# Patient Record
Sex: FEMALE | Race: WHITE | Employment: OTHER | URBAN - METROPOLITAN AREA
[De-identification: names, ages, dates, MRNs, and addresses within clinical notes are randomized per-mention and may not be internally consistent; named-entity substitution may affect disease eponyms.]

---

## 2018-05-16 ENCOUNTER — HOSPITAL ENCOUNTER (OUTPATIENT)
Age: 73
Discharge: HOME | End: 2018-05-18
Payer: COMMERCIAL

## 2018-05-16 PROCEDURE — 88305 TISSUE EXAM BY PATHOLOGIST: CPT

## 2018-11-03 ENCOUNTER — HOSPITAL ENCOUNTER (EMERGENCY)
Age: 73
Discharge: HOME | End: 2018-11-03
Attending: EMERGENCY MEDICINE
Payer: COMMERCIAL

## 2018-11-03 ENCOUNTER — APPOINTMENT (OUTPATIENT)
Dept: GENERAL RADIOLOGY | Age: 73
End: 2018-11-03
Payer: COMMERCIAL

## 2018-11-03 VITALS
DIASTOLIC BLOOD PRESSURE: 66 MMHG | TEMPERATURE: 98.2 F | RESPIRATION RATE: 18 BRPM | HEART RATE: 77 BPM | OXYGEN SATURATION: 93 % | BODY MASS INDEX: 34.72 KG/M2 | WEIGHT: 216 LBS | HEIGHT: 66 IN | SYSTOLIC BLOOD PRESSURE: 144 MMHG

## 2018-11-03 DIAGNOSIS — J40 BRONCHITIS: Primary | ICD-10-CM

## 2018-11-03 LAB
ANION GAP SERPL CALCULATED.3IONS-SCNC: 11 MMOL/L (ref 7–16)
BASOPHILS ABSOLUTE: 0.04 E9/L (ref 0–0.2)
BASOPHILS RELATIVE PERCENT: 1 % (ref 0–2)
BUN BLDV-MCNC: 16 MG/DL (ref 8–23)
CALCIUM SERPL-MCNC: 9 MG/DL (ref 8.6–10.2)
CHLORIDE BLD-SCNC: 107 MMOL/L (ref 98–107)
CO2: 23 MMOL/L (ref 22–29)
CREAT SERPL-MCNC: 1.3 MG/DL (ref 0.5–1)
EOSINOPHILS ABSOLUTE: 0.11 E9/L (ref 0.05–0.5)
EOSINOPHILS RELATIVE PERCENT: 2.6 % (ref 0–6)
GFR AFRICAN AMERICAN: 48
GFR NON-AFRICAN AMERICAN: 40 ML/MIN/1.73
GLUCOSE BLD-MCNC: 93 MG/DL (ref 74–109)
HCT VFR BLD CALC: 42.6 % (ref 34–48)
HEMOGLOBIN: 13.9 G/DL (ref 11.5–15.5)
IMMATURE GRANULOCYTES #: 0.01 E9/L
IMMATURE GRANULOCYTES %: 0.2 % (ref 0–5)
LYMPHOCYTES ABSOLUTE: 0.87 E9/L (ref 1.5–4)
LYMPHOCYTES RELATIVE PERCENT: 20.8 % (ref 20–42)
MCH RBC QN AUTO: 31.2 PG (ref 26–35)
MCHC RBC AUTO-ENTMCNC: 32.6 % (ref 32–34.5)
MCV RBC AUTO: 95.5 FL (ref 80–99.9)
MONOCYTES ABSOLUTE: 0.49 E9/L (ref 0.1–0.95)
MONOCYTES RELATIVE PERCENT: 11.7 % (ref 2–12)
NEUTROPHILS ABSOLUTE: 2.66 E9/L (ref 1.8–7.3)
NEUTROPHILS RELATIVE PERCENT: 63.7 % (ref 43–80)
PDW BLD-RTO: 15.8 FL (ref 11.5–15)
PLATELET # BLD: 156 E9/L (ref 130–450)
PMV BLD AUTO: 11.1 FL (ref 7–12)
POTASSIUM REFLEX MAGNESIUM: 4.4 MMOL/L (ref 3.5–5)
RBC # BLD: 4.46 E12/L (ref 3.5–5.5)
SODIUM BLD-SCNC: 141 MMOL/L (ref 132–146)
WBC # BLD: 4.2 E9/L (ref 4.5–11.5)

## 2018-11-03 PROCEDURE — 80048 BASIC METABOLIC PNL TOTAL CA: CPT

## 2018-11-03 PROCEDURE — 94640 AIRWAY INHALATION TREATMENT: CPT

## 2018-11-03 PROCEDURE — 85025 COMPLETE CBC W/AUTO DIFF WBC: CPT

## 2018-11-03 PROCEDURE — 99283 EMERGENCY DEPT VISIT LOW MDM: CPT

## 2018-11-03 PROCEDURE — 94664 DEMO&/EVAL PT USE INHALER: CPT

## 2018-11-03 PROCEDURE — 6360000002 HC RX W HCPCS: Performed by: EMERGENCY MEDICINE

## 2018-11-03 PROCEDURE — 6370000000 HC RX 637 (ALT 250 FOR IP): Performed by: EMERGENCY MEDICINE

## 2018-11-03 PROCEDURE — 2580000003 HC RX 258: Performed by: EMERGENCY MEDICINE

## 2018-11-03 PROCEDURE — 96374 THER/PROPH/DIAG INJ IV PUSH: CPT

## 2018-11-03 PROCEDURE — 71045 X-RAY EXAM CHEST 1 VIEW: CPT

## 2018-11-03 RX ORDER — PREDNISONE 20 MG/1
40 TABLET ORAL DAILY
Qty: 8 TABLET | Refills: 0 | Status: ON HOLD | OUTPATIENT
Start: 2018-11-03 | End: 2019-05-06 | Stop reason: HOSPADM

## 2018-11-03 RX ORDER — SODIUM CHLORIDE 0.9 % (FLUSH) 0.9 %
10 SYRINGE (ML) INJECTION PRN
Status: DISCONTINUED | OUTPATIENT
Start: 2018-11-03 | End: 2018-11-03 | Stop reason: HOSPADM

## 2018-11-03 RX ORDER — METHYLPREDNISOLONE SODIUM SUCCINATE 125 MG/2ML
125 INJECTION, POWDER, LYOPHILIZED, FOR SOLUTION INTRAMUSCULAR; INTRAVENOUS ONCE
Status: COMPLETED | OUTPATIENT
Start: 2018-11-03 | End: 2018-11-03

## 2018-11-03 RX ORDER — IPRATROPIUM BROMIDE AND ALBUTEROL SULFATE 2.5; .5 MG/3ML; MG/3ML
3 SOLUTION RESPIRATORY (INHALATION) ONCE
Status: COMPLETED | OUTPATIENT
Start: 2018-11-03 | End: 2018-11-03

## 2018-11-03 RX ORDER — LOSARTAN POTASSIUM 100 MG/1
100 TABLET ORAL DAILY
Status: ON HOLD | COMMUNITY
End: 2019-05-07 | Stop reason: HOSPADM

## 2018-11-03 RX ORDER — CEFDINIR 300 MG/1
300 CAPSULE ORAL 2 TIMES DAILY
Status: ON HOLD | COMMUNITY
End: 2019-05-07 | Stop reason: HOSPADM

## 2018-11-03 RX ORDER — ALBUTEROL SULFATE 90 UG/1
2 AEROSOL, METERED RESPIRATORY (INHALATION) EVERY 6 HOURS PRN
Qty: 1 INHALER | Refills: 1 | Status: ON HOLD | OUTPATIENT
Start: 2018-11-03 | End: 2019-05-14 | Stop reason: ALTCHOICE

## 2018-11-03 RX ADMIN — Medication 10 ML: at 11:57

## 2018-11-03 RX ADMIN — IPRATROPIUM BROMIDE AND ALBUTEROL SULFATE 3 AMPULE: .5; 3 SOLUTION RESPIRATORY (INHALATION) at 11:47

## 2018-11-03 RX ADMIN — METHYLPREDNISOLONE SODIUM SUCCINATE 125 MG: 125 INJECTION, POWDER, FOR SOLUTION INTRAMUSCULAR; INTRAVENOUS at 11:56

## 2018-11-03 ASSESSMENT — ENCOUNTER SYMPTOMS
EYE REDNESS: 0
VOMITING: 0
EYE PAIN: 0
BACK PAIN: 0
DIARRHEA: 0
ABDOMINAL DISTENTION: 0
COUGH: 0
EYE DISCHARGE: 0
SINUS PRESSURE: 0
SORE THROAT: 0
WHEEZING: 0
SHORTNESS OF BREATH: 0
NAUSEA: 0

## 2019-04-30 ENCOUNTER — APPOINTMENT (OUTPATIENT)
Dept: NUCLEAR MEDICINE | Age: 74
DRG: 872 | End: 2019-04-30
Payer: COMMERCIAL

## 2019-04-30 ENCOUNTER — HOSPITAL ENCOUNTER (INPATIENT)
Age: 74
LOS: 7 days | Discharge: NURSING, REHAB OR LONG-TERM CARE | DRG: 872 | End: 2019-05-07
Attending: EMERGENCY MEDICINE | Admitting: INTERNAL MEDICINE
Payer: COMMERCIAL

## 2019-04-30 ENCOUNTER — APPOINTMENT (OUTPATIENT)
Dept: GENERAL RADIOLOGY | Age: 74
DRG: 872 | End: 2019-04-30
Payer: COMMERCIAL

## 2019-04-30 ENCOUNTER — APPOINTMENT (OUTPATIENT)
Dept: CT IMAGING | Age: 74
DRG: 872 | End: 2019-04-30
Payer: COMMERCIAL

## 2019-04-30 DIAGNOSIS — R10.31 RIGHT LOWER QUADRANT PAIN: ICD-10-CM

## 2019-04-30 DIAGNOSIS — K63.89 PNEUMATOSIS COLI: Primary | ICD-10-CM

## 2019-04-30 DIAGNOSIS — M48.061 SPINAL STENOSIS OF LUMBAR REGION AT MULTIPLE LEVELS: ICD-10-CM

## 2019-04-30 LAB
ALBUMIN SERPL-MCNC: 3.1 G/DL (ref 3.5–5.2)
ALP BLD-CCNC: 99 U/L (ref 35–104)
ALT SERPL-CCNC: 38 U/L (ref 0–32)
ANION GAP SERPL CALCULATED.3IONS-SCNC: 18 MMOL/L (ref 7–16)
AST SERPL-CCNC: 45 U/L (ref 0–31)
BACTERIA: ABNORMAL /HPF
BASOPHILS ABSOLUTE: 0.04 E9/L (ref 0–0.2)
BASOPHILS RELATIVE PERCENT: 0.3 % (ref 0–2)
BILIRUB SERPL-MCNC: 0.5 MG/DL (ref 0–1.2)
BILIRUBIN URINE: ABNORMAL
BLOOD, URINE: ABNORMAL
BUN BLDV-MCNC: 53 MG/DL (ref 8–23)
CALCIUM SERPL-MCNC: 8.6 MG/DL (ref 8.6–10.2)
CHLORIDE BLD-SCNC: 91 MMOL/L (ref 98–107)
CLARITY: CLEAR
CO2: 18 MMOL/L (ref 22–29)
COLOR: YELLOW
CREAT SERPL-MCNC: 2.3 MG/DL (ref 0.5–1)
EOSINOPHILS ABSOLUTE: 0.08 E9/L (ref 0.05–0.5)
EOSINOPHILS RELATIVE PERCENT: 0.6 % (ref 0–6)
EPITHELIAL CELLS, UA: ABNORMAL /HPF
GFR AFRICAN AMERICAN: 25
GFR NON-AFRICAN AMERICAN: 21 ML/MIN/1.73
GLUCOSE BLD-MCNC: 92 MG/DL (ref 74–99)
GLUCOSE URINE: NEGATIVE MG/DL
HCT VFR BLD CALC: 40.3 % (ref 34–48)
HEMOGLOBIN: 13.3 G/DL (ref 11.5–15.5)
IMMATURE GRANULOCYTES #: 0.15 E9/L
IMMATURE GRANULOCYTES %: 1.2 % (ref 0–5)
INFLUENZA A BY PCR: NOT DETECTED
INFLUENZA B BY PCR: NOT DETECTED
KETONES, URINE: ABNORMAL MG/DL
LACTIC ACID: 0.8 MMOL/L (ref 0.5–2.2)
LEUKOCYTE ESTERASE, URINE: ABNORMAL
LYMPHOCYTES ABSOLUTE: 0.75 E9/L (ref 1.5–4)
LYMPHOCYTES RELATIVE PERCENT: 5.8 % (ref 20–42)
MCH RBC QN AUTO: 29.6 PG (ref 26–35)
MCHC RBC AUTO-ENTMCNC: 33 % (ref 32–34.5)
MCV RBC AUTO: 89.8 FL (ref 80–99.9)
MONOCYTES ABSOLUTE: 0.96 E9/L (ref 0.1–0.95)
MONOCYTES RELATIVE PERCENT: 7.5 % (ref 2–12)
NEUTROPHILS ABSOLUTE: 10.88 E9/L (ref 1.8–7.3)
NEUTROPHILS RELATIVE PERCENT: 84.6 % (ref 43–80)
NITRITE, URINE: NEGATIVE
PDW BLD-RTO: 17.2 FL (ref 11.5–15)
PH UA: 5 (ref 5–9)
PLATELET # BLD: 176 E9/L (ref 130–450)
PMV BLD AUTO: 12 FL (ref 7–12)
POTASSIUM SERPL-SCNC: 4.9 MMOL/L (ref 3.5–5)
PRO-BNP: 765 PG/ML (ref 0–450)
PROTEIN UA: ABNORMAL MG/DL
RBC # BLD: 4.49 E12/L (ref 3.5–5.5)
RBC UA: ABNORMAL /HPF (ref 0–2)
RENAL EPITHELIAL, UA: ABNORMAL /HPF
SODIUM BLD-SCNC: 127 MMOL/L (ref 132–146)
SPECIFIC GRAVITY UA: 1.01 (ref 1–1.03)
TOTAL PROTEIN: 6.6 G/DL (ref 6.4–8.3)
TROPONIN: 0.05 NG/ML (ref 0–0.03)
UROBILINOGEN, URINE: 0.2 E.U./DL
WBC # BLD: 12.9 E9/L (ref 4.5–11.5)
WBC UA: >20 /HPF (ref 0–5)

## 2019-04-30 PROCEDURE — 2060000000 HC ICU INTERMEDIATE R&B

## 2019-04-30 PROCEDURE — 94760 N-INVAS EAR/PLS OXIMETRY 1: CPT

## 2019-04-30 PROCEDURE — 87798 DETECT AGENT NOS DNA AMP: CPT

## 2019-04-30 PROCEDURE — 71045 X-RAY EXAM CHEST 1 VIEW: CPT

## 2019-04-30 PROCEDURE — 2580000003 HC RX 258: Performed by: EMERGENCY MEDICINE

## 2019-04-30 PROCEDURE — 87077 CULTURE AEROBIC IDENTIFY: CPT

## 2019-04-30 PROCEDURE — 3430000000 HC RX DIAGNOSTIC RADIOPHARMACEUTICAL: Performed by: RADIOLOGY

## 2019-04-30 PROCEDURE — 74176 CT ABD & PELVIS W/O CONTRAST: CPT

## 2019-04-30 PROCEDURE — 87581 M.PNEUMON DNA AMP PROBE: CPT

## 2019-04-30 PROCEDURE — 85025 COMPLETE CBC W/AUTO DIFF WBC: CPT

## 2019-04-30 PROCEDURE — A9540 TC99M MAA: HCPCS | Performed by: RADIOLOGY

## 2019-04-30 PROCEDURE — 87633 RESP VIRUS 12-25 TARGETS: CPT

## 2019-04-30 PROCEDURE — 87088 URINE BACTERIA CULTURE: CPT

## 2019-04-30 PROCEDURE — 87186 SC STD MICRODIL/AGAR DIL: CPT

## 2019-04-30 PROCEDURE — 78582 LUNG VENTILAT&PERFUS IMAGING: CPT

## 2019-04-30 PROCEDURE — 87040 BLOOD CULTURE FOR BACTERIA: CPT

## 2019-04-30 PROCEDURE — 81001 URINALYSIS AUTO W/SCOPE: CPT

## 2019-04-30 PROCEDURE — 83880 ASSAY OF NATRIURETIC PEPTIDE: CPT

## 2019-04-30 PROCEDURE — 87502 INFLUENZA DNA AMP PROBE: CPT

## 2019-04-30 PROCEDURE — 99285 EMERGENCY DEPT VISIT HI MDM: CPT

## 2019-04-30 PROCEDURE — 93005 ELECTROCARDIOGRAM TRACING: CPT | Performed by: EMERGENCY MEDICINE

## 2019-04-30 PROCEDURE — A9558 XE133 XENON 10MCI: HCPCS | Performed by: RADIOLOGY

## 2019-04-30 PROCEDURE — 83605 ASSAY OF LACTIC ACID: CPT

## 2019-04-30 PROCEDURE — 84484 ASSAY OF TROPONIN QUANT: CPT

## 2019-04-30 PROCEDURE — 87486 CHLMYD PNEUM DNA AMP PROBE: CPT

## 2019-04-30 PROCEDURE — 6360000002 HC RX W HCPCS: Performed by: EMERGENCY MEDICINE

## 2019-04-30 PROCEDURE — 80053 COMPREHEN METABOLIC PANEL: CPT

## 2019-04-30 RX ORDER — XENON XE-133 10 MCI/1
10 GAS RESPIRATORY (INHALATION)
Status: COMPLETED | OUTPATIENT
Start: 2019-04-30 | End: 2019-04-30

## 2019-04-30 RX ORDER — 0.9 % SODIUM CHLORIDE 0.9 %
1000 INTRAVENOUS SOLUTION INTRAVENOUS ONCE
Status: COMPLETED | OUTPATIENT
Start: 2019-04-30 | End: 2019-04-30

## 2019-04-30 RX ORDER — SODIUM CHLORIDE 0.9 % (FLUSH) 0.9 %
10 SYRINGE (ML) INJECTION PRN
Status: DISCONTINUED | OUTPATIENT
Start: 2019-04-30 | End: 2019-05-07 | Stop reason: HOSPADM

## 2019-04-30 RX ORDER — FENTANYL CITRATE 50 UG/ML
100 INJECTION, SOLUTION INTRAMUSCULAR; INTRAVENOUS ONCE
Status: COMPLETED | OUTPATIENT
Start: 2019-04-30 | End: 2019-04-30

## 2019-04-30 RX ADMIN — FENTANYL CITRATE 100 MCG: 50 INJECTION, SOLUTION INTRAMUSCULAR; INTRAVENOUS at 20:02

## 2019-04-30 RX ADMIN — XENON XE-133 10 MILLICURIE: 10 GAS RESPIRATORY (INHALATION) at 20:23

## 2019-04-30 RX ADMIN — Medication 6 MILLICURIE: at 20:23

## 2019-04-30 RX ADMIN — SODIUM CHLORIDE 1000 ML: 9 INJECTION, SOLUTION INTRAVENOUS at 20:02

## 2019-04-30 RX ADMIN — MEROPENEM 500 MG: 500 INJECTION, POWDER, FOR SOLUTION INTRAVENOUS at 21:36

## 2019-04-30 ASSESSMENT — ENCOUNTER SYMPTOMS
VOMITING: 0
DIARRHEA: 1
BELCHING: 0
CONSTIPATION: 0
NAUSEA: 0
BLOOD IN STOOL: 0
BACK PAIN: 0
COUGH: 1
ABDOMINAL PAIN: 1
HEMATEMESIS: 0
SHORTNESS OF BREATH: 1

## 2019-04-30 ASSESSMENT — PAIN DESCRIPTION - PAIN TYPE: TYPE: ACUTE PAIN

## 2019-04-30 ASSESSMENT — PAIN DESCRIPTION - LOCATION: LOCATION: ABDOMEN

## 2019-04-30 ASSESSMENT — PAIN SCALES - GENERAL
PAINLEVEL_OUTOF10: 6
PAINLEVEL_OUTOF10: 7

## 2019-04-30 NOTE — ED PROVIDER NOTES
smokeless tobacco. She reports that she drinks alcohol. She reports that she does not use drugs. Family History: family history is not on file. The patients home medications have been reviewed.     Allergies: Adrenalin [epinephrine]    -------------------------------------------------- RESULTS -------------------------------------------------    LABS:  Results for orders placed or performed during the hospital encounter of 04/30/19   Culture Blood #1   Result Value Ref Range    Blood Culture, Routine (A)      Gram stain performed from blood culture bottle media  Gram negative rods     Culture Blood #2   Result Value Ref Range    Culture, Blood 2 (A)      Gram stain performed from blood culture bottle media  Gram negative rods     Urine Culture   Result Value Ref Range    Urine Culture, Routine      Organism Escherichia coli (A)     Urine Culture, Routine >100,000 CFU/ml  Sensitivity to follow      Rapid influenza A/B antigens   Result Value Ref Range    Influenza A by PCR Not Detected Not Detected    Influenza B by PCR Not Detected Not Detected   Respiratory Panel, Film Array   Result Value Ref Range    Film Array Adenovirus       Result: Not Detected  *  *  Normal Range: Not Detected      Film Array Coronavirus HKU1       Result: Not Detected  *  *  Normal Range: Not Detected      Film Array Conoravirus NL63       Result: Not Detected  *  *  Normal Range: Not Detected      Film Array Coronavirus 229E       Result: Not Detected  *  *  Normal Range: Not Detected      Film Array Coronavirus OC43       Result: Not Detected  *  *  Normal Range: Not Detected      Film Array Influenza A Virus       Result: Not Detected  *  *  Normal Range: Not Detected      Film Array Influenza A Virus H1       Result: Not Detected  *  *  Normal Range: Not Detected      Film Array Influenza A Virus 09H1       Result: Not Detected  *  *  Normal Range: Not Detected      Film Array Influenza A Virus H3       Result: Not Detected  *  *  Normal Range: Not Detected      Film Array Influenza B       Result: Not Detected  *  *  Normal Range: Not Detected      Film Array Metapneumovirus       Result: Not Detected  *  *  Normal Range: Not Detected      Film Array Parainfluenza Virus 1       Result: Not Detected  *  *  Normal Range: Not Detected      Film Array Parainfluenza Virus 2       Result: Not Detected  *  *  Normal Range: Not Detected      Film Array Parainfluenza Virus 3       Result: Not Detected  *  *  Normal Range: Not Detected      Film Array Parainfluenza Virus 4       Result: Not Detected  *  *  Normal Range: Not Detected      Film Array Respiratory Syncitial Virus       Result: Not Detected  *  *  Normal Range: Not Detected      Film Array Rhinovirus/Enterovirus       Result: Not Detected  *  *  Normal Range: Not Detected      Film Array Bordetella Pertusis       Result: Not Detected  *  *  Normal Range: Not Detected      Film Array Chlamydophilia Pneumoniae       Result: Not Detected  *  *  Normal Range: Not Detected      Film Array Mycoplasma Pneumoniae       Result: Not Detected  *  *  Normal Range: Not Detected     CBC Auto Differential   Result Value Ref Range    WBC 12.9 (H) 4.5 - 11.5 E9/L    RBC 4.49 3.50 - 5.50 E12/L    Hemoglobin 13.3 11.5 - 15.5 g/dL    Hematocrit 40.3 34.0 - 48.0 %    MCV 89.8 80.0 - 99.9 fL    MCH 29.6 26.0 - 35.0 pg    MCHC 33.0 32.0 - 34.5 %    RDW 17.2 (H) 11.5 - 15.0 fL    Platelets 427 193 - 453 E9/L    MPV 12.0 7.0 - 12.0 fL    Neutrophils % 84.6 (H) 43.0 - 80.0 %    Immature Granulocytes % 1.2 0.0 - 5.0 %    Lymphocytes % 5.8 (L) 20.0 - 42.0 %    Monocytes % 7.5 2.0 - 12.0 %    Eosinophils % 0.6 0.0 - 6.0 %    Basophils % 0.3 0.0 - 2.0 %    Neutrophils # 10.88 (H) 1.80 - 7.30 E9/L    Immature Granulocytes # 0.15 E9/L    Lymphocytes # 0.75 (L) 1.50 - 4.00 E9/L    Monocytes # 0.96 (H) 0.10 - 0.95 E9/L    Eosinophils # 0.08 0.05 - 0.50 E9/L    Basophils # 0.04 0.00 - 0.20 E9/L Hemoglobin 12.1 11.5 - 15.5 g/dL    Hematocrit 36.4 34.0 - 48.0 %    MCV 90.5 80.0 - 99.9 fL    MCH 30.1 26.0 - 35.0 pg    MCHC 33.2 32.0 - 34.5 %    RDW 17.1 (H) 11.5 - 15.0 fL    Platelets 390 665 - 974 E9/L    MPV 11.5 7.0 - 12.0 fL   Comprehensive metabolic panel   Result Value Ref Range    Sodium 133 132 - 146 mmol/L    Potassium 4.4 3.5 - 5.0 mmol/L    Chloride 100 98 - 107 mmol/L    CO2 17 (L) 22 - 29 mmol/L    Anion Gap 16 7 - 16 mmol/L    Glucose 96 74 - 99 mg/dL    BUN 45 (H) 8 - 23 mg/dL    CREATININE 1.8 (H) 0.5 - 1.0 mg/dL    GFR Non-African American 27 >=60 mL/min/1.73    GFR African American 33     Calcium 7.9 (L) 8.6 - 10.2 mg/dL    Total Protein 5.8 (L) 6.4 - 8.3 g/dL    Alb 2.6 (L) 3.5 - 5.2 g/dL    Total Bilirubin 0.6 0.0 - 1.2 mg/dL    Alkaline Phosphatase 104 35 - 104 U/L    ALT 33 (H) 0 - 32 U/L    AST 42 (H) 0 - 31 U/L   CK   Result Value Ref Range    Total  (H) 20 - 180 U/L   CK MB   Result Value Ref Range    CK-MB 9.2 (H) 0.0 - 4.3 ng/mL   C-Reactive Protein   Result Value Ref Range    CRP 19.7 (H) 0.0 - 0.4 mg/dL   Sedimentation Rate   Result Value Ref Range    Sed Rate 76 (H) 0 - 20 mm/Hr   Urine electrolytes   Result Value Ref Range    Sodium, Ur <20 Not Established mmol/L    Potassium, Ur 32.9 Not Established mmol/L    Chloride <20 Not Established mmol/L   Urea nitrogen, urine   Result Value Ref Range    Urea Nitrogen, Ur 572 (L) 800 - 1666 mg/dL   EKG 12 Lead   Result Value Ref Range    Ventricular Rate 104 BPM    Atrial Rate 104 BPM    P-R Interval 134 ms    QRS Duration 74 ms    Q-T Interval 332 ms    QTc Calculation (Bazett) 436 ms    P Axis 85 degrees    R Axis 58 degrees    T Axis 35 degrees       RADIOLOGY:  Ct Abdomen Pelvis Wo Contrast Additional Contrast? None    Result Date: 2019  Patient MRN:  [id number] : 1945 Age: 74 years Gender: Female Order Date:  2019 4:00 PM EXAM: CT ABDOMEN PELVIS WO CONTRAST NUMBER OF IMAGES \ views:  460 INDICATION:  RLQ abdominal pain, abdominal distention, Durand he stools/diarrhea COMPARISON: None TECHNIQUE: Axial computerized tomography sections of the abdomen with sagittal and coronal MPR reconstructions were obtained from the lower chest to the pelvic floor without contrast administration. Low-dose CT acquisition technique included one of following options; 1 . Automated exposure control, 2. Adjustment of MA and or KV according to patient's size or 3. Use of iterative reconstruction. Noncontrast imaging limits visceral detail. FINDINGS: CHEST: The lung bases are remarkable for cardiomegaly without evidence of decompensation, and thickened airways with some patchy lower lobe density along the diaphragmatic surface which could represent early pneumonia or atelectasis in the left lower lung. LIVER: The liver is uniform in parenchymal density without focal findings GALLBLADDER AND BILIARY TREE: The gallbladder has been surgically removed, and there is no biliary ductal ectasia. SPLEEN:The spleen is not enlarged, and shows no focal pathology. ADRENALS:  The adrenals are normal in appearance bilaterally. PANCREAS:The pancreas shows no evidence of acute inflammation or mass lesions. There is a mild degree of fatty atrophy. KIDNEYS/BLADDER: The kidneys show cortical atrophy without evidence of outflow obstruction. There is no perinephric inflammatory change. Ureters are similar in course and caliber, and the bladder is normal in appearance. APPENDIX:  Retrocecal, and normal BOWEL:  Small bowel is unremarkable. The right hemicolon is prominently distended with fluid and gas, but shows no evidence of mural thickening. There is a relatively ahaustral appearance, which may indicate a chronic process, such as ulcerative colitis. Small bubbles in the periphery of the fluid adjacent the bowel wall could indicate very mild pneumatosis in the cecal region.  There is no associated mural thickening or extraluminal induration or hyperemia in the region of the cecum. There is no focal constricting lesion 2 suggest obstructive dilation. The colon tapers to more normal caliber in the left hemicolon, which is distended with gas, and shows scattered diverticuli without acute inflammatory findings. There is no evidence of a perforated viscus at this time. PELVIS: There is no dependent free pelvic fluid or pelvic adenopathy. The patient is post hysterectomy. LYMPHATICS:There is no mesenteric or retroperitoneal adenopathy. VASCULAR: There is no evidence of acute vascular pathology involving mesenteric or retroperitoneal great vessels. Atherosclerotic aortoiliac calcification is present. SKELETAL: Moderately advanced degenerative changes of the spine and hips are documented. The L3 vertebrae shows a compressed upper articular endplate without displacement, and there is disc space narrowing at the L4-5 level. There is fluid and gaseous distention of the right hemicolon, most prominent in the cecal region, where there may be subtle pneumatosis. Distention may mask mural inflammatory changes, such as in patient's with TYPHLITIS- related to immunosuppression, neutropenia, or white cell disorders. There is no evidence of perforation at this time, but the distended right hemicolon and slightly thin cecal wall may be sensitive to further distention or intervention. There is a small area of consolidation or atelectasis in the left lower lobe, retrocardiac region, but only a portion of the lower lungs was included. The patient is of large habitus, and advanced degenerative changes of the spine with some vertebral compressions are noted, but no other acute findings are specifically identified.      Nm Lung Vent/perfusion (vq)    Result Date: 2019  Patient MRN: [id number] : 1945 Age:  74 years Gender: Female Order Date: 2019 6:00 PM Exam: NM LUNG VENT/PERFUSION (VQ) Number of Images: 7 groups of images Indication: Chest discomfort, shortness of breath Comparison: Anterior upright chest radiograph today 1618 hours showing hypoventilated lungs in a patient of large habitus Technique/findings: The patient received 9.3 millicuries of 885 Xe. Ventilation images reveal uniform initial and equilibrium tracer distribution throughout the lungs, with rapid bilateral uniform washout. The patient received 7.7 millicuries of 34RFM MAA. Perfusion images reveal no segmental or multifocal subsegmental defects to suggest acute embolism. Normal study with no evidence of pulmonary embolism or significant air trapping. Xr Chest Portable    Result Date: 2019  Patient MRN: [id number] : 1945 Age:  74 years Gender: Female Order Date: 2019 4:00 PM Exam: XR CHEST PORTABLE Number of Views: 1 Indication:   Cough and shortness of breath and epigastric/right groin pain. Comparison: 11/3/2018 Findings: There is a enlarged cardiomediastinal silhouette when compared with the previous study with underlying pulmonary edema, right hemidiaphragmatic elevation, bibasilar airspace disease and thoracic aortic vascular calcifications. No pneumothorax. 1. Enlarged cardiomediastinal silhouette, the possibility of underlying pericardial effusion or other cardiac abnormalities are not excluded on the basis of this exam, clinical correlation recommended. . 2. Underlying pulmonary edema. 3. Nonspecific bibasilar airspace disease, findings can be seen infiltrate/pneumonia and/or atelectasis. 4. Right hemidiaphragmatic elevation. 5. Vascular calcifications thoracic aorta.    ------------------------- NURSING NOTES AND VITALS REVIEWED ---------------------------  Date / Time Roomed:  2019  3:47 PM  ED Bed Assignment:  [id number]    The nursing notes within the ED encounter and vital signs as below have been reviewed.      Patient Vitals for the past 24 hrs:   BP Temp Temp src Pulse Resp SpO2 Height Weight   19 1421 (!) 113/59 98.1 °F (36.7 °C) Oral 108 18 92 % -- -- 05/01/19 0836 -- -- -- -- -- 94 % -- --   05/01/19 0828 134/60 97.2 °F (36.2 °C) Axillary 106 18 (!) 88 % -- --   05/01/19 0534 -- -- -- -- -- -- -- 240 lb 3.2 oz (109 kg)   04/30/19 2230 136/73 98.6 °F (37 °C) Oral 109 18 96 % 5' 6\" (1.676 m) 240 lb 3.2 oz (109 kg)   04/30/19 2136 135/75 98.5 °F (36.9 °C) Oral 113 18 95 % -- --       Oxygen Saturation Interpretation: Normal    ------------------------------------------ PROGRESS NOTES ------------------------------------------  I did speak with Dr. Lauri Zee will admit the patient patient did have a VQ scan that showed no signs of pulmonary embolism she was felt safe for admission to a telemetry floor and is having improving with pain medication. Counseling:  I have spoken with the patient and discussed todays results, in addition to providing specific details for the plan of care and counseling regarding the diagnosis and prognosis. Their questions are answered at this time and they are agreeable with the plan of admission.    --------------------------------- ADDITIONAL PROVIDER NOTES ---------------------------------  This patient's ED course included: a personal history and physicial examination, re-evaluation prior to disposition, multiple bedside re-evaluations, IV medications, cardiac monitoring and continuous pulse oximetry    This patient has remained hemodynamically stable during their ED course. Diagnosis:  1. Pneumatosis coli        Disposition:  Patient's disposition: Admit to telemetry  Patient's condition is stable.              Yonis Hoover DO  Resident  05/01/19 9712

## 2019-04-30 NOTE — ED NOTES
Patient found to have SpO2 of 86% on room air in triage, placed on 2 L nasal cannula and up to 95%     Vitaliy Mckeon RN  04/30/19 5668

## 2019-05-01 LAB
ALBUMIN SERPL-MCNC: 2.6 G/DL (ref 3.5–5.2)
ALP BLD-CCNC: 104 U/L (ref 35–104)
ALT SERPL-CCNC: 33 U/L (ref 0–32)
ANION GAP SERPL CALCULATED.3IONS-SCNC: 16 MMOL/L (ref 7–16)
AST SERPL-CCNC: 42 U/L (ref 0–31)
BILIRUB SERPL-MCNC: 0.6 MG/DL (ref 0–1.2)
BUN BLDV-MCNC: 45 MG/DL (ref 8–23)
C-REACTIVE PROTEIN: 19.7 MG/DL (ref 0–0.4)
CALCIUM SERPL-MCNC: 7.9 MG/DL (ref 8.6–10.2)
CHLORIDE BLD-SCNC: 100 MMOL/L (ref 98–107)
CHLORIDE URINE RANDOM: <20 MMOL/L
CK MB: 9.2 NG/ML (ref 0–4.3)
CO2: 17 MMOL/L (ref 22–29)
CREAT SERPL-MCNC: 1.8 MG/DL (ref 0.5–1)
CREATININE URINE: 137 MG/DL (ref 29–226)
EKG ATRIAL RATE: 104 BPM
EKG P AXIS: 85 DEGREES
EKG P-R INTERVAL: 134 MS
EKG Q-T INTERVAL: 332 MS
EKG QRS DURATION: 74 MS
EKG QTC CALCULATION (BAZETT): 436 MS
EKG R AXIS: 58 DEGREES
EKG T AXIS: 35 DEGREES
EKG VENTRICULAR RATE: 104 BPM
FILM ARRAY ADENOVIRUS: NORMAL
FILM ARRAY BORDETELLA PERTUSSIS: NORMAL
FILM ARRAY CHLAMYDOPHILIA PNEUMONIAE: NORMAL
FILM ARRAY CORONAVIRUS 229E: NORMAL
FILM ARRAY CORONAVIRUS HKU1: NORMAL
FILM ARRAY CORONAVIRUS NL63: NORMAL
FILM ARRAY CORONAVIRUS OC43: NORMAL
FILM ARRAY INFLUENZA A VIRUS 09H1: NORMAL
FILM ARRAY INFLUENZA A VIRUS H1: NORMAL
FILM ARRAY INFLUENZA A VIRUS H3: NORMAL
FILM ARRAY INFLUENZA A VIRUS: NORMAL
FILM ARRAY INFLUENZA B: NORMAL
FILM ARRAY METAPNEUMOVIRUS: NORMAL
FILM ARRAY MYCOPLASMA PNEUMONIAE: NORMAL
FILM ARRAY PARAINFLUENZA VIRUS 1: NORMAL
FILM ARRAY PARAINFLUENZA VIRUS 2: NORMAL
FILM ARRAY PARAINFLUENZA VIRUS 3: NORMAL
FILM ARRAY PARAINFLUENZA VIRUS 4: NORMAL
FILM ARRAY RESPIRATORY SYNCITIAL VIRUS: NORMAL
FILM ARRAY RHINOVIRUS/ENTEROVIRUS: NORMAL
GFR AFRICAN AMERICAN: 33
GFR NON-AFRICAN AMERICAN: 27 ML/MIN/1.73
GLUCOSE BLD-MCNC: 96 MG/DL (ref 74–99)
HCT VFR BLD CALC: 36.4 % (ref 34–48)
HEMOGLOBIN: 12.1 G/DL (ref 11.5–15.5)
LACTIC ACID: 0.7 MMOL/L (ref 0.5–2.2)
LACTIC ACID: 1 MMOL/L (ref 0.5–2.2)
LV EF: 67 %
LVEF MODALITY: NORMAL
MCH RBC QN AUTO: 30.1 PG (ref 26–35)
MCHC RBC AUTO-ENTMCNC: 33.2 % (ref 32–34.5)
MCV RBC AUTO: 90.5 FL (ref 80–99.9)
MICROALBUMIN UR-MCNC: 26.1 MG/L
MICROALBUMIN/CREAT UR-RTO: 19.1 (ref 0–30)
PDW BLD-RTO: 17.1 FL (ref 11.5–15)
PLATELET # BLD: 145 E9/L (ref 130–450)
PMV BLD AUTO: 11.5 FL (ref 7–12)
POTASSIUM SERPL-SCNC: 4.4 MMOL/L (ref 3.5–5)
POTASSIUM, UR: 32.9 MMOL/L
RBC # BLD: 4.02 E12/L (ref 3.5–5.5)
SEDIMENTATION RATE, ERYTHROCYTE: 76 MM/HR (ref 0–20)
SODIUM BLD-SCNC: 133 MMOL/L (ref 132–146)
SODIUM URINE: <20 MMOL/L
TOTAL CK: 288 U/L (ref 20–180)
TOTAL PROTEIN: 5.8 G/DL (ref 6.4–8.3)
TROPONIN: 0.01 NG/ML (ref 0–0.03)
TROPONIN: 0.03 NG/ML (ref 0–0.03)
UREA NITROGEN, UR: 572 MG/DL (ref 800–1666)
WBC # BLD: 14.1 E9/L (ref 4.5–11.5)

## 2019-05-01 PROCEDURE — 2580000003 HC RX 258: Performed by: EMERGENCY MEDICINE

## 2019-05-01 PROCEDURE — 82550 ASSAY OF CK (CPK): CPT

## 2019-05-01 PROCEDURE — 51702 INSERT TEMP BLADDER CATH: CPT

## 2019-05-01 PROCEDURE — 93306 TTE W/DOPPLER COMPLETE: CPT

## 2019-05-01 PROCEDURE — 85651 RBC SED RATE NONAUTOMATED: CPT

## 2019-05-01 PROCEDURE — 80053 COMPREHEN METABOLIC PANEL: CPT

## 2019-05-01 PROCEDURE — 87040 BLOOD CULTURE FOR BACTERIA: CPT

## 2019-05-01 PROCEDURE — 84133 ASSAY OF URINE POTASSIUM: CPT

## 2019-05-01 PROCEDURE — 84484 ASSAY OF TROPONIN QUANT: CPT

## 2019-05-01 PROCEDURE — 99223 1ST HOSP IP/OBS HIGH 75: CPT | Performed by: INTERNAL MEDICINE

## 2019-05-01 PROCEDURE — 6360000002 HC RX W HCPCS: Performed by: INTERNAL MEDICINE

## 2019-05-01 PROCEDURE — 82044 UR ALBUMIN SEMIQUANTITATIVE: CPT

## 2019-05-01 PROCEDURE — 83605 ASSAY OF LACTIC ACID: CPT

## 2019-05-01 PROCEDURE — 2060000000 HC ICU INTERMEDIATE R&B

## 2019-05-01 PROCEDURE — 82436 ASSAY OF URINE CHLORIDE: CPT

## 2019-05-01 PROCEDURE — 85027 COMPLETE CBC AUTOMATED: CPT

## 2019-05-01 PROCEDURE — APPSS60 APP SPLIT SHARED TIME 46-60 MINUTES: Performed by: NURSE PRACTITIONER

## 2019-05-01 PROCEDURE — 82553 CREATINE MB FRACTION: CPT

## 2019-05-01 PROCEDURE — 84540 ASSAY OF URINE/UREA-N: CPT

## 2019-05-01 PROCEDURE — 6360000002 HC RX W HCPCS: Performed by: SPECIALIST

## 2019-05-01 PROCEDURE — 2500000003 HC RX 250 WO HCPCS: Performed by: STUDENT IN AN ORGANIZED HEALTH CARE EDUCATION/TRAINING PROGRAM

## 2019-05-01 PROCEDURE — 84300 ASSAY OF URINE SODIUM: CPT

## 2019-05-01 PROCEDURE — 2580000003 HC RX 258: Performed by: SPECIALIST

## 2019-05-01 PROCEDURE — 93010 ELECTROCARDIOGRAM REPORT: CPT | Performed by: INTERNAL MEDICINE

## 2019-05-01 PROCEDURE — 36415 COLL VENOUS BLD VENIPUNCTURE: CPT

## 2019-05-01 PROCEDURE — 82570 ASSAY OF URINE CREATININE: CPT

## 2019-05-01 PROCEDURE — 2700000000 HC OXYGEN THERAPY PER DAY

## 2019-05-01 PROCEDURE — 2500000003 HC RX 250 WO HCPCS: Performed by: INTERNAL MEDICINE

## 2019-05-01 PROCEDURE — 2580000003 HC RX 258: Performed by: INTERNAL MEDICINE

## 2019-05-01 PROCEDURE — 86140 C-REACTIVE PROTEIN: CPT

## 2019-05-01 RX ORDER — SODIUM CHLORIDE 9 MG/ML
INJECTION, SOLUTION INTRAVENOUS CONTINUOUS
Status: DISCONTINUED | OUTPATIENT
Start: 2019-05-01 | End: 2019-05-01

## 2019-05-01 RX ORDER — MORPHINE SULFATE 2 MG/ML
4 INJECTION, SOLUTION INTRAMUSCULAR; INTRAVENOUS
Status: DISCONTINUED | OUTPATIENT
Start: 2019-05-01 | End: 2019-05-04

## 2019-05-01 RX ORDER — MORPHINE SULFATE 2 MG/ML
2 INJECTION, SOLUTION INTRAMUSCULAR; INTRAVENOUS
Status: DISCONTINUED | OUTPATIENT
Start: 2019-05-01 | End: 2019-05-04

## 2019-05-01 RX ADMIN — Medication 10 ML: at 22:59

## 2019-05-01 RX ADMIN — MORPHINE SULFATE 2 MG: 2 INJECTION, SOLUTION INTRAMUSCULAR; INTRAVENOUS at 17:19

## 2019-05-01 RX ADMIN — Medication 10 ML: at 20:50

## 2019-05-01 RX ADMIN — MORPHINE SULFATE 4 MG: 2 INJECTION, SOLUTION INTRAMUSCULAR; INTRAVENOUS at 08:55

## 2019-05-01 RX ADMIN — Medication 10 ML: at 08:58

## 2019-05-01 RX ADMIN — CEFTRIAXONE 1 G: 1 INJECTION, POWDER, FOR SOLUTION INTRAMUSCULAR; INTRAVENOUS at 10:18

## 2019-05-01 RX ADMIN — SODIUM BICARBONATE: 84 INJECTION, SOLUTION INTRAVENOUS at 16:36

## 2019-05-01 RX ADMIN — AMPICILLIN AND SULBACTAM 3 G: 1; .5 INJECTION, POWDER, FOR SOLUTION INTRAMUSCULAR; INTRAVENOUS at 22:59

## 2019-05-01 RX ADMIN — AMPICILLIN AND SULBACTAM 3 G: 1; .5 INJECTION, POWDER, FOR SOLUTION INTRAMUSCULAR; INTRAVENOUS at 14:25

## 2019-05-01 RX ADMIN — METRONIDAZOLE 500 MG: 500 INJECTION, SOLUTION INTRAVENOUS at 08:24

## 2019-05-01 RX ADMIN — MORPHINE SULFATE 4 MG: 2 INJECTION, SOLUTION INTRAMUSCULAR; INTRAVENOUS at 11:21

## 2019-05-01 RX ADMIN — MORPHINE SULFATE 4 MG: 2 INJECTION, SOLUTION INTRAMUSCULAR; INTRAVENOUS at 14:21

## 2019-05-01 RX ADMIN — SODIUM CHLORIDE: 9 INJECTION, SOLUTION INTRAVENOUS at 01:00

## 2019-05-01 RX ADMIN — Medication 10 ML: at 14:19

## 2019-05-01 RX ADMIN — MORPHINE SULFATE 4 MG: 2 INJECTION, SOLUTION INTRAMUSCULAR; INTRAVENOUS at 20:50

## 2019-05-01 RX ADMIN — Medication 10 ML: at 11:21

## 2019-05-01 RX ADMIN — MORPHINE SULFATE 4 MG: 2 INJECTION, SOLUTION INTRAMUSCULAR; INTRAVENOUS at 06:14

## 2019-05-01 RX ADMIN — Medication 10 ML: at 17:19

## 2019-05-01 ASSESSMENT — PAIN - FUNCTIONAL ASSESSMENT
PAIN_FUNCTIONAL_ASSESSMENT: PREVENTS OR INTERFERES SOME ACTIVE ACTIVITIES AND ADLS
PAIN_FUNCTIONAL_ASSESSMENT: PREVENTS OR INTERFERES WITH MANY ACTIVE NOT PASSIVE ACTIVITIES

## 2019-05-01 ASSESSMENT — PAIN DESCRIPTION - ORIENTATION
ORIENTATION: MID;LOWER
ORIENTATION: MID;RIGHT
ORIENTATION: MID;LEFT;RIGHT
ORIENTATION: LOWER;MID;RIGHT

## 2019-05-01 ASSESSMENT — PAIN DESCRIPTION - PAIN TYPE
TYPE: ACUTE PAIN

## 2019-05-01 ASSESSMENT — PAIN SCALES - GENERAL
PAINLEVEL_OUTOF10: 9
PAINLEVEL_OUTOF10: 8
PAINLEVEL_OUTOF10: 3
PAINLEVEL_OUTOF10: 8
PAINLEVEL_OUTOF10: 10
PAINLEVEL_OUTOF10: 0
PAINLEVEL_OUTOF10: 8
PAINLEVEL_OUTOF10: 5
PAINLEVEL_OUTOF10: 4

## 2019-05-01 ASSESSMENT — PAIN DESCRIPTION - LOCATION
LOCATION: BACK;HIP
LOCATION: BACK
LOCATION: BACK
LOCATION: BACK;GROIN

## 2019-05-01 ASSESSMENT — PAIN DESCRIPTION - DESCRIPTORS
DESCRIPTORS: ACHING;DISCOMFORT;THROBBING
DESCRIPTORS: DISCOMFORT

## 2019-05-01 ASSESSMENT — PAIN DESCRIPTION - PROGRESSION: CLINICAL_PROGRESSION: GRADUALLY WORSENING

## 2019-05-01 ASSESSMENT — PAIN DESCRIPTION - FREQUENCY
FREQUENCY: CONTINUOUS

## 2019-05-01 ASSESSMENT — PAIN DESCRIPTION - ONSET: ONSET: ON-GOING

## 2019-05-01 NOTE — CONSULTS
Inpatient Cardiology Consultation      Reason for Consult:  Elevated Troponin     Consulting Physician: Dr. Rikki Dia     Requesting Physician:  Dr. Imelda Garcia     Date of Consultation: 5/1/2019    HISTORY OF PRESENT ILLNESS:   Ms. Pat Nova is a 74year old  female who is previously not known to [facility]. Her medical history includes HTN, HLD, morbid obesity, hypothyroidism, and Gout. Ms. Pat Nova presented to [facility] ED on 04/30/2019 with complaints of right groin and diffuse abdominal pain. She states that prior to presentation she was having diffuse lower back pain for which she was treating with ice and a heating pad and muscle relaxers. She states that this was not helping and denies fall or injury prior to the pain. She states that on 04/27/2019 she developed right groin pain with diffuse lower abdominal pain as well as diffuse epigastric and right and left upper quadrant pain. She states that her right groin and diffuse lower abdominal pain was continuous and that her upper abdominal pain was intermittent lasting hours and unchanged with exertion. She denies accompanying dyspnea, orthopnea, PND, chest pain, or black/bloody/tarry stools. She states that she had diarrhea and constipation and chills with edema to her BLE for the past week. Upon arrival to the ED her VE were 98.5-132-/61-86% on RA. EKG SR. CXR with questionable pneumonia/pulmonary edema. CT of the abdomen and pelvis with pneumotosis colon to cecum. VQ scan was negative for pulmonary emboli. She received NS, Merrem, and Fentanyl. She was admitted to a telemetry monitored unit. Troponin was 0.05 and 0.03 with BUN/SCr of 53/2.3. Nephrology and General Surgery were consulted. Cardiology was consulted by Dr. Imelda Garcia for evaluation of elevated Troponin. Currently, Ms. Pat Nova is sitting up in bed. Denies chest pain and dyspnea. VS are stable. Telemetry monitoring SR. She is in no acute distress. times daily    Historical Provider, MD   predniSONE (DELTASONE) 20 MG tablet Take 2 tablets by mouth daily 11/3/18   Chrissy Herndon DO   liothyronine (CYTOMEL) 5 MCG tablet Take 5 mcg by mouth daily. Instructed to take morning of surgery with a sip of water    Historical Provider, MD   Coenzyme Q10 (CO Q 10 PO) Take  by mouth. LAST DOSE 1/17/13    Historical Provider, MD       Current Medications:    Current Facility-Administered Medications: 0.9 % sodium chloride infusion, , Intravenous, Continuous  morphine (PF) injection 2 mg, 2 mg, Intravenous, Q2H PRN  morphine (PF) injection 4 mg, 4 mg, Intravenous, Q2H PRN  metronidazole (FLAGYL) 500 mg in NaCl 100 mL IVPB premix, 500 mg, Intravenous, Q8H  cefTRIAXone (ROCEPHIN) 1 g in dextrose 5 % 50 mL IVPB (vial-mate), 1 g, Intravenous, Q24H  sodium chloride flush 0.9 % injection 10 mL, 10 mL, Intravenous, PRN    Allergies:  Adrenalin [epinephrine]    Social History:    Denies tobacco and illicit drug use. Caffeine intake includes decaf coffee daily. Alcohol intake includes 1-2 glasses of wine a day. Family History:   Please note this information was not obtained at this time as it is limited in nature due to the patient's advanced age. REVIEW OF SYSTEMS:     · Constitutional: Denies fevers, chills, night sweats, and fatigue  · HEENT: Denies headaches, nose bleeds, and blurred vision,oral pain, abscess or lesion. · Musculoskeletal: Denies falls, pain to BLE with ambulation and complains of edema to BLE. Complains of right groin and diffuse lumbar back pain-see HPI. · Neurological: Denies dizziness and lightheadedness, numbness and tingling  · Cardiovascular: Denies chest pain, palpitations, and feelings of heart racing. · Respiratory: Denies orthopnea and PND  · Gastrointestinal: Denies heartburn, nausea/vomiting. Complains of diffuse abdominal pain, diarrhea and constipation-see HPI. Denies black/bloody, and tarry stools.    · Genitourinary: Denies dysuria and hematuria  · Hematologic: Denies excessive bruising or bleeding  · Lymphatic: Denies lumps and bumps to neck, axilla, breast, and groin  · Endocrine: Denies excessive thirst. Denies intolerance to hot and cold  · GYN: Postmenopausal state; Denies vaginal bleeding. · Psychiatric: Complains of anxiety and depression. PHYSICAL EXAM:   /73   Pulse 109   Temp 98.6 °F (37 °C) (Oral)   Resp 18   Ht 5' 6\" (1.676 m)   Wt 240 lb 3.2 oz (109 kg)   SpO2 96%   Breastfeeding? No   BMI 38.77 kg/m²   CONST:  Well developed, morbidly obese elderly female who appears of her stated age. Awake, alert, cooperative, no apparent distress  HEENT:   Head- Normocephalic, atraumatic   Eyes- Conjunctivae pink, anicteric  Throat- Oral mucosa pink and moist  Neck-  No stridor, trachea midline, no jugular venous distention. No adenopathy   CHEST: Chest symmetrical and non-tender to palpation. No accessory muscle use or intercostal retractions  RESPIRATORY: Lung sounds - clear throughout fields   CARDIOVASCULAR:     No carotid bruit  Heart Inspection- shows no noted pulsations  Heart Palpation- no heaves or thrills; PMI is non-displaced   Heart Ausculation- Regular rate and rhythm, no murmur. No s3, s4 or rub   PV: No lower extremity edema. No varicosities. Pedal pulses palpable, no clubbing or cyanosis   ABDOMEN: Soft, obese, non-tender to light palpation. Bowel sounds present. No palpable masses no organomegaly; no abdominal bruit  MS: Good muscle strength and tone. No atrophy or abnormal movements. : Deferred  SKIN: Warm and dry no statis dermatitis or ulcers   NEURO / PSYCH: Oriented to person, place and time. Speech clear and appropriate. Follows all commands.  Pleasant affect     DATA:    ECG: SR   Tele strips: SR  Diagnostic:      Intake/Output Summary (Last 24 hours) at 5/1/2019 0757  Last data filed at 5/1/2019 0420  Gross per 24 hour   Intake 384 ml   Output --   Net 384 ml       Labs:   CBC:   Recent Labs     04/30/19  1700 05/01/19  0700   WBC 12.9* 14.1*   HGB 13.3 12.1   HCT 40.3 36.4    145     BMP:   Recent Labs     04/30/19 1700 05/01/19  0700   * 133   K 4.9 4.4   CO2 18* 17*   BUN 53* 45*   CREATININE 2.3* 1.8*   LABGLOM 21 27   CALCIUM 8.6 7.9*   CARDIAC ENZYMES:  Recent Labs     04/30/19  1700 05/01/19  0115 05/01/19  0700   TROPONINI 0.05* 0.03 0.01   LIVER PROFILE:  Recent Labs     04/30/19 1700 05/01/19  0700   AST 45* 42*   ALT 38* 33*   LABALBU 3.1* 2.6*     04/30/2019 CXR:   1. Enlarged cardiomediastinal silhouette, the possibility of  underlying pericardial effusion or other cardiac abnormalities are not  excluded on the basis of this exam, clinical correlation recommended. .  2. Underlying pulmonary edema. 3. Nonspecific bibasilar airspace disease, findings can be seen  infiltrate/pneumonia and/or atelectasis. 4. Right hemidiaphragmatic elevation. 5. Vascular calcifications thoracic aorta.     04/30/2019 VQ Scan:  Normal study with no evidence of pulmonary embolism or significant air trapping. 04/30/2019 CT of Abdomen and Pelvis: There is fluid and gaseous distention of the right hemicolon, most  prominent in the cecal region, where there may be subtle pneumatosis. Distention may mask mural inflammatory changes, such as in patient's  with TYPHLITIS- related to immunosuppression, neutropenia, or white  cell disorders. There is no evidence of perforation at this time, but  the distended right hemicolon and slightly thin cecal wall may be  sensitive to further distention or intervention. There is a small area of consolidation or atelectasis in the left  lower lobe, retrocardiac region, but only a portion of the lower lungs  was included. The patient is of large habitus, and advanced degenerative changes of  the spine with some vertebral compressions are noted, but no other  acute findings are specifically identified. IMPRESSION:  1.  Troponin elevation consistent with myocyte necrosis not yet meeting criteria for an acute MI type I or type II. 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 No chest pain or acute EKG changes  2. Diffuse abdominal pain with right groin pain. Pneumatosis of the colon per CT of the abdomen this admission; General Surgery consulted  3. Lumbar back pain  4. 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 Improving with IVF  5. HTN: Stable  6. HLD  7. Morbid Obesity  8. Hypothyroidism, on replacement therapy  9. Gout      PLAN:  1. Echocardiogram to evaluate WMA. 2. No Troponin driven strategy at present  3. Consider Lexiscan. May be done as an outpatient. Electronically signed by JACOB Bain CNP on 5/1/2019 at 7:53 AM     [id number] [facility] cardiology  Patient is interviewed and examined by me today. She is somnolent and not a good historian. This is taken primarily from the EMR. Ms. Grazyna Lyon presented to [facility] ED on 04/30/2019 with complaints of right groin and diffuse abdominal pain. She states that prior to presentation she was having diffuse lower back pain for which she was treating with ice and a heating pad and muscle relaxers. She states that this was not helping and denies fall or injury prior to the pain. She states that on 04/27/2019 she developed right groin pain with diffuse lower abdominal pain as well as diffuse epigastric and right and left upper quadrant pain.  She states that her right groin and diffuse lower abdominal pain was continuous and that her upper abdominal pain remained the HPI stands as summarized on the chart    Review of Systems:  Constitutional: negative for fever and chills  Respiratory: negative for cough and hemoptysis  Cardiovascular:   Gastrointestinal: negative for abdominal pain, diarrhea, nausea and vomiting  Genitourinary:negative for dysuria and hematuria  Derm: negative for rash and skin lesion(s)  Neurological: negative for seizures and tremors  Endocrine: negative for diabetic symptoms including polydipsia and polyuria  Musculoskeletal: negative for CTD  Psychiatric: negative for psychosis and major depression    On examination by me she is an obese elderly  female who is somnolent and in no acute distress. /73   Pulse 109   Temp 98.6 °F (37 °C) (Oral)   Resp 18   Ht 5' 6\" (1.676 m)   Wt 240 lb 3.2 oz (109 kg)   SpO2 96%   Breastfeeding? No   BMI 38.77 kg/m²   No JVD. No cervical bruit or goiter. Breath sounds diminished with poor and poor effort. Chronic [place] not displaced. Grade 1 SHARON left lower sternal border. Bowel sounds intact. No edema clubbing or cyanosis    EKG interpretation by me sinus tachycardia.  Nonspecific ST segment abnormality    Cardiac assessment and plan formulated by me    I have personally interviewed the patient, independently performed a focused cardiac exam, reviewed the pertinent laboratory and diagnostic testing results with the patient, and directly participated in the medical decision-making as noted above with additions and corrections as appropriate>> HARDEEP Cordero MD

## 2019-05-01 NOTE — PROGRESS NOTES
Consult called to [facility] @ answering service for Dr. Claudio Jerez regarding this patient. Rajat Bianchi, UHA/UC2  5/1/2019   8:01 AM    Dr. Marquez Aquino called back stating consult came to him and Dr. Claudio Jerez is available. Recalled answering service and spoke with Clementine regarding error. Dr. Claudio Jerez to be paged.   8:11 AM

## 2019-05-01 NOTE — CONSULTS
GENERAL SURGERY  CONSULT NOTE  5/1/2019    Physician Consulted: Dr. Amanda Terrell  Reason for Consult: Abdominal pain  Referring Physician: Dr. Sulaiman Vicente    HPI  Anurag Novak is a 74 y.o. female who presents for evaluation of back pain for one week that migrated to mid-abdomen for the past couple of days. She has not had a solid bowel movement for one week. Reports loose stools. Denied melena or blood in stool. Passing flatus. Recent colonoscopy by Dr. Lydia Clay. Hx of cholecystectomy. She denied fever, chills, cp, sob, n/v.      Past Medical History:   Diagnosis Date    Gout     CONTROLLED    Hyperlipidemia     Hypertension     STABLE    Macular hole, right eye     Thyroid disease        Past Surgical History:   Procedure Laterality Date    BREAST SURGERY Right 1982    BENIGN CYST    CHOLECYSTECTOMY  1997    HYSTERECTOMY  1985       Medications Prior to Admission:    Prior to Admission medications    Medication Sig Start Date End Date Taking? Authorizing Provider   losartan (COZAAR) 100 MG tablet Take 100 mg by mouth daily   Yes Historical Provider, MD   albuterol sulfate  (90 Base) MCG/ACT inhaler Inhale 2 puffs into the lungs every 6 hours as needed for Wheezing 11/3/18  Yes Parrish Moraes, DO   Spacer/Aero-Holding Chambers (E-Z SPACER) YAIR Use with all metered dose inhalers 11/3/18  Yes Josep Gibson, DO   atenolol (TENORMIN) 50 MG tablet Take 50 mg by mouth daily. Instructed to take morning of surgery with a sip of water   Yes Historical Provider, MD   atenolol (TENORMIN) 25 MG tablet Take 25 mg by mouth nightly. Yes Historical Provider, MD   lisinopril (PRINIVIL;ZESTRIL) 20 MG tablet Take 20 mg by mouth daily. Yes Historical Provider, MD   levothyroxine (SYNTHROID) 112 MCG tablet Take 112 mcg by mouth Daily. Instructed to take morning of surgery with a sip of water   Yes Historical Provider, MD   pravastatin (PRAVACHOL) 40 MG tablet Take 40 mg by mouth nightly.    Yes Historical Provider, MD   potassium chloride (MICRO-K) 10 MEQ CR capsule Take 10 mEq by mouth 2 times daily. Yes Historical Provider, MD   probenecid (BENEMID) 500 MG tablet Take 500 mg by mouth 2 times daily. Yes Historical Provider, MD   aspirin 81 MG tablet Take 81 mg by mouth daily. LAST DOSE 1/11/13   Yes Historical Provider, MD   multivitamin [facility]) per tablet Take 1 tablet by mouth daily. Yes Historical Provider, MD   cefdinir (OMNICEF) 300 MG capsule Take 300 mg by mouth 2 times daily    Historical Provider, MD   predniSONE (DELTASONE) 20 MG tablet Take 2 tablets by mouth daily 11/3/18   Сергей Flores DO   liothyronine (CYTOMEL) 5 MCG tablet Take 5 mcg by mouth daily. Instructed to take morning of surgery with a sip of water    Historical Provider, MD   Coenzyme Q10 (CO Q 10 PO) Take  by mouth. LAST DOSE 1/17/13    Historical Provider, MD       Allergies   Allergen Reactions    Adrenalin [Epinephrine]      CAUSES TACHYCARDIA       No family history on file. Social History     Tobacco Use    Smoking status: Never Smoker    Smokeless tobacco: Never Used   Substance Use Topics    Alcohol use: Yes     Comment: socially    Drug use: No         Review of Systems   General ROS: positive for  - chills  negative for - fatigue, fever or night sweats  Hematological and Lymphatic ROS: negative for - fatigue, jaundice, night sweats or pallor  Respiratory ROS: no cough, shortness of breath, or wheezing  Cardiovascular ROS: no chest pain or dyspnea on exertion  Gastrointestinal ROS: positive for - abdominal pain, constipation and diarrhea  Genito-Urinary ROS: no dysuria, trouble voiding, or hematuria  Musculoskeletal ROS: negative for - joint swelling, muscle pain or muscular weakness      PHYSICAL EXAM:    Vitals:    04/30/19 2230   BP: 136/73   Pulse: 109   Resp: 18   Temp: 98.6 °F (37 °C)   SpO2: 96%       General: NAD, awake and alert. A&Ox3  Head: Normocephalic, atraumatic  Eyes: PERRLA, EOMI.  No sclera icterus. Lungs: No increased work of breathing. CTAB. No W/R/R. Cardiovascular: RRR. Abdomen: Soft, distended, TTP RLQ. No rebound, guarding or rigidity. Rectal: No masses. Brown stool. Extremities: Atraumatic, full range of motion  Skin: Warm, dry and intact    LABS:  CBC  Recent Labs     19  1700   WBC 12.9*   HGB 13.3   HCT 40.3        BMP  Recent Labs     19  1700   *   K 4.9   CL 91*   CO2 18*   BUN 53*   CREATININE 2.3*   CALCIUM 8.6     Liver Function  Recent Labs     19  1700   BILITOT 0.5   AST 45*   ALT 38*   ALKPHOS 99   PROT 6.6   LABALBU 3.1*     No results for input(s): LACTATE in the last 72 hours. No results for input(s): INR, PTT in the last 72 hours. Invalid input(s): PT    RADIOLOGY  Ct Abdomen Pelvis Wo Contrast Additional Contrast? None    Result Date: 2019  Patient MRN:  [id number] : 1945 Age: 74 years Gender: Female Order Date:  2019 4:00 PM EXAM: CT ABDOMEN PELVIS WO CONTRAST NUMBER OF IMAGES \ views:  26 INDICATION:  RLQ abdominal pain, abdominal distention, Shelby he stools/diarrhea COMPARISON: None TECHNIQUE: Axial computerized tomography sections of the abdomen with sagittal and coronal MPR reconstructions were obtained from the lower chest to the pelvic floor without contrast administration. Low-dose CT acquisition technique included one of following options; 1 . Automated exposure control, 2. Adjustment of MA and or KV according to patient's size or 3. Use of iterative reconstruction. Noncontrast imaging limits visceral detail. FINDINGS: CHEST: The lung bases are remarkable for cardiomegaly without evidence of decompensation, and thickened airways with some patchy lower lobe density along the diaphragmatic surface which could represent early pneumonia or atelectasis in the left lower lung.  LIVER: The liver is uniform in parenchymal density without focal findings GALLBLADDER AND BILIARY TREE: The gallbladder has been surgically removed, and there is no biliary ductal ectasia. SPLEEN:The spleen is not enlarged, and shows no focal pathology. ADRENALS:  The adrenals are normal in appearance bilaterally. PANCREAS:The pancreas shows no evidence of acute inflammation or mass lesions. There is a mild degree of fatty atrophy. KIDNEYS/BLADDER: The kidneys show cortical atrophy without evidence of outflow obstruction. There is no perinephric inflammatory change. Ureters are similar in course and caliber, and the bladder is normal in appearance. APPENDIX:  Retrocecal, and normal BOWEL:  Small bowel is unremarkable. The right hemicolon is prominently distended with fluid and gas, but shows no evidence of mural thickening. There is a relatively ahaustral appearance, which may indicate a chronic process, such as ulcerative colitis. Small bubbles in the periphery of the fluid adjacent the bowel wall could indicate very mild pneumatosis in the cecal region. There is no associated mural thickening or extraluminal induration or hyperemia in the region of the cecum. There is no focal constricting lesion 2 suggest obstructive dilation. The colon tapers to more normal caliber in the left hemicolon, which is distended with gas, and shows scattered diverticuli without acute inflammatory findings. There is no evidence of a perforated viscus at this time. PELVIS: There is no dependent free pelvic fluid or pelvic adenopathy. The patient is post hysterectomy. LYMPHATICS:There is no mesenteric or retroperitoneal adenopathy. VASCULAR: There is no evidence of acute vascular pathology involving mesenteric or retroperitoneal great vessels. Atherosclerotic aortoiliac calcification is present. SKELETAL: Moderately advanced degenerative changes of the spine and hips are documented. The L3 vertebrae shows a compressed upper articular endplate without displacement, and there is disc space narrowing at the L4-5 level.      There is fluid and gaseous distention of the right hemicolon, most prominent in the cecal region, where there may be subtle pneumatosis. Distention may mask mural inflammatory changes, such as in patient's with TYPHLITIS- related to immunosuppression, neutropenia, or white cell disorders. There is no evidence of perforation at this time, but the distended right hemicolon and slightly thin cecal wall may be sensitive to further distention or intervention. There is a small area of consolidation or atelectasis in the left lower lobe, retrocardiac region, but only a portion of the lower lungs was included. The patient is of large habitus, and advanced degenerative changes of the spine with some vertebral compressions are noted, but no other acute findings are specifically identified. Nm Lung Vent/perfusion (vq)    Result Date: 2019  Patient MRN: [id number] : 1945 Age:  74 years Gender: Female Order Date: 2019 6:00 PM Exam: NM LUNG VENT/PERFUSION (VQ) Number of Images: 7 groups of images Indication: Chest discomfort, shortness of breath Comparison: Anterior upright chest radiograph today 1618 hours showing hypoventilated lungs in a patient of large habitus Technique/findings: The patient received 9.3 millicuries of 756 Xe. Ventilation images reveal uniform initial and equilibrium tracer distribution throughout the lungs, with rapid bilateral uniform washout. The patient received 7.7 millicuries of 25WDW MAA. Perfusion images reveal no segmental or multifocal subsegmental defects to suggest acute embolism. Normal study with no evidence of pulmonary embolism or significant air trapping. Xr Chest Portable    Result Date: 2019  Patient MRN: [id number] : 1945 Age:  74 years Gender: Female Order Date: 2019 4:00 PM Exam: XR CHEST PORTABLE Number of Views: 1 Indication:   Cough and shortness of breath and epigastric/right groin pain. Comparison: 11/3/2018 Findings:  There is a enlarged cardiomediastinal silhouette when compared with the previous study with underlying pulmonary edema, right hemidiaphragmatic elevation, bibasilar airspace disease and thoracic aortic vascular calcifications. No pneumothorax. 1. Enlarged cardiomediastinal silhouette, the possibility of underlying pericardial effusion or other cardiac abnormalities are not excluded on the basis of this exam, clinical correlation recommended. . 2. Underlying pulmonary edema. 3. Nonspecific bibasilar airspace disease, findings can be seen infiltrate/pneumonia and/or atelectasis. 4. Right hemidiaphragmatic elevation. 5. Vascular calcifications thoracic aorta.     ASSESSMENT:  74 y.o. female with cecal distension, pneumatosis unknown etiology at this time    PLAN:  Pain and nausea control PRN  NPO  IVF's  Abx's - Rocephin/Flagyl  Repeat Lactic acid pending  Monitor abdominal exam  Blood Cx's w/ GNR, speciation pending    Electronically signed by Teodoro Boles MD on 5/1/19 at 6:44 AM    Seen on 5/1  Much improved pain per patient  Minimal to no tenderness in rlq  CT reviewed, not impressive pneumatosis if that's what it is at all  Cecal distention  Close follow up  May need laparoscopy if clinically not improved    Letty Montoya MD

## 2019-05-01 NOTE — H&P
daily  liothyronine (CYTOMEL) 5 MCG tablet, Take 5 mcg by mouth daily. Instructed to take morning of surgery with a sip of water  Coenzyme Q10 (CO Q 10 PO), Take  by mouth. LAST DOSE 1/17/13    Allergies:    Adrenalin [epinephrine]    Social History:    reports that she has never smoked. She has never used smokeless tobacco. She reports that she drinks alcohol. She reports that she does not use drugs. Family History:   family history is not on file. REVIEW OF SYSTEMS:  As above in the HPI, otherwise negative    PHYSICAL EXAM:    Vitals:  /73   Pulse 109   Temp 98.6 °F (37 °C) (Oral)   Resp 18   Ht 5' 6\" (1.676 m)   Wt 240 lb 3.2 oz (109 kg)   SpO2 96%   Breastfeeding? No   BMI 38.77 kg/m²     General:  Awake, alert, oriented X 3. Well developed, well nourished, well groomed. No apparent distress. HEENT:  Normocephalic, atraumatic. Pupils equal, round, reactive to light. No scleral icterus. No conjunctival injection. Normal lips, teeth, and gums. No nasal discharge.   Neck:  Supple  Heart:  RRR, no murmurs, gallops, rubs  Lungs:  CTA bilaterally, bilat symmetrical expansion, no wheeze, rales, or rhonchi  Abdomen:  Tenderness right greater than left with rebound  Extremities:  No clubbing, cyanosis, or edema  Skin:  Warm and dry, no open lesions or rash  Neuro:  Cranial nerves 2-12 intact, no focal deficits  Breast: deferred  Rectal: deferred  Genitalia:  deferred    LABS:  CBC with Differential:  Lab Results   Component Value Date    WBC 12.9 04/30/2019    RBC 4.49 04/30/2019    HGB 13.3 04/30/2019    HCT 40.3 04/30/2019     04/30/2019    MCV 89.8 04/30/2019    MCH 29.6 04/30/2019    MCHC 33.0 04/30/2019    RDW 17.2 04/30/2019    LYMPHOPCT 5.8 04/30/2019    MONOPCT 7.5 04/30/2019    BASOPCT 0.3 04/30/2019    MONOSABS 0.96 04/30/2019    LYMPHSABS 0.75 04/30/2019    EOSABS 0.08 04/30/2019    BASOSABS 0.04 04/30/2019     CMP:  Lab Results   Component Value Date     04/30/2019    K 4.9 04/30/2019    K 4.4 11/03/2018    CL 91 04/30/2019    CO2 18 04/30/2019    BUN 53 04/30/2019    CREATININE 2.3 04/30/2019    GFRAA 25 04/30/2019    LABGLOM 21 04/30/2019    GLUCOSE 92 04/30/2019    PROT 6.6 04/30/2019    LABALBU 3.1 04/30/2019    CALCIUM 8.6 04/30/2019    BILITOT 0.5 04/30/2019    ALKPHOS 99 04/30/2019    AST 45 04/30/2019    ALT 38 04/30/2019     PT/INR:  No results found for: PROTIME, INR  @cktotal:3,ckmb:3,ckmbindex:3,troponini:3@  U/A:  Lab Results   Component Value Date    NITRU Negative 04/30/2019    COLORU Yellow 04/30/2019    PHUR 5.0 04/30/2019    WBCUA >20 04/30/2019    RBCUA 5-10 04/30/2019    BACTERIA MANY 04/30/2019    CLARITYU Clear 04/30/2019    SPECGRAV 1.015 04/30/2019    UROBILINOGEN 0.2 04/30/2019    BILIRUBINUR SMALL 04/30/2019    BLOODU SMALL 04/30/2019    GLUCOSEU Negative 04/30/2019    KETUA TRACE 04/30/2019          ASSESSMENT:      Active Problems:    Right lower quadrant pain  Resolved Problems:    * No resolved hospital problems.  *          PLAN:    Abdominal pain-  Acute  With changes on CT scan  Lactic ok  Will give ABT  Await surgical opinion      ARF-  No NSAIDS  IVF  Renal consult    Abnormal troponin-  ?if related to ARF  Better this am  Ask cardio opinion  Mary Tavares DO  6:04 AM  5/1/2019

## 2019-05-01 NOTE — CONSULTS
Systems:   Pertinent items are noted in HPI. Remainder of a complete review of systems is (-) otherr than in the HPI    Physical exam:   Constitutional:  Elderly obese female in NAD  Vitals:   VITALS:  BP (!) 113/59   Pulse 108   Temp 98.1 °F (36.7 °C) (Oral)   Resp 18   Ht 5' 6\" (1.676 m)   Wt 240 lb 3.2 oz (109 kg)   SpO2 92%   Breastfeeding?  No   BMI 38.77 kg/m²   24HR INTAKE/OUTPUT:    Intake/Output Summary (Last 24 hours) at 5/1/2019 1519  Last data filed at 5/1/2019 1120  Gross per 24 hour   Intake 384 ml   Output 750 ml   Net -366 ml     URINARY CATHETER OUTPUT (Harris):  Urethral Catheter Non-latex-Output (mL): 750 mL  DRAIN/TUBE OUTPUT:     VENT SETTINGS:     Additional Respiratory  Assessments  Pulse: 108  Resp: 18  SpO2: 92 %    Skin: no rash, turgor wnl  Heent:  eomi, mmm, nc. at  Neck: no bruits or jvd noted  Cardiovascular: PMI not lat displaced  S1, S2 without S3 or a rub  Respiratory: CTA B without w/r/r  Abdomen:  Decreased BS abd distended tympanitic, tender, no HSM  Ext: trace bilat lower extremity edema  Psychiatric: mood and affect appropriate, cr nr 2-12 grossly intact      Data:   Labs:  CBC:   Lab Results   Component Value Date    WBC 14.1 05/01/2019    RBC 4.02 05/01/2019    HGB 12.1 05/01/2019    HCT 36.4 05/01/2019    MCV 90.5 05/01/2019    MCH 30.1 05/01/2019    MCHC 33.2 05/01/2019    RDW 17.1 05/01/2019     05/01/2019    MPV 11.5 05/01/2019     CBC with Differential:    Lab Results   Component Value Date    WBC 14.1 05/01/2019    RBC 4.02 05/01/2019    HGB 12.1 05/01/2019    HCT 36.4 05/01/2019     05/01/2019    MCV 90.5 05/01/2019    MCH 30.1 05/01/2019    MCHC 33.2 05/01/2019    RDW 17.1 05/01/2019    LYMPHOPCT 5.8 04/30/2019    MONOPCT 7.5 04/30/2019    BASOPCT 0.3 04/30/2019    MONOSABS 0.96 04/30/2019    LYMPHSABS 0.75 04/30/2019    EOSABS 0.08 04/30/2019    BASOSABS 0.04 04/30/2019     CMP:    Lab Results   Component Value Date     05/01/2019    K 4.4 05/01/2019    K 4.4 11/03/2018     05/01/2019    CO2 17 05/01/2019    BUN 45 05/01/2019    CREATININE 1.8 05/01/2019    GFRAA 33 05/01/2019    LABGLOM 27 05/01/2019    GLUCOSE 96 05/01/2019    PROT 5.8 05/01/2019    LABALBU 2.6 05/01/2019    CALCIUM 7.9 05/01/2019    BILITOT 0.6 05/01/2019    ALKPHOS 104 05/01/2019    AST 42 05/01/2019    ALT 33 05/01/2019     BMP:    Lab Results   Component Value Date     05/01/2019    K 4.4 05/01/2019    K 4.4 11/03/2018     05/01/2019    CO2 17 05/01/2019    BUN 45 05/01/2019    LABALBU 2.6 05/01/2019    CREATININE 1.8 05/01/2019    CALCIUM 7.9 05/01/2019    GFRAA 33 05/01/2019    LABGLOM 27 05/01/2019    GLUCOSE 96 05/01/2019     BUN/Creatinine:    Lab Results   Component Value Date    BUN 45 05/01/2019    CREATININE 1.8 05/01/2019     Hepatic Function Panel:    Lab Results   Component Value Date    ALKPHOS 104 05/01/2019    ALT 33 05/01/2019    AST 42 05/01/2019    PROT 5.8 05/01/2019    BILITOT 0.6 05/01/2019    LABALBU 2.6 05/01/2019     Albumin:    Lab Results   Component Value Date    LABALBU 2.6 05/01/2019     Calcium:    Lab Results   Component Value Date    CALCIUM 7.9 05/01/2019     Ionized Calcium:  No results found for: IONCA  Magnesium:  No results found for: MG  Phosphorus:  No results found for: PHOS  Uric Acid:  No results found for: LABURIC, URICACID  PT/INR:  No results found for: PROTIME, INR  PTT:  No results found for: APTT, PTT[APTT}  Troponin:    Lab Results   Component Value Date    TROPONINI 0.01 05/01/2019     U/A:    Lab Results   Component Value Date    COLORU Yellow 04/30/2019    PROTEINU TRACE 04/30/2019    PHUR 5.0 04/30/2019    WBCUA >20 04/30/2019    RBCUA 5-10 04/30/2019    BACTERIA MANY 04/30/2019    CLARITYU Clear 04/30/2019    SPECGRAV 1.015 04/30/2019    LEUKOCYTESUR MODERATE 04/30/2019    UROBILINOGEN 0.2 04/30/2019    BILIRUBINUR SMALL 04/30/2019    BLOODU SMALL 04/30/2019    GLUCOSEU Negative 04/30/2019     ABG:  No tomography sections of the abdomen with sagittal   and coronal MPR reconstructions were obtained from the lower chest to   the pelvic floor without contrast administration. Low-dose CT    acquisition technique included one of following options; 1 . Automated   exposure control, 2. Adjustment of MA and or KV according to patient's   size or 3. Use of iterative reconstruction. Noncontrast imaging limits   visceral detail.       FINDINGS:   CHEST:   The lung bases are remarkable for cardiomegaly without evidence of   decompensation, and thickened airways with some patchy lower lobe   density along the diaphragmatic surface which could represent early   pneumonia or atelectasis in the left lower lung.       LIVER:   The liver is uniform in parenchymal density without focal findings       GALLBLADDER AND BILIARY TREE:   The gallbladder has been surgically removed, and there is no biliary   ductal ectasia.       SPLEEN:The spleen is not enlarged, and shows no focal pathology.       ADRENALS:  The adrenals are normal in appearance bilaterally.       PANCREAS:The pancreas shows no evidence of acute inflammation or mass   lesions. There is a mild degree of fatty atrophy.       KIDNEYS/BLADDER: The kidneys show cortical atrophy without evidence of   outflow obstruction. There is no perinephric inflammatory change. Ureters are similar in course and caliber, and the bladder is normal   in appearance.       APPENDIX:  Retrocecal, and normal       BOWEL:  Small bowel is unremarkable. The right hemicolon is   prominently distended with fluid and gas, but shows no evidence of   mural thickening. There is a relatively ahaustral appearance, which   may indicate a chronic process, such as ulcerative colitis. Small   bubbles in the periphery of the fluid adjacent the bowel wall could   indicate very mild pneumatosis in the cecal region.  There is no   associated mural thickening or extraluminal induration or hyperemia in   the region IVF-check urine electrolytes and cr as well as urine urea-continue off the ACEI+ARB     2-CKD G3B with a baseline serum cr 1.3-1.6mg/dl and an e-GFR=30-40ml/min in the setting of HTN and stage II Obesity    3-Hyponatremia most probably  hypovolemic-improved with IV hydration    4- Anion Gap met Acidosis in the setting of the NASRA and the sepsis-will change the IVF to add HCO3 and follow labs    5- Hypocalcemia in the setting of Hypoalbuminemia and Sec HPTH of Renal Origin-check an ionized Ca++, PO4, Mg++, PTH, Vit D    6-HTN with CKD I-IV-BP at goal <130/80-continue off the ACEI/ARRB          Thank you Dr. Hector Reilly DO for allowing us to participate in care of Alvaro Tripp Andreabrittney  3:18 PM  5/1/2019

## 2019-05-01 NOTE — CONSULTS
Inpatient consult to Infectious Diseases  Consult performed by: Lyudmila Helms MD  Consult ordered by: Michelle Cat DO        [facility]  [address] [place] [address]  Consultation Note     Admit Date: 4/30/2019  3:47 PM    Reason for Consult:   2 positive blood cultures    Attending Physician:  Michelle Cat DO    HISTORY OF PRESENT ILLNESS:             The history is obtained from extensive review of available past medical records. The patient is a 74 y.o. female who had been complaining of back pain and was given a course of steroids. The patient was not feeling well but cannot pinpoint the exact time this started. She says it might have been for a week. 2 days prior to the admission she began having abdominal distention and pain. She stopped eating. She had not had a bowel movement at least a week. When she came to the ED on 4/30/19 she was hypoxemic and had to be placed on oxygen. The abdomen was distended and she had a white count of 12.9. Blood cultures were taken. A CT of the abdomen and pelvis revealed gas in the wall of the cecum. Surgery was consulted. She was given  Meropenem, followed by Metronidazole and Ceftriaxone. Blood cultures have turned positive in 3 of 4 bottles so ID was asked to see her in consultation. The patient denies any unusual activities. Does admit to having abdominal distention and pain.     Past Medical History:        Diagnosis Date    Gout     CONTROLLED    Hyperlipidemia     Hypertension     STABLE    Macular hole, right eye     Thyroid disease      Past Surgical History:        Procedure Laterality Date    BREAST SURGERY Right 1982    BENIGN CYST    CHOLECYSTECTOMY  1997    HYSTERECTOMY  1985     Current Medications:   Scheduled Meds:   metroNIDAZOLE  500 mg Intravenous Q8H    cefTRIAXone (ROCEPHIN) IV  1 g Intravenous Q24H     Continuous Infusions:   sodium chloride 125 mL/hr at 05/01/19 0100     PRN Meds:morphine, morphine, perflutren lipid microspheres, sodium chloride flush    Allergies:  Adrenalin [epinephrine]    Social History:   Social History     Socioeconomic History    Marital status:      Spouse name: Not on file    Number of children: Not on file    Years of education: Not on file    Highest education level: Not on file   Occupational History    Not on file   Social Needs    Financial resource strain: Not on file    Food insecurity:     Worry: Not on file     Inability: Not on file    Transportation needs:     Medical: Not on file     Non-medical: Not on file   Tobacco Use    Smoking status: Never Smoker    Smokeless tobacco: Never Used   Substance and Sexual Activity    Alcohol use: Yes     Comment: socially    Drug use: No    Sexual activity: Not on file   Lifestyle    Physical activity:     Days per week: Not on file     Minutes per session: Not on file    Stress: Not on file   Relationships    Social connections:     Talks on phone: Not on file     Gets together: Not on file     Attends Shinto service: Not on file     Active member of club or organization: Not on file     Attends meetings of clubs or organizations: Not on file     Relationship status: Not on file    Intimate partner violence:     Fear of current or ex partner: Not on file     Emotionally abused: Not on file     Physically abused: Not on file     Forced sexual activity: Not on file   Other Topics Concern    Not on file   Social History Narrative    Not on file      Pets: None  Travel: [place] and [facility] in October 2018  Patient lives alone    Family History:   No family history on file. . Otherwise non-pertinent to the chief complaint.     REVIEW OF SYSTEMS:    Constitutional: Negative for fevers, chills, diaphoresis  Neurologic: Negative   Psychiatric: Negative  Rheumatologic: Negative   Endocrine: Negative  Hematologic: Negative  Immunologic: Negative  ENT: Negative  Respiratory: Negative   Cardiovascular: Negative  GI: As in the HPI  : Negative  Musculoskeletal: Negative  Skin: No rashes. PHYSICAL EXAM:    Vitals:   /60   Pulse 106   Temp 97.2 °F (36.2 °C) (Axillary)   Resp 18   Ht 5' 6\" (1.676 m)   Wt 240 lb 3.2 oz (109 kg)   SpO2 94%   Breastfeeding? No   BMI 38.77 kg/m²   Constitutional: The patient is awake, alert, and oriented. Lying in bed. Obese. Skin: Warm and dry. No rashes were noted. HEENT: Eyes show round, and reactive pupils. No jaundice. Dry mucous membranes, no ulcerations, no thrush. Neck: Supple to movements. No lymphadenopathy. Chest: No use of accessory muscles to breathe. Symmetrical expansion. Auscultation reveals no wheezing, crackles, or rhonchi. Cardiovascular: S1 and S2 are rhythmic and regular. No murmurs appreciated. Abdomen: Diminished bowel sounds to auscultation. Distended and tender to palpation, especially right lower quadrant. Tympanitic. Extremities: No clubbing, no cyanosis, no edema. Bilateral knee arthroplasty surgical wounds noted.  No effusion  Lines: peripheral  Harris catheter with clear urine    CBC+dif:  Recent Labs     04/30/19  1700 05/01/19  0700   WBC 12.9* 14.1*   HGB 13.3 12.1   HCT 40.3 36.4   MCV 89.8 90.5    145   NEUTROABS 10.88*  --      No results found for: CRP   No results found for: CRPHS  No results found for: SEDRATE  Lab Results   Component Value Date    ALT 33 (H) 05/01/2019    AST 42 (H) 05/01/2019    ALKPHOS 104 05/01/2019    BILITOT 0.6 05/01/2019     Lab Results   Component Value Date     05/01/2019    K 4.4 05/01/2019    K 4.4 11/03/2018     05/01/2019    CO2 17 05/01/2019    BUN 45 05/01/2019    CREATININE 1.8 05/01/2019    GFRAA 33 05/01/2019    LABGLOM 27 05/01/2019    GLUCOSE 96 05/01/2019    PROT 5.8 05/01/2019    LABALBU 2.6 05/01/2019    CALCIUM 7.9 05/01/2019    BILITOT 0.6 05/01/2019    ALKPHOS 104 05/01/2019    AST 42 05/01/2019    ALT 33 05/01/2019       No results found for: PROTIME, INR    No results found for: TSH    Lab Results   Component Value Date    COLORU Yellow 04/30/2019    PHUR 5.0 04/30/2019    WBCUA >20 04/30/2019    RBCUA 5-10 04/30/2019    BACTERIA MANY 04/30/2019    CLARITYU Clear 04/30/2019    SPECGRAV 1.015 04/30/2019    LEUKOCYTESUR MODERATE 04/30/2019    UROBILINOGEN 0.2 04/30/2019    BILIRUBINUR SMALL 04/30/2019    BLOODU SMALL 04/30/2019    GLUCOSEU Negative 04/30/2019       No results found for: HCO3, BE, O2SAT, PH, THGB, PCO2, PO2, TCO2  Radiology:  CT of the abdomen and pelvis shows distention of the cecum and transverse colon with air-fluid levels in the bowel. Intramural gas noted in the cecum. Microbiology:  Pending  Recent Labs     04/30/19  1700   BC Gram stain performed from blood culture bottle media  Gram negative rods  *     No results for input(s): ORG in the last 72 hours. Recent Labs     04/30/19  1700   BLOODCULT2 Gram stain performed from blood culture bottle media  Gram negative rods  *     No results for input(s): STREPNEUMAGU in the last 72 hours. No results for input(s): LP1UAG in the last 72 hours. No results for input(s): ASO in the last 72 hours. No results for input(s): CULTRESP in the last 72 hours. No results for input(s): PROCAL in the last 72 hours. Assessment:  · Pneumatosis intestinalis  · Probable intra-abdominal infection  · Bacteremia associated to the above  · Leukocytosis secondary to the above    Plan:    · Consolidate antibiotics to Unasyn  · Repeat blood cultures  · Check final cultures, baseline ESR, CRP  · Monitor labs  · Will follow with you    Thank you for having us see this patient in consultation. I will be discussing this case with the treating physicians.     Leta Cagle  11:08 AM  5/1/2019

## 2019-05-01 NOTE — PROGRESS NOTES
SPOKE WITH DR Shayy Posadas FOR ADMISSION ORDERS GENERAL SURGERY NOTIFIED OF CT RESULTS PER ED DOCTOR, NEPHROLOGY AND CARDIOLOGY NOTIFIED OF CONSULTS.

## 2019-05-02 ENCOUNTER — APPOINTMENT (OUTPATIENT)
Dept: GENERAL RADIOLOGY | Age: 74
DRG: 872 | End: 2019-05-02
Payer: COMMERCIAL

## 2019-05-02 LAB
ALBUMIN SERPL-MCNC: 2.5 G/DL (ref 3.5–5.2)
ALP BLD-CCNC: 90 U/L (ref 35–104)
ALT SERPL-CCNC: 30 U/L (ref 0–32)
ANION GAP SERPL CALCULATED.3IONS-SCNC: 16 MMOL/L (ref 7–16)
AST SERPL-CCNC: 39 U/L (ref 0–31)
BILIRUB SERPL-MCNC: 0.5 MG/DL (ref 0–1.2)
BUN BLDV-MCNC: 47 MG/DL (ref 8–23)
CALCIUM IONIZED: 1.13 MMOL/L (ref 1.15–1.33)
CALCIUM SERPL-MCNC: 7.5 MG/DL (ref 8.6–10.2)
CHLORIDE BLD-SCNC: 102 MMOL/L (ref 98–107)
CO2: 20 MMOL/L (ref 22–29)
CREAT SERPL-MCNC: 1.4 MG/DL (ref 0.5–1)
GFR AFRICAN AMERICAN: 44
GFR NON-AFRICAN AMERICAN: 37 ML/MIN/1.73
GLUCOSE BLD-MCNC: 90 MG/DL (ref 74–99)
HCT VFR BLD CALC: 34.3 % (ref 34–48)
HEMOGLOBIN: 11.1 G/DL (ref 11.5–15.5)
MAGNESIUM: 2.6 MG/DL (ref 1.6–2.6)
MCH RBC QN AUTO: 30 PG (ref 26–35)
MCHC RBC AUTO-ENTMCNC: 32.4 % (ref 32–34.5)
MCV RBC AUTO: 92.7 FL (ref 80–99.9)
PARATHYROID HORMONE INTACT: 195 PG/ML (ref 15–65)
PDW BLD-RTO: 17.7 FL (ref 11.5–15)
PHOSPHORUS: 3.2 MG/DL (ref 2.5–4.5)
PLATELET # BLD: 127 E9/L (ref 130–450)
PMV BLD AUTO: 12 FL (ref 7–12)
POTASSIUM SERPL-SCNC: 4.1 MMOL/L (ref 3.5–5)
RBC # BLD: 3.7 E12/L (ref 3.5–5.5)
SODIUM BLD-SCNC: 138 MMOL/L (ref 132–146)
TOTAL PROTEIN: 5.5 G/DL (ref 6.4–8.3)
VITAMIN D 25-HYDROXY: 25 NG/ML (ref 30–100)
WBC # BLD: 10.9 E9/L (ref 4.5–11.5)

## 2019-05-02 PROCEDURE — 74018 RADEX ABDOMEN 1 VIEW: CPT

## 2019-05-02 PROCEDURE — 2580000003 HC RX 258: Performed by: EMERGENCY MEDICINE

## 2019-05-02 PROCEDURE — 83735 ASSAY OF MAGNESIUM: CPT

## 2019-05-02 PROCEDURE — 97161 PT EVAL LOW COMPLEX 20 MIN: CPT

## 2019-05-02 PROCEDURE — 84100 ASSAY OF PHOSPHORUS: CPT

## 2019-05-02 PROCEDURE — 2500000003 HC RX 250 WO HCPCS: Performed by: INTERNAL MEDICINE

## 2019-05-02 PROCEDURE — 2060000000 HC ICU INTERMEDIATE R&B

## 2019-05-02 PROCEDURE — 80053 COMPREHEN METABOLIC PANEL: CPT

## 2019-05-02 PROCEDURE — 82306 VITAMIN D 25 HYDROXY: CPT

## 2019-05-02 PROCEDURE — 83970 ASSAY OF PARATHORMONE: CPT

## 2019-05-02 PROCEDURE — 6360000002 HC RX W HCPCS: Performed by: SPECIALIST

## 2019-05-02 PROCEDURE — 97165 OT EVAL LOW COMPLEX 30 MIN: CPT

## 2019-05-02 PROCEDURE — 36415 COLL VENOUS BLD VENIPUNCTURE: CPT

## 2019-05-02 PROCEDURE — 6360000002 HC RX W HCPCS: Performed by: INTERNAL MEDICINE

## 2019-05-02 PROCEDURE — 99232 SBSQ HOSP IP/OBS MODERATE 35: CPT | Performed by: INTERNAL MEDICINE

## 2019-05-02 PROCEDURE — 82330 ASSAY OF CALCIUM: CPT

## 2019-05-02 PROCEDURE — 2580000003 HC RX 258: Performed by: SPECIALIST

## 2019-05-02 PROCEDURE — 2580000003 HC RX 258: Performed by: INTERNAL MEDICINE

## 2019-05-02 PROCEDURE — 85027 COMPLETE CBC AUTOMATED: CPT

## 2019-05-02 RX ADMIN — Medication 10 ML: at 05:44

## 2019-05-02 RX ADMIN — MORPHINE SULFATE 4 MG: 2 INJECTION, SOLUTION INTRAMUSCULAR; INTRAVENOUS at 09:02

## 2019-05-02 RX ADMIN — SODIUM BICARBONATE: 84 INJECTION, SOLUTION INTRAVENOUS at 21:00

## 2019-05-02 RX ADMIN — MORPHINE SULFATE 4 MG: 2 INJECTION, SOLUTION INTRAMUSCULAR; INTRAVENOUS at 19:58

## 2019-05-02 RX ADMIN — Medication 10 ML: at 16:03

## 2019-05-02 RX ADMIN — SODIUM BICARBONATE: 84 INJECTION, SOLUTION INTRAVENOUS at 01:39

## 2019-05-02 RX ADMIN — Medication 10 ML: at 09:02

## 2019-05-02 RX ADMIN — AMPICILLIN AND SULBACTAM 3 G: 1; .5 INJECTION, POWDER, FOR SOLUTION INTRAMUSCULAR; INTRAVENOUS at 05:44

## 2019-05-02 RX ADMIN — MORPHINE SULFATE 4 MG: 2 INJECTION, SOLUTION INTRAMUSCULAR; INTRAVENOUS at 05:45

## 2019-05-02 RX ADMIN — ERTAPENEM SODIUM 1000 MG: 1 INJECTION, POWDER, LYOPHILIZED, FOR SOLUTION INTRAMUSCULAR; INTRAVENOUS at 13:55

## 2019-05-02 RX ADMIN — SODIUM BICARBONATE: 84 INJECTION, SOLUTION INTRAVENOUS at 11:36

## 2019-05-02 RX ADMIN — MORPHINE SULFATE 4 MG: 2 INJECTION, SOLUTION INTRAMUSCULAR; INTRAVENOUS at 16:02

## 2019-05-02 ASSESSMENT — PAIN - FUNCTIONAL ASSESSMENT
PAIN_FUNCTIONAL_ASSESSMENT: PREVENTS OR INTERFERES SOME ACTIVE ACTIVITIES AND ADLS

## 2019-05-02 ASSESSMENT — PAIN DESCRIPTION - DESCRIPTORS
DESCRIPTORS: ACHING;DISCOMFORT
DESCRIPTORS: ACHING;DISCOMFORT
DESCRIPTORS: ACHING;DISCOMFORT;THROBBING
DESCRIPTORS: ACHING;DISCOMFORT

## 2019-05-02 ASSESSMENT — PAIN DESCRIPTION - PAIN TYPE
TYPE: ACUTE PAIN

## 2019-05-02 ASSESSMENT — PAIN SCALES - GENERAL
PAINLEVEL_OUTOF10: 8
PAINLEVEL_OUTOF10: 7
PAINLEVEL_OUTOF10: 3
PAINLEVEL_OUTOF10: 10
PAINLEVEL_OUTOF10: 5
PAINLEVEL_OUTOF10: 8
PAINLEVEL_OUTOF10: 9
PAINLEVEL_OUTOF10: 4

## 2019-05-02 ASSESSMENT — PAIN DESCRIPTION - LOCATION
LOCATION: HIP
LOCATION: GROIN
LOCATION: ABDOMEN
LOCATION: GROIN

## 2019-05-02 ASSESSMENT — PAIN DESCRIPTION - FREQUENCY
FREQUENCY: CONTINUOUS

## 2019-05-02 ASSESSMENT — PAIN DESCRIPTION - ONSET
ONSET: ON-GOING
ONSET: ON-GOING

## 2019-05-02 ASSESSMENT — PAIN DESCRIPTION - ORIENTATION
ORIENTATION: RIGHT

## 2019-05-02 ASSESSMENT — PAIN DESCRIPTION - PROGRESSION
CLINICAL_PROGRESSION: GRADUALLY WORSENING
CLINICAL_PROGRESSION: GRADUALLY WORSENING

## 2019-05-02 NOTE — PROGRESS NOTES
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 female with prior Hx of CKD G3B with a baseline serum cr 1.3-1.6mg/dl and an e-GFR=30-40ml/min in the setting of HTN and stage II Obesity. Pt states apprx 1 week before Easter she developed pain in the back. Despite outpt therapy  The pain continued and then began in the RLQ as well. She had nausea and diarrhea. No blood in the stool. She came to the ED and a CT Scan suggested Pneumotosis Intestinalis. Blood Cult (+) Gram (-) rods and the UC grew E coli. Her initial serum cr in the ED 2.3 and this AM post IVF down to 1.8mg/dl. She denies using any NSAID. She denies dysuria or hematuria, she has intermittent nocturia. She is on losartan 100mg QD along with atenolol and lisinopril for HTN control    5/2/19: pt awake alert and states she is hungry despite the RLQ pain    Past Medical History:   Diagnosis Date    Gout     CONTROLLED    Hyperlipidemia     Hypertension     STABLE    Macular hole, right eye     Thyroid disease        Past Surgical History:   Procedure Laterality Date    BREAST SURGERY Right 1982    BENIGN CYST    [address]       No family history on file. reports that she has never smoked. She has never used smokeless tobacco. She reports that she drinks alcohol. She reports that she does not use drugs.     Allergies:  Adrenalin [epinephrine]    Current Medications:      ertapenem (INVANZ) 1 g IVPB minibag Q24H   morphine (PF) injection 2 mg Q2H PRN   morphine (PF) injection 4 mg Q2H PRN   perflutren lipid microspheres (DEFINITY) injection 1.65 mg ONCE PRN   sodium bicarbonate 75 mEq in sodium chloride 0.45 % 1,000 mL infusion Continuous   sodium chloride flush 0.9 % injection 10 mL PRN       Review of Systems:   Pertinent items are noted in HPI. Remainder of a complete review of systems is (-) otherr than in the HPI    Physical exam:   Constitutional:  Elderly obese female in NAD  Vitals:   VITALS:  /77   Pulse 91   Temp 97.7 °F (36.5 °C) (Oral)   Resp 18   Ht 5' 6\" (1.676 m)   Wt 233 lb 12.8 oz (106.1 kg)   SpO2 92%   Breastfeeding?  No   BMI 37.74 kg/m²   24HR INTAKE/OUTPUT:      Intake/Output Summary (Last 24 hours) at 5/2/2019 1301  Last data filed at 5/2/2019 0640  Gross per 24 hour   Intake 2667 ml   Output 650 ml   Net 2017 ml     URINARY CATHETER OUTPUT ([facility]):  Urethral Catheter Non-latex-Output (mL): 375 mL  DRAIN/TUBE OUTPUT:     VENT SETTINGS:     Additional Respiratory  Assessments  Pulse: 91  Resp: 18  SpO2: 92 %    Skin: no rash, turgor wnl  Heent:  eomi, mmm, nc. at  Neck: no bruits or jvd noted  Cardiovascular: PMI not lat displaced  S1, S2 without S3 or a rub  Respiratory: CTA B without w/r/r  Abdomen:  Decreased BS abd distended tympanitic, tender in the RLQ, no HSM  Ext: trace bilat lower extremity edema  Psychiatric: mood and affect appropriate, cr nr 2-12 grossly intact      Data:   Labs:  CBC:   Lab Results   Component Value Date    WBC 10.9 05/02/2019    RBC 3.70 05/02/2019    HGB 11.1 05/02/2019    HCT 34.3 05/02/2019    MCV 92.7 05/02/2019    MCH 30.0 05/02/2019    MCHC 32.4 05/02/2019    RDW 17.7 05/02/2019     05/02/2019    MPV 12.0 05/02/2019     CBC with Differential:    Lab Results   Component Value Date    WBC 10.9 05/02/2019    RBC 3.70 05/02/2019    HGB 11.1 05/02/2019    HCT 34.3 05/02/2019     05/02/2019    MCV 92.7 05/02/2019    MCH 30.0 05/02/2019    MCHC 32.4 05/02/2019    RDW 17.7 05/02/2019    LYMPHOPCT 5.8 04/30/2019    MONOPCT 7.5 04/30/2019    BASOPCT 0.3 04/30/2019    MONOSABS 0.96 04/30/2019    LYMPHSABS 0.75 04/30/2019    EOSABS 0.08 04/30/2019 BASOSABS 0.04 04/30/2019     CMP:    Lab Results   Component Value Date     05/02/2019    K 4.1 05/02/2019    K 4.4 11/03/2018     05/02/2019    CO2 20 05/02/2019    BUN 47 05/02/2019    CREATININE 1.4 05/02/2019    GFRAA 44 05/02/2019    LABGLOM 37 05/02/2019    GLUCOSE 90 05/02/2019    PROT 5.5 05/02/2019    LABALBU 2.5 05/02/2019    CALCIUM 7.5 05/02/2019    BILITOT 0.5 05/02/2019    ALKPHOS 90 05/02/2019    AST 39 05/02/2019    ALT 30 05/02/2019     BMP:    Lab Results   Component Value Date     05/02/2019    K 4.1 05/02/2019    K 4.4 11/03/2018     05/02/2019    CO2 20 05/02/2019    BUN 47 05/02/2019    LABALBU 2.5 05/02/2019    CREATININE 1.4 05/02/2019    CALCIUM 7.5 05/02/2019    GFRAA 44 05/02/2019    LABGLOM 37 05/02/2019    GLUCOSE 90 05/02/2019     BUN/Creatinine:    Lab Results   Component Value Date    BUN 47 05/02/2019    CREATININE 1.4 05/02/2019     Hepatic Function Panel:    Lab Results   Component Value Date    ALKPHOS 90 05/02/2019    ALT 30 05/02/2019    AST 39 05/02/2019    PROT 5.5 05/02/2019    BILITOT 0.5 05/02/2019    LABALBU 2.5 05/02/2019     Albumin:    Lab Results   Component Value Date    LABALBU 2.5 05/02/2019     Calcium:    Lab Results   Component Value Date    CALCIUM 7.5 05/02/2019     Ionized Calcium:  No results found for: IONCA  Magnesium:    Lab Results   Component Value Date    MG 2.6 05/02/2019     Phosphorus:    Lab Results   Component Value Date    PHOS 3.2 05/02/2019     Uric Acid:  No results found for: [place]  PT/INR:  No results found for: PROTIME, INR  PTT:  No results found for: APTT, PTT[APTT}  Troponin:    Lab Results   Component Value Date    TROPONINI 0.01 05/01/2019     U/A:    Lab Results   Component Value Date    COLORU Yellow 04/30/2019    PROTEINU TRACE 04/30/2019    PHUR 5.0 04/30/2019    WBCUA >20 04/30/2019    RBCUA 5-10 04/30/2019    BACTERIA MANY 04/30/2019    CLARITYU Clear 04/30/2019    SPECGRAV 1.015 04/30/2019 LEUKOCYTESUR MODERATE 04/30/2019    UROBILINOGEN 0.2 04/30/2019    BILIRUBINUR SMALL 04/30/2019    BLOODU SMALL 04/30/2019    GLUCOSEU Negative 04/30/2019     ABG:  No results found for: PH, PCO2, PO2, HCO3, BE, THGB, TCO2, O2SAT  HgBA1c:  No results found for: LABA1C  Microalbumen/Creatinine ratio:  No components found for: RUCREAT  FLP:  No results found for: TRIG, HDL, LDLCALC, LDLDIRECT, LABVLDL  TSH:  No results found for: TSH  VITAMIN B12: No components found for: B12  FOLATE:  No results found for: FOLATE  Iron Saturation:  No components found for: PERCENTFE  FERRITIN:  No results found for: FERRITIN  AMYLASE:  No results found for: AMYLASE  LIPASE:  No results found for: LIPASE  Fibrinogen Level:  No components found for: FIB  24 Hour Urine for Protein:  No components found for: RAWUPRO, UHRS3, TTHZ60NS, UTV3  24 Hour Urine for Creatinine Clearance:  No components found for: CREAT4, UHRS10, UTV10     Imaging:  CXR results:  4/30/2019 4:00 PM       Exam: XR CHEST PORTABLE       Number of Views: 1       Indication:   Cough and shortness of breath and epigastric/right groin   pain.       Comparison: 11/3/2018       Findings: There is a enlarged cardiomediastinal silhouette when   compared with the previous study with underlying pulmonary edema,   right hemidiaphragmatic elevation, bibasilar airspace disease and   thoracic aortic vascular calcifications. No pneumothorax.           Impression   1. Enlarged cardiomediastinal silhouette, the possibility of   underlying pericardial effusion or other cardiac abnormalities are not   excluded on the basis of this exam, clinical correlation recommended. .   2. Underlying pulmonary edema. 3. Nonspecific bibasilar airspace disease, findings can be seen   infiltrate/pneumonia and/or atelectasis. 4. Right hemidiaphragmatic elevation. 5. Vascular calcifications thoracic aorta.      4/30/2019 4:00 PM       EXAM: CT ABDOMEN PELVIS WO CONTRAST       NUMBER OF IMAGES \ views:  460       INDICATION:  RLQ abdominal pain, abdominal distention, [place] he   stools/diarrhea        COMPARISON: None       TECHNIQUE:   Axial computerized tomography sections of the abdomen with sagittal   and coronal MPR reconstructions were obtained from the lower chest to   the pelvic floor without contrast administration. Low-dose CT    acquisition technique included one of following options; 1 . Automated   exposure control, 2. Adjustment of MA and or KV according to patient's   size or 3. Use of iterative reconstruction. Noncontrast imaging limits   visceral detail.       FINDINGS:   CHEST:   The lung bases are remarkable for cardiomegaly without evidence of   decompensation, and thickened airways with some patchy lower lobe   density along the diaphragmatic surface which could represent early   pneumonia or atelectasis in the left lower lung.       LIVER:   The liver is uniform in parenchymal density without focal findings       GALLBLADDER AND BILIARY TREE:   The gallbladder has been surgically removed, and there is no biliary   ductal ectasia.       SPLEEN:The spleen is not enlarged, and shows no focal pathology.       ADRENALS:  The adrenals are normal in appearance bilaterally.       PANCREAS:The pancreas shows no evidence of acute inflammation or mass   lesions. There is a mild degree of fatty atrophy.       KIDNEYS/BLADDER: The kidneys show cortical atrophy without evidence of   outflow obstruction. There is no perinephric inflammatory change. Ureters are similar in course and caliber, and the bladder is normal   in appearance.       APPENDIX:  Retrocecal, and normal       BOWEL:  Small bowel is unremarkable. The right hemicolon is   prominently distended with fluid and gas, but shows no evidence of   mural thickening. There is a relatively ahaustral appearance, which   may indicate a chronic process, such as ulcerative colitis.  Small   bubbles in the periphery of the fluid adjacent the bowel wall could   indicate very mild pneumatosis in the cecal region. There is no   associated mural thickening or extraluminal induration or hyperemia in   the region of the cecum. There is no focal constricting lesion 2   suggest obstructive dilation. The colon tapers to more normal caliber   in the left hemicolon, which is distended with gas, and shows   scattered diverticuli without acute inflammatory findings. There is no   evidence of a perforated viscus at this time.       PELVIS: There is no dependent free pelvic fluid or pelvic adenopathy. The patient is post hysterectomy.       LYMPHATICS:There is no mesenteric or retroperitoneal adenopathy.       VASCULAR: There is no evidence of acute vascular pathology involving   mesenteric or retroperitoneal great vessels. Atherosclerotic   aortoiliac calcification is present.       SKELETAL: Moderately advanced degenerative changes of the spine and   hips are documented. The L3 vertebrae shows a compressed upper   articular endplate without displacement, and there is disc space   narrowing at the L4-5 level.           Impression   There is fluid and gaseous distention of the right hemicolon, most   prominent in the cecal region, where there may be subtle pneumatosis. Distention may mask mural inflammatory changes, such as in patient's   with TYPHLITIS- related to immunosuppression, neutropenia, or white   cell disorders. There is no evidence of perforation at this time, but   the distended right hemicolon and slightly thin cecal wall may be   sensitive to further distention or intervention.       There is a small area of consolidation or atelectasis in the left   lower lobe, retrocardiac region, but only a portion of the lower lungs   was included.       The patient is of large habitus, and advanced degenerative changes of   the spine with some vertebral compressions are noted, but no other   acute findings are specifically identified. Assessment  1-Stage II NASRA in the setting of the Gram (-) sepsis and complicated UTI with E coli on Unasyn as per ID  as well as probable intra abd sepsis and ACEI+ARB-FeNa<1% consistent with decreased effective renal perfusion- Creatinine trending down off the ACEI/ARB     2-CKD G3B with a baseline serum cr 1.3-1.6mg/dl and an e-GFR=30-40ml/min in the setting of HTN and stage II Obesity    3-Hyponatremia most probably  hypovolemic-resolved with IV hydration    4- Anion Gap met Acidosis in the setting of the NASRA and the sepsis-as NPO continue the IVF-HCO3 trending up    5- Hypocalcemia in the setting of Hypoalbuminemia and Sec HPTH of Renal Origin- ionized Ca++ 1.13 follow, PO4 WNL , , PTH elevated 195 in the setting of the hypocalcemia, Vit D 25-start oral supplement when taking po    6-HTN with CKD I-IV-BP at goal <130/80 this afternoon-early this AM hypotensive at SBP 82--continue off the ACEI/ARRB          Thank you Dr. Mary Tavares DO for allowing us to participate in care of Roger Calderon  1:01 PM  5/2/2019

## 2019-05-02 NOTE — PROGRESS NOTES
[address] [place] [address] [facility]  [facility]  Progress Note    SUBJECTIVE:  Chief Complaint   Patient presents with    Abdominal Pain     patient states she has had abdominal distention since saturday with watery black stools    Diarrhea     Patient is feeling significantly better today. No nausea, vomiting or diarrhea. She still has right-sided abdominal pain described as having \"gas\". Tolerating antibiotics well. No fever. Review of systems:  As stated above in the chief complaint, otherwise negative. Medications:  Scheduled Meds:   ampicillin-sulbactam  3 g Intravenous Q8H     Continuous Infusions:   IV infusion builder 125 mL/hr at 19 0139     PRN Meds:morphine, morphine, perflutren lipid microspheres, sodium chloride flush    OBJECTIVE:  /77   Pulse 91   Temp 97.7 °F (36.5 °C) (Oral)   Resp 18   Ht 5' 6\" (1.676 m)   Wt 233 lb 12.8 oz (106.1 kg)   SpO2 92%   Breastfeeding? No   BMI 37.74 kg/m²   Temp  Av °F (36.7 °C)  Min: 97.7 °F (36.5 °C)  Max: 98.1 °F (36.7 °C)  Constitutional: The patient is awake, alert, and oriented. Visit are present. Skin: Warm and dry. No rashes were noted. HEENT: Eyes show round, and reactive pupils. No jaundice. Moist mucous membranes, no ulcerations, no thrush. Neck: Supple to movements. No lymphadenopathy. Chest: No use of accessory muscles to breathe. Symmetrical expansion. Auscultation reveals no wheezing, crackles, or rhonchi. Cardiovascular: S1 and S2 are rhythmic and regular. No murmurs appreciated. Abdomen: Positive bowel sounds to auscultation. Distended and somewhat tympanitic. Slightly tender to palpation right lower quadrant. Extremities: No edema. Bilateral TKR surgical wounds well-healed. No effusion.   Lines: peripheral    Laboratory and Tests Review:  Lab Results   Component Value Date    WBC 10.9 2019    WBC 14.1 (H) 2019    WBC 12.9 (H) 2019    HGB 11.1 (L) 2019    HCT 34.3 2019 MCV 92.7 05/02/2019     (L) 05/02/2019     Lab Results   Component Value Date    NEUTROABS 10.88 (H) 04/30/2019    NEUTROABS 2.66 11/03/2018    NEUTROABS 4.76 06/19/2017     Lab Results   Component Value Date    CRP 19.7 (H) 05/01/2019     No results found for: CRPHS  Lab Results   Component Value Date    SEDRATE 76 (H) 05/01/2019     Lab Results   Component Value Date    ALT 30 05/02/2019    AST 39 (H) 05/02/2019    ALKPHOS 90 05/02/2019    BILITOT 0.5 05/02/2019     Lab Results   Component Value Date     05/02/2019    K 4.1 05/02/2019    K 4.4 11/03/2018     05/02/2019    CO2 20 05/02/2019    BUN 47 05/02/2019    CREATININE 1.4 05/02/2019    CREATININE 1.8 05/01/2019    CREATININE 2.3 04/30/2019    GFRAA 44 05/02/2019    LABGLOM 37 05/02/2019    GLUCOSE 90 05/02/2019    PROT 5.5 05/02/2019    LABALBU 2.5 05/02/2019    CALCIUM 7.5 05/02/2019    BILITOT 0.5 05/02/2019    ALKPHOS 90 05/02/2019    AST 39 05/02/2019    ALT 30 05/02/2019     Radiology:      Microbiology:   Urine culture 4/30/19 >100K ESBL+ (sensitive to Imipenem and Bactrim) E. coli   Blood cultures 4/30/19 GNR (presumptive E. coli)  Blood cultures 5/1S 19 negative so far  Respiratory panel: negative     ASSESSMENT:  · Probable complicated UTI with sepsis with E. coli  · Pneumatosis intestinalis  · GNR bacteremia associated to the above  · Leukocytosis associated to the above    PLAN:  · Change Unasyn to Ertapenem  · Contact isolation    [place]av[place]sburgkranthi Higginbothameugene  11:18 AM  5/2/2019

## 2019-05-02 NOTE — PROGRESS NOTES
Physical Therapy    Facility/Department: [address] INTERMEDIATE 1  Initial Assessment    NAME: Winston Diop  : 1945  MRN: [id number]    Date of Service: 2019       Patient Diagnosis(es): The encounter diagnosis was Pneumatosis coli.     has a past medical history of Gout, Hyperlipidemia, Hypertension, Macular hole, right eye, and Thyroid disease. has a past surgical history that includes Breast surgery (Right, ); Hysterectomy (); and Cholecystectomy (). Evaluating Therapist: Jonny Dunn PT      Room #: 0643/4011-D  DIAGNOSIS: right lower quadrant pain  PRECAUTIONS: fall risk, O2, contact islolation    Social:  Pt lives with her  in a 1 floor plan 2 steps and 1 rails to enter. Bed and bath on first floor.  is w/c bound. Prior to admission pt walked with no device. Pt reported owing a walker. Pt reported she takes care of the household chores. Initial Evaluation  Date:  Treatment      Short Term/ Long Term   Goals   Was pt agreeable to Eval/treatment? yes     Does pt have pain? Back pain from stenosis and lower right groin pain     Bed Mobility  Rolling: Min A  Supine to sit: Mod A  Sit to supine: Mod A  Scooting: Min A to sitting EOB  SBA   Transfers Sit to stand: Min A  Stand to sit: Min A  Stand pivot: Min A  SBA   Ambulation    35 feet with w/w with Min A  100+ feet with w/w with SBA   Stair negotiation: ascended and descended NT   2 steps with 1 rail with SBA   ROM WFL     Strength Grossly 3+ to 4-/5  Increase LE strength by 1/2 mm grade   AM-PAC raw score 16/24       Pt is alert and Oriented x3  Balance: sitting EOB Supervision, standing with w/w Min A. Sensation: intact  Endurance: fair    Chair alarm: yes     ASSESSMENT  Pt displays functional ability as noted in the objective portion of this evaluation. Comments/Treatment:  Pt found in bed. No report of dizziness during functional mobility.   Cueing required for hand placement during transfers. Pt ambulates with slow gait speed. No LOB. At end of eval, pt left sitting up in chair with call light in reach and chair alarm on. Examination of body systems Decreased   Functional mobility x   ROM    Strength x   Safety Awareness    Cognition    Endurance x   Sensation    Balance x   Vision/Visual Deficets    Coordination      Patient education  Pt educated on PT objectives during eval, hand placement during transfers, w/w safety. Patient response to education:   Pt verbalized understanding Pt demonstrated skill Pt requires further education in this area   Yes    yes     Rehab potential is Good for reaching above PT goals. Pts/ family goals   1. None stated    Patient and or family understand(s) diagnosis, prognosis, and plan of care. PLAN  PT care will be provided in accordance with the objectives noted above. Whenever appropriate, clear delegation orders will be provided for nursing staff. Exercises and functional mobility practice will be used as well as appropriate assistive devices or modalities to obtain goals. Patient and family education will also be administered as needed. Frequency of treatments will be 2-5x/week x 7-10 days.     Time in: 1337  Time out: 700 [address]  License number:  [id number]

## 2019-05-02 NOTE — PROGRESS NOTES
GENERAL SURGERY  DAILY PROGRESS NOTE  5/2/2019    Subjective:  No acute events  Pain controlled  NPO  Denied n/v  -F/-BM    Objective:  /77   Pulse 91   Temp 97.7 °F (36.5 °C) (Oral)   Resp 18   Ht 5' 6\" (1.676 m)   Wt 233 lb 12.8 oz (106.1 kg)   SpO2 92%   Breastfeeding? No   BMI 37.74 kg/m²     General: NAD, awake and alert. Head: Normocephalic, atraumatic  Eyes: PERRLA, EOMI. Lungs: No increased work of breathing. Cardiovascular: RRR. Abdomen: Soft, distended, NT. No rebound, guarding or rigidity. Extremities: Atraumatic, full range of motion  Skin: Warm, dry and intact    Assessment/Plan:  74 y.o. female with abdominal distension, possibly secondary to pseudo-obstruction from urosepsis    Pain and nausea control PRN  NPO  IVF's  Replace electrolytes PRN  Monitor abdominal distension    Electronically signed by Clayton Hassan MD on 5/2/2019 at 1:04 PM      Attending Note:    Patient seen/examined. Agree with above assessment and plan. Abdominal pain and distention continues to improve today, no N/V but no flatus yet. Continue NPO, await bowel function.

## 2019-05-02 NOTE — PROGRESS NOTES
INPATIENT CARDIOLOGY FOLLOW-UP    Name: Noemy Foy    Age: 74 y.o. Date of Admission: 4/30/2019  3:47 PM    Date of Service: 5/2/2019    Chief Complaint: Follow-up for troponin elevation    Interim History:  No new overnight cardiac complaints. Currently with no complaints of CP, SOB, palpitations, dizziness, or lightheadedness. SR on telemetry. Review of Systems:   Cardiac: As per HPI  General: No fever, chills  Pulmonary: As per HPI  HEENT: No visual disturbances, difficult swallowing  GI: No nausea, vomiting  Endocrine: No thyroid disease or DM  Musculoskeletal: MEZA x 4, no focal motor deficits  Skin: Intact, no rashes  Neuro/Psych: No headache or seizures    Problem List:  Active Problems:    Right lower quadrant pain    Pneumatosis coli  Resolved Problems:    * No resolved hospital problems. *      Allergies:   Allergies   Allergen Reactions    Adrenalin [Epinephrine]      CAUSES TACHYCARDIA       Current Medications:  Current Facility-Administered Medications   Medication Dose Route Frequency Provider Last Rate Last Dose    morphine (PF) injection 2 mg  2 mg Intravenous Q2H PRN Ethan Wylien, DO   2 mg at 05/01/19 1719    morphine (PF) injection 4 mg  4 mg Intravenous Q2H PRN Ethan Ryan, DO   4 mg at 05/02/19 2067    perflutren lipid microspheres (DEFINITY) injection 1.65 mg  1.5 mL Intravenous ONCE PRN JACOB Dewitt - CNP        ampicillin-sulbactam (UNASYN) 3 g ivpb minibag  3 g Intravenous Q8H Jerry Duke MD   Stopped at 05/02/19 3750    sodium bicarbonate 75 mEq in sodium chloride 0.45 % 1,000 mL infusion   Intravenous Continuous Clent Phalen,  mL/hr at 05/02/19 0139      sodium chloride flush 0.9 % injection 10 mL  10 mL Intravenous PRN Adrienne Sago, DO   10 mL at 05/02/19 0902      IV infusion builder 125 mL/hr at 05/02/19 0139       Physical Exam:  /77   Pulse 91   Temp 97.7 °F (36.5 °C) (Oral)   Resp 18   Ht 5' 6\" (1.676 m)   Wt 233 lb 12.8 oz (106.1 kg)   SpO2 92%   Breastfeeding? No   BMI 37.74 kg/m²   Wt Readings from Last 3 Encounters:   05/02/19 233 lb 12.8 oz (106.1 kg)   11/03/18 216 lb (98 kg)   01/23/13 236 lb (107 kg)     Appearance: Awake, alert, no acute respiratory distress  Skin: Intact, no rash  Head: Normocephalic, atraumatic  Eyes: EOMI, no conjunctival erythema  ENMT: No pharyngeal erythema, MMM, no rhinorrhea  Neck: Supple, no elevated JVP, no carotid bruits  Lungs: Clear to auscultation bilaterally. No wheezes, rales, or rhonchi. Cardiac: Regular rate and rhythm, +S1S2, no murmurs apparent  Abdomen: Soft, nontender, +bowel sounds  Extremities: Moves all extremities x 4, no lower extremity edema  Neurologic: No focal motor deficits apparent, normal mood and affect  Peripheral Pulses: Intact posterior tibial pulses bilaterally    Intake/Output:    Intake/Output Summary (Last 24 hours) at 5/2/2019 1057  Last data filed at 5/2/2019 0640  Gross per 24 hour   Intake 2667 ml   Output 1400 ml   Net 1267 ml     No intake/output data recorded.     Laboratory Tests:  Recent Labs     04/30/19 1700 05/01/19  0700 05/02/19  0550   * 133 138   K 4.9 4.4 4.1   CL 91* 100 102   CO2 18* 17* 20*   BUN 53* 45* 47*   CREATININE 2.3* 1.8* 1.4*   GLUCOSE 92 96 90   CALCIUM 8.6 7.9* 7.5*     Lab Results   Component Value Date    MG 2.6 05/02/2019     Recent Labs     04/30/19 1700 05/01/19  0700 05/02/19  0550   ALKPHOS 99 104 90   ALT 38* 33* 30   AST 45* 42* 39*   PROT 6.6 5.8* 5.5*   BILITOT 0.5 0.6 0.5   LABALBU 3.1* 2.6* 2.5*     Recent Labs     04/30/19 1700 05/01/19  0700 05/02/19  0550   WBC 12.9* 14.1* 10.9   RBC 4.49 4.02 3.70   HGB 13.3 12.1 11.1*   HCT 40.3 36.4 34.3   MCV 89.8 90.5 92.7   MCH 29.6 30.1 30.0   MCHC 33.0 33.2 32.4   RDW 17.2* 17.1* 17.7*    145 127*   MPV 12.0 11.5 12.0     Lab Results   Component Value Date    CKTOTAL 288 (H) 05/01/2019    CKMB 9.2 (H) 05/01/2019    TROPONINI 0.01 05/01/2019    TROPONINI Obesity  8. Hypothyroidism, on replacement therapy  9. Gout        PLAN:  1.  Nothing to add from a cardiac persoective          Vilma Kilpatrick, [address] [facility]        Lab Results   Component Value Date     05/02/2019     05/01/2019     (L) 04/30/2019    K 4.1 05/02/2019    K 4.4 05/01/2019    K 4.9 04/30/2019     05/02/2019     05/01/2019    CL 91 (L) 04/30/2019    CO2 20 (L) 05/02/2019    CO2 17 (L) 05/01/2019    CO2 18 (L) 04/30/2019    BUN 47 (H) 05/02/2019    BUN 45 (H) 05/01/2019    BUN 53 (H) 04/30/2019    CREATININE 1.4 (H) 05/02/2019    CREATININE 1.8 (H) 05/01/2019    CREATININE 2.3 (H) 04/30/2019    GLUCOSE 90 05/02/2019    GLUCOSE 96 05/01/2019    GLUCOSE 92 04/30/2019    CALCIUM 7.5 (L) 05/02/2019    CALCIUM 7.9 (L) 05/01/2019    CALCIUM 8.6 04/30/2019

## 2019-05-02 NOTE — PROGRESS NOTES
Subjective: The patient is awake and alert. Npo  Little less pain  Objective:    BP (!) 114/50   Pulse 96   Temp 98.1 °F (36.7 °C) (Oral)   Resp 18   Ht 5' 6\" (1.676 m)   Wt 233 lb 12.8 oz (106.1 kg)   SpO2 92%   Breastfeeding? No   BMI 37.74 kg/m²     Heart:  RRR, no murmurs, gallops, or rubs.   Lungs:  CTA bilaterally, no wheeze, rales or rhonchi  Abd: bowel sounds present, nontender, nondistended, no masses  Extrem:  No clubbing, cyanosis, or edema    CBC with Differential:    Lab Results   Component Value Date    WBC 10.9 05/02/2019    RBC 3.70 05/02/2019    HGB 11.1 05/02/2019    HCT 34.3 05/02/2019     05/02/2019    MCV 92.7 05/02/2019    MCH 30.0 05/02/2019    MCHC 32.4 05/02/2019    RDW 17.7 05/02/2019    LYMPHOPCT 5.8 04/30/2019    MONOPCT 7.5 04/30/2019    BASOPCT 0.3 04/30/2019    MONOSABS 0.96 04/30/2019    LYMPHSABS 0.75 04/30/2019    EOSABS 0.08 04/30/2019    BASOSABS 0.04 04/30/2019     CMP:    Lab Results   Component Value Date     05/01/2019    K 4.4 05/01/2019    K 4.4 11/03/2018     05/01/2019    CO2 17 05/01/2019    BUN 45 05/01/2019    CREATININE 1.8 05/01/2019    GFRAA 33 05/01/2019    LABGLOM 27 05/01/2019    GLUCOSE 96 05/01/2019    PROT 5.8 05/01/2019    LABALBU 2.6 05/01/2019    CALCIUM 7.9 05/01/2019    BILITOT 0.6 05/01/2019    ALKPHOS 104 05/01/2019    AST 42 05/01/2019    ALT 33 05/01/2019     PT/INR:  No results found for: PROTIME, INR  @cktotal:3,ckmb:3,ckmbindex:3,troponini:3@  U/A:    Lab Results   Component Value Date    NITRU Negative 04/30/2019    COLORU Yellow 04/30/2019    PHUR 5.0 04/30/2019    WBCUA >20 04/30/2019    RBCUA 5-10 04/30/2019    BACTERIA MANY 04/30/2019    CLARITYU Clear 04/30/2019    SPECGRAV 1.015 04/30/2019    UROBILINOGEN 0.2 04/30/2019    BILIRUBINUR SMALL 04/30/2019    BLOODU SMALL 04/30/2019    GLUCOSEU Negative 04/30/2019    KETUA TRACE 04/30/2019        Assessment:    Patient Active Problem List   Diagnosis    Right lower quadrant pain    Pneumatosis coli       Plan:    Abdominal pain-  Little less today  Abdominal pain less  Labs pending    Gram negative sepsis-  ABT per ID  ?  If from diverticular dz or etiology  Slowly improved    ARF-  Labs pending  ivf    Abnormal CPK-  Follow with cardio      Js Cure, DO  6:41 AM  5/2/2019

## 2019-05-02 NOTE — PROGRESS NOTES
patient's room  SBA - with use of device, as needed/appropriate   Balance Sitting: Good  (at EOB)  Standing: Fair  (with walker)  Fair+ dynamic standing balance during completion of ADLs and other functional tasks. Activity Tolerance Fair  Patient will demonstrate Good understanding and consistent implementation of energy conservation techniques and work simplification techniques into ADL/IADL routines. Visual/  Perceptual WFL grossly     N/A        Strength: ROM: Additional Information:    R UE  4-/5  WFL    L UE 4-/5  WFL      Hearing: WFL  Sensation: No complaints of numbness/tingling in B UEs. Tone: WFL  Edema: No    Comments/Treatment: Upon arrival, patient supine in bed. At end of session, patient seated in bedside chair with call light and phone within reach, chair alarm activated, waffle cushion in place, and all lines and tubes intact. Patient would benefit from continued skilled OT to increase safety and independence with completion of ADL/IADL tasks for functional independence and quality of life. Patient education provided regardin) safe transfer techniques, 2) importance of having assistance with functional transfers/mobility to prevent falls during hospitalization. Patient verbalized understanding. Eval Complexity: Low    Assessment of Current Deficits:   Functional Mobility ? ADLs ? Strength ? Cognition ? Functional Transfers  ? IADLs ? Safety Awareness ? Endurance ? Fine Motor Coordination ? Balance ? Vision/Perception ? Sensation ? Gross Motor Coordination ? ROM ? Delirium ? Motor Control ? Plan of Care:   ADL Retraining ? Equipment Needs ? Neuromuscular Re-Education ? Energy Conservation Techniques ? Functional Transfer Training ? Patient and/or Family Education ? Functional Mobility Training ? Environmental Modifications ? Cognitive Re-Training ? Compensatory Techniques for ADLs ? Splinting Needs ? Positioning to Improve Overall Function ? Therapeutic Activity ? Therapeutic Exercise  ? Visual/Perceptual: ?    Delirium Prevention/Treatment  ? Other:  ?    Rehab Potential: Good for established goals. Patient / Family Goal: Patient did not state a goal.  Patient and/or family were instructed on functional diagnosis, prognosis/goals, and OT plan of care. Demonstrated Good understanding. Low complexity evaluation + 0 timed treatment minutes  Time Out: 1354    Evaluation time includes thorough review of current medical information, gathering information on past medical history/social history and prior level of function, completion of standardized testing/informal observation of tasks, assessment of data, and development of POC/Goals. Anita Vidal, OTR/L  License Number: [id number]

## 2019-05-03 ENCOUNTER — APPOINTMENT (OUTPATIENT)
Dept: GENERAL RADIOLOGY | Age: 74
DRG: 872 | End: 2019-05-03
Payer: COMMERCIAL

## 2019-05-03 LAB
ALBUMIN SERPL-MCNC: 2.7 G/DL (ref 3.5–5.2)
ALP BLD-CCNC: 96 U/L (ref 35–104)
ALT SERPL-CCNC: 30 U/L (ref 0–32)
ANION GAP SERPL CALCULATED.3IONS-SCNC: 16 MMOL/L (ref 7–16)
AST SERPL-CCNC: 33 U/L (ref 0–31)
BILIRUB SERPL-MCNC: 0.4 MG/DL (ref 0–1.2)
BUN BLDV-MCNC: 35 MG/DL (ref 8–23)
CALCIUM IONIZED: 1.14 MMOL/L (ref 1.15–1.33)
CALCIUM SERPL-MCNC: 7.9 MG/DL (ref 8.6–10.2)
CHLORIDE BLD-SCNC: 103 MMOL/L (ref 98–107)
CO2: 24 MMOL/L (ref 22–29)
CREAT SERPL-MCNC: 1 MG/DL (ref 0.5–1)
CULTURE, BLOOD 2: ABNORMAL
CULTURE, BLOOD 2: ABNORMAL
GFR AFRICAN AMERICAN: >60
GFR NON-AFRICAN AMERICAN: 54 ML/MIN/1.73
GLUCOSE BLD-MCNC: 80 MG/DL (ref 74–99)
HCT VFR BLD CALC: 38.1 % (ref 34–48)
HEMOGLOBIN: 12.1 G/DL (ref 11.5–15.5)
MCH RBC QN AUTO: 29.4 PG (ref 26–35)
MCHC RBC AUTO-ENTMCNC: 31.8 % (ref 32–34.5)
MCV RBC AUTO: 92.7 FL (ref 80–99.9)
ORGANISM: ABNORMAL
ORGANISM: ABNORMAL
PDW BLD-RTO: 17.7 FL (ref 11.5–15)
PLATELET # BLD: 178 E9/L (ref 130–450)
PMV BLD AUTO: 11.7 FL (ref 7–12)
POTASSIUM SERPL-SCNC: 4.1 MMOL/L (ref 3.5–5)
RBC # BLD: 4.11 E12/L (ref 3.5–5.5)
SODIUM BLD-SCNC: 143 MMOL/L (ref 132–146)
TOTAL PROTEIN: 6 G/DL (ref 6.4–8.3)
URINE CULTURE, ROUTINE: ABNORMAL
URINE CULTURE, ROUTINE: ABNORMAL
WBC # BLD: 10.2 E9/L (ref 4.5–11.5)

## 2019-05-03 PROCEDURE — 2580000003 HC RX 258: Performed by: INTERNAL MEDICINE

## 2019-05-03 PROCEDURE — 74018 RADEX ABDOMEN 1 VIEW: CPT

## 2019-05-03 PROCEDURE — 2700000000 HC OXYGEN THERAPY PER DAY

## 2019-05-03 PROCEDURE — 82330 ASSAY OF CALCIUM: CPT

## 2019-05-03 PROCEDURE — 6360000002 HC RX W HCPCS: Performed by: SPECIALIST

## 2019-05-03 PROCEDURE — 02HV33Z INSERTION OF INFUSION DEVICE INTO SUPERIOR VENA CAVA, PERCUTANEOUS APPROACH: ICD-10-PCS | Performed by: INTERNAL MEDICINE

## 2019-05-03 PROCEDURE — 2500000003 HC RX 250 WO HCPCS: Performed by: INTERNAL MEDICINE

## 2019-05-03 PROCEDURE — 36569 INSJ PICC 5 YR+ W/O IMAGING: CPT

## 2019-05-03 PROCEDURE — 6360000002 HC RX W HCPCS: Performed by: INTERNAL MEDICINE

## 2019-05-03 PROCEDURE — 99231 SBSQ HOSP IP/OBS SF/LOW 25: CPT | Performed by: NURSE PRACTITIONER

## 2019-05-03 PROCEDURE — 36415 COLL VENOUS BLD VENIPUNCTURE: CPT

## 2019-05-03 PROCEDURE — 2060000000 HC ICU INTERMEDIATE R&B

## 2019-05-03 PROCEDURE — 2580000003 HC RX 258: Performed by: SPECIALIST

## 2019-05-03 PROCEDURE — C1751 CATH, INF, PER/CENT/MIDLINE: HCPCS

## 2019-05-03 PROCEDURE — 6370000000 HC RX 637 (ALT 250 FOR IP): Performed by: INTERNAL MEDICINE

## 2019-05-03 PROCEDURE — 85027 COMPLETE CBC AUTOMATED: CPT

## 2019-05-03 PROCEDURE — 2500000003 HC RX 250 WO HCPCS: Performed by: SPECIALIST

## 2019-05-03 PROCEDURE — 80053 COMPREHEN METABOLIC PANEL: CPT

## 2019-05-03 PROCEDURE — 76937 US GUIDE VASCULAR ACCESS: CPT

## 2019-05-03 PROCEDURE — APPSS30 APP SPLIT SHARED TIME 16-30 MINUTES: Performed by: NURSE PRACTITIONER

## 2019-05-03 RX ORDER — LIDOCAINE HYDROCHLORIDE 10 MG/ML
5 INJECTION, SOLUTION EPIDURAL; INFILTRATION; INTRACAUDAL; PERINEURAL ONCE
Status: COMPLETED | OUTPATIENT
Start: 2019-05-03 | End: 2019-05-03

## 2019-05-03 RX ORDER — SODIUM CHLORIDE 0.9 % (FLUSH) 0.9 %
10 SYRINGE (ML) INJECTION PRN
Status: DISCONTINUED | OUTPATIENT
Start: 2019-05-03 | End: 2019-05-07 | Stop reason: HOSPADM

## 2019-05-03 RX ORDER — HYDROCODONE BITARTRATE AND ACETAMINOPHEN 7.5; 325 MG/1; MG/1
1 TABLET ORAL EVERY 6 HOURS PRN
Status: DISCONTINUED | OUTPATIENT
Start: 2019-05-03 | End: 2019-05-04

## 2019-05-03 RX ORDER — HEPARIN SODIUM (PORCINE) LOCK FLUSH IV SOLN 100 UNIT/ML 100 UNIT/ML
3 SOLUTION INTRAVENOUS PRN
Status: DISCONTINUED | OUTPATIENT
Start: 2019-05-03 | End: 2019-05-07 | Stop reason: HOSPADM

## 2019-05-03 RX ORDER — HEPARIN SODIUM (PORCINE) LOCK FLUSH IV SOLN 100 UNIT/ML 100 UNIT/ML
3 SOLUTION INTRAVENOUS EVERY 12 HOURS SCHEDULED
Status: DISCONTINUED | OUTPATIENT
Start: 2019-05-03 | End: 2019-05-07 | Stop reason: HOSPADM

## 2019-05-03 RX ORDER — BISACODYL 10 MG
10 SUPPOSITORY, RECTAL RECTAL DAILY
Status: DISCONTINUED | OUTPATIENT
Start: 2019-05-03 | End: 2019-05-07 | Stop reason: HOSPADM

## 2019-05-03 RX ADMIN — HYDROCODONE BITARTRATE AND ACETAMINOPHEN 1 TABLET: 7.5; 325 TABLET ORAL at 09:50

## 2019-05-03 RX ADMIN — LIDOCAINE HYDROCHLORIDE 5 ML: 10 INJECTION, SOLUTION EPIDURAL; INFILTRATION; INTRACAUDAL; PERINEURAL at 17:00

## 2019-05-03 RX ADMIN — SODIUM CHLORIDE, POTASSIUM CHLORIDE, SODIUM LACTATE AND CALCIUM CHLORIDE: 600; 310; 30; 20 INJECTION, SOLUTION INTRAVENOUS at 15:58

## 2019-05-03 RX ADMIN — HYDROCODONE BITARTRATE AND ACETAMINOPHEN 1 TABLET: 7.5; 325 TABLET ORAL at 22:38

## 2019-05-03 RX ADMIN — SODIUM CHLORIDE, PRESERVATIVE FREE 300 UNITS: 5 INJECTION INTRAVENOUS at 22:39

## 2019-05-03 RX ADMIN — ENOXAPARIN SODIUM 40 MG: 40 INJECTION SUBCUTANEOUS at 09:27

## 2019-05-03 RX ADMIN — HYDROCODONE BITARTRATE AND ACETAMINOPHEN 1 TABLET: 7.5; 325 TABLET ORAL at 15:59

## 2019-05-03 RX ADMIN — ERTAPENEM SODIUM 1000 MG: 1 INJECTION, POWDER, LYOPHILIZED, FOR SOLUTION INTRAMUSCULAR; INTRAVENOUS at 15:57

## 2019-05-03 RX ADMIN — SODIUM BICARBONATE: 84 INJECTION, SOLUTION INTRAVENOUS at 06:24

## 2019-05-03 ASSESSMENT — PAIN SCALES - GENERAL
PAINLEVEL_OUTOF10: 8
PAINLEVEL_OUTOF10: 7
PAINLEVEL_OUTOF10: 0
PAINLEVEL_OUTOF10: 9
PAINLEVEL_OUTOF10: 0

## 2019-05-03 NOTE — PATIENT CARE CONFERENCE
P Quality Flow/Interdisciplinary Rounds Progress Note        Quality Flow Rounds held on May 3, 2019    Disciplines Attending:  Bedside Nurse, ,  and Nursing Unit Leadership    Dotty Patten was admitted on 4/30/2019  3:47 PM    Anticipated Discharge Date:  Expected Discharge Date: 05/06/19    Disposition:    Kaveh Score:  Kaveh Scale Score: 18    Readmission Score:         Discussed patient goal for the day, patient clinical progression, and barriers to discharge. The following Goal(s) of the Day/Commitment(s) have been identified:  Cont IV antibioitics, Echo today and cont to monitor.        Jenni Stratton  May 3, 2019

## 2019-05-03 NOTE — PROCEDURES
Picc    Catheter insertion date 5/3/2019     Product Number:  [id number]   Lot No: [id number]   Gauge: 18g   Lumen: single   R Basilic    Vein Diameter : 0.5cm   Upper arm circumference (10CM ABOVE AC): 31cm   Catheter Length : 39cm   Internal Length: 38.5cm   Exposed Catheter Length: 0.5cm   Ultrasound Used:yes  VPS [facility] [place] confirms PICC tip is placed in the lower 1/3 of the SVC or at the Cavoatrial junction. Floor nurse notified PICC is okay to use.    : Alida Armenta RN

## 2019-05-03 NOTE — PROGRESS NOTES
Subjective: The patient is awake and alert. Less pain. No gas but feels like it  Objective:    /64   Pulse 97   Temp 98 °F (36.7 °C) (Oral)   Resp 18   Ht 5' 6\" (1.676 m)   Wt 240 lb 3.2 oz (109 kg)   SpO2 95%   Breastfeeding? No   BMI 38.77 kg/m²     Heart:  RRR, no murmurs, gallops, or rubs.   Lungs:  CTA bilaterally, no wheeze, rales or rhonchi  Abd: bowel sounds present, nontender, nondistended, no masses  Extrem:  No clubbing, cyanosis, or edema    CBC with Differential:    Lab Results   Component Value Date    WBC 10.2 05/03/2019    RBC 4.11 05/03/2019    HGB 12.1 05/03/2019    HCT 38.1 05/03/2019     05/03/2019    MCV 92.7 05/03/2019    MCH 29.4 05/03/2019    MCHC 31.8 05/03/2019    RDW 17.7 05/03/2019    LYMPHOPCT 5.8 04/30/2019    MONOPCT 7.5 04/30/2019    BASOPCT 0.3 04/30/2019    MONOSABS 0.96 04/30/2019    LYMPHSABS 0.75 04/30/2019    EOSABS 0.08 04/30/2019    BASOSABS 0.04 04/30/2019     CMP:    Lab Results   Component Value Date     05/03/2019    K 4.1 05/03/2019    K 4.4 11/03/2018     05/03/2019    CO2 24 05/03/2019    BUN 35 05/03/2019    CREATININE 1.0 05/03/2019    GFRAA >60 05/03/2019    LABGLOM 54 05/03/2019    GLUCOSE 80 05/03/2019    PROT 6.0 05/03/2019    LABALBU 2.7 05/03/2019    CALCIUM 7.9 05/03/2019    BILITOT 0.4 05/03/2019    ALKPHOS 96 05/03/2019    AST 33 05/03/2019    ALT 30 05/03/2019     PT/INR:  No results found for: PROTIME, INR  @cktotal:3,ckmb:3,ckmbindex:3,troponini:3@  U/A:    Lab Results   Component Value Date    NITRU Negative 04/30/2019    COLORU Yellow 04/30/2019    PHUR 5.0 04/30/2019    WBCUA >20 04/30/2019    RBCUA 5-10 04/30/2019    BACTERIA MANY 04/30/2019    CLARITYU Clear 04/30/2019    SPECGRAV 1.015 04/30/2019    UROBILINOGEN 0.2 04/30/2019    BILIRUBINUR SMALL 04/30/2019    BLOODU SMALL 04/30/2019    GLUCOSEU Negative 04/30/2019    KETUA TRACE 04/30/2019        Assessment:    Patient Active Problem List   Diagnosis    Right lower quadrant pain    Pneumatosis coli       Plan:  Abdominal pain-  Improving  Increase activity  Add dvt prophylaxis    ARF-  Improving approaching baseline  Add sara hose edema likely from venous insuff    Sepsis from UTI-E.coli  ABT per ID    Acute back pain-  Recent was d/t see pain management for likely facet injection on Tuesday   believe needs to hold off PT for now        Minnie Funes, DO  6:45 AM  5/3/2019

## 2019-05-03 NOTE — PROGRESS NOTES
5/3/2019  5:15 PM      Nutrition Assessment    Type and Reason for Visit: Initial, Positive Nutrition Screen    Nutrition Recommendations: Advance diet as tolerated. If pt cannot tolerate diet progression from current Clear liquid diet, consider alternate means of nutrition. Nutrition Assessment: Pt at risk of malnutrition secondary to altered GI function, which necessitates NPO/clear liquid diet. Currently, pt has been NPO/clear liquids x 3 days and decreased PO PTA reported. May recommend nutrition support, if pt is not able to tolerate diet progression    Malnutrition Assessment:  · Malnutrition Status: At risk for malnutrition  · Context: Acute illness or injury  · Findings of the 6 clinical characteristics of malnutrition (Minimum of 2 out of 6 clinical characteristics is required to make the diagnosis of moderate or severe Protein Calorie Malnutrition based on AND/ASPEN Guidelines):  1. Energy Intake-Less than or equal to 50% of estimated energy requirement, Unable to assess    2. Weight Loss-No significant weight loss,    3. Fat Loss-No significant subcutaneous fat loss,    4. Muscle Loss-No significant muscle mass loss,    5. Fluid Accumulation-Unable to assess(edema may be relative to CKD ), Extremities  6.  Strength- Not measured    Nutrition Risk Level:  Moderate    Nutrient Needs:  · Estimated Daily Total Kcal:  (15-18 bertha/kg CBW)  · Estimated Daily Protein (g): 70-80 (1.2-1.3 g/kg CBW - hx CKD 3)  · Estimated Daily Total Fluid (ml/day):  (1 ml/bertha)    Nutrition Diagnosis:   · Problem: Inadequate oral intake  · Etiology: related to Alteration in GI function     Signs and symptoms:  as evidenced by Intake 0-25%, Diet history of poor intake, Diarrhea, Nausea, Lab values, GI abnormality    Objective Information:  · Nutrition-Focused Physical Findings: SOB, cough, abdominal pain, diarrhea  and poor appetite PTA, current abdomen is soft with hypoactive BS, +3 edema  · Wound Type: (Boggy heels)  · Current Nutrition Therapies:  · Oral Diet Orders: Clear Liquid   · Oral Diet intake: 1-25%(PTA and has been NPO until next meal (dinner) of clear liquids)  · Anthropometric Measures:  · Ht: 5' 6\" (167.6 cm)   · Current Body Wt: 240 lb (108.9 kg)(5/3 bedwt)  · Admission Body Wt: 240 lb (108.9 kg)  · Usual Body Wt: 223 lb (101.2 kg)(per EMR at PCP x 1 yr ago)  · % Weight Change:  ,  none significant  · Ideal Body Wt: 130 lb (59 kg), % Ideal Body 185%  · BMI Classification: BMI 35.0 - 39.9 Obese Class II    Nutrition Interventions:   Modify current diet(advance as tolerated or consider nutrition support)  Continued Inpatient Monitoring, Education Not Indicated, Coordination of Care    Nutrition Evaluation:   · Evaluation: Goals set   · Goals: Pt tolerate diet progression with at least 50% intake, stable dry wt,     · Monitoring: Nutrition Progression, Meal Intake, Diet Tolerance, Skin Integrity, I&O, Weight, Pertinent Labs, Monitor Hemodynamic Status, Nausea or Vomiting, Monitor Bowel Function      Electronically signed by Niesha Bo RD, CNSC, LD on 5/3/19 at 5:15 PM    Contact Number: [phone]

## 2019-05-03 NOTE — PROGRESS NOTES
GENERAL SURGERY  DAILY PROGRESS NOTE  5/3/2019    Subjective:  No acute events  Pain controlled  NPO  Denied n/v  -F/-BM    Objective:  BP (!) 124/46   Pulse 94   Temp 97.5 °F (36.4 °C) (Oral)   Resp 20   Ht 5' 6\" (1.676 m)   Wt 240 lb 3.2 oz (109 kg)   SpO2 94%   Breastfeeding? No   BMI 38.77 kg/m²     General: NAD, awake and alert. Head: Normocephalic, atraumatic  Eyes: PERRLA, EOMI. Lungs: No increased work of breathing. Cardiovascular: RRR. Abdomen: Soft, distended, NT. No rebound, guarding or rigidity. Extremities: Atraumatic, full range of motion  Skin: Warm, dry and intact    Assessment/Plan:  74 y.o. female with abdominal distension, possibly secondary to pseudo-obstruction from urosepsis    Pain and nausea control PRN  NPO  IVF's  Replace electrolytes PRN  Monitor abdominal distension  Will place rectal tube. Possible KUB in afternoon.     Electronically signed by Madison Dupont MD on 5/3/2019 at 10:09 AM     Patient had flatus this am  No tenderness found  No leukocytosis  Clear liquid  Kojo Fonseca MD

## 2019-05-03 NOTE — PROGRESS NOTES
[facility]  [facility]  Progress Note    SUBJECTIVE:  Chief Complaint   Patient presents with    Abdominal Pain     patient states she has had abdominal distention since saturday with watery black stools    Diarrhea     Patient feels better today. Denies any pain. Tolerating antibiotics. No nausea or vomiting. Abdomen is less distended and less tender. Review of systems:  As stated above in the chief complaint, otherwise negative. Medications:  Scheduled Meds:   enoxaparin  40 mg Subcutaneous Daily    bisacodyl  10 mg Rectal Daily    [Held by provider] vitamin D  1,000 Units Oral Daily    ertapenem (INVANZ) IVPB  1 g Intravenous Q24H     Continuous Infusions:   IV infusion builder       PRN Meds:HYDROcodone-acetaminophen, morphine, morphine, perflutren lipid microspheres, sodium chloride flush    OBJECTIVE:  BP (!) 124/46   Pulse 94   Temp 97.5 °F (36.4 °C) (Oral)   Resp 20   Ht 5' 6\" (1.676 m)   Wt 240 lb 3.2 oz (109 kg)   SpO2 94%   Breastfeeding? No   BMI 38.77 kg/m²   Temp  Av.8 °F (36.6 °C)  Min: 97.5 °F (36.4 °C)  Max: 98 °F (36.7 °C)  Constitutional: patient is lying in bed. She is in no distress. Visitor present. Skin: Warm. No rash. HEENT: round pupils. No jaundice. No thrush. Neck: Supple  Chest: Good breath sounds bilaterally. No rhonchi. No crackles. Cardiovascular: Heart sounds rhythmic and regular. Abdomen: Less distended and tympanitic. Slightly tender right lower quadrant. Positive bowel sounds. Rectal tube.   Extremities: No edema  Lines: peripheral    Laboratory and Tests Review:  Lab Results   Component Value Date    WBC 10.2 2019    WBC 10.9 2019    WBC 14.1 (H) 2019    HGB 12.1 2019    HCT 38.1 2019    MCV 92.7 2019     2019     Lab Results   Component Value Date    NEUTROABS 10.88 (H) 2019    NEUTROABS 2.66 2018    NEUTROABS 4.76 2017     Lab Results   Component

## 2019-05-03 NOTE — CARE COORDINATION
Social Work/Discharge Planning:  Frankey Beets from [facility] states facility obtained pre-cert denial for snf.  notified patient and she states she will be on IV antibiotics at discharge. Called Katiuska from [facility] in regards to early [employer] and she states facility can re-submit for pre-cert on Monday since patient is not medically ready for discharge. Notified patient she is agreeable to snf at [facility] if insurance approves or home health care with [facility]. Will continue to follow.   Electronically signed by LENCHO Downs on 5/3/2019 at 3:05 PM

## 2019-05-03 NOTE — PROGRESS NOTES
INPATIENT CARDIOLOGY FOLLOW-UP    Name: Alexandria Pringle    Age: 74 y.o. Date of Admission: 4/30/2019  3:47 PM    Date of Service: 5/3/2019    Chief Complaint: Follow-up for troponin elevation    Interim History:  No new overnight cardiac complaints. Currently with no complaints of CP, SOB, palpitations, dizziness, or lightheadedness. ST on telemetry. Review of Systems:   Cardiac: As per HPI  General: No fever, chills  Pulmonary: As per HPI  HEENT: No visual disturbances, difficult swallowing  GI: No nausea, vomiting  Endocrine: No thyroid disease or DM  Musculoskeletal: MEZA x 4, no focal motor deficits  Skin: Intact, no rashes  Neuro/Psych: No headache or seizures      Allergies:   Allergies   Allergen Reactions    Adrenalin [Epinephrine]      CAUSES TACHYCARDIA       Current Medications:  Current Facility-Administered Medications   Medication Dose Route Frequency Provider Last Rate Last Dose    HYDROcodone-acetaminophen (NORCO) 7.5-325 MG per tablet 1 tablet  1 tablet Oral Q6H PRN [facility], DO   1 tablet at 05/03/19 0950    enoxaparin (LOVENOX) injection 40 mg  40 mg Subcutaneous Daily [facility], DO   40 mg at 05/03/19 0446    bisacodyl (DULCOLAX) suppository 10 mg  10 mg Rectal Daily Marcial Scott DO        ertapenem [facility]) 1 g IVPB minibag  1 g Intravenous Q24H Wyatt Malagon MD   Stopped at 05/02/19 1434    morphine (PF) injection 2 mg  2 mg Intravenous Q2H PRN [facility], DO   2 mg at 05/01/19 1719    morphine (PF) injection 4 mg  4 mg Intravenous Q2H PRN [facility], DO   4 mg at 05/02/19 1958    perflutren lipid microspheres (DEFINITY) injection 1.65 mg  1.5 mL Intravenous ONCE PRN JACOB Andre - CNP        sodium bicarbonate 75 mEq in sodium chloride 0.45 % 1,000 mL infusion   Intravenous Continuous Tyree Combs  mL/hr at 05/03/19 0624      sodium chloride flush 0.9 % injection 10 mL  10 mL Intravenous PRN Claudia Dill DO   10 mL at 05/02/19 1603      IV infusion builder 125 mL/hr at 05/03/19 0624       Physical Exam:  BP (!) 124/46   Pulse 94   Temp 97.5 °F (36.4 °C) (Oral)   Resp 20   Ht 5' 6\" (1.676 m)   Wt 240 lb 3.2 oz (109 kg)   SpO2 94%   Breastfeeding? No   BMI 38.77 kg/m²   Wt Readings from Last 3 Encounters:   05/03/19 240 lb 3.2 oz (109 kg)   11/03/18 216 lb (98 kg)   01/23/13 236 lb (107 kg)     Appearance: Awake, alert, no acute respiratory distress  Skin: Intact, no rash  Head: Normocephalic, atraumatic  Eyes: EOMI, no conjunctival erythema  ENMT: No pharyngeal erythema, MMM, no rhinorrhea  Neck: Supple, no elevated JVP, no carotid bruits  Lungs: Clear to auscultation bilaterally. No wheezes, rales, or rhonchi. Cardiac: Regular rate and rhythm, +S1S2, no murmurs apparent  Abdomen: Soft, nontender, +bowel sounds  Extremities: Moves all extremities x 4, no lower extremity edema  Neurologic: No focal motor deficits apparent, normal mood and affect  Peripheral Pulses: Intact posterior tibial pulses bilaterally    Intake/Output:    Intake/Output Summary (Last 24 hours) at 5/3/2019 1143  Last data filed at 5/3/2019 0211  Gross per 24 hour   Intake --   Output 1450 ml   Net -1450 ml     No intake/output data recorded.     Laboratory Tests:  Recent Labs     05/01/19  0700 05/02/19  0550 05/03/19  0327    138 143   K 4.4 4.1 4.1    102 103   CO2 17* 20* 24   BUN 45* 47* 35*   CREATININE 1.8* 1.4* 1.0   GLUCOSE 96 90 80   CALCIUM 7.9* 7.5* 7.9*     Lab Results   Component Value Date    MG 2.6 05/02/2019     Recent Labs     05/01/19  0700 05/02/19  0550 05/03/19  0327   ALKPHOS 104 90 96   ALT 33* 30 30   AST 42* 39* 33*   PROT 5.8* 5.5* 6.0*   BILITOT 0.6 0.5 0.4   LABALBU 2.6* 2.5* 2.7*     Recent Labs     05/01/19  0700 05/02/19  0550 05/03/19  0327   WBC 14.1* 10.9 10.2   RBC 4.02 3.70 4.11   HGB 12.1 11.1* 12.1   HCT 36.4 34.3 38.1   MCV 90.5 92.7 92.7   MCH 30.1 30.0 29.4   MCHC 33.2 32.4 31.8*   RDW 17.1* 17.7*

## 2019-05-03 NOTE — PLAN OF CARE
Problem: Inadequate oral food/beverage intake (NI-2.1)  Goal: Food and/or Nutrient Delivery  Advance diet as tolerated and will initiate ONS at that time  Description  Individualized approach for food/nutrient provision.   Outcome: Not Met This Shift

## 2019-05-03 NOTE — PROGRESS NOTES
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 female with prior Hx of CKD G3B with a baseline serum cr 1.3-1.6mg/dl and an e-GFR=30-40ml/min in the setting of HTN and stage II Obesity. Pt states apprx 1 week before Easter she developed pain in the back. Despite outpt therapy  The pain continued and then began in the RLQ as well. She had nausea and diarrhea. No blood in the stool. She came to the ED and a CT Scan suggested Pneumotosis Intestinalis. Blood Cult (+) Gram (-) rods and the UC grew E coli. Her initial serum cr in the ED 2.3 and this AM post IVF down to 1.8mg/dl. She denies using any NSAID. She denies dysuria or hematuria, she has intermittent nocturia. She is on losartan 100mg QD along with atenolol and lisinopril for HTN control    5/3/19: pt awake alert and states she is thirsty still feels quite bloated with the distended abd    Past Medical History:   Diagnosis Date    Gout     CONTROLLED    Hyperlipidemia     Hypertension     STABLE    Macular hole, right eye     Thyroid disease        Past Surgical History:   Procedure Laterality Date    BREAST SURGERY Right 1982    BENIGN CYST    [address]       No family history on file. reports that she has never smoked. She has never used smokeless tobacco. She reports that she drinks alcohol. She reports that she does not use drugs.     Allergies:  Adrenalin [epinephrine]    Current Medications:      HYDROcodone-acetaminophen (NORCO) 7.5-325 MG per tablet 1 tablet Q6H PRN   enoxaparin (LOVENOX) injection 40 mg Daily   bisacodyl (DULCOLAX) suppository 10 mg Daily   ertapenem (INVANZ) 1 g IVPB minibag Q24H   morphine (PF) injection 2 mg Q2H PRN   morphine (PF) injection 4 mg Q2H PRN   perflutren lipid microspheres (DEFINITY) injection 1.65 mg ONCE PRN   sodium bicarbonate 75 mEq in sodium chloride 0.45 % 1,000 mL infusion Continuous   sodium chloride flush 0.9 % injection 10 mL PRN       Review of Systems:   Pertinent items are noted in HPI. Remainder of a complete review of systems is (-) otherr than in the HPI    Physical exam:   Constitutional:  Elderly obese female in NAD  Vitals:   VITALS:  BP (!) 124/46   Pulse 94   Temp 97.5 °F (36.4 °C) (Oral)   Resp 20   Ht 5' 6\" (1.676 m)   Wt 240 lb 3.2 oz (109 kg)   SpO2 94%   Breastfeeding?  No   BMI 38.77 kg/m²   24HR INTAKE/OUTPUT:      Intake/Output Summary (Last 24 hours) at 5/3/2019 1212  Last data filed at 5/3/2019 0211  Gross per 24 hour   Intake --   Output 1450 ml   Net -1450 ml     URINARY CATHETER OUTPUT (Harris):  Urethral Catheter Non-latex-Output (mL): 350 mL  DRAIN/TUBE OUTPUT:     VENT SETTINGS:     Additional Respiratory  Assessments  Pulse: 94  Resp: 20  SpO2: 94 %    Skin: no rash, turgor wnl  Heent:  eomi, mmm, nc. at  Neck: no bruits or jvd noted  Cardiovascular: PMI not lat displaced  S1, S2 without S3 or a rub  Respiratory: CTA B without w/r/r  Abdomen:  Decreased BS abd distended tympanitic, tender in the RLQ, no HSM  Ext: 1(+) bilat lower extremity edema  Psychiatric: mood and affect appropriate, cr nr 2-12 grossly intact      Data:   Labs:  CBC:   Lab Results   Component Value Date    WBC 10.2 05/03/2019    RBC 4.11 05/03/2019    HGB 12.1 05/03/2019    HCT 38.1 05/03/2019    MCV 92.7 05/03/2019    MCH 29.4 05/03/2019    MCHC 31.8 05/03/2019    RDW 17.7 05/03/2019     05/03/2019    MPV 11.7 05/03/2019     CBC with Differential:    Lab Results   Component Value Date    WBC 10.2 05/03/2019    RBC 4.11 05/03/2019    HGB 12.1 05/03/2019    HCT 38.1 05/03/2019     05/03/2019    MCV 92.7 05/03/2019    MCH 29.4 05/03/2019    MCHC 31.8 05/03/2019 RDW 17.7 05/03/2019    LYMPHOPCT 5.8 04/30/2019    MONOPCT 7.5 04/30/2019    BASOPCT 0.3 04/30/2019    MONOSABS 0.96 04/30/2019    LYMPHSABS 0.75 04/30/2019    EOSABS 0.08 04/30/2019    BASOSABS 0.04 04/30/2019     CMP:    Lab Results   Component Value Date     05/03/2019    K 4.1 05/03/2019    K 4.4 11/03/2018     05/03/2019    CO2 24 05/03/2019    BUN 35 05/03/2019    CREATININE 1.0 05/03/2019    GFRAA >60 05/03/2019    LABGLOM 54 05/03/2019    GLUCOSE 80 05/03/2019    PROT 6.0 05/03/2019    LABALBU 2.7 05/03/2019    CALCIUM 7.9 05/03/2019    BILITOT 0.4 05/03/2019    ALKPHOS 96 05/03/2019    AST 33 05/03/2019    ALT 30 05/03/2019     BMP:    Lab Results   Component Value Date     05/03/2019    K 4.1 05/03/2019    K 4.4 11/03/2018     05/03/2019    CO2 24 05/03/2019    BUN 35 05/03/2019    LABALBU 2.7 05/03/2019    CREATININE 1.0 05/03/2019    CALCIUM 7.9 05/03/2019    GFRAA >60 05/03/2019    LABGLOM 54 05/03/2019    GLUCOSE 80 05/03/2019     BUN/Creatinine:    Lab Results   Component Value Date    BUN 35 05/03/2019    CREATININE 1.0 05/03/2019     Hepatic Function Panel:    Lab Results   Component Value Date    ALKPHOS 96 05/03/2019    ALT 30 05/03/2019    AST 33 05/03/2019    PROT 6.0 05/03/2019    BILITOT 0.4 05/03/2019    LABALBU 2.7 05/03/2019     Albumin:    Lab Results   Component Value Date    LABALBU 2.7 05/03/2019     Calcium:    Lab Results   Component Value Date    CALCIUM 7.9 05/03/2019     Ionized Calcium:  No results found for: IONCA  Magnesium:    Lab Results   Component Value Date    MG 2.6 05/02/2019     Phosphorus:    Lab Results   Component Value Date    PHOS 3.2 05/02/2019     Uric Acid:  No results found for: Susan Lout  PT/INR:  No results found for: PROTIME, INR  PTT:  No results found for: APTT, PTT[APTT}  Troponin:    Lab Results   Component Value Date    TROPONINI 0.01 05/01/2019     U/A:    Lab Results   Component Value Date    COLORU Yellow 04/30/2019 PROTEINU TRACE 04/30/2019    PHUR 5.0 04/30/2019    WBCUA >20 04/30/2019    RBCUA 5-10 04/30/2019    BACTERIA MANY 04/30/2019    CLARITYU Clear 04/30/2019    SPECGRAV 1.015 04/30/2019    LEUKOCYTESUR MODERATE 04/30/2019    UROBILINOGEN 0.2 04/30/2019    BILIRUBINUR SMALL 04/30/2019    BLOODU SMALL 04/30/2019    GLUCOSEU Negative 04/30/2019     ABG:  No results found for: PH, PCO2, PO2, HCO3, BE, THGB, TCO2, O2SAT  HgBA1c:  No results found for: LABA1C  Microalbumen/Creatinine ratio:  No components found for: RUCREAT  FLP:  No results found for: TRIG, HDL, LDLCALC, LDLDIRECT, LABVLDL  TSH:  No results found for: TSH  VITAMIN B12: No components found for: B12  FOLATE:  No results found for: FOLATE  Iron Saturation:  No components found for: PERCENTFE  FERRITIN:  No results found for: FERRITIN  AMYLASE:  No results found for: AMYLASE  LIPASE:  No results found for: LIPASE  Fibrinogen Level:  No components found for: FIB  24 Hour Urine for Protein:  No components found for: RAWUPRO, UHRS3, BYBH47QH, UTV3  24 Hour Urine for Creatinine Clearance:  No components found for: CREAT4, UHRS10, UTV10     Imaging:  CXR results:  4/30/2019 4:00 PM       Exam: XR CHEST PORTABLE       Number of Views: 1       Indication:   Cough and shortness of breath and epigastric/right groin   pain.       Comparison: 11/3/2018       Findings: There is a enlarged cardiomediastinal silhouette when   compared with the previous study with underlying pulmonary edema,   right hemidiaphragmatic elevation, bibasilar airspace disease and   thoracic aortic vascular calcifications. No pneumothorax.           Impression   1. Enlarged cardiomediastinal silhouette, the possibility of   underlying pericardial effusion or other cardiac abnormalities are not   excluded on the basis of this exam, clinical correlation recommended. .   2. Underlying pulmonary edema.    3. Nonspecific bibasilar airspace disease, findings can be seen   infiltrate/pneumonia and/or mural thickening. There is a relatively ahaustral appearance, which   may indicate a chronic process, such as ulcerative colitis. Small   bubbles in the periphery of the fluid adjacent the bowel wall could   indicate very mild pneumatosis in the cecal region. There is no   associated mural thickening or extraluminal induration or hyperemia in   the region of the cecum. There is no focal constricting lesion 2   suggest obstructive dilation. The colon tapers to more normal caliber   in the left hemicolon, which is distended with gas, and shows   scattered diverticuli without acute inflammatory findings. There is no   evidence of a perforated viscus at this time.       PELVIS: There is no dependent free pelvic fluid or pelvic adenopathy. The patient is post hysterectomy.       LYMPHATICS:There is no mesenteric or retroperitoneal adenopathy.       VASCULAR: There is no evidence of acute vascular pathology involving   mesenteric or retroperitoneal great vessels. Atherosclerotic   aortoiliac calcification is present.       SKELETAL: Moderately advanced degenerative changes of the spine and   hips are documented. The L3 vertebrae shows a compressed upper   articular endplate without displacement, and there is disc space   narrowing at the L4-5 level.           Impression   There is fluid and gaseous distention of the right hemicolon, most   prominent in the cecal region, where there may be subtle pneumatosis. Distention may mask mural inflammatory changes, such as in patient's   with TYPHLITIS- related to immunosuppression, neutropenia, or white   cell disorders.  There is no evidence of perforation at this time, but   the distended right hemicolon and slightly thin cecal wall may be   sensitive to further distention or intervention.       There is a small area of consolidation or atelectasis in the left   lower lobe, retrocardiac region, but only a portion of the lower lungs   was included.       The patient is of large habitus, and advanced degenerative changes of   the spine with some vertebral compressions are noted, but no other   acute findings are specifically identified.          Assessment  1-Stage II NASRA in the setting of the Gram (-) sepsis and complicated UTI with E coli on Unasyn as per ID  as well as probable intra abd sepsis and ACEI+ARB-FeNa<1% consistent with decreased effective renal perfusion-Creatinine back to baseline     2-CKD G3B with a baseline serum cr 1.3-1.6mg/dl and an e-GFR=30-40ml/min in the setting of HTN and stage II Obesity    3-Hyponatremia most probably  hypovolemic-resolved with IV hydration    4- Anion Gap met Acidosis in the setting of the NASRA and the sepsis-as NPO continue the IVF-HCO3 up to 24 will stop the IV HCO3    5- Hypocalcemia in the setting of Hypoalbuminemia and Sec HPTH of Renal Origin- ionized Ca++ 1.14 follow, last PO4 WNL , , PTH elevated 195 in the setting of the hypocalcemia, Vit D 25-start oral supplement when taking po    6-HTN with CKD I-IV-BP at goal <130/80 this afternoon-d--continue off the ACEI/ARRB          Thank you Dr. Daniela Blount DO for allowing us to participate in care of Mahogany Calderon  12:12 PM  5/3/2019

## 2019-05-03 NOTE — CARE COORDINATION
Social Work/Discharge Planning:  Met with patient and completed initial assessment. Explained Social Work role and discussed transition of care/discharge planning. Patient lives with her  in a one story house with two steps to enter. PTA patient independent with no adaptive device but states she will be using her front wheeled walker. Patient has a home health care history with [facility]. Patient denies any history with snf. Discussed AMPAC score indicating need for snf. Patient states she prefers [facility] if she goes to snf and [address] if plan is home. Patient agreeable to this worker making a referral to [facility]. Referral made to serafin Sanchez at [facility] and facility will review patient information. Will continue to follow and assist with discharge planning.   Electronically signed by Janita Buerger, LSW on 5/3/2019 at 11:02 AM

## 2019-05-04 LAB
ALBUMIN SERPL-MCNC: 2.7 G/DL (ref 3.5–5.2)
ALP BLD-CCNC: 82 U/L (ref 35–104)
ALT SERPL-CCNC: 24 U/L (ref 0–32)
ANION GAP SERPL CALCULATED.3IONS-SCNC: 10 MMOL/L (ref 7–16)
AST SERPL-CCNC: 25 U/L (ref 0–31)
BILIRUB SERPL-MCNC: 0.4 MG/DL (ref 0–1.2)
BLOOD CULTURE, ROUTINE: ABNORMAL
BLOOD CULTURE, ROUTINE: ABNORMAL
BUN BLDV-MCNC: 26 MG/DL (ref 8–23)
CALCIUM IONIZED: 1.22 MMOL/L (ref 1.15–1.33)
CALCIUM SERPL-MCNC: 8.2 MG/DL (ref 8.6–10.2)
CHLORIDE BLD-SCNC: 104 MMOL/L (ref 98–107)
CO2: 29 MMOL/L (ref 22–29)
CREAT SERPL-MCNC: 0.9 MG/DL (ref 0.5–1)
GFR AFRICAN AMERICAN: >60
GFR NON-AFRICAN AMERICAN: >60 ML/MIN/1.73
GLUCOSE BLD-MCNC: 104 MG/DL (ref 74–99)
HCT VFR BLD CALC: 34.6 % (ref 34–48)
HEMOGLOBIN: 11.3 G/DL (ref 11.5–15.5)
MCH RBC QN AUTO: 30.6 PG (ref 26–35)
MCHC RBC AUTO-ENTMCNC: 32.7 % (ref 32–34.5)
MCV RBC AUTO: 93.8 FL (ref 80–99.9)
ORGANISM: ABNORMAL
PDW BLD-RTO: 17.5 FL (ref 11.5–15)
PLATELET # BLD: 196 E9/L (ref 130–450)
PMV BLD AUTO: 11.2 FL (ref 7–12)
POTASSIUM SERPL-SCNC: 4.1 MMOL/L (ref 3.5–5)
RBC # BLD: 3.69 E12/L (ref 3.5–5.5)
SODIUM BLD-SCNC: 143 MMOL/L (ref 132–146)
TOTAL PROTEIN: 5.7 G/DL (ref 6.4–8.3)
WBC # BLD: 7.5 E9/L (ref 4.5–11.5)

## 2019-05-04 PROCEDURE — 6360000002 HC RX W HCPCS: Performed by: INTERNAL MEDICINE

## 2019-05-04 PROCEDURE — 2580000003 HC RX 258: Performed by: SPECIALIST

## 2019-05-04 PROCEDURE — 82330 ASSAY OF CALCIUM: CPT

## 2019-05-04 PROCEDURE — 6370000000 HC RX 637 (ALT 250 FOR IP): Performed by: INTERNAL MEDICINE

## 2019-05-04 PROCEDURE — 80053 COMPREHEN METABOLIC PANEL: CPT

## 2019-05-04 PROCEDURE — 36415 COLL VENOUS BLD VENIPUNCTURE: CPT

## 2019-05-04 PROCEDURE — 2060000000 HC ICU INTERMEDIATE R&B

## 2019-05-04 PROCEDURE — 85027 COMPLETE CBC AUTOMATED: CPT

## 2019-05-04 PROCEDURE — 6360000002 HC RX W HCPCS: Performed by: SPECIALIST

## 2019-05-04 RX ORDER — HYDROCODONE BITARTRATE AND ACETAMINOPHEN 5; 325 MG/1; MG/1
1 TABLET ORAL EVERY 6 HOURS PRN
Status: DISCONTINUED | OUTPATIENT
Start: 2019-05-04 | End: 2019-05-05

## 2019-05-04 RX ORDER — LOSARTAN POTASSIUM 50 MG/1
50 TABLET ORAL DAILY
Status: DISCONTINUED | OUTPATIENT
Start: 2019-05-04 | End: 2019-05-07 | Stop reason: HOSPADM

## 2019-05-04 RX ORDER — KETOROLAC TROMETHAMINE 30 MG/ML
30 INJECTION, SOLUTION INTRAMUSCULAR; INTRAVENOUS ONCE
Status: COMPLETED | OUTPATIENT
Start: 2019-05-04 | End: 2019-05-04

## 2019-05-04 RX ADMIN — HYDROCODONE BITARTRATE AND ACETAMINOPHEN 1 TABLET: 5; 325 TABLET ORAL at 08:58

## 2019-05-04 RX ADMIN — SODIUM CHLORIDE, PRESERVATIVE FREE 300 UNITS: 5 INJECTION INTRAVENOUS at 08:47

## 2019-05-04 RX ADMIN — ERTAPENEM SODIUM 1000 MG: 1 INJECTION, POWDER, LYOPHILIZED, FOR SOLUTION INTRAMUSCULAR; INTRAVENOUS at 12:59

## 2019-05-04 RX ADMIN — Medication 10 ML: at 08:47

## 2019-05-04 RX ADMIN — LOSARTAN POTASSIUM 50 MG: 50 TABLET, FILM COATED ORAL at 13:03

## 2019-05-04 RX ADMIN — ENOXAPARIN SODIUM 40 MG: 40 INJECTION SUBCUTANEOUS at 08:47

## 2019-05-04 RX ADMIN — KETOROLAC TROMETHAMINE 30 MG: 30 INJECTION, SOLUTION INTRAMUSCULAR at 12:59

## 2019-05-04 ASSESSMENT — PAIN - FUNCTIONAL ASSESSMENT
PAIN_FUNCTIONAL_ASSESSMENT: PREVENTS OR INTERFERES SOME ACTIVE ACTIVITIES AND ADLS
PAIN_FUNCTIONAL_ASSESSMENT: PREVENTS OR INTERFERES SOME ACTIVE ACTIVITIES AND ADLS

## 2019-05-04 ASSESSMENT — PAIN DESCRIPTION - PAIN TYPE
TYPE: ACUTE PAIN
TYPE: ACUTE PAIN

## 2019-05-04 ASSESSMENT — PAIN SCALES - GENERAL
PAINLEVEL_OUTOF10: 8
PAINLEVEL_OUTOF10: 3
PAINLEVEL_OUTOF10: 4
PAINLEVEL_OUTOF10: 5
PAINLEVEL_OUTOF10: 10

## 2019-05-04 ASSESSMENT — PAIN DESCRIPTION - LOCATION
LOCATION: BACK
LOCATION: BACK

## 2019-05-04 ASSESSMENT — PAIN DESCRIPTION - ORIENTATION
ORIENTATION: MID;LOWER
ORIENTATION: LOWER

## 2019-05-04 ASSESSMENT — PAIN DESCRIPTION - FREQUENCY
FREQUENCY: INTERMITTENT
FREQUENCY: INTERMITTENT

## 2019-05-04 ASSESSMENT — PAIN DESCRIPTION - ONSET
ONSET: ON-GOING
ONSET: GRADUAL

## 2019-05-04 ASSESSMENT — PAIN DESCRIPTION - DESCRIPTORS
DESCRIPTORS: DISCOMFORT;SHARP
DESCRIPTORS: ACHING;DISCOMFORT;SHARP

## 2019-05-04 ASSESSMENT — PAIN DESCRIPTION - PROGRESSION
CLINICAL_PROGRESSION: GRADUALLY WORSENING
CLINICAL_PROGRESSION: GRADUALLY WORSENING

## 2019-05-04 NOTE — PROGRESS NOTES
Subjective: The patient is awake and alert. Tolerated liquids passed gas    Objective:    BP (!) 147/72   Pulse 100   Temp 98 °F (36.7 °C) (Oral)   Resp 16   Ht 5' 6\" (1.676 m)   Wt 243 lb (110.2 kg)   SpO2 96%   Breastfeeding? No   BMI 39.22 kg/m²     Heart:  RRR, no murmurs, gallops, or rubs.   Lungs:  CTA bilaterally, no wheeze, rales or rhonchi  Abd: bowel sounds present, nontender, nondistended, no masses  Extrem:  No clubbing, cyanosis, or edema    CBC with Differential:    Lab Results   Component Value Date    WBC 7.5 05/04/2019    RBC 3.69 05/04/2019    HGB 11.3 05/04/2019    HCT 34.6 05/04/2019     05/04/2019    MCV 93.8 05/04/2019    MCH 30.6 05/04/2019    MCHC 32.7 05/04/2019    RDW 17.5 05/04/2019    LYMPHOPCT 5.8 04/30/2019    MONOPCT 7.5 04/30/2019    BASOPCT 0.3 04/30/2019    MONOSABS 0.96 04/30/2019    LYMPHSABS 0.75 04/30/2019    EOSABS 0.08 04/30/2019    BASOSABS 0.04 04/30/2019     CMP:    Lab Results   Component Value Date     05/03/2019    K 4.1 05/03/2019    K 4.4 11/03/2018     05/03/2019    CO2 24 05/03/2019    BUN 35 05/03/2019    CREATININE 1.0 05/03/2019    GFRAA >60 05/03/2019    LABGLOM 54 05/03/2019    GLUCOSE 80 05/03/2019    PROT 6.0 05/03/2019    LABALBU 2.7 05/03/2019    CALCIUM 7.9 05/03/2019    BILITOT 0.4 05/03/2019    ALKPHOS 96 05/03/2019    AST 33 05/03/2019    ALT 30 05/03/2019     PT/INR:  No results found for: PROTIME, INR  @cktotal:3,ckmb:3,ckmbindex:3,troponini:3@  U/A:    Lab Results   Component Value Date    NITRU Negative 04/30/2019    COLORU Yellow 04/30/2019    PHUR 5.0 04/30/2019    WBCUA >20 04/30/2019    RBCUA 5-10 04/30/2019    BACTERIA MANY 04/30/2019    CLARITYU Clear 04/30/2019    SPECGRAV 1.015 04/30/2019    UROBILINOGEN 0.2 04/30/2019    BILIRUBINUR SMALL 04/30/2019    BLOODU SMALL 04/30/2019    GLUCOSEU Negative 04/30/2019    KETUA TRACE 04/30/2019        Assessment:    Patient Active Problem List   Diagnosis    Right lower quadrant pain    Pneumatosis coli    Troponin level elevated       Plan:    Abdominal pain-  On norco only-try to limit as possible  Some night time confusion  Some gas  Pain still there but less  KUB noted    E.coli sepsis from uti-  Blood culture positive of 5/1 and 4/30x2  ABT per ID      Louis Dee DO  5:45 AM  5/4/2019

## 2019-05-04 NOTE — PROGRESS NOTES
Nephrology Note  Marcie Baltazar MD          Patient seen and examined. Chart reviewed. No family member is present at bedside. Patient states that soon after ambulating earlier today she has had recurrence of her symptoms of sciatica Involving her right leg. Denies shortness of breath. Appetite good. No nausea or dysguesia. Awake and alert . In no acute distress. Vital SignsBP (!) 154/64   Pulse 95   Temp 97.9 °F (36.6 °C) (Oral)   Resp 16   Ht 5' 6\" (1.676 m)   Wt 243 lb (110.2 kg)   SpO2 94%   Breastfeeding? No   BMI 39.22 kg/m²   24HR INTAKE/OUTPUT:    Intake/Output Summary (Last 24 hours) at 5/4/2019 1252  Last data filed at 5/4/2019 1003  Gross per 24 hour   Intake 1621 ml   Output 725 ml   Net 896 ml         Physical Exam    Neck: No JVD  Lungs: Breath sounds decreased at the bases. No rales or ronchi. Heart: Regular rate and rhythm. No S3 gallop. No  murmrur. Abdomen: Soft non distended, non tender and normal bowel sounds.   Extremeties: +1 bipedal edema      ROS:  RESPIRATORY:  negative  CARDIOVASCULAR:  negative  GASTROINTESTINAL:  negative  GENITOURINARY:  Negative        Current Facility-Administered Medications   Medication Dose Route Frequency Provider Last Rate Last Dose    HYDROcodone-acetaminophen (NORCO) 5-325 MG per tablet 1 tablet  1 tablet Oral Q6H PRN Verlyn Spillers, DO   1 tablet at 05/04/19 0858    ketorolac (TORADOL) injection 30 mg  30 mg Intravenous Once Boaz Hemphill MD        enoxaparin (LOVENOX) injection 40 mg  40 mg Subcutaneous Daily Verlyn Spillers, DO   40 mg at 05/04/19 0847    bisacodyl (DULCOLAX) suppository 10 mg  10 mg Rectal Daily Air Guo DO        lactated ringers 1,000 mL infusion   Intravenous Continuous Evelyn Shepard MD 75 mL/hr at 05/03/19 9776      [Held by provider] vitamin D (CHOLECALCIFEROL) tablet 1,000 Units  1,000 Units Oral Daily Evelyn Shepard MD        sodium chloride flush 0.9 % injection 10 mL  10 mL Intravenous PRN Kristine Dennis MD   10 mL at 05/04/19 0847    heparin flush 100 UNIT/ML injection 300 Units  3 mL Intravenous 2 times per day Kristine Dennis MD   300 Units at 05/04/19 0847    heparin flush 100 UNIT/ML injection 300 Units  3 mL Intercatheter PRN Kristine Dennis MD        ertapenem [facility]) 1 g IVPB minibag  1 g Intravenous Q24H Kristine Dennis MD   Stopped at 05/03/19 1627    perflutren lipid microspheres (DEFINITY) injection 1.65 mg  1.5 mL Intravenous ONCE PRN JACOB Cosme - CNP        sodium chloride flush 0.9 % injection 10 mL  10 mL Intravenous PRN Brian Meek DO   10 mL at 05/02/19 1603         Labs:    CBC:   Recent Labs     05/02/19  0550 05/03/19  0327 05/04/19  0530   WBC 10.9 10.2 7.5   HGB 11.1* 12.1 11.3*   * 178 196        No results found for: IRON, TIBC, FERRITIN    Lab Results   Component Value Date     (H) 05/02/2019    CALCIUM 8.2 (L) 05/04/2019    CALCIUM 7.9 (L) 05/03/2019    CALCIUM 7.5 (L) 05/02/2019    CAION 1.22 05/04/2019    CAION 1.14 (L) 05/03/2019    CAION 1.13 (L) 05/02/2019    PHOS 3.2 05/02/2019    MG 2.6 05/02/2019       BMP:   Recent Labs     05/02/19  0550 05/03/19  0327 05/04/19  0530    143 143   K 4.1 4.1 4.1    103 104   CO2 20* 24 29   BUN 47* 35* 26*   CREATININE 1.4* 1.0 0.9   GLUCOSE 90 80 104*       Assessment: / Plan:    1. Acute kidney injury in the setting of sepsis with complicated UTI and also get renal hypoperfusion. Serum creatinine is improved and actually is lower than her baseline. 2.  Hypertension with chronic kidney disease stage I to stage IV. Blood pressures are   higher than the target of less than 130/80 mm Hg. We'll resume angiotensin II receptor blocking agent at a lower dose. Apparently she was both on an ACE inhibitor and an ARB agent prior to admission. 3.   Hypocalcemia. Improved. 4.   Anion gap metabolic acidosis with acute kidney injury. Resolved.     5.  Anemia of

## 2019-05-04 NOTE — PROGRESS NOTES
[address] [place] [address] [facility]  Progress Note    SUBJECTIVE:  Chief Complaint   Patient presents with    Abdominal Pain     patient states she has had abdominal distention since saturday with watery black stools    Diarrhea     . Tolerating antibiotics. No nausea or vomiting. C/o severe \"sciatic\" back pain into right leg. Refuses to be moved d/t pain    Review of systems:  As stated above in the chief complaint, otherwise negative. Medications:  Scheduled Meds:   enoxaparin  40 mg Subcutaneous Daily    bisacodyl  10 mg Rectal Daily    [Held by provider] vitamin D  1,000 Units Oral Daily    heparin flush  3 mL Intravenous 2 times per day    ertapenem (INVANZ) IVPB  1 g Intravenous Q24H     Continuous Infusions:   IV infusion builder 75 mL/hr at 19 1558     PRN Meds:HYDROcodone 5 mg - acetaminophen, sodium chloride flush, heparin flush, perflutren lipid microspheres, sodium chloride flush    OBJECTIVE:  BP (!) 154/64   Pulse 95   Temp 97.9 °F (36.6 °C) (Oral)   Resp 16   Ht 5' 6\" (1.676 m)   Wt 243 lb (110.2 kg)   SpO2 94%   Breastfeeding? No   BMI 39.22 kg/m²   Temp  Av.2 °F (36.8 °C)  Min: 97.9 °F (36.6 °C)  Max: 98.7 °F (37.1 °C)  Constitutional: patient is lying in bed. A&O. Upset regarding back pain. Skin: Warm. No rash. HEENT: round pupils. No jaundice. No thrush. Neck: Supple  Chest: Good breath sounds bilaterally. No rhonchi. No crackles. Cardiovascular: Heart sounds rhythmic and regular.   Abdomen: soft, nondistended  Extremities: +1-2 edema b/l LEs  Lines: PICC RUE dressed  F/c draining clear marck urine    Laboratory and Tests Review:  Lab Results   Component Value Date    WBC 7.5 2019    WBC 10.2 2019    WBC 10.9 2019    HGB 11.3 (L) 2019    HCT 34.6 2019    MCV 93.8 2019     2019     Lab Results   Component Value Date    NEUTROABS 10.88 (H) 2019    NEUTROABS 2.66 2018    NEUTROABS 4.76 06/19/2017     Lab Results   Component Value Date    CRP 19.7 (H) 05/01/2019     No results found for: CRPHS  Lab Results   Component Value Date    SEDRATE 76 (H) 05/01/2019     Lab Results   Component Value Date    ALT 24 05/04/2019    AST 25 05/04/2019    ALKPHOS 82 05/04/2019    BILITOT 0.4 05/04/2019     Lab Results   Component Value Date     05/04/2019    K 4.1 05/04/2019    K 4.4 11/03/2018     05/04/2019    CO2 29 05/04/2019    BUN 26 05/04/2019    CREATININE 0.9 05/04/2019    CREATININE 1.0 05/03/2019    CREATININE 1.4 05/02/2019    GFRAA >60 05/04/2019    LABGLOM >60 05/04/2019    GLUCOSE 104 05/04/2019    PROT 5.7 05/04/2019    LABALBU 2.7 05/04/2019    CALCIUM 8.2 05/04/2019    BILITOT 0.4 05/04/2019    ALKPHOS 82 05/04/2019    AST 25 05/04/2019    ALT 24 05/04/2019     Radiology:      Microbiology:   Urine culture 4/30/19 >100K ESBL+ (sensitive to Imipenem and Bactrim) E. coli   Blood cultures 4/30/19 E. coli  Blood cultures 5/1S 19 negative so far  Respiratory panel: negative     ASSESSMENT:  · Probable complicated UTI with sepsis with E. coli  · Pneumatosis intestinalis  · GNR bacteremia associated to the above  · Leukocytosis associated to the above    PLAN:  · Continue Ertapenem. I have asked microbiology runs sensitivities on ertapenem    April 1740 [facility] 1400  12:38 PM  5/4/2019     Inserting catheter now. Patient was having significant pain in her right sciatic area. She has had pain like this before. She did not tolerate sitting in chair for 2 long. She is now lying in bed and slightly more comfortable. The abdominal pain has improved significantly.

## 2019-05-05 LAB
ALBUMIN SERPL-MCNC: 2.5 G/DL (ref 3.5–5.2)
ALP BLD-CCNC: 96 U/L (ref 35–104)
ALT SERPL-CCNC: 21 U/L (ref 0–32)
ANION GAP SERPL CALCULATED.3IONS-SCNC: 10 MMOL/L (ref 7–16)
AST SERPL-CCNC: 24 U/L (ref 0–31)
BILIRUB SERPL-MCNC: 0.4 MG/DL (ref 0–1.2)
BUN BLDV-MCNC: 24 MG/DL (ref 8–23)
CALCIUM SERPL-MCNC: 8.3 MG/DL (ref 8.6–10.2)
CHLORIDE BLD-SCNC: 106 MMOL/L (ref 98–107)
CO2: 29 MMOL/L (ref 22–29)
CREAT SERPL-MCNC: 1.1 MG/DL (ref 0.5–1)
FERRITIN: 266 NG/ML
FOLATE: >20 NG/ML (ref 4.8–24.2)
GFR AFRICAN AMERICAN: 59
GFR NON-AFRICAN AMERICAN: 48 ML/MIN/1.73
GLUCOSE BLD-MCNC: 103 MG/DL (ref 74–99)
HCT VFR BLD CALC: 34.5 % (ref 34–48)
HEMOGLOBIN: 11.1 G/DL (ref 11.5–15.5)
IRON SATURATION: 21 % (ref 15–50)
IRON: 31 MCG/DL (ref 37–145)
MAGNESIUM: 2.1 MG/DL (ref 1.6–2.6)
MCH RBC QN AUTO: 29.9 PG (ref 26–35)
MCHC RBC AUTO-ENTMCNC: 32.2 % (ref 32–34.5)
MCV RBC AUTO: 93 FL (ref 80–99.9)
PDW BLD-RTO: 17.1 FL (ref 11.5–15)
PHOSPHORUS: 2 MG/DL (ref 2.5–4.5)
PLATELET # BLD: 205 E9/L (ref 130–450)
PMV BLD AUTO: 11.4 FL (ref 7–12)
POTASSIUM SERPL-SCNC: 4.3 MMOL/L (ref 3.5–5)
RBC # BLD: 3.71 E12/L (ref 3.5–5.5)
SODIUM BLD-SCNC: 145 MMOL/L (ref 132–146)
TOTAL IRON BINDING CAPACITY: 146 MCG/DL (ref 250–450)
TOTAL PROTEIN: 5.3 G/DL (ref 6.4–8.3)
VITAMIN B-12: <150 PG/ML (ref 211–946)
WBC # BLD: 9.2 E9/L (ref 4.5–11.5)

## 2019-05-05 PROCEDURE — 83735 ASSAY OF MAGNESIUM: CPT

## 2019-05-05 PROCEDURE — 6370000000 HC RX 637 (ALT 250 FOR IP): Performed by: INTERNAL MEDICINE

## 2019-05-05 PROCEDURE — 36415 COLL VENOUS BLD VENIPUNCTURE: CPT

## 2019-05-05 PROCEDURE — 83550 IRON BINDING TEST: CPT

## 2019-05-05 PROCEDURE — 82728 ASSAY OF FERRITIN: CPT

## 2019-05-05 PROCEDURE — 6360000002 HC RX W HCPCS: Performed by: INTERNAL MEDICINE

## 2019-05-05 PROCEDURE — 87324 CLOSTRIDIUM AG IA: CPT

## 2019-05-05 PROCEDURE — 2580000003 HC RX 258: Performed by: SPECIALIST

## 2019-05-05 PROCEDURE — 82607 VITAMIN B-12: CPT

## 2019-05-05 PROCEDURE — 6360000002 HC RX W HCPCS: Performed by: SPECIALIST

## 2019-05-05 PROCEDURE — 85027 COMPLETE CBC AUTOMATED: CPT

## 2019-05-05 PROCEDURE — 83540 ASSAY OF IRON: CPT

## 2019-05-05 PROCEDURE — 82746 ASSAY OF FOLIC ACID SERUM: CPT

## 2019-05-05 PROCEDURE — 2060000000 HC ICU INTERMEDIATE R&B

## 2019-05-05 PROCEDURE — 6370000000 HC RX 637 (ALT 250 FOR IP): Performed by: SPECIALIST

## 2019-05-05 PROCEDURE — 80053 COMPREHEN METABOLIC PANEL: CPT

## 2019-05-05 PROCEDURE — 84100 ASSAY OF PHOSPHORUS: CPT

## 2019-05-05 RX ORDER — HYDROCODONE BITARTRATE AND ACETAMINOPHEN 7.5; 325 MG/1; MG/1
1 TABLET ORAL EVERY 6 HOURS PRN
Status: DISCONTINUED | OUTPATIENT
Start: 2019-05-05 | End: 2019-05-07 | Stop reason: HOSPADM

## 2019-05-05 RX ORDER — CYCLOBENZAPRINE HCL 10 MG
10 TABLET ORAL 3 TIMES DAILY PRN
Status: DISCONTINUED | OUTPATIENT
Start: 2019-05-05 | End: 2019-05-07 | Stop reason: HOSPADM

## 2019-05-05 RX ADMIN — CYCLOBENZAPRINE HYDROCHLORIDE 10 MG: 10 TABLET, FILM COATED ORAL at 17:05

## 2019-05-05 RX ADMIN — SODIUM CHLORIDE, PRESERVATIVE FREE 300 UNITS: 5 INJECTION INTRAVENOUS at 20:28

## 2019-05-05 RX ADMIN — ERTAPENEM SODIUM 1000 MG: 1 INJECTION, POWDER, LYOPHILIZED, FOR SOLUTION INTRAMUSCULAR; INTRAVENOUS at 13:45

## 2019-05-05 RX ADMIN — SODIUM CHLORIDE, PRESERVATIVE FREE 300 UNITS: 5 INJECTION INTRAVENOUS at 08:00

## 2019-05-05 RX ADMIN — HYDROCODONE BITARTRATE AND ACETAMINOPHEN 1 TABLET: 7.5; 325 TABLET ORAL at 08:36

## 2019-05-05 RX ADMIN — CYCLOBENZAPRINE HYDROCHLORIDE 10 MG: 10 TABLET, FILM COATED ORAL at 08:00

## 2019-05-05 RX ADMIN — HYDROCODONE BITARTRATE AND ACETAMINOPHEN 1 TABLET: 7.5; 325 TABLET ORAL at 20:28

## 2019-05-05 RX ADMIN — Medication 250 MG: at 20:28

## 2019-05-05 RX ADMIN — LOSARTAN POTASSIUM 50 MG: 50 TABLET, FILM COATED ORAL at 08:00

## 2019-05-05 RX ADMIN — HYDROCODONE BITARTRATE AND ACETAMINOPHEN 1 TABLET: 7.5; 325 TABLET ORAL at 14:31

## 2019-05-05 RX ADMIN — ENOXAPARIN SODIUM 40 MG: 40 INJECTION SUBCUTANEOUS at 08:00

## 2019-05-05 RX ADMIN — Medication 250 MG: at 14:11

## 2019-05-05 ASSESSMENT — PAIN DESCRIPTION - DESCRIPTORS
DESCRIPTORS: ACHING;CONSTANT;DISCOMFORT
DESCRIPTORS: ACHING;CONSTANT
DESCRIPTORS: ACHING;CONSTANT;DISCOMFORT

## 2019-05-05 ASSESSMENT — PAIN SCALES - GENERAL
PAINLEVEL_OUTOF10: 6
PAINLEVEL_OUTOF10: 8
PAINLEVEL_OUTOF10: 0
PAINLEVEL_OUTOF10: 4
PAINLEVEL_OUTOF10: 8
PAINLEVEL_OUTOF10: 0
PAINLEVEL_OUTOF10: 0
PAINLEVEL_OUTOF10: 5
PAINLEVEL_OUTOF10: 6
PAINLEVEL_OUTOF10: 5

## 2019-05-05 ASSESSMENT — PAIN - FUNCTIONAL ASSESSMENT
PAIN_FUNCTIONAL_ASSESSMENT: PREVENTS OR INTERFERES WITH MANY ACTIVE NOT PASSIVE ACTIVITIES

## 2019-05-05 ASSESSMENT — PAIN DESCRIPTION - FREQUENCY
FREQUENCY: CONTINUOUS

## 2019-05-05 ASSESSMENT — PAIN DESCRIPTION - PAIN TYPE
TYPE: CHRONIC PAIN

## 2019-05-05 ASSESSMENT — PAIN DESCRIPTION - ORIENTATION
ORIENTATION: LOWER

## 2019-05-05 ASSESSMENT — PAIN DESCRIPTION - ONSET
ONSET: ON-GOING

## 2019-05-05 ASSESSMENT — PAIN DESCRIPTION - LOCATION
LOCATION: BACK

## 2019-05-05 NOTE — PROGRESS NOTES
Seen and examined  Denied any abdominal pain  More flatus  tolerated clears  Advance diet to fulls  No tenderness on exam  Will hold off on decompressive colonoscopy as patient is passing more flatus       Stephania Kanner, MD

## 2019-05-05 NOTE — PROGRESS NOTES
Nephrology Note  Elizabeth Varela MD          Patient seen and examined. Chart reviewed. No family member is present at bedside. Patient is feeling better. Oral intake has improved. Her diet has been advanced. Her symptoms of sciatica improved. She is denying any diarrhea. Denies shortness of breath. Appetite good. No nausea or dysguesia. Awake and alert . In no acute distress. Vital SignsBP 138/84   Pulse 100   Temp 97.6 °F (36.4 °C) (Oral)   Resp 16   Ht 5' 6\" (1.676 m)   Wt 238 lb 9.6 oz (108.2 kg)   SpO2 93%   Breastfeeding? No   BMI 38.51 kg/m²   24HR INTAKE/OUTPUT:    Intake/Output Summary (Last 24 hours) at 5/5/2019 1314  Last data filed at 5/5/2019 0943  Gross per 24 hour   Intake 2596 ml   Output 1300 ml   Net 1296 ml         Physical Exam    Neck: No JVD  Lungs: Breath sounds decreased at the bases. No rales or ronchi. Heart: Regular rate and rhythm. No S3 gallop. No  murmrur. Abdomen: Soft non distended, non tender and normal bowel sounds.   Extremeties: +1 bipedal edema        ROS:  RESPIRATORY:  negative  CARDIOVASCULAR:  negative  GASTROINTESTINAL:  negative  GENITOURINARY:  Negative           Current Facility-Administered Medications   Medication Dose Route Frequency Provider Last Rate Last Dose    HYDROcodone-acetaminophen (NORCO) 7.5-325 MG per tablet 1 tablet  1 tablet Oral Q6H PRN Timo Bennett DO   1 tablet at 05/05/19 0836    cyclobenzaprine (FLEXERIL) tablet 10 mg  10 mg Oral TID PRN Timo Bennett DO   10 mg at 05/05/19 0800    vancomycin (VANCOCIN) oral solution 250 mg  250 mg Oral Q8H Marisel Roth MD        losartan (COZAAR) tablet 50 mg  50 mg Oral Daily Maximus Leidy Kilpatrick MD   50 mg at 05/05/19 0800    enoxaparin (LOVENOX) injection 40 mg  40 mg Subcutaneous Daily Timo Bennett DO   40 mg at 05/05/19 0800    bisacodyl (DULCOLAX) suppository 10 mg  10 mg Rectal Daily Claudeen Sires, DO        lactated ringers 1,000 mL infusion   Intravenous Continuous Maximus S Jayce Calloway MD 75 mL/hr at 05/03/19 1558      [Held by provider] vitamin D (CHOLECALCIFEROL) tablet 1,000 Units  1,000 Units Oral Daily Dominguez Blankenship MD        sodium chloride flush 0.9 % injection 10 mL  10 mL Intravenous PRN Jose Lim MD   10 mL at 05/04/19 0847    heparin flush 100 UNIT/ML injection 300 Units  3 mL Intravenous 2 times per day Jose Lim MD   300 Units at 05/05/19 0800    heparin flush 100 UNIT/ML injection 300 Units  3 mL Intercatheter PRN Jose Lim MD        ertapenem [facility]) 1 g IVPB minibag  1 g Intravenous Q24H Jose Lim MD   Stopped at 05/04/19 1348    perflutren lipid microspheres (DEFINITY) injection 1.65 mg  1.5 mL Intravenous ONCE PRN Rose Sandy, APRN - CNP        sodium chloride flush 0.9 % injection 10 mL  10 mL Intravenous PRN Nayla Oliva DO   10 mL at 05/02/19 1603         Labs:    CBC:   Recent Labs     05/03/19 0327 05/04/19  0530 05/05/19  0347   WBC 10.2 7.5 9.2   HGB 12.1 11.3* 11.1*    196 205        Lab Results   Component Value Date    IRON 31 (L) 05/05/2019    TIBC 146 (L) 05/05/2019       Lab Results   Component Value Date     (H) 05/02/2019    CALCIUM 8.3 (L) 05/05/2019    CALCIUM 8.2 (L) 05/04/2019    CALCIUM 7.9 (L) 05/03/2019    CAION 1.22 05/04/2019    CAION 1.14 (L) 05/03/2019    CAION 1.13 (L) 05/02/2019    PHOS 2.0 (L) 05/05/2019    PHOS 3.2 05/02/2019    MG 2.1 05/05/2019    MG 2.6 05/02/2019       BMP:   Recent Labs     05/03/19 0327 05/04/19  0530 05/05/19  0347    143 145   K 4.1 4.1 4.3    104 106   CO2 24 29 29   BUN 35* 26* 24*   CREATININE 1.0 0.9 1.1*   GLUCOSE 80 104* 103*         Assessment: / Plan:  1. Acute kidney injury in the setting of sepsis with complicated UTI and also get renal hypoperfusion. Serum creatinine is  actually is lower than her baseline. We will Hep-Lock IV fluids after the current bag.     2. Hypertension with chronic kidney disease stage I to stage IV. Hypertension control improved with addition of angiotensin II receptor blocking agent. Blood pressures are    now at target of less than 130/80 mm Hg. Apparently she was both on an ACE inhibitor and an ARB agent prior to admission.     3. Hypocalcemia. Improved.     4. Anion gap metabolic acidosis with acute kidney injury. Resolved.     5. Anemia of chronic kidney disease. We'll follow hemoglobin and hematocrit. Iron studies reflect mild deficiency. vitamin B12 and folate levels are pending. .  Mary Pickering MD  Nephrology  Electronically signed by Angelique Peter MD on 5/5/2019 at 1:13 PM

## 2019-05-05 NOTE — PROGRESS NOTES
DR. Jacek Juarez RETURNED CALL STATES PT WILL SEE MD IN AM AND DISCUSS PAIN MANAGEMENT NO NEW ORDERS PATIENT AWARE

## 2019-05-05 NOTE — PROGRESS NOTES
[address] [place] [address] [facility]  Progress Note    SUBJECTIVE:  Chief Complaint   Patient presents with    Abdominal Pain     patient states she has had abdominal distention since saturday with watery black stools    Diarrhea     . Tolerating antibiotics. No nausea or vomiting.  + diarrhea with 6-7 stools overnight. No abd pain. Back pain better controlled today. Review of systems:  As stated above in the chief complaint, otherwise negative. Medications:  Scheduled Meds:   losartan  50 mg Oral Daily    enoxaparin  40 mg Subcutaneous Daily    bisacodyl  10 mg Rectal Daily    [Held by provider] vitamin D  1,000 Units Oral Daily    heparin flush  3 mL Intravenous 2 times per day    ertapenem (INVANZ) IVPB  1 g Intravenous Q24H     Continuous Infusions:   IV infusion builder 75 mL/hr at 19 1558     PRN Meds:HYDROcodone-acetaminophen, cyclobenzaprine, sodium chloride flush, heparin flush, perflutren lipid microspheres, sodium chloride flush    OBJECTIVE:  /84   Pulse 100   Temp 97.6 °F (36.4 °C) (Oral)   Resp 16   Ht 5' 6\" (1.676 m)   Wt 238 lb 9.6 oz (108.2 kg)   SpO2 93%   Breastfeeding? No   BMI 38.51 kg/m²   Temp  Av.8 °F (36.6 °C)  Min: 97.6 °F (36.4 °C)  Max: 98 °F (36.7 °C)  Constitutional: patient is lying in bed. A&O. Skin: Warm. No rash. HEENT: round pupils. No jaundice. No thrush. Neck: Supple  Chest: Good breath sounds bilaterally. No rhonchi. No crackles. Cardiovascular: Heart sounds rhythmic and regular. Abdomen: soft, nondistended, nontender.  obese  Extremities: +2 edema b/l LEs  Lines: PICC RUE dressed  F/c draining clear marck urine    Laboratory and Tests Review:  Lab Results   Component Value Date    WBC 9.2 2019    WBC 7.5 2019    WBC 10.2 2019    HGB 11.1 (L) 2019    HCT 34.5 2019    MCV 93.0 2019     2019     Lab Results   Component Value Date    NEUTROABS 10.88 (H) 2019 NEUTROABS 2.66 11/03/2018    NEUTROABS 4.76 06/19/2017     Lab Results   Component Value Date    CRP 19.7 (H) 05/01/2019     No results found for: CRPHS  Lab Results   Component Value Date    SEDRATE 76 (H) 05/01/2019     Lab Results   Component Value Date    ALT 21 05/05/2019    AST 24 05/05/2019    ALKPHOS 96 05/05/2019    BILITOT 0.4 05/05/2019     Lab Results   Component Value Date     05/05/2019    K 4.3 05/05/2019    K 4.4 11/03/2018     05/05/2019    CO2 29 05/05/2019    BUN 24 05/05/2019    CREATININE 1.1 05/05/2019    CREATININE 0.9 05/04/2019    CREATININE 1.0 05/03/2019    GFRAA 59 05/05/2019    LABGLOM 48 05/05/2019    GLUCOSE 103 05/05/2019    PROT 5.3 05/05/2019    LABALBU 2.5 05/05/2019    CALCIUM 8.3 05/05/2019    BILITOT 0.4 05/05/2019    ALKPHOS 96 05/05/2019    AST 24 05/05/2019    ALT 21 05/05/2019     Radiology:      Microbiology:   Urine culture 4/30/19 >100K ESBL+ (sensitive to Imipenem, Ertapenem and Bactrim) E. coli   Blood cultures 4/30/19 E. coli  Blood cultures 5/1 E. coli  Respiratory panel: negative     ASSESSMENT:  · Probable complicated UTI with sepsis with E. coli  · Pneumatosis intestinalis  · E coli bacteremia associated to the above  · Leukocytosis associated to the above    PLAN:  · Continue Ertapenem  · Stool for c diff. Start oral Vancomycin    [facility]  12:04 PM  5/5/2019     Patient seen and examined. I had a face to face encounter with the patient. Agree with exam, assessment and plan as outlined above. Addition and corrections were done as deemed appropriate. My exam and plan include: As above, patient has been having diarrhea. White count is going up which may indicate C. difficile with colitis. We will be starting oral Vancomycin.     Jonathan Pedraza  5/5/2019

## 2019-05-05 NOTE — PROGRESS NOTES
Long Ortiz 04/30/2019        Assessment:    Patient Active Problem List   Diagnosis    Right lower quadrant pain    Pneumatosis coli    Troponin level elevated       Plan:    Abdominal pain-resolved  Doing better with this reguard  Having stools and passing gas  ? Follow up imaging    E.coli sepsis-ABT per ID  ? If would be cleared for facet joint or spinal injetion and when has appt with pain management on Tuesday    Spinal DDD- with stenosis-  ?  When cleared for injection  No morphine  Increase norco  Watch toradol with kidney fxn    arf-  resolved      Naomia DO Devora  6:26 AM  5/5/2019

## 2019-05-06 LAB
ALBUMIN SERPL-MCNC: 2.3 G/DL (ref 3.5–5.2)
ALP BLD-CCNC: 122 U/L (ref 35–104)
ALT SERPL-CCNC: 19 U/L (ref 0–32)
ANION GAP SERPL CALCULATED.3IONS-SCNC: 8 MMOL/L (ref 7–16)
AST SERPL-CCNC: 24 U/L (ref 0–31)
BILIRUB SERPL-MCNC: 0.4 MG/DL (ref 0–1.2)
BUN BLDV-MCNC: 18 MG/DL (ref 8–23)
C DIFFICILE TOXIN, EIA: NORMAL
CALCIUM SERPL-MCNC: 8.5 MG/DL (ref 8.6–10.2)
CHLORIDE BLD-SCNC: 103 MMOL/L (ref 98–107)
CO2: 30 MMOL/L (ref 22–29)
CREAT SERPL-MCNC: 1 MG/DL (ref 0.5–1)
GFR AFRICAN AMERICAN: >60
GFR NON-AFRICAN AMERICAN: 54 ML/MIN/1.73
GLUCOSE BLD-MCNC: 95 MG/DL (ref 74–99)
HCT VFR BLD CALC: 34.4 % (ref 34–48)
HEMOGLOBIN: 10.9 G/DL (ref 11.5–15.5)
MAGNESIUM: 1.8 MG/DL (ref 1.6–2.6)
MCH RBC QN AUTO: 29.9 PG (ref 26–35)
MCHC RBC AUTO-ENTMCNC: 31.7 % (ref 32–34.5)
MCV RBC AUTO: 94.5 FL (ref 80–99.9)
PDW BLD-RTO: 16.8 FL (ref 11.5–15)
PHOSPHORUS: 2.8 MG/DL (ref 2.5–4.5)
PLATELET # BLD: 210 E9/L (ref 130–450)
PMV BLD AUTO: 10.4 FL (ref 7–12)
POTASSIUM SERPL-SCNC: 4 MMOL/L (ref 3.5–5)
RBC # BLD: 3.64 E12/L (ref 3.5–5.5)
SODIUM BLD-SCNC: 141 MMOL/L (ref 132–146)
TOTAL PROTEIN: 5.3 G/DL (ref 6.4–8.3)
WBC # BLD: 8.3 E9/L (ref 4.5–11.5)

## 2019-05-06 PROCEDURE — 6370000000 HC RX 637 (ALT 250 FOR IP): Performed by: INTERNAL MEDICINE

## 2019-05-06 PROCEDURE — 85027 COMPLETE CBC AUTOMATED: CPT

## 2019-05-06 PROCEDURE — 6360000002 HC RX W HCPCS: Performed by: INTERNAL MEDICINE

## 2019-05-06 PROCEDURE — 2060000000 HC ICU INTERMEDIATE R&B

## 2019-05-06 PROCEDURE — 80053 COMPREHEN METABOLIC PANEL: CPT

## 2019-05-06 PROCEDURE — 6360000002 HC RX W HCPCS: Performed by: SPECIALIST

## 2019-05-06 PROCEDURE — 83735 ASSAY OF MAGNESIUM: CPT

## 2019-05-06 PROCEDURE — 97535 SELF CARE MNGMENT TRAINING: CPT

## 2019-05-06 PROCEDURE — 97530 THERAPEUTIC ACTIVITIES: CPT

## 2019-05-06 PROCEDURE — 84100 ASSAY OF PHOSPHORUS: CPT

## 2019-05-06 PROCEDURE — 36592 COLLECT BLOOD FROM PICC: CPT

## 2019-05-06 PROCEDURE — 2580000003 HC RX 258: Performed by: SPECIALIST

## 2019-05-06 PROCEDURE — 36415 COLL VENOUS BLD VENIPUNCTURE: CPT

## 2019-05-06 RX ORDER — ATENOLOL 25 MG/1
25 TABLET ORAL NIGHTLY
Status: DISCONTINUED | OUTPATIENT
Start: 2019-05-06 | End: 2019-05-07 | Stop reason: HOSPADM

## 2019-05-06 RX ORDER — CYANOCOBALAMIN 1000 UG/ML
1000 INJECTION INTRAMUSCULAR; SUBCUTANEOUS DAILY
Status: DISCONTINUED | OUTPATIENT
Start: 2019-05-06 | End: 2019-05-07 | Stop reason: HOSPADM

## 2019-05-06 RX ORDER — LEVOTHYROXINE SODIUM 112 UG/1
112 TABLET ORAL DAILY
Status: DISCONTINUED | OUTPATIENT
Start: 2019-05-06 | End: 2019-05-07 | Stop reason: HOSPADM

## 2019-05-06 RX ORDER — ASPIRIN 81 MG/1
81 TABLET ORAL DAILY
Status: DISCONTINUED | OUTPATIENT
Start: 2019-05-06 | End: 2019-05-07 | Stop reason: HOSPADM

## 2019-05-06 RX ORDER — ATENOLOL 50 MG/1
50 TABLET ORAL DAILY
Status: DISCONTINUED | OUTPATIENT
Start: 2019-05-06 | End: 2019-05-07 | Stop reason: HOSPADM

## 2019-05-06 RX ORDER — CYCLOBENZAPRINE HCL 10 MG
10 TABLET ORAL 3 TIMES DAILY PRN
Qty: 30 TABLET | Refills: 0 | Status: ON HOLD
Start: 2019-05-06 | End: 2019-05-20 | Stop reason: HOSPADM

## 2019-05-06 RX ORDER — FUROSEMIDE 10 MG/ML
20 INJECTION INTRAMUSCULAR; INTRAVENOUS 2 TIMES DAILY
Status: DISCONTINUED | OUTPATIENT
Start: 2019-05-06 | End: 2019-05-07

## 2019-05-06 RX ORDER — HYDROCODONE BITARTRATE AND ACETAMINOPHEN 7.5; 325 MG/1; MG/1
1 TABLET ORAL EVERY 6 HOURS PRN
Qty: 10 TABLET | Refills: 0
Start: 2019-05-06 | End: 2019-05-09

## 2019-05-06 RX ORDER — PROBENECID 500 MG/1
500 TABLET, FILM COATED ORAL 2 TIMES DAILY
Status: DISCONTINUED | OUTPATIENT
Start: 2019-05-06 | End: 2019-05-07 | Stop reason: HOSPADM

## 2019-05-06 RX ORDER — PRAVASTATIN SODIUM 20 MG
40 TABLET ORAL NIGHTLY
Status: DISCONTINUED | OUTPATIENT
Start: 2019-05-06 | End: 2019-05-07 | Stop reason: HOSPADM

## 2019-05-06 RX ORDER — CYANOCOBALAMIN 1000 UG/ML
1000 INJECTION INTRAMUSCULAR; SUBCUTANEOUS DAILY
Qty: 1 VIAL | Refills: 0 | Status: ON HOLD
Start: 2019-05-06 | End: 2019-05-14

## 2019-05-06 RX ORDER — MULTIVITAMIN WITH FOLIC ACID 400 MCG
1 TABLET ORAL DAILY
Status: DISCONTINUED | OUTPATIENT
Start: 2019-05-06 | End: 2019-05-07 | Stop reason: HOSPADM

## 2019-05-06 RX ORDER — BISACODYL 10 MG
10 SUPPOSITORY, RECTAL RECTAL DAILY PRN
Qty: 30 SUPPOSITORY | Refills: 0 | Status: ON HOLD | OUTPATIENT
Start: 2019-05-06 | End: 2019-05-14

## 2019-05-06 RX ORDER — LIOTHYRONINE SODIUM 5 UG/1
5 TABLET ORAL DAILY
Status: DISCONTINUED | OUTPATIENT
Start: 2019-05-06 | End: 2019-05-07 | Stop reason: HOSPADM

## 2019-05-06 RX ORDER — DOCUSATE SODIUM 100 MG/1
100 CAPSULE, LIQUID FILLED ORAL 2 TIMES DAILY
Qty: 60 CAPSULE | Refills: 0 | Status: ON HOLD | OUTPATIENT
Start: 2019-05-06 | End: 2019-06-06 | Stop reason: HOSPADM

## 2019-05-06 RX ADMIN — FUROSEMIDE 20 MG: 10 INJECTION, SOLUTION INTRAVENOUS at 17:35

## 2019-05-06 RX ADMIN — FUROSEMIDE 20 MG: 10 INJECTION, SOLUTION INTRAVENOUS at 13:04

## 2019-05-06 RX ADMIN — Medication 250 MG: at 13:03

## 2019-05-06 RX ADMIN — CYCLOBENZAPRINE HYDROCHLORIDE 10 MG: 10 TABLET, FILM COATED ORAL at 05:33

## 2019-05-06 RX ADMIN — ASPIRIN 81 MG: 81 TABLET, COATED ORAL at 09:35

## 2019-05-06 RX ADMIN — HYDROCODONE BITARTRATE AND ACETAMINOPHEN 1 TABLET: 7.5; 325 TABLET ORAL at 03:43

## 2019-05-06 RX ADMIN — LOSARTAN POTASSIUM 50 MG: 50 TABLET, FILM COATED ORAL at 09:35

## 2019-05-06 RX ADMIN — CYANOCOBALAMIN 1000 MCG: 1000 INJECTION, SOLUTION INTRAMUSCULAR at 09:34

## 2019-05-06 RX ADMIN — PROBENECID 500 MG: 500 TABLET, FILM COATED ORAL at 09:35

## 2019-05-06 RX ADMIN — SODIUM CHLORIDE, PRESERVATIVE FREE 300 UNITS: 5 INJECTION INTRAVENOUS at 09:36

## 2019-05-06 RX ADMIN — PROBENECID 500 MG: 500 TABLET, FILM COATED ORAL at 23:34

## 2019-05-06 RX ADMIN — MULTIVITAMIN TABLET 1 TABLET: TABLET at 09:34

## 2019-05-06 RX ADMIN — ATENOLOL 25 MG: 25 TABLET ORAL at 23:35

## 2019-05-06 RX ADMIN — HYDROCODONE BITARTRATE AND ACETAMINOPHEN 1 TABLET: 7.5; 325 TABLET ORAL at 09:43

## 2019-05-06 RX ADMIN — Medication 250 MG: at 20:10

## 2019-05-06 RX ADMIN — ENOXAPARIN SODIUM 40 MG: 40 INJECTION SUBCUTANEOUS at 09:35

## 2019-05-06 RX ADMIN — ATENOLOL 50 MG: 25 TABLET ORAL at 09:35

## 2019-05-06 RX ADMIN — LIOTHYRONINE SODIUM 5 MCG: 5 TABLET ORAL at 09:35

## 2019-05-06 RX ADMIN — CYCLOBENZAPRINE HYDROCHLORIDE 10 MG: 10 TABLET, FILM COATED ORAL at 21:52

## 2019-05-06 RX ADMIN — PRAVASTATIN SODIUM 40 MG: 20 TABLET ORAL at 23:34

## 2019-05-06 RX ADMIN — Medication 250 MG: at 05:33

## 2019-05-06 RX ADMIN — ATENOLOL 50 MG: 25 TABLET ORAL at 23:34

## 2019-05-06 RX ADMIN — LEVOTHYROXINE SODIUM 112 MCG: 0.11 TABLET ORAL at 09:35

## 2019-05-06 RX ADMIN — HYDROCODONE BITARTRATE AND ACETAMINOPHEN 1 TABLET: 7.5; 325 TABLET ORAL at 23:33

## 2019-05-06 RX ADMIN — ERTAPENEM SODIUM 1000 MG: 1 INJECTION, POWDER, LYOPHILIZED, FOR SOLUTION INTRAMUSCULAR; INTRAVENOUS at 13:03

## 2019-05-06 RX ADMIN — CYCLOBENZAPRINE HYDROCHLORIDE 10 MG: 10 TABLET, FILM COATED ORAL at 13:05

## 2019-05-06 RX ADMIN — SODIUM CHLORIDE, PRESERVATIVE FREE 300 UNITS: 5 INJECTION INTRAVENOUS at 20:10

## 2019-05-06 RX ADMIN — HYDROCODONE BITARTRATE AND ACETAMINOPHEN 1 TABLET: 7.5; 325 TABLET ORAL at 17:34

## 2019-05-06 ASSESSMENT — PAIN SCALES - GENERAL
PAINLEVEL_OUTOF10: 7
PAINLEVEL_OUTOF10: 6
PAINLEVEL_OUTOF10: 10
PAINLEVEL_OUTOF10: 7
PAINLEVEL_OUTOF10: 8

## 2019-05-06 ASSESSMENT — PAIN DESCRIPTION - PAIN TYPE
TYPE: CHRONIC PAIN

## 2019-05-06 ASSESSMENT — PAIN DESCRIPTION - DESCRIPTORS
DESCRIPTORS: THROBBING
DESCRIPTORS: TIGHTNESS;THROBBING
DESCRIPTORS: THROBBING
DESCRIPTORS: CONSTANT;THROBBING

## 2019-05-06 ASSESSMENT — PAIN DESCRIPTION - FREQUENCY
FREQUENCY: CONTINUOUS

## 2019-05-06 ASSESSMENT — PAIN DESCRIPTION - ONSET
ONSET: ON-GOING

## 2019-05-06 ASSESSMENT — PAIN DESCRIPTION - ORIENTATION
ORIENTATION: LOWER

## 2019-05-06 ASSESSMENT — PAIN DESCRIPTION - LOCATION
LOCATION: BACK

## 2019-05-06 ASSESSMENT — PAIN - FUNCTIONAL ASSESSMENT
PAIN_FUNCTIONAL_ASSESSMENT: PREVENTS OR INTERFERES WITH MANY ACTIVE NOT PASSIVE ACTIVITIES
PAIN_FUNCTIONAL_ASSESSMENT: PREVENTS OR INTERFERES SOME ACTIVE ACTIVITIES AND ADLS
PAIN_FUNCTIONAL_ASSESSMENT: PREVENTS OR INTERFERES SOME ACTIVE ACTIVITIES AND ADLS
PAIN_FUNCTIONAL_ASSESSMENT: PREVENTS OR INTERFERES WITH MANY ACTIVE NOT PASSIVE ACTIVITIES

## 2019-05-06 ASSESSMENT — PAIN DESCRIPTION - PROGRESSION
CLINICAL_PROGRESSION: GRADUALLY WORSENING
CLINICAL_PROGRESSION: GRADUALLY WORSENING

## 2019-05-06 NOTE — PROGRESS NOTES
Occupational Therapy  OT BEDSIDE TREATMENT NOTE      Date:2019  Patient Name: Wing Chairez  MRN: [id number]  : 1945  Room: [address] [place] [address]-A     Evaluating OT: Barbie Desir. PANKAJ Kennedy/L - [id number]     AM-PAC Daily Activity Raw Score: 17     Recommended Adaptive Equipment: Continue to assess.      Diagnosis: Right lower quadrant pain [R10.31]  Pertinent Medical History: HTN, gout      Precautions: falls;  contact isolation     Home Living: Patient lives with her  (who uses a wheelchair) in a one-floor home; patient's bedroom and bathroom are on the main living level. Patient noted that the laundry is on the main living level; stair glide available to access basement. Bathroom Setup: walk-in shower (with seat and grab bars)  Equipment Owned: walker     Prior Level of Function (PLOF): Per patient, patient was independent with ADLs, independent (primarily responsible) with IADLs, and independent with functional mobility (without device) prior to this hospitalization.   Driving: Yes (patient is the only  of the household)     Pain Level: Patient reported experiencing pain in her back, which she attributed to stenosis, but did not rate her pain.       Functional Assessment:    Initial Eval Status  Date: 2019 Treatment Status   Short Term Goals  Treatment Frequency: PRN   Feeding Independent       Grooming Min A   (standing at sink) SBA while standing at sink   SBA  (standing at sink)   UB Dressing Min A   Setup   LB Dressing Max A   SBA - with use of AE, as needed/appropriate   Bathing Mod A   SBA - with use of AE/DME, as needed/appropriate   Toileting Mod A Assist for toilet hygiene  SBA   Bed Mobility  Supine-to-Sit: Min A Mod A sit to supine   SBA   Functional Transfers Sit-to-Stand: Min A   from EOB Min A from chair and toilet   SBA   Functional Mobility Min A   (with walker) within patient's room CGA using w/w   SBA - with use of device, as needed/appropriate   Balance Sitting: Good  (at EOB)  Standing: Fair  (with walker) Fair   Fair+ dynamic standing balance during completion of ADLs and other functional tasks. Activity Tolerance Fair  fair-   Fatigue and back pain limiting function  Patient will demonstrate Good understanding and consistent implementation of energy conservation techniques and work simplification techniques into ADL/IADL routines. Comments:  Pt sitting in the chair. Complaint of back pain. Cues for hand placement to increase safety technique during transfers. Pt returned to bed at end of the session. Exercise: good use of UE's during activity    Education: walker and transfer safety    Other: Limited tolerance for activity. Cues for safety. Assist with ADL. · Pt has made  progress towards set goals.    · Continue with current plan of care      Total Tx Time: [facility] YAYA/L [id number]

## 2019-05-06 NOTE — PROGRESS NOTES
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 female with prior Hx of CKD G3B with a baseline serum cr 1.3-1.6mg/dl and an e-GFR=30-40ml/min in the setting of HTN and stage II Obesity. Pt states apprx 1 week before Easter she developed pain in the back. Despite outpt therapy  The pain continued and then began in the RLQ as well. She had nausea and diarrhea. No blood in the stool. She came to the ED and a CT Scan suggested Pneumotosis Intestinalis. Blood Cult (+) Gram (-) rods and the UC grew E coli. Her initial serum cr in the ED 2.3 and this AM post IVF down to 1.8mg/dl. She denies using any NSAID. She denies dysuria or hematuria, she has intermittent nocturia. She is on losartan 100mg QD along with atenolol and lisinopril for HTN control    5/6/19: pt awake alert notes edema in the LE bilat, denies increased SOB    Past Medical History:   Diagnosis Date    Gout     CONTROLLED    Hyperlipidemia     Hypertension     STABLE    Macular hole, right eye     Thyroid disease        Past Surgical History:   Procedure Laterality Date    BREAST SURGERY Right 1982    BENIGN CYST    [address]       No family history on file. reports that she has never smoked. She has never used smokeless tobacco. She reports that she drinks alcohol. She reports that she does not use drugs.     Allergies:  Adrenalin [epinephrine]    Current Medications:      aspirin EC tablet 81 mg Daily   atenolol (TENORMIN) tablet 25 mg Nightly   atenolol (TENORMIN) tablet 50 mg Daily   levothyroxine (SYNTHROID) tablet 112 mcg Daily   liothyronine (CYTOMEL) tablet 5 mcg Daily   multivitamin 1 tablet Daily pravastatin (PRAVACHOL) tablet 40 mg Nightly   probenecid (BENEMID) tablet 500 mg BID   cyanocobalamin injection 1,000 mcg Daily   HYDROcodone-acetaminophen (NORCO) 7.5-325 MG per tablet 1 tablet Q6H PRN   cyclobenzaprine (FLEXERIL) tablet 10 mg TID PRN   vancomycin (VANCOCIN) oral solution 250 mg Q8H   losartan (COZAAR) tablet 50 mg Daily   enoxaparin (LOVENOX) injection 40 mg Daily   bisacodyl (DULCOLAX) suppository 10 mg Daily   [Held by provider] vitamin D (CHOLECALCIFEROL) tablet 1,000 Units Daily   sodium chloride flush 0.9 % injection 10 mL PRN   heparin flush 100 UNIT/ML injection 300 Units 2 times per day   heparin flush 100 UNIT/ML injection 300 Units PRN   ertapenem (INVANZ) 1 g IVPB minibag Q24H   perflutren lipid microspheres (DEFINITY) injection 1.65 mg ONCE PRN   sodium chloride flush 0.9 % injection 10 mL PRN       Review of Systems:   Pertinent items are noted in HPI. Remainder of a complete review of systems is (-) otherr than in the HPI    Physical exam:   Constitutional:  Elderly obese female in NAD  Vitals:   VITALS:  BP (!) 141/76   Pulse 102   Temp 98.1 °F (36.7 °C) (Oral)   Resp 20   Ht 5' 6\" (1.676 m)   Wt 238 lb 11.2 oz (108.3 kg)   SpO2 92%   Breastfeeding?  No   BMI 38.53 kg/m²   24HR INTAKE/OUTPUT:      Intake/Output Summary (Last 24 hours) at 5/6/2019 1123  Last data filed at 5/6/2019 0930  Gross per 24 hour   Intake 1000 ml   Output 950 ml   Net 50 ml     URINARY CATHETER OUTPUT ([facility]):  Urethral Catheter Non-latex-Output (mL): 300 mL  DRAIN/TUBE OUTPUT:     VENT SETTINGS:     Additional Respiratory  Assessments  Pulse: 102  Resp: 20  SpO2: 92 %    Skin: no rash, turgor wnl  Heent:  eomi, mmm, nc. at  Neck: no bruits or jvd noted  Cardiovascular: PMI not lat displaced  S1, S2 without S3 or a rub  Respiratory: CTA B without w/r/r  Abdomen:  Decreased BS abd distended tympanitic, tender in the RLQ, no HSM  Ext: 2(+) bilat lower extremity edema  Psychiatric: mood and affect 05/06/2019     Calcium:    Lab Results   Component Value Date    CALCIUM 8.5 05/06/2019     Ionized Calcium:  No results found for: IONCA  Magnesium:    Lab Results   Component Value Date    MG 1.8 05/06/2019     Phosphorus:    Lab Results   Component Value Date    PHOS 2.8 05/06/2019     Uric Acid:  No results found for: Juli Brock  PT/INR:  No results found for: PROTIME, INR  PTT:  No results found for: APTT, PTT[APTT}  Troponin:    Lab Results   Component Value Date    TROPONINI 0.01 05/01/2019     U/A:    Lab Results   Component Value Date    COLORU Yellow 04/30/2019    PROTEINU TRACE 04/30/2019    PHUR 5.0 04/30/2019    WBCUA >20 04/30/2019    RBCUA 5-10 04/30/2019    BACTERIA MANY 04/30/2019    CLARITYU Clear 04/30/2019    SPECGRAV 1.015 04/30/2019    LEUKOCYTESUR MODERATE 04/30/2019    UROBILINOGEN 0.2 04/30/2019    BILIRUBINUR SMALL 04/30/2019    BLOODU SMALL 04/30/2019    GLUCOSEU Negative 04/30/2019     ABG:  No results found for: PH, PCO2, PO2, HCO3, BE, THGB, TCO2, O2SAT  HgBA1c:  No results found for: LABA1C  Microalbumen/Creatinine ratio:  No components found for: RUCREAT  FLP:  No results found for: TRIG, HDL, LDLCALC, LDLDIRECT, LABVLDL  TSH:  No results found for: TSH  VITAMIN B12: No components found for: B12  FOLATE:    Lab Results   Component Value Date    FOLATE >20.0 05/05/2019     Iron Saturation:  No components found for: PERCENTFE  FERRITIN:    Lab Results   Component Value Date    FERRITIN 266 05/05/2019     AMYLASE:  No results found for: AMYLASE  LIPASE:  No results found for: LIPASE  Fibrinogen Level:  No components found for: FIB  24 Hour Urine for Protein:  No components found for: RAWUPRO, UHRS3, EXLS89WD, UTV3  24 Hour Urine for Creatinine Clearance:  No components found for: CREAT4, UHRS10, UTV10     Imaging:  CXR results:  4/30/2019 4:00 PM       Exam: XR CHEST PORTABLE       Number of Views: 1       Indication:   Cough and shortness of breath and epigastric/right groin pain.       Comparison: 11/3/2018       Findings: There is a enlarged cardiomediastinal silhouette when   compared with the previous study with underlying pulmonary edema,   right hemidiaphragmatic elevation, bibasilar airspace disease and   thoracic aortic vascular calcifications. No pneumothorax.           Impression   1. Enlarged cardiomediastinal silhouette, the possibility of   underlying pericardial effusion or other cardiac abnormalities are not   excluded on the basis of this exam, clinical correlation recommended. .   2. Underlying pulmonary edema. 3. Nonspecific bibasilar airspace disease, findings can be seen   infiltrate/pneumonia and/or atelectasis. 4. Right hemidiaphragmatic elevation. 5. Vascular calcifications thoracic aorta. 4/30/2019 4:00 PM       EXAM: CT ABDOMEN PELVIS WO CONTRAST       NUMBER OF IMAGES \ views:  460       INDICATION:  RLQ abdominal pain, abdominal distention, [place] he   stools/diarrhea        COMPARISON: None       TECHNIQUE:   Axial computerized tomography sections of the abdomen with sagittal   and coronal MPR reconstructions were obtained from the lower chest to   the pelvic floor without contrast administration. Low-dose CT    acquisition technique included one of following options; 1 . Automated   exposure control, 2. Adjustment of MA and or KV according to patient's   size or 3. Use of iterative reconstruction.  Noncontrast imaging limits   visceral detail.       FINDINGS:   CHEST:   The lung bases are remarkable for cardiomegaly without evidence of   decompensation, and thickened airways with some patchy lower lobe   density along the diaphragmatic surface which could represent early   pneumonia or atelectasis in the left lower lung.       LIVER:   The liver is uniform in parenchymal density without focal findings       GALLBLADDER AND BILIARY TREE:   The gallbladder has been surgically removed, and there is no biliary   ductal ectasia.       SPLEEN:The spleen is not enlarged, and shows no focal pathology.       ADRENALS:  The adrenals are normal in appearance bilaterally.       PANCREAS:The pancreas shows no evidence of acute inflammation or mass   lesions. There is a mild degree of fatty atrophy.       KIDNEYS/BLADDER: The kidneys show cortical atrophy without evidence of   outflow obstruction. There is no perinephric inflammatory change. Ureters are similar in course and caliber, and the bladder is normal   in appearance.       APPENDIX:  Retrocecal, and normal       BOWEL:  Small bowel is unremarkable. The right hemicolon is   prominently distended with fluid and gas, but shows no evidence of   mural thickening. There is a relatively ahaustral appearance, which   may indicate a chronic process, such as ulcerative colitis. Small   bubbles in the periphery of the fluid adjacent the bowel wall could   indicate very mild pneumatosis in the cecal region. There is no   associated mural thickening or extraluminal induration or hyperemia in   the region of the cecum. There is no focal constricting lesion 2   suggest obstructive dilation. The colon tapers to more normal caliber   in the left hemicolon, which is distended with gas, and shows   scattered diverticuli without acute inflammatory findings. There is no   evidence of a perforated viscus at this time.       PELVIS: There is no dependent free pelvic fluid or pelvic adenopathy. The patient is post hysterectomy.       LYMPHATICS:There is no mesenteric or retroperitoneal adenopathy.       VASCULAR: There is no evidence of acute vascular pathology involving   mesenteric or retroperitoneal great vessels. Atherosclerotic   aortoiliac calcification is present.       SKELETAL: Moderately advanced degenerative changes of the spine and   hips are documented.  The L3 vertebrae shows a compressed upper   articular endplate without displacement, and there is disc space   narrowing at the L4-5 level.           Impression There is fluid and gaseous distention of the right hemicolon, most   prominent in the cecal region, where there may be subtle pneumatosis. Distention may mask mural inflammatory changes, such as in patient's   with TYPHLITIS- related to immunosuppression, neutropenia, or white   cell disorders. There is no evidence of perforation at this time, but   the distended right hemicolon and slightly thin cecal wall may be   sensitive to further distention or intervention.       There is a small area of consolidation or atelectasis in the left   lower lobe, retrocardiac region, but only a portion of the lower lungs   was included.       The patient is of large habitus, and advanced degenerative changes of   the spine with some vertebral compressions are noted, but no other   acute findings are specifically identified.          Assessment  1-Stage II NASRA in the setting of the Gram (-) sepsis and complicated UTI with E coli on Unasyn as per ID  as well as probable intra abd sepsis and ACEI+ARB-FeNa<1% consistent with decreased effective renal perfusion-Creatinine back to below baseline     2-CKD G3B with a baseline serum cr 1.3-1.6mg/dl and an e-GFR=30-40ml/min in the setting of HTN and stage II Obesity    3-Hyponatremia most probably  hypovolemic-resolved     4- Anion Gap met Acidosis in the setting of the NASRA and the sepsis-resolved    5- Hypocalcemia in the setting of Hypoalbuminemia and Sec HPTH of Renal Origin-last  ionized Ca++ 1.22 recheck in the AM, last PO4 WNL ,  PTH elevated 195 in the setting of the hypocalcemia, Vit D 25-started oral supplement     6-HTN with CKD I-IV-BP above goal <130/80 exacerbated by the vol overload---start diuresis-follow cr with the ARB          Thank you Dr. Josh Guevara DO for allowing us to participate in care of Mary Calderon  11:23 AM  5/6/2019

## 2019-05-06 NOTE — CARE COORDINATION
Social Work/Discharge Planning:  Met with patient to confirm discharge plan and discussed option of snf or hhc. Patient states her  has arthritis and it would be difficult for her  to assist with the IV antibiotic. Patient requesting to try for snf approval at [facility]. Notified charge nurse of need to know duration of IV antibiotic. Called therapy and requested updated therapy notes. Left message for liapascual Whipple at [facility]. Will continue to follow.  Electronically signed by LENCHO Wick on 5/6/2019 at 8:35 AM

## 2019-05-06 NOTE — PROGRESS NOTES
Subjective: The patient is awake and alert. No problems overnight. Denies chest pain, angina, and dyspnea. Denies abdominal pain. Tolerating diet. No nausea or vomiting. Objective:    BP (!) 160/78 Comment: manual  Pulse 99   Temp 98.2 °F (36.8 °C) (Oral)   Resp 16   Ht 5' 6\" (1.676 m)   Wt 238 lb 11.2 oz (108.3 kg)   SpO2 92%   Breastfeeding? No   BMI 38.53 kg/m²     Heart:  RRR, no murmurs, gallops, or rubs.   Lungs:  CTA bilaterally, no wheeze, rales or rhonchi  Abd: bowel sounds present, nontender, nondistended, no masses  Extrem:  No clubbing, cyanosis, or edema    CBC with Differential:    Lab Results   Component Value Date    WBC 8.3 05/06/2019    RBC 3.64 05/06/2019    HGB 10.9 05/06/2019    HCT 34.4 05/06/2019     05/06/2019    MCV 94.5 05/06/2019    MCH 29.9 05/06/2019    MCHC 31.7 05/06/2019    RDW 16.8 05/06/2019    LYMPHOPCT 5.8 04/30/2019    MONOPCT 7.5 04/30/2019    BASOPCT 0.3 04/30/2019    MONOSABS 0.96 04/30/2019    LYMPHSABS 0.75 04/30/2019    EOSABS 0.08 04/30/2019    BASOSABS 0.04 04/30/2019     CMP:    Lab Results   Component Value Date     05/06/2019    K 4.0 05/06/2019    K 4.4 11/03/2018     05/06/2019    CO2 30 05/06/2019    BUN 18 05/06/2019    CREATININE 1.0 05/06/2019    GFRAA >60 05/06/2019    LABGLOM 54 05/06/2019    GLUCOSE 95 05/06/2019    PROT 5.3 05/06/2019    LABALBU 2.3 05/06/2019    CALCIUM 8.5 05/06/2019    BILITOT 0.4 05/06/2019    ALKPHOS 122 05/06/2019    AST 24 05/06/2019    ALT 19 05/06/2019     PT/INR:  No results found for: PROTIME, INR  @cktotal:3,ckmb:3,ckmbindex:3,troponini:3@  U/A:    Lab Results   Component Value Date    NITRU Negative 04/30/2019    COLORU Yellow 04/30/2019    PHUR 5.0 04/30/2019    WBCUA >20 04/30/2019    RBCUA 5-10 04/30/2019    BACTERIA MANY 04/30/2019    CLARITYU Clear 04/30/2019    SPECGRAV 1.015 04/30/2019    UROBILINOGEN 0.2 04/30/2019    BILIRUBINUR SMALL 04/30/2019    BLOODU SMALL 04/30/2019    GLUCOSEU Negative 04/30/2019    KETUA TRACE 04/30/2019        Assessment:    Patient Active Problem List   Diagnosis    Right lower quadrant pain    Pneumatosis coli    Troponin level elevated       Plan:    Abdominal pain-  Resovled no groin pain either    Sepsis-  Awaiting c.  Diff  Diarrhea may be from enema and medical treatemnt of pseudo-obstruction  Discharge planning  Subacute when ok with consultants    b12 def-  Start b12 shots    arf-resolved    Spinal stenosis-  Will see pain management as outpatient in am  ?when ok for injection-will defer to [address], DO  6:25 AM  5/6/2019

## 2019-05-06 NOTE — CARE COORDINATION
Social Work/Discharge Planning:  Carlos Eduardomon Kenia from [employer] called with pre-cert approval and states pre-cert is good for 48 hours. Notified patient and charge nurse of pre-cert approval.  HENS completed and electronic N-17 in Epic. Will continue to follow.   Electronically signed by LENCHO Bray on 5/6/2019 at 12:56 PM

## 2019-05-06 NOTE — PATIENT CARE CONFERENCE
P Quality Flow/Interdisciplinary Rounds Progress Note        Quality Flow Rounds held on May 6, 2019    Disciplines Attending:  Bedside Nurse, ,  and Nursing Unit Leadership    Estelle Winn was admitted on 4/30/2019  3:47 PM    Anticipated Discharge Date:  Expected Discharge Date: 05/06/19    Disposition:    Kaveh Score:  Kaveh Scale Score: 19    Readmission Score:         Discussed patient goal for the day, patient clinical progression, and barriers to discharge. The following Goal(s) of the Day/Commitment(s) have been identified:  Check discharge and cont to monitor.        [facility]  May 6, 2019

## 2019-05-06 NOTE — PROGRESS NOTES
GENERAL SURGERY  DAILY PROGRESS NOTE  5/6/2019    Subjective:  No acute events  Pain controlled  Tolerating diet  Denied n/v  +F/+BM    Objective:  BP (!) 160/78 Comment: manual  Pulse 99   Temp 98.2 °F (36.8 °C) (Oral)   Resp 16   Ht 5' 6\" (1.676 m)   Wt 238 lb 11.2 oz (108.3 kg)   SpO2 92%   Breastfeeding? No   BMI 38.53 kg/m²     General: NAD, awake and alert. Head: Normocephalic, atraumatic  Eyes: PERRLA, EOMI. Lungs: No increased work of breathing. Cardiovascular: RRR. Abdomen: Soft, mild distension, NT. No rebound, guarding or rigidity.   Extremities: Atraumatic, full range of motion  Skin: Warm, dry and intact    Assessment/Plan:  74 y.o. female with abdominal distension, possibly secondary to pseudo-obstruction from urosepsis, improving    Pain and nausea control PRN  Diet as tolerated  PO Vanc per primary  Replace electrolytes PRN  No acute surgical intervention    Electronically signed by Alley Schulz MD on 5/6/2019 at 7:14 AM

## 2019-05-06 NOTE — PROGRESS NOTES
[address] [place] [address] [facility]  Progress Note    SUBJECTIVE:  Chief Complaint   Patient presents with    Abdominal Pain     patient states she has had abdominal distention since saturday with watery black stools    Diarrhea     No new complaints today. Remains afebrile. Loose stools. No fevers. Review of systems:  As stated above in the chief complaint, otherwise negative. Medications:  Scheduled Meds:   aspirin  81 mg Oral Daily    atenolol  25 mg Oral Nightly    atenolol  50 mg Oral Daily    levothyroxine  112 mcg Oral Daily    liothyronine  5 mcg Oral Daily    multivitamin  1 tablet Oral Daily    pravastatin  40 mg Oral Nightly    probenecid  500 mg Oral BID    cyanocobalamin  1,000 mcg Intramuscular Daily    furosemide  20 mg Intravenous BID    vancomycin  250 mg Oral Q8H    losartan  50 mg Oral Daily    enoxaparin  40 mg Subcutaneous Daily    bisacodyl  10 mg Rectal Daily    vitamin D  1,000 Units Oral Daily    heparin flush  3 mL Intravenous 2 times per day    ertapenem (INVANZ) IVPB  1 g Intravenous Q24H     Continuous Infusions:    PRN Meds:HYDROcodone-acetaminophen, cyclobenzaprine, sodium chloride flush, heparin flush, perflutren lipid microspheres, sodium chloride flush    OBJECTIVE:  BP (!) 141/76   Pulse 102   Temp 98.1 °F (36.7 °C) (Oral)   Resp 20   Ht 5' 6\" (1.676 m)   Wt 238 lb 11.2 oz (108.3 kg)   SpO2 92%   Breastfeeding? No   BMI 38.53 kg/m²   Temp  Av.8 °F (36.6 °C)  Min: 97.5 °F (36.4 °C)  Max: 98.2 °F (36.8 °C)  Constitutional: The patient is sitting in chair. No distress. Awake and alert. Skin: Warm and dry. No rash  HEENT: Round pupils. No ulcerations or thrush  Neck: Supple  Chest: Good breath sounds bilaterally. No rhonchi. No crackles. Cardiovascular: Heart sounds rhythmic and regular. Abdomen: Soft, benign and nontender. Positive bowel sounds.   Extremities: Bilateral lower extremity edema  Lines: PICC RUE dressed  [facility]

## 2019-05-06 NOTE — PLAN OF CARE
Problem: Inadequate oral food/beverage intake (NI-2.1)  Goal: Food and/or Nutrient Delivery  Continue current diet and initiating ONS to augment PO  Description  Individualized approach for food/nutrient provision.   Outcome: Not Met This Shift

## 2019-05-06 NOTE — DISCHARGE INSTR - COC
Continuity of Care Form    Patient Name: Jairo Melendez   :  1945  MRN:  [id number]    Admit date:  2019  Discharge date:  19    Code Status Order: Prior   Advance Directives:   [facility] Documentation     Date/Time Healthcare Directive Type of Healthcare Directive Copy in [facility] [address] Agent's Name Healthcare Agent's Phone Number    19 2315  No, patient does not have an advance directive for healthcare treatment -- -- -- -- --          Admitting Physician:  Clifton Simmonds, DO  PCP: Shanika Adair DO    Discharging Nurse: [address] [place] [address] Unit/Room#: 6173/7193-I  Discharging Unit Phone Number: [phone]    Emergency Contact:   Extended Emergency Contact Information  Primary Emergency Contact: [facility]  Address: [place] [address], [address] [place] [address] Phone: [phone]  Relation: Spouse  Secondary Emergency Contact: Michael Balderas [address] Phone: [phone]  Mobile Phone: [phone]  Relation: Child    Past Surgical History:  Past Surgical History:   Procedure Laterality Date    BREAST SURGERY Right [address]       Immunization History: There is no immunization history on file for this patient. Active Problems:  Patient Active Problem List   Diagnosis Code    Right lower quadrant pain R10.31    Pneumatosis coli K63.89    Troponin level elevated R74.8       Isolation/Infection:   Isolation          Contact        Patient Infection Status     Infection Encounter Level? Onset Date Added Added By Resolved Resolved By Review Date    ESBL (Extended Spectrum Beta Lactamase) No  17 Culture Blood #1       E Coli Urine 19, 2017  E. Coli blood 19          Nurse Assessment:  Last Vital Signs: BP (!) 160/78 Comment: manual  Pulse 99   Temp 98.2 °F (36.8 °C) (Oral)   Resp 16   Ht 5' 6\" (1.676 m)   Wt 238 lb 11.2 oz (108.3 kg)   SpO2 92%   Breastfeeding? No   BMI 38.53 kg/m²     Last documented pain score (0-10 scale): Pain Level: 7  Last Weight:   Wt Readings from Last 1 Encounters:   05/06/19 238 lb 11.2 oz (108.3 kg)     Mental Status:  oriented and alert    IV Access:  - PICC - site  R Basilic, insertion date: 5/3/19    Nursing Mobility/ADLs:  Walking   Assisted  Transfer  Assisted  Bathing  Assisted  Dressing  Assisted  Toileting  Assisted  Feeding  Assisted  Med Admin  Assisted  Med Delivery   whole    Wound Care Documentation and Therapy:        Elimination:  Continence:   · Bowel: Yes  · Bladder: Yes  Urinary Catheter: None and Removal Date 5/7/19   Colostomy/Ileostomy/Ileal Conduit: No       Date of Last BM: 5/7/19      Intake/Output Summary (Last 24 hours) at 5/6/2019 0629  Last data filed at 5/6/2019 0535  Gross per 24 hour   Intake 1300 ml   Output 950 ml   Net 350 ml     I/O last 3 completed shifts: In: 2121 [P.O.:1540; I.V.:581]  Out: 1300 [Urine:1300]    Safety Concerns: At Risk for Falls    Impairments/Disabilities:      Vision    Nutrition Therapy:  Current Nutrition Therapy:   - Oral Diet:  General    Routes of Feeding: Oral  Liquids: No Restrictions  Daily Fluid Restriction: no  Last Modified Barium Swallow with Video (Video Swallowing Test): not done    Treatments at the Time of Hospital Discharge:   Respiratory Treatments: ***  Oxygen Therapy:  is not on home oxygen therapy.   Ventilator:    - No ventilator support    Rehab Therapies: Physical Therapy and Occupational Therapy  Weight Bearing Status/Restrictions: No weight bearing restirctions  Other Medical Equipment (for information only, NOT a DME order):  walker  Other Treatments: ***    Patient's personal belongings (please select all that are sent with patient):  Nakul    RN SIGNATURE:  Electronically signed by Yoanna Coburn RN on 5/7/19 at 10:56 AM    CASE MANAGEMENT/SOCIAL WORK SECTION    Inpatient Status Date:  4/30/2019    Readmission Risk Assessment Score:  Readmission Risk              Risk of Unplanned Readmission:        15           Discharging to Facility/ Agency   · Name:  [facility]  · Address: [place] [address]  · Phone: [phone]  · Fax: [phone]    Dialysis Facility (if applicable)   · Name:  · Address:  · Dialysis Schedule:  · Phone:  · Fax:    / signature: Electronically signed by LENCHO Nettles on 5/6/19 at 12:58 PM    PHYSICIAN SECTION    Prognosis: Good    Condition at Discharge: Stable    Rehab Potential (if transferring to Rehab): Good    Recommended Labs or Other Treatments After Discharge: ***    Physician Certification: I certify the above information and transfer of Milan Singletary  is necessary for the continuing treatment of the diagnosis listed and that she requires [place] for less 30 days.      Update Admission H&P: No change in H&P    PHYSICIAN SIGNATURE:  Electronically signed by Nathan Ro DO on 5/6/2019 at 6:30 AM

## 2019-05-06 NOTE — PROGRESS NOTES
AM-PAC raw score 16/24 15/24      Balance: fair minus dynamic using WW for support    Patient education  Pt was educated on upright posture during gait, backing to the bed prior to sitting using [employer] for support, log rolling for back pain management with assist back to bed    Patient response to education:   Pt verbalized understanding Pt demonstrated skill Pt requires further education in this area   yes yes yes     Additional Comments: Kristyn slow, guarded and laboring due to LBP. Pt fatigued after activity    Pt was left in bed per request with call light in reach. Treatment time: 16 minutes  Time out: 0956    Pt is making good progress toward established Physical Therapy goals as per functional mobility performed. Function limited due to low back pain. .  Continue with physical therapy current plan of care.     April Jones PTA   License Number: [id number]

## 2019-05-06 NOTE — PROGRESS NOTES
5/6/2019  4:19 PM      Nutrition Assessment    Type and Reason for Visit: Reassess    Nutrition Recommendations: Continue current diet and started ONS    Nutrition Assessment: Nutrition status improving, as diet advanced to General diet and pt tolerating diet. Will add ONS to augment, as intake is variable and suboptimal at times. Malnutrition Assessment:  · Malnutrition Status: At risk for malnutrition  · Context: Acute illness or injury  · Findings of the 6 clinical characteristics of malnutrition (Minimum of 2 out of 6 clinical characteristics is required to make the diagnosis of moderate or severe Protein Calorie Malnutrition based on AND/ASPEN Guidelines):  1. Energy Intake-Less than or equal to 50% of estimated energy requirement, Greater than or equal to 5 days    2. Weight Loss-No significant weight loss,    3. Fat Loss-No significant subcutaneous fat loss,    4. Muscle Loss-No significant muscle mass loss,    5. Fluid Accumulation-Unable to assess(CKD), Extremities  6.  Strength-Not measured    Nutrition Risk Level:  Moderate    Nutrient Needs:  · Estimated Daily Total Kcal:  (15-18 bertha/kg CBW)  · Estimated Daily Protein (g): 70-80 (1.2-1.3 g/kg IBW)  · Estimated Daily Total Fluid (ml/day):  (1 ml/bertha)    Nutrition Diagnosis:   · Problem: Inadequate oral intake  · Etiology: related to Alteration in GI function     Signs and symptoms:  as evidenced by Intake 25-50%, Diet history of poor intake, Nausea, Diarrhea, GI abnormality    Objective Information:  · Nutrition-Focused Physical Findings: improved SOB, soft abdomen with +BS, +1 edema, +I/O, diarrhea  · Wound Type: (reddened buttocks)  · Current Nutrition Therapies:  · Oral Diet Orders: General   · Oral Diet intake: 26-50%(average - intake is variable)  · Anthropometric Measures:  · Ht: 5' 6\" (167.6 cm)   · Current Body Wt: 238 lb (108 kg)(5/6 bedwt - down 2# and less edema)  · Admission Body Wt: 240 lb (108.9 kg)  · Usual Body Wt: 223 lb (101.2 kg)(per EMR at PCP x 1 yr ago)  · % Weight Change:  ,  none significant  · Ideal Body Wt: 130 lb (59 kg), % Ideal Body 183%  · BMI Classification: BMI 35.0 - 39.9 Obese Class II    Nutrition Interventions:   Start ONS, Continue current diet  Continued Inpatient Monitoring, Education Not Indicated, Coordination of Care, Coordination of Community Care(Discharge planning for [employer] facility noted)    Nutrition Evaluation:   · Evaluation: Progressing toward goals   · Goals: PO at least 50% at meals and ONS, stable dry wt    · Monitoring: Meal Intake, Supplement Intake, Skin Integrity, I&O, Weight, Pertinent Labs, Diarrhea, Monitor Hemodynamic Status      Electronically signed by Elicia Terry RD, CNSC, LD on 5/6/19 at 4:19 PM    Contact Number: [phone]

## 2019-05-07 VITALS
DIASTOLIC BLOOD PRESSURE: 74 MMHG | TEMPERATURE: 97.8 F | BODY MASS INDEX: 39.05 KG/M2 | WEIGHT: 243 LBS | SYSTOLIC BLOOD PRESSURE: 141 MMHG | OXYGEN SATURATION: 92 % | HEART RATE: 79 BPM | RESPIRATION RATE: 18 BRPM | HEIGHT: 66 IN

## 2019-05-07 LAB
ALBUMIN SERPL-MCNC: 2.5 G/DL (ref 3.5–5.2)
ALP BLD-CCNC: 78 U/L (ref 35–104)
ALT SERPL-CCNC: 18 U/L (ref 0–32)
ANION GAP SERPL CALCULATED.3IONS-SCNC: 8 MMOL/L (ref 7–16)
AST SERPL-CCNC: 22 U/L (ref 0–31)
BILIRUB SERPL-MCNC: 0.4 MG/DL (ref 0–1.2)
BLOOD CULTURE, ROUTINE: ABNORMAL
BLOOD CULTURE, ROUTINE: ABNORMAL
BUN BLDV-MCNC: 17 MG/DL (ref 8–23)
CALCIUM IONIZED: 1.27 MMOL/L (ref 1.15–1.33)
CALCIUM SERPL-MCNC: 8.9 MG/DL (ref 8.6–10.2)
CHLORIDE BLD-SCNC: 100 MMOL/L (ref 98–107)
CO2: 32 MMOL/L (ref 22–29)
CREAT SERPL-MCNC: 1.1 MG/DL (ref 0.5–1)
CULTURE, BLOOD 2: ABNORMAL
CULTURE, BLOOD 2: ABNORMAL
GFR AFRICAN AMERICAN: 59
GFR NON-AFRICAN AMERICAN: 48 ML/MIN/1.73
GLUCOSE BLD-MCNC: 98 MG/DL (ref 74–99)
HCT VFR BLD CALC: 35.4 % (ref 34–48)
HEMOGLOBIN: 11.1 G/DL (ref 11.5–15.5)
MAGNESIUM: 1.8 MG/DL (ref 1.6–2.6)
MCH RBC QN AUTO: 29.7 PG (ref 26–35)
MCHC RBC AUTO-ENTMCNC: 31.4 % (ref 32–34.5)
MCV RBC AUTO: 94.7 FL (ref 80–99.9)
ORGANISM: ABNORMAL
ORGANISM: ABNORMAL
PDW BLD-RTO: 16.8 FL (ref 11.5–15)
PHOSPHORUS: 4 MG/DL (ref 2.5–4.5)
PLATELET # BLD: 214 E9/L (ref 130–450)
PMV BLD AUTO: 10.7 FL (ref 7–12)
POTASSIUM SERPL-SCNC: 3.6 MMOL/L (ref 3.5–5)
RBC # BLD: 3.74 E12/L (ref 3.5–5.5)
SODIUM BLD-SCNC: 140 MMOL/L (ref 132–146)
TOTAL PROTEIN: 5.3 G/DL (ref 6.4–8.3)
WBC # BLD: 9.1 E9/L (ref 4.5–11.5)

## 2019-05-07 PROCEDURE — 6370000000 HC RX 637 (ALT 250 FOR IP): Performed by: INTERNAL MEDICINE

## 2019-05-07 PROCEDURE — 6360000002 HC RX W HCPCS: Performed by: SPECIALIST

## 2019-05-07 PROCEDURE — 6360000002 HC RX W HCPCS: Performed by: INTERNAL MEDICINE

## 2019-05-07 PROCEDURE — 2580000003 HC RX 258: Performed by: SPECIALIST

## 2019-05-07 PROCEDURE — 85027 COMPLETE CBC AUTOMATED: CPT

## 2019-05-07 PROCEDURE — 36415 COLL VENOUS BLD VENIPUNCTURE: CPT

## 2019-05-07 PROCEDURE — 84100 ASSAY OF PHOSPHORUS: CPT

## 2019-05-07 PROCEDURE — 83735 ASSAY OF MAGNESIUM: CPT

## 2019-05-07 PROCEDURE — 80053 COMPREHEN METABOLIC PANEL: CPT

## 2019-05-07 PROCEDURE — 82330 ASSAY OF CALCIUM: CPT

## 2019-05-07 RX ORDER — FUROSEMIDE 20 MG/1
20 TABLET ORAL DAILY
Status: DISCONTINUED | OUTPATIENT
Start: 2019-05-08 | End: 2019-05-07 | Stop reason: HOSPADM

## 2019-05-07 RX ORDER — POTASSIUM CHLORIDE 1.5 G/1.77G
40 POWDER, FOR SOLUTION ORAL ONCE
Status: DISCONTINUED | OUTPATIENT
Start: 2019-05-07 | End: 2019-05-07 | Stop reason: CLARIF

## 2019-05-07 RX ORDER — FUROSEMIDE 20 MG/1
20 TABLET ORAL DAILY
Qty: 60 TABLET | Refills: 3 | Status: ON HOLD | OUTPATIENT
Start: 2019-05-08 | End: 2019-05-14

## 2019-05-07 RX ORDER — LOSARTAN POTASSIUM 50 MG/1
50 TABLET ORAL DAILY
Qty: 30 TABLET | Refills: 3 | Status: ON HOLD | OUTPATIENT
Start: 2019-05-08 | End: 2019-11-22 | Stop reason: ALTCHOICE

## 2019-05-07 RX ORDER — FUROSEMIDE 20 MG/1
20 TABLET ORAL DAILY
Qty: 60 TABLET | Refills: 3 | Status: CANCELLED | OUTPATIENT
Start: 2019-05-08

## 2019-05-07 RX ADMIN — Medication 250 MG: at 04:53

## 2019-05-07 RX ADMIN — ATENOLOL 50 MG: 25 TABLET ORAL at 08:12

## 2019-05-07 RX ADMIN — LIOTHYRONINE SODIUM 5 MCG: 5 TABLET ORAL at 08:11

## 2019-05-07 RX ADMIN — PROBENECID 500 MG: 500 TABLET, FILM COATED ORAL at 08:12

## 2019-05-07 RX ADMIN — ASPIRIN 81 MG: 81 TABLET, COATED ORAL at 08:12

## 2019-05-07 RX ADMIN — LEVOTHYROXINE SODIUM 112 MCG: 0.11 TABLET ORAL at 05:00

## 2019-05-07 RX ADMIN — HYDROCODONE BITARTRATE AND ACETAMINOPHEN 1 TABLET: 7.5; 325 TABLET ORAL at 08:12

## 2019-05-07 RX ADMIN — Medication 10 ML: at 08:12

## 2019-05-07 RX ADMIN — FUROSEMIDE 20 MG: 10 INJECTION, SOLUTION INTRAVENOUS at 08:11

## 2019-05-07 RX ADMIN — MULTIVITAMIN TABLET 1 TABLET: TABLET at 08:11

## 2019-05-07 RX ADMIN — LOSARTAN POTASSIUM 50 MG: 50 TABLET, FILM COATED ORAL at 08:11

## 2019-05-07 RX ADMIN — ENOXAPARIN SODIUM 40 MG: 40 INJECTION SUBCUTANEOUS at 08:12

## 2019-05-07 RX ADMIN — CYANOCOBALAMIN 1000 MCG: 1000 INJECTION, SOLUTION INTRAMUSCULAR at 08:18

## 2019-05-07 RX ADMIN — POTASSIUM BICARBONATE 40 MEQ: 782 TABLET, EFFERVESCENT ORAL at 14:01

## 2019-05-07 RX ADMIN — SODIUM CHLORIDE, PRESERVATIVE FREE 300 UNITS: 5 INJECTION INTRAVENOUS at 08:26

## 2019-05-07 RX ADMIN — VITAMIN D 1000 UNITS: 25 TAB ORAL at 08:11

## 2019-05-07 ASSESSMENT — PAIN SCALES - GENERAL
PAINLEVEL_OUTOF10: 10
PAINLEVEL_OUTOF10: 0
PAINLEVEL_OUTOF10: 7

## 2019-05-07 ASSESSMENT — PAIN DESCRIPTION - LOCATION: LOCATION: BACK

## 2019-05-07 ASSESSMENT — PAIN DESCRIPTION - PROGRESSION: CLINICAL_PROGRESSION: GRADUALLY WORSENING

## 2019-05-07 ASSESSMENT — PAIN DESCRIPTION - ORIENTATION: ORIENTATION: LOWER

## 2019-05-07 ASSESSMENT — PAIN DESCRIPTION - PAIN TYPE: TYPE: CHRONIC PAIN

## 2019-05-07 ASSESSMENT — PAIN DESCRIPTION - FREQUENCY: FREQUENCY: CONTINUOUS

## 2019-05-07 ASSESSMENT — PAIN - FUNCTIONAL ASSESSMENT: PAIN_FUNCTIONAL_ASSESSMENT: PREVENTS OR INTERFERES WITH MANY ACTIVE NOT PASSIVE ACTIVITIES

## 2019-05-07 ASSESSMENT — PAIN DESCRIPTION - DESCRIPTORS: DESCRIPTORS: THROBBING

## 2019-05-07 ASSESSMENT — PAIN DESCRIPTION - ONSET: ONSET: ON-GOING

## 2019-05-07 NOTE — PROGRESS NOTES
edema  Lines: PICC RUE dressed  Harris catheter with clear urine    Laboratory and Tests Review:  Lab Results   Component Value Date    WBC 9.1 05/07/2019    WBC 8.3 05/06/2019    WBC 9.2 05/05/2019    HGB 11.1 (L) 05/07/2019    HCT 35.4 05/07/2019    MCV 94.7 05/07/2019     05/07/2019     Lab Results   Component Value Date    NEUTROABS 10.88 (H) 04/30/2019    NEUTROABS 2.66 11/03/2018    NEUTROABS 4.76 06/19/2017     Lab Results   Component Value Date    CRP 19.7 (H) 05/01/2019     No results found for: CRPHS  Lab Results   Component Value Date    SEDRATE 76 (H) 05/01/2019     Lab Results   Component Value Date    ALT 18 05/07/2019    AST 22 05/07/2019    ALKPHOS 78 05/07/2019    BILITOT 0.4 05/07/2019     Lab Results   Component Value Date     05/07/2019    K 3.6 05/07/2019    K 4.4 11/03/2018     05/07/2019    CO2 32 05/07/2019    BUN 17 05/07/2019    CREATININE 1.1 05/07/2019    CREATININE 1.0 05/06/2019    CREATININE 1.1 05/05/2019    GFRAA 59 05/07/2019    LABGLOM 48 05/07/2019    GLUCOSE 98 05/07/2019    PROT 5.3 05/07/2019    LABALBU 2.5 05/07/2019    CALCIUM 8.9 05/07/2019    BILITOT 0.4 05/07/2019    ALKPHOS 78 05/07/2019    AST 22 05/07/2019    ALT 18 05/07/2019     Radiology:      Microbiology:   Urine culture 4/30/19 >100K ESBL+ (sensitive to Imipenem, Ertapenem and Bactrim) E. coli   Blood cultures 4/30/19 E. coli  Blood cultures 5/1 E. coli  Respiratory panel: negative     ASSESSMENT:  · Complicated UTI with sepsis with E. coli  · Pneumatosis intestinalis  · E coli bacteremia associated to the above  · Leukocytosis associated to the above  · Antibiotics social diarrhea. PLAN:  · Continue Ertapenem until 5/11/19.  Reconciled  · The patient can be discharged from [place] [id number]  12:23 PM  5/7/2019

## 2019-05-07 NOTE — PROGRESS NOTES
Called Dr. [facility] office regarding meds that need reconciled on discharge med rec.

## 2019-05-07 NOTE — PROGRESS NOTES
Messaged Dr. Estefanía Wakefield regarding patient discharge today and if she will discharged on IV lasix.

## 2019-05-07 NOTE — CARE COORDINATION
Social Work/Discharge Planning:  Discharge order noted. Patient to discharge to [facility] at SNF LOC. Called [facility] and arranged [facility] transport for 2:00pm.  Notified patient, charge nurse and liaison Frankey Beets at [facility] of discharge time. Electronically signed by LENCHO Downs on 5/7/2019 at 9:07 AM    Addendum:  Frankey Beets from [facility] states facility can  patient at 2:00pm.  [facility] and cancelled transport. Notified patient and charge nurse.  Electronically signed by LENCHO Downs on 5/7/2019 at 9:38 AM

## 2019-05-07 NOTE — PROGRESS NOTES
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 female with prior Hx of CKD G3B with a baseline serum cr 1.3-1.6mg/dl and an e-GFR=30-40ml/min in the setting of HTN and stage II Obesity. Pt states apprx 1 week before Easter she developed pain in the back. Despite outpt therapy  The pain continued and then began in the RLQ as well. She had nausea and diarrhea. No blood in the stool. She came to the ED and a CT Scan suggested Pneumotosis Intestinalis. Blood Cult (+) Gram (-) rods and the UC grew E coli. Her initial serum cr in the ED 2.3 and this AM post IVF down to 1.8mg/dl. She denies using any NSAID. She denies dysuria or hematuria, she has intermittent nocturia. She is on losartan 100mg QD along with atenolol and lisinopril for HTN control    5/7/19:   Seen and examined  Edema about the same per patient  No shortness of breath  No chest pain, abdominal pain, nausea  Notes chronic back apin  Complains of food in hospital      Past Medical History:   Diagnosis Date    Gout     CONTROLLED    Hyperlipidemia     Hypertension     STABLE    Macular hole, right eye     Thyroid disease        Past Surgical History:   Procedure Laterality Date    BREAST SURGERY Right 1982    BENIGN CYST    [address]       No family history on file. reports that she has never smoked. She has never used smokeless tobacco. She reports that she drinks alcohol. She reports that she does not use drugs.     Allergies:  Adrenalin [epinephrine]    Current Medications:      aspirin EC tablet 81 mg Daily   atenolol (TENORMIN) tablet 25 mg Nightly   atenolol (TENORMIN) tablet 50 mg Daily   levothyroxine (SYNTHROID) tablet 112 mcg Daily   liothyronine (CYTOMEL) tablet 5 mcg Daily   multivitamin 1 tablet Daily   pravastatin (PRAVACHOL) tablet 40 mg Nightly   probenecid (BENEMID) tablet 500 mg BID   cyanocobalamin injection 1,000 mcg Daily   furosemide (LASIX) injection 20 mg BID   HYDROcodone-acetaminophen (NORCO) 7.5-325 MG per tablet 1 tablet Q6H PRN   cyclobenzaprine (FLEXERIL) tablet 10 mg TID PRN   losartan (COZAAR) tablet 50 mg Daily   enoxaparin (LOVENOX) injection 40 mg Daily   bisacodyl (DULCOLAX) suppository 10 mg Daily   vitamin D (CHOLECALCIFEROL) tablet 1,000 Units Daily   sodium chloride flush 0.9 % injection 10 mL PRN   heparin flush 100 UNIT/ML injection 300 Units 2 times per day   heparin flush 100 UNIT/ML injection 300 Units PRN   ertapenem (INVANZ) 1 g IVPB minibag Q24H   perflutren lipid microspheres (DEFINITY) injection 1.65 mg ONCE PRN   sodium chloride flush 0.9 % injection 10 mL PRN       Review of Systems:   Pertinent items are noted in HPI. Remainder of a complete review of systems is (-) otherr than in the HPI    Physical exam:   Constitutional:  Elderly obese female in NAD  Vitals:   VITALS:  BP (!) 141/74   Pulse 79   Temp 97.8 °F (36.6 °C) (Oral)   Resp 18   Ht 5' 6\" (1.676 m)   Wt 243 lb (110.2 kg)   SpO2 92%   Breastfeeding?  No   BMI 39.22 kg/m²   24HR INTAKE/OUTPUT:      Intake/Output Summary (Last 24 hours) at 5/7/2019 1047  Last data filed at 5/7/2019 0945  Gross per 24 hour   Intake 600 ml   Output 1700 ml   Net -1100 ml     URINARY CATHETER OUTPUT (Reynolds):  Urethral Catheter Non-latex-Output (mL): 450 mL  DRAIN/TUBE OUTPUT:     VENT SETTINGS:     Additional Respiratory  Assessments  Pulse: 79  Resp: 18  SpO2: 92 %    Skin: no rash, turgor wnl  Heent:  eomi, mmm, nc. at  Neck: no jvd noted  Cardiovascular: RRR no rub  Respiratory: CTA B without w/r/r  Abdomen:  Soft, NT  Ext: 2(+) bilat lower extremity edema  : no cva tenderness, +reynolds  Psychiatric: cooperative and appropriate  Neuro NFD        Data:   Labs:  CBC:   Lab Results   Component Value Date    WBC 9.1 05/07/2019    RBC 3.74 05/07/2019    HGB 11.1 05/07/2019    HCT 35.4 05/07/2019    MCV 94.7 05/07/2019    MCH 29.7 05/07/2019    MCHC 31.4 05/07/2019    RDW 16.8 05/07/2019     05/07/2019    MPV 10.7 05/07/2019     CBC with Differential:    Lab Results   Component Value Date    WBC 9.1 05/07/2019    RBC 3.74 05/07/2019    HGB 11.1 05/07/2019    HCT 35.4 05/07/2019     05/07/2019    MCV 94.7 05/07/2019    MCH 29.7 05/07/2019    MCHC 31.4 05/07/2019    RDW 16.8 05/07/2019    LYMPHOPCT 5.8 04/30/2019    MONOPCT 7.5 04/30/2019    BASOPCT 0.3 04/30/2019    MONOSABS 0.96 04/30/2019    LYMPHSABS 0.75 04/30/2019    EOSABS 0.08 04/30/2019    BASOSABS 0.04 04/30/2019     CMP:    Lab Results   Component Value Date     05/07/2019    K 3.6 05/07/2019    K 4.4 11/03/2018     05/07/2019    CO2 32 05/07/2019    BUN 17 05/07/2019    CREATININE 1.1 05/07/2019    GFRAA 59 05/07/2019    LABGLOM 48 05/07/2019    GLUCOSE 98 05/07/2019    PROT 5.3 05/07/2019    LABALBU 2.5 05/07/2019    CALCIUM 8.9 05/07/2019    BILITOT 0.4 05/07/2019    ALKPHOS 78 05/07/2019    AST 22 05/07/2019    ALT 18 05/07/2019     BMP:    Lab Results   Component Value Date     05/07/2019    K 3.6 05/07/2019    K 4.4 11/03/2018     05/07/2019    CO2 32 05/07/2019    BUN 17 05/07/2019    LABALBU 2.5 05/07/2019    CREATININE 1.1 05/07/2019    CALCIUM 8.9 05/07/2019    GFRAA 59 05/07/2019    LABGLOM 48 05/07/2019    GLUCOSE 98 05/07/2019     BUN/Creatinine:    Lab Results   Component Value Date    BUN 17 05/07/2019    CREATININE 1.1 05/07/2019     Hepatic Function Panel:    Lab Results   Component Value Date    ALKPHOS 78 05/07/2019    ALT 18 05/07/2019    AST 22 05/07/2019    PROT 5.3 05/07/2019    BILITOT 0.4 05/07/2019    LABALBU 2.5 05/07/2019     Albumin:    Lab Results   Component Value Date    LABALBU 2.5 05/07/2019     Calcium:    Lab Results Component Value Date    CALCIUM 8.9 05/07/2019     Ionized Calcium:  No results found for: IONCA  Magnesium:    Lab Results   Component Value Date    MG 1.8 05/07/2019     Phosphorus:    Lab Results   Component Value Date    PHOS 4.0 05/07/2019     Uric Acid:  No results found for: Nick Fuentes  PT/INR:  No results found for: PROTIME, INR  PTT:  No results found for: APTT, PTT[APTT}  Troponin:    Lab Results   Component Value Date    TROPONINI 0.01 05/01/2019     U/A:    Lab Results   Component Value Date    COLORU Yellow 04/30/2019    PROTEINU TRACE 04/30/2019    PHUR 5.0 04/30/2019    WBCUA >20 04/30/2019    RBCUA 5-10 04/30/2019    BACTERIA MANY 04/30/2019    CLARITYU Clear 04/30/2019    SPECGRAV 1.015 04/30/2019    LEUKOCYTESUR MODERATE 04/30/2019    UROBILINOGEN 0.2 04/30/2019    BILIRUBINUR SMALL 04/30/2019    BLOODU SMALL 04/30/2019    GLUCOSEU Negative 04/30/2019     ABG:  No results found for: PH, PCO2, PO2, HCO3, BE, THGB, TCO2, O2SAT  HgBA1c:  No results found for: LABA1C  Microalbumen/Creatinine ratio:  No components found for: RUCREAT  FLP:  No results found for: TRIG, HDL, LDLCALC, LDLDIRECT, LABVLDL  TSH:  No results found for: TSH  VITAMIN B12: No components found for: B12  FOLATE:    Lab Results   Component Value Date    FOLATE >20.0 05/05/2019     Iron Saturation:  No components found for: PERCENTFE  FERRITIN:    Lab Results   Component Value Date    FERRITIN 266 05/05/2019     AMYLASE:  No results found for: AMYLASE  LIPASE:  No results found for: LIPASE  Fibrinogen Level:  No components found for: FIB  24 Hour Urine for Protein:  No components found for: RAWUPRO, UHRS3, NVGQ76PM, UTV3  24 Hour Urine for Creatinine Clearance:  No components found for: CREAT4, UHRS10, UTV10     Imaging:  CXR results:  4/30/2019 4:00 PM       Exam: XR CHEST PORTABLE       Number of Views: 1       Indication:   Cough and shortness of breath and epigastric/right groin   pain.       Comparison: 11/3/2018     pathology.       ADRENALS:  The adrenals are normal in appearance bilaterally.       PANCREAS:The pancreas shows no evidence of acute inflammation or mass   lesions. There is a mild degree of fatty atrophy.       KIDNEYS/BLADDER: The kidneys show cortical atrophy without evidence of   outflow obstruction. There is no perinephric inflammatory change. Ureters are similar in course and caliber, and the bladder is normal   in appearance.       APPENDIX:  Retrocecal, and normal       BOWEL:  Small bowel is unremarkable. The right hemicolon is   prominently distended with fluid and gas, but shows no evidence of   mural thickening. There is a relatively ahaustral appearance, which   may indicate a chronic process, such as ulcerative colitis. Small   bubbles in the periphery of the fluid adjacent the bowel wall could   indicate very mild pneumatosis in the cecal region. There is no   associated mural thickening or extraluminal induration or hyperemia in   the region of the cecum. There is no focal constricting lesion 2   suggest obstructive dilation. The colon tapers to more normal caliber   in the left hemicolon, which is distended with gas, and shows   scattered diverticuli without acute inflammatory findings. There is no   evidence of a perforated viscus at this time.       PELVIS: There is no dependent free pelvic fluid or pelvic adenopathy. The patient is post hysterectomy.       LYMPHATICS:There is no mesenteric or retroperitoneal adenopathy.       VASCULAR: There is no evidence of acute vascular pathology involving   mesenteric or retroperitoneal great vessels. Atherosclerotic   aortoiliac calcification is present.       SKELETAL: Moderately advanced degenerative changes of the spine and   hips are documented.  The L3 vertebrae shows a compressed upper   articular endplate without displacement, and there is disc space   narrowing at the L4-5 level.           Impression   There is fluid and gaseous distention of the right hemicolon, most   prominent in the cecal region, where there may be subtle pneumatosis. Distention may mask mural inflammatory changes, such as in patient's   with TYPHLITIS- related to immunosuppression, neutropenia, or white   cell disorders. There is no evidence of perforation at this time, but   the distended right hemicolon and slightly thin cecal wall may be   sensitive to further distention or intervention.       There is a small area of consolidation or atelectasis in the left   lower lobe, retrocardiac region, but only a portion of the lower lungs   was included.       The patient is of large habitus, and advanced degenerative changes of   the spine with some vertebral compressions are noted, but no other   acute findings are specifically identified.          Assessment  1-Stage II NASRA in the setting of the Gram (-) sepsis and complicated UTI with E coli on Unasyn as per ID  as well as probable intra abd sepsis and ACEI+ARB-FeNa<1% consistent with decreased effective renal perfusion    Renal function stable, below reported baseline    2-CKD G3B with a baseline serum cr 1.3-1.6mg/dl and an e-GFR=30-40ml/min in the setting of HTN and stage II Obesity    3-Hyponatremia most probably  hypovolemic-resolved     4- Anion Gap met Acidosis in the setting of the NASRA and the sepsis-resolved    5- Hypocalcemia in the setting of Hypoalbuminemia and Sec HPTH of Renal Origin; last PO4 WNL ,  PTH elevated 195 in the setting of the hypocalcemia, Vit D 25-started oral supplement     6-HTN with CKD I-IV-BP above goal <130/80 exacerbated by the vol overload---start diuresis-follow cr with the ARB    7- Edema   In the setting of IV fluids and hypoalbuminemia  Give diuretics    8- Hypokalemia  With diuretics; replace and follow      For discharge to SNF today  Will place on lasix 20mg po daily  Follow BMP, MG twice weekly  Follow daily weights - call if she gains more 3lbs in one day or 5lbs total  Follow daily BP - call if routinely above 140/80  Follow-up in office         Thank you Dr. Nitza Velasquez DO for allowing us to participate in care of Bebe Lainez Larryurmila  10:47 AM  5/7/2019

## 2019-05-07 NOTE — PROGRESS NOTES
Subjective: The patient is awake and alert. No problems overnight. Denies chest pain, angina, and dyspnea. Denies abdominal pain. Tolerating diet. No nausea or vomiting. Objective:    BP (!) 150/72   Pulse 80   Temp 98 °F (36.7 °C) (Oral)   Resp 16   Ht 5' 6\" (1.676 m)   Wt 243 lb (110.2 kg)   SpO2 93%   Breastfeeding? No   BMI 39.22 kg/m²     Heart:  RRR, no murmurs, gallops, or rubs.   Lungs:  CTA bilaterally, no wheeze, rales or rhonchi  Abd: bowel sounds present, nontender, nondistended, no masses  Extrem:  No clubbing, cyanosis, or edema    CBC with Differential:    Lab Results   Component Value Date    WBC 9.1 05/07/2019    RBC 3.74 05/07/2019    HGB 11.1 05/07/2019    HCT 35.4 05/07/2019     05/07/2019    MCV 94.7 05/07/2019    MCH 29.7 05/07/2019    MCHC 31.4 05/07/2019    RDW 16.8 05/07/2019    LYMPHOPCT 5.8 04/30/2019    MONOPCT 7.5 04/30/2019    BASOPCT 0.3 04/30/2019    MONOSABS 0.96 04/30/2019    LYMPHSABS 0.75 04/30/2019    EOSABS 0.08 04/30/2019    BASOSABS 0.04 04/30/2019     CMP:    Lab Results   Component Value Date     05/07/2019    K 3.6 05/07/2019    K 4.4 11/03/2018     05/07/2019    CO2 32 05/07/2019    BUN 17 05/07/2019    CREATININE 1.1 05/07/2019    GFRAA 59 05/07/2019    LABGLOM 48 05/07/2019    GLUCOSE 98 05/07/2019    PROT 5.3 05/07/2019    LABALBU 2.5 05/07/2019    CALCIUM 8.9 05/07/2019    BILITOT 0.4 05/07/2019    ALKPHOS 78 05/07/2019    AST 22 05/07/2019    ALT 18 05/07/2019     PT/INR:  No results found for: PROTIME, INR  @cktotal:3,ckmb:3,ckmbindex:3,troponini:3@  U/A:    Lab Results   Component Value Date    NITRU Negative 04/30/2019    COLORU Yellow 04/30/2019    PHUR 5.0 04/30/2019    WBCUA >20 04/30/2019    RBCUA 5-10 04/30/2019    BACTERIA MANY 04/30/2019    CLARITYU Clear 04/30/2019    SPECGRAV 1.015 04/30/2019    UROBILINOGEN 0.2 04/30/2019    BILIRUBINUR SMALL 04/30/2019    BLOODU SMALL 04/30/2019    GLUCOSEU Negative 04/30/2019

## 2019-05-07 NOTE — PATIENT CARE CONFERENCE
P Quality Flow/Interdisciplinary Rounds Progress Note        Quality Flow Rounds held on May 7, 2019    Disciplines Attending:  Bedside Nurse, ,  and Nursing Unit Leadership    Kim Hoang was admitted on 4/30/2019  3:47 PM    Anticipated Discharge Date:  Expected Discharge Date: 05/07/19    Disposition:    Kaveh Score:  Kaveh Scale Score: 19    Readmission Score:         Discussed patient goal for the day, patient clinical progression, and barriers to discharge.   The following Goal(s) of the Day/Commitment(s) have been identified:  Discharge to [facility] rehab today, SS to assist with arrangement to the facility      [facility]  May 7, 2019

## 2019-05-10 ENCOUNTER — CLINICAL DOCUMENTATION (OUTPATIENT)
Dept: CARDIOLOGY CLINIC | Age: 74
End: 2019-05-10

## 2019-05-10 NOTE — PROGRESS NOTES
Patient admitted with right groin and abdominal pain. Troponin elevated. Per Dr. Emmanuel Bhatt, follow-up can be PRN.

## 2019-05-14 ENCOUNTER — APPOINTMENT (OUTPATIENT)
Dept: GENERAL RADIOLOGY | Age: 74
DRG: 871 | End: 2019-05-14
Payer: COMMERCIAL

## 2019-05-14 ENCOUNTER — HOSPITAL ENCOUNTER (INPATIENT)
Age: 74
LOS: 6 days | Discharge: NURSING, REHAB OR LONG-TERM CARE | DRG: 871 | End: 2019-05-20
Attending: EMERGENCY MEDICINE | Admitting: INTERNAL MEDICINE
Payer: COMMERCIAL

## 2019-05-14 ENCOUNTER — APPOINTMENT (OUTPATIENT)
Dept: CT IMAGING | Age: 74
DRG: 871 | End: 2019-05-14
Payer: COMMERCIAL

## 2019-05-14 DIAGNOSIS — J96.01 ACUTE RESPIRATORY FAILURE WITH HYPOXIA (HCC): ICD-10-CM

## 2019-05-14 DIAGNOSIS — A41.9 SEPSIS, DUE TO UNSPECIFIED ORGANISM: Primary | ICD-10-CM

## 2019-05-14 PROBLEM — I10 HYPERTENSION: Status: ACTIVE | Noted: 2019-05-14

## 2019-05-14 LAB
ABO/RH: NORMAL
ALBUMIN SERPL-MCNC: 3.5 G/DL (ref 3.5–5.2)
ALP BLD-CCNC: 106 U/L (ref 35–104)
ALT SERPL-CCNC: 17 U/L (ref 0–32)
AMMONIA: <10 UMOL/L (ref 11–51)
AMPHETAMINE SCREEN, URINE: NOT DETECTED
ANION GAP SERPL CALCULATED.3IONS-SCNC: 18 MMOL/L (ref 7–16)
ANTIBODY SCREEN: NORMAL
APTT: 29.9 SEC (ref 24.5–35.1)
AST SERPL-CCNC: 23 U/L (ref 0–31)
B.E.: 1.3 MMOL/L (ref -3–3)
BACTERIA: ABNORMAL /HPF
BARBITURATE SCREEN URINE: NOT DETECTED
BASOPHILS ABSOLUTE: 0.1 E9/L (ref 0–0.2)
BASOPHILS RELATIVE PERCENT: 0.4 % (ref 0–2)
BENZODIAZEPINE SCREEN, URINE: NOT DETECTED
BILIRUB SERPL-MCNC: 0.6 MG/DL (ref 0–1.2)
BILIRUBIN URINE: NEGATIVE
BLOOD, URINE: NORMAL
BUN BLDV-MCNC: 10 MG/DL (ref 8–23)
CALCIUM SERPL-MCNC: 9.7 MG/DL (ref 8.6–10.2)
CANNABINOID SCREEN URINE: NOT DETECTED
CHLORIDE BLD-SCNC: 96 MMOL/L (ref 98–107)
CHP ED QC CHECK: NORMAL
CLARITY: CLEAR
CO2: 26 MMOL/L (ref 22–29)
COCAINE METABOLITE SCREEN URINE: NOT DETECTED
COHB: 0.4 % (ref 0–1.5)
COLOR: YELLOW
CREAT SERPL-MCNC: 1.3 MG/DL (ref 0.5–1)
CRITICAL: ABNORMAL
DATE ANALYZED: ABNORMAL
DATE OF COLLECTION: ABNORMAL
EKG ATRIAL RATE: 110 BPM
EKG P AXIS: 50 DEGREES
EKG P-R INTERVAL: 146 MS
EKG Q-T INTERVAL: 334 MS
EKG QRS DURATION: 80 MS
EKG QTC CALCULATION (BAZETT): 452 MS
EKG R AXIS: 36 DEGREES
EKG T AXIS: 50 DEGREES
EKG VENTRICULAR RATE: 110 BPM
EOSINOPHILS ABSOLUTE: 0.03 E9/L (ref 0.05–0.5)
EOSINOPHILS RELATIVE PERCENT: 0.1 % (ref 0–6)
GFR AFRICAN AMERICAN: 44
GFR AFRICAN AMERICAN: 48
GFR NON-AFRICAN AMERICAN: 37 ML/MIN/1.73
GFR NON-AFRICAN AMERICAN: 40 ML/MIN/1.73
GLUCOSE BLD-MCNC: 73 MG/DL
GLUCOSE BLD-MCNC: 86 MG/DL (ref 74–99)
GLUCOSE BLD-MCNC: 91 MG/DL (ref 74–99)
GLUCOSE URINE: NEGATIVE MG/DL
HCO3: 24.6 MMOL/L (ref 22–26)
HCT VFR BLD CALC: 40.9 % (ref 34–48)
HEMOGLOBIN: 13.1 G/DL (ref 11.5–15.5)
HHB: 11.4 % (ref 0–5)
IMMATURE GRANULOCYTES #: 0.19 E9/L
IMMATURE GRANULOCYTES %: 0.8 % (ref 0–5)
INR BLD: 1.3
KETONES, URINE: NEGATIVE MG/DL
LAB: ABNORMAL
LACTIC ACID, SEPSIS: 3 MMOL/L (ref 0.5–1.9)
LEUKOCYTE ESTERASE, URINE: NEGATIVE
LYMPHOCYTES ABSOLUTE: 1.13 E9/L (ref 1.5–4)
LYMPHOCYTES RELATIVE PERCENT: 5 % (ref 20–42)
Lab: ABNORMAL
MCH RBC QN AUTO: 30.3 PG (ref 26–35)
MCHC RBC AUTO-ENTMCNC: 32 % (ref 32–34.5)
MCV RBC AUTO: 94.5 FL (ref 80–99.9)
METER GLUCOSE: 73 MG/DL (ref 74–99)
METHADONE SCREEN, URINE: NOT DETECTED
METHB: 0.3 % (ref 0–1.5)
MODE: ABNORMAL
MONOCYTES ABSOLUTE: 1.18 E9/L (ref 0.1–0.95)
MONOCYTES RELATIVE PERCENT: 5.2 % (ref 2–12)
NEUTROPHILS ABSOLUTE: 19.94 E9/L (ref 1.8–7.3)
NEUTROPHILS RELATIVE PERCENT: 88.5 % (ref 43–80)
NITRITE, URINE: NEGATIVE
O2 CONTENT: 15 ML/DL
O2 SATURATION: 88.5 % (ref 92–98.5)
O2HB: 87.9 % (ref 94–97)
OPERATOR ID: ABNORMAL
OPIATE SCREEN URINE: NOT DETECTED
PATIENT TEMP: 37 C
PCO2: 34.6 MMHG (ref 35–45)
PDW BLD-RTO: 17.2 FL (ref 11.5–15)
PERFORMED ON: ABNORMAL
PH BLOOD GAS: 7.47 (ref 7.35–7.45)
PH UA: 6.5 (ref 5–9)
PHENCYCLIDINE SCREEN URINE: NOT DETECTED
PLATELET # BLD: 346 E9/L (ref 130–450)
PMV BLD AUTO: 11.1 FL (ref 7–12)
PO2: 56.5 MMHG (ref 60–100)
POC CHLORIDE: 105 MMOL/L (ref 100–108)
POC CREATININE: 1.4 MG/DL (ref 0.5–1)
POC POTASSIUM: 5.9 MMOL/L (ref 3.5–5)
POC SODIUM: 136 MMOL/L (ref 132–146)
POTASSIUM SERPL-SCNC: 3.9 MMOL/L (ref 3.5–5)
PROPOXYPHENE SCREEN: NOT DETECTED
PROTEIN UA: NEGATIVE MG/DL
PROTHROMBIN TIME: 14.6 SEC (ref 9.3–12.4)
RBC # BLD: 4.33 E12/L (ref 3.5–5.5)
RBC UA: ABNORMAL /HPF (ref 0–2)
REASON FOR REJECTION: NORMAL
REJECTED TEST: NORMAL
SODIUM BLD-SCNC: 140 MMOL/L (ref 132–146)
SOURCE, BLOOD GAS: ABNORMAL
SPECIFIC GRAVITY UA: <=1.005 (ref 1–1.03)
T4 TOTAL: 5 MCG/DL (ref 4.5–11.7)
THB: 12.1 G/DL (ref 11.5–16.5)
TIME ANALYZED: 1516
TOTAL PROTEIN: 7.7 G/DL (ref 6.4–8.3)
TROPONIN: 0.04 NG/ML (ref 0–0.03)
TSH SERPL DL<=0.05 MIU/L-ACNC: 17.19 UIU/ML (ref 0.27–4.2)
UROBILINOGEN, URINE: 0.2 E.U./DL
WBC # BLD: 22.6 E9/L (ref 4.5–11.5)
WBC UA: ABNORMAL /HPF (ref 0–5)
YEAST: ABNORMAL

## 2019-05-14 PROCEDURE — 82805 BLOOD GASES W/O2 SATURATION: CPT

## 2019-05-14 PROCEDURE — 84295 ASSAY OF SERUM SODIUM: CPT

## 2019-05-14 PROCEDURE — 82565 ASSAY OF CREATININE: CPT

## 2019-05-14 PROCEDURE — 87088 URINE BACTERIA CULTURE: CPT

## 2019-05-14 PROCEDURE — 82435 ASSAY OF BLOOD CHLORIDE: CPT

## 2019-05-14 PROCEDURE — 6360000002 HC RX W HCPCS: Performed by: INTERNAL MEDICINE

## 2019-05-14 PROCEDURE — 84443 ASSAY THYROID STIM HORMONE: CPT

## 2019-05-14 PROCEDURE — 84436 ASSAY OF TOTAL THYROXINE: CPT

## 2019-05-14 PROCEDURE — 85610 PROTHROMBIN TIME: CPT

## 2019-05-14 PROCEDURE — 80053 COMPREHEN METABOLIC PANEL: CPT

## 2019-05-14 PROCEDURE — 99285 EMERGENCY DEPT VISIT HI MDM: CPT

## 2019-05-14 PROCEDURE — 74177 CT ABD & PELVIS W/CONTRAST: CPT

## 2019-05-14 PROCEDURE — 84132 ASSAY OF SERUM POTASSIUM: CPT

## 2019-05-14 PROCEDURE — 6370000000 HC RX 637 (ALT 250 FOR IP): Performed by: INTERNAL MEDICINE

## 2019-05-14 PROCEDURE — 6360000004 HC RX CONTRAST MEDICATION: Performed by: RADIOLOGY

## 2019-05-14 PROCEDURE — 83605 ASSAY OF LACTIC ACID: CPT

## 2019-05-14 PROCEDURE — 82947 ASSAY GLUCOSE BLOOD QUANT: CPT

## 2019-05-14 PROCEDURE — 82962 GLUCOSE BLOOD TEST: CPT

## 2019-05-14 PROCEDURE — 36415 COLL VENOUS BLD VENIPUNCTURE: CPT

## 2019-05-14 PROCEDURE — 93010 ELECTROCARDIOGRAM REPORT: CPT | Performed by: INTERNAL MEDICINE

## 2019-05-14 PROCEDURE — 87040 BLOOD CULTURE FOR BACTERIA: CPT

## 2019-05-14 PROCEDURE — 85025 COMPLETE CBC W/AUTO DIFF WBC: CPT

## 2019-05-14 PROCEDURE — 81001 URINALYSIS AUTO W/SCOPE: CPT

## 2019-05-14 PROCEDURE — 82140 ASSAY OF AMMONIA: CPT

## 2019-05-14 PROCEDURE — 2580000003 HC RX 258: Performed by: INTERNAL MEDICINE

## 2019-05-14 PROCEDURE — 86900 BLOOD TYPING SEROLOGIC ABO: CPT

## 2019-05-14 PROCEDURE — 6360000002 HC RX W HCPCS: Performed by: EMERGENCY MEDICINE

## 2019-05-14 PROCEDURE — 80307 DRUG TEST PRSMV CHEM ANLYZR: CPT

## 2019-05-14 PROCEDURE — 2060000000 HC ICU INTERMEDIATE R&B

## 2019-05-14 PROCEDURE — 70450 CT HEAD/BRAIN W/O DYE: CPT

## 2019-05-14 PROCEDURE — 71045 X-RAY EXAM CHEST 1 VIEW: CPT

## 2019-05-14 PROCEDURE — 2580000003 HC RX 258: Performed by: EMERGENCY MEDICINE

## 2019-05-14 PROCEDURE — 2580000003 HC RX 258: Performed by: RADIOLOGY

## 2019-05-14 PROCEDURE — 84484 ASSAY OF TROPONIN QUANT: CPT

## 2019-05-14 PROCEDURE — 85730 THROMBOPLASTIN TIME PARTIAL: CPT

## 2019-05-14 PROCEDURE — 96365 THER/PROPH/DIAG IV INF INIT: CPT

## 2019-05-14 PROCEDURE — 86901 BLOOD TYPING SEROLOGIC RH(D): CPT

## 2019-05-14 PROCEDURE — 86850 RBC ANTIBODY SCREEN: CPT

## 2019-05-14 PROCEDURE — 93005 ELECTROCARDIOGRAM TRACING: CPT | Performed by: EMERGENCY MEDICINE

## 2019-05-14 RX ORDER — PROBENECID 500 MG/1
500 TABLET, FILM COATED ORAL 2 TIMES DAILY
Status: DISCONTINUED | OUTPATIENT
Start: 2019-05-14 | End: 2019-05-20 | Stop reason: HOSPADM

## 2019-05-14 RX ORDER — 0.9 % SODIUM CHLORIDE 0.9 %
500 INTRAVENOUS SOLUTION INTRAVENOUS ONCE
Status: COMPLETED | OUTPATIENT
Start: 2019-05-14 | End: 2019-05-14

## 2019-05-14 RX ORDER — SODIUM CHLORIDE 0.9 % (FLUSH) 0.9 %
10 SYRINGE (ML) INJECTION PRN
Status: DISCONTINUED | OUTPATIENT
Start: 2019-05-14 | End: 2019-05-20 | Stop reason: HOSPADM

## 2019-05-14 RX ORDER — FAMOTIDINE 20 MG/1
20 TABLET, FILM COATED ORAL DAILY
Status: DISCONTINUED | OUTPATIENT
Start: 2019-05-14 | End: 2019-05-17 | Stop reason: SDUPTHER

## 2019-05-14 RX ORDER — POTASSIUM CHLORIDE 7.45 MG/ML
10 INJECTION INTRAVENOUS PRN
Status: DISCONTINUED | OUTPATIENT
Start: 2019-05-14 | End: 2019-05-20 | Stop reason: HOSPADM

## 2019-05-14 RX ORDER — LOSARTAN POTASSIUM 50 MG/1
50 TABLET ORAL DAILY
Status: DISCONTINUED | OUTPATIENT
Start: 2019-05-15 | End: 2019-05-20 | Stop reason: HOSPADM

## 2019-05-14 RX ORDER — PRAVASTATIN SODIUM 20 MG
40 TABLET ORAL NIGHTLY
Status: DISCONTINUED | OUTPATIENT
Start: 2019-05-14 | End: 2019-05-20 | Stop reason: HOSPADM

## 2019-05-14 RX ORDER — ASPIRIN 81 MG/1
81 TABLET, CHEWABLE ORAL DAILY
Status: DISCONTINUED | OUTPATIENT
Start: 2019-05-14 | End: 2019-05-20 | Stop reason: HOSPADM

## 2019-05-14 RX ORDER — ACETAMINOPHEN 325 MG/1
650 TABLET ORAL EVERY 4 HOURS PRN
Status: DISCONTINUED | OUTPATIENT
Start: 2019-05-14 | End: 2019-05-20 | Stop reason: HOSPADM

## 2019-05-14 RX ORDER — LIOTHYRONINE SODIUM 5 UG/1
5 TABLET ORAL DAILY
Status: DISCONTINUED | OUTPATIENT
Start: 2019-05-15 | End: 2019-05-20 | Stop reason: HOSPADM

## 2019-05-14 RX ORDER — SODIUM CHLORIDE 0.9 % (FLUSH) 0.9 %
10 SYRINGE (ML) INJECTION PRN
Status: DISCONTINUED | OUTPATIENT
Start: 2019-05-14 | End: 2019-05-14 | Stop reason: SDUPTHER

## 2019-05-14 RX ORDER — ATENOLOL 50 MG/1
50 TABLET ORAL DAILY
Status: DISCONTINUED | OUTPATIENT
Start: 2019-05-15 | End: 2019-05-20 | Stop reason: HOSPADM

## 2019-05-14 RX ORDER — CYCLOBENZAPRINE HCL 10 MG
10 TABLET ORAL 3 TIMES DAILY PRN
Status: DISCONTINUED | OUTPATIENT
Start: 2019-05-14 | End: 2019-05-15

## 2019-05-14 RX ORDER — DOCUSATE SODIUM 100 MG/1
100 CAPSULE, LIQUID FILLED ORAL 2 TIMES DAILY
Status: DISCONTINUED | OUTPATIENT
Start: 2019-05-14 | End: 2019-05-20 | Stop reason: HOSPADM

## 2019-05-14 RX ORDER — OXYBUTYNIN CHLORIDE 5 MG/1
10 TABLET ORAL DAILY
Refills: 1 | Status: ON HOLD | COMMUNITY
Start: 2019-02-25 | End: 2019-12-27

## 2019-05-14 RX ORDER — BISACODYL 10 MG
10 SUPPOSITORY, RECTAL RECTAL DAILY PRN
Status: DISCONTINUED | OUTPATIENT
Start: 2019-05-14 | End: 2019-05-20 | Stop reason: HOSPADM

## 2019-05-14 RX ORDER — ALPRAZOLAM 0.5 MG/1
0.5 TABLET ORAL EVERY 6 HOURS PRN
Refills: 0 | Status: ON HOLD | COMMUNITY
Start: 2019-03-07 | End: 2019-05-20 | Stop reason: HOSPADM

## 2019-05-14 RX ORDER — ALBUTEROL SULFATE 90 UG/1
2 AEROSOL, METERED RESPIRATORY (INHALATION) EVERY 6 HOURS PRN
Status: DISCONTINUED | OUTPATIENT
Start: 2019-05-14 | End: 2019-05-20 | Stop reason: HOSPADM

## 2019-05-14 RX ORDER — ATENOLOL 25 MG/1
25 TABLET ORAL NIGHTLY
Status: DISCONTINUED | OUTPATIENT
Start: 2019-05-14 | End: 2019-05-20 | Stop reason: HOSPADM

## 2019-05-14 RX ORDER — DEXTROSE MONOHYDRATE 25 G/50ML
25 INJECTION, SOLUTION INTRAVENOUS ONCE
Status: COMPLETED | OUTPATIENT
Start: 2019-05-14 | End: 2019-05-14

## 2019-05-14 RX ORDER — LEVOTHYROXINE SODIUM 112 UG/1
112 TABLET ORAL DAILY
Status: DISCONTINUED | OUTPATIENT
Start: 2019-05-15 | End: 2019-05-20 | Stop reason: HOSPADM

## 2019-05-14 RX ORDER — ONDANSETRON 2 MG/ML
4 INJECTION INTRAMUSCULAR; INTRAVENOUS EVERY 6 HOURS PRN
Status: DISCONTINUED | OUTPATIENT
Start: 2019-05-14 | End: 2019-05-20 | Stop reason: HOSPADM

## 2019-05-14 RX ORDER — TORSEMIDE 20 MG/1
20 TABLET ORAL DAILY PRN
Status: ON HOLD | COMMUNITY
End: 2019-06-06 | Stop reason: HOSPADM

## 2019-05-14 RX ORDER — SODIUM CHLORIDE 0.9 % (FLUSH) 0.9 %
10 SYRINGE (ML) INJECTION EVERY 12 HOURS SCHEDULED
Status: DISCONTINUED | OUTPATIENT
Start: 2019-05-14 | End: 2019-05-14 | Stop reason: SDUPTHER

## 2019-05-14 RX ORDER — POTASSIUM CHLORIDE 20 MEQ/1
40 TABLET, EXTENDED RELEASE ORAL PRN
Status: DISCONTINUED | OUTPATIENT
Start: 2019-05-14 | End: 2019-05-20 | Stop reason: HOSPADM

## 2019-05-14 RX ORDER — SODIUM CHLORIDE 9 MG/ML
INJECTION, SOLUTION INTRAVENOUS CONTINUOUS
Status: DISCONTINUED | OUTPATIENT
Start: 2019-05-14 | End: 2019-05-17

## 2019-05-14 RX ADMIN — VANCOMYCIN HYDROCHLORIDE 2000 MG: 10 INJECTION, POWDER, LYOPHILIZED, FOR SOLUTION INTRAVENOUS at 16:58

## 2019-05-14 RX ADMIN — Medication 10 ML: at 15:50

## 2019-05-14 RX ADMIN — IOPAMIDOL 110 ML: 755 INJECTION, SOLUTION INTRAVENOUS at 15:50

## 2019-05-14 RX ADMIN — SODIUM CHLORIDE 500 ML: 9 INJECTION, SOLUTION INTRAVENOUS at 15:09

## 2019-05-14 RX ADMIN — PROBENECID 500 MG: 500 TABLET, FILM COATED ORAL at 20:21

## 2019-05-14 RX ADMIN — SODIUM CHLORIDE 500 ML: 9 INJECTION, SOLUTION INTRAVENOUS at 14:35

## 2019-05-14 RX ADMIN — DEXTROSE 50 % IN WATER (D50W) INTRAVENOUS SYRINGE 25 G: at 14:35

## 2019-05-14 RX ADMIN — MEROPENEM 1 G: 1 INJECTION, POWDER, FOR SOLUTION INTRAVENOUS at 15:08

## 2019-05-14 RX ADMIN — SODIUM CHLORIDE: 9 INJECTION, SOLUTION INTRAVENOUS at 19:24

## 2019-05-14 RX ADMIN — FAMOTIDINE 20 MG: 20 TABLET ORAL at 20:21

## 2019-05-14 RX ADMIN — MEROPENEM 1 G: 1 INJECTION, POWDER, FOR SOLUTION INTRAVENOUS at 20:22

## 2019-05-14 RX ADMIN — DOCUSATE SODIUM 100 MG: 100 CAPSULE, LIQUID FILLED ORAL at 20:21

## 2019-05-14 RX ADMIN — ATENOLOL 25 MG: 25 TABLET ORAL at 20:21

## 2019-05-14 RX ADMIN — PRAVASTATIN SODIUM 40 MG: 20 TABLET ORAL at 20:22

## 2019-05-14 ASSESSMENT — PAIN SCALES - GENERAL
PAINLEVEL_OUTOF10: 0
PAINLEVEL_OUTOF10: 5

## 2019-05-14 NOTE — ED NOTES
Marcelino Jefferson, patient's  . Would like to be updated when she gets to the floor. He is wheelchair bound and unable to be here at this time.      Salvador Madrigal RN  05/14/19 0946

## 2019-05-14 NOTE — PROGRESS NOTES
Donte Lau,    Your patient is on a medication that requires a renal dose adjustment. Renal Function Assessment:    Date Body Weight IBW Adj. Body Weight Scr CrCl Dialysis status   5/14/2019 101.6 kg 59 kg 75 kg 1.3 44 ml/min        Pharmacy has renally dose-adjusted the following medication(s):    Date Medication Original Dosing Regimen New Dosing Regimen   5/14/2019 famotidine 20 mg bid 20 mg daily           These changes were made per protocol according to the [facility] Renal Function-Based Dose Adjustments Policy    *Please note this dose may need readjusted if your patient's renal function significantly improves. Please contact pharmacy with any questions regarding these changes.

## 2019-05-14 NOTE — ED PROVIDER NOTES
Basophils # 0.10 0.00 - 0.20 E9/L   Comprehensive Metabolic Panel   Result Value Ref Range    Sodium 140 132 - 146 mmol/L    Potassium 3.9 3.5 - 5.0 mmol/L    Chloride 96 (L) 98 - 107 mmol/L    CO2 26 22 - 29 mmol/L    Anion Gap 18 (H) 7 - 16 mmol/L    Glucose 91 74 - 99 mg/dL    BUN 10 8 - 23 mg/dL    CREATININE 1.3 (H) 0.5 - 1.0 mg/dL    GFR Non-African American 40 >=60 mL/min/1.73    GFR African American 48     Calcium 9.7 8.6 - 10.2 mg/dL    Total Protein 7.7 6.4 - 8.3 g/dL    Alb 3.5 3.5 - 5.2 g/dL    Total Bilirubin 0.6 0.0 - 1.2 mg/dL    Alkaline Phosphatase 106 (H) 35 - 104 U/L    ALT 17 0 - 32 U/L    AST 23 0 - 31 U/L   Ammonia   Result Value Ref Range    Ammonia <10.0 (L) 11.0 - 51.0 umol/L   TSH without Reflex   Result Value Ref Range    TSH 17.190 (H) 0.270 - 4.200 uIU/mL   T4   Result Value Ref Range    T4, Total 5.0 4.5 - 11.7 mcg/dL   Protime-INR   Result Value Ref Range    Protime 14.6 (H) 9.3 - 12.4 sec    INR 1.3    APTT   Result Value Ref Range    aPTT 29.9 24.5 - 35.1 sec   Troponin   Result Value Ref Range    Troponin 0.04 (H) 0.00 - 0.03 ng/mL   Lactate, Sepsis   Result Value Ref Range    Lactic Acid, Sepsis 3.0 (H) 0.5 - 1.9 mmol/L   Blood Gas, Arterial   Result Value Ref Range    Date Analyzed [id number]     Time Analyzed 5834     Source: Blood Arterial     pH, Blood Gas 7.470 (H) 7.350 - 7.450    PCO2 34.6 (L) 35.0 - 45.0 mmHg    PO2 56.5 (L) 60.0 - 100.0 mmHg    HCO3 24.6 22.0 - 26.0 mmol/L    B.E. 1.3 -3.0 - 3.0 mmol/L    O2 Sat 88.5 (L) 92.0 - 98.5 %    O2Hb 87.9 (L) 94.0 - 97.0 %    COHb 0.4 0.0 - 1.5 %    MetHb 0.3 0.0 - 1.5 %    O2 Content 15.0 mL/dL    HHb 11.4 (H) 0.0 - 5.0 %    tHb (est) 12.1 11.5 - 16.5 g/dL    Mode NC-  2L     Date Of Collection      Time Collected      Pt Temp 37.0 C     ID [id number]     Lab [id number]     Critical(s) Notified .  No Critical Values    POCT Glucose   Result Value Ref Range    Glucose 73 mg/dL    QC OK? ok    POCT Venous   Result Value Ref Range POC Sodium 136 132 - 146 mmol/L    POC Potassium 5.9 (H) 3.5 - 5.0 mmol/L    POC Chloride 105 100 - 108 mmol/L    POC Glucose 86 74 - 99 mg/dl    POC Creatinine 1.4 (H) 0.5 - 1.0 mg/dL    GFR Non-African American 37 >=60 mL/min/1.73    GFR  44     Performed on SEE BELOW    POCT Glucose   Result Value Ref Range    Meter Glucose 73 (L) 74 - 99 mg/dL   EKG 12 Lead   Result Value Ref Range    Ventricular Rate 110 BPM    Atrial Rate 110 BPM    P-R Interval 146 ms    QRS Duration 80 ms    Q-T Interval 334 ms    QTc Calculation (Bazett) 452 ms    P Axis 50 degrees    R Axis 36 degrees    T Axis 50 degrees   TYPE AND SCREEN   Result Value Ref Range    ABO/Rh O NEG     Antibody Screen NEG        RADIOLOGY:  Interpreted by Radiologist unless otherwise specified  CT Head WO Contrast   Final Result   Moderate atrophy and mild chronic microvascular ischemic disease. CT ABDOMEN PELVIS W IV CONTRAST Additional Contrast? None   Final Result      NO ACUTE PATHOLOGY SEEN IN ABDOMEN OR PELVIS       Grade 2 compression fracture of L3 vertebral body superior endplate   which is amenable for vertebral body augmentation      Status post hysterectomy and cholecystectomy. Coronary calcification suggesting of coronary artery disease. Other findings as described above         XR CHEST PORTABLE   Final Result   1. Stable, enlarged cardiac mediastinal silhouette with mild central   pulmonary vascular congestion and thoracic aortic vascular   calcifications. 2... Nonspecific bibasilar airspace disease, findings can be seen in   atelectasis and/or scarring, with the possibility of an early   developing infiltrate/pneumonia felt less likely but not completely   excluded.             EKG Interpretation  Interpreted by emergency department physician, Dr. Anne Lozada     Date of EK19  Time: 1309    Rhythm: sinus tachycardia  Rate: tachycardia  Axis: normal  Conduction: normal  ST Segments: no acute change  T Waves: no acute change    Clinical Impression: sinus tachycardia, no acute ischemic changes  Comparison to prior EKG: stable as compared to patient's most recent EKG      ------------------------- NURSING NOTES AND VITALS REVIEWED ---------------------------   The nursing notes within the ED encounter and vital signs as below have been reviewed by myself. BP (!) 155/72   Pulse 110   Temp 97 °F (36.1 °C) (Temporal)   Resp 16   Wt 224 lb (101.6 kg)   SpO2 94%   BMI 36.15 kg/m²   Oxygen Saturation Interpretation: Normal    The patients available past medical records and past encounters were reviewed. ------------------------------ ED COURSE/MEDICAL DECISION MAKING----------------------  Medications   vancomycin (VANCOCIN) 2,000 mg in dextrose 5 % 500 mL IVPB (has no administration in time range)   sodium chloride flush 0.9 % injection 10 mL (10 mLs Intravenous Given 5/14/19 1550)   0.9 % sodium chloride bolus (0 mLs Intravenous Stopped 5/14/19 1509)   dextrose 50 % solution 25 g (25 g Intravenous Given 5/14/19 1435)   meropenem (MERREM) 1 g in sodium chloride 0.9 % 100 mL IVPB (mini-bag) (1 g Intravenous New Bag 5/14/19 1508)   0.9 % sodium chloride bolus (500 mLs Intravenous New Bag 5/14/19 1509)   iopamidol (ISOVUE-370) 76 % injection 110 mL (110 mLs Intravenous Given 5/14/19 1550)              Medical Decision Making:    Sepsis. CT reassuring. Awaiting UA. Empiric antibiotics for sepsis based on hx of ESBL ecoli in the past as well. No chest pain or SOB. No pleuritic pain to suggest PE. Does have a mild cough and CXR with infiltrate, ?pulmonary source. Dr. Leonidas Dance to admit.         Re-Evaluations:                improving      This patient's ED course included: a personal history and physicial examination, re-evaluation prior to disposition, multiple bedside re-evaluations, IV medications, cardiac monitoring, continuous pulse oximetry and complex medical decision making and emergency management    This patient has remained hemodynamically stable and been closely monitored during their ED course. Consultations:  Dr. Randy Duong    Counseling: The emergency provider has spoken with the patient and discussed todays results, in addition to providing specific details for the plan of care and counseling regarding the diagnosis and prognosis. Questions are answered at this time and they are agreeable with the plan.       --------------------------------- IMPRESSION AND DISPOSITION ---------------------------------    IMPRESSION  1. Sepsis, due to unspecified organism ([address]    2. Acute respiratory failure with hypoxia (HCC)        DISPOSITION  Disposition: Admit to telemetry  Patient condition is stable        NOTE: This report was transcribed using voice recognition software.  Every effort was made to ensure accuracy; however, inadvertent computerized transcription errors may be present             Denisse Calloway MD  05/14/19 2613

## 2019-05-14 NOTE — ED NOTES
Bed: 22  Expected date:   Expected time:   Means of arrival:   Comments:  EMS     Ivelisse Santiago RN  05/14/19 2034

## 2019-05-15 PROBLEM — G93.41 ACUTE METABOLIC ENCEPHALOPATHY: Status: ACTIVE | Noted: 2019-05-15

## 2019-05-15 PROBLEM — E78.5 HLD (HYPERLIPIDEMIA): Status: ACTIVE | Noted: 2019-05-15

## 2019-05-15 PROBLEM — E66.9 OBESITY: Status: ACTIVE | Noted: 2019-05-15

## 2019-05-15 LAB
ANION GAP SERPL CALCULATED.3IONS-SCNC: 10 MMOL/L (ref 7–16)
BASOPHILS ABSOLUTE: 0.08 E9/L (ref 0–0.2)
BASOPHILS RELATIVE PERCENT: 0.5 % (ref 0–2)
BUN BLDV-MCNC: 8 MG/DL (ref 8–23)
CALCIUM SERPL-MCNC: 8.4 MG/DL (ref 8.6–10.2)
CHLORIDE BLD-SCNC: 99 MMOL/L (ref 98–107)
CO2: 26 MMOL/L (ref 22–29)
CREAT SERPL-MCNC: 1.1 MG/DL (ref 0.5–1)
EOSINOPHILS ABSOLUTE: 0.06 E9/L (ref 0.05–0.5)
EOSINOPHILS RELATIVE PERCENT: 0.4 % (ref 0–6)
GFR AFRICAN AMERICAN: 59
GFR NON-AFRICAN AMERICAN: 48 ML/MIN/1.73
GLUCOSE BLD-MCNC: 82 MG/DL (ref 74–99)
HCT VFR BLD CALC: 38.3 % (ref 34–48)
HEMOGLOBIN: 11.8 G/DL (ref 11.5–15.5)
IMMATURE GRANULOCYTES #: 0.12 E9/L
IMMATURE GRANULOCYTES %: 0.7 % (ref 0–5)
LACTIC ACID, SEPSIS: 0.8 MMOL/L (ref 0.5–1.9)
LYMPHOCYTES ABSOLUTE: 0.97 E9/L (ref 1.5–4)
LYMPHOCYTES RELATIVE PERCENT: 5.9 % (ref 20–42)
MAGNESIUM: 1.7 MG/DL (ref 1.6–2.6)
MCH RBC QN AUTO: 29.7 PG (ref 26–35)
MCHC RBC AUTO-ENTMCNC: 30.8 % (ref 32–34.5)
MCV RBC AUTO: 96.5 FL (ref 80–99.9)
MONOCYTES ABSOLUTE: 0.89 E9/L (ref 0.1–0.95)
MONOCYTES RELATIVE PERCENT: 5.4 % (ref 2–12)
NEUTROPHILS ABSOLUTE: 14.45 E9/L (ref 1.8–7.3)
NEUTROPHILS RELATIVE PERCENT: 87.1 % (ref 43–80)
PDW BLD-RTO: 16.6 FL (ref 11.5–15)
PLATELET # BLD: 219 E9/L (ref 130–450)
PMV BLD AUTO: 10.8 FL (ref 7–12)
POTASSIUM REFLEX MAGNESIUM: 3.4 MMOL/L (ref 3.5–5)
PROCALCITONIN: 0.25 NG/ML (ref 0–0.08)
RBC # BLD: 3.97 E12/L (ref 3.5–5.5)
SODIUM BLD-SCNC: 135 MMOL/L (ref 132–146)
WBC # BLD: 16.6 E9/L (ref 4.5–11.5)

## 2019-05-15 PROCEDURE — 97110 THERAPEUTIC EXERCISES: CPT

## 2019-05-15 PROCEDURE — 83735 ASSAY OF MAGNESIUM: CPT

## 2019-05-15 PROCEDURE — 6360000002 HC RX W HCPCS: Performed by: INTERNAL MEDICINE

## 2019-05-15 PROCEDURE — 97161 PT EVAL LOW COMPLEX 20 MIN: CPT

## 2019-05-15 PROCEDURE — 2580000003 HC RX 258: Performed by: INTERNAL MEDICINE

## 2019-05-15 PROCEDURE — 6370000000 HC RX 637 (ALT 250 FOR IP): Performed by: INTERNAL MEDICINE

## 2019-05-15 PROCEDURE — 83605 ASSAY OF LACTIC ACID: CPT

## 2019-05-15 PROCEDURE — 87324 CLOSTRIDIUM AG IA: CPT

## 2019-05-15 PROCEDURE — 2700000000 HC OXYGEN THERAPY PER DAY

## 2019-05-15 PROCEDURE — 80048 BASIC METABOLIC PNL TOTAL CA: CPT

## 2019-05-15 PROCEDURE — 85025 COMPLETE CBC W/AUTO DIFF WBC: CPT

## 2019-05-15 PROCEDURE — 36415 COLL VENOUS BLD VENIPUNCTURE: CPT

## 2019-05-15 PROCEDURE — 2060000000 HC ICU INTERMEDIATE R&B

## 2019-05-15 PROCEDURE — 84145 PROCALCITONIN (PCT): CPT

## 2019-05-15 RX ORDER — ZIPRASIDONE MESYLATE 20 MG/ML
10 INJECTION, POWDER, LYOPHILIZED, FOR SOLUTION INTRAMUSCULAR ONCE
Status: COMPLETED | OUTPATIENT
Start: 2019-05-15 | End: 2019-05-15

## 2019-05-15 RX ADMIN — PROBENECID 500 MG: 500 TABLET, FILM COATED ORAL at 20:40

## 2019-05-15 RX ADMIN — WATER 1.2 ML: 1 INJECTION INTRAMUSCULAR; INTRAVENOUS; SUBCUTANEOUS at 10:17

## 2019-05-15 RX ADMIN — ACETAMINOPHEN 650 MG: 325 TABLET, FILM COATED ORAL at 20:41

## 2019-05-15 RX ADMIN — MEROPENEM 1 G: 1 INJECTION, POWDER, FOR SOLUTION INTRAVENOUS at 15:16

## 2019-05-15 RX ADMIN — LEVOTHYROXINE SODIUM 112 MCG: 112 TABLET ORAL at 06:28

## 2019-05-15 RX ADMIN — DOCUSATE SODIUM 100 MG: 100 CAPSULE, LIQUID FILLED ORAL at 20:41

## 2019-05-15 RX ADMIN — ZIPRASIDONE MESYLATE 10 MG: 20 INJECTION, POWDER, LYOPHILIZED, FOR SOLUTION INTRAMUSCULAR at 10:15

## 2019-05-15 RX ADMIN — MEROPENEM 1 G: 1 INJECTION, POWDER, FOR SOLUTION INTRAVENOUS at 04:34

## 2019-05-15 RX ADMIN — MEROPENEM 1 G: 1 INJECTION, POWDER, FOR SOLUTION INTRAVENOUS at 21:22

## 2019-05-15 RX ADMIN — PRAVASTATIN SODIUM 40 MG: 20 TABLET ORAL at 20:40

## 2019-05-15 RX ADMIN — ATENOLOL 25 MG: 25 TABLET ORAL at 20:41

## 2019-05-15 ASSESSMENT — PAIN SCALES - GENERAL
PAINLEVEL_OUTOF10: 0

## 2019-05-15 NOTE — PROGRESS NOTES
Dr. Connor Cota paged via perfect serve.  For consult  re: Complicated UTI Dr. Mitzi Loving on for [address] [facility], [phone]

## 2019-05-15 NOTE — PROGRESS NOTES
Dr Anabella Rouse notified that patient confused, hallucinating and combative. Refusing breakfast and medications. See Orders.

## 2019-05-15 NOTE — PLAN OF CARE
Problem: Falls - Risk of:  Goal: Absence of physical injury  Description  Absence of physical injury  Outcome: Met This Shift     Problem: Risk for Impaired Skin Integrity  Goal: Tissue integrity - skin and mucous membranes  Description  Structural intactness and normal physiological function of skin and  mucous membranes.   Outcome: Met This Shift     Problem: Gas Exchange - Impaired:  Goal: Levels of oxygenation will improve  Description  Levels of oxygenation will improve  Outcome: Met This Shift     Problem: Tissue Perfusion, Altered:  Goal: Circulatory function within specified parameters  Description  Circulatory function within specified parameters  Outcome: Met This Shift

## 2019-05-15 NOTE — CARE COORDINATION
5/15/2019 social work:discharge planning   Patient is from [facility] and will need pt and ot evals to return. Once in per liaison they will start precert. Sw called  [facility]air and plan is back to [facility]. . Will follow and assist.

## 2019-05-15 NOTE — CONSULTS
[address] [place] [address] [facility]    Consultation Note     Admit Date: 5/14/2019  1:03 PM    Reason for Consult:  uti    Attending Physician:  Kaycee Chakraborty MD       Chief Complaint   Patient presents with    Abdominal Pain    Leg Swelling    Other     states that she just doesn't feel well         History Obtained From:  For a detailed history please see previous and current available medical records. HISTORY OF PRESENT ILLNESS:             The patient is a 74 y.o. female admitted from NH with confusion and worsening mental status going on for the last 1 week. She had complicated ESBL E coli UTI and had been on iv invanz until 5/11/19 under the care of Dr Juliet Matute.  For the last 1 week her mental status had been getting worse and worse and she was admitted to the hospital.    Past Medical History:        Diagnosis Date    Arthritis     Gout     CONTROLLED    Hyperlipidemia     Hypertension     STABLE    Macular hole, right eye     Thyroid disease      Past Surgical History:        Procedure Laterality Date    BREAST SURGERY Right 1982    BENIGN CYST    [address]     Current Medications:   Scheduled Meds:   atenolol  50 mg Oral Daily    atenolol  25 mg Oral Nightly    levothyroxine  112 mcg Oral Daily    liothyronine  5 mcg Oral Daily    pravastatin  40 mg Oral Nightly    probenecid  500 mg Oral BID    aspirin  81 mg Oral Daily    enoxaparin  40 mg Subcutaneous Daily    docusate sodium  100 mg Oral BID    losartan  50 mg Oral Daily    famotidine  20 mg Oral Daily    meropenem  1 g Intravenous Q8H     Continuous Infusions:   sodium chloride 75 mL/hr at 05/14/19 1924     PRN Meds:sodium chloride flush, albuterol sulfate HFA, cyclobenzaprine, bisacodyl, potassium chloride **OR** potassium alternative oral replacement **OR** potassium chloride, magnesium hydroxide, ondansetron, acetaminophen    Allergies:  Adrenalin [epinephrine]    Social History:   Social History     Socioeconomic History    Marital status:      Spouse name: None    Number of children: None    Years of education: None    Highest education level: None   Occupational History    None   Social Needs    Financial resource strain: None    Food insecurity:     Worry: None     Inability: None    Transportation needs:     Medical: None     Non-medical: None   Tobacco Use    Smoking status: Never Smoker    Smokeless tobacco: Never Used   Substance and Sexual Activity    Alcohol use: Yes     Comment: socially    Drug use: No    Sexual activity: None   Lifestyle    Physical activity:     Days per week: None     Minutes per session: None    Stress: None   Relationships    Social connections:     Talks on phone: None     Gets together: None     Attends Jew service: None     Active member of club or organization: None     Attends meetings of clubs or organizations: None     Relationship status: None    Intimate partner violence:     Fear of current or ex partner: None     Emotionally abused: None     Physically abused: None     Forced sexual activity: None   Other Topics Concern    None   Social History Narrative    None       Family History:       Problem Relation Age of Onset    Other Mother    . Otherwise non-pertinent to the chief complaint.     REVIEW OF SYSTEMS:    14 ROS negative unless otherwise specified in the HPI    PHYSICAL EXAM:    Vitals:    /68   Pulse 94   Temp 98.2 °F (36.8 °C)   Resp 18   Ht 5' 6\" (1.676 m)   Wt 235 lb 9.6 oz (106.9 kg)   SpO2 97%   BMI 38.03 kg/m²     General Appearance:    Alert, cooperative, no distress, appears stated age   Head:    Normocephalic, without obvious abnormality, atraumatic   Eyes:    PERRL, conjunctiva/corneas clear      Ears:    Normal and external ear canals, both ears   Nose:   Nares normal, septum midline, mucosa normal, no drainage    or sinus tenderness   Throat:   Lips, mucosa, and tongue normal; teeth and gums normal   Neck:   Supple, trachea midline, no adenopathy; no JVD   Lungs:     Clear to auscultation bilaterally, respirations unlabored   Chest wall:    No tenderness or deformity   Heart:    Regular rate and rhythm, S1 and S2 normal, no murmur, rub   or gallop   Abdomen:     Soft, non-tender, bowel sounds active all four quadrants,     no masses, no organomegaly   Extremities:   Extremities normal, atraumatic, no cyanosis or edema   Pulses:   2+ and symmetric all extremities   Skin:   Skin color, texture, turgor normal, no rashes or lesions       CBC+dif:  Reviewed and trend followed    Radiology:  Noted    Microbiology:  Pending    Assessment:  · Acute encephalopathy likely from therapy with ertapenem  · ESBL E coli complicated UTI finished treatment  · Leucocytosis and lactic acidosis    Plan:    · Watch off antibiotics  · hydrate  · Recheck WBC  · Monitor labs  · Will follow with you    Thank you for having us see this patient in consultation. I will be discussing this case with the treating physicians.     Electronically signed by Sylvan Claude, MD on 5/15/2019 at 1:43 PM

## 2019-05-15 NOTE — PROGRESS NOTES
Physical Therapy    Facility/Department: [facility]  Initial Assessment    NAME: Dotty Patten  : 1945  MRN: [id number]    Date of Service: 5/15/2019  Evaluating Therapist: Kavita Huerta PT, DPT    Room #: 4950T  DIAGNOSIS: sepsis  PRECAUTIONS: falls, bed alarm  Pertinent Medical History: arthritis, gout, HLD, HTN, thyroid disease, cholecystectomy, hysterectomy     Social: unable to obtain social history from pt due to AMS  Per chart pt admitted from [facility]. Pt used ww? And required ? Assistance with self care or mobility. Per 19 PT note Pt lived with her  in a 1 floor plan 2 steps and 1 rails to enter. Bed and bath on first floor.  is w/c bound. Prior to admission pt walked with no device. Pt reported owing a walker. Pt reported she takes care of the household chores. Initial Evaluation  Date: 5/15/19 Treatment  Date: NA  Short Term/ Long Term   Goals   Was pt agreeable to Eval/treatment? Yes     Did pt report pain? Pt reported pain in low back upon sitting EOB, not rated      Bed Mobility  Rolling: mod A   Supine to sit: max A   Sit to supine: max A   Scooting: mod A   Min A    Transfers Sit to stand: mod A   Stand to sit: mod A   Stand pivot: NT   Min A    Ambulation    2 lateral steps to with ww and mod A   15 feet with AAD with min A   Stair negotiation: ascended and descended NT  NA   [facility] Raw Score 11/24       Alertness/Orientation: xself only. Very confused, tangential conversation, easily agitated, followed approx 25-30% of simple commands, easily distracted   LE AROM: Grossly WFL  LE Strength: Grossly 4/5   Static Balance: min A sitting, min-mod A standing   Dynamic Balance: mod A standing   Endurance: Limited   Sensation: denied numbness/tingling   Edema/Skin Integrity: BLE edema     Chair/Bed Alarm: armed      ASSESSMENT/TREATMENT  Pt displays functional ability as noted in the objective portion of this evaluation.       Pt performed the following therapeutic activities/exercise:   Supine SLR (AA), ankle pumps, hip abduction/adduction (AA), shoulder press x 20 reps    Sitting EOB LAQ, ankle circumduction x20 reps B   Sitting EOB x 4 minutes total focusing on trunk control, posture    Comments: Pt was supine upon PT arrival and agreeable to PT evaluation/treatment. Pt very confused, difficult to understand at times, followed approx 30% of simple commands, impulsive, and demonstrated very limited attention span. Pt sat EOB x 4 total minutes and c/o LBP , stood and took 2 steps laterally until impusively sitting with unsafe technique despite cues. Pt toleated activity well. Will continue to follow. At end of session pt supine, bed alarm armed, with call light within reach and all needs met. Patient education  Pt educated re: safety recommendations, PT treatment expectations and POC, PT d/c recommendations. Patient response to education:   Pt verbalized understanding Pt demonstrated skill Pt requires further education in this area   Yes  partial Yes      Rehab potential is fair  for reaching above PT goals. Pts/ family goals   None stated       PLAN  PT care will be provided in accordance with the objectives noted above. Whenever appropriate, clear delegation orders will be provided for nursing staff. Exercises and functional mobility practice will be used as well as appropriate assistive devices or modalities to obtain goals. Patient and family education will also be administered as needed. Frequency of treatments will be 2-5x/week x 1-3 days. Patient and or family understand(s) diagnosis, prognosis, and plan of care.       Time in: 1545  Time out: 500 Marito Sauceda DPT   License number:  [id number]

## 2019-05-15 NOTE — H&P
[address] [facility]  History and Physical      CHIEF COMPLAINT:  abd pain      HISTORY OF PRESENT ILLNESS:      The patient is a 74 y.o. female patient of Dr Carla Florian who presents with AMS from SNF. Persistent for days. Assoc nausea and leg swelling. Recent admission for UTI/bacteremia  ESBL+ E coli/pneumotosis coli 5/1-5/7. History is limited by mental status. History is obtained from chart review and discussions with staff and family. Discussed with patient's  Frida Joel. Patient has been increasingly confused since admission for UTI. He states this all started after she was taking pain medications for her back pain and became constipated. She then developed abdominal pain was admitted to the hospital. He tells me that her friends have been telling him that she is not herself. He called the nursing home and she was brought to the ED for further evaluation. .    Past Medical History:    Past Medical History:   Diagnosis Date    Arthritis     Gout     CONTROLLED    Hyperlipidemia     Hypertension     STABLE    Macular hole, right eye     Thyroid disease        Past Surgical History:    Past Surgical History:   Procedure Laterality Date    BREAST SURGERY Right 1982    BENIGN CYST    CHOLECYSTECTOMY  1997    HYSTERECTOMY  1985       Medications Prior to Admission:    Medications Prior to Admission: torsemide (DEMADEX) 20 MG tablet, Take 20 mg by mouth daily as needed (as needed for extra fluid)  ALPRAZolam (XANAX) 0.5 MG tablet, Take 0.5 mg by mouth every 6 hours as needed. losartan (COZAAR) 50 MG tablet, Take 1 tablet by mouth daily  enoxaparin (LOVENOX) 40 MG/0.4ML injection, Inject 0.4 mLs into the skin daily  cyclobenzaprine (FLEXERIL) 10 MG tablet, Take 1 tablet by mouth 3 times daily as needed for Muscle spasms  docusate sodium (COLACE) 100 MG capsule, Take 1 capsule by mouth 2 times daily  atenolol (TENORMIN) 50 MG tablet, Take 50 mg by mouth daily.  Instructed to take morning of surgery with a sip of water  atenolol (TENORMIN) 25 MG tablet, Take 25 mg by mouth nightly. levothyroxine (SYNTHROID) 112 MCG tablet, Take 112 mcg by mouth Daily. Instructed to take morning of surgery with a sip of water  pravastatin (PRAVACHOL) 40 MG tablet, Take 40 mg by mouth nightly. potassium chloride (MICRO-K) 10 MEQ CR capsule, Take 10 mEq by mouth 2 times daily. probenecid (BENEMID) 500 MG tablet, Take 500 mg by mouth 2 times daily. Coenzyme Q10 (CO Q 10 PO), Take  by mouth. LAST DOSE 1/17/13  aspirin 81 MG tablet, Take 81 mg by mouth daily. LAST DOSE 1/11/13  multivitamin (THERAGRAN) per tablet, Take 1 tablet by mouth daily. oxybutynin (DITROPAN) 5 MG tablet, Take 5 mg by mouth 2 times daily  [DISCONTINUED] furosemide (LASIX) 20 MG tablet, Take 1 tablet by mouth daily  [DISCONTINUED] vitamin D (CHOLECALCIFEROL) 1000 UNIT TABS tablet, Take 1 tablet by mouth daily  [DISCONTINUED] cyanocobalamin 1000 MCG/ML injection, Inject 1 mL into the muscle daily  [DISCONTINUED] bisacodyl (DULCOLAX) 10 MG suppository, Place 1 suppository rectally daily as needed for Constipation  liothyronine (CYTOMEL) 5 MCG tablet, Take 5 mcg by mouth daily. Instructed to take morning of surgery with a sip of water    Allergies:    Adrenalin [epinephrine]    Social History:    reports that she has never smoked. She has never used smokeless tobacco. She reports that she drinks alcohol. She reports that she does not use drugs. Family History:   family history includes Other in her mother. REVIEW OF SYSTEMS:  As above in the HPI, otherwise negative    PHYSICAL EXAM:    Vitals:  /70   Pulse 98   Temp 97.5 °F (36.4 °C) (Temporal)   Resp 20   Ht 5' 6\" (1.676 m)   Wt 235 lb 9.6 oz (106.9 kg)   SpO2 96%   BMI 38.03 kg/m²     General:  Awake, alert, oriented X 3. Well developed, well nourished, well groomed. No apparent distress. HEENT:  Normocephalic, atraumatic. Pupils equal, round, reactive to light.   No scleral MODERATE 05/14/2019    BACTERIA RARE 05/14/2019    CLARITYU Clear 05/14/2019    SPECGRAV <=1.005 05/14/2019    LEUKOCYTESUR Negative 05/14/2019    UROBILINOGEN 0.2 05/14/2019    BILIRUBINUR Negative 05/14/2019    BLOODU TRACE-INTACT 05/14/2019    GLUCOSEU Negative 05/14/2019     ABG:    Lab Results   Component Value Date    PH 7.470 05/14/2019    PCO2 34.6 05/14/2019    PO2 56.5 05/14/2019    HCO3 24.6 05/14/2019    BE 1.3 05/14/2019    O2SAT 88.5 05/14/2019     HgBA1c:  No results found for: LABA1C  FLP:  No results found for: TRIG, HDL, LDLCALC, LDLDIRECT, LABVLDL  TSH:    Lab Results   Component Value Date    TSH 17.190 05/14/2019       CT Head WO Contrast   Final Result   Moderate atrophy and mild chronic microvascular ischemic disease. CT ABDOMEN PELVIS W IV CONTRAST Additional Contrast? None   Final Result      NO ACUTE PATHOLOGY SEEN IN ABDOMEN OR PELVIS       Grade 2 compression fracture of L3 vertebral body superior endplate   which is amenable for vertebral body augmentation      Status post hysterectomy and cholecystectomy. Coronary calcification suggesting of coronary artery disease. Other findings as described above         XR CHEST PORTABLE   Final Result   1. Stable, enlarged cardiac mediastinal silhouette with mild central   pulmonary vascular congestion and thoracic aortic vascular   calcifications. 2... Nonspecific bibasilar airspace disease, findings can be seen in   atelectasis and/or scarring, with the possibility of an early   developing infiltrate/pneumonia felt less likely but not completely   excluded. ASSESSMENT:      Principal Problem:    Sepsis ([address]  Active Problems:    HTN (hypertension)    HLD (hyperlipidemia)    Obesity    Acute metabolic encephalopathy  Resolved Problems:    * No resolved hospital problems.  *      PLAN:    Admit  IV abx  UCx  BCx  C diff toxin if diarrhea  IVF  ID cs  Monitor labs closely  Medications for other co morbidities cont as appropriate w dosage adjustments as necessary PT/OT  DVT PPx  DC planning         Electronically signed by Oscar Mullins MD on 5/15/2019 at 9:14 AM

## 2019-05-16 PROBLEM — A04.72 CLOSTRIDIUM DIFFICILE DIARRHEA: Status: ACTIVE | Noted: 2019-05-16

## 2019-05-16 LAB
C DIFFICILE TOXIN, EIA: ABNORMAL
ORGANISM: ABNORMAL
URINE CULTURE, ROUTINE: ABNORMAL
URINE CULTURE, ROUTINE: ABNORMAL

## 2019-05-16 PROCEDURE — 6370000000 HC RX 637 (ALT 250 FOR IP): Performed by: INTERNAL MEDICINE

## 2019-05-16 PROCEDURE — 2700000000 HC OXYGEN THERAPY PER DAY

## 2019-05-16 PROCEDURE — 97110 THERAPEUTIC EXERCISES: CPT

## 2019-05-16 PROCEDURE — 6360000002 HC RX W HCPCS: Performed by: INTERNAL MEDICINE

## 2019-05-16 PROCEDURE — 97530 THERAPEUTIC ACTIVITIES: CPT

## 2019-05-16 PROCEDURE — 97535 SELF CARE MNGMENT TRAINING: CPT

## 2019-05-16 PROCEDURE — 97166 OT EVAL MOD COMPLEX 45 MIN: CPT

## 2019-05-16 PROCEDURE — 2580000003 HC RX 258: Performed by: INTERNAL MEDICINE

## 2019-05-16 PROCEDURE — 2060000000 HC ICU INTERMEDIATE R&B

## 2019-05-16 RX ORDER — FLUCONAZOLE 100 MG/1
200 TABLET ORAL DAILY
Status: DISCONTINUED | OUTPATIENT
Start: 2019-05-16 | End: 2019-05-20 | Stop reason: HOSPADM

## 2019-05-16 RX ORDER — ZIPRASIDONE MESYLATE 20 MG/ML
10 INJECTION, POWDER, LYOPHILIZED, FOR SOLUTION INTRAMUSCULAR EVERY 12 HOURS PRN
Status: DISCONTINUED | OUTPATIENT
Start: 2019-05-16 | End: 2019-05-17

## 2019-05-16 RX ADMIN — ASPIRIN 81 MG 81 MG: 81 TABLET ORAL at 10:42

## 2019-05-16 RX ADMIN — PROBENECID 500 MG: 500 TABLET, FILM COATED ORAL at 22:15

## 2019-05-16 RX ADMIN — ATENOLOL 50 MG: 25 TABLET ORAL at 10:41

## 2019-05-16 RX ADMIN — PRAVASTATIN SODIUM 40 MG: 20 TABLET ORAL at 22:15

## 2019-05-16 RX ADMIN — SODIUM CHLORIDE: 9 INJECTION, SOLUTION INTRAVENOUS at 05:51

## 2019-05-16 RX ADMIN — ATENOLOL 25 MG: 25 TABLET ORAL at 22:27

## 2019-05-16 RX ADMIN — MEROPENEM 1 G: 1 INJECTION, POWDER, FOR SOLUTION INTRAVENOUS at 13:52

## 2019-05-16 RX ADMIN — DOCUSATE SODIUM 100 MG: 100 CAPSULE, LIQUID FILLED ORAL at 10:40

## 2019-05-16 RX ADMIN — SODIUM CHLORIDE: 9 INJECTION, SOLUTION INTRAVENOUS at 22:15

## 2019-05-16 RX ADMIN — FAMOTIDINE 20 MG: 20 TABLET ORAL at 10:42

## 2019-05-16 RX ADMIN — MEROPENEM 1 G: 1 INJECTION, POWDER, FOR SOLUTION INTRAVENOUS at 05:49

## 2019-05-16 RX ADMIN — LEVOTHYROXINE SODIUM 112 MCG: 112 TABLET ORAL at 06:39

## 2019-05-16 RX ADMIN — LOSARTAN POTASSIUM 50 MG: 50 TABLET, FILM COATED ORAL at 10:42

## 2019-05-16 RX ADMIN — NYSTATIN 500000 UNITS: 100000 SUSPENSION ORAL at 22:15

## 2019-05-16 RX ADMIN — PROBENECID 500 MG: 500 TABLET, FILM COATED ORAL at 10:41

## 2019-05-16 RX ADMIN — ACETAMINOPHEN 650 MG: 325 TABLET, FILM COATED ORAL at 22:15

## 2019-05-16 RX ADMIN — LIOTHYRONINE SODIUM 5 MCG: 5 TABLET ORAL at 10:41

## 2019-05-16 RX ADMIN — FLUCONAZOLE 200 MG: 100 TABLET ORAL at 16:25

## 2019-05-16 RX ADMIN — Medication 125 MG: at 22:16

## 2019-05-16 RX ADMIN — ZIPRASIDONE MESYLATE 10 MG: 20 INJECTION, POWDER, LYOPHILIZED, FOR SOLUTION INTRAMUSCULAR at 22:28

## 2019-05-16 RX ADMIN — ENOXAPARIN SODIUM 40 MG: 40 INJECTION SUBCUTANEOUS at 10:39

## 2019-05-16 ASSESSMENT — PAIN DESCRIPTION - DESCRIPTORS: DESCRIPTORS: DISCOMFORT

## 2019-05-16 ASSESSMENT — PAIN SCALES - GENERAL
PAINLEVEL_OUTOF10: 0
PAINLEVEL_OUTOF10: 5
PAINLEVEL_OUTOF10: 0
PAINLEVEL_OUTOF10: 2
PAINLEVEL_OUTOF10: 0
PAINLEVEL_OUTOF10: 0

## 2019-05-16 ASSESSMENT — PAIN - FUNCTIONAL ASSESSMENT: PAIN_FUNCTIONAL_ASSESSMENT: PREVENTS OR INTERFERES SOME ACTIVE ACTIVITIES AND ADLS

## 2019-05-16 ASSESSMENT — PAIN DESCRIPTION - PAIN TYPE: TYPE: CHRONIC PAIN

## 2019-05-16 ASSESSMENT — PAIN DESCRIPTION - ONSET: ONSET: PROGRESSIVE

## 2019-05-16 ASSESSMENT — PAIN DESCRIPTION - FREQUENCY: FREQUENCY: INTERMITTENT

## 2019-05-16 ASSESSMENT — PAIN DESCRIPTION - ORIENTATION: ORIENTATION: LOWER

## 2019-05-16 ASSESSMENT — PAIN DESCRIPTION - LOCATION: LOCATION: BACK

## 2019-05-16 NOTE — PROGRESS NOTES
Called Dr Linda Mcclain, asked for something PRN pt is combative and agitated. No answer, left a voicemail and call back number.

## 2019-05-16 NOTE — PROGRESS NOTES
Subjective:  Feeling better No CP, SOB, F, V, D, P   Still confused  Objective:    /66   Pulse 94   Temp 98.6 °F (37 °C) (Temporal)   Resp 20   Ht 5' 6\" (1.676 m)   Wt 235 lb 9.6 oz (106.9 kg)   SpO2 95%   BMI 38.03 kg/m²     24HR INTAKE/OUTPUT:      Intake/Output Summary (Last 24 hours) at 5/16/2019 0924  Last data filed at 5/16/2019 0651  Gross per 24 hour   Intake 1511 ml   Output 0 ml   Net 1511 ml     nad confused  Heart:  RRR, no murmurs, gallops, or rubs. Lungs:  CTA bilaterally, no wheeze, rales or rhonchi  Abd: bowel sounds present, nontender, nondistended, no masses  Extrem:  No clubbing, cyanosis, or edema    Most Recent Labs  Lab Results   Component Value Date    WBC 16.6 (H) 05/15/2019    HGB 11.8 05/15/2019    HCT 38.3 05/15/2019     05/15/2019     05/15/2019    K 3.4 (L) 05/15/2019    CL 99 05/15/2019    CREATININE 1.1 (H) 05/15/2019    BUN 8 05/15/2019    CO2 26 05/15/2019    GLUCOSE 82 05/15/2019    ALT 17 05/14/2019    AST 23 05/14/2019    INR 1.3 05/14/2019    TSH 17.190 (H) 05/14/2019    LABMICR 26.1 (H) 05/01/2019     Recent Labs     05/15/19  1059   MG 1.7     Lab Results   Component Value Date    CALCIUM 8.4 (L) 05/15/2019    PHOS 4.0 05/07/2019        CT Head WO Contrast   Final Result   Moderate atrophy and mild chronic microvascular ischemic disease. CT ABDOMEN PELVIS W IV CONTRAST Additional Contrast? None   Final Result      NO ACUTE PATHOLOGY SEEN IN ABDOMEN OR PELVIS       Grade 2 compression fracture of L3 vertebral body superior endplate   which is amenable for vertebral body augmentation      Status post hysterectomy and cholecystectomy. Coronary calcification suggesting of coronary artery disease. Other findings as described above         XR CHEST PORTABLE   Final Result   1. Stable, enlarged cardiac mediastinal silhouette with mild central   pulmonary vascular congestion and thoracic aortic vascular   calcifications. 2...  Nonspecific

## 2019-05-16 NOTE — PLAN OF CARE
Problem: Falls - Risk of:  Goal: Will remain free from falls  Description  Will remain free from falls  5/16/2019 0556 by Emir Muniz RN  Outcome: Met This Shift  5/15/2019 1828 by Landen Alexander RN  Outcome: Met This Shift  Goal: Absence of physical injury  Description  Absence of physical injury  5/16/2019 0556 by Emir Muniz RN  Outcome: Met This Shift  5/15/2019 1828 by Landen Alexander RN  Outcome: Met This Shift     Problem: Risk for Impaired Skin Integrity  Goal: Tissue integrity - skin and mucous membranes  Description  Structural intactness and normal physiological function of skin and  mucous membranes.   Outcome: Met This Shift     Problem: Discharge Planning:  Goal: Discharged to appropriate level of care  Description  Discharged to appropriate level of care  Outcome: Met This Shift     Problem: Gas Exchange - Impaired:  Goal: Levels of oxygenation will improve  Description  Levels of oxygenation will improve  Outcome: Met This Shift     Problem: Infection, Septic Shock:  Goal: Will show no infection signs and symptoms  Description  Will show no infection signs and symptoms  Outcome: Met This Shift     Problem: Infection - Ventilator-Associated Pneumonia:  Goal: Absence of pulmonary infection  Description  Absence of pulmonary infection  Outcome: Met This Shift     Problem: Serum Glucose Level - Abnormal:  Goal: Ability to maintain appropriate glucose levels will improve  Description  Ability to maintain appropriate glucose levels will improve  Outcome: Met This Shift     Problem: Tissue Perfusion, Altered:  Goal: Circulatory function within specified parameters  Description  Circulatory function within specified parameters  Outcome: Met This Shift     Problem: Venous Thromboembolism:  Goal: Will show no signs or symptoms of venous thromboembolism  Description  Will show no signs or symptoms of venous thromboembolism  Outcome: Met This Shift  Goal: Absence of signs or symptoms of impaired coagulation  Description  Absence of signs or symptoms of impaired coagulation  Outcome: Met This Shift     Problem: Musculor/Skeletal Functional Status  Goal: Highest potential functional level  Outcome: Met This Shift  Goal: Absence of falls  5/16/2019 0556 by Noel Meigs, RN  Outcome: Met This Shift  5/15/2019 1828 by Bentley Brambila RN  Outcome: Met This Shift

## 2019-05-16 NOTE — PROGRESS NOTES
Physical Therapy  Facility/Department: [facility]  Daily Treatment Note  NAME: Alexandria Pringle  : 1945  MRN: [id number]    Date of Service: 2019    Evaluating Therapist: Missy Mehta PT, DPT     Room #: 6152W  DIAGNOSIS: sepsis  PRECAUTIONS: falls, bed alarm  Pertinent Medical History: arthritis, gout, HLD, HTN, thyroid disease, cholecystectomy, hysterectomy      Social: unable to obtain social history from pt due to AMS  Per chart pt admitted from [facility]. Pt used ww? And required ? Assistance with self care or mobility. Per 19 PT note Pt lived with her  in a 1 floor plan 2 steps and 1 rails to enter.  Bed and bath on first floor. Sosa Sepulveda is w/c bound.    Prior to admission pt walked with no device.  Pt reported owing a walker.  Pt reported she takes care of the household chores.                   Initial Evaluation  Date: 19 Treatment  Date: NA  Short Term/ Long Term   Goals   Was pt agreeable to Eval/treatment? Yes       Did pt report pain?  no       Bed Mobility  Rolling: mod A   Supine to sit: mod A   Sit to supine: mod A   Scooting: mod A    Min A    Transfers Sit to stand: min A   Stand to sit: min A   Stand pivot: NT    Min A    Ambulation    2 lateral steps to with ww and mod A    15 feet with AAD with min A   Stair negotiation: ascended and descended NT   NA   [facility] Raw Score 12/24            Additional Comments: Pt supine upon entering room. Pt transferred to EOB and upon standing became incontinent of urine. Pt confused this AM asking therapists to go to her car in garage;reoriented pt to place, date, time despite pt stating that correctly previously. Pt performed multiple sit<>stands and was incontinent multiple times with urine/BM. Changed linens, gowns, pads and socks. Provided hygiene assist and returned pt to bed supine with all needs met. Pt call light left by patient.     Time in: 0843  Time out: 0910    Pt is making fair progress toward established Physical Therapy goals. Continue with physical therapy current plan of care.     Delores Ellis PTA  License Number: [id number]

## 2019-05-16 NOTE — PLAN OF CARE
Plan of care discussed with patient / family.     Problem: Falls - Risk of:  Goal: Will remain free from falls  Description  Will remain free from falls  5/16/2019 1215 by Bebeto Hopson RN  Outcome: Met This Shift  5/16/2019 0556 by Puja Santana RN  Outcome: Met This Shift     Problem: Falls - Risk of:  Goal: Absence of physical injury  Description  Absence of physical injury  5/16/2019 1215 by Bebeto Hopson RN  Outcome: Met This Shift  5/16/2019 0556 by Puja Santana RN  Outcome: Met This Shift     Problem: Gas Exchange - Impaired:  Goal: Levels of oxygenation will improve  Description  Levels of oxygenation will improve  5/16/2019 1215 by Bebeto Hopson RN  Outcome: Met This Shift  5/16/2019 0556 by Puja Santana RN  Outcome: Met This Shift     Problem: Infection, Septic Shock:  Goal: Will show no infection signs and symptoms  Description  Will show no infection signs and symptoms  5/16/2019 1215 by Bebeto Hopson RN  Outcome: Met This Shift  5/16/2019 0556 by Puja Santana RN  Outcome: Met This Shift     Problem: Venous Thromboembolism:  Goal: Will show no signs or symptoms of venous thromboembolism  Description  Will show no signs or symptoms of venous thromboembolism  5/16/2019 1215 by Bebeto Hopson RN  Outcome: Met This Shift

## 2019-05-16 NOTE — PROGRESS NOTES
OCCUPATIONAL THERAPY INITIAL EVALUATION      Date:2019  Patient Name: Ben Garcia  MRN: [id number]  : 1945  Room: [address] [place], [place] [address]    Evaluating OT: Perriagnes Metzger, SALLY, OTR/L [id number]    AM-PAC Daily Activity Raw Score:   Recommended Adaptive Equipment: TBD     Diagnosis: sepsis   Surgery: n/a   Pertinent Medical History: HTN   Precautions:  Falls, TSM, contact isoloation     Home Living: Pt is from SNF. She is a questionable historian because she reports living with her family prior and that she has to arrange  arrangements for her brother. Prior Level of Function: assist with ADLs; using ww for functional mobility     Pain Level: 0/10  Cognition: A&O: 2/4 (self, time); Follows 1 step directions   Memory:  fair    Sequencing:  fair    Problem solving:  fair    Judgement/safety:  fair      Functional Assessment:   Initial Eval Status  Date: 19 Treatment Status  Date: Short Term Goals  Treatment frequency: PRN    Feeding SBA  Mod I   Grooming SBA (sitting EOB)  Mod I   UB Dressing Mod A (limited due to ROM)   Min A   LB Dressing Max A (pt unable to mina B socks sitting EOB)  Mod A    Bathing NT  Min A    Toileting Max A (pt incontinent, assist with bed pan and tim care, increased time)  Mod A   Bed Mobility  Log roll: Mod A  Supine to sit: Mod A   Sit to supine: Mod A   Log roll Min A  Supine to sit: Min A   Sit to supine: Min A   Functional Transfers Sit to stand:Min A (5x)  Stand to sit:Min A  Commode: NT (attempted 5x)  SBA   Functional Mobility NT (due to incontinence, pt able to take 3 side steps to [facility] Mod A)  Min A   Balance Sitting:     Static:  Min A (cues for posterior lean, progressed to SBA)    Dynamic:Min A  Standing:  Mod A     Activity Tolerance Fair-(limited due to incontinence with sit-stands)     Visual/  Perceptual Glasses: for reading                UE AROM: BUE: grossly 100' shoulder flex, elbow WFL    Strength: BUE: shoulder flex grossly 3+/5, elbow flex grossly 4+   Strength: B WFL  Fine Motor Coordination:  WFL    Hearing: WFL  Sensation:  No c/o numbness or tingling  Tone:  WFL  Edema: none noted                            Comments/Treatment: Cleared by RN to see pt. Upon arrival, patient sitting upright in bed and agreeable to OT session. Required re-orientation throughout session. Required Mod A for bed mobility. Educated on techniques for log rolling for ease with bed mobility. With each sit-stand, pt having incontrollable BB. Required assist for bed pan and toileting needs. Required increased time. Pt julien to take 3 side steps to [facility]. . At end of session, patient sitting upright in bed with call light and phone within reach, all lines and tubes intact. Pt appeared to have tolerated session well. Pt would benefit from continued OT to increase functional independence and quality of life. Eval Complexity: moderate  · History: Expanded chart review of medical records and additional review of physical, cognitive, or psychosocial history related to current functional performance  · Exam: 3+ performance deficits  · Assistance/Modification: mod/max assistance or modifications required to perform tasks. May have comorbidities that affect occupational performance.     Assessment of current deficits   Functional mobility [x]  ADLs [x] Strength [x]  Cognition [x]  Functional transfers  [x] IADLs [x] Safety Awareness [x]  Endurance [x]  Fine Motor Coordination [] Balance [x] Vision/perception [] Sensation []   Gross Motor Coordination [] ROM [] Delirium []                  Motor Control []    Plan of Care:   ADL retraining [x]   Equipment needs [x]   Neuromuscular re-education [x] Energy Conservation Techniques [x]  Functional Transfer training [x] Patient and/or Family Education [x]  Functional Mobility training [x]  Environmental Modifications [x]  Cognitive re-training []   Compensatory techniques for ADLs [x]  Splinting Needs []   Positioning to improve overall function [x]   Therapeutic Activity [x]  Therapeutic Exercise  [x]  Visual/Perceptual: []    Delirium prevention/treatment  []   Other:  []    Rehab Potential: Good for established goals, pt. assisted in establishment of goals. Patient / Family Goal: not stated    Patient instructed on diagnosis, prognosis/goals and plan of care. Demonstrated fair understanding. [] Malnutrition indicators have been identified and nursing has been notified to ensure a dietitian consult is ordered. Evaluation time includes thorough review of current medical information, gathering information on past medical & social history & PLOF, completion of standardized testing, informal observation of tasks, consultation with other medical professions/disciplines, assessment of data & development of POC/goals. Tx. Time in: 8:30  Tx.  Time out: 9:00  moderate Evaluation + 30 timed treatment minutes    Armando Lance, OTR/L [id number]

## 2019-05-17 LAB
ANION GAP SERPL CALCULATED.3IONS-SCNC: 10 MMOL/L (ref 7–16)
ANION GAP SERPL CALCULATED.3IONS-SCNC: 9 MMOL/L (ref 7–16)
BUN BLDV-MCNC: 7 MG/DL (ref 8–23)
BUN BLDV-MCNC: 8 MG/DL (ref 8–23)
CALCIUM SERPL-MCNC: 7.6 MG/DL (ref 8.6–10.2)
CALCIUM SERPL-MCNC: 7.9 MG/DL (ref 8.6–10.2)
CHLORIDE BLD-SCNC: 108 MMOL/L (ref 98–107)
CHLORIDE BLD-SCNC: 108 MMOL/L (ref 98–107)
CO2: 22 MMOL/L (ref 22–29)
CO2: 23 MMOL/L (ref 22–29)
CREAT SERPL-MCNC: 0.7 MG/DL (ref 0.5–1)
CREAT SERPL-MCNC: 0.9 MG/DL (ref 0.5–1)
GFR AFRICAN AMERICAN: >60
GFR AFRICAN AMERICAN: >60
GFR NON-AFRICAN AMERICAN: >60 ML/MIN/1.73
GFR NON-AFRICAN AMERICAN: >60 ML/MIN/1.73
GLUCOSE BLD-MCNC: 74 MG/DL (ref 74–99)
GLUCOSE BLD-MCNC: 83 MG/DL (ref 74–99)
HCT VFR BLD CALC: 34.8 % (ref 34–48)
HEMOGLOBIN: 10.9 G/DL (ref 11.5–15.5)
MAGNESIUM: 1.9 MG/DL (ref 1.6–2.6)
MCH RBC QN AUTO: 30.2 PG (ref 26–35)
MCHC RBC AUTO-ENTMCNC: 31.3 % (ref 32–34.5)
MCV RBC AUTO: 96.4 FL (ref 80–99.9)
PDW BLD-RTO: 16.1 FL (ref 11.5–15)
PLATELET # BLD: 245 E9/L (ref 130–450)
PMV BLD AUTO: 10.8 FL (ref 7–12)
POTASSIUM SERPL-SCNC: 2.8 MMOL/L (ref 3.5–5)
POTASSIUM SERPL-SCNC: 3.6 MMOL/L (ref 3.5–5)
RBC # BLD: 3.61 E12/L (ref 3.5–5.5)
REASON FOR REJECTION: NORMAL
REJECTED TEST: NORMAL
SODIUM BLD-SCNC: 139 MMOL/L (ref 132–146)
SODIUM BLD-SCNC: 141 MMOL/L (ref 132–146)
WBC # BLD: 8.4 E9/L (ref 4.5–11.5)

## 2019-05-17 PROCEDURE — 80048 BASIC METABOLIC PNL TOTAL CA: CPT

## 2019-05-17 PROCEDURE — 36415 COLL VENOUS BLD VENIPUNCTURE: CPT

## 2019-05-17 PROCEDURE — 6370000000 HC RX 637 (ALT 250 FOR IP): Performed by: INTERNAL MEDICINE

## 2019-05-17 PROCEDURE — 2060000000 HC ICU INTERMEDIATE R&B

## 2019-05-17 PROCEDURE — 2580000003 HC RX 258: Performed by: INTERNAL MEDICINE

## 2019-05-17 PROCEDURE — 2700000000 HC OXYGEN THERAPY PER DAY

## 2019-05-17 PROCEDURE — 6360000002 HC RX W HCPCS: Performed by: INTERNAL MEDICINE

## 2019-05-17 PROCEDURE — 83735 ASSAY OF MAGNESIUM: CPT

## 2019-05-17 PROCEDURE — 85027 COMPLETE CBC AUTOMATED: CPT

## 2019-05-17 RX ORDER — FAMOTIDINE 20 MG/1
20 TABLET, FILM COATED ORAL 2 TIMES DAILY
Status: DISCONTINUED | OUTPATIENT
Start: 2019-05-17 | End: 2019-05-20 | Stop reason: HOSPADM

## 2019-05-17 RX ORDER — ZIPRASIDONE MESYLATE 20 MG/ML
10 INJECTION, POWDER, LYOPHILIZED, FOR SOLUTION INTRAMUSCULAR EVERY 12 HOURS PRN
Status: DISCONTINUED | OUTPATIENT
Start: 2019-05-17 | End: 2019-05-20 | Stop reason: HOSPADM

## 2019-05-17 RX ORDER — QUETIAPINE FUMARATE 25 MG/1
25 TABLET, FILM COATED ORAL NIGHTLY
Status: DISCONTINUED | OUTPATIENT
Start: 2019-05-17 | End: 2019-05-20 | Stop reason: HOSPADM

## 2019-05-17 RX ORDER — QUETIAPINE FUMARATE 25 MG/1
12.5 TABLET, FILM COATED ORAL EVERY 6 HOURS PRN
Status: DISCONTINUED | OUTPATIENT
Start: 2019-05-17 | End: 2019-05-20 | Stop reason: HOSPADM

## 2019-05-17 RX ADMIN — ATENOLOL 50 MG: 25 TABLET ORAL at 10:03

## 2019-05-17 RX ADMIN — PROBENECID 500 MG: 500 TABLET, FILM COATED ORAL at 10:04

## 2019-05-17 RX ADMIN — LIOTHYRONINE SODIUM 5 MCG: 5 TABLET ORAL at 10:04

## 2019-05-17 RX ADMIN — LEVOTHYROXINE SODIUM 112 MCG: 112 TABLET ORAL at 06:40

## 2019-05-17 RX ADMIN — POTASSIUM CHLORIDE 10 MEQ: 7.46 INJECTION, SOLUTION INTRAVENOUS at 02:14

## 2019-05-17 RX ADMIN — NYSTATIN 500000 UNITS: 100000 SUSPENSION ORAL at 16:19

## 2019-05-17 RX ADMIN — ATENOLOL 25 MG: 25 TABLET ORAL at 22:09

## 2019-05-17 RX ADMIN — Medication 125 MG: at 16:19

## 2019-05-17 RX ADMIN — PROBENECID 500 MG: 500 TABLET, FILM COATED ORAL at 22:09

## 2019-05-17 RX ADMIN — Medication 125 MG: at 04:12

## 2019-05-17 RX ADMIN — POTASSIUM CHLORIDE 10 MEQ: 7.46 INJECTION, SOLUTION INTRAVENOUS at 04:15

## 2019-05-17 RX ADMIN — NYSTATIN 500000 UNITS: 100000 SUSPENSION ORAL at 09:54

## 2019-05-17 RX ADMIN — FAMOTIDINE 20 MG: 20 TABLET ORAL at 22:10

## 2019-05-17 RX ADMIN — NYSTATIN 500000 UNITS: 100000 SUSPENSION ORAL at 22:09

## 2019-05-17 RX ADMIN — ASPIRIN 81 MG 81 MG: 81 TABLET ORAL at 10:04

## 2019-05-17 RX ADMIN — FAMOTIDINE 20 MG: 20 TABLET ORAL at 10:04

## 2019-05-17 RX ADMIN — Medication 125 MG: at 09:54

## 2019-05-17 RX ADMIN — POTASSIUM CHLORIDE 10 MEQ: 7.46 INJECTION, SOLUTION INTRAVENOUS at 05:17

## 2019-05-17 RX ADMIN — PRAVASTATIN SODIUM 40 MG: 20 TABLET ORAL at 22:10

## 2019-05-17 RX ADMIN — Medication 125 MG: at 22:09

## 2019-05-17 RX ADMIN — SODIUM CHLORIDE: 9 INJECTION, SOLUTION INTRAVENOUS at 10:02

## 2019-05-17 RX ADMIN — LOSARTAN POTASSIUM 50 MG: 50 TABLET, FILM COATED ORAL at 10:03

## 2019-05-17 RX ADMIN — POTASSIUM CHLORIDE 10 MEQ: 7.46 INJECTION, SOLUTION INTRAVENOUS at 06:19

## 2019-05-17 RX ADMIN — POTASSIUM CHLORIDE 10 MEQ: 7.46 INJECTION, SOLUTION INTRAVENOUS at 03:11

## 2019-05-17 RX ADMIN — NYSTATIN 500000 UNITS: 100000 SUSPENSION ORAL at 14:01

## 2019-05-17 RX ADMIN — QUETIAPINE FUMARATE 25 MG: 25 TABLET ORAL at 22:10

## 2019-05-17 RX ADMIN — QUETIAPINE FUMARATE 12.5 MG: 25 TABLET ORAL at 14:01

## 2019-05-17 RX ADMIN — FLUCONAZOLE 200 MG: 100 TABLET ORAL at 10:03

## 2019-05-17 RX ADMIN — ENOXAPARIN SODIUM 40 MG: 40 INJECTION SUBCUTANEOUS at 09:54

## 2019-05-17 RX ADMIN — POTASSIUM CHLORIDE 10 MEQ: 7.46 INJECTION, SOLUTION INTRAVENOUS at 07:36

## 2019-05-17 ASSESSMENT — PAIN SCALES - GENERAL
PAINLEVEL_OUTOF10: 0

## 2019-05-17 NOTE — PLAN OF CARE
Problem: Falls - Risk of:  Goal: Will remain free from falls  Description  Will remain free from falls  5/17/2019 1620 by Charlie Foley RN  Outcome: Met This Shift    Problem: Risk for Impaired Skin Integrity  Goal: Tissue integrity - skin and mucous membranes  Description  Structural intactness and normal physiological function of skin and  mucous membranes.   5/17/2019 1620 by Charlie Foley RN  Outcome: Met This Shift     Problem: Gas Exchange - Impaired:  Goal: Levels of oxygenation will improve  Description  Levels of oxygenation will improve  5/17/2019 1620 by Charlie Foley RN  Outcome: Met This Shift

## 2019-05-17 NOTE — PROGRESS NOTES
Dr Michelle Sultana said if patient doesn't discharge today she will consider general floor transfer.

## 2019-05-17 NOTE — PROGRESS NOTES
Subjective:  Feeling better No CP, SOB, F, V, D, P   Still confused  Objective:    BP (!) 147/66   Pulse 85   Temp 97.6 °F (36.4 °C) (Temporal)   Resp 19   Ht 5' 6\" (1.676 m)   Wt 237 lb 6.4 oz (107.7 kg)   SpO2 97%   BMI 38.32 kg/m²     24HR INTAKE/OUTPUT:      Intake/Output Summary (Last 24 hours) at 5/17/2019 0820  Last data filed at 5/17/2019 0626  Gross per 24 hour   Intake 2646.25 ml   Output 800 ml   Net 1846.25 ml     nad confused  Heart:  RRR, no murmurs, gallops, or rubs. Lungs:  CTA bilaterally, no wheeze, rales or rhonchi  Abd: bowel sounds present, nontender, nondistended, no masses  Extrem:  No clubbing, cyanosis, or edema    Most Recent Labs  Lab Results   Component Value Date    WBC 16.6 (H) 05/15/2019    HGB 11.8 05/15/2019    HCT 38.3 05/15/2019     05/15/2019     05/17/2019    K 2.8 (L) 05/17/2019     (H) 05/17/2019    CREATININE 0.9 05/17/2019    BUN 8 05/17/2019    CO2 22 05/17/2019    GLUCOSE 83 05/17/2019    ALT 17 05/14/2019    AST 23 05/14/2019    INR 1.3 05/14/2019    TSH 17.190 (H) 05/14/2019    LABMICR 26.1 (H) 05/01/2019     Recent Labs     05/15/19  1059   MG 1.7     Lab Results   Component Value Date    CALCIUM 7.6 (L) 05/17/2019    PHOS 4.0 05/07/2019        CT Head WO Contrast   Final Result   Moderate atrophy and mild chronic microvascular ischemic disease. CT ABDOMEN PELVIS W IV CONTRAST Additional Contrast? None   Final Result      NO ACUTE PATHOLOGY SEEN IN ABDOMEN OR PELVIS       Grade 2 compression fracture of L3 vertebral body superior endplate   which is amenable for vertebral body augmentation      Status post hysterectomy and cholecystectomy. Coronary calcification suggesting of coronary artery disease. Other findings as described above         XR CHEST PORTABLE   Final Result   1. Stable, enlarged cardiac mediastinal silhouette with mild central   pulmonary vascular congestion and thoracic aortic vascular   calcifications. 2... Nonspecific bibasilar airspace disease, findings can be seen in   atelectasis and/or scarring, with the possibility of an early   developing infiltrate/pneumonia felt less likely but not completely   excluded. Assessment    Principal Problem:    Sepsis ([address]  Active Problems:    HTN (hypertension)    HLD (hyperlipidemia)    Obesity    Acute metabolic encephalopathy    Clostridium difficile diarrhea  Resolved Problems:    * No resolved hospital problems.  *      Plan:      UCx yeast  BCx NGTD  Monitor labs  C diff toxin +  Cont PO vanc  D/C IVF  Replace K  ID Consult appreciated   Monitor labs closely  seroquel and geodon for agitation  PT/OT  DVT PPx  DC planning -- SNF tmrw        Electronically signed by Kely James MD on 5/17/2019 at 8:20 AM

## 2019-05-17 NOTE — PROGRESS NOTES
CC- ams    Subjective: The patient is awake and alert. Confusion better. WBC normal. No fever  10 ROS otherwise negative unless otherwise specified above. Objective:    Vitals:    05/17/19 1632   BP:    Pulse:    Resp:    Temp:    SpO2: 95%       General Appearance:    Awake,alert oriented, no acute distress.    HEENT:    Normocephalic,PERRL,neck supple, no JVD, mucosa moist, no thrush   Lungs:     Clear to auscultation bilaterally, no wheeze , crackles   Heart:    Regular rate and rhythm, no murmur   Abdomen:     Soft, non-tender, not distended  bowel sounds present,   Extremities:   No edema,no open wound,no erythema, non  tender   Skin:   no rashes or lesions   CBC+dif:  WBC- 8     Radiology:  Noted     Microbiology:  Pending     Assessment:  · Acute encephalopathy likely from therapy with carbapenem vs infectious etiology- off meropenem  · ESBL E coli complicated UTI finished treatment  · Leucocytosis and lactic acidosis- IMPROVED  · Candiduria and possible candida esophagitis     Plan:      · Diflucan and oral nystatin for 7 days  · If fever free for 48 hrs and confusion resolved can be discharged            Electronically signed by Italo Olivares MD on 5/17/2019 at 5:55 PM

## 2019-05-17 NOTE — CARE COORDINATION
Notified Dr. Eben Bravo of K+ 2.8, checked for discharge (after potassium is replaced), agreeable, will wait for discharge and notify liaison Adelina Parker

## 2019-05-18 PROCEDURE — 2580000003 HC RX 258: Performed by: RADIOLOGY

## 2019-05-18 PROCEDURE — 6370000000 HC RX 637 (ALT 250 FOR IP): Performed by: INTERNAL MEDICINE

## 2019-05-18 PROCEDURE — 2060000000 HC ICU INTERMEDIATE R&B

## 2019-05-18 PROCEDURE — 6360000002 HC RX W HCPCS: Performed by: INTERNAL MEDICINE

## 2019-05-18 RX ADMIN — NYSTATIN 500000 UNITS: 100000 SUSPENSION ORAL at 19:55

## 2019-05-18 RX ADMIN — LEVOTHYROXINE SODIUM 112 MCG: 112 TABLET ORAL at 06:26

## 2019-05-18 RX ADMIN — ASPIRIN 81 MG 81 MG: 81 TABLET ORAL at 09:24

## 2019-05-18 RX ADMIN — ZIPRASIDONE MESYLATE 10 MG: 20 INJECTION, POWDER, LYOPHILIZED, FOR SOLUTION INTRAMUSCULAR at 14:44

## 2019-05-18 RX ADMIN — ATENOLOL 50 MG: 25 TABLET ORAL at 09:20

## 2019-05-18 RX ADMIN — Medication 125 MG: at 17:05

## 2019-05-18 RX ADMIN — Medication 125 MG: at 22:26

## 2019-05-18 RX ADMIN — ENOXAPARIN SODIUM 40 MG: 40 INJECTION SUBCUTANEOUS at 09:19

## 2019-05-18 RX ADMIN — PRAVASTATIN SODIUM 40 MG: 20 TABLET ORAL at 19:54

## 2019-05-18 RX ADMIN — LOSARTAN POTASSIUM 50 MG: 50 TABLET, FILM COATED ORAL at 09:20

## 2019-05-18 RX ADMIN — ATENOLOL 25 MG: 25 TABLET ORAL at 19:54

## 2019-05-18 RX ADMIN — QUETIAPINE FUMARATE 25 MG: 25 TABLET ORAL at 19:54

## 2019-05-18 RX ADMIN — PROBENECID 500 MG: 500 TABLET, FILM COATED ORAL at 19:54

## 2019-05-18 RX ADMIN — NYSTATIN 500000 UNITS: 100000 SUSPENSION ORAL at 09:24

## 2019-05-18 RX ADMIN — QUETIAPINE FUMARATE 12.5 MG: 25 TABLET ORAL at 17:00

## 2019-05-18 RX ADMIN — FAMOTIDINE 20 MG: 20 TABLET ORAL at 09:20

## 2019-05-18 RX ADMIN — FAMOTIDINE 20 MG: 20 TABLET ORAL at 19:55

## 2019-05-18 RX ADMIN — FLUCONAZOLE 200 MG: 100 TABLET ORAL at 09:20

## 2019-05-18 RX ADMIN — PROBENECID 500 MG: 500 TABLET, FILM COATED ORAL at 09:20

## 2019-05-18 RX ADMIN — QUETIAPINE FUMARATE 12.5 MG: 25 TABLET ORAL at 09:30

## 2019-05-18 RX ADMIN — Medication 125 MG: at 09:24

## 2019-05-18 RX ADMIN — Medication 10 ML: at 19:55

## 2019-05-18 RX ADMIN — LIOTHYRONINE SODIUM 5 MCG: 5 TABLET ORAL at 09:20

## 2019-05-18 RX ADMIN — Medication 125 MG: at 04:54

## 2019-05-18 ASSESSMENT — PAIN - FUNCTIONAL ASSESSMENT: PAIN_FUNCTIONAL_ASSESSMENT: ACTIVITIES ARE NOT PREVENTED

## 2019-05-18 ASSESSMENT — PAIN SCALES - GENERAL
PAINLEVEL_OUTOF10: 5
PAINLEVEL_OUTOF10: 0
PAINLEVEL_OUTOF10: 0
PAINLEVEL_OUTOF10: 2
PAINLEVEL_OUTOF10: 0
PAINLEVEL_OUTOF10: 0

## 2019-05-18 ASSESSMENT — PAIN DESCRIPTION - DESCRIPTORS: DESCRIPTORS: DISCOMFORT

## 2019-05-18 ASSESSMENT — PAIN DESCRIPTION - LOCATION: LOCATION: BACK

## 2019-05-18 ASSESSMENT — PAIN DESCRIPTION - FREQUENCY: FREQUENCY: INTERMITTENT

## 2019-05-18 ASSESSMENT — PAIN DESCRIPTION - PROGRESSION: CLINICAL_PROGRESSION: GRADUALLY WORSENING

## 2019-05-18 ASSESSMENT — PAIN DESCRIPTION - ORIENTATION: ORIENTATION: LOWER

## 2019-05-18 NOTE — PROGRESS NOTES
Subjective:  Feeling better No CP, SOB, F, V, D, P   Still confused  Objective:    BP (!) 161/76   Pulse 89   Temp 98.8 °F (37.1 °C) (Temporal)   Resp 22   Ht 5' 6\" (1.676 m)   Wt 236 lb 9.6 oz (107.3 kg)   SpO2 94%   BMI 38.19 kg/m²     24HR INTAKE/OUTPUT:      Intake/Output Summary (Last 24 hours) at 5/18/2019 1051  Last data filed at 5/18/2019 0949  Gross per 24 hour   Intake 360 ml   Output 100 ml   Net 260 ml     nad confused  Heart:  RRR, no murmurs, gallops, or rubs. Lungs:  CTA bilaterally, no wheeze, rales or rhonchi  Abd: bowel sounds present, nontender, nondistended, no masses  Extrem:  No clubbing, cyanosis, or edema    Most Recent Labs  Lab Results   Component Value Date    WBC 8.4 05/17/2019    HGB 10.9 (L) 05/17/2019    HCT 34.8 05/17/2019     05/17/2019     05/17/2019    K 3.6 05/17/2019     (H) 05/17/2019    CREATININE 0.7 05/17/2019    BUN 7 (L) 05/17/2019    CO2 23 05/17/2019    GLUCOSE 74 05/17/2019    ALT 17 05/14/2019    AST 23 05/14/2019    INR 1.3 05/14/2019    TSH 17.190 (H) 05/14/2019    LABMICR 26.1 (H) 05/01/2019     Recent Labs     05/17/19  1405   MG 1.9     Lab Results   Component Value Date    CALCIUM 7.9 (L) 05/17/2019    PHOS 4.0 05/07/2019        CT Head WO Contrast   Final Result   Moderate atrophy and mild chronic microvascular ischemic disease. CT ABDOMEN PELVIS W IV CONTRAST Additional Contrast? None   Final Result      NO ACUTE PATHOLOGY SEEN IN ABDOMEN OR PELVIS       Grade 2 compression fracture of L3 vertebral body superior endplate   which is amenable for vertebral body augmentation      Status post hysterectomy and cholecystectomy. Coronary calcification suggesting of coronary artery disease. Other findings as described above         XR CHEST PORTABLE   Final Result   1. Stable, enlarged cardiac mediastinal silhouette with mild central   pulmonary vascular congestion and thoracic aortic vascular   calcifications. 2... Nonspecific bibasilar airspace disease, findings can be seen in   atelectasis and/or scarring, with the possibility of an early   developing infiltrate/pneumonia felt less likely but not completely   excluded. Assessment    Principal Problem:    Sepsis ([address]  Active Problems:    HTN (hypertension)    HLD (hyperlipidemia)    Obesity    Acute metabolic encephalopathy    Clostridium difficile diarrhea  Resolved Problems:    * No resolved hospital problems.  *      Plan:      UCx yeast  BCx NGTD  Monitor labs  C diff toxin +  Cont PO vanc  D/C IVF  Replace K  ID Consult appreciated   Monitor labs closely  seroquel and geodon for agitation  PT/OT  DVT PPx  DC planning -- SNF soon        Electronically signed by Mahogany Perkins MD on 5/18/2019 at 10:51 AM

## 2019-05-18 NOTE — PLAN OF CARE
Problem: Tissue Perfusion, Altered:  Goal: Circulatory function within specified parameters  Description  Circulatory function within specified parameters  5/18/2019 0629 by Eliane Gill RN  Outcome: Met This Shift     Problem: Venous Thromboembolism:  Goal: Will show no signs or symptoms of venous thromboembolism  Description  Will show no signs or symptoms of venous thromboembolism  5/18/2019 0629 by Eliane Gill RN  Outcome: Met This Shift  5/18/2019 0148 by Luz Marina Garcia RN  Outcome: Met This Shift  Goal: Absence of signs or symptoms of impaired coagulation  Description  Absence of signs or symptoms of impaired coagulation  5/18/2019 0629 by Eliane Gill RN  Outcome: Met This Shift  5/18/2019 0148 by Luz Marina Garcia RN  Outcome: Met This Shift     Problem: Musculor/Skeletal Functional Status  Goal: Highest potential functional level  5/18/2019 1100 by Juan Anton RN  Outcome: Met This Shift  5/18/2019 0629 by Eliane Gill RN  Outcome: Met This Shift  Goal: Absence of falls  5/18/2019 1100 by Juan Anton RN  Outcome: Met This Shift  5/18/2019 0629 by Eliane Gill RN  Outcome: Met This Shift  5/18/2019 0148 by Luz Marina Garcia RN  Outcome: Met This Shift

## 2019-05-18 NOTE — PLAN OF CARE
Problem: Falls - Risk of:  Goal: Will remain free from falls  Description  Will remain free from falls  5/18/2019 0148 by Nikki Tucker RN  Outcome: Met This Shift  5/17/2019 1620 by Ap Donald RN  Outcome: Met This Shift  Goal: Absence of physical injury  Description  Absence of physical injury  Outcome: Met This Shift     Problem: Risk for Impaired Skin Integrity  Goal: Tissue integrity - skin and mucous membranes  Description  Structural intactness and normal physiological function of skin and  mucous membranes.   5/17/2019 1620 by Ap Donald RN  Outcome: Met This Shift     Problem: Gas Exchange - Impaired:  Goal: Levels of oxygenation will improve  Description  Levels of oxygenation will improve  5/18/2019 0148 by Nikki Tucker RN  Outcome: Met This Shift  5/17/2019 1620 by Ap Donald RN  Outcome: Met This Shift     Problem: Serum Glucose Level - Abnormal:  Goal: Ability to maintain appropriate glucose levels will improve  Description  Ability to maintain appropriate glucose levels will improve  Outcome: Met This Shift     Problem: Venous Thromboembolism:  Goal: Will show no signs or symptoms of venous thromboembolism  Description  Will show no signs or symptoms of venous thromboembolism  Outcome: Met This Shift  Goal: Absence of signs or symptoms of impaired coagulation  Description  Absence of signs or symptoms of impaired coagulation  Outcome: Met This Shift     Problem: Musculor/Skeletal Functional Status  Goal: Absence of falls  Outcome: Met This Shift

## 2019-05-19 LAB
ANION GAP SERPL CALCULATED.3IONS-SCNC: 13 MMOL/L (ref 7–16)
BLOOD CULTURE, ROUTINE: NORMAL
BUN BLDV-MCNC: 5 MG/DL (ref 8–23)
CALCIUM SERPL-MCNC: 8.6 MG/DL (ref 8.6–10.2)
CHLORIDE BLD-SCNC: 108 MMOL/L (ref 98–107)
CO2: 22 MMOL/L (ref 22–29)
CREAT SERPL-MCNC: 0.7 MG/DL (ref 0.5–1)
CULTURE, BLOOD 2: NORMAL
GFR AFRICAN AMERICAN: >60
GFR NON-AFRICAN AMERICAN: >60 ML/MIN/1.73
GLUCOSE BLD-MCNC: 71 MG/DL (ref 74–99)
POTASSIUM SERPL-SCNC: 3.7 MMOL/L (ref 3.5–5)
SODIUM BLD-SCNC: 143 MMOL/L (ref 132–146)

## 2019-05-19 PROCEDURE — 6360000002 HC RX W HCPCS: Performed by: INTERNAL MEDICINE

## 2019-05-19 PROCEDURE — 6370000000 HC RX 637 (ALT 250 FOR IP): Performed by: INTERNAL MEDICINE

## 2019-05-19 PROCEDURE — 80048 BASIC METABOLIC PNL TOTAL CA: CPT

## 2019-05-19 PROCEDURE — 36415 COLL VENOUS BLD VENIPUNCTURE: CPT

## 2019-05-19 PROCEDURE — 2580000003 HC RX 258: Performed by: RADIOLOGY

## 2019-05-19 PROCEDURE — 2060000000 HC ICU INTERMEDIATE R&B

## 2019-05-19 RX ORDER — FLUCONAZOLE 200 MG/1
200 TABLET ORAL DAILY
Qty: 3 TABLET | Refills: 0 | Status: SHIPPED | OUTPATIENT
Start: 2019-05-20 | End: 2019-05-23

## 2019-05-19 RX ADMIN — LOSARTAN POTASSIUM 50 MG: 50 TABLET, FILM COATED ORAL at 10:00

## 2019-05-19 RX ADMIN — FAMOTIDINE 20 MG: 20 TABLET ORAL at 10:07

## 2019-05-19 RX ADMIN — QUETIAPINE FUMARATE 25 MG: 25 TABLET ORAL at 19:37

## 2019-05-19 RX ADMIN — NYSTATIN 500000 UNITS: 100000 SUSPENSION ORAL at 19:37

## 2019-05-19 RX ADMIN — Medication 125 MG: at 15:37

## 2019-05-19 RX ADMIN — ENOXAPARIN SODIUM 40 MG: 40 INJECTION SUBCUTANEOUS at 10:00

## 2019-05-19 RX ADMIN — FAMOTIDINE 20 MG: 20 TABLET ORAL at 19:37

## 2019-05-19 RX ADMIN — LEVOTHYROXINE SODIUM 112 MCG: 112 TABLET ORAL at 06:23

## 2019-05-19 RX ADMIN — Medication 125 MG: at 03:41

## 2019-05-19 RX ADMIN — NYSTATIN 500000 UNITS: 100000 SUSPENSION ORAL at 10:00

## 2019-05-19 RX ADMIN — Medication 10 ML: at 19:38

## 2019-05-19 RX ADMIN — Medication 125 MG: at 10:01

## 2019-05-19 RX ADMIN — Medication 125 MG: at 21:29

## 2019-05-19 RX ADMIN — ATENOLOL 25 MG: 25 TABLET ORAL at 19:38

## 2019-05-19 RX ADMIN — PROBENECID 500 MG: 500 TABLET, FILM COATED ORAL at 19:36

## 2019-05-19 RX ADMIN — ATENOLOL 50 MG: 25 TABLET ORAL at 10:00

## 2019-05-19 RX ADMIN — ASPIRIN 81 MG 81 MG: 81 TABLET ORAL at 10:01

## 2019-05-19 RX ADMIN — LIOTHYRONINE SODIUM 5 MCG: 5 TABLET ORAL at 10:01

## 2019-05-19 RX ADMIN — PROBENECID 500 MG: 500 TABLET, FILM COATED ORAL at 10:00

## 2019-05-19 RX ADMIN — DOCUSATE SODIUM 100 MG: 100 CAPSULE, LIQUID FILLED ORAL at 19:37

## 2019-05-19 RX ADMIN — PRAVASTATIN SODIUM 40 MG: 20 TABLET ORAL at 19:36

## 2019-05-19 RX ADMIN — FLUCONAZOLE 200 MG: 100 TABLET ORAL at 10:00

## 2019-05-19 ASSESSMENT — PAIN SCALES - GENERAL
PAINLEVEL_OUTOF10: 5
PAINLEVEL_OUTOF10: 0

## 2019-05-19 NOTE — PROGRESS NOTES
Subjective:  Feeling better No CP, SOB, F, V, D, P   Less  confused  Objective:    /68   Pulse 80   Temp 97.6 °F (36.4 °C) (Temporal)   Resp 16   Ht 5' 6\" (1.676 m)   Wt 232 lb 3.2 oz (105.3 kg)   SpO2 96%   BMI 37.48 kg/m²     24HR INTAKE/OUTPUT:      Intake/Output Summary (Last 24 hours) at 5/19/2019 1249  Last data filed at 5/19/2019 1023  Gross per 24 hour   Intake 180 ml   Output --   Net 180 ml     nad aox3  Heart:  RRR, no murmurs, gallops, or rubs. Lungs:  CTA bilaterally, no wheeze, rales or rhonchi  Abd: bowel sounds present, nontender, nondistended, no masses  Extrem:  No clubbing, cyanosis, or edema    Most Recent Labs  Lab Results   Component Value Date    WBC 8.4 05/17/2019    HGB 10.9 (L) 05/17/2019    HCT 34.8 05/17/2019     05/17/2019     05/19/2019    K 3.7 05/19/2019     (H) 05/19/2019    CREATININE 0.7 05/19/2019    BUN 5 (L) 05/19/2019    CO2 22 05/19/2019    GLUCOSE 71 (L) 05/19/2019    ALT 17 05/14/2019    AST 23 05/14/2019    INR 1.3 05/14/2019    TSH 17.190 (H) 05/14/2019    LABMICR 26.1 (H) 05/01/2019     Recent Labs     05/17/19  1405   MG 1.9     Lab Results   Component Value Date    CALCIUM 8.6 05/19/2019    PHOS 4.0 05/07/2019        CT Head WO Contrast   Final Result   Moderate atrophy and mild chronic microvascular ischemic disease. CT ABDOMEN PELVIS W IV CONTRAST Additional Contrast? None   Final Result      NO ACUTE PATHOLOGY SEEN IN ABDOMEN OR PELVIS       Grade 2 compression fracture of L3 vertebral body superior endplate   which is amenable for vertebral body augmentation      Status post hysterectomy and cholecystectomy. Coronary calcification suggesting of coronary artery disease. Other findings as described above         XR CHEST PORTABLE   Final Result   1. Stable, enlarged cardiac mediastinal silhouette with mild central   pulmonary vascular congestion and thoracic aortic vascular   calcifications. 2...  Nonspecific bibasilar

## 2019-05-19 NOTE — PROGRESS NOTES
CC- ams    Subjective:    Pt much improved today  Answered all questions  No more loose stools  Objective:    Vitals:    05/19/19 0930   BP: 138/68   Pulse: 80   Resp: 16   Temp: 97.6 °F (36.4 °C)   SpO2: 96%       General Appearance:  confused   HEENT:    Normocephalic,PERRL,neck supple, no JVD, mucosa moist, no thrush   Lungs:     Clear to auscultation bilaterally, no wheeze , crackles   Heart:    Regular rate and rhythm, no murmur   Abdomen:     Soft, non-tender, not distended  bowel sounds present,   Extremities:   No edema,no open wound,no erythema, non  tender   Skin:   no rashes or lesions   CBC+dif:  WBC- 8     Radiology:  Noted     Microbiology:  Pending     Assessment:  · Acute encephalopathy likely from therapy with carbapenem vs infectious etiology- off meropenem  · ESBL E coli complicated UTI finished treatment  · Leucocytosis and lactic acidosis- IMPROVED  · Candiduria and possible candida esophagitis  · Cdiff colitis     Plan:    Back to baseline, no more loose stools  · Diflucan and oral nystatin   · On PO vancomycin , complete total 14 day course  · Ok fOR D/C from ID standpoint            Electronically signed by Harini Shepard MD on 5/19/2019 at 11:17 AM

## 2019-05-19 NOTE — PLAN OF CARE
Problem: Falls - Risk of:  Goal: Will remain free from falls  Description  Will remain free from falls  Outcome: Met This Shift  Goal: Absence of physical injury  Description  Absence of physical injury  5/19/2019 1230 by Salud Carr RN  Outcome: Met This Shift  5/19/2019 0312 by Elvi Sanchez RN  Outcome: Met This Shift     Problem: Risk for Impaired Skin Integrity  Goal: Tissue integrity - skin and mucous membranes  Description  Structural intactness and normal physiological function of skin and  mucous membranes.   5/19/2019 1230 by Salud Carr RN  Outcome: Met This Shift  5/19/2019 0312 by Elvi Sanchez RN  Outcome: Met This Shift     Problem: Gas Exchange - Impaired:  Goal: Levels of oxygenation will improve  Description  Levels of oxygenation will improve  Outcome: Met This Shift     Problem: Infection, Septic Shock:  Goal: Will show no infection signs and symptoms  Description  Will show no infection signs and symptoms  5/19/2019 0312 by Elvi Sanchez RN  Outcome: Met This Shift     Problem: Tissue Perfusion, Altered:  Goal: Circulatory function within specified parameters  Description  Circulatory function within specified parameters  5/19/2019 0312 by Elvi Sanchez RN  Outcome: Met This Shift

## 2019-05-20 VITALS
SYSTOLIC BLOOD PRESSURE: 150 MMHG | BODY MASS INDEX: 37.17 KG/M2 | HEIGHT: 66 IN | TEMPERATURE: 97.9 F | RESPIRATION RATE: 18 BRPM | HEART RATE: 86 BPM | OXYGEN SATURATION: 97 % | DIASTOLIC BLOOD PRESSURE: 75 MMHG | WEIGHT: 231.3 LBS

## 2019-05-20 PROCEDURE — 97530 THERAPEUTIC ACTIVITIES: CPT

## 2019-05-20 PROCEDURE — 97535 SELF CARE MNGMENT TRAINING: CPT

## 2019-05-20 PROCEDURE — 6360000002 HC RX W HCPCS: Performed by: INTERNAL MEDICINE

## 2019-05-20 PROCEDURE — 6370000000 HC RX 637 (ALT 250 FOR IP): Performed by: INTERNAL MEDICINE

## 2019-05-20 RX ORDER — QUETIAPINE FUMARATE 25 MG/1
12.5 TABLET, FILM COATED ORAL EVERY 6 HOURS PRN
Qty: 60 TABLET | Refills: 3 | DISCHARGE
Start: 2019-05-20 | End: 2019-07-30

## 2019-05-20 RX ORDER — QUETIAPINE FUMARATE 25 MG/1
25 TABLET, FILM COATED ORAL NIGHTLY
Qty: 60 TABLET | Refills: 3 | DISCHARGE
Start: 2019-05-20 | End: 2019-07-30

## 2019-05-20 RX ADMIN — LOSARTAN POTASSIUM 50 MG: 50 TABLET, FILM COATED ORAL at 08:47

## 2019-05-20 RX ADMIN — DOCUSATE SODIUM 100 MG: 100 CAPSULE, LIQUID FILLED ORAL at 08:47

## 2019-05-20 RX ADMIN — LEVOTHYROXINE SODIUM 112 MCG: 112 TABLET ORAL at 06:32

## 2019-05-20 RX ADMIN — PROBENECID 500 MG: 500 TABLET, FILM COATED ORAL at 08:48

## 2019-05-20 RX ADMIN — Medication 125 MG: at 04:00

## 2019-05-20 RX ADMIN — ENOXAPARIN SODIUM 40 MG: 40 INJECTION SUBCUTANEOUS at 08:48

## 2019-05-20 RX ADMIN — LIOTHYRONINE SODIUM 5 MCG: 5 TABLET ORAL at 08:47

## 2019-05-20 RX ADMIN — Medication 125 MG: at 09:39

## 2019-05-20 RX ADMIN — FLUCONAZOLE 200 MG: 100 TABLET ORAL at 08:47

## 2019-05-20 RX ADMIN — ATENOLOL 50 MG: 25 TABLET ORAL at 08:48

## 2019-05-20 RX ADMIN — ASPIRIN 81 MG 81 MG: 81 TABLET ORAL at 08:47

## 2019-05-20 RX ADMIN — FAMOTIDINE 20 MG: 20 TABLET ORAL at 08:47

## 2019-05-20 ASSESSMENT — PAIN DESCRIPTION - PROGRESSION: CLINICAL_PROGRESSION: NOT CHANGED

## 2019-05-20 ASSESSMENT — PAIN DESCRIPTION - DESCRIPTORS: DESCRIPTORS: ACHING;DISCOMFORT;SORE

## 2019-05-20 ASSESSMENT — PAIN DESCRIPTION - LOCATION: LOCATION: BACK

## 2019-05-20 ASSESSMENT — PAIN DESCRIPTION - ORIENTATION: ORIENTATION: LOWER

## 2019-05-20 ASSESSMENT — PAIN - FUNCTIONAL ASSESSMENT: PAIN_FUNCTIONAL_ASSESSMENT: ACTIVITIES ARE NOT PREVENTED

## 2019-05-20 ASSESSMENT — PAIN DESCRIPTION - ONSET: ONSET: ON-GOING

## 2019-05-20 ASSESSMENT — PAIN DESCRIPTION - PAIN TYPE: TYPE: CHRONIC PAIN

## 2019-05-20 ASSESSMENT — PAIN DESCRIPTION - FREQUENCY: FREQUENCY: INTERMITTENT

## 2019-05-20 ASSESSMENT — PAIN SCALES - GENERAL: PAINLEVEL_OUTOF10: 5

## 2019-05-20 NOTE — PROGRESS NOTES
Subjective:  Feeling better No CP, SOB, F, V, D, P   Less  confused  Objective:    BP (!) 150/75   Pulse 86   Temp 97.9 °F (36.6 °C) (Temporal)   Resp 18   Ht 5' 6\" (1.676 m)   Wt 231 lb 4.8 oz (104.9 kg)   SpO2 97%   BMI 37.33 kg/m²     24HR INTAKE/OUTPUT:      Intake/Output Summary (Last 24 hours) at 5/20/2019 1659  Last data filed at 5/20/2019 0356  Gross per 24 hour   Intake 420 ml   Output --   Net 420 ml     nad aox3  Heart:  RRR, no murmurs, gallops, or rubs. Lungs:  CTA bilaterally, no wheeze, rales or rhonchi  Abd: bowel sounds present, nontender, nondistended, no masses  Extrem:  No clubbing, cyanosis, or edema    Most Recent Labs  Lab Results   Component Value Date    WBC 8.4 05/17/2019    HGB 10.9 (L) 05/17/2019    HCT 34.8 05/17/2019     05/17/2019     05/19/2019    K 3.7 05/19/2019     (H) 05/19/2019    CREATININE 0.7 05/19/2019    BUN 5 (L) 05/19/2019    CO2 22 05/19/2019    GLUCOSE 71 (L) 05/19/2019    ALT 17 05/14/2019    AST 23 05/14/2019    INR 1.3 05/14/2019    TSH 17.190 (H) 05/14/2019    LABMICR 26.1 (H) 05/01/2019     Recent Labs     05/17/19  1405   MG 1.9     Lab Results   Component Value Date    CALCIUM 8.6 05/19/2019    PHOS 4.0 05/07/2019        CT Head WO Contrast   Final Result   Moderate atrophy and mild chronic microvascular ischemic disease. CT ABDOMEN PELVIS W IV CONTRAST Additional Contrast? None   Final Result      NO ACUTE PATHOLOGY SEEN IN ABDOMEN OR PELVIS       Grade 2 compression fracture of L3 vertebral body superior endplate   which is amenable for vertebral body augmentation      Status post hysterectomy and cholecystectomy. Coronary calcification suggesting of coronary artery disease. Other findings as described above         XR CHEST PORTABLE   Final Result   1. Stable, enlarged cardiac mediastinal silhouette with mild central   pulmonary vascular congestion and thoracic aortic vascular   calcifications. 2...  Nonspecific bibasilar airspace disease, findings can be seen in   atelectasis and/or scarring, with the possibility of an early   developing infiltrate/pneumonia felt less likely but not completely   excluded. Assessment    Principal Problem:    Sepsis ([address]  Active Problems:    HTN (hypertension)    HLD (hyperlipidemia)    Obesity    Acute metabolic encephalopathy    Clostridium difficile diarrhea  Resolved Problems:    * No resolved hospital problems.  *      Plan:      UCx yeast  BCx NGTD  Monitor labs  C diff toxin +  Cont PO vanc  D/C IVF  Replace K  ID Consult appreciated -- diflucan/ nystatin  Monitor labs closely  seroquel and geodon for agitation  PT/OT  DVT PPx  DC planning -- SNF today        Electronically signed by Esperanza Duke MD on 5/20/2019 at 9:22 AM

## 2019-05-20 NOTE — CARE COORDINATION
Patient to return to [facility] skilled today at [address]. Nurse notified. Message left for spouse on machine. Ambulance and envelope on soft chart.

## 2019-05-20 NOTE — DISCHARGE INSTR - COC
Continuity of Care Form    Patient Name: Lorenza Matthews   :  1945  MRN:  [id number]    Admit date:  2019  Discharge date:  19      Code Status Order: Full Code   Advance Directives:   [facility] Documentation     Date/Time Healthcare Directive Type of Healthcare Directive Copy in [address] Agent's Name Healthcare Agent's Phone Number    19 3007  Yes, patient has an advance directive for healthcare treatment  Durable power of  for health care;Living will  No, copy requested from family  Spouse  Maritza Michel  [phone]          Admitting Physician:  Eugena Cheadle, MD  PCP: [employer], DO    Discharging Nurse: [address] Unit/Room#: 3991/3693-U  Discharging Unit Phone Number: [phone]      Emergency Contact:   Extended Emergency Contact Information  Primary Emergency Contact: aDt Francis  Address: [address] Phone: [phone]  Relation: Spouse  [facility] [address] [facility] Phone: [phone]  Mobile Phone: [phone]  Relation: Child    Past Surgical History:  Past Surgical History:   Procedure Laterality Date    BREAST SURGERY Right [address]       Immunization History: There is no immunization history on file for this patient. Active Problems:  Patient Active Problem List   Diagnosis Code    Right lower quadrant pain R10.31    Pneumatosis coli K63.89    Troponin level elevated R74.8    Sepsis ([address] A41.9    HTN (hypertension) I10    HLD (hyperlipidemia) E78.5    Obesity E66.9    Acute metabolic encephalopathy J73.95    Clostridium difficile diarrhea A04.72       Isolation/Infection:   Isolation          Contact        Patient Infection Status     Infection Encounter Level?  Onset Date Added Added By Resolved Resolved By Review Date    C-diff (Clostridium difficile) Yes 05/15/19 05/16/19 CLOSTRIDIUM DIFFICILE EIA   05/23/19    ESBL (Extended Spectrum Beta Lactamase) No  06/07/17 Culture Blood #1       E Coli Urine 4/30/19, 06/05/2017  E. Coli blood 5/1/19, 4/30/19          Nurse Assessment:  Last Vital Signs: BP (!) 150/75   Pulse 86   Temp 97.9 °F (36.6 °C) (Temporal)   Resp 18   Ht 5' 6\" (1.676 m)   Wt 231 lb 4.8 oz (104.9 kg)   SpO2 97%   BMI 37.33 kg/m²     Last documented pain score (0-10 scale): Pain Level: 5  Last Weight:   Wt Readings from Last 1 Encounters:   05/20/19 231 lb 4.8 oz (104.9 kg)     Mental Status:  disoriented    IV Access:  - None    Nursing Mobility/ADLs:  Walking   Dependent  Transfer  Dependent  Bathing  Dependent  Dressing  Dependent  Toileting  Dependent  Feeding  Assisted  Med Admin  Dependent  Med Delivery   whole    Wound Care Documentation and Therapy:        Elimination:  Continence:   · Bowel: No  · Bladder: No  Urinary Catheter: None   Colostomy/Ileostomy/Ileal Conduit: No       Date of Last BM: 5-20-19    Intake/Output Summary (Last 24 hours) at 5/20/2019 0925  Last data filed at 5/20/2019 0356  Gross per 24 hour   Intake 420 ml   Output --   Net 420 ml     I/O last 3 completed shifts: In: 5 [P.O.:420]  Out: -     Safety Concerns: At Risk for Falls    Impairments/Disabilities:      None    Nutrition Therapy:  Current Nutrition Therapy:   - Oral Diet:  General    Routes of Feeding: Oral  Liquids: No Restrictions  Daily Fluid Restriction: no  Last Modified Barium Swallow with Video (Video Swallowing Test): not done    Treatments at the Time of Hospital Discharge:   Respiratory Treatments:     Oxygen Therapy:  is not on home oxygen therapy.   Ventilator:    - No ventilator support    Rehab Therapies: Physical Therapy and Occupational Therapy  Weight Bearing Status/Restrictions: No weight bearing restirctions  Other Medical Equipment (for information

## 2019-05-20 NOTE — DISCHARGE SUMMARY
Physician Discharge Summary     Patient ID:  Kelly Boston  [id number]  86 y.o.  1945    Admit date: 5/14/2019    Discharge date and time:  5/20/2019    Admission Diagnoses:   Patient Active Problem List   Diagnosis    Right lower quadrant pain    Pneumatosis coli    Troponin level elevated    [address]is ([facility]    HTN (hypertension)    HLD (hyperlipidemia)    Obesity    Acute metabolic encephalopathy    Clostridium difficile diarrhea       Discharge Diagnoses: Principal Problem:    Sepsis ([facility]  Active Problems:    HTN (hypertension)    HLD (hyperlipidemia)    Obesity    Acute metabolic encephalopathy    Clostridium difficile diarrhea  Resolved Problems:    * No resolved hospital problems. *    · + Candiduria and possible candida esophagitis per ID          Consults: IP CONSULT TO INTERNAL MEDICINE  IP CONSULT TO SOCIAL WORK  IP CONSULT TO INFECTIOUS DISEASES    Procedures: na    Hospital Course: adm w AMS. Dx and treated for Cdiff causing Encephalopathy. MS improved but not to baseline. Stable for D/C to SNF. Discharge Exam:  See progress note from today    Condition:  Stable    Disposition: SNF    Patient Instructions:   Current Discharge Medication List      START taking these medications    Details   !! QUEtiapine (SEROQUEL) 25 MG tablet Take 1 tablet by mouth nightly  Qty: 60 tablet, Refills: 3      !! QUEtiapine (SEROQUEL) 25 MG tablet Take 0.5 tablets by mouth every 6 hours as needed for Agitation  Qty: 60 tablet, Refills: 3      nystatin (MYCOSTATIN) 958980 UNIT/ML suspension Take 5 mLs by mouth 4 times daily      fluconazole (DIFLUCAN) 200 MG tablet Take 1 tablet by mouth daily for 3 days  Qty: 3 tablet, Refills: 0      vancomycin (VANCOCIN) 50 mg/mL oral solution Take 2.5 mLs by mouth every 6 hours for 10 days  Qty: 100 mL, Refills: 0       !! - Potential duplicate medications found. Please discuss with provider.       CONTINUE these medications which have NOT CHANGED    Details liothyronine (CYTOMEL) 5 MCG tablet Comments:   Reason for Stopping:             Activity: activity as tolerated  Diet: regular diet    Follow-up with PCP, ID    Note that over 30 minutes was spent in preparing discharge papers, discussing discharge with patient, staff, consultants, medication review, arranging follow up, etc.    Signed:  Esperanza Duke MD  5/20/2019  12:03 PM

## 2019-05-20 NOTE — PROGRESS NOTES
OT BEDSIDE TREATMENT NOTE      Date:2019  Patient Name: Peter Arredondo  MRN: [id number]  : 1945  Room: [address] [place], [place] [address]     Evaluating OT: SALLY Smith, OTR/L [id number]     AM-PAC Daily Activity Raw Score:    Recommended Adaptive Equipment: TBD      Diagnosis: sepsis   Surgery: n/a   Pertinent Medical History: HTN   Precautions:  Falls, TSM, contact isoloation    Per OT Eval:   Home Living: Pt is from SNF. She is a questionable historian because she reports living with her family prior and that she has to arrange  arrangements for her brother.       Prior Level of Function: assist with ADLs; using ww for functional mobility     Pain: Pt c/o pain in B LE's, did not rate. Cognition:  A&O: 2/4; Follows 1 step directions with min cues. Confusion noted throughout treatment. Memory:  fair               Sequencing:  fair               Problem solving:  fair               Judgement/safety:  fair     Functional Assessment:    Initial Eval Status  Date: 19 Treatment Status  Date:  19 Short Term Goals  Treatment frequency: PRN    Feeding SBA  Per Eval  Will continue to assess. Mod I   Grooming SBA (sitting EOB)  Min A  Seated at EOB pt required assist for dynamic sitting balance/safety while combing hair. Mod I   UB Dressing Mod A (limited due to ROM)   Mod A  To adjust hospital gown over B shoulders and around trunk and for dynamic sitting balance/safety. Min cues for sequencing and safety required. Min A   LB Dressing Max A (pt unable to mina B socks sitting EOB)  Max  To don socks seated at EOB. Mod A    Bathing NT  NT Min A    Toileting Max A (pt incontinent, assist with bed pan and tim care, increased time)  Max A  Pt incontinent of bladder in standing x2. Pt required assist to complete personal hygiene and linen changes. Mod A   Bed Mobility  Log roll: Mod A  Supine to sit: Mod A   Sit to supine: Mod A   Rolling:  Mod A  Supine<>Sit: Max A x2  Max cues for sequencing and safety required during supine<>sit transfers. Log roll Min A  Supine to sit: Min A   Sit to supine: Min A   Functional Transfers Sit to stand:Min A (5x)  Stand to sit:Min A  Commode: NT (attempted 5x)  Sit<>Stand: Max A  Completed x2 reps with mod cues for hand placement/safety. SBA   Functional Mobility NT (due to incontinence, pt able to take 3 side steps to [facility] Mod A) NT  Min A   Balance Sitting:     Static:  Min A (cues for posterior lean, progressed to SBA)    Dynamic:Min A  Standing: Mod A Sitting:     Static: Min A    Dynamic:Min A to Max A    Standing: Max A    Pt tolerated sitting at EOB x10 minutes with fair tolerance. Pt demo'd occasional posterior LOB d/t pain requiring Max A to recover. Pt tolerated standing x2 reps <1 minute d/t c/o pain.     Activity Tolerance Fair-(limited due to incontinence with sit-stands)  Fair-  Limited by pain.     Visual/  Perceptual Glasses: for reading                       Comments: Upon arrival pt in supine with HOB elevated. Pt educated on transfer safety, body mechanics, adaptive techniques for ADL tasks, posture, safety and benefits of increasing activity. Pt verbalized/demonstrated limited understanding, recommend continued education. At end of session pt returned to supine with HOB elevated with all lines and tubes intact,  call light within reach and bed alarm activated. · Pt has made fair progress towards set goals.    · Continue with current plan of care    Time in: 1115  Time out:1140  Total Tx Time: 22 Minutes    Angela Yin OSMAN/L [address] [facility], [facility], OTR/L [id number]

## 2019-05-20 NOTE — PROGRESS NOTES
CC- ams    Subjective:  No fever, confusion resolved  Diarrhea better    Objective:    Vitals:    05/20/19 0745   BP: (!) 150/75   Pulse: 86   Resp: 18   Temp: 97.9 °F (36.6 °C)   SpO2: 97%       General Appearance:  Awake alert   HEENT:    Normocephalic,PERRL,neck supple, no JVD, mucosa moist, no thrush   Lungs:     Clear to auscultation bilaterally, no wheeze , crackles   Heart:    Regular rate and rhythm, no murmur   Abdomen:     Soft, non-tender, not distended  bowel sounds present,   Extremities:   No edema,no open wound,no erythema, non  tender   Skin:   no rashes or lesions   CBC+dif:  WBC- 8     Radiology:  Noted     Microbiology:  Pending     Assessment:  · Acute encephalopathy likely from therapy with carbapenem vs infectious etiology- off meropenem  · ESBL E coli complicated UTI finished treatment  · Leucocytosis and lactic acidosis- IMPROVED  · Candiduria and possible candida esophagitis  · Uncomplicated C diff colitis     Plan:      · Diflucan and oral nystatin 3 more days  · On PO vancomycin , complete total 14 day course  · Okay for discharge            Electronically signed by Johanna Looney MD on 5/20/2019 at 12:09 PM

## 2019-05-20 NOTE — PROGRESS NOTES
Physical Therapy  Facility/Department: [facility]  Daily Treatment Note  NAME: Camden Galloway  : 1945  MRN: [id number]    Date of Service: 2019    Evaluating Therapist: Hamlet Rutherford PT, DPT     Room #: 7091S  DIAGNOSIS: sepsis  PRECAUTIONS: falls, bed alarm  Pertinent Medical History: arthritis, gout, HLD, HTN, thyroid disease, cholecystectomy, hysterectomy      Social: unable to obtain social history from pt due to AMS  Per chart pt admitted from [facility]. Pt used ww? And required ? Assistance with self care or mobility. Per 19 PT note Pt lived with her  in a 1 floor plan 2 steps and 1 rails to enter.  Bed and bath on first floor. Angela Settler is w/c bound.    Prior to admission pt walked with no device.  Pt reported owing a walker.  Pt reported she takes care of the household chores.                   Initial Evaluation  Date: 19 Treatment  Date: 19 Short Term/ Long Term   Goals   Was pt agreeable to Eval/treatment? Yes  yes     Did pt report pain?  no BLE with mobility did not quantify      Bed Mobility  Rolling: mod A   Supine to sit: mod A   Sit to supine: mod A   Scooting: mod A  Rolling: NT  Supine to sit: max A x2   Sit to supine: max A x2  Scooting: max A  Min A    Transfers Sit to stand: min A   Stand to sit: min A   Stand pivot: NT  Sit to stand: max A   Stand to sit: max A   Stand pivot: NT  Min A    Ambulation    2 lateral steps to with ww and mod A  NT 15 feet with AAD with min A   Stair negotiation: ascended and descended NT  NT NA   [facility] Raw Score           Additional Comments: Pt supine in bed upon entering room. Pt A&O to self and time but stated she was in a hotel. Pt transferred to EOB requiring assist for BLE and trunk. Upon sitting EOB pt began yelling out expressing BLE pain. Pt sat EOB approx 15 minutes with CGA to max A d/t pt leaning posteriorly sporadically. Pt performed ankle pumps and AA LAQ to BLE while seated EOB.  Pt performed STS and stood statically less than 1 minute before abruptly sitting d/t c/o pain. Pt incontinent of bladder while standing. Pt performed second rep STS while hygiene and linen change performed with pt tolerating less than 1 minute before sitting abruptly. Pt returned to supine requiring assist for BLE and trunk. Pt left supine in bed with HOB elevated to pt's tolerance, all needs met and call light within reach.        Time in: 1115  Time out: [address] [address]

## 2019-05-27 ENCOUNTER — APPOINTMENT (OUTPATIENT)
Dept: GENERAL RADIOLOGY | Age: 74
DRG: 853 | End: 2019-05-27
Payer: COMMERCIAL

## 2019-05-27 ENCOUNTER — HOSPITAL ENCOUNTER (INPATIENT)
Age: 74
LOS: 11 days | Discharge: NURSING, REHAB OR LONG-TERM CARE | DRG: 853 | End: 2019-06-07
Attending: EMERGENCY MEDICINE | Admitting: INTERNAL MEDICINE
Payer: COMMERCIAL

## 2019-05-27 DIAGNOSIS — R50.9 FEVER, UNSPECIFIED FEVER CAUSE: Primary | ICD-10-CM

## 2019-05-27 DIAGNOSIS — J18.9 PNEUMONIA DUE TO ORGANISM: ICD-10-CM

## 2019-05-27 LAB
ALBUMIN SERPL-MCNC: 2.4 G/DL (ref 3.5–5.2)
ALP BLD-CCNC: 122 U/L (ref 35–104)
ALT SERPL-CCNC: 22 U/L (ref 0–32)
ANION GAP SERPL CALCULATED.3IONS-SCNC: 14 MMOL/L (ref 7–16)
AST SERPL-CCNC: 32 U/L (ref 0–31)
BACTERIA: ABNORMAL /HPF
BILIRUB SERPL-MCNC: 0.3 MG/DL (ref 0–1.2)
BILIRUBIN URINE: NEGATIVE
BLOOD, URINE: ABNORMAL
BUN BLDV-MCNC: 32 MG/DL (ref 8–23)
CALCIUM SERPL-MCNC: 8.9 MG/DL (ref 8.6–10.2)
CASTS 2: ABNORMAL /LPF
CASTS: ABNORMAL /LPF
CHLORIDE BLD-SCNC: 102 MMOL/L (ref 98–107)
CLARITY: ABNORMAL
CO2: 22 MMOL/L (ref 22–29)
COLOR: ABNORMAL
CREAT SERPL-MCNC: 1.4 MG/DL (ref 0.5–1)
GFR AFRICAN AMERICAN: 44
GFR NON-AFRICAN AMERICAN: 37 ML/MIN/1.73
GLUCOSE BLD-MCNC: 143 MG/DL (ref 74–99)
GLUCOSE URINE: NEGATIVE MG/DL
HCT VFR BLD CALC: 30.3 % (ref 34–48)
HEMOGLOBIN: 9.7 G/DL (ref 11.5–15.5)
KETONES, URINE: NEGATIVE MG/DL
LACTIC ACID: 1.3 MMOL/L (ref 0.5–2.2)
LEUKOCYTE ESTERASE, URINE: NEGATIVE
MCH RBC QN AUTO: 29.1 PG (ref 26–35)
MCHC RBC AUTO-ENTMCNC: 32 % (ref 32–34.5)
MCV RBC AUTO: 91 FL (ref 80–99.9)
NITRITE, URINE: NEGATIVE
PDW BLD-RTO: 16.7 FL (ref 11.5–15)
PH UA: 5 (ref 5–9)
PLATELET # BLD: 143 E9/L (ref 130–450)
PMV BLD AUTO: 13.4 FL (ref 7–12)
POTASSIUM SERPL-SCNC: 3.1 MMOL/L (ref 3.5–5)
PROTEIN UA: 30 MG/DL
RBC # BLD: 3.33 E12/L (ref 3.5–5.5)
RBC UA: ABNORMAL /HPF (ref 0–2)
SODIUM BLD-SCNC: 138 MMOL/L (ref 132–146)
SPECIFIC GRAVITY UA: 1.02 (ref 1–1.03)
TOTAL PROTEIN: 6.4 G/DL (ref 6.4–8.3)
TROPONIN: 0.04 NG/ML (ref 0–0.03)
UROBILINOGEN, URINE: 0.2 E.U./DL
WBC # BLD: 11.8 E9/L (ref 4.5–11.5)
WBC UA: ABNORMAL /HPF (ref 0–5)

## 2019-05-27 PROCEDURE — 87186 SC STD MICRODIL/AGAR DIL: CPT

## 2019-05-27 PROCEDURE — 2580000003 HC RX 258: Performed by: EMERGENCY MEDICINE

## 2019-05-27 PROCEDURE — 87040 BLOOD CULTURE FOR BACTERIA: CPT

## 2019-05-27 PROCEDURE — 84484 ASSAY OF TROPONIN QUANT: CPT

## 2019-05-27 PROCEDURE — 2060000000 HC ICU INTERMEDIATE R&B

## 2019-05-27 PROCEDURE — 83605 ASSAY OF LACTIC ACID: CPT

## 2019-05-27 PROCEDURE — 87077 CULTURE AEROBIC IDENTIFY: CPT

## 2019-05-27 PROCEDURE — 85027 COMPLETE CBC AUTOMATED: CPT

## 2019-05-27 PROCEDURE — 99285 EMERGENCY DEPT VISIT HI MDM: CPT

## 2019-05-27 PROCEDURE — 87088 URINE BACTERIA CULTURE: CPT

## 2019-05-27 PROCEDURE — 81001 URINALYSIS AUTO W/SCOPE: CPT

## 2019-05-27 PROCEDURE — 80053 COMPREHEN METABOLIC PANEL: CPT

## 2019-05-27 PROCEDURE — 93010 ELECTROCARDIOGRAM REPORT: CPT | Performed by: INTERNAL MEDICINE

## 2019-05-27 PROCEDURE — 71045 X-RAY EXAM CHEST 1 VIEW: CPT

## 2019-05-27 PROCEDURE — 36415 COLL VENOUS BLD VENIPUNCTURE: CPT

## 2019-05-27 PROCEDURE — 93005 ELECTROCARDIOGRAM TRACING: CPT | Performed by: EMERGENCY MEDICINE

## 2019-05-27 RX ORDER — 0.9 % SODIUM CHLORIDE 0.9 %
1000 INTRAVENOUS SOLUTION INTRAVENOUS ONCE
Status: COMPLETED | OUTPATIENT
Start: 2019-05-27 | End: 2019-05-28

## 2019-05-27 RX ORDER — 0.9 % SODIUM CHLORIDE 0.9 %
1000 INTRAVENOUS SOLUTION INTRAVENOUS ONCE
Status: COMPLETED | OUTPATIENT
Start: 2019-05-27 | End: 2019-05-27

## 2019-05-27 RX ADMIN — SODIUM CHLORIDE 1000 ML: 9 INJECTION, SOLUTION INTRAVENOUS at 19:10

## 2019-05-27 RX ADMIN — SODIUM CHLORIDE 1000 ML: 9 INJECTION, SOLUTION INTRAVENOUS at 22:30

## 2019-05-27 NOTE — ED PROVIDER NOTES
rhythm, no murmurs, gallops, or rubs. 2+ distal pulses. Abdomen: Soft, non tender, non distended  Extremities: Moves all extremities x 4. Warm and well perfused. 2+ edema of the bilateral lower extremities  Skin: warm and dry without rash. Neurologic: GCS 15, CN 2-12 grossly intact, no focal deficits, no meningeal signs  Psych: Normal Affect.  No signs or symptoms of depression or psychosis.    -------------------------------------------------- RESULTS -------------------------------------------------  All laboratory and radiology results have been personally reviewed by myself   LABS:  Results for orders placed or performed during the hospital encounter of 05/27/19   CBC   Result Value Ref Range    WBC 11.8 (H) 4.5 - 11.5 E9/L    RBC 3.33 (L) 3.50 - 5.50 E12/L    Hemoglobin 9.7 (L) 11.5 - 15.5 g/dL    Hematocrit 30.3 (L) 34.0 - 48.0 %    MCV 91.0 80.0 - 99.9 fL    MCH 29.1 26.0 - 35.0 pg    MCHC 32.0 32.0 - 34.5 %    RDW 16.7 (H) 11.5 - 15.0 fL    Platelets 970 171 - 244 E9/L    MPV 13.4 (H) 7.0 - 12.0 fL   Comprehensive Metabolic Panel   Result Value Ref Range    Sodium 138 132 - 146 mmol/L    Potassium 3.1 (L) 3.5 - 5.0 mmol/L    Chloride 102 98 - 107 mmol/L    CO2 22 22 - 29 mmol/L    Anion Gap 14 7 - 16 mmol/L    Glucose 143 (H) 74 - 99 mg/dL    BUN 32 (H) 8 - 23 mg/dL    CREATININE 1.4 (H) 0.5 - 1.0 mg/dL    GFR Non-African American 37 >=60 mL/min/1.73    GFR African American 44     Calcium 8.9 8.6 - 10.2 mg/dL    Total Protein 6.4 6.4 - 8.3 g/dL    Alb 2.4 (L) 3.5 - 5.2 g/dL    Total Bilirubin 0.3 0.0 - 1.2 mg/dL    Alkaline Phosphatase 122 (H) 35 - 104 U/L    ALT 22 0 - 32 U/L    AST 32 (H) 0 - 31 U/L   Troponin   Result Value Ref Range    Troponin 0.04 (H) 0.00 - 0.03 ng/mL   Lactic Acid, Plasma   Result Value Ref Range    Lactic Acid 1.3 0.5 - 2.2 mmol/L   Urinalysis   Result Value Ref Range    Color, UA TODD (A) Straw/Yellow    Clarity, UA SL CLOUDY Clear    Glucose, Ur Negative Negative mg/dL 10 mL (has no administration in time range)   vancomycin (VANCOCIN) 2,000 mg in dextrose 5 % 500 mL IVPB (has no administration in time range)   0.9 % sodium chloride bolus (0 mLs Intravenous Stopped 5/27/19 2230)   0.9 % sodium chloride bolus (0 mLs Intravenous Stopped 5/28/19 0152)       Medical Decision Making:    .    Reevaluation:  Patient rechecked and made aware of findings and admission. Patient oriented ×3  Call placed to internal medicine  Counseling: The emergency provider has spoken with the patient and discussed todays results, in addition to providing specific details for the plan of care and counseling regarding the diagnosis and prognosis. Questions are answered at this time and they are agreeable with the plan.      --------------------------------- IMPRESSION AND DISPOSITION ---------------------------------    IMPRESSION  1. Fever, unspecified fever cause    2. Pneumonia due to organism        DISPOSITION  Disposition: Admit to telemetry  Patient condition is stable    SCRIBE ATTESTATION    5/27/19, 6:30 PM.    This note is prepared by Sami Leyva, acting as Scribe for Renata Barker MD.    Renata Barker MD:  The scribe's documentation has been prepared under my direction and personally reviewed by me in its entirety. I confirm that the note above accurately reflects all work, treatment, procedures, and medical decision making performed by me. NOTE: This report was edited using voice recognition software. Every effort was made to ensure accuracy; however, inadvertent computerized transcription errors may be present.             Renata Barker MD  05/27/19 [address] Fidel Lanier MD  05/28/19 0844

## 2019-05-28 ENCOUNTER — APPOINTMENT (OUTPATIENT)
Dept: ULTRASOUND IMAGING | Age: 74
DRG: 853 | End: 2019-05-28
Payer: COMMERCIAL

## 2019-05-28 ENCOUNTER — APPOINTMENT (OUTPATIENT)
Dept: CT IMAGING | Age: 74
DRG: 853 | End: 2019-05-28
Payer: COMMERCIAL

## 2019-05-28 PROBLEM — N30.00 ACUTE CYSTITIS: Status: ACTIVE | Noted: 2019-05-28

## 2019-05-28 LAB
ALBUMIN SERPL-MCNC: 2.2 G/DL (ref 3.5–5.2)
ALP BLD-CCNC: 120 U/L (ref 35–104)
ALT SERPL-CCNC: 23 U/L (ref 0–32)
ANION GAP SERPL CALCULATED.3IONS-SCNC: 13 MMOL/L (ref 7–16)
AST SERPL-CCNC: 32 U/L (ref 0–31)
B.E.: -3.1 MMOL/L (ref -3–3)
BASOPHILS ABSOLUTE: 0.03 E9/L (ref 0–0.2)
BASOPHILS RELATIVE PERCENT: 0.3 % (ref 0–2)
BILIRUB SERPL-MCNC: 0.5 MG/DL (ref 0–1.2)
BUN BLDV-MCNC: 24 MG/DL (ref 8–23)
CALCIUM SERPL-MCNC: 8.7 MG/DL (ref 8.6–10.2)
CHLORIDE BLD-SCNC: 100 MMOL/L (ref 98–107)
CK MB: 2 NG/ML (ref 0–4.3)
CO2: 21 MMOL/L (ref 22–29)
COHB: 0.8 % (ref 0–1.5)
CREAT SERPL-MCNC: 1.2 MG/DL (ref 0.5–1)
CRITICAL: ABNORMAL
DATE ANALYZED: ABNORMAL
DATE OF COLLECTION: ABNORMAL
EKG ATRIAL RATE: 102 BPM
EKG P AXIS: 21 DEGREES
EKG P-R INTERVAL: 146 MS
EKG Q-T INTERVAL: 350 MS
EKG QRS DURATION: 86 MS
EKG QTC CALCULATION (BAZETT): 456 MS
EKG R AXIS: 49 DEGREES
EKG T AXIS: 24 DEGREES
EKG VENTRICULAR RATE: 102 BPM
EOSINOPHILS ABSOLUTE: 0.02 E9/L (ref 0.05–0.5)
EOSINOPHILS RELATIVE PERCENT: 0.2 % (ref 0–6)
GFR AFRICAN AMERICAN: 53
GFR NON-AFRICAN AMERICAN: 44 ML/MIN/1.73
GLUCOSE BLD-MCNC: 250 MG/DL (ref 74–99)
HCO3: 20 MMOL/L (ref 22–26)
HCT VFR BLD CALC: 34.2 % (ref 34–48)
HEMOGLOBIN: 10.9 G/DL (ref 11.5–15.5)
HHB: 6.1 % (ref 0–5)
IMMATURE GRANULOCYTES #: 0.12 E9/L
IMMATURE GRANULOCYTES %: 1.3 % (ref 0–5)
LAB: ABNORMAL
LACTIC ACID: 2.4 MMOL/L (ref 0.5–2.2)
LYMPHOCYTES ABSOLUTE: 0.68 E9/L (ref 1.5–4)
LYMPHOCYTES RELATIVE PERCENT: 7.4 % (ref 20–42)
Lab: ABNORMAL
MAGNESIUM: 1.7 MG/DL (ref 1.6–2.6)
MCH RBC QN AUTO: 29.4 PG (ref 26–35)
MCHC RBC AUTO-ENTMCNC: 31.9 % (ref 32–34.5)
MCV RBC AUTO: 92.2 FL (ref 80–99.9)
METER GLUCOSE: 67 MG/DL (ref 74–99)
METER GLUCOSE: 95 MG/DL (ref 74–99)
METER GLUCOSE: 97 MG/DL (ref 74–99)
METHB: 0.5 % (ref 0–1.5)
MODE: ABNORMAL
MONOCYTES ABSOLUTE: 0.38 E9/L (ref 0.1–0.95)
MONOCYTES RELATIVE PERCENT: 4.1 % (ref 2–12)
NEUTROPHILS ABSOLUTE: 8 E9/L (ref 1.8–7.3)
NEUTROPHILS RELATIVE PERCENT: 86.7 % (ref 43–80)
O2 SATURATION: 93.8 % (ref 92–98.5)
O2HB: 92.6 % (ref 94–97)
OPERATOR ID: 2156
PATIENT TEMP: 37 C
PCO2: 29.7 MMHG (ref 35–45)
PDW BLD-RTO: 16.6 FL (ref 11.5–15)
PH BLOOD GAS: 7.45 (ref 7.35–7.45)
PHOSPHORUS: 2.4 MG/DL (ref 2.5–4.5)
PLATELET # BLD: 162 E9/L (ref 130–450)
PMV BLD AUTO: 12.8 FL (ref 7–12)
PO2: 68.6 MMHG (ref 60–100)
POTASSIUM SERPL-SCNC: 3.17 MMOL/L (ref 3.3–5.1)
POTASSIUM SERPL-SCNC: 3.5 MMOL/L (ref 3.5–5)
POTASSIUM SERPL-SCNC: 3.5 MMOL/L (ref 3.5–5)
PROCALCITONIN: 1.46 NG/ML (ref 0–0.08)
RBC # BLD: 3.71 E12/L (ref 3.5–5.5)
SODIUM BLD-SCNC: 134 MMOL/L (ref 132–146)
SOURCE, BLOOD GAS: ABNORMAL
THB: 11.5 G/DL (ref 11.5–16.5)
TIME ANALYZED: 1805
TOTAL CK: 47 U/L (ref 20–180)
TOTAL PROTEIN: 7.3 G/DL (ref 6.4–8.3)
TROPONIN: 0.04 NG/ML (ref 0–0.03)
WBC # BLD: 9.2 E9/L (ref 4.5–11.5)

## 2019-05-28 PROCEDURE — 82805 BLOOD GASES W/O2 SATURATION: CPT

## 2019-05-28 PROCEDURE — 83605 ASSAY OF LACTIC ACID: CPT

## 2019-05-28 PROCEDURE — 2060000000 HC ICU INTERMEDIATE R&B

## 2019-05-28 PROCEDURE — 6360000002 HC RX W HCPCS: Performed by: EMERGENCY MEDICINE

## 2019-05-28 PROCEDURE — 82550 ASSAY OF CK (CPK): CPT

## 2019-05-28 PROCEDURE — 93971 EXTREMITY STUDY: CPT

## 2019-05-28 PROCEDURE — 82962 GLUCOSE BLOOD TEST: CPT

## 2019-05-28 PROCEDURE — 74176 CT ABD & PELVIS W/O CONTRAST: CPT

## 2019-05-28 PROCEDURE — 84145 PROCALCITONIN (PCT): CPT

## 2019-05-28 PROCEDURE — 36415 COLL VENOUS BLD VENIPUNCTURE: CPT

## 2019-05-28 PROCEDURE — 84100 ASSAY OF PHOSPHORUS: CPT

## 2019-05-28 PROCEDURE — 84132 ASSAY OF SERUM POTASSIUM: CPT

## 2019-05-28 PROCEDURE — 6360000002 HC RX W HCPCS: Performed by: INTERNAL MEDICINE

## 2019-05-28 PROCEDURE — 2580000003 HC RX 258: Performed by: EMERGENCY MEDICINE

## 2019-05-28 PROCEDURE — 80053 COMPREHEN METABOLIC PANEL: CPT

## 2019-05-28 PROCEDURE — 2580000003 HC RX 258: Performed by: INTERNAL MEDICINE

## 2019-05-28 PROCEDURE — 85025 COMPLETE CBC W/AUTO DIFF WBC: CPT

## 2019-05-28 PROCEDURE — 2580000003 HC RX 258

## 2019-05-28 PROCEDURE — 82553 CREATINE MB FRACTION: CPT

## 2019-05-28 PROCEDURE — 84484 ASSAY OF TROPONIN QUANT: CPT

## 2019-05-28 PROCEDURE — 83735 ASSAY OF MAGNESIUM: CPT

## 2019-05-28 PROCEDURE — 6370000000 HC RX 637 (ALT 250 FOR IP): Performed by: INTERNAL MEDICINE

## 2019-05-28 RX ORDER — FLUCONAZOLE 200 MG/1
200 TABLET ORAL DAILY
COMMUNITY
End: 2019-07-30

## 2019-05-28 RX ORDER — HYDROCODONE BITARTRATE AND ACETAMINOPHEN 7.5; 325 MG/1; MG/1
1 TABLET ORAL EVERY 6 HOURS PRN
COMMUNITY
End: 2019-06-10

## 2019-05-28 RX ORDER — PROBENECID 500 MG/1
500 TABLET, FILM COATED ORAL 2 TIMES DAILY
Status: DISCONTINUED | OUTPATIENT
Start: 2019-05-28 | End: 2019-06-07 | Stop reason: HOSPADM

## 2019-05-28 RX ORDER — SODIUM CHLORIDE 0.9 % (FLUSH) 0.9 %
10 SYRINGE (ML) INJECTION PRN
Status: DISCONTINUED | OUTPATIENT
Start: 2019-05-28 | End: 2019-06-03

## 2019-05-28 RX ORDER — HYDROCODONE BITARTRATE AND ACETAMINOPHEN 7.5; 325 MG/1; MG/1
1 TABLET ORAL EVERY 6 HOURS PRN
Status: DISCONTINUED | OUTPATIENT
Start: 2019-05-28 | End: 2019-06-07

## 2019-05-28 RX ORDER — ACETAMINOPHEN 325 MG/1
650 TABLET ORAL EVERY 4 HOURS PRN
Status: ON HOLD | COMMUNITY
End: 2020-11-17 | Stop reason: HOSPADM

## 2019-05-28 RX ORDER — SODIUM CHLORIDE 9 MG/ML
INJECTION, SOLUTION INTRAVENOUS CONTINUOUS
Status: DISCONTINUED | OUTPATIENT
Start: 2019-05-28 | End: 2019-06-03

## 2019-05-28 RX ORDER — FLUCONAZOLE 100 MG/1
200 TABLET ORAL DAILY
Status: DISCONTINUED | OUTPATIENT
Start: 2019-05-28 | End: 2019-06-05

## 2019-05-28 RX ORDER — DEXTROSE MONOHYDRATE 25 G/50ML
INJECTION, SOLUTION INTRAVENOUS
Status: COMPLETED
Start: 2019-05-28 | End: 2019-05-28

## 2019-05-28 RX ORDER — LIOTHYRONINE SODIUM 5 UG/1
5 TABLET ORAL DAILY
Status: DISCONTINUED | OUTPATIENT
Start: 2019-05-28 | End: 2019-06-07 | Stop reason: HOSPADM

## 2019-05-28 RX ORDER — MULTIVITAMIN WITH FOLIC ACID 400 MCG
1 TABLET ORAL DAILY
Status: DISCONTINUED | OUTPATIENT
Start: 2019-05-28 | End: 2019-06-07 | Stop reason: HOSPADM

## 2019-05-28 RX ORDER — SODIUM CHLORIDE 0.9 % (FLUSH) 0.9 %
10 SYRINGE (ML) INJECTION EVERY 12 HOURS SCHEDULED
Status: DISCONTINUED | OUTPATIENT
Start: 2019-05-28 | End: 2019-06-03

## 2019-05-28 RX ORDER — FUROSEMIDE 20 MG/1
20 TABLET ORAL DAILY
Status: ON HOLD | COMMUNITY
End: 2019-06-06 | Stop reason: HOSPADM

## 2019-05-28 RX ORDER — SODIUM CHLORIDE 0.9 % (FLUSH) 0.9 %
10 SYRINGE (ML) INJECTION PRN
Status: DISCONTINUED | OUTPATIENT
Start: 2019-05-28 | End: 2019-05-28 | Stop reason: SDUPTHER

## 2019-05-28 RX ORDER — ATENOLOL 25 MG/1
25 TABLET ORAL NIGHTLY
Status: DISCONTINUED | OUTPATIENT
Start: 2019-05-28 | End: 2019-06-07 | Stop reason: HOSPADM

## 2019-05-28 RX ORDER — ALBUTEROL SULFATE 90 UG/1
2 AEROSOL, METERED RESPIRATORY (INHALATION) EVERY 6 HOURS PRN
Status: ON HOLD | COMMUNITY
End: 2019-09-16

## 2019-05-28 RX ORDER — ACETAMINOPHEN 325 MG/1
650 TABLET ORAL EVERY 4 HOURS PRN
Status: DISCONTINUED | OUTPATIENT
Start: 2019-05-28 | End: 2019-05-28 | Stop reason: SDUPTHER

## 2019-05-28 RX ORDER — CYANOCOBALAMIN 1000 UG/ML
1000 INJECTION INTRAMUSCULAR; SUBCUTANEOUS
Status: ON HOLD | COMMUNITY
End: 2020-11-13

## 2019-05-28 RX ORDER — SODIUM CHLORIDE 0.9 % (FLUSH) 0.9 %
10 SYRINGE (ML) INJECTION EVERY 12 HOURS SCHEDULED
Status: DISCONTINUED | OUTPATIENT
Start: 2019-05-28 | End: 2019-05-28 | Stop reason: SDUPTHER

## 2019-05-28 RX ORDER — LIOTHYRONINE SODIUM 5 UG/1
5 TABLET ORAL DAILY
Status: ON HOLD | COMMUNITY
End: 2019-06-06 | Stop reason: HOSPADM

## 2019-05-28 RX ORDER — ACETAMINOPHEN 325 MG/1
650 TABLET ORAL EVERY 4 HOURS PRN
Status: DISCONTINUED | OUTPATIENT
Start: 2019-05-28 | End: 2019-06-07 | Stop reason: HOSPADM

## 2019-05-28 RX ORDER — QUETIAPINE FUMARATE 25 MG/1
25 TABLET, FILM COATED ORAL NIGHTLY
Status: DISCONTINUED | OUTPATIENT
Start: 2019-05-28 | End: 2019-06-07 | Stop reason: HOSPADM

## 2019-05-28 RX ORDER — ONDANSETRON 2 MG/ML
4 INJECTION INTRAMUSCULAR; INTRAVENOUS EVERY 6 HOURS PRN
Status: DISCONTINUED | OUTPATIENT
Start: 2019-05-28 | End: 2019-06-03

## 2019-05-28 RX ORDER — DOCUSATE SODIUM 100 MG/1
100 CAPSULE, LIQUID FILLED ORAL 2 TIMES DAILY
Status: DISCONTINUED | OUTPATIENT
Start: 2019-05-28 | End: 2019-06-07 | Stop reason: HOSPADM

## 2019-05-28 RX ORDER — LEVOTHYROXINE SODIUM 112 UG/1
112 TABLET ORAL DAILY
Status: DISCONTINUED | OUTPATIENT
Start: 2019-05-28 | End: 2019-06-07 | Stop reason: HOSPADM

## 2019-05-28 RX ORDER — QUETIAPINE FUMARATE 25 MG/1
12.5 TABLET, FILM COATED ORAL EVERY 6 HOURS PRN
Status: DISCONTINUED | OUTPATIENT
Start: 2019-05-28 | End: 2019-06-07 | Stop reason: HOSPADM

## 2019-05-28 RX ORDER — ERTAPENEM 1 G/1
1000 INJECTION, POWDER, LYOPHILIZED, FOR SOLUTION INTRAMUSCULAR; INTRAVENOUS EVERY 24 HOURS
Status: ON HOLD | COMMUNITY
End: 2019-05-28 | Stop reason: SINTOL

## 2019-05-28 RX ORDER — CLOTRIMAZOLE 10 MG/1
10 LOZENGE ORAL; TOPICAL
COMMUNITY
End: 2019-07-30

## 2019-05-28 RX ORDER — OXYBUTYNIN CHLORIDE 5 MG/1
5 TABLET ORAL 2 TIMES DAILY
Status: DISCONTINUED | OUTPATIENT
Start: 2019-05-28 | End: 2019-06-07 | Stop reason: HOSPADM

## 2019-05-28 RX ORDER — 0.9 % SODIUM CHLORIDE 0.9 %
500 INTRAVENOUS SOLUTION INTRAVENOUS ONCE
Status: COMPLETED | OUTPATIENT
Start: 2019-05-28 | End: 2019-05-28

## 2019-05-28 RX ORDER — PRAVASTATIN SODIUM 20 MG
40 TABLET ORAL NIGHTLY
Status: DISCONTINUED | OUTPATIENT
Start: 2019-05-28 | End: 2019-06-07 | Stop reason: HOSPADM

## 2019-05-28 RX ADMIN — Medication 125 MG: at 13:58

## 2019-05-28 RX ADMIN — Medication 10 ML: at 08:53

## 2019-05-28 RX ADMIN — PRAVASTATIN SODIUM 40 MG: 20 TABLET ORAL at 20:41

## 2019-05-28 RX ADMIN — NYSTATIN 500000 UNITS: 100000 SUSPENSION ORAL at 08:52

## 2019-05-28 RX ADMIN — CEFTOLOZANE AND TAZOBACTAM 750 MG: 1; .5 INJECTION, POWDER, LYOPHILIZED, FOR SOLUTION INTRAVENOUS at 20:39

## 2019-05-28 RX ADMIN — OXYBUTYNIN CHLORIDE 5 MG: 5 TABLET ORAL at 17:22

## 2019-05-28 RX ADMIN — ATENOLOL 25 MG: 25 TABLET ORAL at 20:41

## 2019-05-28 RX ADMIN — QUETIAPINE FUMARATE 25 MG: 25 TABLET ORAL at 20:42

## 2019-05-28 RX ADMIN — PROBENECID 500 MG: 500 TABLET, FILM COATED ORAL at 20:41

## 2019-05-28 RX ADMIN — DOCUSATE SODIUM 100 MG: 100 CAPSULE, LIQUID FILLED ORAL at 08:52

## 2019-05-28 RX ADMIN — Medication 125 MG: at 06:47

## 2019-05-28 RX ADMIN — SODIUM CHLORIDE 500 ML: 9 INJECTION, SOLUTION INTRAVENOUS at 18:51

## 2019-05-28 RX ADMIN — NYSTATIN 500000 UNITS: 100000 SUSPENSION ORAL at 17:20

## 2019-05-28 RX ADMIN — LEVOTHYROXINE SODIUM 112 MCG: 0.11 TABLET ORAL at 06:47

## 2019-05-28 RX ADMIN — PROBENECID 500 MG: 500 TABLET, FILM COATED ORAL at 08:52

## 2019-05-28 RX ADMIN — NYSTATIN 500000 UNITS: 100000 SUSPENSION ORAL at 20:41

## 2019-05-28 RX ADMIN — Medication 125 MG: at 17:22

## 2019-05-28 RX ADMIN — SODIUM CHLORIDE: 9 INJECTION, SOLUTION INTRAVENOUS at 06:18

## 2019-05-28 RX ADMIN — LIOTHYRONINE SODIUM 5 MCG: 5 TABLET ORAL at 08:52

## 2019-05-28 RX ADMIN — Medication: at 18:51

## 2019-05-28 RX ADMIN — FLUCONAZOLE 200 MG: 100 TABLET ORAL at 08:51

## 2019-05-28 RX ADMIN — CEFEPIME 2 G: 2 INJECTION, POWDER, FOR SOLUTION INTRAVENOUS at 00:05

## 2019-05-28 RX ADMIN — VANCOMYCIN HYDROCHLORIDE 2000 MG: 10 INJECTION, POWDER, LYOPHILIZED, FOR SOLUTION INTRAVENOUS at 02:27

## 2019-05-28 RX ADMIN — OXYBUTYNIN CHLORIDE 5 MG: 5 TABLET ORAL at 08:51

## 2019-05-28 RX ADMIN — VITAMIN D, TAB 1000IU (100/BT) 1000 UNITS: 25 TAB at 08:51

## 2019-05-28 RX ADMIN — ENOXAPARIN SODIUM 40 MG: 40 INJECTION, SOLUTION INTRAVENOUS; SUBCUTANEOUS at 08:51

## 2019-05-28 RX ADMIN — CEFTOLOZANE AND TAZOBACTAM 750 MG: 1; .5 INJECTION, POWDER, LYOPHILIZED, FOR SOLUTION INTRAVENOUS at 17:21

## 2019-05-28 RX ADMIN — Medication 10 ML: at 06:18

## 2019-05-28 RX ADMIN — MULTIVITAMIN TABLET 1 TABLET: TABLET at 08:53

## 2019-05-28 RX ADMIN — NYSTATIN 500000 UNITS: 100000 SUSPENSION ORAL at 13:58

## 2019-05-28 ASSESSMENT — PAIN SCALES - GENERAL
PAINLEVEL_OUTOF10: 3
PAINLEVEL_OUTOF10: 0

## 2019-05-28 NOTE — PLAN OF CARE
Problem: Falls - Risk of:  Goal: Will remain free from falls  Description  Will remain free from falls  Outcome: Met This Shift  Goal: Absence of physical injury  Description  Absence of physical injury  Outcome: Met This Shift     Problem: Risk for Impaired Skin Integrity  Goal: Tissue integrity - skin and mucous membranes  Description  Structural intactness and normal physiological function of skin and  mucous membranes.   Outcome: Not Met This Shift     Problem: Breathing Pattern - Ineffective:  Goal: Ability to achieve and maintain a regular respiratory rate will improve  Description  Ability to achieve and maintain a regular respiratory rate will improve  Outcome: Not Met This Shift

## 2019-05-28 NOTE — PROGRESS NOTES
Occupational Therapy          OT consult received and appreciated. Chart reviewed. Will hold evaluation due to patient off unit this pm . Will evaluate at a later time. Thank you. Madhuri Lozada.  [address], [address]

## 2019-05-28 NOTE — CARE COORDINATION
Patient was admitted from [address] [place] [address] with [facility] Teague there, patient is not a bedhold but can return on discharge. Will need therapy evals and a precert prior to discharge. SW following.

## 2019-05-28 NOTE — PROGRESS NOTES
Patient has order to consult Infectious Disease. Unsuccessful with reaching physician, will reattempt.

## 2019-05-28 NOTE — H&P
Cloteal Antony  Dr. Fredi Oconnell M.D. History and Physical      CHIEF COMPLAINT:  Fever and dark urine    Reason for Admission:  Pneumonia, UTI    History Obtained From:  patient, electronic medical record    HISTORY OF PRESENT ILLNESS:      The patient is a 74 y.o. female of Perri Cotton, DO with significant past medical history of c-diff, HTN,  HLD, elevated troponin who presents with a fever and dark urine over the past 1 day. She is currently being treated for c-diff with vancomycin, and notices that she has been persistently weak with ongoing loose stools since her diagnosis in mid-May. She presented to the ER with a fever of 102 per EMS. She also noticed dark urine starting yesterday. ED course: Vitals 112/50, pulse 104, resp 18, temp 98.9, SpO2 90%. Labs: K 3.1, BUN/Cr 32/1.4, Trop 0.4, WBC 11.8, Hgb 9.7, Hct 30.3, UA: Small blood, 30 protein, moderate bacteria. Blood cultures: gram negative rods. CXR: subacute chronic infiltrates which have progressed.      Past Medical History:        Diagnosis Date    Arthritis     Gout     CONTROLLED    Hyperlipidemia     Hypertension     STABLE    Macular hole, right eye     Thyroid disease      Past Surgical History:        Procedure Laterality Date    BREAST SURGERY Right 1982    BENIGN CYST    CHOLECYSTECTOMY  1997    HYSTERECTOMY  1985         Medications Prior to Admission:    Medications Prior to Admission: cyanocobalamin 1000 MCG/ML injection, Inject 1,000 mcg into the muscle every 7 days  liothyronine (CYTOMEL) 5 MCG tablet, Take 5 mcg by mouth daily  fluconazole (DIFLUCAN) 200 MG tablet, Take 200 mg by mouth daily  ertapenem (INVANZ) 1 GM injection, Inject 1,000 mg into the muscle every 24 hours  LACTOBACILLUS PO, Take 1 capsule by mouth daily  furosemide (LASIX) 20 MG tablet, Take 20 mg by mouth daily  vitamin D (CHOLECALCIFEROL) 1000 UNIT TABS tablet, Take 1,000 Units by mouth daily  clotrimazole (MYCELEX) 10 MG shanthi, Take 10 mg by 122*   BILITOT 0.3     No results for input(s): BNP in the last 72 hours. Recent Labs     05/27/19  1910   TROPONINI 0.04*       ASSESSMENT:      Principal Problem:    Pneumonia  Active Problems:    Troponin level elevated    Sepsis (HCC)    HTN (hypertension)    HLD (hyperlipidemia)    Clostridium difficile diarrhea    Acute cystitis  Resolved Problems:    * No resolved hospital problems.  *        PLAN:    Admitted to telemetry for pneumonia/UTI,   IV abx therapy pending cultures/sensi   Id following     NASRA (pre-renal)  continue hydration, stop nephrotoxins, assess for obstruction if not better   IV fluid hydration  Potassium replacement  Monitor labs    elevated troponin (stable when compared to 5/14/19)  No chest pain or SOB   unremarkable ekg         DVT prophylaxis  PT/OT  DC planning    HERMAN Bolden  5/28/2019  10:59 AM     Above reviewed in conjunction with Tuan Thomas I agree  Pt Seen and Examined, questions answered   Plan formulated in conjunction with wilma Stratton MD

## 2019-05-28 NOTE — PROGRESS NOTES
Physical Therapy    PT order received and medical chart reviewed 5/28 PM. Pt off unit for tests. Will re-attempt at a later time/date. Thank you.     Neha Johnson, PT, DPT  [id number]

## 2019-05-29 ENCOUNTER — APPOINTMENT (OUTPATIENT)
Dept: GENERAL RADIOLOGY | Age: 74
DRG: 853 | End: 2019-05-29
Payer: COMMERCIAL

## 2019-05-29 LAB
ANION GAP SERPL CALCULATED.3IONS-SCNC: 12 MMOL/L (ref 7–16)
BASOPHILS ABSOLUTE: 0.02 E9/L (ref 0–0.2)
BASOPHILS RELATIVE PERCENT: 0.3 % (ref 0–2)
BUN BLDV-MCNC: 23 MG/DL (ref 8–23)
CALCIUM SERPL-MCNC: 8 MG/DL (ref 8.6–10.2)
CHLORIDE BLD-SCNC: 110 MMOL/L (ref 98–107)
CK MB: 1.5 NG/ML (ref 0–4.3)
CK MB: 1.7 NG/ML (ref 0–4.3)
CO2: 21 MMOL/L (ref 22–29)
CREAT SERPL-MCNC: 1.2 MG/DL (ref 0.5–1)
EOSINOPHILS ABSOLUTE: 0.05 E9/L (ref 0.05–0.5)
EOSINOPHILS RELATIVE PERCENT: 0.7 % (ref 0–6)
GFR AFRICAN AMERICAN: 53
GFR NON-AFRICAN AMERICAN: 44 ML/MIN/1.73
GLUCOSE BLD-MCNC: 110 MG/DL (ref 74–99)
HCT VFR BLD CALC: 26.3 % (ref 34–48)
HEMOGLOBIN: 8.2 G/DL (ref 11.5–15.5)
IMMATURE GRANULOCYTES #: 0.08 E9/L
IMMATURE GRANULOCYTES %: 1.1 % (ref 0–5)
LYMPHOCYTES ABSOLUTE: 0.68 E9/L (ref 1.5–4)
LYMPHOCYTES RELATIVE PERCENT: 9.7 % (ref 20–42)
MAGNESIUM: 1.9 MG/DL (ref 1.6–2.6)
MCH RBC QN AUTO: 28.8 PG (ref 26–35)
MCHC RBC AUTO-ENTMCNC: 31.2 % (ref 32–34.5)
MCV RBC AUTO: 92.3 FL (ref 80–99.9)
MONOCYTES ABSOLUTE: 0.73 E9/L (ref 0.1–0.95)
MONOCYTES RELATIVE PERCENT: 10.4 % (ref 2–12)
NEUTROPHILS ABSOLUTE: 5.45 E9/L (ref 1.8–7.3)
NEUTROPHILS RELATIVE PERCENT: 77.8 % (ref 43–80)
PDW BLD-RTO: 16.8 FL (ref 11.5–15)
PLATELET # BLD: 118 E9/L (ref 130–450)
PMV BLD AUTO: 12.9 FL (ref 7–12)
POTASSIUM REFLEX MAGNESIUM: 2.7 MMOL/L (ref 3.5–5)
POTASSIUM SERPL-SCNC: 3.7 MMOL/L (ref 3.5–5)
PROCALCITONIN: 0.96 NG/ML (ref 0–0.08)
RBC # BLD: 2.85 E12/L (ref 3.5–5.5)
SODIUM BLD-SCNC: 143 MMOL/L (ref 132–146)
URINE CULTURE, ROUTINE: NORMAL
WBC # BLD: 7 E9/L (ref 4.5–11.5)

## 2019-05-29 PROCEDURE — 85025 COMPLETE CBC W/AUTO DIFF WBC: CPT

## 2019-05-29 PROCEDURE — 6360000002 HC RX W HCPCS: Performed by: INTERNAL MEDICINE

## 2019-05-29 PROCEDURE — 2700000000 HC OXYGEN THERAPY PER DAY

## 2019-05-29 PROCEDURE — 6370000000 HC RX 637 (ALT 250 FOR IP): Performed by: INTERNAL MEDICINE

## 2019-05-29 PROCEDURE — 99223 1ST HOSP IP/OBS HIGH 75: CPT | Performed by: INTERNAL MEDICINE

## 2019-05-29 PROCEDURE — 84132 ASSAY OF SERUM POTASSIUM: CPT

## 2019-05-29 PROCEDURE — 87633 RESP VIRUS 12-25 TARGETS: CPT

## 2019-05-29 PROCEDURE — 82553 CREATINE MB FRACTION: CPT

## 2019-05-29 PROCEDURE — 36415 COLL VENOUS BLD VENIPUNCTURE: CPT

## 2019-05-29 PROCEDURE — 71045 X-RAY EXAM CHEST 1 VIEW: CPT

## 2019-05-29 PROCEDURE — 80048 BASIC METABOLIC PNL TOTAL CA: CPT

## 2019-05-29 PROCEDURE — 87798 DETECT AGENT NOS DNA AMP: CPT

## 2019-05-29 PROCEDURE — 83735 ASSAY OF MAGNESIUM: CPT

## 2019-05-29 PROCEDURE — 2580000003 HC RX 258: Performed by: INTERNAL MEDICINE

## 2019-05-29 PROCEDURE — 94761 N-INVAS EAR/PLS OXIMETRY MLT: CPT

## 2019-05-29 PROCEDURE — 2060000000 HC ICU INTERMEDIATE R&B

## 2019-05-29 PROCEDURE — 84145 PROCALCITONIN (PCT): CPT

## 2019-05-29 PROCEDURE — 87486 CHLMYD PNEUM DNA AMP PROBE: CPT

## 2019-05-29 PROCEDURE — 87581 M.PNEUMON DNA AMP PROBE: CPT

## 2019-05-29 RX ORDER — IPRATROPIUM BROMIDE AND ALBUTEROL SULFATE 2.5; .5 MG/3ML; MG/3ML
1 SOLUTION RESPIRATORY (INHALATION)
Status: DISCONTINUED | OUTPATIENT
Start: 2019-05-29 | End: 2019-05-29

## 2019-05-29 RX ORDER — POTASSIUM CHLORIDE AND SODIUM CHLORIDE 900; 300 MG/100ML; MG/100ML
INJECTION, SOLUTION INTRAVENOUS CONTINUOUS
Status: DISCONTINUED | OUTPATIENT
Start: 2019-05-29 | End: 2019-05-29

## 2019-05-29 RX ORDER — POTASSIUM CHLORIDE 20 MEQ/1
40 TABLET, EXTENDED RELEASE ORAL DAILY
Status: DISCONTINUED | OUTPATIENT
Start: 2019-05-29 | End: 2019-06-07 | Stop reason: HOSPADM

## 2019-05-29 RX ORDER — ALBUTEROL SULFATE 2.5 MG/3ML
2.5 SOLUTION RESPIRATORY (INHALATION) 4 TIMES DAILY
Status: DISCONTINUED | OUTPATIENT
Start: 2019-05-29 | End: 2019-06-07 | Stop reason: HOSPADM

## 2019-05-29 RX ADMIN — QUETIAPINE FUMARATE 25 MG: 25 TABLET ORAL at 21:04

## 2019-05-29 RX ADMIN — Medication: at 21:05

## 2019-05-29 RX ADMIN — DOCUSATE SODIUM 100 MG: 100 CAPSULE, LIQUID FILLED ORAL at 09:31

## 2019-05-29 RX ADMIN — SODIUM CHLORIDE 75 ML/HR: 9 INJECTION, SOLUTION INTRAVENOUS at 01:40

## 2019-05-29 RX ADMIN — VITAMIN D, TAB 1000IU (100/BT) 1000 UNITS: 25 TAB at 09:31

## 2019-05-29 RX ADMIN — CEFTOLOZANE AND TAZOBACTAM 750 MG: 1; .5 INJECTION, POWDER, LYOPHILIZED, FOR SOLUTION INTRAVENOUS at 21:50

## 2019-05-29 RX ADMIN — Medication 125 MG: at 01:38

## 2019-05-29 RX ADMIN — Medication 125 MG: at 12:18

## 2019-05-29 RX ADMIN — NYSTATIN 500000 UNITS: 100000 SUSPENSION ORAL at 09:31

## 2019-05-29 RX ADMIN — DOCUSATE SODIUM 100 MG: 100 CAPSULE, LIQUID FILLED ORAL at 21:05

## 2019-05-29 RX ADMIN — Medication 125 MG: at 06:48

## 2019-05-29 RX ADMIN — CEFTOLOZANE AND TAZOBACTAM 750 MG: 1; .5 INJECTION, POWDER, LYOPHILIZED, FOR SOLUTION INTRAVENOUS at 15:01

## 2019-05-29 RX ADMIN — ALBUTEROL SULFATE 2.5 MG: 2.5 SOLUTION RESPIRATORY (INHALATION) at 20:01

## 2019-05-29 RX ADMIN — ATENOLOL 25 MG: 25 TABLET ORAL at 21:05

## 2019-05-29 RX ADMIN — POTASSIUM CHLORIDE 40 MEQ: 20 TABLET, EXTENDED RELEASE ORAL at 09:31

## 2019-05-29 RX ADMIN — PROBENECID 500 MG: 500 TABLET, FILM COATED ORAL at 21:05

## 2019-05-29 RX ADMIN — FLUCONAZOLE 200 MG: 100 TABLET ORAL at 09:31

## 2019-05-29 RX ADMIN — Medication 125 MG: at 18:15

## 2019-05-29 RX ADMIN — ENOXAPARIN SODIUM 40 MG: 40 INJECTION, SOLUTION INTRAVENOUS; SUBCUTANEOUS at 09:31

## 2019-05-29 RX ADMIN — MULTIVITAMIN TABLET 1 TABLET: TABLET at 09:31

## 2019-05-29 RX ADMIN — PROBENECID 500 MG: 500 TABLET, FILM COATED ORAL at 09:32

## 2019-05-29 RX ADMIN — PRAVASTATIN SODIUM 40 MG: 20 TABLET ORAL at 21:04

## 2019-05-29 RX ADMIN — LIOTHYRONINE SODIUM 5 MCG: 5 TABLET ORAL at 09:31

## 2019-05-29 RX ADMIN — CEFTOLOZANE AND TAZOBACTAM 750 MG: 1; .5 INJECTION, POWDER, LYOPHILIZED, FOR SOLUTION INTRAVENOUS at 06:49

## 2019-05-29 RX ADMIN — SODIUM CHLORIDE AND POTASSIUM CHLORIDE: 9; 2.98 INJECTION, SOLUTION INTRAVENOUS at 11:13

## 2019-05-29 RX ADMIN — Medication 10 ML: at 21:07

## 2019-05-29 RX ADMIN — NYSTATIN 500000 UNITS: 100000 SUSPENSION ORAL at 15:01

## 2019-05-29 RX ADMIN — SODIUM CHLORIDE: 9 INJECTION, SOLUTION INTRAVENOUS at 15:01

## 2019-05-29 RX ADMIN — OXYBUTYNIN CHLORIDE 5 MG: 5 TABLET ORAL at 09:31

## 2019-05-29 RX ADMIN — LEVOTHYROXINE SODIUM 112 MCG: 0.11 TABLET ORAL at 06:49

## 2019-05-29 RX ADMIN — NYSTATIN 500000 UNITS: 100000 SUSPENSION ORAL at 21:06

## 2019-05-29 ASSESSMENT — PAIN SCALES - GENERAL
PAINLEVEL_OUTOF10: 0
PAINLEVEL_OUTOF10: 6

## 2019-05-29 NOTE — SIGNIFICANT EVENT
-RRT Note:   -RRT was called at 18:25 for respiratory distress.   -The patient has past medical history significant for c.diff infection, ESBL, HLD, HTN  -The patient presented with sepsis due to UTI and she has been treated with antibiotics and followed by ID service. -During the RRT the vitals were as following BP: 112/88  HR: 135  SpO2: unobtainable initially and eventually to 100% on 15 L nonrebreather  temp: 98.9     -On arrival her blood glucose was found to be 71, she was given 1/2 amp of D50% with an improvement in her blood glucose. -The patient was given on bolus of fluid 500 cc   - ABG was obtained and it showed   ABG:    Lab Results   Component Value Date    PH 7.446 05/28/2019    PCO2 29.7 05/28/2019    PO2 68.6 05/28/2019    HCO3 20.0 05/28/2019    BE -3.1 05/28/2019    O2SAT 93.8 05/28/2019     On physical examination:   -She has poor air entry, bilaterally      Hypoxic respiratory failure in presence of sepsis:   -? PE patient with NASRA, will hold off CTA, will order V/Q  VS underlying sepsis ( pneumonia) ? ?  - Continue non-rebreather and wean down as she tolerates       Sanford Boles M.D PGY 3  Internal Medicine Resident  5/29/2019

## 2019-05-29 NOTE — PROGRESS NOTES
Sent for the patient to come to mri, but Rn unable to come down with the patient at this time. Will try again later.

## 2019-05-29 NOTE — CONSULTS
activity: Not on file   Lifestyle    Physical activity:     Days per week: Not on file     Minutes per session: Not on file    Stress: Not on file   Relationships    Social connections:     Talks on phone: Not on file     Gets together: Not on file     Attends [employer] service: Not on file     Active member of club or organization: Not on file     Attends meetings of clubs or organizations: Not on file     Relationship status: Not on file    Intimate partner violence:     Fear of current or ex partner: Not on file     Emotionally abused: Not on file     Physically abused: Not on file     Forced sexual activity: Not on file   Other Topics Concern    Not on file   Social History Narrative    Not on file     Social History     Tobacco Use   Smoking Status Never Smoker   Smokeless Tobacco Never Used     Social History     Substance and Sexual Activity   Alcohol Use Yes    Comment: socially     Social History     Substance and Sexual Activity   Drug Use No       OCCUPATIONAL HISTORY:            HOME MEDICATIONS:  Prior to Admission medications    Medication Sig Start Date End Date Taking?  Authorizing Provider   cyanocobalamin 1000 MCG/ML injection Inject 1,000 mcg into the muscle every 7 days   Yes Historical Provider, MD   liothyronine (CYTOMEL) 5 MCG tablet Take 5 mcg by mouth daily   Yes Historical Provider, MD   fluconazole (DIFLUCAN) 200 MG tablet Take 200 mg by mouth daily   Yes Historical Provider, MD   LACTOBACILLUS PO Take 1 capsule by mouth daily   Yes Historical Provider, MD   furosemide (LASIX) 20 MG tablet Take 20 mg by mouth daily   Yes Historical Provider, MD   vitamin D (CHOLECALCIFEROL) 1000 UNIT TABS tablet Take 1,000 Units by mouth daily   Yes Historical Provider, MD   clotrimazole (MYCELEX) 10 MG shanhti Take 10 mg by mouth 5 times daily   Yes Historical Provider, MD   albuterol sulfate  (90 Base) MCG/ACT inhaler Inhale 2 puffs into the lungs every 6 hours as needed for Wheezing   Yes Historical Provider, MD   magnesium hydroxide (MILK OF MAGNESIA) 400 MG/5ML suspension Take by mouth daily as needed for Constipation   Yes Historical Provider, MD   HYDROcodone-acetaminophen (NORCO) 7.5-325 MG per tablet Take 1 tablet by mouth every 6 hours as needed for Pain. Yes Historical Provider, MD   acetaminophen (TYLENOL) 325 MG tablet Take 650 mg by mouth every 4 hours as needed for Pain   Yes Historical Provider, MD   QUEtiapine (SEROQUEL) 25 MG tablet Take 1 tablet by mouth nightly 5/20/19  Yes Viviana Arshad MD   nystatin (MYCOSTATIN) 372138 UNIT/ML suspension Take 5 mLs by mouth 4 times daily 5/20/19  Yes Viviana Arshad MD   vancomycin (VANCOCIN) 50 mg/mL oral solution Take 2.5 mLs by mouth every 6 hours for 10 days 5/19/19 5/29/19 Yes Felipe Avendaño MD   torsemide (DEMADEX) 20 MG tablet Take 20 mg by mouth daily as needed (as needed for extra fluid)   Yes Historical Provider, MD   oxybutynin (DITROPAN) 5 MG tablet Take 5 mg by mouth 2 times daily 2/25/19  Yes Historical Provider, MD   losartan (COZAAR) 50 MG tablet Take 1 tablet by mouth daily 5/8/19  Yes Colletta Kirks, DO   enoxaparin (LOVENOX) 40 MG/0.4ML injection Inject 0.4 mLs into the skin daily 5/6/19  Yes Colletta Kirks, DO   docusate sodium (COLACE) 100 MG capsule Take 1 capsule by mouth 2 times daily 5/6/19 6/5/19 Yes Chrystal Libman, MD   atenolol (TENORMIN) 50 MG tablet Take 50 mg by mouth daily. Instructed to take morning of surgery with a sip of water   Yes Historical Provider, MD   atenolol (TENORMIN) 25 MG tablet Take 25 mg by mouth nightly. Yes Historical Provider, MD   levothyroxine (SYNTHROID) 112 MCG tablet Take 112 mcg by mouth Daily. Instructed to take morning of surgery with a sip of water   Yes Historical Provider, MD   pravastatin (PRAVACHOL) 40 MG tablet Take 40 mg by mouth nightly. Yes Historical Provider, MD   potassium chloride (MICRO-K) 10 MEQ CR capsule Take 10 mEq by mouth 2 times daily.    Yes Historical Provider, MD   probenecid (BENEMID) 500 MG tablet Take 500 mg by mouth 2 times daily. Yes Historical Provider, MD   Coenzyme Q10 (CO Q 10 PO) Take  by mouth. LAST DOSE 1/17/13   Yes Historical Provider, MD   aspirin 81 MG tablet Take 81 mg by mouth daily. LAST DOSE 1/11/13   Yes Historical Provider, MD   multivitamin [facility]) per tablet Take 1 tablet by mouth daily.    Yes Historical Provider, MD   QUEtiapine (SEROQUEL) 25 MG tablet Take 0.5 tablets by mouth every 6 hours as needed for Agitation 5/20/19   Burton Malhotra MD       CURRENT MEDICATIONS:  Current Facility-Administered Medications: potassium chloride (KLOR-CON M) extended release tablet 40 mEq, 40 mEq, Oral, Daily  albuterol (PROVENTIL) nebulizer solution 2.5 mg, 2.5 mg, Nebulization, 4x daily  sodium chloride flush 0.9 % injection 10 mL, 10 mL, Intravenous, PRN  sodium chloride flush 0.9 % injection 10 mL, 10 mL, Intravenous, 2 times per day  acetaminophen (TYLENOL) tablet 650 mg, 650 mg, Oral, Q4H PRN  atenolol (TENORMIN) tablet 25 mg, 25 mg, Oral, Nightly  docusate sodium (COLACE) capsule 100 mg, 100 mg, Oral, BID  enoxaparin (LOVENOX) injection 40 mg, 40 mg, Subcutaneous, Daily  vitamin D (CHOLECALCIFEROL) tablet 1,000 Units, 1,000 Units, Oral, Daily  vancomycin (VANCOCIN) oral solution 125 mg, 125 mg, Oral, 4 times per day  QUEtiapine (SEROQUEL) tablet 12.5 mg, 12.5 mg, Oral, Q6H PRN  QUEtiapine (SEROQUEL) tablet 25 mg, 25 mg, Oral, Nightly  probenecid (BENEMID) tablet 500 mg, 500 mg, Oral, BID  pravastatin (PRAVACHOL) tablet 40 mg, 40 mg, Oral, Nightly  oxybutynin (DITROPAN) tablet 5 mg, 5 mg, Oral, BID  nystatin (MYCOSTATIN) 868953 UNIT/ML suspension 500,000 Units, 5 mL, Oral, 4x Daily  multivitamin 1 tablet, 1 tablet, Oral, Daily  liothyronine (CYTOMEL) tablet 5 mcg, 5 mcg, Oral, Daily  levothyroxine (SYNTHROID) tablet 112 mcg, 112 mcg, Oral, Daily  HYDROcodone-acetaminophen (NORCO) 7.5-325 MG per tablet 1 tablet, 1 tablet, Oral, Q6H PRN  fluconazole (DIFLUCAN) tablet 200 mg, 200 mg, Oral, Daily  magnesium hydroxide (MILK OF MAGNESIA) 400 MG/5ML suspension 30 mL, 30 mL, Oral, Daily PRN  ondansetron (ZOFRAN) injection 4 mg, 4 mg, Intravenous, Q6H PRN  0.9 % sodium chloride infusion, , Intravenous, Continuous  ceftolozane-tazobactam (ZERBAXA) 750 mg in dextrose 5 % 100 mL IVPB, 750 mg, Intravenous, Q8H    IV MEDICATIONS:   sodium chloride Stopped (05/29/19 1113)       ALLERGIES:  Allergies   Allergen Reactions    Adrenalin [Epinephrine]      CAUSES TACHYCARDIA    Meropenem Other (See Comments)     Severe confusion       REVIEW OF SYSTEMS:  General ROS:  No weight loss ,no fatigue     ENT ROS:   No Sore throat ,no lymphoadenopathy,no nasal stuffiness     Hematological and Lymphatic ROS:   No ecchymosis ,no tendency to bleed  Respiratory ROS:   SOB   Cardiovascular ROS:   No CP,No Palpitation   Gastrointestinal ROS:   No Gi bleed,no nausea or vomiting      - Musculoskeletal ROS:      - no joint swelling ,no joint pain   Neurological ROS:     -no weakness or numbness    Dermatological ROS:   No skin rash ,no urticaria     PHYSICAL EXAMINATION:     VITAL SIGNS:  BP (!) 111/55   Pulse 80   Temp 97.3 °F (36.3 °C) (Temporal)   Resp 18   Ht 5' 6\" (1.676 m)   Wt 244 lb 2 oz (110.7 kg)   SpO2 98%   BMI 39.40 kg/m²   Wt Readings from Last 3 Encounters:   05/29/19 244 lb 2 oz (110.7 kg)   05/20/19 231 lb 4.8 oz (104.9 kg)   05/07/19 243 lb (110.2 kg)     Temp Readings from Last 3 Encounters:   05/29/19 97.3 °F (36.3 °C) (Temporal)   05/20/19 97.9 °F (36.6 °C) (Temporal)   05/07/19 97.8 °F (36.6 °C) (Oral)     TMAX:  BP Readings from Last 3 Encounters:   05/29/19 (!) 111/55   05/20/19 (!) 150/75   05/07/19 (!) 141/74     Pulse Readings from Last 3 Encounters:   05/29/19 80   05/20/19 86   05/07/19 79           INTAKE/OUTPUTS:  I/O last 3 completed shifts:   In: 944 [P.O.:60; I.V.:884]  Out: 500 [Urine:500]    Intake/Output Summary (Last 24 hours) at 5/29/2019 1409  Last data filed at 5/29/2019 0659  Gross per 24 hour   Intake 944 ml   Output 500 ml   Net 444 ml       General Appearance: alert and oriented to person, place and time, well-developed and   well-nourished, in no acute distress   Eyes: pupils equal, round, and reactive to light, extraocular eye movements intact, conjunctivae normal and sclera anicteric   Neck: neck supple and non tender without mass, no thyromegaly, no thyroid nodules and no cervical adenopathy   Pulmonary/Chest:decrease breath sounds bibasilar   Cardiovascular: normal rate, regular rhythm, normal S1 and S2, no murmurs, rubs, clicks or gallops, distal pulses intact, no carotid bruits, no murmurs, no gallops, no carotid bruits and no JVD   Abdomen: obese, soft, non-tender, non-distended, normal bowel sounds, no masses or organomegaly   Extremities:+2 edema   Musculoskeletal: normal range of motion, no joint swelling, deformity or tenderness   Neurologic: reflexes normal and symmetric, no cranial nerve deficit noted    LABS/IMAGING:    CBC:  Lab Results   Component Value Date    WBC 7.0 05/29/2019    HGB 8.2 (L) 05/29/2019    HCT 26.3 (L) 05/29/2019    MCV 92.3 05/29/2019     (L) 05/29/2019    LYMPHOPCT 9.7 (L) 05/29/2019    RBC 2.85 (L) 05/29/2019    MCH 28.8 05/29/2019    MCHC 31.2 (L) 05/29/2019    RDW 16.8 (H) 05/29/2019    NEUTOPHILPCT 77.8 05/29/2019    MONOPCT 10.4 05/29/2019    BASOPCT 0.3 05/29/2019    NEUTROABS 5.45 05/29/2019    LYMPHSABS 0.68 (L) 05/29/2019    MONOSABS 0.73 05/29/2019    EOSABS 0.05 05/29/2019    BASOSABS 0.02 05/29/2019       Recent Labs     05/29/19  0534 05/28/19  1810 05/27/19  1910   WBC 7.0 9.2 11.8*   HGB 8.2* 10.9* 9.7*   HCT 26.3* 34.2 30.3*   MCV 92.3 92.2 91.0   * 162 143       BMP:   Recent Labs     05/27/19 1910 05/28/19 1805 05/28/19 1810 05/29/19  0534     --   --  134 143   K 3.1*   < > 3.17* 3.5 2.7*     --   --  100 110*   CO2 22  --   --  21* 21*   PHOS  -- --   --  2.4*  --    BUN 32*  --   --  24* 23   CREATININE 1.4*  --   --  1.2* 1.2*    < > = values in this interval not displayed. MG:   Lab Results   Component Value Date    MG 1.9 05/29/2019     Ca/Phos:   Lab Results   Component Value Date    CALCIUM 8.0 (L) 05/29/2019    PHOS 2.4 (L) 05/28/2019     Amylase: No results found for: AMYLASE  Lipase: No results found for: LIPASE  LIVER PROFILE:   Recent Labs     05/27/19  1910 05/28/19  1810   AST 32* 32*   ALT 22 23   BILITOT 0.3 0.5   ALKPHOS 122* 120*       PT/INR: No results for input(s): PROTIME, INR in the last 72 hours. APTT: No results for input(s): APTT in the last 72 hours. Cardiac Enzymes:  Lab Results   Component Value Date    CKTOTAL 47 05/28/2019    CKMB 1.7 05/29/2019    TROPONINI 0.04 (H) 05/28/2019               PROBLEM LIST:  Patient Active Problem List   Diagnosis    Right lower quadrant pain    Pneumatosis coli    Troponin level elevated    Sepsis ([facility] [address]    HTN (hypertension)    HLD (hyperlipidemia)    Obesity    Acute metabolic encephalopathy    Clostridium difficile diarrhea    Pneumonia    Acute cystitis               ASSESSMENT:  1.) Acute Hypoxic resp failure   2.) History of C diff   3. )Bilateral atelectasis   4.)Possible LEILANI   5.)Hypokalemia       PLAN:  *-patient seems slightly overload with edema and fissur can be seen fill with fluid in CXR ,but with her K and diarrhea will hold on Fluid    *-will need Spirometry   *-Doubt PE but we may consider doing CT ,her hypoxia is improving and no DVT   *-BD   *_procalcitonin ,resp viral panel ,.  *- replace K  *- will try CPAP tonight see how she does  Will follow closely   Goal for sat 92-94  Ambulate when possible         Thank you very much for allowing me to participate in the care of this pleasant patient , should you have any questions ,please do not hesitate to contact me         [address] NOTE: This report was transcribed using voice recognition software. Every effort was made to ensure accuracy; however, inadvertent computerized transcription errors may be present.

## 2019-05-29 NOTE — PROGRESS NOTES
Occupational Therapy  Date:2019  Patient Name: Milan Singletary  MRN: [id number]  : 1945  Room: 8966/8592-Q     OT order received and appreciated. Chart reviewed. Hold OT evaluation, await Lumbar MRI and POC . Will follow.     Kiarra Alexander OTR/L #[id number]

## 2019-05-29 NOTE — PROGRESS NOTES
Physical Therapy    PT order received and medical chart reviewed 5/29 PM. Awaiting MRI results and POC. Will follow. Thank you.     Sheffield Olszewski, PT, DPT  [id number]

## 2019-05-29 NOTE — PROGRESS NOTES
CC- SOB    Subjective: The patient is awake and alert. Tolerating medications. Reports no side effects. Afebrile. Had RRT for desaturation and hypolgycemia last night, on high flow O2 now  10 ROS otherwise negative unless otherwise specified above. Objective:    Vitals:    05/29/19 1500   BP:    Pulse:    Resp:    Temp:    SpO2: 98%       General Appearance:    Awake, alert , no acute distress.    HEENT:    Normocephalic,PERRL,neck supple, no JVD, mucosa moist, no thrush   Lungs:     Clear to auscultation bilaterally, no wheeze , crackles   Heart:    Regular rate and rhythm, no murmur   Abdomen:     Soft, non-tender, not distended  bowel sounds present,   Extremities:  B/L LE edema,no open wound,no erythema, non  tender   Skin:   no rashes or lesions   CBC+dif:  Reviewed and trend followed    Radiology:  · Ct abdomen pelvis w/o contrast- perinpephric stranding, L1 destruction    Microbiology:  Pending    Assessment:  · Sepsis from recurrent gram negative abcteremia r/o GI / source   · C diff colitis ongoing treatment  · 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

## 2019-05-29 NOTE — PROGRESS NOTES
Patient brought down to MRI and was too large did not fit in scanner. VALENTINA Merrill notified patient will need open MRI.

## 2019-05-29 NOTE — PROGRESS NOTES
Right IV site needs changed. I told Saimafloyd Martel that would change it and she said not to worry, she will get it.

## 2019-05-29 NOTE — FLOWSHEET NOTE
Inpatient Wound Care(initial evaluation) 8512    Admit Date: 5/27/2019  6:29 PM    Reason for consult:  Right inner buttocks    Wound history:  Admitted from [facility] with wound    Findings:       05/29/19 1620   Skin Integrity Site 2   Skin Integrity Location 2 Bruising   Location 2 BUE   Wound 05/29/19 Buttocks Right   Date First Assessed/Time First Assessed: 05/29/19 0040   Present on Hospital Admission: Yes  Primary Wound Type: Pressure Injury  Location: Buttocks  Wound Location Orientation: Right   Wound Image    Wound Pressure Stage  3   Dressing/Treatment Pharmaceutical agent (see MAR)   Wound Length (cm) 5 cm   Wound Width (cm) 3 cm   Wound Depth (cm) 0.1 cm   Wound Surface Area (cm^2) 15 cm^2   Wound Volume (cm^3) 1.5 cm^3   Wound Assessment Yellow; Purple;Red   Drainage Amount None   Malika-wound Assessment Fragile; Purple   Red%Wound Bed 70   Yellow%Wound Bed 10   Purple%Wound Bed 20   folds intact  Patient states she does not have control of her bowels at all times  Harris in place    **Informed Consent**    The patient has given verbal consent to have photos taken of wound and inserted into their chart as part of their permanent medical record for purposes of documentation, treatment management and/or medical review. All Images taken on 5/29/19 of patient name: Rg Desai were transmitted and stored on secured Xiaoi Robert located within [facility] by a registered [employer] Mobile Application Device.      Impression:  Stage 3 right buttocks    Plan:  Recommend antifungal to buttocks  Dressing not appropriate due to location  Will need continued preventative care    Sofie Luo 5/29/2019 4:34 PM

## 2019-05-30 ENCOUNTER — APPOINTMENT (OUTPATIENT)
Dept: MRI IMAGING | Age: 74
DRG: 853 | End: 2019-05-30
Payer: COMMERCIAL

## 2019-05-30 ENCOUNTER — HOSPITAL ENCOUNTER (OUTPATIENT)
Age: 74
Discharge: HOME | End: 2019-05-30
Payer: COMMERCIAL

## 2019-05-30 LAB
FILM ARRAY ADENOVIRUS: NORMAL
FILM ARRAY BORDETELLA PERTUSSIS: NORMAL
FILM ARRAY CHLAMYDOPHILIA PNEUMONIAE: NORMAL
FILM ARRAY CORONAVIRUS 229E: NORMAL
FILM ARRAY CORONAVIRUS HKU1: NORMAL
FILM ARRAY CORONAVIRUS NL63: NORMAL
FILM ARRAY CORONAVIRUS OC43: NORMAL
FILM ARRAY INFLUENZA A VIRUS 09H1: NORMAL
FILM ARRAY INFLUENZA A VIRUS H1: NORMAL
FILM ARRAY INFLUENZA A VIRUS H3: NORMAL
FILM ARRAY INFLUENZA A VIRUS: NORMAL
FILM ARRAY INFLUENZA B: NORMAL
FILM ARRAY METAPNEUMOVIRUS: NORMAL
FILM ARRAY MYCOPLASMA PNEUMONIAE: NORMAL
FILM ARRAY PARAINFLUENZA VIRUS 1: NORMAL
FILM ARRAY PARAINFLUENZA VIRUS 2: NORMAL
FILM ARRAY PARAINFLUENZA VIRUS 3: NORMAL
FILM ARRAY PARAINFLUENZA VIRUS 4: NORMAL
FILM ARRAY RESPIRATORY SYNCITIAL VIRUS: NORMAL
FILM ARRAY RHINOVIRUS/ENTEROVIRUS: NORMAL

## 2019-05-30 PROCEDURE — 2060000000 HC ICU INTERMEDIATE R&B

## 2019-05-30 PROCEDURE — 99233 SBSQ HOSP IP/OBS HIGH 50: CPT | Performed by: INTERNAL MEDICINE

## 2019-05-30 PROCEDURE — A0425 GROUND MILEAGE: HCPCS

## 2019-05-30 PROCEDURE — 94640 AIRWAY INHALATION TREATMENT: CPT

## 2019-05-30 PROCEDURE — 94761 N-INVAS EAR/PLS OXIMETRY MLT: CPT

## 2019-05-30 PROCEDURE — 2580000003 HC RX 258: Performed by: INTERNAL MEDICINE

## 2019-05-30 PROCEDURE — 87040 BLOOD CULTURE FOR BACTERIA: CPT

## 2019-05-30 PROCEDURE — 72148 MRI LUMBAR SPINE W/O DYE: CPT

## 2019-05-30 PROCEDURE — 6370000000 HC RX 637 (ALT 250 FOR IP): Performed by: INTERNAL MEDICINE

## 2019-05-30 PROCEDURE — 6360000002 HC RX W HCPCS: Performed by: INTERNAL MEDICINE

## 2019-05-30 PROCEDURE — 2700000000 HC OXYGEN THERAPY PER DAY

## 2019-05-30 PROCEDURE — 36415 COLL VENOUS BLD VENIPUNCTURE: CPT

## 2019-05-30 PROCEDURE — 94660 CPAP INITIATION&MGMT: CPT

## 2019-05-30 PROCEDURE — A0428 BLS: HCPCS

## 2019-05-30 RX ADMIN — CEFTOLOZANE AND TAZOBACTAM 750 MG: 1; .5 INJECTION, POWDER, LYOPHILIZED, FOR SOLUTION INTRAVENOUS at 06:24

## 2019-05-30 RX ADMIN — MULTIVITAMIN TABLET 1 TABLET: TABLET at 10:38

## 2019-05-30 RX ADMIN — ENOXAPARIN SODIUM 40 MG: 40 INJECTION, SOLUTION INTRAVENOUS; SUBCUTANEOUS at 10:33

## 2019-05-30 RX ADMIN — Medication: at 10:40

## 2019-05-30 RX ADMIN — LEVOTHYROXINE SODIUM 112 MCG: 0.11 TABLET ORAL at 06:24

## 2019-05-30 RX ADMIN — QUETIAPINE FUMARATE 25 MG: 25 TABLET ORAL at 22:04

## 2019-05-30 RX ADMIN — PROBENECID 500 MG: 500 TABLET, FILM COATED ORAL at 10:37

## 2019-05-30 RX ADMIN — ATENOLOL 25 MG: 25 TABLET ORAL at 22:03

## 2019-05-30 RX ADMIN — OXYBUTYNIN CHLORIDE 5 MG: 5 TABLET ORAL at 10:38

## 2019-05-30 RX ADMIN — POTASSIUM CHLORIDE 40 MEQ: 20 TABLET, EXTENDED RELEASE ORAL at 10:36

## 2019-05-30 RX ADMIN — Medication 125 MG: at 00:00

## 2019-05-30 RX ADMIN — Medication 125 MG: at 14:52

## 2019-05-30 RX ADMIN — ALBUTEROL SULFATE 2.5 MG: 2.5 SOLUTION RESPIRATORY (INHALATION) at 19:27

## 2019-05-30 RX ADMIN — Medication 10 ML: at 22:02

## 2019-05-30 RX ADMIN — ALBUTEROL SULFATE 2.5 MG: 2.5 SOLUTION RESPIRATORY (INHALATION) at 10:46

## 2019-05-30 RX ADMIN — ALBUTEROL SULFATE 2.5 MG: 2.5 SOLUTION RESPIRATORY (INHALATION) at 16:05

## 2019-05-30 RX ADMIN — Medication 125 MG: at 06:24

## 2019-05-30 RX ADMIN — VITAMIN D, TAB 1000IU (100/BT) 1000 UNITS: 25 TAB at 10:38

## 2019-05-30 RX ADMIN — NYSTATIN 500000 UNITS: 100000 SUSPENSION ORAL at 14:52

## 2019-05-30 RX ADMIN — PRAVASTATIN SODIUM 40 MG: 20 TABLET ORAL at 22:03

## 2019-05-30 RX ADMIN — LIOTHYRONINE SODIUM 5 MCG: 5 TABLET ORAL at 10:37

## 2019-05-30 RX ADMIN — SODIUM CHLORIDE: 9 INJECTION, SOLUTION INTRAVENOUS at 06:25

## 2019-05-30 RX ADMIN — OXYBUTYNIN CHLORIDE 5 MG: 5 TABLET ORAL at 18:38

## 2019-05-30 RX ADMIN — Medication 125 MG: at 18:39

## 2019-05-30 RX ADMIN — NYSTATIN 500000 UNITS: 100000 SUSPENSION ORAL at 18:39

## 2019-05-30 RX ADMIN — NYSTATIN 500000 UNITS: 100000 SUSPENSION ORAL at 10:35

## 2019-05-30 RX ADMIN — NYSTATIN 500000 UNITS: 100000 SUSPENSION ORAL at 22:04

## 2019-05-30 RX ADMIN — CEFTOLOZANE AND TAZOBACTAM 750 MG: 1; .5 INJECTION, POWDER, LYOPHILIZED, FOR SOLUTION INTRAVENOUS at 21:59

## 2019-05-30 RX ADMIN — Medication: at 22:05

## 2019-05-30 RX ADMIN — FLUCONAZOLE 200 MG: 100 TABLET ORAL at 10:38

## 2019-05-30 RX ADMIN — PROBENECID 500 MG: 500 TABLET, FILM COATED ORAL at 22:03

## 2019-05-30 ASSESSMENT — PAIN SCALES - GENERAL
PAINLEVEL_OUTOF10: 2
PAINLEVEL_OUTOF10: 0

## 2019-05-30 NOTE — PLAN OF CARE
Problem: Falls - Risk of:  Goal: Will remain free from falls  Description  Will remain free from falls  Outcome: Met This Shift  Goal: Absence of physical injury  Description  Absence of physical injury  Outcome: Met This Shift     Problem: Breathing Pattern - Ineffective:  Goal: Ability to achieve and maintain a regular respiratory rate will improve  Description  Ability to achieve and maintain a regular respiratory rate will improve  Outcome: Met This Shift     Problem: Skin Integrity:  Goal: Absence of new skin breakdown  Description  Absence of new skin breakdown  Outcome: Met This Shift

## 2019-05-30 NOTE — PROGRESS NOTES
CC- SOB    Subjective: The patient is awake and alert. Tolerating medications. Reports no side effects. Afebrile. 10 ROS otherwise negative unless otherwise specified above. Objective:    Vitals:    05/30/19 1100   BP:    Pulse:    Resp:    Temp:    SpO2: 96%       General Appearance:    Awake, alert , no acute distress.    HEENT:    Normocephalic,PERRL,neck supple, no JVD, mucosa moist, no thrush   Lungs:     Clear to auscultation bilaterally, no wheeze , crackles   Heart:    Regular rate and rhythm, no murmur   Abdomen:     Soft, non-tender, not distended  bowel sounds present,   Extremities:  B/L LE edema,no open wound,no erythema, non  tender   Skin:   no rashes or lesions   CBC+dif:  Reviewed and trend followed    Radiology:  · Ct abdomen pelvis w/o contrast- perinpephric stranding, L3 vertebral body destruction    Microbiology:  5/27/19- BC- ESBL E coli 2 sets    Assessment:  · Sepsis from recurrent gram negative abcteremia r/o GI / source   · C diff colitis ongoing treatment  · 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

## 2019-05-30 NOTE — PROGRESS NOTES
Measures:  · Ht: 5' 6\" (167.6 cm)   · Current Body Wt: 241 lb (109.3 kg)(5/30 bed scale)  · Admission Body Wt: 244 lb (110.7 kg)(5/29 first measured bed scale)  · Usual Body Wt: 243 lb (110.2 kg)(actual per EMR 04/2019)  · % Weight Change:   Pt wt appears stable per EMR. Pt states she weighed 224# at last PCP visit, but unable to recall date  · Ideal Body Wt: 130 lb (59 kg), % Ideal Body 185%  · BMI Classification: BMI 35.0 - 39.9 Obese Class II    Nutrition Interventions:   Continued Inpatient Monitoring, Coordination of Care, Education Initiated(discussed ONS options w/ pt.  Pt consumed ~50% of breakfast per RN)    Nutrition Evaluation:   · Evaluation: Goals set   · Goals: Consume >50% meals and ONS    · Monitoring: Meal Intake, Supplement Intake, Diet Tolerance, Skin Integrity, Wound Healing, I&O, Pertinent Labs, Monitor Bowel Function, Weight, Chewing/Swallowing      Electronically signed by Tiara Anna, MS, RD, LD on 5/30/19 at 10:52 AM    Contact Number:

## 2019-05-30 NOTE — DISCHARGE SUMMARY
Patient ID:  Kandis Chavez  [id number]  23 y.o.  1945    Admit date: 4/30/2019    Discharge date and time: 5/7/2019  2:25 PM     Admission Diagnoses: Right lower quadrant pain [R10.31]  Right lower quadrant pain [R10.31]    Discharge Diagnoses: Active Problems:    Right lower quadrant pain    Pneumatosis coli    Troponin level elevated  Resolved Problems:    * No resolved hospital problems. *        Consults: ID and general surgery    Procedures: none    Hospital Course: Admitted for large area of obstipation and abdominal pain. She was seen by surgery and felt this was pseudo obstruction. her abdominal pain improved after enemas and laxatives. She was seen by the ID service she was found to have complicated UTI with E.coli on blood and urine. She was started and continued on Unasyn. Her course was complicated with acute renal failure that resolved with IVF. Discharged to ECF in good condition. Discharge Exam:  See progress note from today    Disposition: SNF    Patient Instructions:   Discharge Medication List as of 5/7/2019  2:21 PM      START taking these medications    Details   ertapenem [facility]) infusion Infuse 1,000 mg intravenously every 24 hours for 5 days Compound per protocol., Disp-5000 mg, R-0Print      furosemide (LASIX) 20 MG tablet Take 1 tablet by mouth daily, Disp-60 tablet, R-3Normal      HYDROcodone-acetaminophen (NORCO) 7.5-325 MG per tablet Take 1 tablet by mouth every 6 hours as needed for Pain for up to 3 days. , Disp-10 tablet, R-0NO PRINT      enoxaparin (LOVENOX) 40 MG/0.4ML injection Inject 0.4 mLs into the skin daily, Disp-1 mL, R-0NO PRINT      docusate sodium (COLACE) 100 MG capsule Take 1 capsule by mouth 2 times daily, Disp-60 capsule, R-0Normal      vitamin D (CHOLECALCIFEROL) 1000 UNIT TABS tablet Take 1 tablet by mouth daily, Disp-60 tablet, R-0NO PRINT      cyclobenzaprine (FLEXERIL) 10 MG tablet Take 1 tablet by mouth 3 times daily as needed for Muscle spasms, Disp-30 tablet, R-0NO PRINT      cyanocobalamin 1000 MCG/ML injection Inject 1 mL into the muscle daily, Disp-1 vial, R-0NO PRINT      bisacodyl (DULCOLAX) 10 MG suppository Place 1 suppository rectally daily as needed for Constipation, Disp-30 suppository, R-0Normal         CONTINUE these medications which have CHANGED    Details   losartan (COZAAR) 50 MG tablet Take 1 tablet by mouth daily, Disp-30 tablet, R-3Normal         CONTINUE these medications which have NOT CHANGED    Details   albuterol sulfate  (90 Base) MCG/ACT inhaler Inhale 2 puffs into the lungs every 6 hours as needed for Wheezing, Disp-1 Inhaler, R-1Print      !! atenolol (TENORMIN) 50 MG tablet Take 50 mg by mouth daily. Instructed to take morning of surgery with a sip of waterHistorical Med      !! atenolol (TENORMIN) 25 MG tablet Take 25 mg by mouth nightly. Historical Med      levothyroxine (SYNTHROID) 112 MCG tablet Take 112 mcg by mouth Daily. Instructed to take morning of surgery with a sip of waterHistorical Med      pravastatin (PRAVACHOL) 40 MG tablet Take 40 mg by mouth nightly. Historical Med      potassium chloride (MICRO-K) 10 MEQ CR capsule Take 10 mEq by mouth 2 times daily. Historical Med      probenecid (BENEMID) 500 MG tablet Take 500 mg by mouth 2 times daily. Historical Med      Coenzyme Q10 (CO Q 10 PO) Take  by mouth. LAST DOSE 1/17/13Historical Med      aspirin 81 MG tablet Take 81 mg by mouth daily. LAST DOSE 1/11/13Historical Med      multivitamin (THERAGRAN) per tablet Take 1 tablet by mouth daily. Historical Med      liothyronine (CYTOMEL) 5 MCG tablet Take 5 mcg by mouth daily. Instructed to take morning of surgery with a sip of waterHistorical Med       !! - Potential duplicate medications found. Please discuss with provider.       STOP taking these medications       cefdinir (OMNICEF) 300 MG capsule Comments:   Reason for Stopping:         Spacer/Aero-Holding Chambers (E-Z SPACER) YAIR Comments:   Reason for Stopping:         predniSONE (DELTASONE) 20 MG tablet Comments:   Reason for Stopping:         lisinopril (PRINIVIL;ZESTRIL) 20 MG tablet Comments:   Reason for Stopping:             Activity: activity as tolerated  Diet: cardiac diet    Follow-up with ECF doctor in 2 days.     Note that over 30 minutes was spent in preparing discharge papers, discussing discharge with patient, medication review, etc.    Signed:  Jewels Barrow  5/30/2019  11:54 AM

## 2019-05-30 NOTE — PROGRESS NOTES
Occupational Therapy          OT consult received and appreciated. Chart reviewed. Will hold evaluation due to continue to hold for MRI . Will evaluate at a later time. Thank you. Madhuri Lozada.  [address], [address]

## 2019-05-30 NOTE — PROGRESS NOTES
Subjective: The patient is awake and alert. Resting comfortably   Feels better today   Objective:    BP (!) 142/63   Pulse 92   Temp 97.6 °F (36.4 °C) (Oral)   Resp 18   Ht 5' 6\" (1.676 m)   Wt 241 lb (109.3 kg)   SpO2 96%   BMI 38.90 kg/m²     In: 1160 [P.O.:300; I.V.:1351]  Out: 750     HEENT: NCAT,  PERRLA, No JVD  Heart:  RRR, no murmurs, gallops, or rubs. Lungs:  CTA bilaterally, no wheeze, rales or rhonchi  Abd: bowel sounds present, nontender, nondistended, no masses  Extrem:  No clubbing, cyanosis, or edema     Recent Labs     05/27/19 1910 05/28/19 1810 05/29/19  0534   WBC 11.8* 9.2 7.0   HGB 9.7* 10.9* 8.2*   HCT 30.3* 34.2 26.3*    162 118*       Recent Labs     05/27/19 1910 05/28/19 1810 05/29/19  0534 05/29/19  1736     --  134 143  --    K 3.1*   < > 3.5 2.7* 3.7     --  100 110*  --    CO2 22  --  21* 21*  --    BUN 32*  --  24* 23  --    CREATININE 1.4*  --  1.2* 1.2*  --    CALCIUM 8.9  --  8.7 8.0*  --     < > = values in this interval not displayed.        Assessment:    Patient Active Problem List   Diagnosis    Right lower quadrant pain    Pneumatosis coli    Troponin level elevated    Sepsis ([facility] [address]    HTN (hypertension)    HLD (hyperlipidemia)    Obesity    Acute metabolic encephalopathy    Clostridium difficile diarrhea    Pneumonia    Acute cystitis       Plan:    Admitted to [facility] for evaluation of hypoxemia on continue pulse ox monitoring   Sats in mid 90's - pulm following   GNR in blood cultures  IV abx therapy - ID following   Continue supportive care   continue current meds with nebs   pulm following     DVT proph  PT/OT   Dc planning- adament about going home - does not wish to got to SNF   DVT and GERD prophylaxis    All consultants notes reviewed    Merle Huff MD  12:31 PM  5/30/2019

## 2019-05-31 ENCOUNTER — APPOINTMENT (OUTPATIENT)
Dept: GENERAL RADIOLOGY | Age: 74
DRG: 853 | End: 2019-05-31
Payer: COMMERCIAL

## 2019-05-31 LAB
BLOOD CULTURE, ROUTINE: ABNORMAL
BLOOD CULTURE, ROUTINE: ABNORMAL
CULTURE, BLOOD 2: ABNORMAL
CULTURE, BLOOD 2: ABNORMAL
ORGANISM: ABNORMAL
ORGANISM: ABNORMAL

## 2019-05-31 PROCEDURE — 6360000002 HC RX W HCPCS: Performed by: INTERNAL MEDICINE

## 2019-05-31 PROCEDURE — 2060000000 HC ICU INTERMEDIATE R&B

## 2019-05-31 PROCEDURE — 99233 SBSQ HOSP IP/OBS HIGH 50: CPT | Performed by: INTERNAL MEDICINE

## 2019-05-31 PROCEDURE — 94660 CPAP INITIATION&MGMT: CPT

## 2019-05-31 PROCEDURE — 6370000000 HC RX 637 (ALT 250 FOR IP): Performed by: INTERNAL MEDICINE

## 2019-05-31 PROCEDURE — 97530 THERAPEUTIC ACTIVITIES: CPT

## 2019-05-31 PROCEDURE — 2700000000 HC OXYGEN THERAPY PER DAY

## 2019-05-31 PROCEDURE — 97162 PT EVAL MOD COMPLEX 30 MIN: CPT

## 2019-05-31 PROCEDURE — 97166 OT EVAL MOD COMPLEX 45 MIN: CPT

## 2019-05-31 PROCEDURE — 2580000003 HC RX 258: Performed by: INTERNAL MEDICINE

## 2019-05-31 PROCEDURE — 97535 SELF CARE MNGMENT TRAINING: CPT

## 2019-05-31 PROCEDURE — 99222 1ST HOSP IP/OBS MODERATE 55: CPT | Performed by: NEUROLOGICAL SURGERY

## 2019-05-31 PROCEDURE — 71045 X-RAY EXAM CHEST 1 VIEW: CPT

## 2019-05-31 PROCEDURE — 94640 AIRWAY INHALATION TREATMENT: CPT

## 2019-05-31 RX ADMIN — NYSTATIN 500000 UNITS: 100000 SUSPENSION ORAL at 15:45

## 2019-05-31 RX ADMIN — ALBUTEROL SULFATE 2.5 MG: 2.5 SOLUTION RESPIRATORY (INHALATION) at 21:11

## 2019-05-31 RX ADMIN — PROBENECID 500 MG: 500 TABLET, FILM COATED ORAL at 21:56

## 2019-05-31 RX ADMIN — VITAMIN D, TAB 1000IU (100/BT) 1000 UNITS: 25 TAB at 09:05

## 2019-05-31 RX ADMIN — Medication: at 21:53

## 2019-05-31 RX ADMIN — Medication: at 14:03

## 2019-05-31 RX ADMIN — CEFTOLOZANE AND TAZOBACTAM 750 MG: 1; .5 INJECTION, POWDER, LYOPHILIZED, FOR SOLUTION INTRAVENOUS at 06:03

## 2019-05-31 RX ADMIN — ATENOLOL 25 MG: 25 TABLET ORAL at 21:56

## 2019-05-31 RX ADMIN — NYSTATIN 500000 UNITS: 100000 SUSPENSION ORAL at 21:55

## 2019-05-31 RX ADMIN — OXYBUTYNIN CHLORIDE 5 MG: 5 TABLET ORAL at 09:05

## 2019-05-31 RX ADMIN — Medication 125 MG: at 12:08

## 2019-05-31 RX ADMIN — SODIUM CHLORIDE: 9 INJECTION, SOLUTION INTRAVENOUS at 21:53

## 2019-05-31 RX ADMIN — Medication 10 ML: at 21:54

## 2019-05-31 RX ADMIN — QUETIAPINE FUMARATE 25 MG: 25 TABLET ORAL at 21:56

## 2019-05-31 RX ADMIN — OXYBUTYNIN CHLORIDE 5 MG: 5 TABLET ORAL at 15:44

## 2019-05-31 RX ADMIN — Medication 125 MG: at 00:18

## 2019-05-31 RX ADMIN — Medication 125 MG: at 06:03

## 2019-05-31 RX ADMIN — FLUCONAZOLE 200 MG: 100 TABLET ORAL at 09:05

## 2019-05-31 RX ADMIN — SODIUM CHLORIDE: 9 INJECTION, SOLUTION INTRAVENOUS at 07:29

## 2019-05-31 RX ADMIN — ALBUTEROL SULFATE 2.5 MG: 2.5 SOLUTION RESPIRATORY (INHALATION) at 10:48

## 2019-05-31 RX ADMIN — LIOTHYRONINE SODIUM 5 MCG: 5 TABLET ORAL at 09:05

## 2019-05-31 RX ADMIN — Medication 125 MG: at 16:49

## 2019-05-31 RX ADMIN — NYSTATIN 500000 UNITS: 100000 SUSPENSION ORAL at 13:37

## 2019-05-31 RX ADMIN — ENOXAPARIN SODIUM 40 MG: 40 INJECTION, SOLUTION INTRAVENOUS; SUBCUTANEOUS at 09:05

## 2019-05-31 RX ADMIN — NYSTATIN 500000 UNITS: 100000 SUSPENSION ORAL at 09:05

## 2019-05-31 RX ADMIN — POTASSIUM CHLORIDE 40 MEQ: 20 TABLET, EXTENDED RELEASE ORAL at 09:04

## 2019-05-31 RX ADMIN — LEVOTHYROXINE SODIUM 112 MCG: 0.11 TABLET ORAL at 06:03

## 2019-05-31 RX ADMIN — MULTIVITAMIN TABLET 1 TABLET: TABLET at 09:05

## 2019-05-31 RX ADMIN — CEFTOLOZANE AND TAZOBACTAM 750 MG: 1; .5 INJECTION, POWDER, LYOPHILIZED, FOR SOLUTION INTRAVENOUS at 14:03

## 2019-05-31 RX ADMIN — PROBENECID 500 MG: 500 TABLET, FILM COATED ORAL at 09:05

## 2019-05-31 RX ADMIN — HYDROCODONE BITARTRATE AND ACETAMINOPHEN 1 TABLET: 7.5; 325 TABLET ORAL at 11:05

## 2019-05-31 RX ADMIN — Medication: at 09:06

## 2019-05-31 RX ADMIN — CEFTOLOZANE AND TAZOBACTAM 1.5 G: 1; .5 INJECTION, POWDER, LYOPHILIZED, FOR SOLUTION INTRAVENOUS at 21:54

## 2019-05-31 RX ADMIN — PRAVASTATIN SODIUM 40 MG: 20 TABLET ORAL at 21:55

## 2019-05-31 ASSESSMENT — PAIN SCALES - GENERAL
PAINLEVEL_OUTOF10: 6
PAINLEVEL_OUTOF10: 0
PAINLEVEL_OUTOF10: 6
PAINLEVEL_OUTOF10: 0
PAINLEVEL_OUTOF10: 6
PAINLEVEL_OUTOF10: 4
PAINLEVEL_OUTOF10: 0
PAINLEVEL_OUTOF10: 3

## 2019-05-31 ASSESSMENT — PAIN - FUNCTIONAL ASSESSMENT
PAIN_FUNCTIONAL_ASSESSMENT: PREVENTS OR INTERFERES WITH MANY ACTIVE NOT PASSIVE ACTIVITIES
PAIN_FUNCTIONAL_ASSESSMENT: PREVENTS OR INTERFERES SOME ACTIVE ACTIVITIES AND ADLS
PAIN_FUNCTIONAL_ASSESSMENT: PREVENTS OR INTERFERES SOME ACTIVE ACTIVITIES AND ADLS

## 2019-05-31 ASSESSMENT — PAIN DESCRIPTION - ORIENTATION
ORIENTATION: LOWER;MID
ORIENTATION: MID;LOWER
ORIENTATION: MID;LOWER;RIGHT;LEFT

## 2019-05-31 ASSESSMENT — PAIN DESCRIPTION - PAIN TYPE
TYPE: CHRONIC PAIN
TYPE: ACUTE PAIN
TYPE: CHRONIC PAIN

## 2019-05-31 ASSESSMENT — PAIN DESCRIPTION - ONSET
ONSET: ON-GOING
ONSET: ON-GOING
ONSET: GRADUAL

## 2019-05-31 ASSESSMENT — PAIN DESCRIPTION - PROGRESSION
CLINICAL_PROGRESSION: NOT CHANGED
CLINICAL_PROGRESSION: GRADUALLY WORSENING
CLINICAL_PROGRESSION: NOT CHANGED

## 2019-05-31 ASSESSMENT — PAIN DESCRIPTION - FREQUENCY
FREQUENCY: INTERMITTENT

## 2019-05-31 ASSESSMENT — PAIN DESCRIPTION - LOCATION
LOCATION: BACK
LOCATION: BACK;KNEE
LOCATION: BACK

## 2019-05-31 ASSESSMENT — ENCOUNTER SYMPTOMS
BACK PAIN: 1
GASTROINTESTINAL NEGATIVE: 1
ALLERGIC/IMMUNOLOGIC NEGATIVE: 1
RESPIRATORY NEGATIVE: 1

## 2019-05-31 ASSESSMENT — PAIN DESCRIPTION - DESCRIPTORS
DESCRIPTORS: ACHING;DULL
DESCRIPTORS: ACHING
DESCRIPTORS: ACHING;DISCOMFORT;PRESSURE

## 2019-05-31 ASSESSMENT — PAIN DESCRIPTION - DIRECTION: RADIATING_TOWARDS: R LEG

## 2019-05-31 NOTE — PROGRESS NOTES
EOB: varying assist levels (Max><Min A)   Standing: Max A x2 w/ ww (limited stand tolerance)     Activity Tolerance Poor+  Fair   Visual/  Perceptual Glasses: yes                Hand dominance: R   Strength ROM Additional Info:    RUE  Distal: 3+/5  Distal: WFL  Proximal AROM: limited secondary to h/o rotator cuff injury good  and wfl FMC/dexterity noted during ADL tasks       LUE 3+/5  WFL good  and wfl FMC/dexterity noted during ADL tasks       Hearing: [facility]   Sensation:  No c/o numbness or tingling at rest. However pt c/o L LE numbness/tingling once in seated position and while standing - RN notified. Per pt, subsided once assisted to supine position. Tone: WFL   Edema: B LE's  Buttock wound                            Comments/Treatment: Upon arrival, patient lying in bed. Therapist facilitated bed mobility (rolling, supine><sit EOB w/ education/cues for logroll techniques, spine neutrality. Increased time and effort required. Once in seated position, pt c/o increased lumbar pain. Reinforced rest, spine neutrality and additional pain management strategies - subsided w/ time), functional transfers (sit><stand 2x from EOB w/ education/cues for safety/hand placement and pain management. Extensive seated rest break between trials), sitting and standing tolerance tasks (addressing posture, balance and activity tolerance. Limited stand tolerance w/ ww, 1-2 minutes). Therapist facilitated self-care retraining: UB/LB self-care tasks (gown, socks) and seated grooming task while educating pt on modified techniques, spine neutrality, posture, safety and energy conservation techniques. Skilled monitoring of HR, O2 sats and pts response to treatment (Pt on 6L O2 via nasal cannula. O2 sat remained WNL at rest and with activity). At end of session, patient lying in bed with call light and phone within reach, all lines and tubes intact.      Pt would benefit from continued skilled OT to increase functional independence and quality of life. mod  Profile and History- med (extensive chart review)  Assessment of Occupational Performance and Identification of Deficits- med  Clinical Decision Making- med    Evaluation time includes thorough review of current medical information, gathering information on past medical history/social history and prior level of function, completion of standardized testing/informal observation of tasks, assessment of data, and development of POC/Goals. Assessment of current deficits   Functional mobility [x]  ADLs [x] Strength [x]  Cognition []  Functional transfers  [x] IADLs [] Safety Awareness [x]  Endurance [x]  Fine Motor Coordination [] Balance [x] Vision/perception [] Sensation []   Gross Motor Coordination [] ROM [x] Delirium []                  Motor Control []    Plan of Care:   ADL retraining [x]   Equipment needs [x]   Neuromuscular re-education [x] Energy Conservation Techniques [x]  Functional Transfer training [x] Patient and/or Family Education [x]  Functional Mobility training [x]  Environmental Modifications [x]  Cognitive re-training []   Compensatory techniques for ADLs [x]  Splinting Needs []   Positioning to improve overall function [x]   Therapeutic Activity [x]  Therapeutic Exercise  [x]  Visual/Perceptual: []    Delirium prevention/treatment  []   Other:  []    Rehab Potential: Good for established goals    Patient / Family Goal: Not stated     Patient and/or family were instructed diagnosis, prognosis/goals and plan of care. Demonstrated fair understanding. [] Malnutrition indicators have been identified and nursing has been notified to ensure a dietitian consult is ordered.        Mod Evaluation completed +  Timed Treatment: 23 minutes  Tx Time in: 10:27  TxTime out: 10:50        Juan Murphy, OTR/L #[id number]

## 2019-05-31 NOTE — PROGRESS NOTES
date, year, and situation. Sensation: LE sensation intact; albeit pt reports extreme numbness and tingling in L foot that eventually lead to entire L LE during session. Symptoms were relived with repositioning back to supine. Pt reported that L LE felt as if it \"fell asleep. \"   Coordination: NT  Edema: Mild to moderate pitting edema in B LE.    ASSESSMENT    Patient education  Pt educated on bed mobility techniques and safety for spinal neutrality. Pt educated on STS transfer technique. Patient response to education:   Pt verbalized understanding Pt demonstrated skill Pt requires further education in this area   Yes Yes Yes     Comments:  Prior to room entry, RN cleared pt for activity. Upon arrival, pt was supine in bed on 6L O2; O2 sat remained between 94-97% on 6L throughout session. Pt remained supine for questioning. Following questioning, pt was educated on bed mobility technique and safety to maintain spinal neutrality. Pt was instructed on log rolling technique but was unable to perform by-self and required assistance noted above. During supine to sit transfer, pt described great pain in lumbar region. Pain resided to acceptable level after encouragement from therapist. Pt sat EOB for approximately 7-10 minutes as symptoms improved. During this time, pt was educated on appropriate technique for STS transfer. Pt then performed STS transfer to [facility]. Pt stood for approximately 2 minutes with verbal and tactile cueing to remain standing. Pt reported numbness in L LE and asked to be seated to relieve numbness; numbness relieved upon being seated. Pt was given appropriate rest time for pt comfort. Pt then performed an additional STS transfer and stood for approximately 2 minutes. Pt again reported numbness in L LE after period of time and relieved upon being seated. Pt was then educated on how to properly return to supine position, but again required assistance. Pt was left lying in bed supine on 6L O2.  All needs were met during evaluation. Following evaluation, RN was notified of pt's performance and symptoms with activity. Pts/family goals:  Return home    Patient and or family understand(s) diagnosis, prognosis, and plan of care:  Yes    PLAN  PT care will be provided in accordance with the objectives noted above. Whenever appropriate, clear delegation orders will be provided for nursing staff. Exercises and functional mobility practice will be used as well as appropriate assistive devices or modalities to obtain goals. Patient and family education will also be administered as needed. Frequency of treatments: 2-5x/week x 3-4 days.     Time in: 1005  Time out: [address], PT, [place]  [id number]

## 2019-05-31 NOTE — PROGRESS NOTES
carotid bruits, no murmurs, no gallops, no carotid bruits and no JVD   Abdomen: obese, soft, non-tender, non-distended, normal bowel sounds, no masses or organomegaly   Extremities:+2 edema   Musculoskeletal: normal range of motion, no joint swelling, deformity or tenderness   Neurologic: reflexes normal and symmetric, no cranial nerve deficit noted    LABS/IMAGING:    CBC:  Lab Results   Component Value Date    WBC 7.0 05/29/2019    HGB 8.2 (L) 05/29/2019    HCT 26.3 (L) 05/29/2019    MCV 92.3 05/29/2019     (L) 05/29/2019    LYMPHOPCT 9.7 (L) 05/29/2019    RBC 2.85 (L) 05/29/2019    MCH 28.8 05/29/2019    MCHC 31.2 (L) 05/29/2019    RDW 16.8 (H) 05/29/2019    NEUTOPHILPCT 77.8 05/29/2019    MONOPCT 10.4 05/29/2019    BASOPCT 0.3 05/29/2019    NEUTROABS 5.45 05/29/2019    LYMPHSABS 0.68 (L) 05/29/2019    MONOSABS 0.73 05/29/2019    EOSABS 0.05 05/29/2019    BASOSABS 0.02 05/29/2019       Recent Labs     05/29/19  0534 05/28/19 1810 05/27/19  1910   WBC 7.0 9.2 11.8*   HGB 8.2* 10.9* 9.7*   HCT 26.3* 34.2 30.3*   MCV 92.3 92.2 91.0   * 162 143       BMP:   Recent Labs     05/28/19 1810 05/29/19  0534 05/29/19  1736    143  --    K 3.5 2.7* 3.7    110*  --    CO2 21* 21*  --    PHOS 2.4*  --   --    BUN 24* 23  --    CREATININE 1.2* 1.2*  --        MG:   Lab Results   Component Value Date    MG 1.9 05/29/2019     Ca/Phos:   Lab Results   Component Value Date    CALCIUM 8.0 (L) 05/29/2019    PHOS 2.4 (L) 05/28/2019     Amylase: No results found for: AMYLASE  Lipase: No results found for: LIPASE  LIVER PROFILE:   Recent Labs     05/28/19  1810   AST 32*   ALT 23   BILITOT 0.5   ALKPHOS 120*       PT/INR: No results for input(s): PROTIME, INR in the last 72 hours. APTT: No results for input(s): APTT in the last 72 hours.     Cardiac Enzymes:  Lab Results   Component Value Date    CKTOTAL 47 05/28/2019    CKMB 1.7 05/29/2019    TROPONINI 0.04 (H) 05/28/2019               PROBLEM LIST:  Patient

## 2019-05-31 NOTE — CARE COORDINATION
Plan remains for patient to return to [facility].  As long as therapy notes stay updated she can return prior to precert being obtained. Envelope and ambulance form on soft chart.

## 2019-05-31 NOTE — PROGRESS NOTES
Pulmonary [facility]                             Pulmonary Progress Note :          Patient: Eldon Galdamez  MRN: [id number]  : 1945      Date of Admission: .2019  6:29 PM          Reason for Consultation:  CC : Follow up on Hypoxia     HPI:   Doing much better  Now on 4 L   No fever or chills  SOB has improved   No chest pain   Was able to pull 500 ml only using incentive spirometry   BC shows GNR     PHYSICAL EXAMINATION:     VITAL SIGNS:  /64   Pulse 101   Temp 97.2 °F (36.2 °C) (Temporal)   Resp 18   Ht 5' 6\" (1.676 m)   Wt 241 lb (109.3 kg)   SpO2 91%   BMI 38.90 kg/m²   Wt Readings from Last 3 Encounters:   19 241 lb (109.3 kg)   19 231 lb 4.8 oz (104.9 kg)   19 243 lb (110.2 kg)     Temp Readings from Last 3 Encounters:   19 97.2 °F (36.2 °C) (Temporal)   19 97.9 °F (36.6 °C) (Temporal)   19 97.8 °F (36.6 °C) (Oral)     TMAX:  BP Readings from Last 3 Encounters:   19 138/64   19 (!) 150/75   19 (!) 141/74     Pulse Readings from Last 3 Encounters:   19 101   19 86   19 79           INTAKE/OUTPUTS:  I/O last 3 completed shifts:   In: 977 [P.O.:360; I.V.:419; IV Piggyback:198]  Out: 250 [Urine:250]    Intake/Output Summary (Last 24 hours) at 2019 1304  Last data filed at 2019 0950  Gross per 24 hour   Intake 987 ml   Output 250 ml   Net 737 ml       General Appearance: alert and oriented to person, place and time, well-developed and   well-nourished, in no acute distress   Eyes: pupils equal, round, and reactive to light, extraocular eye movements intact, conjunctivae normal and sclera anicteric   Neck: neck supple and non tender without mass, no thyromegaly, no thyroid nodules and no cervical adenopathy   Pulmonary/Chest:decrease breath sounds bibasilar   Cardiovascular: normal rate, regular rhythm, normal S1 and S2, no murmurs, rubs, clicks or gallops, distal pulses intact, no carotid bruits, no murmurs, no gallops, no carotid bruits and no JVD   Abdomen: obese, soft, non-tender, non-distended, normal bowel sounds, no masses or organomegaly   Extremities:+2 edema   Musculoskeletal: normal range of motion, no joint swelling, deformity or tenderness   Neurologic: reflexes normal and symmetric, no cranial nerve deficit noted    LABS/IMAGING:    CBC:  Lab Results   Component Value Date    WBC 7.0 05/29/2019    HGB 8.2 (L) 05/29/2019    HCT 26.3 (L) 05/29/2019    MCV 92.3 05/29/2019     (L) 05/29/2019    LYMPHOPCT 9.7 (L) 05/29/2019    RBC 2.85 (L) 05/29/2019    MCH 28.8 05/29/2019    MCHC 31.2 (L) 05/29/2019    RDW 16.8 (H) 05/29/2019    NEUTOPHILPCT 77.8 05/29/2019    MONOPCT 10.4 05/29/2019    BASOPCT 0.3 05/29/2019    NEUTROABS 5.45 05/29/2019    LYMPHSABS 0.68 (L) 05/29/2019    MONOSABS 0.73 05/29/2019    EOSABS 0.05 05/29/2019    BASOSABS 0.02 05/29/2019       Recent Labs     05/29/19  0534 05/28/19 1810 05/27/19  1910   WBC 7.0 9.2 11.8*   HGB 8.2* 10.9* 9.7*   HCT 26.3* 34.2 30.3*   MCV 92.3 92.2 91.0   * 162 143       BMP:   Recent Labs     05/28/19 1810 05/29/19  0534 05/29/19  1736    143  --    K 3.5 2.7* 3.7    110*  --    CO2 21* 21*  --    PHOS 2.4*  --   --    BUN 24* 23  --    CREATININE 1.2* 1.2*  --        MG:   Lab Results   Component Value Date    MG 1.9 05/29/2019     Ca/Phos:   Lab Results   Component Value Date    CALCIUM 8.0 (L) 05/29/2019    PHOS 2.4 (L) 05/28/2019     Amylase: No results found for: AMYLASE  Lipase: No results found for: LIPASE  LIVER PROFILE:   Recent Labs     05/28/19  1810   AST 32*   ALT 23   BILITOT 0.5   ALKPHOS 120*       PT/INR: No results for input(s): PROTIME, INR in the last 72 hours. APTT: No results for input(s): APTT in the last 72 hours.     Cardiac Enzymes:  Lab Results   Component Value Date    CKTOTAL 47 05/28/2019    CKMB 1.7 05/29/2019    TROPONINI 0.04 (H) 05/28/2019 PROBLEM LIST:  Patient Active Problem List   Diagnosis    Right lower quadrant pain    Pneumatosis coli    Troponin level elevated    Sepsis ([facility] [address]    HTN (hypertension)    HLD (hyperlipidemia)    Obesity    Acute metabolic encephalopathy    Clostridium difficile diarrhea    Pneumonia    Acute cystitis               ASSESSMENT:  1.) Acute Hypoxic resp failure   2.) History of C diff   3. )Bilateral atelectasis   4.)Possible LEILANI   5.)Hypokalemia       PLAN:  *-Doing much better on 4 L and will continue to    wean   *- Has to ambulate and up to chair ,her hypoxia mainly from shunt and atelectasis ,she again encouraged to do  More of spirometry and take deep breath  *-BD   *-Sepsis with GNR as per ID   *- CPAP as needed   Will follow closely   Goal for sat 92-77        [address] [facility]     NOTE: This report was transcribed using voice recognition software. Every effort was made to ensure accuracy; however, inadvertent computerized transcription errors may be present.

## 2019-05-31 NOTE — PROGRESS NOTES
Pt seen and examined  Dictated  74year old female with e coli bacteremia. She has had progressive low back pain and LE weakness/numbness. Lumbar MRI reveals lumbar osteomyelitis/epidural abscess vs epidural mass/fracture. Lumbar MRI with Seven will be ordered. Custom TLSO brace ordered. Surgical decompression possibly Monday pending MRI results.

## 2019-05-31 NOTE — CONSULTS
[address]     Chief Complaint   Patient presents with    Fever     sent in from [facility] to r/o sepsis. +Cdiff and Ecoli in urine   . Chief Complaint: back pain, leg numbness and weakness    HPI:   Ford Huang is a 74 y.o.  female who has history of e coli bacteremia. She has had progressive low back pain and leg numbness and weakness. She has not ambulated in a few weeks. She currently has a reynolds in, and denies any bowel or bladder dysfunction prior to admission. The back pain is severe in intensity and sharp in character. Movement aggravates the pain and rest improves it.      Past Medical History:   Diagnosis Date    Arthritis     Gout     CONTROLLED    Hyperlipidemia     Hypertension     STABLE    Macular hole, right eye     Thyroid disease      Past Surgical History:   Procedure Laterality Date    BREAST SURGERY Right 1982    BENIGN CYST    CHOLECYSTECTOMY  1997    HYSTERECTOMY  1985      Family History   Problem Relation Age of Onset    Other Mother       Social History     Socioeconomic History    Marital status:      Spouse name: Not on file    Number of children: Not on file    Years of education: Not on file    Highest education level: Not on file   Occupational History    Not on file   Social Needs    Financial resource strain: Not on file    Food insecurity:     Worry: Not on file     Inability: Not on file    Transportation needs:     Medical: Not on file     Non-medical: Not on file   Tobacco Use    Smoking status: Never Smoker    Smokeless tobacco: Never Used   Substance and Sexual Activity    Alcohol use: Yes     Comment: socially    Drug use: No    Sexual activity: Not on file   Lifestyle    Physical activity:     Days per week: Not on file     Minutes per session: Not on file    Stress: Not on file   Relationships    Social connections:     Talks on phone: Not on file     Gets together: Not on Allergic/Immunologic: Negative. Neurological: Positive for weakness. Hematological: Negative. Psychiatric/Behavioral: Negative. Physical Exam     /64   Pulse 101   Temp 97.2 °F (36.2 °C) (Temporal)   Resp 18   Ht 5' 6\" (1.676 m)   Wt 241 lb (109.3 kg)   SpO2 91%   BMI 38.90 kg/m²    Physical Exam   Constitutional: Appears well-nourished. Head: Normocephalic and atraumatic. Eyes: EOM are normal. Pupils are equal, round, and reactive to light. Neck: Normal range of motion. No tracheal deviation present. Cardiovascular: Normal rate. Pulmonary/Chest: No stridor. Abdominal: No distension. Neurological:   Oriented to person, place, and time. CN 3-12 intact  Motor strength 4-/5 in both LE  Sensation intact to light touch   Skin: Skin is warm and dry. Psychiatric: Thought content normal.     Assessment:   74year old female with e coli bacteremia. She has had progressive low back pain and LE weakness/numbness. Lumbar MRI reveals lumbar osteomyelitis/epidural abscess vs epidural mass/fracture causing severe L3-5 central stenosis. Plan:  · We will obtain lumbar MRI with and without Seven. · Custom TLSO brace ordered  · Surgical decompression possibly Monday      Electronically signed by HERMAN Vences on 5/31/2019 at 2:35 PM     I have interviewed and examined the patient and agree with above. She has a pathologic fracture of L3 with a spondylolisthesis at L4-L5. Will recommend decompression from L3-L5 with facet fusion. Unable to get MRI with contrast as she cannot fit in our MRI. If large bore mobile unit is available. Will consider it prior to surgery.     Lacie Morales

## 2019-05-31 NOTE — PROGRESS NOTES
Spoke to [employer] that patient will need [place] [facility] for this MRI with contrast, as she did with the Lumbar without. Patient too large for scanner here.

## 2019-05-31 NOTE — PLAN OF CARE
Problem: Risk for Impaired Skin Integrity  Goal: Tissue integrity - skin and mucous membranes  Description  Structural intactness and normal physiological function of skin and  mucous membranes.   Outcome: Met This Shift     Problem: Falls - Risk of:  Goal: Will remain free from falls  Description  Will remain free from falls  Outcome: Met This Shift  Goal: Absence of physical injury  Description  Absence of physical injury  Outcome: Met This Shift     Problem: Skin Integrity:  Goal: Will show no infection signs and symptoms  Description  Will show no infection signs and symptoms  Outcome: Met This Shift  Goal: Absence of new skin breakdown  Description  Absence of new skin breakdown  Outcome: Met This Shift

## 2019-06-01 PROBLEM — M54.50 ACUTE MIDLINE LOW BACK PAIN WITHOUT SCIATICA: Status: ACTIVE | Noted: 2019-06-01

## 2019-06-01 PROBLEM — M46.26 ACUTE OSTEOMYELITIS OF LUMBAR SPINE (HCC): Status: ACTIVE | Noted: 2019-06-01

## 2019-06-01 LAB
ABO/RH: NORMAL
ANION GAP SERPL CALCULATED.3IONS-SCNC: 10 MMOL/L (ref 7–16)
ANTIBODY SCREEN: NORMAL
BASOPHILS ABSOLUTE: 0.03 E9/L (ref 0–0.2)
BASOPHILS RELATIVE PERCENT: 0.3 % (ref 0–2)
BLOOD BANK DISPENSE STATUS: NORMAL
BLOOD BANK DISPENSE STATUS: NORMAL
BLOOD BANK PRODUCT CODE: NORMAL
BLOOD BANK PRODUCT CODE: NORMAL
BPU ID: NORMAL
BPU ID: NORMAL
BUN BLDV-MCNC: 15 MG/DL (ref 8–23)
CALCIUM SERPL-MCNC: 8.1 MG/DL (ref 8.6–10.2)
CHLORIDE BLD-SCNC: 110 MMOL/L (ref 98–107)
CO2: 21 MMOL/L (ref 22–29)
CREAT SERPL-MCNC: 0.9 MG/DL (ref 0.5–1)
DESCRIPTION BLOOD BANK: NORMAL
DESCRIPTION BLOOD BANK: NORMAL
EOSINOPHILS ABSOLUTE: 0.12 E9/L (ref 0.05–0.5)
EOSINOPHILS RELATIVE PERCENT: 1.2 % (ref 0–6)
GFR AFRICAN AMERICAN: >60
GFR NON-AFRICAN AMERICAN: >60 ML/MIN/1.73
GLUCOSE BLD-MCNC: 97 MG/DL (ref 74–99)
HCT VFR BLD CALC: 26.7 % (ref 34–48)
HEMOGLOBIN: 8.1 G/DL (ref 11.5–15.5)
IMMATURE GRANULOCYTES #: 0.09 E9/L
IMMATURE GRANULOCYTES %: 0.9 % (ref 0–5)
LYMPHOCYTES ABSOLUTE: 0.89 E9/L (ref 1.5–4)
LYMPHOCYTES RELATIVE PERCENT: 9.1 % (ref 20–42)
MCH RBC QN AUTO: 28.5 PG (ref 26–35)
MCHC RBC AUTO-ENTMCNC: 30.3 % (ref 32–34.5)
MCV RBC AUTO: 94 FL (ref 80–99.9)
MONOCYTES ABSOLUTE: 0.56 E9/L (ref 0.1–0.95)
MONOCYTES RELATIVE PERCENT: 5.7 % (ref 2–12)
NEUTROPHILS ABSOLUTE: 8.06 E9/L (ref 1.8–7.3)
NEUTROPHILS RELATIVE PERCENT: 82.8 % (ref 43–80)
PDW BLD-RTO: 16.3 FL (ref 11.5–15)
PLATELET # BLD: 170 E9/L (ref 130–450)
PMV BLD AUTO: 12.8 FL (ref 7–12)
POTASSIUM SERPL-SCNC: 3.4 MMOL/L (ref 3.5–5)
RBC # BLD: 2.84 E12/L (ref 3.5–5.5)
SODIUM BLD-SCNC: 141 MMOL/L (ref 132–146)
WBC # BLD: 9.8 E9/L (ref 4.5–11.5)

## 2019-06-01 PROCEDURE — 36569 INSJ PICC 5 YR+ W/O IMAGING: CPT

## 2019-06-01 PROCEDURE — 86901 BLOOD TYPING SEROLOGIC RH(D): CPT

## 2019-06-01 PROCEDURE — 86850 RBC ANTIBODY SCREEN: CPT

## 2019-06-01 PROCEDURE — 6370000000 HC RX 637 (ALT 250 FOR IP): Performed by: INTERNAL MEDICINE

## 2019-06-01 PROCEDURE — 86900 BLOOD TYPING SEROLOGIC ABO: CPT

## 2019-06-01 PROCEDURE — 02HV33Z INSERTION OF INFUSION DEVICE INTO SUPERIOR VENA CAVA, PERCUTANEOUS APPROACH: ICD-10-PCS | Performed by: INTERNAL MEDICINE

## 2019-06-01 PROCEDURE — 94640 AIRWAY INHALATION TREATMENT: CPT

## 2019-06-01 PROCEDURE — 2060000000 HC ICU INTERMEDIATE R&B

## 2019-06-01 PROCEDURE — 2580000003 HC RX 258: Performed by: NEUROLOGICAL SURGERY

## 2019-06-01 PROCEDURE — 2700000000 HC OXYGEN THERAPY PER DAY

## 2019-06-01 PROCEDURE — 86923 COMPATIBILITY TEST ELECTRIC: CPT

## 2019-06-01 PROCEDURE — 80048 BASIC METABOLIC PNL TOTAL CA: CPT

## 2019-06-01 PROCEDURE — 36430 TRANSFUSION BLD/BLD COMPNT: CPT

## 2019-06-01 PROCEDURE — 36415 COLL VENOUS BLD VENIPUNCTURE: CPT

## 2019-06-01 PROCEDURE — P9016 RBC LEUKOCYTES REDUCED: HCPCS

## 2019-06-01 PROCEDURE — 6360000002 HC RX W HCPCS: Performed by: INTERNAL MEDICINE

## 2019-06-01 PROCEDURE — 87040 BLOOD CULTURE FOR BACTERIA: CPT

## 2019-06-01 PROCEDURE — 2580000003 HC RX 258: Performed by: INTERNAL MEDICINE

## 2019-06-01 PROCEDURE — C1751 CATH, INF, PER/CENT/MIDLINE: HCPCS

## 2019-06-01 PROCEDURE — 85025 COMPLETE CBC W/AUTO DIFF WBC: CPT

## 2019-06-01 PROCEDURE — 99232 SBSQ HOSP IP/OBS MODERATE 35: CPT | Performed by: INTERNAL MEDICINE

## 2019-06-01 RX ORDER — SODIUM CHLORIDE 0.9 % (FLUSH) 0.9 %
10 SYRINGE (ML) INJECTION PRN
Status: DISCONTINUED | OUTPATIENT
Start: 2019-06-01 | End: 2019-06-03

## 2019-06-01 RX ORDER — SODIUM CHLORIDE 0.9 % (FLUSH) 0.9 %
10 SYRINGE (ML) INJECTION EVERY 12 HOURS SCHEDULED
Status: DISCONTINUED | OUTPATIENT
Start: 2019-06-01 | End: 2019-06-03

## 2019-06-01 RX ORDER — 0.9 % SODIUM CHLORIDE 0.9 %
250 INTRAVENOUS SOLUTION INTRAVENOUS ONCE
Status: COMPLETED | OUTPATIENT
Start: 2019-06-01 | End: 2019-06-02

## 2019-06-01 RX ORDER — LIDOCAINE HYDROCHLORIDE 10 MG/ML
5 INJECTION, SOLUTION EPIDURAL; INFILTRATION; INTRACAUDAL; PERINEURAL ONCE
Status: DISCONTINUED | OUTPATIENT
Start: 2019-06-01 | End: 2019-06-03

## 2019-06-01 RX ADMIN — Medication 125 MG: at 19:21

## 2019-06-01 RX ADMIN — ENOXAPARIN SODIUM 40 MG: 40 INJECTION, SOLUTION INTRAVENOUS; SUBCUTANEOUS at 09:11

## 2019-06-01 RX ADMIN — FLUCONAZOLE 200 MG: 100 TABLET ORAL at 09:12

## 2019-06-01 RX ADMIN — Medication: at 14:07

## 2019-06-01 RX ADMIN — LIOTHYRONINE SODIUM 5 MCG: 5 TABLET ORAL at 09:12

## 2019-06-01 RX ADMIN — LEVOTHYROXINE SODIUM 112 MCG: 0.11 TABLET ORAL at 06:38

## 2019-06-01 RX ADMIN — NYSTATIN 500000 UNITS: 100000 SUSPENSION ORAL at 16:24

## 2019-06-01 RX ADMIN — PROBENECID 500 MG: 500 TABLET, FILM COATED ORAL at 20:35

## 2019-06-01 RX ADMIN — POTASSIUM CHLORIDE 40 MEQ: 20 TABLET, EXTENDED RELEASE ORAL at 09:11

## 2019-06-01 RX ADMIN — PRAVASTATIN SODIUM 40 MG: 20 TABLET ORAL at 20:34

## 2019-06-01 RX ADMIN — MULTIVITAMIN TABLET 1 TABLET: TABLET at 09:12

## 2019-06-01 RX ADMIN — SODIUM CHLORIDE 250 ML: 9 INJECTION, SOLUTION INTRAVENOUS at 14:20

## 2019-06-01 RX ADMIN — OXYBUTYNIN CHLORIDE 5 MG: 5 TABLET ORAL at 16:24

## 2019-06-01 RX ADMIN — QUETIAPINE FUMARATE 25 MG: 25 TABLET ORAL at 20:34

## 2019-06-01 RX ADMIN — SODIUM CHLORIDE: 9 INJECTION, SOLUTION INTRAVENOUS at 13:59

## 2019-06-01 RX ADMIN — VITAMIN D, TAB 1000IU (100/BT) 1000 UNITS: 25 TAB at 09:12

## 2019-06-01 RX ADMIN — ALBUTEROL SULFATE 2.5 MG: 2.5 SOLUTION RESPIRATORY (INHALATION) at 13:10

## 2019-06-01 RX ADMIN — NYSTATIN 500000 UNITS: 100000 SUSPENSION ORAL at 09:12

## 2019-06-01 RX ADMIN — PROBENECID 500 MG: 500 TABLET, FILM COATED ORAL at 09:12

## 2019-06-01 RX ADMIN — ALBUTEROL SULFATE 2.5 MG: 2.5 SOLUTION RESPIRATORY (INHALATION) at 17:55

## 2019-06-01 RX ADMIN — OXYBUTYNIN CHLORIDE 5 MG: 5 TABLET ORAL at 09:12

## 2019-06-01 RX ADMIN — Medication: at 09:13

## 2019-06-01 RX ADMIN — Medication 125 MG: at 06:38

## 2019-06-01 RX ADMIN — CEFTOLOZANE AND TAZOBACTAM 1.5 G: 1; .5 INJECTION, POWDER, LYOPHILIZED, FOR SOLUTION INTRAVENOUS at 22:43

## 2019-06-01 RX ADMIN — Medication 125 MG: at 12:27

## 2019-06-01 RX ADMIN — CEFTOLOZANE AND TAZOBACTAM 1.5 G: 1; .5 INJECTION, POWDER, LYOPHILIZED, FOR SOLUTION INTRAVENOUS at 06:38

## 2019-06-01 RX ADMIN — Medication 10 ML: at 22:43

## 2019-06-01 RX ADMIN — Medication: at 22:47

## 2019-06-01 RX ADMIN — ATENOLOL 25 MG: 25 TABLET ORAL at 20:35

## 2019-06-01 RX ADMIN — NYSTATIN 500000 UNITS: 100000 SUSPENSION ORAL at 14:07

## 2019-06-01 ASSESSMENT — PAIN SCALES - GENERAL
PAINLEVEL_OUTOF10: 0
PAINLEVEL_OUTOF10: 1
PAINLEVEL_OUTOF10: 0

## 2019-06-01 NOTE — PROGRESS NOTES
Subjective: The patient is awake and alert. Resting comfortably   Feels better today   bACK PAIN AND STIFFNESS PERSISTS       Objective:    BP (!) 155/75   Pulse 100   Temp 98.9 °F (37.2 °C) (Temporal)   Resp 18   Ht 5' 6\" (1.676 m)   Wt 247 lb 5 oz (112.2 kg)   SpO2 94%   BMI 39.92 kg/m²     In: 2395 [P.O.:600; I.V.:1745; Blood:50]  Out: 1100     HEENT: NCAT,  PERRLA, No JVD  Heart:  RRR, no murmurs, gallops, or rubs. Lungs:  CTA bilaterally, no wheeze, rales or rhonchi  Abd: bowel sounds present, nontender, nondistended, no masses  Extrem:  No clubbing, cyanosis, or edema     Recent Labs     06/01/19  0518   WBC 9.8   HGB 8.1*   HCT 26.7*          Recent Labs     05/29/19  1736 06/01/19  0518   NA  --  141   K 3.7 3.4*   CL  --  110*   CO2  --  21*   BUN  --  15   CREATININE  --  0.9   CALCIUM  --  8.1*       Assessment:    Patient Active Problem List   Diagnosis    Right lower quadrant pain    Pneumatosis coli    Troponin level elevated    Sepsis (HCC)    HTN (hypertension)    HLD (hyperlipidemia)    Obesity    Acute metabolic encephalopathy    Clostridium difficile diarrhea    Pneumonia    Acute cystitis    Acute midline low back pain without sciatica    Acute osteomyelitis of lumbar spine ([facility] [address]       Plan:    Admitted to tele for evaluation of hypoxemia on continue pulse ox monitoring   Sats in mid 90's - pulm following   Cancel CTA chest - no need  Continue nebs and supportive care     GNR in blood cultures from 5/30   ?  Discitis Joseph Javon on mri - repeat MRI with steve on Monday   IV abx therapy - ID following   Continue supportive care   Plan for OR on Monday - discussed with NS     DVT proph  PT/OT   Dc planning- adament about going home - does not wish to got to SNF     All consultants notes reviewed    Lambert Castrejon MD  4:49 PM  6/1/2019

## 2019-06-01 NOTE — PROGRESS NOTES
CC- Recurrent ESBL E coli bacteremia, sepsis, discitis     Pt is awake and alert  Denies fever and chills  Reports back pain, stiffness  Afebrile        Current Facility-Administered Medications   Medication Dose Route Frequency Provider Last Rate Last Dose    0.9 % sodium chloride bolus  250 mL Intravenous Once Michelle Parrish MD        ceftolozane-tazobactam (ZERBAXA) 1.5 g in dextrose 5 % 100 mL IVPB  1.5 g Intravenous Q8H Martin Gallardo MD   Stopped at 06/01/19 0740    potassium chloride (KLOR-CON M) extended release tablet 40 mEq  40 mEq Oral Daily Chetna Martin MD   40 mEq at 06/01/19 0911    albuterol (PROVENTIL) nebulizer solution 2.5 mg  2.5 mg Nebulization 4x daily Chetna Martin MD   2.5 mg at 06/01/19 1310    miconazole nitrate 2 % ointment   Topical TID Chetna Martin MD        And    miconazole nitrate 2 % ointment   Topical TID PRN Chetna Martin MD        sodium chloride flush 0.9 % injection 10 mL  10 mL Intravenous PRN Rosia Rich, DO   10 mL at 05/28/19 0618    sodium chloride flush 0.9 % injection 10 mL  10 mL Intravenous 2 times per day Rosia Rich, DO   10 mL at 05/31/19 2154    acetaminophen (TYLENOL) tablet 650 mg  650 mg Oral Q4H PRN Rosia Rich, DO        atenolol (TENORMIN) tablet 25 mg  25 mg Oral Nightly Rosia Rich, DO   25 mg at 05/31/19 2156    docusate sodium (COLACE) capsule 100 mg  100 mg Oral BID Rosia Rich, DO   100 mg at 05/29/19 2105    enoxaparin (LOVENOX) injection 40 mg  40 mg Subcutaneous Daily Rosia Rich, DO   40 mg at 06/01/19 1498    vitamin D (CHOLECALCIFEROL) tablet 1,000 Units  1,000 Units Oral Daily Rosia Rich, DO   1,000 Units at 06/01/19 0912    vancomycin (VANCOCIN) oral solution 125 mg  125 mg Oral 4 times per day Rosia Rich, DO   125 mg at 06/01/19 1227    QUEtiapine (SEROQUEL) tablet 12.5 mg  12.5 mg Oral Q6H PRN Rosia Rich, DO        QUEtiapine (SEROQUEL) tablet 25 mg  25 mg Oral Nightly Luke Doss DO   25 mg at 05/31/19 2156    probenecid (BENEMID) tablet 500 mg  500 mg Oral BID Radha Gonzalez, DO   500 mg at 06/01/19 0912    pravastatin (PRAVACHOL) tablet 40 mg  40 mg Oral Nightly Radha Gonzaelz, DO   40 mg at 05/31/19 2155    oxybutynin (DITROPAN) tablet 5 mg  5 mg Oral BID Radha Gonzalez, DO   5 mg at 06/01/19 0912    nystatin (MYCOSTATIN) 955024 UNIT/ML suspension 500,000 Units  5 mL Oral 4x Daily Radha Gonzalez, DO   500,000 Units at 06/01/19 1001    multivitamin 1 tablet  1 tablet Oral Daily Radha Gonzalez, DO   1 tablet at 06/01/19 9126    liothyronine (CYTOMEL) tablet 5 mcg  5 mcg Oral Daily Radha Gonzalez, DO   5 mcg at 06/01/19 8614    levothyroxine (SYNTHROID) tablet 112 mcg  112 mcg Oral Daily Radha Gonzalez, DO   112 mcg at 06/01/19 7307    HYDROcodone-acetaminophen (NORCO) 7.5-325 MG per tablet 1 tablet  1 tablet Oral Q6H PRN Radha Gonzalez, DO   1 tablet at 05/31/19 1105    fluconazole (DIFLUCAN) tablet 200 mg  200 mg Oral Daily Radha Gonzalez, DO   200 mg at 06/01/19 0912    magnesium hydroxide (MILK OF MAGNESIA) 400 MG/5ML suspension 30 mL  30 mL Oral Daily PRN Radha Gonzalez, DO        ondansetron [facility]) injection 4 mg  4 mg Intravenous Q6H PRN Radha Gonzalez, DO        0.9 % sodium chloride infusion   Intravenous Continuous Radha Gonzalez, DO 75 mL/hr at 05/31/19 2153           REVIEW OF SYSTEMS:      CONSTITUTIONAL:  Denies fever   HEENT: denies blurring of vision or double vision, denies hearing problem  RESPIRATORY: denies cough, shortness of breath, sputum expectoration, chest pain. CARDIOVASCULAR:  Denies palpitation  GASTROINTESTINAL:  Denies abdomen pain, diarrhea or constipation. GENITOURINARY:  Denies burning urination or frequency of urination  INTEGUMENT: denies wound , rash  HEMATOLOGIC/LYMPHATIC:  Denies lymph node swelling, gum bleeding or easy bruising.   MUSCULOSKELETAL:  Low back pain and stiffness   NEUROLOGICAL:  weakness     Objective:    BP (!) 149/71   Pulse 88   Temp 98.8 °F (37.1 °C) (Temporal)   Resp 18   Ht 5' 6\" (1.676 m)   Wt 247 lb 5 oz (112.2 kg)   SpO2 95%   BMI 39.92 kg/m²     General Appearance:    Awake, alert , no acute distress.    HEENT:    Normocephalic,PERRL,neck supple, no JVD, mucosa moist, no thrush   Lungs:     Clear to auscultation bilaterally, no wheeze , crackles   Heart:    Regular rate and rhythm, no murmur   Abdomen:     Soft, non-tender, not distended  bowel sounds present,   Extremities:  B/L LE edema,no open wound,no erythema, non  tender   Skin:   no rashes or lesions   CBC with Differential:      Lab Results   Component Value Date    WBC 9.8 06/01/2019    RBC 2.84 06/01/2019    HGB 8.1 06/01/2019    HCT 26.7 06/01/2019     06/01/2019    MCV 94.0 06/01/2019    MCH 28.5 06/01/2019    MCHC 30.3 06/01/2019    RDW 16.3 06/01/2019    LYMPHOPCT 9.1 06/01/2019    MONOPCT 5.7 06/01/2019    BASOPCT 0.3 06/01/2019    MONOSABS 0.56 06/01/2019    LYMPHSABS 0.89 06/01/2019    EOSABS 0.12 06/01/2019    BASOSABS 0.03 06/01/2019       CMP:    Lab Results   Component Value Date     06/01/2019    K 3.4 06/01/2019    K 2.7 05/29/2019     06/01/2019    CO2 21 06/01/2019    BUN 15 06/01/2019    CREATININE 0.9 06/01/2019    GFRAA >60 06/01/2019    LABGLOM >60 06/01/2019    GLUCOSE 97 06/01/2019    PROT 7.3 05/28/2019    LABALBU 2.2 05/28/2019    CALCIUM 8.1 06/01/2019    BILITOT 0.5 05/28/2019    ALKPHOS 120 05/28/2019    AST 32 05/28/2019    ALT 23 05/28/2019         Susceptibility     Escherichia coli (1)     Antibiotic Interpretation MARTINEZ Status    ampicillin Resistant >=^32 mcg/mL     aztreonam Resistant =^16 mcg/mL     ceFAZolin Resistant >=^64 mcg/mL     cefotaxime Resistant >=^64 mcg/mL     cefoTEtan Sensitive <=^4 mcg/mL     cefOXitin Sensitive <=^4 mcg/mL     cefpodoxime proxetil Resistant >=^8 mcg/mL     CEFTOLOZANE/TAZOBACTAM (ZERBAXA) Sensitive <=^0.25 mcg/mL     cefTRIAXone Resistant >=^64 mcg/mL     cefuroxime Resistant >=^64 mcg/mL Confirmatory Extended Spectrum Beta-Lactamase  Pos mcg/mL     doripenem Sensitive <=^0.12 mcg/mL     gentamicin Resistant >=^16 mcg/mL     imipenem Sensitive <=^0.25 mcg/mL     levofloxacin Resistant >=^8 mcg/mL     meropenem Sensitive <=^0.25 mcg/mL     moxifloxacin Resistant >=^8 mcg/mL     nalidixic acid Resistant >=^32 mcg/mL     tobramycin Resistant >=^16 mcg/mL     trimethoprim-sulfamethoxazole Resistant >=^320 mcg/mL     Lab and Collection     Culture Blood #1 - 5/27/2019           Radiology:    CT abdomen and pelvis -    Very limited examination for evaluation of renal pathology due to the  lack of intravenous contrast. There is perinephric stranding which may  due to renal failure. Pyelonephritis or renal abscess cannot be  evaluated. Consider either CT with contrast if possible or  Ultrasonography. MRI - L spine     1. Osteolytic destruction and compression of the L3 vertebral body and  mild acute posterior wedge compression of the L2 inferior endplate. Extensive surrounding inflammation and edema involving the paraspinal  musculature and psoas muscles. The findings are concerning for  spondylodiscitis involving the L2 and L3 vertebra, L2-L3  intervertebral disc, L3-L4 intervertebral disc, and right posterior  elements of the L3 and L4 vertebra. Possible extension into the spinal  canal. Underlying neoplasm is also a consideration, but the findings  favor infection.     Microbiology:  5/27/19- BC- ESBL E coli 2 sets    Assessment:  · Recurrent ESBL  E coli bacteremia /sepsis from acute lumbar vertebral osteomyelitis   · C diff colitis ongoing treatment  · H/o encephalopathy from therapy with carbapenem  · ESBL E coli complicated UTI finished treatment  · Mucocutaneous candidiasis      Plan:    · IV Zerbaxa  1.5 grams IV q 8 hrs  ( ADR to meropenem )   · Diflucan 200 mg  q day   · To OR on Monday ( appreciated neurosurgery consult )   · PO vancomycin 125 mg q 6 - while on Abx  · PICC line Electronically signed by Tariq Smith MD on 6/1/2019 at 1:55 PM

## 2019-06-01 NOTE — PLAN OF CARE
Problem: Falls - Risk of:  Goal: Will remain free from falls  Description  Will remain free from falls  Outcome: Met This Shift  Goal: Absence of physical injury  Description  Absence of physical injury  Outcome: Met This Shift     Problem: Risk for Impaired Skin Integrity  Goal: Tissue integrity - skin and mucous membranes  Description  Structural intactness and normal physiological function of skin and  mucous membranes.   Outcome: Not Met This Shift     Problem: Breathing Pattern - Ineffective:  Goal: Ability to achieve and maintain a regular respiratory rate will improve  Description  Ability to achieve and maintain a regular respiratory rate will improve  Outcome: Not Met This Shift     Problem: Skin Integrity:  Goal: Will show no infection signs and symptoms  Description  Will show no infection signs and symptoms  Outcome: Not Met This Shift     Problem: Pain:  Goal: Pain level will decrease  Description  Pain level will decrease  Outcome: Not Met This Shift

## 2019-06-01 NOTE — PROGRESS NOTES
picc Placement 6/1/2019    Product number: [id number] Lot Number: [id number]      Ultrasound: yes   \"R Brachial\",.       Upper Arm Circumference:     Size: 40cm    Exposed Length: 3cm    Internal Length: 37cm   Cut: 10cm   Vein Measurement: 0.53cm    Mansi Funez  6/1/2019    Blu achieved picc in 1/3 distal svc or atrial caval junction   4:21 PM

## 2019-06-02 LAB
ANION GAP SERPL CALCULATED.3IONS-SCNC: 6 MMOL/L (ref 7–16)
BASOPHILS ABSOLUTE: 0.05 E9/L (ref 0–0.2)
BASOPHILS RELATIVE PERCENT: 0.4 % (ref 0–2)
BUN BLDV-MCNC: 14 MG/DL (ref 8–23)
CALCIUM SERPL-MCNC: 8 MG/DL (ref 8.6–10.2)
CHLORIDE BLD-SCNC: 112 MMOL/L (ref 98–107)
CO2: 23 MMOL/L (ref 22–29)
CREAT SERPL-MCNC: 0.7 MG/DL (ref 0.5–1)
EOSINOPHILS ABSOLUTE: 0.13 E9/L (ref 0.05–0.5)
EOSINOPHILS RELATIVE PERCENT: 1 % (ref 0–6)
GFR AFRICAN AMERICAN: >60
GFR NON-AFRICAN AMERICAN: >60 ML/MIN/1.73
GLUCOSE BLD-MCNC: 100 MG/DL (ref 74–99)
HCT VFR BLD CALC: 32.7 % (ref 34–48)
HEMOGLOBIN: 10.6 G/DL (ref 11.5–15.5)
IMMATURE GRANULOCYTES #: 0.16 E9/L
IMMATURE GRANULOCYTES %: 1.3 % (ref 0–5)
LYMPHOCYTES ABSOLUTE: 0.91 E9/L (ref 1.5–4)
LYMPHOCYTES RELATIVE PERCENT: 7.3 % (ref 20–42)
MCH RBC QN AUTO: 29.8 PG (ref 26–35)
MCHC RBC AUTO-ENTMCNC: 32.4 % (ref 32–34.5)
MCV RBC AUTO: 91.9 FL (ref 80–99.9)
METER GLUCOSE: 114 MG/DL (ref 74–99)
MONOCYTES ABSOLUTE: 0.49 E9/L (ref 0.1–0.95)
MONOCYTES RELATIVE PERCENT: 3.9 % (ref 2–12)
NEUTROPHILS ABSOLUTE: 10.78 E9/L (ref 1.8–7.3)
NEUTROPHILS RELATIVE PERCENT: 86.1 % (ref 43–80)
PDW BLD-RTO: 16.3 FL (ref 11.5–15)
PLATELET # BLD: 218 E9/L (ref 130–450)
PMV BLD AUTO: 11.9 FL (ref 7–12)
POTASSIUM SERPL-SCNC: 3.8 MMOL/L (ref 3.5–5)
RBC # BLD: 3.56 E12/L (ref 3.5–5.5)
SODIUM BLD-SCNC: 141 MMOL/L (ref 132–146)
WBC # BLD: 12.5 E9/L (ref 4.5–11.5)

## 2019-06-02 PROCEDURE — 82962 GLUCOSE BLOOD TEST: CPT

## 2019-06-02 PROCEDURE — 6360000002 HC RX W HCPCS: Performed by: INTERNAL MEDICINE

## 2019-06-02 PROCEDURE — 2580000003 HC RX 258: Performed by: INTERNAL MEDICINE

## 2019-06-02 PROCEDURE — 6370000000 HC RX 637 (ALT 250 FOR IP): Performed by: INTERNAL MEDICINE

## 2019-06-02 PROCEDURE — 2700000000 HC OXYGEN THERAPY PER DAY

## 2019-06-02 PROCEDURE — 99232 SBSQ HOSP IP/OBS MODERATE 35: CPT | Performed by: INTERNAL MEDICINE

## 2019-06-02 PROCEDURE — 80048 BASIC METABOLIC PNL TOTAL CA: CPT

## 2019-06-02 PROCEDURE — 94640 AIRWAY INHALATION TREATMENT: CPT

## 2019-06-02 PROCEDURE — 36415 COLL VENOUS BLD VENIPUNCTURE: CPT

## 2019-06-02 PROCEDURE — 2060000000 HC ICU INTERMEDIATE R&B

## 2019-06-02 PROCEDURE — 85025 COMPLETE CBC W/AUTO DIFF WBC: CPT

## 2019-06-02 RX ORDER — SODIUM CHLORIDE 9 MG/ML
INJECTION, SOLUTION INTRAVENOUS CONTINUOUS
Status: DISCONTINUED | OUTPATIENT
Start: 2019-06-03 | End: 2019-06-07 | Stop reason: HOSPADM

## 2019-06-02 RX ADMIN — PRAVASTATIN SODIUM 40 MG: 20 TABLET ORAL at 21:45

## 2019-06-02 RX ADMIN — ALBUTEROL SULFATE 2.5 MG: 2.5 SOLUTION RESPIRATORY (INHALATION) at 20:20

## 2019-06-02 RX ADMIN — LEVOTHYROXINE SODIUM 112 MCG: 0.11 TABLET ORAL at 06:27

## 2019-06-02 RX ADMIN — OXYBUTYNIN CHLORIDE 5 MG: 5 TABLET ORAL at 09:21

## 2019-06-02 RX ADMIN — ATENOLOL 25 MG: 25 TABLET ORAL at 21:45

## 2019-06-02 RX ADMIN — PROBENECID 500 MG: 500 TABLET, FILM COATED ORAL at 21:45

## 2019-06-02 RX ADMIN — CEFTOLOZANE AND TAZOBACTAM 1.5 G: 1; .5 INJECTION, POWDER, LYOPHILIZED, FOR SOLUTION INTRAVENOUS at 06:19

## 2019-06-02 RX ADMIN — FLUCONAZOLE 200 MG: 100 TABLET ORAL at 09:21

## 2019-06-02 RX ADMIN — VITAMIN D, TAB 1000IU (100/BT) 1000 UNITS: 25 TAB at 09:21

## 2019-06-02 RX ADMIN — QUETIAPINE FUMARATE 25 MG: 25 TABLET ORAL at 21:45

## 2019-06-02 RX ADMIN — Medication 125 MG: at 12:57

## 2019-06-02 RX ADMIN — ALBUTEROL SULFATE 2.5 MG: 2.5 SOLUTION RESPIRATORY (INHALATION) at 10:06

## 2019-06-02 RX ADMIN — LIOTHYRONINE SODIUM 5 MCG: 5 TABLET ORAL at 09:21

## 2019-06-02 RX ADMIN — Medication 10 ML: at 09:23

## 2019-06-02 RX ADMIN — Medication: at 09:22

## 2019-06-02 RX ADMIN — PROBENECID 500 MG: 500 TABLET, FILM COATED ORAL at 09:21

## 2019-06-02 RX ADMIN — CEFTOLOZANE AND TAZOBACTAM 1.5 G: 1; .5 INJECTION, POWDER, LYOPHILIZED, FOR SOLUTION INTRAVENOUS at 15:37

## 2019-06-02 RX ADMIN — OXYBUTYNIN CHLORIDE 5 MG: 5 TABLET ORAL at 16:38

## 2019-06-02 RX ADMIN — CEFTOLOZANE AND TAZOBACTAM 1.5 G: 1; .5 INJECTION, POWDER, LYOPHILIZED, FOR SOLUTION INTRAVENOUS at 21:51

## 2019-06-02 RX ADMIN — Medication 10 ML: at 00:09

## 2019-06-02 RX ADMIN — Medication 125 MG: at 23:34

## 2019-06-02 RX ADMIN — POTASSIUM CHLORIDE 40 MEQ: 20 TABLET, EXTENDED RELEASE ORAL at 09:21

## 2019-06-02 RX ADMIN — Medication 125 MG: at 00:57

## 2019-06-02 RX ADMIN — Medication 10 ML: at 21:51

## 2019-06-02 RX ADMIN — Medication: at 21:51

## 2019-06-02 RX ADMIN — Medication 125 MG: at 06:20

## 2019-06-02 RX ADMIN — NYSTATIN 500000 UNITS: 100000 SUSPENSION ORAL at 00:05

## 2019-06-02 RX ADMIN — Medication: at 15:37

## 2019-06-02 RX ADMIN — NYSTATIN 500000 UNITS: 100000 SUSPENSION ORAL at 12:57

## 2019-06-02 RX ADMIN — NYSTATIN 500000 UNITS: 100000 SUSPENSION ORAL at 09:22

## 2019-06-02 RX ADMIN — MULTIVITAMIN TABLET 1 TABLET: TABLET at 09:21

## 2019-06-02 RX ADMIN — ENOXAPARIN SODIUM 40 MG: 40 INJECTION, SOLUTION INTRAVENOUS; SUBCUTANEOUS at 09:21

## 2019-06-02 RX ADMIN — Medication 125 MG: at 17:58

## 2019-06-02 ASSESSMENT — PAIN SCALES - GENERAL
PAINLEVEL_OUTOF10: 5
PAINLEVEL_OUTOF10: 0
PAINLEVEL_OUTOF10: 0

## 2019-06-02 NOTE — PROGRESS NOTES
Pulmonary [address] [facility]                             Pulmonary Progress Note :          Patient: Jeovanny Min  MRN: [id number]  : 1945      Date of Admission: .2019  6:29 PM          Reason for Consultation:  CC : Follow up on Hypoxia     HPI:   On 3 L and breathing much better,ask to wean more   For back surgery on Monday ,she is moderate risk and she like to proceed  Was able to pull 500 ml only using incentive spirometry   BC shows GNR   More energetic na able to take deep breath      PHYSICAL EXAMINATION:     VITAL SIGNS:  BP (!) 145/62   Pulse 105   Temp 98.2 °F (36.8 °C) (Temporal)   Resp 20   Ht 5' 6\" (1.676 m)   Wt 247 lb 8 oz (112.3 kg)   SpO2 93%   BMI 39.95 kg/m²   Wt Readings from Last 3 Encounters:   19 247 lb 8 oz (112.3 kg)   19 231 lb 4.8 oz (104.9 kg)   19 243 lb (110.2 kg)     Temp Readings from Last 3 Encounters:   19 98.2 °F (36.8 °C) (Temporal)   19 97.9 °F (36.6 °C) (Temporal)   19 97.8 °F (36.6 °C) (Oral)     TMAX:  BP Readings from Last 3 Encounters:   19 (!) 145/62   19 (!) 150/75   19 (!) 141/74     Pulse Readings from Last 3 Encounters:   19 105   19 86   19 79           INTAKE/OUTPUTS:  I/O last 3 completed shifts: In: 1924.1 [P.O.:300; I.V.:960.8;  Blood:413.3; IV Piggyback:250]  Out: 750 [Urine:750]    Intake/Output Summary (Last 24 hours) at 2019 193  Last data filed at 2019 1536  Gross per 24 hour   Intake 1510.75 ml   Output 1400 ml   Net 110.75 ml       General Appearance: alert and oriented to person, place and time, well-developed and   well-nourished, in no acute distress   Eyes: pupils equal, round, and reactive to light, extraocular eye movements intact, conjunctivae normal and sclera anicteric   Neck: neck supple and non tender without mass, no thyromegaly, no thyroid nodules and no cervical adenopathy   Pulmonary/Chest:decrease breath sounds bibasilar   Cardiovascular: normal rate, regular rhythm, normal S1 and S2, no murmurs, rubs, clicks or gallops, distal pulses intact, no carotid bruits, no murmurs, no gallops, no carotid bruits and no JVD   Abdomen: obese, soft, non-tender, non-distended, normal bowel sounds, no masses or organomegaly   Extremities:+2 edema   Musculoskeletal: normal range of motion, no joint swelling, deformity or tenderness   Neurologic: reflexes normal and symmetric, no cranial nerve deficit noted    LABS/IMAGING:    CBC:  Lab Results   Component Value Date    WBC 12.5 (H) 06/02/2019    HGB 10.6 (L) 06/02/2019    HCT 32.7 (L) 06/02/2019    MCV 91.9 06/02/2019     06/02/2019    LYMPHOPCT 7.3 (L) 06/02/2019    RBC 3.56 06/02/2019    MCH 29.8 06/02/2019    MCHC 32.4 06/02/2019    RDW 16.3 (H) 06/02/2019    NEUTOPHILPCT 86.1 (H) 06/02/2019    MONOPCT 3.9 06/02/2019    BASOPCT 0.4 06/02/2019    NEUTROABS 10.78 (H) 06/02/2019    LYMPHSABS 0.91 (L) 06/02/2019    MONOSABS 0.49 06/02/2019    EOSABS 0.13 06/02/2019    BASOSABS 0.05 06/02/2019       Recent Labs     06/02/19  1303 06/01/19  0518 05/29/19  0534   WBC 12.5* 9.8 7.0   HGB 10.6* 8.1* 8.2*   HCT 32.7* 26.7* 26.3*   MCV 91.9 94.0 92.3    170 118*       BMP:   Recent Labs     06/01/19  0518 06/02/19  1303    141   K 3.4* 3.8   * 112*   CO2 21* 23   BUN 15 14   CREATININE 0.9 0.7       MG:   Lab Results   Component Value Date    MG 1.9 05/29/2019     Ca/Phos:   Lab Results   Component Value Date    CALCIUM 8.0 (L) 06/02/2019    PHOS 2.4 (L) 05/28/2019     Amylase: No results found for: AMYLASE  Lipase: No results found for: LIPASE  LIVER PROFILE:   No results for input(s): AST, ALT, LIPASE, BILIDIR, BILITOT, ALKPHOS in the last 72 hours. Invalid input(s): AMYLASE,  ALB    PT/INR: No results for input(s): PROTIME, INR in the last 72 hours. APTT: No results for input(s): APTT in the last 72 hours.     Cardiac Enzymes:  Lab Results Component Value Date    CKTOTAL 47 05/28/2019    CKMB 1.7 05/29/2019    TROPONINI 0.04 (H) 05/28/2019               PROBLEM LIST:  Patient Active Problem List   Diagnosis    Right lower quadrant pain    Pneumatosis coli    Troponin level elevated    Sepsis ([facility] [address]    HTN (hypertension)    HLD (hyperlipidemia)    Obesity    Acute metabolic encephalopathy    Clostridium difficile diarrhea    Pneumonia    Acute cystitis    Acute midline low back pain without sciatica    Acute osteomyelitis of lumbar spine (HCC)               ASSESSMENT:  1.) Acute Hypoxic resp failure   2.) History of C diff   3. )Bilateral atelectasis   4.)Possible LEILANI   5.)Hypokalemia       PLAN:    *-Doing much better on 3 L and will continue to wean   *- encouraged to do more spirometry   *- Has to ambulate and up to chair ,her hypoxia mainly from shunt and atelectasis  *-BD   *-Sepsis with GNR as per ID? E coli  ,and      going for back surgery/I&D  ? \" epidural abscess   *- CPAP as needed   Will follow closely   Goal for sat 92-12        [address] [facility]     NOTE: This report was transcribed using voice recognition software. Every effort was made to ensure accuracy; however, inadvertent computerized transcription errors may be present.

## 2019-06-02 NOTE — PROGRESS NOTES
Occupational Therapy  OT BEDSIDE TREATMENT NOTE      Date:2019  Patient Name: José Miguel Buchanan  MRN: [id number]  : 1945  Room: [address] [place] [address]     Pt's chart reviewed and treatment attempted, hold for isaias. Will attempt at a later time/date.             Isaías Fernandez

## 2019-06-02 NOTE — PROGRESS NOTES
Department of Neurosurgery  Progress Note    CHIEF COMPLAINT: pt seen for lumbar stenosis, fracture, possible epidural abscess/discitis    SUBJECTIVE:  Back pain severe with movement. Leg numbness/weakness unchanged    REVIEW OF SYSTEMS :  Constitutional: Negative for chills and fever. Neurological: Negative for dizziness, tremors and speech change. OBJECTIVE:   VITALS:  BP (!) 180/74   Pulse 92   Temp 98.3 °F (36.8 °C) (Temporal)   Resp 20   Ht 5' 6\" (1.676 m)   Wt 247 lb 8 oz (112.3 kg)   SpO2 95%   BMI 39.95 kg/m²   PHYSICAL:  CONSTITUTIONAL:  awake, alert, cooperative, no apparent distress, and appears stated age. MEZA. FC.   Bilateral LE weakness 4-/5    DATA:  CBC:   Lab Results   Component Value Date    WBC 9.8 06/01/2019    RBC 2.84 06/01/2019    HGB 8.1 06/01/2019    HCT 26.7 06/01/2019    MCV 94.0 06/01/2019    MCH 28.5 06/01/2019    MCHC 30.3 06/01/2019    RDW 16.3 06/01/2019     06/01/2019    MPV 12.8 06/01/2019     BMP:    Lab Results   Component Value Date     06/01/2019    K 3.4 06/01/2019    K 2.7 05/29/2019     06/01/2019    CO2 21 06/01/2019    BUN 15 06/01/2019    LABALBU 2.2 05/28/2019    CREATININE 0.9 06/01/2019    CALCIUM 8.1 06/01/2019    GFRAA >60 06/01/2019    LABGLOM >60 06/01/2019    GLUCOSE 97 06/01/2019     PT/INR:    Lab Results   Component Value Date    PROTIME 14.6 05/14/2019    INR 1.3 05/14/2019     PTT:    Lab Results   Component Value Date    APTT 29.9 05/14/2019   [APTT}    Current Inpatient Medications  Current Facility-Administered Medications: [START ON 6/3/2019] 0.9 % sodium chloride infusion, , Intravenous, Continuous  [START ON 6/3/2019] ceFAZolin (ANCEF) 2 g in dextrose 5 % 50 mL IVPB, 2 g, Intravenous, Once  lidocaine PF 1 % injection 5 mL, 5 mL, Intradermal, Once  sodium chloride flush 0.9 % injection 10 mL, 10 mL, Intravenous, 2 times per day  sodium chloride flush 0.9 % injection 10 mL, 10 mL, Intravenous, PRN  ceftolozane-tazobactam

## 2019-06-02 NOTE — PROGRESS NOTES
Subjective: The patient is awake and alert. Resting comfortably   Feels better today   bACK PAIN AND STIFFNESS PERSISTS       Objective:    BP (!) 180/74   Pulse 92   Temp 98.3 °F (36.8 °C) (Temporal)   Resp 20   Ht 5' 6\" (1.676 m)   Wt 247 lb 8 oz (112.3 kg)   SpO2 95%   BMI 39.95 kg/m²     In: 1804.1 [P.O.:180; I.V.:960.8; Blood:413.3]  Out: 750     HEENT: NCAT,  PERRLA, No JVD  Heart:  RRR, no murmurs, gallops, or rubs. Lungs:  CTA bilaterally, no wheeze, rales or rhonchi  Abd: bowel sounds present, nontender, nondistended, no masses  Extrem:  No clubbing, cyanosis, or edema     Recent Labs     06/01/19  0518   WBC 9.8   HGB 8.1*   HCT 26.7*          Recent Labs     06/01/19  0518      K 3.4*   *   CO2 21*   BUN 15   CREATININE 0.9   CALCIUM 8.1*       Assessment:    Patient Active Problem List   Diagnosis    Right lower quadrant pain    Pneumatosis coli    Troponin level elevated    Sepsis ([facility] [address]    HTN (hypertension)    HLD (hyperlipidemia)    Obesity    Acute metabolic encephalopathy    Clostridium difficile diarrhea    Pneumonia    Acute cystitis    Acute midline low back pain without sciatica    Acute osteomyelitis of lumbar spine ([facility] [address]       Plan:    Admitted to [facility] for evaluation of hypoxemia on continue pulse ox monitoring   Sats in mid 90's - pulm following   Off continuous monitoring   Cancelled CTA chest - no need  Continue nebs and supportive care     GNR in blood cultures from 5/30   ?  Discitis Selene Barrera on mri - repeat MRI with steve on Monday   IV abx therapy - ID following   Continue supportive care   Plan for OR on Monday - discussed with NS     DVT proph  PT/OT   Dc planning- adament about going home - does not wish to got to SNF -    Am labs   All consultants notes reviewed    Kathe Griffin MD  11:44 AM  6/2/2019

## 2019-06-02 NOTE — PROGRESS NOTES
Physical Therapy    Medical chart reviewed for PT treatment 6/2 AM. Awaiting neurosurgical POC. Will follow. Thank you.     Fernando Nix, PT, DPT  [id number]

## 2019-06-02 NOTE — PROGRESS NOTES
murmurs, rubs, clicks or gallops, distal pulses intact, no carotid bruits, no murmurs, no gallops, no carotid bruits and no JVD   Abdomen: obese, soft, non-tender, non-distended, normal bowel sounds, no masses or organomegaly   Extremities:+2 edema   Musculoskeletal: normal range of motion, no joint swelling, deformity or tenderness   Neurologic: reflexes normal and symmetric, no cranial nerve deficit noted    LABS/IMAGING:    CBC:  Lab Results   Component Value Date    WBC 9.8 06/01/2019    HGB 8.1 (L) 06/01/2019    HCT 26.7 (L) 06/01/2019    MCV 94.0 06/01/2019     06/01/2019    LYMPHOPCT 9.1 (L) 06/01/2019    RBC 2.84 (L) 06/01/2019    MCH 28.5 06/01/2019    MCHC 30.3 (L) 06/01/2019    RDW 16.3 (H) 06/01/2019    NEUTOPHILPCT 82.8 (H) 06/01/2019    MONOPCT 5.7 06/01/2019    BASOPCT 0.3 06/01/2019    NEUTROABS 8.06 (H) 06/01/2019    LYMPHSABS 0.89 (L) 06/01/2019    MONOSABS 0.56 06/01/2019    EOSABS 0.12 06/01/2019    BASOSABS 0.03 06/01/2019       Recent Labs     06/01/19  0518 05/29/19  0534 05/28/19  1810   WBC 9.8 7.0 9.2   HGB 8.1* 8.2* 10.9*   HCT 26.7* 26.3* 34.2   MCV 94.0 92.3 92.2    118* 162       BMP:   Recent Labs     06/01/19  0518      K 3.4*   *   CO2 21*   BUN 15   CREATININE 0.9       MG:   Lab Results   Component Value Date    MG 1.9 05/29/2019     Ca/Phos:   Lab Results   Component Value Date    CALCIUM 8.1 (L) 06/01/2019    PHOS 2.4 (L) 05/28/2019     Amylase: No results found for: AMYLASE  Lipase: No results found for: LIPASE  LIVER PROFILE:   No results for input(s): AST, ALT, LIPASE, BILIDIR, BILITOT, ALKPHOS in the last 72 hours. Invalid input(s): AMYLASE,  ALB    PT/INR: No results for input(s): PROTIME, INR in the last 72 hours. APTT: No results for input(s): APTT in the last 72 hours.     Cardiac Enzymes:  Lab Results   Component Value Date    CKTOTAL 47 05/28/2019    CKMB 1.7 05/29/2019    TROPONINI 0.04 (H) 05/28/2019               PROBLEM LIST:  Patient Active Problem List   Diagnosis    Right lower quadrant pain    Pneumatosis coli    Troponin level elevated    Sepsis ([facility] [address]    HTN (hypertension)    HLD (hyperlipidemia)    Obesity    Acute metabolic encephalopathy    Clostridium difficile diarrhea    Pneumonia    Acute cystitis    Acute midline low back pain without sciatica    Acute osteomyelitis of lumbar spine (HCC)               ASSESSMENT:  1.) Acute Hypoxic resp failure   2.) History of C diff   3. )Bilateral atelectasis   4.)Possible LEILANI   5.)Hypokalemia       PLAN:    *-Doing much better on 3 L and will continue to wean   *- Has to ambulate and up to chair ,her hypoxia mainly from shunt and atelectasis ,she again encouraged to do  More of spirometry and take deep breath  *-BD   *-Sepsis with GNR as per ID ,and going for back surgery/I&D spine abscess? *- CPAP as needed   Will follow closely   Goal for sat 92-94        [address] [facility]     NOTE: This report was transcribed using voice recognition software. Every effort was made to ensure accuracy; however, inadvertent computerized transcription errors may be present.

## 2019-06-02 NOTE — PLAN OF CARE
Problem: Risk for Impaired Skin Integrity  Goal: Tissue integrity - skin and mucous membranes  Description  Structural intactness and normal physiological function of skin and  mucous membranes.   Outcome: Met This Shift     Problem: Falls - Risk of:  Goal: Will remain free from falls  Description  Will remain free from falls  Outcome: Met This Shift  Goal: Absence of physical injury  Description  Absence of physical injury  Outcome: Met This Shift     Problem: Breathing Pattern - Ineffective:  Goal: Ability to achieve and maintain a regular respiratory rate will improve  Description  Ability to achieve and maintain a regular respiratory rate will improve  Outcome: Met This Shift     Problem: Skin Integrity:  Goal: Will show no infection signs and symptoms  Description  Will show no infection signs and symptoms  Outcome: Met This Shift  Goal: Absence of new skin breakdown  Description  Absence of new skin breakdown  Outcome: Met This Shift     Problem: Pain:  Goal: Pain level will decrease  Description  Pain level will decrease  Outcome: Met This Shift  Goal: Control of acute pain  Description  Control of acute pain  Outcome: Met This Shift  Goal: Control of chronic pain  Description  Control of chronic pain  Outcome: Met This Shift

## 2019-06-02 NOTE — PROGRESS NOTES
CC- Recurrent ESBL E coli bacteremia, sepsis, discitis     Pt is awake and alert  Denies fever and chills  Reports back pain  PICC line placed     Afebrile        Current Facility-Administered Medications   Medication Dose Route Frequency Provider Last Rate Last Dose    [START ON 6/3/2019] 0.9 % sodium chloride infusion   Intravenous Continuous HERMAN Castañeda        [START ON 6/3/2019] ceFAZolin (ANCEF) 2 g in dextrose 5 % 50 mL IVPB  2 g Intravenous Once HERMAN Castañeda        lidocaine PF 1 % injection 5 mL  5 mL Intradermal Once Isrrael Munoz MD        sodium chloride flush 0.9 % injection 10 mL  10 mL Intravenous 2 times per day Kelly Puentes MD   10 mL at 06/01/19 2243    sodium chloride flush 0.9 % injection 10 mL  10 mL Intravenous PRN Isrrael Munoz MD        ceftolozane-tazobactam (ZERBAXA) 1.5 g in dextrose 5 % 100 mL IVPB  1.5 g Intravenous Q8H Irvin Rolle MD   Stopped at 06/02/19 0815    potassium chloride (KLOR-CON M) extended release tablet 40 mEq  40 mEq Oral Daily Ibis Boston MD   40 mEq at 06/02/19 0921    albuterol (PROVENTIL) nebulizer solution 2.5 mg  2.5 mg Nebulization 4x daily Ibis Boston MD   2.5 mg at 06/02/19 1006    miconazole nitrate 2 % ointment   Topical TID Ibis Boston MD        And    miconazole nitrate 2 % ointment   Topical TID PRN Ibis Boston MD        sodium chloride flush 0.9 % injection 10 mL  10 mL Intravenous PRN Graciela Ratel, DO   10 mL at 05/28/19 0618    sodium chloride flush 0.9 % injection 10 mL  10 mL Intravenous 2 times per day Graciela Ratel, DO   10 mL at 06/02/19 3795    acetaminophen (TYLENOL) tablet 650 mg  650 mg Oral Q4H PRN Graciela Ratel, DO        atenolol (TENORMIN) tablet 25 mg  25 mg Oral Nightly Graciela Ratel, DO   25 mg at 06/01/19 2035    docusate sodium (COLACE) capsule 100 mg  100 mg Oral BID Graciela Ratel, DO   100 mg at 05/29/19 2105    enoxaparin (LOVENOX) injection 40 mg  40 mg Subcutaneous Daily Graciela Ratel, DO sputum expectoration, chest pain. CARDIOVASCULAR:  Denies palpitation  GASTROINTESTINAL:  Denies abdomen pain, diarrhea or constipation. GENITOURINARY:  Denies burning urination or frequency of urination  INTEGUMENT: denies wound , rash  HEMATOLOGIC/LYMPHATIC:  Denies lymph node swelling, gum bleeding or easy bruising. MUSCULOSKELETAL:  Low back pain and stiffness   NEUROLOGICAL:  Weakness     Objective:    BP (!) 180/74   Pulse 92   Temp 98.3 °F (36.8 °C) (Temporal)   Resp 20   Ht 5' 6\" (1.676 m)   Wt 247 lb 8 oz (112.3 kg)   SpO2 95%   BMI 39.95 kg/m²     General Appearance:    Awake, alert , no acute distress.    HEENT:    Normocephalic,PERRL,neck supple, no JVD, mucosa moist, no thrush   Lungs:     Clear to auscultation bilaterally   Heart:    Regular rate and rhythm, no murmur   Abdomen:     Soft, non-tender, not distended  bowel sounds present,   Extremities:  B/L LE edema,no open wound,no erythema, non  tender   Skin:   no rashes or lesions   CBC with Differential:      Lab Results   Component Value Date    WBC 12.5 06/02/2019    RBC 3.56 06/02/2019    HGB 10.6 06/02/2019    HCT 32.7 06/02/2019     06/02/2019    MCV 91.9 06/02/2019    MCH 29.8 06/02/2019    MCHC 32.4 06/02/2019    RDW 16.3 06/02/2019    LYMPHOPCT 7.3 06/02/2019    MONOPCT 3.9 06/02/2019    BASOPCT 0.4 06/02/2019    MONOSABS 0.49 06/02/2019    LYMPHSABS 0.91 06/02/2019    EOSABS 0.13 06/02/2019    BASOSABS 0.05 06/02/2019       CMP:    Lab Results   Component Value Date     06/02/2019    K 3.8 06/02/2019    K 2.7 05/29/2019     06/02/2019    CO2 23 06/02/2019    BUN 14 06/02/2019    CREATININE 0.7 06/02/2019    GFRAA >60 06/02/2019    LABGLOM >60 06/02/2019    GLUCOSE 100 06/02/2019    PROT 7.3 05/28/2019    LABALBU 2.2 05/28/2019    CALCIUM 8.0 06/02/2019    BILITOT 0.5 05/28/2019    ALKPHOS 120 05/28/2019    AST 32 05/28/2019    ALT 23 05/28/2019         Susceptibility     Escherichia coli (1)     Antibiotic Interpretation MARTINEZ Status    ampicillin Resistant >=^32 mcg/mL     aztreonam Resistant =^16 mcg/mL     ceFAZolin Resistant >=^64 mcg/mL     cefotaxime Resistant >=^64 mcg/mL     cefoTEtan Sensitive <=^4 mcg/mL     cefOXitin Sensitive <=^4 mcg/mL     cefpodoxime proxetil Resistant >=^8 mcg/mL     CEFTOLOZANE/TAZOBACTAM (ZERBAXA) Sensitive <=^0.25 mcg/mL     cefTRIAXone Resistant >=^64 mcg/mL     cefuroxime Resistant >=^64 mcg/mL     Confirmatory Extended Spectrum Beta-Lactamase  Pos mcg/mL     doripenem Sensitive <=^0.12 mcg/mL     gentamicin Resistant >=^16 mcg/mL     imipenem Sensitive <=^0.25 mcg/mL     levofloxacin Resistant >=^8 mcg/mL     meropenem Sensitive <=^0.25 mcg/mL     moxifloxacin Resistant >=^8 mcg/mL     nalidixic acid Resistant >=^32 mcg/mL     tobramycin Resistant >=^16 mcg/mL     trimethoprim-sulfamethoxazole Resistant >=^320 mcg/mL     Lab and Collection     Culture Blood #1 - 5/27/2019           Radiology:    CT abdomen and pelvis -    Very limited examination for evaluation of renal pathology due to the  lack of intravenous contrast. There is perinephric stranding which may  due to renal failure. Pyelonephritis or renal abscess cannot be  evaluated. Consider either CT with contrast if possible or  Ultrasonography. MRI - L spine     1. Osteolytic destruction and compression of the L3 vertebral body and  mild acute posterior wedge compression of the L2 inferior endplate. Extensive surrounding inflammation and edema involving the paraspinal  musculature and psoas muscles. The findings are concerning for  spondylodiscitis involving the L2 and L3 vertebra, L2-L3  intervertebral disc, L3-L4 intervertebral disc, and right posterior  elements of the L3 and L4 vertebra. Possible extension into the spinal  canal. Underlying neoplasm is also a consideration, but the findings  favor infection.     Microbiology:  5/27/19- BC- ESBL E coli 2 sets    Assessment:  · Recurrent ESBL  E coli bacteremia Marlen Wang from acute lumbar vertebral osteomyelitis   · C diff colitis ongoing treatment  · H/o encephalopathy from therapy with carbapenem  · ESBL E coli complicated UTI finished treatment  · Mucocutaneous candidiasis      Plan:    · IV Zerbaxa  1.5 grams IV q 8 hrs  ( ADR to meropenem )   · Diflucan 200 mg  q day   · To OR on Monday   · PO vancomycin 125 mg q 6 - while on Abx  · Follow up blood cx 6/1 neg     Electronically signed by Justin Mata MD on 6/2/2019 at 2:23 PM

## 2019-06-03 ENCOUNTER — APPOINTMENT (OUTPATIENT)
Dept: GENERAL RADIOLOGY | Age: 74
DRG: 853 | End: 2019-06-03
Payer: COMMERCIAL

## 2019-06-03 ENCOUNTER — ANESTHESIA (OUTPATIENT)
Dept: OPERATING ROOM | Age: 74
DRG: 853 | End: 2019-06-03
Payer: COMMERCIAL

## 2019-06-03 ENCOUNTER — ANESTHESIA EVENT (OUTPATIENT)
Dept: OPERATING ROOM | Age: 74
DRG: 853 | End: 2019-06-03
Payer: COMMERCIAL

## 2019-06-03 VITALS — DIASTOLIC BLOOD PRESSURE: 69 MMHG | OXYGEN SATURATION: 95 % | SYSTOLIC BLOOD PRESSURE: 143 MMHG

## 2019-06-03 LAB
ANION GAP SERPL CALCULATED.3IONS-SCNC: 12 MMOL/L (ref 7–16)
BASOPHILS ABSOLUTE: 0.06 E9/L (ref 0–0.2)
BASOPHILS RELATIVE PERCENT: 0.5 % (ref 0–2)
BUN BLDV-MCNC: 12 MG/DL (ref 8–23)
CALCIUM SERPL-MCNC: 8.4 MG/DL (ref 8.6–10.2)
CHLORIDE BLD-SCNC: 111 MMOL/L (ref 98–107)
CO2: 23 MMOL/L (ref 22–29)
CREAT SERPL-MCNC: 0.7 MG/DL (ref 0.5–1)
EOSINOPHILS ABSOLUTE: 0.13 E9/L (ref 0.05–0.5)
EOSINOPHILS RELATIVE PERCENT: 1.1 % (ref 0–6)
GFR AFRICAN AMERICAN: >60
GFR NON-AFRICAN AMERICAN: >60 ML/MIN/1.73
GLUCOSE BLD-MCNC: 105 MG/DL (ref 74–99)
HCT VFR BLD CALC: 32.4 % (ref 34–48)
HEMOGLOBIN: 10.3 G/DL (ref 11.5–15.5)
IMMATURE GRANULOCYTES #: 0.12 E9/L
IMMATURE GRANULOCYTES %: 1 % (ref 0–5)
LYMPHOCYTES ABSOLUTE: 0.96 E9/L (ref 1.5–4)
LYMPHOCYTES RELATIVE PERCENT: 8.2 % (ref 20–42)
MCH RBC QN AUTO: 29.3 PG (ref 26–35)
MCHC RBC AUTO-ENTMCNC: 31.8 % (ref 32–34.5)
MCV RBC AUTO: 92 FL (ref 80–99.9)
MONOCYTES ABSOLUTE: 0.47 E9/L (ref 0.1–0.95)
MONOCYTES RELATIVE PERCENT: 4 % (ref 2–12)
NEUTROPHILS ABSOLUTE: 9.9 E9/L (ref 1.8–7.3)
NEUTROPHILS RELATIVE PERCENT: 85.2 % (ref 43–80)
PDW BLD-RTO: 16.4 FL (ref 11.5–15)
PLATELET # BLD: 224 E9/L (ref 130–450)
PMV BLD AUTO: 11.6 FL (ref 7–12)
POTASSIUM SERPL-SCNC: 3.5 MMOL/L (ref 3.5–5)
RBC # BLD: 3.52 E12/L (ref 3.5–5.5)
SODIUM BLD-SCNC: 146 MMOL/L (ref 132–146)
WBC # BLD: 11.6 E9/L (ref 4.5–11.5)

## 2019-06-03 PROCEDURE — 2580000003 HC RX 258: Performed by: INTERNAL MEDICINE

## 2019-06-03 PROCEDURE — 2500000003 HC RX 250 WO HCPCS: Performed by: NEUROLOGICAL SURGERY

## 2019-06-03 PROCEDURE — 87186 SC STD MICRODIL/AGAR DIL: CPT

## 2019-06-03 PROCEDURE — 2580000003 HC RX 258: Performed by: PHYSICIAN ASSISTANT

## 2019-06-03 PROCEDURE — 87077 CULTURE AEROBIC IDENTIFY: CPT

## 2019-06-03 PROCEDURE — 85025 COMPLETE CBC W/AUTO DIFF WBC: CPT

## 2019-06-03 PROCEDURE — 6360000002 HC RX W HCPCS: Performed by: PHYSICIAN ASSISTANT

## 2019-06-03 PROCEDURE — 87075 CULTR BACTERIA EXCEPT BLOOD: CPT

## 2019-06-03 PROCEDURE — 6370000000 HC RX 637 (ALT 250 FOR IP): Performed by: NEUROLOGICAL SURGERY

## 2019-06-03 PROCEDURE — 1200000000 HC SEMI PRIVATE

## 2019-06-03 PROCEDURE — 2720000010 HC SURG SUPPLY STERILE: Performed by: NEUROLOGICAL SURGERY

## 2019-06-03 PROCEDURE — 7100000000 HC PACU RECOVERY - FIRST 15 MIN: Performed by: NEUROLOGICAL SURGERY

## 2019-06-03 PROCEDURE — 87206 SMEAR FLUORESCENT/ACID STAI: CPT

## 2019-06-03 PROCEDURE — 94660 CPAP INITIATION&MGMT: CPT

## 2019-06-03 PROCEDURE — 80048 BASIC METABOLIC PNL TOTAL CA: CPT

## 2019-06-03 PROCEDURE — 63047 LAM FACETEC & FORAMOT LUMBAR: CPT | Performed by: NEUROLOGICAL SURGERY

## 2019-06-03 PROCEDURE — 00NY0ZZ RELEASE LUMBAR SPINAL CORD, OPEN APPROACH: ICD-10-PCS | Performed by: NEUROLOGICAL SURGERY

## 2019-06-03 PROCEDURE — 6360000002 HC RX W HCPCS

## 2019-06-03 PROCEDURE — 3700000000 HC ANESTHESIA ATTENDED CARE: Performed by: NEUROLOGICAL SURGERY

## 2019-06-03 PROCEDURE — 6360000002 HC RX W HCPCS: Performed by: ANESTHESIOLOGY

## 2019-06-03 PROCEDURE — 2580000003 HC RX 258: Performed by: NURSE ANESTHETIST, CERTIFIED REGISTERED

## 2019-06-03 PROCEDURE — 2580000003 HC RX 258: Performed by: NEUROLOGICAL SURGERY

## 2019-06-03 PROCEDURE — 6360000002 HC RX W HCPCS: Performed by: INTERNAL MEDICINE

## 2019-06-03 PROCEDURE — 7100000001 HC PACU RECOVERY - ADDTL 15 MIN: Performed by: NEUROLOGICAL SURGERY

## 2019-06-03 PROCEDURE — 36415 COLL VENOUS BLD VENIPUNCTURE: CPT

## 2019-06-03 PROCEDURE — 3700000001 HC ADD 15 MINUTES (ANESTHESIA): Performed by: NEUROLOGICAL SURGERY

## 2019-06-03 PROCEDURE — 99232 SBSQ HOSP IP/OBS MODERATE 35: CPT | Performed by: INTERNAL MEDICINE

## 2019-06-03 PROCEDURE — 87070 CULTURE OTHR SPECIMN AEROBIC: CPT

## 2019-06-03 PROCEDURE — 87116 MYCOBACTERIA CULTURE: CPT

## 2019-06-03 PROCEDURE — 2700000000 HC OXYGEN THERAPY PER DAY

## 2019-06-03 PROCEDURE — 3600000004 HC SURGERY LEVEL 4 BASE: Performed by: NEUROLOGICAL SURGERY

## 2019-06-03 PROCEDURE — 87102 FUNGUS ISOLATION CULTURE: CPT

## 2019-06-03 PROCEDURE — 6360000002 HC RX W HCPCS: Performed by: NEUROLOGICAL SURGERY

## 2019-06-03 PROCEDURE — 0S900ZZ DRAINAGE OF LUMBAR VERTEBRAL JOINT, OPEN APPROACH: ICD-10-PCS | Performed by: NEUROLOGICAL SURGERY

## 2019-06-03 PROCEDURE — 6360000002 HC RX W HCPCS: Performed by: NURSE ANESTHETIST, CERTIFIED REGISTERED

## 2019-06-03 PROCEDURE — 3600000014 HC SURGERY LEVEL 4 ADDTL 15MIN: Performed by: NEUROLOGICAL SURGERY

## 2019-06-03 PROCEDURE — 3209999900 FLUORO FOR SURGICAL PROCEDURES

## 2019-06-03 PROCEDURE — 63048 LAM FACETEC &FORAMOT EA ADDL: CPT | Performed by: NEUROLOGICAL SURGERY

## 2019-06-03 PROCEDURE — 94640 AIRWAY INHALATION TREATMENT: CPT

## 2019-06-03 PROCEDURE — 87205 SMEAR GRAM STAIN: CPT

## 2019-06-03 PROCEDURE — L0486 TLSO RIGIDLINED CUST FAB TWO: HCPCS

## 2019-06-03 PROCEDURE — 2500000003 HC RX 250 WO HCPCS: Performed by: NURSE ANESTHETIST, CERTIFIED REGISTERED

## 2019-06-03 PROCEDURE — 6370000000 HC RX 637 (ALT 250 FOR IP): Performed by: PHYSICIAN ASSISTANT

## 2019-06-03 PROCEDURE — 2500000003 HC RX 250 WO HCPCS

## 2019-06-03 PROCEDURE — 2709999900 HC NON-CHARGEABLE SUPPLY: Performed by: NEUROLOGICAL SURGERY

## 2019-06-03 PROCEDURE — 87015 SPECIMEN INFECT AGNT CONCNTJ: CPT

## 2019-06-03 RX ORDER — TIZANIDINE 4 MG/1
4 TABLET ORAL EVERY 6 HOURS PRN
Status: DISCONTINUED | OUTPATIENT
Start: 2019-06-03 | End: 2019-06-07 | Stop reason: HOSPADM

## 2019-06-03 RX ORDER — ONDANSETRON 2 MG/ML
INJECTION INTRAMUSCULAR; INTRAVENOUS PRN
Status: DISCONTINUED | OUTPATIENT
Start: 2019-06-03 | End: 2019-06-03 | Stop reason: SDUPTHER

## 2019-06-03 RX ORDER — GLYCOPYRROLATE 1 MG/5 ML
SYRINGE (ML) INTRAVENOUS PRN
Status: DISCONTINUED | OUTPATIENT
Start: 2019-06-03 | End: 2019-06-03 | Stop reason: SDUPTHER

## 2019-06-03 RX ORDER — LABETALOL HYDROCHLORIDE 5 MG/ML
5 INJECTION, SOLUTION INTRAVENOUS EVERY 10 MIN PRN
Status: DISCONTINUED | OUTPATIENT
Start: 2019-06-03 | End: 2019-06-03 | Stop reason: HOSPADM

## 2019-06-03 RX ORDER — BUPIVACAINE HYDROCHLORIDE 2.5 MG/ML
INJECTION, SOLUTION EPIDURAL; INFILTRATION; INTRACAUDAL PRN
Status: DISCONTINUED | OUTPATIENT
Start: 2019-06-03 | End: 2019-06-03 | Stop reason: ALTCHOICE

## 2019-06-03 RX ORDER — DEXAMETHASONE SODIUM PHOSPHATE 10 MG/ML
INJECTION INTRAMUSCULAR; INTRAVENOUS PRN
Status: DISCONTINUED | OUTPATIENT
Start: 2019-06-03 | End: 2019-06-03 | Stop reason: SDUPTHER

## 2019-06-03 RX ORDER — DOCUSATE SODIUM 100 MG/1
100 CAPSULE, LIQUID FILLED ORAL 2 TIMES DAILY
Status: DISCONTINUED | OUTPATIENT
Start: 2019-06-03 | End: 2019-06-03

## 2019-06-03 RX ORDER — FENTANYL CITRATE 50 UG/ML
INJECTION, SOLUTION INTRAMUSCULAR; INTRAVENOUS PRN
Status: DISCONTINUED | OUTPATIENT
Start: 2019-06-03 | End: 2019-06-03 | Stop reason: SDUPTHER

## 2019-06-03 RX ORDER — SODIUM CHLORIDE 9 MG/ML
INJECTION, SOLUTION INTRAVENOUS CONTINUOUS PRN
Status: DISCONTINUED | OUTPATIENT
Start: 2019-06-03 | End: 2019-06-03 | Stop reason: SDUPTHER

## 2019-06-03 RX ORDER — ROCURONIUM BROMIDE 10 MG/ML
INJECTION, SOLUTION INTRAVENOUS PRN
Status: DISCONTINUED | OUTPATIENT
Start: 2019-06-03 | End: 2019-06-03 | Stop reason: SDUPTHER

## 2019-06-03 RX ORDER — LIDOCAINE HYDROCHLORIDE 20 MG/ML
INJECTION, SOLUTION INTRAVENOUS PRN
Status: DISCONTINUED | OUTPATIENT
Start: 2019-06-03 | End: 2019-06-03 | Stop reason: SDUPTHER

## 2019-06-03 RX ORDER — SODIUM CHLORIDE 0.9 % (FLUSH) 0.9 %
10 SYRINGE (ML) INJECTION EVERY 12 HOURS SCHEDULED
Status: DISCONTINUED | OUTPATIENT
Start: 2019-06-03 | End: 2019-06-07 | Stop reason: HOSPADM

## 2019-06-03 RX ORDER — ONDANSETRON 2 MG/ML
4 INJECTION INTRAMUSCULAR; INTRAVENOUS EVERY 6 HOURS PRN
Status: DISCONTINUED | OUTPATIENT
Start: 2019-06-03 | End: 2019-06-07 | Stop reason: HOSPADM

## 2019-06-03 RX ORDER — PROPOFOL 10 MG/ML
INJECTION, EMULSION INTRAVENOUS PRN
Status: DISCONTINUED | OUTPATIENT
Start: 2019-06-03 | End: 2019-06-03 | Stop reason: SDUPTHER

## 2019-06-03 RX ORDER — MEPERIDINE HYDROCHLORIDE 50 MG/ML
12.5 INJECTION INTRAMUSCULAR; INTRAVENOUS; SUBCUTANEOUS EVERY 5 MIN PRN
Status: DISCONTINUED | OUTPATIENT
Start: 2019-06-03 | End: 2019-06-03 | Stop reason: HOSPADM

## 2019-06-03 RX ORDER — VANCOMYCIN HYDROCHLORIDE 500 MG/10ML
INJECTION, POWDER, LYOPHILIZED, FOR SOLUTION INTRAVENOUS PRN
Status: DISCONTINUED | OUTPATIENT
Start: 2019-06-03 | End: 2019-06-03 | Stop reason: ALTCHOICE

## 2019-06-03 RX ORDER — SODIUM CHLORIDE 0.9 % (FLUSH) 0.9 %
10 SYRINGE (ML) INJECTION PRN
Status: DISCONTINUED | OUTPATIENT
Start: 2019-06-03 | End: 2019-06-07 | Stop reason: HOSPADM

## 2019-06-03 RX ORDER — NEOSTIGMINE METHYLSULFATE 1 MG/ML
INJECTION, SOLUTION INTRAVENOUS PRN
Status: DISCONTINUED | OUTPATIENT
Start: 2019-06-03 | End: 2019-06-03 | Stop reason: SDUPTHER

## 2019-06-03 RX ADMIN — DEXAMETHASONE SODIUM PHOSPHATE 10 MG: 10 INJECTION INTRAMUSCULAR; INTRAVENOUS at 14:27

## 2019-06-03 RX ADMIN — SODIUM CHLORIDE: 9 INJECTION, SOLUTION INTRAVENOUS at 02:50

## 2019-06-03 RX ADMIN — Medication 125 MG: at 19:05

## 2019-06-03 RX ADMIN — Medication 10 ML: at 20:51

## 2019-06-03 RX ADMIN — PRAVASTATIN SODIUM 40 MG: 20 TABLET ORAL at 20:49

## 2019-06-03 RX ADMIN — FENTANYL CITRATE 50 MCG: 50 INJECTION, SOLUTION INTRAMUSCULAR; INTRAVENOUS at 14:00

## 2019-06-03 RX ADMIN — OXYBUTYNIN CHLORIDE 5 MG: 5 TABLET ORAL at 19:05

## 2019-06-03 RX ADMIN — CEFTOLOZANE AND TAZOBACTAM 1.5 G: 1; .5 INJECTION, POWDER, LYOPHILIZED, FOR SOLUTION INTRAVENOUS at 05:30

## 2019-06-03 RX ADMIN — Medication 3 MG: at 15:43

## 2019-06-03 RX ADMIN — ONDANSETRON HYDROCHLORIDE 4 MG: 2 INJECTION, SOLUTION INTRAMUSCULAR; INTRAVENOUS at 15:19

## 2019-06-03 RX ADMIN — Medication 0.6 MG: at 15:43

## 2019-06-03 RX ADMIN — PROPOFOL 150 MG: 10 INJECTION, EMULSION INTRAVENOUS at 14:00

## 2019-06-03 RX ADMIN — ROCURONIUM BROMIDE 50 MG: 10 INJECTION, SOLUTION INTRAVENOUS at 14:00

## 2019-06-03 RX ADMIN — HYDROMORPHONE HYDROCHLORIDE 0.5 MG: 1 INJECTION, SOLUTION INTRAMUSCULAR; INTRAVENOUS; SUBCUTANEOUS at 16:59

## 2019-06-03 RX ADMIN — CEFTOLOZANE AND TAZOBACTAM 1.5 G: 1; .5 INJECTION, POWDER, LYOPHILIZED, FOR SOLUTION INTRAVENOUS at 22:25

## 2019-06-03 RX ADMIN — SODIUM CHLORIDE: 9 INJECTION, SOLUTION INTRAVENOUS at 13:52

## 2019-06-03 RX ADMIN — LIDOCAINE HYDROCHLORIDE 100 MG: 20 INJECTION, SOLUTION INTRAVENOUS at 14:00

## 2019-06-03 RX ADMIN — ATENOLOL 25 MG: 25 TABLET ORAL at 20:50

## 2019-06-03 RX ADMIN — FENTANYL CITRATE 50 MCG: 50 INJECTION, SOLUTION INTRAMUSCULAR; INTRAVENOUS at 14:37

## 2019-06-03 RX ADMIN — QUETIAPINE FUMARATE 25 MG: 25 TABLET ORAL at 20:50

## 2019-06-03 RX ADMIN — CEFAZOLIN 2 G: 10 INJECTION, POWDER, FOR SOLUTION INTRAVENOUS; PARENTERAL at 14:35

## 2019-06-03 RX ADMIN — Medication: at 09:38

## 2019-06-03 RX ADMIN — PROBENECID 500 MG: 500 TABLET, FILM COATED ORAL at 20:49

## 2019-06-03 RX ADMIN — Medication 10 ML: at 09:38

## 2019-06-03 RX ADMIN — HYDROCODONE BITARTRATE AND ACETAMINOPHEN 1 TABLET: 7.5; 325 TABLET ORAL at 19:18

## 2019-06-03 RX ADMIN — ALBUTEROL SULFATE 2.5 MG: 2.5 SOLUTION RESPIRATORY (INHALATION) at 09:05

## 2019-06-03 ASSESSMENT — PULMONARY FUNCTION TESTS
PIF_VALUE: 23
PIF_VALUE: 29
PIF_VALUE: 31
PIF_VALUE: 29
PIF_VALUE: 23
PIF_VALUE: 32
PIF_VALUE: 3
PIF_VALUE: 1
PIF_VALUE: 24
PIF_VALUE: 3
PIF_VALUE: 31
PIF_VALUE: 32
PIF_VALUE: 32
PIF_VALUE: 19
PIF_VALUE: 30
PIF_VALUE: 32
PIF_VALUE: 30
PIF_VALUE: 30
PIF_VALUE: 31
PIF_VALUE: 31
PIF_VALUE: 30
PIF_VALUE: 22
PIF_VALUE: 3
PIF_VALUE: 22
PIF_VALUE: 31
PIF_VALUE: 32
PIF_VALUE: 22
PIF_VALUE: 30
PIF_VALUE: 12
PIF_VALUE: 31
PIF_VALUE: 25
PIF_VALUE: 30
PIF_VALUE: 23
PIF_VALUE: 30
PIF_VALUE: 31
PIF_VALUE: 31
PIF_VALUE: 23
PIF_VALUE: 31
PIF_VALUE: 31
PIF_VALUE: 32
PIF_VALUE: 30
PIF_VALUE: 29
PIF_VALUE: 29
PIF_VALUE: 30
PIF_VALUE: 19
PIF_VALUE: 30
PIF_VALUE: 25
PIF_VALUE: 8
PIF_VALUE: 36
PIF_VALUE: 30
PIF_VALUE: 30
PIF_VALUE: 31
PIF_VALUE: 23
PIF_VALUE: 21
PIF_VALUE: 32
PIF_VALUE: 30
PIF_VALUE: 22
PIF_VALUE: 1
PIF_VALUE: 3
PIF_VALUE: 8
PIF_VALUE: 32
PIF_VALUE: 31
PIF_VALUE: 31
PIF_VALUE: 2
PIF_VALUE: 31
PIF_VALUE: 31
PIF_VALUE: 30
PIF_VALUE: 34
PIF_VALUE: 30
PIF_VALUE: 31
PIF_VALUE: 30
PIF_VALUE: 33
PIF_VALUE: 31
PIF_VALUE: 31
PIF_VALUE: 29
PIF_VALUE: 31
PIF_VALUE: 23
PIF_VALUE: 29
PIF_VALUE: 35
PIF_VALUE: 3
PIF_VALUE: 2
PIF_VALUE: 33
PIF_VALUE: 29
PIF_VALUE: 31
PIF_VALUE: 31
PIF_VALUE: 23
PIF_VALUE: 30
PIF_VALUE: 31
PIF_VALUE: 23
PIF_VALUE: 32
PIF_VALUE: 3
PIF_VALUE: 22
PIF_VALUE: 25
PIF_VALUE: 1
PIF_VALUE: 3
PIF_VALUE: 32
PIF_VALUE: 1
PIF_VALUE: 30
PIF_VALUE: 23
PIF_VALUE: 32
PIF_VALUE: 29
PIF_VALUE: 23
PIF_VALUE: 20
PIF_VALUE: 30
PIF_VALUE: 24
PIF_VALUE: 31
PIF_VALUE: 4
PIF_VALUE: 27
PIF_VALUE: 24
PIF_VALUE: 32
PIF_VALUE: 32
PIF_VALUE: 25
PIF_VALUE: 31
PIF_VALUE: 1
PIF_VALUE: 36
PIF_VALUE: 23
PIF_VALUE: 22
PIF_VALUE: 31

## 2019-06-03 ASSESSMENT — PAIN DESCRIPTION - ORIENTATION
ORIENTATION: MID;LOWER

## 2019-06-03 ASSESSMENT — PAIN SCALES - GENERAL
PAINLEVEL_OUTOF10: 5
PAINLEVEL_OUTOF10: 0
PAINLEVEL_OUTOF10: 0
PAINLEVEL_OUTOF10: 6
PAINLEVEL_OUTOF10: 0
PAINLEVEL_OUTOF10: 0
PAINLEVEL_OUTOF10: 4
PAINLEVEL_OUTOF10: 7
PAINLEVEL_OUTOF10: 4

## 2019-06-03 ASSESSMENT — PAIN DESCRIPTION - PAIN TYPE
TYPE: SURGICAL PAIN

## 2019-06-03 ASSESSMENT — PAIN DESCRIPTION - FREQUENCY
FREQUENCY: CONTINUOUS

## 2019-06-03 ASSESSMENT — PAIN DESCRIPTION - PROGRESSION
CLINICAL_PROGRESSION: NOT CHANGED

## 2019-06-03 ASSESSMENT — PAIN DESCRIPTION - LOCATION
LOCATION: BACK

## 2019-06-03 ASSESSMENT — PAIN DESCRIPTION - ONSET
ONSET: ON-GOING

## 2019-06-03 ASSESSMENT — PAIN DESCRIPTION - DESCRIPTORS
DESCRIPTORS: DISCOMFORT;CONSTANT

## 2019-06-03 NOTE — PROGRESS NOTES
Occupational Therapy     Date:6/3/2019  Patient Name: Lorenza Matthews  MRN: [id number]  : 1945  Room: [facility]/8719-X     Completed chart review & noting waiting for MRI , then possible lumbar decompression/possible fusion per neurosurgery note. Continue to hold treatment until after MRI completed. Shamar Gomez.  [address] [place] [address], [address] [place] [address]

## 2019-06-03 NOTE — ANESTHESIA PRE PROCEDURE
torsemide (DEMADEX) 20 MG tablet Take 20 mg by mouth daily as needed (as needed for extra fluid)   Yes Historical Provider, MD   oxybutynin (DITROPAN) 5 MG tablet Take 5 mg by mouth 2 times daily 2/25/19  Yes Historical Provider, MD   losartan (COZAAR) 50 MG tablet Take 1 tablet by mouth daily 5/8/19  Yes Sorin Morrison DO   enoxaparin (LOVENOX) 40 MG/0.4ML injection Inject 0.4 mLs into the skin daily 5/6/19  Yes Sorin Morrison DO   docusate sodium (COLACE) 100 MG capsule Take 1 capsule by mouth 2 times daily 5/6/19 6/5/19 Yes Odette Rashid MD   atenolol (TENORMIN) 50 MG tablet Take 50 mg by mouth daily. Instructed to take morning of surgery with a sip of water   Yes Historical Provider, MD   atenolol (TENORMIN) 25 MG tablet Take 25 mg by mouth nightly. Yes Historical Provider, MD   levothyroxine (SYNTHROID) 112 MCG tablet Take 112 mcg by mouth Daily. Instructed to take morning of surgery with a sip of water   Yes Historical Provider, MD   pravastatin (PRAVACHOL) 40 MG tablet Take 40 mg by mouth nightly. Yes Historical Provider, MD   potassium chloride (MICRO-K) 10 MEQ CR capsule Take 10 mEq by mouth 2 times daily. Yes Historical Provider, MD   probenecid (BENEMID) 500 MG tablet Take 500 mg by mouth 2 times daily. Yes Historical Provider, MD   Coenzyme Q10 (CO Q 10 PO) Take  by mouth. LAST DOSE 1/17/13   Yes Historical Provider, MD   aspirin 81 MG tablet Take 81 mg by mouth daily. LAST DOSE 1/11/13   Yes Historical Provider, MD   multivitamin [facility]) per tablet Take 1 tablet by mouth daily.    Yes Historical Provider, MD   QUEtiapine (SEROQUEL) 25 MG tablet Take 0.5 tablets by mouth every 6 hours as needed for Agitation 5/20/19   Bekah Sherman MD       Current medications:    Current Facility-Administered Medications   Medication Dose Route Frequency Provider Last Rate Last Dose    0.9 % sodium chloride infusion   Intravenous Continuous HERMAN Farley 75 mL/hr at 06/03/19 0258      ceFAZolin (ANCEF) 2 g in dextrose 5 % 50 mL IVPB  2 g Intravenous Once HERMAN Nina        lidocaine PF 1 % injection 5 mL  5 mL Intradermal Once Isrrael Munoz MD        sodium chloride flush 0.9 % injection 10 mL  10 mL Intravenous 2 times per day Barbara Mckenzie MD   10 mL at 06/02/19 2151    sodium chloride flush 0.9 % injection 10 mL  10 mL Intravenous PRN Isrrael Munoz MD        ceftolozane-tazobactam (ZERBAXA) 1.5 g in dextrose 5 % 100 mL IVPB  1.5 g Intravenous Q8H Pool Alfaro MD   Stopped at 06/03/19 0731    potassium chloride (KLOR-CON M) extended release tablet 40 mEq  40 mEq Oral Daily Leena Morelos MD   40 mEq at 06/02/19 0921    albuterol (PROVENTIL) nebulizer solution 2.5 mg  2.5 mg Nebulization 4x daily Leena Morelos MD   Stopped at 06/03/19 1338    miconazole nitrate 2 % ointment   Topical TID Leena Morelos MD        And    miconazole nitrate 2 % ointment   Topical TID PRN Leena Morelos MD        sodium chloride flush 0.9 % injection 10 mL  10 mL Intravenous PRN Janice Perch, DO   10 mL at 05/28/19 0618    sodium chloride flush 0.9 % injection 10 mL  10 mL Intravenous 2 times per day Janice Perch, DO   10 mL at 06/03/19 5264    acetaminophen (TYLENOL) tablet 650 mg  650 mg Oral Q4H PRN Janice Perch, DO        atenolol (TENORMIN) tablet 25 mg  25 mg Oral Nightly Janice Perch, DO   25 mg at 06/02/19 2145    docusate sodium (COLACE) capsule 100 mg  100 mg Oral BID Janice Perch, DO   100 mg at 05/29/19 2105    enoxaparin (LOVENOX) injection 40 mg  40 mg Subcutaneous Daily Janice Perch, DO   40 mg at 06/02/19 6476    vitamin D (CHOLECALCIFEROL) tablet 1,000 Units  1,000 Units Oral Daily Janice Perch, DO   1,000 Units at 06/02/19 7030    vancomycin (VANCOCIN) oral solution 125 mg  125 mg Oral 4 times per day Janice Perch, DO   125 mg at 06/02/19 2334    QUEtiapine (SEROQUEL) tablet 12.5 mg  12.5 mg Oral Q6H PRN Janice Mackey DO        QUEtiapine (SEROQUEL) tablet 25 mg 25 mg Oral Nightly Orpha Donald, DO   25 mg at 06/02/19 2145    probenecid (BENEMID) tablet 500 mg  500 mg Oral BID Orpha George, DO   500 mg at 06/02/19 2145    pravastatin (PRAVACHOL) tablet 40 mg  40 mg Oral Nightly Orpha Donald, DO   40 mg at 06/02/19 2145    oxybutynin (DITROPAN) tablet 5 mg  5 mg Oral BID Orpha George, DO   5 mg at 06/02/19 1638    nystatin (MYCOSTATIN) 901929 UNIT/ML suspension 500,000 Units  5 mL Oral 4x Daily Orpha George, DO   500,000 Units at 06/02/19 1257    multivitamin 1 tablet  1 tablet Oral Daily Orpha George, DO   1 tablet at 06/02/19 8441    liothyronine (CYTOMEL) tablet 5 mcg  5 mcg Oral Daily Orpha Donald, DO   5 mcg at 06/02/19 1080    levothyroxine (SYNTHROID) tablet 112 mcg  112 mcg Oral Daily Orpha George, DO   112 mcg at 06/02/19 6242    HYDROcodone-acetaminophen (NORCO) 7.5-325 MG per tablet 1 tablet  1 tablet Oral Q6H PRN Orpha George, DO   1 tablet at 05/31/19 1105    fluconazole (DIFLUCAN) tablet 200 mg  200 mg Oral Daily Orpha George, DO   200 mg at 06/02/19 4085    magnesium hydroxide (MILK OF MAGNESIA) 400 MG/5ML suspension 30 mL  30 mL Oral Daily PRN Orpha Donald, DO        ondansetron [facility]) injection 4 mg  4 mg Intravenous Q6H PRN Orpha Donald, DO        0.9 % sodium chloride infusion   Intravenous Continuous Orpha Donald, DO 75 mL/hr at 06/02/19 8462         Allergies:     Allergies   Allergen Reactions    Adrenalin [Epinephrine]      CAUSES TACHYCARDIA    Meropenem Other (See Comments)     Severe confusion       Problem List:    Patient Active Problem List   Diagnosis Code    Right lower quadrant pain R10.31    Pneumatosis coli K63.89    Troponin level elevated R74.8    Sepsis (HCC) A41.9    HTN (hypertension) I10    HLD (hyperlipidemia) E78.5    Obesity E66.9    Acute metabolic encephalopathy S03.94    Clostridium difficile diarrhea A04.72    Pneumonia J18.9    Acute cystitis N30.00    Acute midline low back pain without sciatica M54.5    Acute osteomyelitis of lumbar spine (HCC) M46.26       Past Medical History:        Diagnosis Date    Arthritis     Gout     CONTROLLED    Hyperlipidemia     Hypertension     STABLE    Macular hole, right eye     Thyroid disease        Past Surgical History:        Procedure Laterality Date    BREAST SURGERY Right 1982    BENIGN CYST    CHOLECYSTECTOMY  1997    HYSTERECTOMY  1985       Social History:    Social History     Tobacco Use    Smoking status: Never Smoker    Smokeless tobacco: Never Used   Substance Use Topics    Alcohol use: Yes     Comment: socially                                Counseling given: Not Answered      Vital Signs (Current):   Vitals:    06/02/19 2328 06/03/19 0008 06/03/19 0745 06/03/19 0905   BP: 130/68  (!) 140/78    Pulse: 98  95    Resp: 20  18    Temp: 37.2 °C (98.9 °F)  36.5 °C (97.7 °F)    TempSrc: Temporal  Temporal    SpO2: 94%  92% 94%   Weight:  249 lb 2 oz (113 kg)     Height:                                                  BP Readings from Last 3 Encounters:   06/03/19 (!) 140/78   05/20/19 (!) 150/75   05/07/19 (!) 141/74       NPO Status: Time of last liquid consumption: 2230                        Time of last solid consumption: 1800                        Date of last liquid consumption: 06/02/19                        Date of last solid food consumption: 06/02/19    BMI:   Wt Readings from Last 3 Encounters:   06/03/19 249 lb 2 oz (113 kg)   05/20/19 231 lb 4.8 oz (104.9 kg)   05/07/19 243 lb (110.2 kg)     Body mass index is 40.21 kg/m².     CBC:   Lab Results   Component Value Date    WBC 11.6 06/03/2019    RBC 3.52 06/03/2019    HGB 10.3 06/03/2019    HCT 32.4 06/03/2019    MCV 92.0 06/03/2019    RDW 16.4 06/03/2019     06/03/2019       CMP:   Lab Results   Component Value Date     06/03/2019    K 3.5 06/03/2019    K 2.7 05/29/2019     06/03/2019    CO2 23 06/03/2019    BUN 12 06/03/2019    CREATININE 0.7 06/03/2019    GFRAA >60 06/03/2019    LABGLOM >60 06/03/2019    GLUCOSE 105 06/03/2019    PROT 7.3 05/28/2019    CALCIUM 8.4 06/03/2019    BILITOT 0.5 05/28/2019    ALKPHOS 120 05/28/2019    AST 32 05/28/2019    ALT 23 05/28/2019       POC Tests: No results for input(s): POCGLU, POCNA, POCK, POCCL, POCBUN, POCHEMO, POCHCT in the last 72 hours. Coags:   Lab Results   Component Value Date    PROTIME 14.6 05/14/2019    INR 1.3 05/14/2019    APTT 29.9 05/14/2019       HCG (If Applicable): No results found for: PREGTESTUR, PREGSERUM, HCG, HCGQUANT     ABGs: No results found for: PHART, PO2ART, OVL3DED, BDO6YBT, BEART, U7AEGSDV     Type & Screen (If Applicable):  No results found for: LABABO, LABRH     EKG 5/28/19  Ventricular Rate 120  BPM Incomplete 05/28/2019  6:22 PM [facility]   Atrial Rate 120  BPM Incomplete 05/28/2019  6:22 PM [facility]   P-R Interval 140  ms Incomplete 05/28/2019  6:22 PM [facility]   QRS Duration 86  ms Incomplete 05/28/2019  6:22 PM [facility]   Q-T Interval 298  ms Incomplete 05/28/2019  6:22 PM [facility]   QTc Calculation (Bazett) 421  ms Incomplete 05/28/2019  6:22 PM [facility]   P [place] 56  degrees Incomplete 05/28/2019  6:22 PM [facility]   R Axis 46  degrees Incomplete 05/28/2019  6:22 PM [facility]   T Axis 1  degrees Incomplete 05/28/2019  6:22 PM [facility]   Testing Performed By     Lab - Abbreviation Name Director Address Valid Date Range   360-[facility] MUSE Unknown Unknown 04/18/16 0721-Present   Narrative   Performed by: [facility]   Sinus tachycardia  Nonspecific ST and T wave abnormality  Abnormal ECG     ECHO 5/1/19   Ejection fraction biplane =67%.   Normal sized left atrium.   L atrial pressure not elevated   Normal mitral valve structure and function.   Aortic valve Not well visualized.   Mildly sclerotic aortic valve.   No pulmonary hypertension   No pericardial effusion.     Anesthesia Evaluation  Patient summary reviewed and Nursing notes reviewed no history of anesthetic complications:   Airway: Mallampati: I  TM distance: >3 FB   Neck ROM: full  Mouth opening: > = 3 FB Dental:      Comment: Pt denies any loose chipped or missing teeth    Pulmonary:   (+) pneumonia:  rhonchi,                            ROS comment: Pneumatosis coli   Cardiovascular:    (+) hypertension:,       ECG reviewed  Rhythm: regular  Rate: normal  Echocardiogram reviewed                  Neuro/Psych:                ROS comment: Acute metabolic encephalopathy    Acute midline low back pain without sciatica  Acute osteomyelitis of lumbar spine      GI/Hepatic/Renal:   (+) morbid obesity         ROS comment: Gout    Acute cystitis. Endo/Other:    (+) hypothyroidism: arthritis:., .                  ROS comment: Sepsis     Clostridium difficile diarrhea Abdominal:   (+) obese,         Vascular:                                      Anesthesia Plan      general     ASA 3     (PAPITO PICC)  Induction: intravenous. BIS  MIPS: Postoperative opioids intended and Prophylactic antiemetics administered. Anesthetic plan and risks discussed with patient. Use of blood products discussed with patient whom consented to blood products. Plan discussed with CRNA and attending.                   Saniya Lopez RN   6/3/2019

## 2019-06-03 NOTE — PROGRESS NOTES
Physical Therapy    Medical chart reviewed 6/3 AM. Plan is for lumbar laminectomy today. Will follow. Thank you.     Cheo Lau, PT, DPT  [id number]

## 2019-06-03 NOTE — PROGRESS NOTES
CC- SOB    Subjective: The patient is awake and alert. Tolerating medications. Reports no side effects. Afebrile. 10 ROS otherwise negative unless otherwise specified above. Objective:    Vitals:    06/03/19 0905   BP:    Pulse:    Resp:    Temp:    SpO2: 94%       General Appearance:    Awake, alert , no acute distress.    HEENT:    Normocephalic,PERRL,neck supple, no JVD, mucosa moist, no thrush   Lungs:     Clear to auscultation bilaterally, no wheeze , crackles   Heart:    Regular rate and rhythm, no murmur   Abdomen:     Soft, non-tender, not distended  bowel sounds present,   Extremities:  B/L LE edema,no open wound,no erythema, non  tender   Skin:   no rashes or lesions   CBC+dif:  Reviewed and trend followed    Radiology:  · Ct abdomen pelvis w/o contrast- perinpephric stranding, L3 vertebral body destruction  · MRI spine- L2- L3 VOM    Microbiology:  5/27/19- BC- ESBL E coli 2 sets    Assessment:  · Sepsis from acute osteomyelitis of lumbar vertebrae with recurrent ESBL E coli bacteremia   · C diff colitis ongoing treatment  · H/o encephalopathy from therapy with carbapenem  · ESBL E coli complicated UTI finished treatment  · NASRA resolved  · mucocutaneous candidiasis      Plan:    · IV ZERBAXA adjust to 1.5 g Q8  · Iv diflucan and nystatin  · PO vancomycin 125 mg q6   · For OR today        Electronically signed by Percy Matias MD on 6/3/2019 at 9:33 AM

## 2019-06-03 NOTE — BRIEF OP NOTE
Brief Postoperative Note  ______________________________________________________________    Patient: Camden Galloway  YOB: 1945  MRN: [id number]  Date of Procedure: 6/3/2019    Pre-Op Diagnosis: LUMBER LAMINECTOMY    Post-Op Diagnosis: Same       Procedure(s):  LUMBAR LAMINECTOMY POSTERIOR L3-L5, BONE BIOPSY L4 - GIOVANNY, X-RAY,  WANTS TF    Anesthesia: Anesthesia type not filed in the log.     Surgeon(s):  Gaby Daly MD    Assistant: Parish Roland    Estimated Blood Loss (mL): less than 50     Complications: None    Specimens:   ID Type Source Tests Collected by Time Destination   1 : PARASPINAL FLUID Bone Bone ANAEROBIC CULTURE, FUNGUS CULTURE, GRAM STAIN, SURGICAL CULTURE, ACID FAST CULTURE WITH SMEAR Gaby Daly MD 6/3/2019 1420        Implants:  * No implants in log *      Drains:   Urethral Catheter Non-latex 16 fr (Active)   Catheter Indications Need for fluid management in critically ill patients in a critical care setting not able to be managed by other means such as BSC with hat, bedpan, urinal, condom catheter, or short term intermittent urethral catherization 6/3/2019  7:50 AM   Urine Color Yellow 6/3/2019  7:50 AM   Urine Appearance Cloudy 6/3/2019  7:50 AM   Output (mL) 300 mL 6/3/2019  5:46 AM       [REMOVED] Urethral Catheter Non-latex (Removed)   $ Urethral catheter insertion $ Not inserted for procedure 5/1/2019 11:20 AM   Catheter Indications Need for fluid management in critically ill patients in a critical care setting not able to be managed by other means such as BSC with hat, bedpan, urinal, condom catheter, or short term intermittent urethral catherization 5/7/2019  8:30 AM   Site Assessment No urethral drainage 5/7/2019  8:30 AM   Urine Color Yellow 5/7/2019  8:30 AM   Urine Appearance Sediment 5/7/2019  8:30 AM   Output (mL) 450 mL 5/7/2019  2:32 AM       Findings: see dictated op note    Suki Hair MD  Date: 6/3/2019  Time: 3:43 PM

## 2019-06-03 NOTE — PROGRESS NOTES
Department of Neurosurgery  Progress Note    CHIEF COMPLAINT: pt seen for lumbar stenosis, fracture, possible epidural abscess/discitis    SUBJECTIVE:  Back pain severe with movement. Leg numbness/weakness unchanged    REVIEW OF SYSTEMS :  Constitutional: Negative for chills and fever. Neurological: Negative for dizziness, tremors and speech change. OBJECTIVE:   VITALS:  /68   Pulse 98   Temp 98.9 °F (37.2 °C) (Temporal)   Resp 20   Ht 5' 6\" (1.676 m)   Wt 249 lb 2 oz (113 kg)   SpO2 94%   BMI 40.21 kg/m²   PHYSICAL:  CONSTITUTIONAL:  awake, alert, cooperative, no apparent distress, and appears stated age. MEZA. FC.   Bilateral LE weakness 4-/5    DATA:  CBC:   Lab Results   Component Value Date    WBC 11.6 06/03/2019    RBC 3.52 06/03/2019    HGB 10.3 06/03/2019    HCT 32.4 06/03/2019    MCV 92.0 06/03/2019    MCH 29.3 06/03/2019    MCHC 31.8 06/03/2019    RDW 16.4 06/03/2019     06/03/2019    MPV 11.6 06/03/2019     BMP:    Lab Results   Component Value Date     06/03/2019    K 3.5 06/03/2019    K 2.7 05/29/2019     06/03/2019    CO2 23 06/03/2019    BUN 12 06/03/2019    LABALBU 2.2 05/28/2019    CREATININE 0.7 06/03/2019    CALCIUM 8.4 06/03/2019    GFRAA >60 06/03/2019    LABGLOM >60 06/03/2019    GLUCOSE 105 06/03/2019     PT/INR:    Lab Results   Component Value Date    PROTIME 14.6 05/14/2019    INR 1.3 05/14/2019     PTT:    Lab Results   Component Value Date    APTT 29.9 05/14/2019   [APTT}    Current Inpatient Medications  Current Facility-Administered Medications: 0.9 % sodium chloride infusion, , Intravenous, Continuous  ceFAZolin (ANCEF) 2 g in dextrose 5 % 50 mL IVPB, 2 g, Intravenous, Once  lidocaine PF 1 % injection 5 mL, 5 mL, Intradermal, Once  sodium chloride flush 0.9 % injection 10 mL, 10 mL, Intravenous, 2 times per day  sodium chloride flush 0.9 % injection 10 mL, 10 mL, Intravenous, PRN  ceftolozane-tazobactam (ZERBAXA) 1.5 g in dextrose 5 % 100 mL IVPB, 1.5 g, Intravenous, Q8H  potassium chloride (KLOR-CON M) extended release tablet 40 mEq, 40 mEq, Oral, Daily  albuterol (PROVENTIL) nebulizer solution 2.5 mg, 2.5 mg, Nebulization, 4x daily  miconazole nitrate 2 % ointment, , Topical, TID **AND** miconazole nitrate 2 % ointment, , Topical, TID PRN  sodium chloride flush 0.9 % injection 10 mL, 10 mL, Intravenous, PRN  sodium chloride flush 0.9 % injection 10 mL, 10 mL, Intravenous, 2 times per day  acetaminophen (TYLENOL) tablet 650 mg, 650 mg, Oral, Q4H PRN  atenolol (TENORMIN) tablet 25 mg, 25 mg, Oral, Nightly  docusate sodium (COLACE) capsule 100 mg, 100 mg, Oral, BID  enoxaparin (LOVENOX) injection 40 mg, 40 mg, Subcutaneous, Daily  vitamin D (CHOLECALCIFEROL) tablet 1,000 Units, 1,000 Units, Oral, Daily  vancomycin (VANCOCIN) oral solution 125 mg, 125 mg, Oral, 4 times per day  QUEtiapine (SEROQUEL) tablet 12.5 mg, 12.5 mg, Oral, Q6H PRN  QUEtiapine (SEROQUEL) tablet 25 mg, 25 mg, Oral, Nightly  probenecid (BENEMID) tablet 500 mg, 500 mg, Oral, BID  pravastatin (PRAVACHOL) tablet 40 mg, 40 mg, Oral, Nightly  oxybutynin (DITROPAN) tablet 5 mg, 5 mg, Oral, BID  nystatin (MYCOSTATIN) 307594 UNIT/ML suspension 500,000 Units, 5 mL, Oral, 4x Daily  multivitamin 1 tablet, 1 tablet, Oral, Daily  liothyronine (CYTOMEL) tablet 5 mcg, 5 mcg, Oral, Daily  levothyroxine (SYNTHROID) tablet 112 mcg, 112 mcg, Oral, Daily  HYDROcodone-acetaminophen (NORCO) 7.5-325 MG per tablet 1 tablet, 1 tablet, Oral, Q6H PRN  fluconazole (DIFLUCAN) tablet 200 mg, 200 mg, Oral, Daily  magnesium hydroxide (MILK OF MAGNESIA) 400 MG/5ML suspension 30 mL, 30 mL, Oral, Daily PRN  ondansetron (ZOFRAN) injection 4 mg, 4 mg, Intravenous, Q6H PRN  0.9 % sodium chloride infusion, , Intravenous, Continuous    ASSESSMENT:   74year old female with e coli bacteremia.   She has had progressive low back pain and LE weakness/numbness.  Lumbar MRI reveals lumbar osteomyelitis/epidural abscess vs

## 2019-06-04 LAB — GRAM STAIN ORDERABLE: NORMAL

## 2019-06-04 PROCEDURE — 97530 THERAPEUTIC ACTIVITIES: CPT

## 2019-06-04 PROCEDURE — 6370000000 HC RX 637 (ALT 250 FOR IP): Performed by: PHYSICIAN ASSISTANT

## 2019-06-04 PROCEDURE — 2700000000 HC OXYGEN THERAPY PER DAY

## 2019-06-04 PROCEDURE — 97535 SELF CARE MNGMENT TRAINING: CPT

## 2019-06-04 PROCEDURE — 94640 AIRWAY INHALATION TREATMENT: CPT

## 2019-06-04 PROCEDURE — 94660 CPAP INITIATION&MGMT: CPT

## 2019-06-04 PROCEDURE — 97168 OT RE-EVAL EST PLAN CARE: CPT

## 2019-06-04 PROCEDURE — 6360000002 HC RX W HCPCS: Performed by: PHYSICIAN ASSISTANT

## 2019-06-04 PROCEDURE — 2580000003 HC RX 258: Performed by: PHYSICIAN ASSISTANT

## 2019-06-04 PROCEDURE — 99232 SBSQ HOSP IP/OBS MODERATE 35: CPT | Performed by: INTERNAL MEDICINE

## 2019-06-04 PROCEDURE — 97164 PT RE-EVAL EST PLAN CARE: CPT

## 2019-06-04 PROCEDURE — 1200000000 HC SEMI PRIVATE

## 2019-06-04 RX ADMIN — Medication 125 MG: at 06:22

## 2019-06-04 RX ADMIN — VITAMIN D, TAB 1000IU (100/BT) 1000 UNITS: 25 TAB at 10:30

## 2019-06-04 RX ADMIN — TIZANIDINE 4 MG: 4 TABLET ORAL at 06:18

## 2019-06-04 RX ADMIN — NYSTATIN 500000 UNITS: 100000 SUSPENSION ORAL at 10:28

## 2019-06-04 RX ADMIN — FLUCONAZOLE 200 MG: 100 TABLET ORAL at 10:28

## 2019-06-04 RX ADMIN — MULTIVITAMIN TABLET 1 TABLET: TABLET at 10:27

## 2019-06-04 RX ADMIN — OXYBUTYNIN CHLORIDE 5 MG: 5 TABLET ORAL at 19:11

## 2019-06-04 RX ADMIN — TIZANIDINE 4 MG: 4 TABLET ORAL at 19:14

## 2019-06-04 RX ADMIN — ALBUTEROL SULFATE 2.5 MG: 2.5 SOLUTION RESPIRATORY (INHALATION) at 14:38

## 2019-06-04 RX ADMIN — QUETIAPINE FUMARATE 25 MG: 25 TABLET ORAL at 22:32

## 2019-06-04 RX ADMIN — Medication: at 17:14

## 2019-06-04 RX ADMIN — Medication 125 MG: at 13:54

## 2019-06-04 RX ADMIN — HYDROCODONE BITARTRATE AND ACETAMINOPHEN 1 TABLET: 7.5; 325 TABLET ORAL at 23:28

## 2019-06-04 RX ADMIN — SODIUM CHLORIDE: 9 INJECTION, SOLUTION INTRAVENOUS at 19:20

## 2019-06-04 RX ADMIN — DOCUSATE SODIUM 100 MG: 100 CAPSULE, LIQUID FILLED ORAL at 10:27

## 2019-06-04 RX ADMIN — LEVOTHYROXINE SODIUM 112 MCG: 0.11 TABLET ORAL at 06:18

## 2019-06-04 RX ADMIN — ENOXAPARIN SODIUM 40 MG: 40 INJECTION, SOLUTION INTRAVENOUS; SUBCUTANEOUS at 10:30

## 2019-06-04 RX ADMIN — HYDROCODONE BITARTRATE AND ACETAMINOPHEN 1 TABLET: 7.5; 325 TABLET ORAL at 17:15

## 2019-06-04 RX ADMIN — PRAVASTATIN SODIUM 40 MG: 20 TABLET ORAL at 22:32

## 2019-06-04 RX ADMIN — ALBUTEROL SULFATE 2.5 MG: 2.5 SOLUTION RESPIRATORY (INHALATION) at 17:53

## 2019-06-04 RX ADMIN — Medication 10 ML: at 10:32

## 2019-06-04 RX ADMIN — OXYBUTYNIN CHLORIDE 5 MG: 5 TABLET ORAL at 10:26

## 2019-06-04 RX ADMIN — LIOTHYRONINE SODIUM 5 MCG: 5 TABLET ORAL at 10:30

## 2019-06-04 RX ADMIN — CEFTOLOZANE AND TAZOBACTAM 1.5 G: 1; .5 INJECTION, POWDER, LYOPHILIZED, FOR SOLUTION INTRAVENOUS at 10:26

## 2019-06-04 RX ADMIN — ATENOLOL 25 MG: 25 TABLET ORAL at 22:32

## 2019-06-04 RX ADMIN — PROBENECID 500 MG: 500 TABLET, FILM COATED ORAL at 22:32

## 2019-06-04 RX ADMIN — ALBUTEROL SULFATE 2.5 MG: 2.5 SOLUTION RESPIRATORY (INHALATION) at 11:08

## 2019-06-04 RX ADMIN — PROBENECID 500 MG: 500 TABLET, FILM COATED ORAL at 10:29

## 2019-06-04 RX ADMIN — HYDROCODONE BITARTRATE AND ACETAMINOPHEN 1 TABLET: 7.5; 325 TABLET ORAL at 10:27

## 2019-06-04 RX ADMIN — CEFTOLOZANE AND TAZOBACTAM 1.5 G: 1; .5 INJECTION, POWDER, LYOPHILIZED, FOR SOLUTION INTRAVENOUS at 19:20

## 2019-06-04 RX ADMIN — Medication: at 22:30

## 2019-06-04 RX ADMIN — POTASSIUM CHLORIDE 40 MEQ: 20 TABLET, EXTENDED RELEASE ORAL at 10:30

## 2019-06-04 RX ADMIN — Medication 125 MG: at 19:12

## 2019-06-04 ASSESSMENT — PAIN DESCRIPTION - FREQUENCY
FREQUENCY: INTERMITTENT
FREQUENCY: INTERMITTENT
FREQUENCY: CONTINUOUS

## 2019-06-04 ASSESSMENT — PAIN DESCRIPTION - ORIENTATION
ORIENTATION: MID;LOWER

## 2019-06-04 ASSESSMENT — PAIN SCALES - GENERAL
PAINLEVEL_OUTOF10: 5
PAINLEVEL_OUTOF10: 5
PAINLEVEL_OUTOF10: 4
PAINLEVEL_OUTOF10: 8
PAINLEVEL_OUTOF10: 4
PAINLEVEL_OUTOF10: 8

## 2019-06-04 ASSESSMENT — PAIN DESCRIPTION - LOCATION
LOCATION: BACK

## 2019-06-04 ASSESSMENT — PAIN DESCRIPTION - PAIN TYPE
TYPE: SURGICAL PAIN

## 2019-06-04 ASSESSMENT — PAIN DESCRIPTION - DESCRIPTORS
DESCRIPTORS: DISCOMFORT;ACHING;SHARP
DESCRIPTORS: ACHING;CONSTANT;DISCOMFORT
DESCRIPTORS: DISCOMFORT;ACHING;SHARP

## 2019-06-04 ASSESSMENT — PAIN DESCRIPTION - PROGRESSION
CLINICAL_PROGRESSION: NOT CHANGED

## 2019-06-04 ASSESSMENT — PAIN DESCRIPTION - ONSET
ONSET: ON-GOING

## 2019-06-04 NOTE — PROGRESS NOTES
Nutrition Assessment    Type and Reason for Visit: Reassess    Nutrition Recommendations: Continue dental soft diet, Continue current ONS    Nutrition Assessment: Pt improving from a nutritional standpoint as evidenced by increasing p.o. intakes, pt consuming >50% of meals and >75% of ONS. Pt remains at nutritional risk 2/2 increased nutrient needs for wound healing. Will continue to provide Ensure high protein BID and Nishant BID to aid in wound healing. Malnutrition Assessment:  · Malnutrition Status: At risk for malnutrition  · Context: Acute illness or injury  · Findings of the 6 clinical characteristics of malnutrition (Minimum of 2 out of 6 clinical characteristics is required to make the diagnosis of moderate or severe Protein Calorie Malnutrition based on AND/ASPEN Guidelines):  1. Energy Intake-Less than or equal to 50% of estimated energy requirement, (x3 days)    2. Weight Loss-No significant weight loss, in 1 month  3. Fat Loss-No significant subcutaneous fat loss    4. Muscle Loss-No significant muscle mass loss    5. Fluid Accumulation-No significant fluid accumulation    6.  Strength-Not measured    Nutrition Risk Level:  Moderate    Nutrient Needs:  · Estimated Daily Total Kcal: (MSJ REE 1612 X 1.2 SF)  · Estimated Daily Protein (g): (1.5-1.8 g/kg IBW)  · Estimated Daily Total Fluid (ml/day): (1 ml/kca)    Nutrition Diagnosis:   · Problem: Increased nutrient needs  · Etiology: related to Increased demand for energy/nutrients     Signs and symptoms:  as evidenced by Presence of wounds    Objective Information:  · Nutrition-Focused Physical Findings: A&O, abd. soft, hypoactive BS, +I/Os, +2 RUE/LUE edema, +3 RLE/LLE edema  · Wound Type: Multiple, Stage III, Surgical Wound  · Current Nutrition Therapies:  · Oral Diet Orders: General, Dental Soft   · Oral Diet intake: 51-75%  · Oral Nutrition Supplement (ONS) Orders: Low Calorie High Protein Supplement, Wound Healing Oral Supplement  · ONS intake: %  · Anthropometric Measures:  · Ht: 5' 6\" (167.6 cm)   · Current Body Wt: 249 lb (112.9 kg)(6/4 actual)  · Admission Body Wt: 244 lb (110.7 kg)(5/29 first measured bed scale)  · Usual Body Wt: 243 lb (110.2 kg)(actual per EMR 04/2019)  · % Weight Change:  8# wt gain x 5 days likely r/t fluid   · Ideal Body Wt: 130 lb (59 kg), % Ideal Body 192%  · BMI Classification: BMI > or equal to 40.0 Obese Class III    Nutrition Interventions:   Continued Inpatient Monitoring, Education not appropriate at this time    Nutrition Evaluation:   · Evaluation: Goals set   · Goals: Consume >75% of meals and ONS    · Monitoring: Meal Intake, Supplement Intake, Skin Integrity, Wound Healing, I&O, Weight, Pertinent Labs, Chewing/Swallowing, Monitor Bowel Function      Electronically signed by Danilo Hdz RD, LD on 6/4/19 at 2:39 PM    Contact Number: [phone]

## 2019-06-04 NOTE — OP NOTE
saline. I  proceeded to close the wound in layers using 0 Vicryl for the fascia,  2-0 Vicryl for the subcutaneous layer, and staples for the skin. A dry  sterile dressing was placed over this. The patient was then  subsequently flipped into supine position on her hospital bed, was  extubated and transported to the postanesthesia care unit in stable  condition. There were no complications. Counts were correct. I was  present for the entire case.         Indira Loera MD    D: 06/03/2019 17:00:48       T: 06/03/2019 19:45:57     [email or web address]  Job#: [id number]     Doc#: [id number]    CC:

## 2019-06-04 NOTE — PROGRESS NOTES
Physical Therapy  Facility/Department: [facility]  Re-Assessment    NAME: Radha Chavez  : 1945  MRN: [id number]     Date of Service: 2019      Re-Evaluating PT: Kenya Gilbert, PT, DPT  [id number]    Room #:  9534-O  Diagnosis: Pneumonia  Precautions: Fall risk, 4 L O2, reynolds, contact isolation, spinal, buttock wound, TLSO, pt goes by \"Nataliya\"  Procedures: Bilateral L3, L4, and L5 laminectomy with bilateral L3-L4 and L4-L5 medial facetectomy and bilateral L3, L4, and L5 foraminotomy and drainage of paraspinal abscess 6/3  PMHx: HTN, Gout, Thyroid disease, HLD, Arthritis     Pt was admitted from [place] [address]. Prior to [facility], pt lives with  (WC bound ~ 7 years; able to ambulate short distance and fairly independent) in a one story home with 1+1 stair(s) and 0 rail(s) to enter. Bed is on the first floor and bath is on the first floor. Pt reports that house is \"handicap accessible villa. \" Laundry is on the first floor. Pt states a stair lift is available to go to basement. Pt ambulated with no AD prior to [facility]. Pt reports she used a remy lift for transfers at [place] [address] and stood with assist x2 with therapy.      Re-Evaluation  Date: 19 Treatment Date: Short Term/ Long Term   Goals   AM-PAC 6 Clicks        Was pt agreeable to Eval/treatment? Yes       Does pt have pain? 3/10 B hips and low back       Bed Mobility  Rolling: Max A  Supine to sit: Depx 2  Sit to supine: Depx 2  Scooting: Max A x2   Rolling: Mod A  Supine to sit: Mod A  Sit to supine: Mod A  Scooting: Mod A   Transfers Sit to stand: Max A x2  Stand to sit: Max A x2  Stand pivot: NT   Sit to stand: Mod A  Stand to sit: Mod A  Stand pivot:  Mod A with [employer]   Ambulation   NT   25 feet with [employer] with Mod A   Stair negotiation: NT   1+1 platform step(s) with 0 rail(s) with [employer] with Mod A   ROM B UE: Refer to OT note  B LE: WFL passively       Strength B UE: Refer to OT note  RLE: grossly 2/5 hip and knee; 3/5 ankle  LLE grossly 2/5 hip and knee; 3/5 ankle       Balance Sitting EOB: Max A progressed to Mod A  Dynamic standing: Max A x2    Sitting EOB: Mod A  Dynamic standing: Mod A with [facility]      Pt is A & O x: 2 to place and year  Sensation: Pt reporting parasthesia in B hips. Coordination: NT  Edema: Mild to moderate pitting edema in B LE.   ASSESSMENT  Pt displays functional ability as noted in the objective portion of this evaluation. Comments/Treatment:  Pt was cleared by RN prior to PT evaluation. Pt was received in supine and was agreeable to PT evaluation. Pt educated on spinal precautions. Pt soiled with BM upon entrance, requiring verbal and tactile cues to roll bilaterally. Pt rolling with decreased assistance to the R in comparison to the L. Pt educated on donning and doffing of TLSO. Pt performed bed mobility via log roll requiring assistance with trunk and BLEs. Pt requiring manual facilitation to position hips at edge of bed. Pt with severe posterior lean due to weakness at edge of bed. Pt tolerating approximately 8 min at edge of bed. Pt performed sit to stand transfer with max verbal and tactile cues for facilitation. Pt demonstrating flexed posture and posterior lean tolerating approximately 25 seconds of static stance. Pt time in stance limited by fatigue. Pt incontinent of bowel with sit to supine transfer. Pt performing further rolling, dependent for pericare and doffing of brace. Pts brace donned once again, as pt placed in chair position. Pt was left with call button within reach and all needs met. Pt would benefit from continued PT to maximize functional mobility independence. Patient education  Pt educated on PT role, spinal precautions, donning/doffing of brace    Patient response to education:   Pt verbalized understanding Pt demonstrated skill Pt requires further education in this area   Yes Requires assist/VCs Yes     Rehab potential is Good for reaching above PT goals. Pts/ family goals   1. Get better.     Patient and or family understand(s) diagnosis, prognosis, and plan of care. PLAN  PT care will be provided in accordance with the objectives noted above. Whenever appropriate, clear delegation orders will be provided for nursing staff. Exercises and functional mobility practice will be used as well as appropriate assistive devices or modalities to obtain goals. Patient and family education will also be administered as needed. Frequency of treatments will be 2-5x/week x 7-10 days.     Time in: 3 [facility]  Time out: 10 [place], [place], DPT  License [id number]   Joe Webb PT, DPT  [id number]

## 2019-06-04 NOTE — PROGRESS NOTES
Pulmonary [facility]                             Pulmonary Progress Note :          Patient: Winston Davis  MRN: [id number]  : 1945      Date of Admission: .2019  6:29 PM          Reason for Consultation:  CC : Follow up on Hypoxia     HPI:   She is doing very well  Able to pull 750 ml in spirometry   On 2 L   No fever or chills     PHYSICAL EXAMINATION:     VITAL SIGNS:  BP (!) 175/81   Pulse 80   Temp 97 °F (36.1 °C) (Temporal)   Resp 16   Ht 5' 6\" (1.676 m)   Wt 249 lb (112.9 kg)   SpO2 95%   BMI 40.19 kg/m²   Wt Readings from Last 3 Encounters:   19 249 lb (112.9 kg)   19 231 lb 4.8 oz (104.9 kg)   19 243 lb (110.2 kg)     Temp Readings from Last 3 Encounters:   19 97 °F (36.1 °C) (Temporal)   19 97.9 °F (36.6 °C) (Temporal)   19 97.8 °F (36.6 °C) (Oral)     TMAX:  BP Readings from Last 3 Encounters:   19 (!) 175/81   19 (!) 143/69   19 (!) 150/75     Pulse Readings from Last 3 Encounters:   19 80   19 86   19 79           INTAKE/OUTPUTS:  I/O last 3 completed shifts: In: 8544 [P.O.:120;  I.V.:1399; IV Piggyback:100]  Out: 1105 [Urine:1075; Blood:30]    Intake/Output Summary (Last 24 hours) at 2019 1452  Last data filed at 2019 0646  Gross per 24 hour   Intake 1519 ml   Output 1105 ml   Net 414 ml       General Appearance: alert and oriented to person, place and time, well-developed and   well-nourished, in no acute distress   Eyes: pupils equal, round, and reactive to light, extraocular eye movements intact, conjunctivae normal and sclera anicteric   Neck: neck supple and non tender without mass, no thyromegaly, no thyroid nodules and no cervical adenopathy   Pulmonary/Chest:decrease breath sounds bibasilar   Cardiovascular: normal rate, regular rhythm, normal S1 and S2, no murmurs, rubs, clicks or gallops, distal pulses intact, no carotid bruits, no murmurs, no gallops, no carotid bruits and no JVD   Abdomen: obese, soft, non-tender, non-distended, normal bowel sounds, no masses or organomegaly   Extremities:+2 edema   Musculoskeletal: normal range of motion, no joint swelling, deformity or tenderness   Neurologic: reflexes normal and symmetric, no cranial nerve deficit noted    LABS/IMAGING:    CBC:  Lab Results   Component Value Date    WBC 11.6 (H) 06/03/2019    HGB 10.3 (L) 06/03/2019    HCT 32.4 (L) 06/03/2019    MCV 92.0 06/03/2019     06/03/2019    LYMPHOPCT 8.2 (L) 06/03/2019    RBC 3.52 06/03/2019    MCH 29.3 06/03/2019    MCHC 31.8 (L) 06/03/2019    RDW 16.4 (H) 06/03/2019    NEUTOPHILPCT 85.2 (H) 06/03/2019    MONOPCT 4.0 06/03/2019    BASOPCT 0.5 06/03/2019    NEUTROABS 9.90 (H) 06/03/2019    LYMPHSABS 0.96 (L) 06/03/2019    MONOSABS 0.47 06/03/2019    EOSABS 0.13 06/03/2019    BASOSABS 0.06 06/03/2019       Recent Labs     06/03/19  0530 06/02/19  1303 06/01/19  0518   WBC 11.6* 12.5* 9.8   HGB 10.3* 10.6* 8.1*   HCT 32.4* 32.7* 26.7*   MCV 92.0 91.9 94.0    218 170       BMP:   Recent Labs     06/02/19  1303 06/03/19  0530    146   K 3.8 3.5   * 111*   CO2 23 23   BUN 14 12   CREATININE 0.7 0.7       MG:   Lab Results   Component Value Date    MG 1.9 05/29/2019     Ca/Phos:   Lab Results   Component Value Date    CALCIUM 8.4 (L) 06/03/2019    PHOS 2.4 (L) 05/28/2019     Amylase: No results found for: AMYLASE  Lipase: No results found for: LIPASE  LIVER PROFILE:   No results for input(s): AST, ALT, LIPASE, BILIDIR, BILITOT, ALKPHOS in the last 72 hours. Invalid input(s): AMYLASE,  ALB    PT/INR: No results for input(s): PROTIME, INR in the last 72 hours. APTT: No results for input(s): APTT in the last 72 hours.     Cardiac Enzymes:  Lab Results   Component Value Date    CKTOTAL 47 05/28/2019    CKMB 1.7 05/29/2019    TROPONINI 0.04 (H) 05/28/2019               PROBLEM LIST:  Patient Active Problem List   Diagnosis    Right lower quadrant pain    Pneumatosis coli    Troponin level elevated    Sepsis (HCC)    HTN (hypertension)    HLD (hyperlipidemia)    Obesity    Acute metabolic encephalopathy    Clostridium difficile diarrhea    Pneumonia    Acute cystitis    Acute midline low back pain without sciatica    Acute osteomyelitis of lumbar spine (HCC)               ASSESSMENT:  1.) Acute Hypoxic resp failure   2.) History of C diff   3. )Bilateral atelectasis   4.)Possible LEILANI   5.)Hypokalemia       PLAN:    *-Did well with surgery ,will continue aggressive spirometry and flutter valve   *- Encouraged to do more spirometry   *- BiPAP at night   *- Has to ambulate and up to chair ,her hypoxia mainly from shunt and atelectasis  *- BD   *-Sepsis with GNR as per ID? E coli  ,and going for back surgery/I&D  ? \" epidural abscess   *- CPAP as needed   *-ambulate as needed    Goal for sat 92-28        228 [facility]     NOTE: This report was transcribed using voice recognition software. Every effort was made to ensure accuracy; however, inadvertent computerized transcription errors may be present.

## 2019-06-04 NOTE — PROGRESS NOTES
Pulmonary [address]                             Pulmonary Progress Note :          Patient: Noemy Foy  MRN: [id number]  : 1945      Date of Admission: .2019  6:29 PM          Reason for Consultation:  CC : Follow up on Hypoxia     HPI:   Doing much better  For abscess I&D  Doing better with spirometry 750   Able take deep breath      PHYSICAL EXAMINATION:     VITAL SIGNS:  /80   Pulse 81   Temp 97.7 °F (36.5 °C) (Temporal)   Resp 18   Ht 5' 6\" (1.676 m)   Wt 249 lb 2 oz (113 kg)   SpO2 94%   BMI 40.21 kg/m²   Wt Readings from Last 3 Encounters:   19 249 lb 2 oz (113 kg)   19 231 lb 4.8 oz (104.9 kg)   19 243 lb (110.2 kg)     Temp Readings from Last 3 Encounters:   19 97.7 °F (36.5 °C) (Temporal)   19 97.9 °F (36.6 °C) (Temporal)   19 97.8 °F (36.6 °C) (Oral)     TMAX:  BP Readings from Last 3 Encounters:   19 136/80   19 (!) 143/69   19 (!) 150/75     Pulse Readings from Last 3 Encounters:   19 81   19 86   19 79           INTAKE/OUTPUTS:  I/O last 3 completed shifts: In: 2246.6 [P.O.:120;  I.V.:1926.6; IV Piggyback:200]  Out: 855 [Urine:825; Blood:30]    Intake/Output Summary (Last 24 hours) at 6/3/2019 2313  Last data filed at 6/3/2019 2109  Gross per 24 hour   Intake 2246.56 ml   Output 855 ml   Net 1391.56 ml       General Appearance: alert and oriented to person, place and time, well-developed and   well-nourished, in no acute distress   Eyes: pupils equal, round, and reactive to light, extraocular eye movements intact, conjunctivae normal and sclera anicteric   Neck: neck supple and non tender without mass, no thyromegaly, no thyroid nodules and no cervical adenopathy   Pulmonary/Chest:decrease breath sounds bibasilar   Cardiovascular: normal rate, regular rhythm, normal S1 and S2, no murmurs, rubs, clicks or gallops, distal pulses intact, no carotid bruits, no murmurs, no gallops, no carotid bruits and no JVD   Abdomen: obese, soft, non-tender, non-distended, normal bowel sounds, no masses or organomegaly   Extremities:+2 edema   Musculoskeletal: normal range of motion, no joint swelling, deformity or tenderness   Neurologic: reflexes normal and symmetric, no cranial nerve deficit noted    LABS/IMAGING:    CBC:  Lab Results   Component Value Date    WBC 11.6 (H) 06/03/2019    HGB 10.3 (L) 06/03/2019    HCT 32.4 (L) 06/03/2019    MCV 92.0 06/03/2019     06/03/2019    LYMPHOPCT 8.2 (L) 06/03/2019    RBC 3.52 06/03/2019    MCH 29.3 06/03/2019    MCHC 31.8 (L) 06/03/2019    RDW 16.4 (H) 06/03/2019    NEUTOPHILPCT 85.2 (H) 06/03/2019    MONOPCT 4.0 06/03/2019    BASOPCT 0.5 06/03/2019    NEUTROABS 9.90 (H) 06/03/2019    LYMPHSABS 0.96 (L) 06/03/2019    MONOSABS 0.47 06/03/2019    EOSABS 0.13 06/03/2019    BASOSABS 0.06 06/03/2019       Recent Labs     06/03/19  0530 06/02/19  1303 06/01/19  0518   WBC 11.6* 12.5* 9.8   HGB 10.3* 10.6* 8.1*   HCT 32.4* 32.7* 26.7*   MCV 92.0 91.9 94.0    218 170       BMP:   Recent Labs     06/01/19  0518 06/02/19  1303 06/03/19  0530    141 146   K 3.4* 3.8 3.5   * 112* 111*   CO2 21* 23 23   BUN 15 14 12   CREATININE 0.9 0.7 0.7       MG:   Lab Results   Component Value Date    MG 1.9 05/29/2019     Ca/Phos:   Lab Results   Component Value Date    CALCIUM 8.4 (L) 06/03/2019    PHOS 2.4 (L) 05/28/2019     Amylase: No results found for: AMYLASE  Lipase: No results found for: LIPASE  LIVER PROFILE:   No results for input(s): AST, ALT, LIPASE, BILIDIR, BILITOT, ALKPHOS in the last 72 hours. Invalid input(s): AMYLASE,  ALB    PT/INR: No results for input(s): PROTIME, INR in the last 72 hours. APTT: No results for input(s): APTT in the last 72 hours.     Cardiac Enzymes:  Lab Results   Component Value Date    CKTOTAL 47 05/28/2019    CKMB 1.7 05/29/2019    TROPONINI 0.04 (H) 05/28/2019               PROBLEM LIST:  Patient Active Problem List   Diagnosis    Right lower quadrant pain    Pneumatosis coli    Troponin level elevated    Sepsis ([facility] [address]    HTN (hypertension)    HLD (hyperlipidemia)    Obesity    Acute metabolic encephalopathy    Clostridium difficile diarrhea    Pneumonia    Acute cystitis    Acute midline low back pain without sciatica    Acute osteomyelitis of lumbar spine (HCC)               ASSESSMENT:  1.) Acute Hypoxic resp failure   2.) History of C diff   3. )Bilateral atelectasis   4.)Possible LEILANI   5.)Hypokalemia       PLAN:    *-watch carefully around surgery  *- encouraged to do more spirometry   *- BiPAP at night   *- Has to ambulate and up to chair ,her hypoxia      mainly from shunt and atelectasis  *- BD   *-Sepsis with GNR as per ID? E coli  ,and         going for back surgery/I&D  ? \" epidural abscess   *- CPAP as needed     Goal for sat 92-72        228 [facility]     NOTE: This report was transcribed using voice recognition software. Every effort was made to ensure accuracy; however, inadvertent computerized transcription errors may be present.

## 2019-06-04 NOTE — PROGRESS NOTES
Occupational Therapy  OCCUPATIONAL THERAPY RE-EVALUATION      Date:2019  Patient Name: Ford Huang  MRN: [id number]  : 1945  Room: [address] [place] [address]     Re-evaluating OT: Denia Rothman OTR/L #[id number]     OT re-evaluation completed following lumbar surgery     AM-PAC Daily Activity Raw Score:     Recommended Adaptive Equipment:  TBD     Diagnosis: PNA   AM-PAC Daily Activity Raw Score:   Recommended Adaptive Equipment: TBD      Diagnosis: Pneumonia   Pt presented to ED on 19 for fever   Pertinent Medical History: arthritis, gout, HTN     Precautions:  Falls, contact isolation, O2, reynolds, spine neutrality, TLSO   RN approved OT session this date.     Home Living: Pt admitted from [address] [place] [address] permanently resides w/ spouse (spounse is w/c bound) in 1 floor home 1, 0 HR  \"handicap accessible villa\" - laundry, bath, bed on 1st floor  Stair lift to basement  Bathroom: walk-in shower     Prior Level of Function: Independent with ADLs and IADLs, ambulated without AD. Pt reports that she has been in [facility] for 1 week and was a remy lift/ x2 assist with therapy      Prior to all hospitalizations, independent w/ ADL's/IADL's and ambulated w/o AD  Driving: yes (prior to admissions)        Pain Level: Pt c/o 1/10 back pain and 2/10 hip pain this session with activity.  Reinforced spinal precautions and nursing notified     Cognition: A&O: 4/4; Follows 2 step directions              Memory:  good               Sequencing:  fair               Problem solving:  fair               Judgement/safety:  fair                Functional Assessment:    Re- Eval Status  Date: 19 Treatment Status  Date: Short Term Goals  Treatment frequency: PRN    Feeding SBA   Independent    Grooming Moderate Assist   Seated EOB   Modified [place]    UB Dressing Moderate Assist   Gown  Dependent for TLSO   Stand by Assist    LB Dressing Dependent    Moderate Assist    Bathing Dependent   Moderate Assist    Toileting Dependent    Maximal Assist    Bed Mobility  Rolling: Maximal Assist  Supine to sit: Dependent x2  Sit to supine: Dependent x2   Rolling: Mod A  Supine to sit: Maximal Assist   Sit to supine: Maximal Assist    Functional Transfers Max A x2  (sit to stand transfer)   Max A   Functional Mobility NT   Max A   Balance Sitting EOB: varying assist levels (Max><Min A)   Standing: Max A x2 w/ ww (limited stand tolerance)       Activity Tolerance Poor+   Fair   Visual/  Perceptual Glasses: yes                          Hand dominance: right     Strength ROM Additional Info:    RUE  WFL, formal MMT deferred due to spinal precautions  WFL good  and wfl FMC/dexterity noted during ADL tasks     LUE WFL formal MMT deferred due to spinal precautions  WFL good  and wfl FMC/dexterity noted during ADL tasks     Hearing:  [email or web address] [facility]"[employer] [facility] [place]  Sensation: denies numbness and tingling   Tone: WFL  Edema: B LE edema                             Comments/Treatment: Upon arrival, patient supine in bed and agreeable to OT Session with PT collaboration. Therapist educated pt regarding role of OT, spinal precautions and TLSO. Therapist facilitated donning of TLSO, bed mobility utilizing log roll technique (rolling x8, supine<>sit), sitting balance at EOB  (8+ minutes; addressing posture, weightshifting and activity tolerance: max initial sitting balance improving to min A at times) and sit to stand transfers (max A x2 with 20 second stand) - skilled cuing on sequencing, hand placement, body mechanics and safety. Therapist facilitated self-care retraining: UB/LB self-care tasks (gown x2, bathing , socks), toileting hygiene task x2 (linen/pad change) and grooming task while educating pt on modified techniques within precautions, posture, safety and energy conservation techniques. Skilled monitoring of HR, O2 sats and pts response to treatment. Therapist provided skilled repositioning in bed addressing joint and skin integrity.   Pt demonstrating fair understanding of education/techniques, requiring additional training / education. At end of session, patient seated with HOB elevated with call light and phone within reach, all lines intact. Pt would benefit from continued skilled OT to increase functional independence and quality of life. (re-Evaluation time includes thorough review of current medical information, gathering information on past medical history/social history and prior level of function, completion of standardized testing/informal observation of tasks, assessment of data, and development of POC/Goals.)      Assessment of current deficits   Functional mobility [x]  ADLs [x] Strength [x]  Cognition []  Functional transfers  [x] IADLs [x] Safety Awareness [x]  Endurance [x]  Fine Motor Coordination [] Balance [x] Vision/perception [] Sensation []   Gross Motor Coordination [] ROM [] Delirium []                  Motor Control []    Plan of Care:   ADL retraining [x]   Equipment needs [x]   Neuromuscular re-education [x] Energy Conservation Techniques [x]  Functional Transfer training [x] Patient and/or Family Education [x]  Functional Mobility training [x]  Environmental Modifications [x]  Cognitive re-training []   Compensatory techniques for ADLs [x]  Splinting Needs []   Positioning to improve overall function [x]   Therapeutic Activity [x]  Therapeutic Exercise  [x]  Visual/Perceptual: []    Delirium prevention/treatment  []   Other:  []    Rehab Potential: F+ for established goals     Patient / Family Goal:  Regain independence    Patient and/or family were instructed diagnosis, prognosis/goals and plan of care. Demonstrated fair understanding. [] Malnutrition indicators have been identified and nursing has been notified to ensure a dietitian consult is ordered.        AM-PAC Daily Activity Inpatient   How much help for putting on and taking off regular lower body clothing?: Total  How much help for Bathing?: A Lot  How much help for Toileting?: Total  How much help for putting on and taking off regular upper body clothing?: A Lot  How much help for taking care of personal grooming?: A Lot  How much help for eating meals?: A Little  AM-PAC Inpatient Daily Activity Raw Score: 11  AM-PAC Inpatient ADL T-Scale Score : 29.04  ADL Inpatient CMS 0-100% Score: 70.42  ADL Inpatient CMS G-Code Modifier : CL       Re- Evaluation + 42 timed treatment minutes  Tx Time in: 949  Tx Time out: [place] OTR/L #[id number]

## 2019-06-04 NOTE — PROGRESS NOTES
Subjective: The patient is awake and alert. Resting comfortably   Awake alert   denies sob or sob       Objective:    BP (!) 175/81   Pulse 80   Temp 97 °F (36.1 °C) (Temporal)   Resp 16   Ht 5' 6\" (1.676 m)   Wt 249 lb (112.9 kg)   SpO2 95%   BMI 40.19 kg/m²     In: 789 [P.O.:240; I.V.:549]  Out: 900     HEENT: NCAT,  PERRLA, No JVD  Heart:  RRR, no murmurs, gallops, or rubs. Lungs:  CTA bilaterally, no wheeze, rales or rhonchi  Abd: bowel sounds present, nontender, nondistended, no masses  Extrem:  No clubbing, cyanosis, or edema     Recent Labs     06/02/19  1303 06/03/19  0530   WBC 12.5* 11.6*   HGB 10.6* 10.3*   HCT 32.7* 32.4*    224       Recent Labs     06/02/19  1303 06/03/19  0530    146   K 3.8 3.5   * 111*   CO2 23 23   BUN 14 12   CREATININE 0.7 0.7   CALCIUM 8.0* 8.4*       Assessment:    Patient Active Problem List   Diagnosis    Right lower quadrant pain    Pneumatosis coli    Troponin level elevated    Sepsis (HCC)    HTN (hypertension)    HLD (hyperlipidemia)    Obesity    Acute metabolic encephalopathy    Clostridium difficile diarrhea    Pneumonia    Acute cystitis    Acute midline low back pain without sciatica    Acute osteomyelitis of lumbar spine ([address]       Plan:    Admitted to [facility] for evaluation of hypoxemia on continue pulse ox monitoring   Sats in mid 90's - pulm following   Off continuous monitoring   Cancelled CTA chest - no need  Continue nebs and supportive care     GNR in blood cultures from 5/30   ?  Discitis Usman Varela on mri   POD 1 for   LUMBAR LAMINECTOMY POSTERIOR L3-L5, BONE BIOPSY L4  IV abx therapy - ID following   Continue supportive care   Plan for OR this am by dr Mesfin Foley     DVT proph  PT/OT   Dc planning- will likely require rehab     Am labs   All consultants notes reviewed    Fco Thornton MD  4:43 PM  6/4/2019

## 2019-06-04 NOTE — PROGRESS NOTES
CC- SOB    Subjective: Tolerating medications. Reports no side effects. Afebrile. Had surgery, sleepy,wearing orthotic brace  10 ROS otherwise negative unless otherwise specified above. Objective:    Vitals:    06/04/19 0830   BP: (!) 175/81   Pulse: 80   Resp: 16   Temp: 97 °F (36.1 °C)   SpO2: 95%       General Appearance:    Awake, alert , no acute distress.    HEENT:    Normocephalic,PERRL,neck supple, no JVD, mucosa moist, no thrush   Lungs:     Orthotic brace, no wheeze , crackles   Heart:    Regular rate and rhythm, no murmur   Abdomen:     Soft, non-tender, not distended  bowel sounds present,   Extremities:  B/L LE edema,no open wound,no erythema, non  tender   Skin:   no rashes or lesions   CBC+dif:  Reviewed and trend followed    Radiology:  · Ct abdomen pelvis w/o contrast- perinpephric stranding, L3 vertebral body destruction  · MRI spine- L2- L3 VOM    Microbiology:  5/27/19- BC- ESBL E coli 2 sets    Assessment:  · Sepsis from acute osteomyelitis of lumbar vertebrae with recurrent ESBL E coli bacteremia   · C diff colitis ongoing treatment  · 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

## 2019-06-04 NOTE — DISCHARGE INSTR - DIET
 Good nutrition is important when healing from an illness, injury, or surgery. Follow any nutrition recommendations given to you during your hospital stay.  If you were given an oral nutrition supplement while in the hospital, continue to take this supplement at home. Ensure high protein 2x/day and Nishant mixed with 6-8 oz water 2x/day. You can take it with meals, in-between meals, and/or before bedtime. These supplements can be purchased at most local grocery stores, pharmacies, and chain super-stores.  If you have any questions about your diet or nutrition, call the hospital and ask for the dietitian.

## 2019-06-04 NOTE — PLAN OF CARE
Problem: Increased energy expenditure (NI-1.2)  Goal: Food and/or Nutrient Delivery  Description-Provide Ensure High Protein BID and Nishant BID  Individualized approach for food/nutrient provision.   Outcome: Met This Shift

## 2019-06-04 NOTE — PROGRESS NOTES
Department of Neurosurgery  Progress Note    CHIEF COMPLAINT: s/p lumbar laminectomy and evacuation of epidural abscess    SUBJECTIVE:  Tolerating op site pain well. No leg pain. Leg numbness/weakness unchanged    REVIEW OF SYSTEMS :  Constitutional: Negative for chills and fever. Neurological: Negative for dizziness, tremors and speech change. OBJECTIVE:   VITALS:  BP (!) 175/81   Pulse 80   Temp 97 °F (36.1 °C) (Temporal)   Resp 16   Ht 5' 6\" (1.676 m)   Wt 249 lb (112.9 kg)   SpO2 95%   BMI 40.19 kg/m²   PHYSICAL:  CONSTITUTIONAL:  awake, alert, cooperative, no apparent distress, and appears stated age. MEZA. Bilateral LE weakness 3/5.   Decreased sensation to light touch    DATA:  CBC:   Lab Results   Component Value Date    WBC 11.6 06/03/2019    RBC 3.52 06/03/2019    HGB 10.3 06/03/2019    HCT 32.4 06/03/2019    MCV 92.0 06/03/2019    MCH 29.3 06/03/2019    MCHC 31.8 06/03/2019    RDW 16.4 06/03/2019     06/03/2019    MPV 11.6 06/03/2019     BMP:    Lab Results   Component Value Date     06/03/2019    K 3.5 06/03/2019    K 2.7 05/29/2019     06/03/2019    CO2 23 06/03/2019    BUN 12 06/03/2019    LABALBU 2.2 05/28/2019    CREATININE 0.7 06/03/2019    CALCIUM 8.4 06/03/2019    GFRAA >60 06/03/2019    LABGLOM >60 06/03/2019    GLUCOSE 105 06/03/2019     PT/INR:    Lab Results   Component Value Date    PROTIME 14.6 05/14/2019    INR 1.3 05/14/2019     PTT:    Lab Results   Component Value Date    APTT 29.9 05/14/2019   [APTT}    Current Inpatient Medications  Current Facility-Administered Medications: sodium chloride flush 0.9 % injection 10 mL, 10 mL, Intravenous, 2 times per day  sodium chloride flush 0.9 % injection 10 mL, 10 mL, Intravenous, PRN  ondansetron (ZOFRAN) injection 4 mg, 4 mg, Intravenous, Q6H PRN  HYDROmorphone (DILAUDID) injection 0.25 mg, 0.25 mg, Intravenous, Q3H PRN **OR** HYDROmorphone (DILAUDID) injection 0.5 mg, 0.5 mg, Intravenous, Q3H PRN  tiZANidine

## 2019-06-05 LAB
ANAEROBIC CULTURE: NORMAL
ANION GAP SERPL CALCULATED.3IONS-SCNC: 7 MMOL/L (ref 7–16)
BASOPHILS ABSOLUTE: 0.06 E9/L (ref 0–0.2)
BASOPHILS RELATIVE PERCENT: 0.6 % (ref 0–2)
BLOOD CULTURE, ROUTINE: ABNORMAL
BLOOD CULTURE, ROUTINE: ABNORMAL
BUN BLDV-MCNC: 17 MG/DL (ref 8–23)
CALCIUM SERPL-MCNC: 8 MG/DL (ref 8.6–10.2)
CHLORIDE BLD-SCNC: 113 MMOL/L (ref 98–107)
CO2: 23 MMOL/L (ref 22–29)
CREAT SERPL-MCNC: 0.7 MG/DL (ref 0.5–1)
CULTURE, BLOOD 2: ABNORMAL
CULTURE, BLOOD 2: ABNORMAL
EOSINOPHILS ABSOLUTE: 0.2 E9/L (ref 0.05–0.5)
EOSINOPHILS RELATIVE PERCENT: 2 % (ref 0–6)
GFR AFRICAN AMERICAN: >60
GFR NON-AFRICAN AMERICAN: >60 ML/MIN/1.73
GLUCOSE BLD-MCNC: 110 MG/DL (ref 74–99)
HCT VFR BLD CALC: 32.2 % (ref 34–48)
HEMOGLOBIN: 10.1 G/DL (ref 11.5–15.5)
IMMATURE GRANULOCYTES #: 0.12 E9/L
IMMATURE GRANULOCYTES %: 1.2 % (ref 0–5)
LYMPHOCYTES ABSOLUTE: 1.31 E9/L (ref 1.5–4)
LYMPHOCYTES RELATIVE PERCENT: 13 % (ref 20–42)
MCH RBC QN AUTO: 29.6 PG (ref 26–35)
MCHC RBC AUTO-ENTMCNC: 31.4 % (ref 32–34.5)
MCV RBC AUTO: 94.4 FL (ref 80–99.9)
MONOCYTES ABSOLUTE: 0.58 E9/L (ref 0.1–0.95)
MONOCYTES RELATIVE PERCENT: 5.8 % (ref 2–12)
NEUTROPHILS ABSOLUTE: 7.8 E9/L (ref 1.8–7.3)
NEUTROPHILS RELATIVE PERCENT: 77.4 % (ref 43–80)
ORGANISM: ABNORMAL
ORGANISM: ABNORMAL
PDW BLD-RTO: 16.2 FL (ref 11.5–15)
PLATELET # BLD: 217 E9/L (ref 130–450)
PMV BLD AUTO: 11.7 FL (ref 7–12)
POTASSIUM SERPL-SCNC: 3.5 MMOL/L (ref 3.5–5)
RBC # BLD: 3.41 E12/L (ref 3.5–5.5)
SODIUM BLD-SCNC: 143 MMOL/L (ref 132–146)
WBC # BLD: 10.1 E9/L (ref 4.5–11.5)

## 2019-06-05 PROCEDURE — 97535 SELF CARE MNGMENT TRAINING: CPT

## 2019-06-05 PROCEDURE — 2700000000 HC OXYGEN THERAPY PER DAY

## 2019-06-05 PROCEDURE — 1200000000 HC SEMI PRIVATE

## 2019-06-05 PROCEDURE — 2580000003 HC RX 258: Performed by: PHYSICIAN ASSISTANT

## 2019-06-05 PROCEDURE — 99232 SBSQ HOSP IP/OBS MODERATE 35: CPT | Performed by: INTERNAL MEDICINE

## 2019-06-05 PROCEDURE — 80048 BASIC METABOLIC PNL TOTAL CA: CPT

## 2019-06-05 PROCEDURE — 85025 COMPLETE CBC W/AUTO DIFF WBC: CPT

## 2019-06-05 PROCEDURE — 6360000002 HC RX W HCPCS: Performed by: PHYSICIAN ASSISTANT

## 2019-06-05 PROCEDURE — 6370000000 HC RX 637 (ALT 250 FOR IP): Performed by: PHYSICIAN ASSISTANT

## 2019-06-05 PROCEDURE — 36415 COLL VENOUS BLD VENIPUNCTURE: CPT

## 2019-06-05 PROCEDURE — 97530 THERAPEUTIC ACTIVITIES: CPT

## 2019-06-05 PROCEDURE — 94640 AIRWAY INHALATION TREATMENT: CPT

## 2019-06-05 PROCEDURE — 94660 CPAP INITIATION&MGMT: CPT

## 2019-06-05 PROCEDURE — 6360000002 HC RX W HCPCS: Performed by: INTERNAL MEDICINE

## 2019-06-05 RX ORDER — HEPARIN SODIUM (PORCINE) LOCK FLUSH IV SOLN 100 UNIT/ML 100 UNIT/ML
300 SOLUTION INTRAVENOUS 2 TIMES DAILY
Status: DISCONTINUED | OUTPATIENT
Start: 2019-06-05 | End: 2019-06-07 | Stop reason: HOSPADM

## 2019-06-05 RX ORDER — HEPARIN SODIUM (PORCINE) LOCK FLUSH IV SOLN 100 UNIT/ML 100 UNIT/ML
300 SOLUTION INTRAVENOUS PRN
Status: DISCONTINUED | OUTPATIENT
Start: 2019-06-05 | End: 2019-06-07 | Stop reason: HOSPADM

## 2019-06-05 RX ADMIN — Medication 10 ML: at 21:25

## 2019-06-05 RX ADMIN — LEVOTHYROXINE SODIUM 112 MCG: 0.11 TABLET ORAL at 05:56

## 2019-06-05 RX ADMIN — LIOTHYRONINE SODIUM 5 MCG: 5 TABLET ORAL at 08:54

## 2019-06-05 RX ADMIN — Medication 125 MG: at 08:57

## 2019-06-05 RX ADMIN — VITAMIN D, TAB 1000IU (100/BT) 1000 UNITS: 25 TAB at 08:56

## 2019-06-05 RX ADMIN — PROBENECID 500 MG: 500 TABLET, FILM COATED ORAL at 21:26

## 2019-06-05 RX ADMIN — Medication 10 ML: at 08:55

## 2019-06-05 RX ADMIN — QUETIAPINE FUMARATE 25 MG: 25 TABLET ORAL at 21:26

## 2019-06-05 RX ADMIN — HYDROCODONE BITARTRATE AND ACETAMINOPHEN 1 TABLET: 7.5; 325 TABLET ORAL at 05:56

## 2019-06-05 RX ADMIN — HYDROCODONE BITARTRATE AND ACETAMINOPHEN 1 TABLET: 7.5; 325 TABLET ORAL at 14:52

## 2019-06-05 RX ADMIN — Medication: at 14:28

## 2019-06-05 RX ADMIN — Medication 125 MG: at 21:51

## 2019-06-05 RX ADMIN — PROBENECID 500 MG: 500 TABLET, FILM COATED ORAL at 08:55

## 2019-06-05 RX ADMIN — OXYBUTYNIN CHLORIDE 5 MG: 5 TABLET ORAL at 16:59

## 2019-06-05 RX ADMIN — FLUCONAZOLE 200 MG: 100 TABLET ORAL at 08:55

## 2019-06-05 RX ADMIN — CEFTOLOZANE AND TAZOBACTAM 1.5 G: 1; .5 INJECTION, POWDER, LYOPHILIZED, FOR SOLUTION INTRAVENOUS at 22:20

## 2019-06-05 RX ADMIN — ENOXAPARIN SODIUM 40 MG: 40 INJECTION, SOLUTION INTRAVENOUS; SUBCUTANEOUS at 08:55

## 2019-06-05 RX ADMIN — CEFTOLOZANE AND TAZOBACTAM 1.5 G: 1; .5 INJECTION, POWDER, LYOPHILIZED, FOR SOLUTION INTRAVENOUS at 14:27

## 2019-06-05 RX ADMIN — TIZANIDINE 4 MG: 4 TABLET ORAL at 08:55

## 2019-06-05 RX ADMIN — ALBUTEROL SULFATE 2.5 MG: 2.5 SOLUTION RESPIRATORY (INHALATION) at 17:17

## 2019-06-05 RX ADMIN — SODIUM CHLORIDE, PRESERVATIVE FREE 300 UNITS: 5 INJECTION INTRAVENOUS at 21:25

## 2019-06-05 RX ADMIN — MULTIVITAMIN TABLET 1 TABLET: TABLET at 08:58

## 2019-06-05 RX ADMIN — CEFTOLOZANE AND TAZOBACTAM 1.5 G: 1; .5 INJECTION, POWDER, LYOPHILIZED, FOR SOLUTION INTRAVENOUS at 02:09

## 2019-06-05 RX ADMIN — Medication 125 MG: at 03:35

## 2019-06-05 RX ADMIN — Medication 125 MG: at 16:59

## 2019-06-05 RX ADMIN — ALBUTEROL SULFATE 2.5 MG: 2.5 SOLUTION RESPIRATORY (INHALATION) at 08:22

## 2019-06-05 RX ADMIN — PRAVASTATIN SODIUM 40 MG: 20 TABLET ORAL at 21:26

## 2019-06-05 RX ADMIN — HYDROCODONE BITARTRATE AND ACETAMINOPHEN 1 TABLET: 7.5; 325 TABLET ORAL at 22:28

## 2019-06-05 RX ADMIN — POTASSIUM CHLORIDE 40 MEQ: 20 TABLET, EXTENDED RELEASE ORAL at 08:54

## 2019-06-05 RX ADMIN — OXYBUTYNIN CHLORIDE 5 MG: 5 TABLET ORAL at 08:58

## 2019-06-05 RX ADMIN — ATENOLOL 25 MG: 25 TABLET ORAL at 21:26

## 2019-06-05 RX ADMIN — Medication: at 08:57

## 2019-06-05 ASSESSMENT — PAIN SCALES - GENERAL
PAINLEVEL_OUTOF10: 7
PAINLEVEL_OUTOF10: 0
PAINLEVEL_OUTOF10: 10
PAINLEVEL_OUTOF10: 2
PAINLEVEL_OUTOF10: 5
PAINLEVEL_OUTOF10: 7
PAINLEVEL_OUTOF10: 0
PAINLEVEL_OUTOF10: 0
PAINLEVEL_OUTOF10: 4

## 2019-06-05 ASSESSMENT — PAIN DESCRIPTION - PAIN TYPE
TYPE: SURGICAL PAIN
TYPE: ACUTE PAIN;SURGICAL PAIN
TYPE: SURGICAL PAIN
TYPE: SURGICAL PAIN

## 2019-06-05 ASSESSMENT — PAIN DESCRIPTION - LOCATION
LOCATION: BACK
LOCATION: BACK;LEG
LOCATION: BACK;LEG
LOCATION: BACK

## 2019-06-05 ASSESSMENT — PAIN DESCRIPTION - PROGRESSION
CLINICAL_PROGRESSION: NOT CHANGED

## 2019-06-05 ASSESSMENT — PAIN - FUNCTIONAL ASSESSMENT
PAIN_FUNCTIONAL_ASSESSMENT: PREVENTS OR INTERFERES WITH MANY ACTIVE NOT PASSIVE ACTIVITIES
PAIN_FUNCTIONAL_ASSESSMENT: PREVENTS OR INTERFERES SOME ACTIVE ACTIVITIES AND ADLS

## 2019-06-05 ASSESSMENT — PAIN DESCRIPTION - FREQUENCY
FREQUENCY: CONTINUOUS

## 2019-06-05 ASSESSMENT — PAIN DESCRIPTION - DESCRIPTORS
DESCRIPTORS: ACHING;CONSTANT;DISCOMFORT
DESCRIPTORS: ACHING;CONSTANT;SORE

## 2019-06-05 ASSESSMENT — PAIN DESCRIPTION - ORIENTATION
ORIENTATION: RIGHT;LEFT
ORIENTATION: RIGHT;LEFT;MID
ORIENTATION: MID;LOWER
ORIENTATION: MID;LOWER

## 2019-06-05 ASSESSMENT — PAIN DESCRIPTION - ONSET
ONSET: ON-GOING

## 2019-06-05 NOTE — PROGRESS NOTES
with 0 rail(s) with [address] with Mod A   ROM B UE: Refer to OT note  B LE: WFL passively  B UE: Refer to OT note  B LE: WFL passively     Strength B UE: Refer to OT note  RLE: grossly 2/5 hip and knee; 3/5 ankle  LLE grossly 2/5 hip and knee; 3/5 ankle  B UE: Refer to OT note  RLE: grossly 2/5 hip and knee; 3/5 ankle  LLE grossly 2/5 hip and knee; 3/5 ankle     Balance Sitting EOB: Max A progressed to Mod A  Dynamic standing: Max A x2   Sitting EOB: Max A progressed to Mod A  Dynamic standing: Max A x2  Sitting EOB: Mod A  Dynamic standing: Mod A with [address]      Pt is A & O x: 2  self, place \"2020, May\"   Sensation: Pt reporting parasthesia in B hips. Coordination: NT  Edema: Mild to moderate pitting edema in B LE. Patient education  Pt was educated on spinal precautions and role of PT    Patient response to education:   Pt verbalized understanding Pt demonstrated skill Pt requires further education in this area   yes partial yes     Additional Comments: Pt received in supine, agreeable to PT treatment. Pt requiring verbal and tactile cues to perform rolling bilaterally to don TLSO. Pt requiring decreased assistance to roll to the R in comparison to the L. Pt noted to be soiled of BM, dependent for pericare. Pt performing supine to sit transfer with verbal and tactile cues to perform via log roll. Pt requiring assistance with trunk and BLEs. Pt demonstrating severe posterior lean, requiring max cues to correct. Pt tolerating approximately 8 min at edge of bed. Pt requiring verbal and tactile cues to perform sit to stand transfer. Pt demonstrating flexed posture with stance limited by weakness. Pt required verbal and tactile cues to perform sit to supine transfer via log roll. Pt placed in chair position with all needs met and call bell in reach. Pt would benefit from continued PT to maximize functional mobility independence.        Time in: 1024  Time out: 1105    Pt is making progress toward established Physical

## 2019-06-05 NOTE — DISCHARGE INSTR - COC
Continuity of Care Form    Patient Name: Ben January   :  1945  MRN:  [id number]    Admit date:  2019  Discharge date:  2019    Code Status Order: Full Code   Advance Directives:   Advance Care Flowsheet Documentation     Date/Time Healthcare Directive Type of Healthcare Directive Copy in [address] Agent's Name Healthcare Agent's Phone Number    19 [phone]  No, patient does not have an advance directive for healthcare treatment -- -- -- -- --    19 [phone]  No, patient does not have an advance directive for healthcare treatment -- -- -- -- --          Admitting Physician:  Sathish Kumar MD  PCP: Enedina Bence, DO    Discharging Nurse:   [address] Unit/Room#: 7249/0655-Y  Discharging Unit Phone Number: [phone]    Emergency Contact:   Extended Emergency Contact Information  Primary Emergency Contact: Antoinette Lynch  Address: [address] Phone: [phone]  Relation: Spouse  Secondary Emergency Contact: Ijeoma [address] Phone: [phone]  Mobile Phone: [phone]  Relation: Child    Past Surgical History:  Past Surgical History:   Procedure Laterality Date    BREAST SURGERY Right 1982    BENIGN CYST   [place] N/A 6/3/2019    LUMBAR LAMINECTOMY POSTERIOR L3-L5, BONE BIOPSY L4 - GIOVANNY, X-RAY,  WANTS TF performed by Jackie Knowles MD at [address] [place] [address]       Immunization History: There is no immunization history on file for this patient.     Active Problems:  Patient Active Problem List   Diagnosis Code    Right lower quadrant pain R10.31    Pneumatosis coli K63.89    Troponin level elevated R74.8    Sepsis ([address] A41.9    HTN (hypertension) I10    HLD (hyperlipidemia) E78.5    Obesity E66.9    Acute metabolic encephalopathy N39.92    Clostridium difficile diarrhea A04.72  Pneumonia J18.9    Acute cystitis N30.00    Acute midline low back pain without sciatica M54.5    Acute osteomyelitis of lumbar spine (HCC) M46.26       Isolation/Infection:   Isolation          Contact        Patient Infection Status     Infection Encounter Level? Onset Date Added Added By Resolved Resolved By Review Date    ESBL (Extended Spectrum Beta Lactamase) No  06/07/17 Body Fluid Culture       E Coli Urine 4/30/19, 06/05/2017  E. Coli blood 5/27/19, 5/1/19, 4/30/19          Nurse Assessment:  Last Vital Signs: BP (!) 173/76   Pulse 83   Temp 97.6 °F (36.4 °C) (Temporal)   Resp 18   Ht 5' 6\" (1.676 m)   Wt 252 lb 2 oz (114.4 kg)   SpO2 94%   BMI 40.69 kg/m²     Last documented pain score (0-10 scale): Pain Level: 2  Last Weight:   Wt Readings from Last 1 Encounters:   06/05/19 252 lb 2 oz (114.4 kg)     Mental Status:  oriented and alert    IV Access:  - PICC - site  R Basilic, insertion date: 6/1/2019    Nursing Mobility/ADLs:  Walking   Assisted  Transfer  Assisted  Bathing  Assisted  Dressing  Assisted  Toileting  Assisted  Feeding  Independent  Med Admin  Independent  Med Delivery   whole    Wound Care Documentation and Therapy:  Wound 05/29/19 Buttocks Right (Active)   Wound Image   5/29/2019  4:20 PM   Wound Pressure Stage  3 6/1/2019  7:45 AM   Dressing Status Clean;Dry; Intact 6/5/2019  8:45 AM   Dressing Changed Changed/New 6/5/2019  8:45 AM   Dressing/Treatment Pharmaceutical agent (see MAR); Other (comment) 6/5/2019  8:45 AM   Wound Length (cm) 5 cm 5/29/2019  4:20 PM   Wound Width (cm) 3 cm 5/29/2019  4:20 PM   Wound Depth (cm) 0.1 cm 5/29/2019  4:20 PM   Wound Surface Area (cm^2) 15 cm^2 5/29/2019  4:20 PM   Wound Volume (cm^3) 1.5 cm^3 5/29/2019  4:20 PM   Wound Assessment Red 6/5/2019  8:45 AM   Drainage Amount Scant 6/5/2019  8:45 AM   Drainage Description Serosanguinous 6/5/2019  8:45 AM   Malika-wound Assessment Fragile; Red 6/5/2019  8:45 AM   Red%Wound Bed 70 6/5/2019  8:45 AM Yellow%Wound Bed 10 6/5/2019  8:45 AM   Purple%Wound Bed 20 6/5/2019  8:45 AM   Number of days: 7       Wound 05/29/19 Buttocks Left area is purple and pink with 2cm area of berakdown. (Active)   Number of days: 7        Elimination:  Continence:   · Bowel: No  · Bladder: No  Urinary Catheter: [facility] catheter placed at prior facility   Colostomy/Ileostomy/Ileal Conduit: No       Date of Last BM: 6/6/2019    Intake/Output Summary (Last 24 hours) at 6/5/2019 1416  Last data filed at 6/5/2019 0953  Gross per 24 hour   Intake 2515 ml   Output 1300 ml   Net 1215 ml     I/O last 3 completed shifts: In: 2395 [P.O.:480; I.V.:1815; IV Piggyback:100]  Out: 1300 [Urine:1300]    Safety Concerns: At Risk for Falls    Impairments/Disabilities:      Vision    Nutrition Therapy:  Current Nutrition Therapy:   - Oral Diet:  General and Dental Soft    Routes of Feeding: Oral  Liquids: No Restrictions  Daily Fluid Restriction: no  Last Modified Barium Swallow with Video (Video Swallowing Test): not done    Treatments at the Time of Hospital Discharge:   Respiratory Treatments: none  Oxygen Therapy:  is on oxygen at 2 L/min per nasal cannula.   Ventilator:    - No ventilator support    Rehab Therapies: Physical Therapy and Occupational Therapy  Weight Bearing Status/Restrictions: WBAT with brace  Other Medical Equipment (for information only, NOT a DME order):  braces for back - turtleshell  Other Treatments: None    Patient's personal belongings (please select all that are sent with patient):  Glasses    RN SIGNATURE:  Electronically signed by Chad Rodas RN on 6/7/19 at 12:06 PM    CASE MANAGEMENT/SOCIAL WORK SECTION    Inpatient Status Date: ***    Readmission Risk Assessment Score:  Readmission Risk              Risk of Unplanned Readmission:        28           Discharging to Facility/ Agency   · Name: [facility]  · Address:  · Phone:  · Fax:    Dialysis Facility (if applicable) · Name:  · Address:  · Dialysis Schedule:  · Phone:  · Fax:    / signature: Electronically signed by LENCHO Ko on 6/5/2019 at 2:16 PM      PHYSICIAN SECTION    Prognosis: {Prognosis:[id number]}    Condition at Discharge: Stable    Rehab Potential (if transferring to Rehab): Fair    Recommended Labs or Other Treatments After Discharge: cbc and bmp in 2 days     Physician Certification: I certify the above information and transfer of Ford Huang  is necessary for the continuing treatment of the diagnosis listed and that she requires [facility] for less 30 days.      Update Admission H&P: No change in H&P    PHYSICIAN SIGNATURE:  Electronically signed by Michaela Goldman MD on 6/6/19 at 9:18 AM

## 2019-06-05 NOTE — PROGRESS NOTES
Occupational Therapy  OT BEDSIDE TREATMENT NOTE      Date:2019  Patient Name: Ita Rowan  MRN: [id number]  : 1945  Room: [address] [place] [address]     Re-evaluating OT: Abdoulaye Chambers OTR/L #[id number]      OT re-evaluation completed following lumbar surgery      AM-PAC Daily Activity Raw Score:      Recommended Adaptive Equipment:  TBD      Diagnosis: PNA   AM-PAC Daily Activity Raw Score:   Recommended Adaptive Equipment: TBD      Diagnosis: Pneumonia   Pt presented to ED on 19 for fever   Pertinent Medical History: arthritis, gout, HTN     Precautions:  Falls, contact isolation, O2, reynolds, spine neutrality, TLSO   RN approved OT session this date.     Home Living: Pt admitted from [place] [address]  Pt permanently resides w/ spouse (spolidiae is w/c bound) in 1 floor home 1, 0 HR  \"handicap accessible villa\" - laundry, bath, bed on 1st floor  Stair lift to basement  Bathroom: walk-in shower     Prior Level of Function: Independent with ADLs and IADLs, ambulated without AD. Pt reports that she has been in [facility] for 1 week and was a remy lift/ x2 assist with therapy      Prior to all hospitalizations, independent w/ ADL's/IADL's and ambulated w/o AD  Driving: yes (prior to admissions)        Pain Level: Pt c/o 1/10 back pain and 2/10 hip pain this session with activity. Reinforced spinal precautions and nursing notified     Cognition: A&O: 4/4; Follows 2 step directions              Memory:  good               Sequencing: Daniela Castillo solving: Matilda Daniel              Judgement/safety:  fair     Functional Assessment:    Re- Eval Status  Date: 19 Treatment Status  Date: 19 Short Term Goals  Treatment frequency: PRN    Feeding SBA SBA  Independent    Grooming Moderate Assist   Seated EOB Min A  To comb hair while seated EOB.  Modified Gary    UB Dressing Moderate Assist   Gown  Dependent for TLSO  Max A  Max A to don/doff gown while seated. Dependent to don/doff TLSO rolling side to side. Stand by Assist    LB Dressing Dependent   Dependent  To don/doff socks while seated. Moderate Assist    Bathing Dependent Dependent  Per eval  Moderate Assist    Toileting Dependent   Dependent- hygiene  Following incontinence of bowel. Maximal Assist    Bed Mobility  Rolling: Maximal Assist  Supine to sit: Dependent x2  Sit to supine: Dependent x2 Rolling: Maximal Assist  Supine to sit: Dependent x2  Sit to supine: Dependent x2  Educated on technique to increase to increase independence.  Rolling: Mod A  Supine to sit: Maximal Assist   Sit to supine: Maximal Assist    Functional Transfers Max A x2  (sit to stand transfer) Max A- sit<->stand  Cuing on hand placement and body mechanics.  Max A   Functional Mobility NT N/T  Max A   Balance Sitting EOB: varying assist levels (Max><Min A)   Standing: Max A x2 w/ ww (limited stand tolerance) Sitting: Min- Max A seated EOB. Standing: Max A x 2      Activity Tolerance Poor+  Poor+ Fair   Visual/  Perceptual Glasses: yes                        Comments: Upon arrival pt supine in bed. Educated on spinal precautions with pt requiring being repeated several times. Pt educated on adaptive techniques to increase independence and safety during ADL's, bed mobility, and functional transfers while maintaining precautions. At end of session pt left seated upright in bed, call light within reach. · Pt has made limited progress towards set goals.      · Continue with current plan of care    Time in: 10:24  Time out: 11:05  Total Tx Time: 39 mins    Isaías Brown

## 2019-06-05 NOTE — PROGRESS NOTES
Department of Neurosurgery  Progress Note    CHIEF COMPLAINT: s/p lumbar laminectomy and evacuation of epidural abscess    SUBJECTIVE:  Tolerating op site pain well. No leg pain. Leg numbness/weakness unchanged    REVIEW OF SYSTEMS :  Constitutional: Negative for chills and fever. Neurological: Negative for dizziness, tremors and speech change. OBJECTIVE:   VITALS:  /88   Pulse 77   Temp 97.6 °F (36.4 °C) (Temporal)   Resp 18   Ht 5' 6\" (1.676 m)   Wt 252 lb 2 oz (114.4 kg)   SpO2 94%   BMI 40.69 kg/m²   PHYSICAL:  CONSTITUTIONAL:  awake, alert, cooperative, no apparent distress, and appears stated age. MEZA. Bilateral LE weakness 3/5.   Decreased sensation to light touch    DATA:  CBC:   Lab Results   Component Value Date    WBC 11.6 06/03/2019    RBC 3.52 06/03/2019    HGB 10.3 06/03/2019    HCT 32.4 06/03/2019    MCV 92.0 06/03/2019    MCH 29.3 06/03/2019    MCHC 31.8 06/03/2019    RDW 16.4 06/03/2019     06/03/2019    MPV 11.6 06/03/2019     BMP:    Lab Results   Component Value Date     06/03/2019    K 3.5 06/03/2019    K 2.7 05/29/2019     06/03/2019    CO2 23 06/03/2019    BUN 12 06/03/2019    LABALBU 2.2 05/28/2019    CREATININE 0.7 06/03/2019    CALCIUM 8.4 06/03/2019    GFRAA >60 06/03/2019    LABGLOM >60 06/03/2019    GLUCOSE 105 06/03/2019     PT/INR:    Lab Results   Component Value Date    PROTIME 14.6 05/14/2019    INR 1.3 05/14/2019     PTT:    Lab Results   Component Value Date    APTT 29.9 05/14/2019   [APTT}    Current Inpatient Medications  Current Facility-Administered Medications: sodium chloride flush 0.9 % injection 10 mL, 10 mL, Intravenous, 2 times per day  sodium chloride flush 0.9 % injection 10 mL, 10 mL, Intravenous, PRN  ondansetron (ZOFRAN) injection 4 mg, 4 mg, Intravenous, Q6H PRN  HYDROmorphone (DILAUDID) injection 0.25 mg, 0.25 mg, Intravenous, Q3H PRN **OR** HYDROmorphone (DILAUDID) injection 0.5 mg, 0.5 mg, Intravenous, Q3H PRN  tiZANidine (ZANAFLEX) tablet 4 mg, 4 mg, Oral, Q6H PRN  0.9 % sodium chloride infusion, , Intravenous, Continuous  ceftolozane-tazobactam (ZERBAXA) 1.5 g in dextrose 5 % 100 mL IVPB, 1.5 g, Intravenous, Q8H  potassium chloride (KLOR-CON M) extended release tablet 40 mEq, 40 mEq, Oral, Daily  albuterol (PROVENTIL) nebulizer solution 2.5 mg, 2.5 mg, Nebulization, 4x daily  miconazole nitrate 2 % ointment, , Topical, TID **AND** miconazole nitrate 2 % ointment, , Topical, TID PRN  acetaminophen (TYLENOL) tablet 650 mg, 650 mg, Oral, Q4H PRN  atenolol (TENORMIN) tablet 25 mg, 25 mg, Oral, Nightly  docusate sodium (COLACE) capsule 100 mg, 100 mg, Oral, BID  enoxaparin (LOVENOX) injection 40 mg, 40 mg, Subcutaneous, Daily  vitamin D (CHOLECALCIFEROL) tablet 1,000 Units, 1,000 Units, Oral, Daily  vancomycin (VANCOCIN) oral solution 125 mg, 125 mg, Oral, 4 times per day  QUEtiapine (SEROQUEL) tablet 12.5 mg, 12.5 mg, Oral, Q6H PRN  QUEtiapine (SEROQUEL) tablet 25 mg, 25 mg, Oral, Nightly  probenecid (BENEMID) tablet 500 mg, 500 mg, Oral, BID  pravastatin (PRAVACHOL) tablet 40 mg, 40 mg, Oral, Nightly  oxybutynin (DITROPAN) tablet 5 mg, 5 mg, Oral, BID  nystatin (MYCOSTATIN) 058581 UNIT/ML suspension 500,000 Units, 5 mL, Oral, 4x Daily  multivitamin 1 tablet, 1 tablet, Oral, Daily  liothyronine (CYTOMEL) tablet 5 mcg, 5 mcg, Oral, Daily  levothyroxine (SYNTHROID) tablet 112 mcg, 112 mcg, Oral, Daily  HYDROcodone-acetaminophen (NORCO) 7.5-325 MG per tablet 1 tablet, 1 tablet, Oral, Q6H PRN  fluconazole (DIFLUCAN) tablet 200 mg, 200 mg, Oral, Daily  magnesium hydroxide (MILK OF MAGNESIA) 400 MG/5ML suspension 30 mL, 30 mL, Oral, Daily PRN    ASSESSMENT:   · 74year old female s/p L3-5 lumbar laminectomy and evacuation of epidural abscess.   - stable    PLAN:  · PT/OT  · WBAT with brace  · No anticoagulation  · AB per ID  · Pain control  · Lumbar Dressing change QOD      Electronically signed by Margart Epley, PA on

## 2019-06-05 NOTE — ANESTHESIA POSTPROCEDURE EVALUATION
Department of Anesthesiology  Postprocedure Note    Patient: Lorenza Matthews  MRN: [id number]  YOB: 1945  Date of evaluation: 6/5/2019  Time:  7:12 AM     Procedure Summary     Date:  06/03/19 Room / Location:  [facility]    Anesthesia Start:  1352 Anesthesia Stop:  1401    Procedure:  LUMBAR LAMINECTOMY POSTERIOR L3-L5, BONE BIOPSY L4 - GIOVANNY, X-RAY,  WANTS TF (N/A ) Diagnosis:  (LUMBER LAMINECTOMY)    Surgeon:  Gela Galdamez MD Responsible Provider:  Johnna Bo MD    Anesthesia Type:  general ASA Status:  3          Anesthesia Type: No value filed. Sallie Phase I: Sallie Score: 9    Sallie Phase II:      Last vitals: Reviewed and per EMR flowsheets.        Anesthesia Post Evaluation    Patient location during evaluation: PACU  Patient participation: complete - patient participated  Level of consciousness: awake  Pain score: 0  Airway patency: patent  Nausea & Vomiting: no nausea and no vomiting  Complications: no  Cardiovascular status: hemodynamically stable  Respiratory status: acceptable  Hydration status: euvolemic

## 2019-06-05 NOTE — PROGRESS NOTES
CC- SOB    Subjective: Tolerating medications. Reports no side effects. Afebrile. Had surgery, sleepy,wearing orthotic brace  10 ROS otherwise negative unless otherwise specified above. Objective:    Vitals:    06/05/19 0845   BP: (!) 173/76   Pulse: 83   Resp: 18   Temp: 97.6 °F (36.4 °C)   SpO2: 94%       General Appearance:    Awake, alert , no acute distress. HEENT:    Normocephalic,PERRL,neck supple, no JVD, mucosa moist, no thrush   Lungs:     Orthotic brace, no wheeze , crackles   Heart:    Regular rate and rhythm, no murmur   Abdomen:     Soft, non-tender, not distended  bowel sounds present,   Extremities:  B/L LE edema,no open wound,no erythema, non  tender   Skin:   no rashes or lesions   CBC+dif:  Reviewed and trend followed    Radiology:  · Ct abdomen pelvis w/o contrast- perinpephric stranding, L3 vertebral body destruction  · MRI spine- L2- L3 VOM    Microbiology:  5/27/19- BC- ESBL E coli 2 sets  Surgical culture- ESBL E COLI    Assessment:  · Sepsis from acute osteomyelitis of lumbar vertebrae with recurrent ESBL E coli bacteremia s/p L3-5 lumbar laminectomy and evacuation of epidural abscess on 6/3/19  · C diff colitis ongoing treatment  · 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  PICC line  · Dc po diflucan   · PO vancomycin 125 mg q6 while on iv antibiotics          Electronically signed by Serina Lesch, MD on 6/5/2019 at 2:56 PM

## 2019-06-06 LAB
BLOOD CULTURE, ROUTINE: NORMAL
BODY FLUID CULTURE, STERILE: ABNORMAL
BODY FLUID CULTURE, STERILE: ABNORMAL
CULTURE, BLOOD 2: NORMAL
GRAM STAIN RESULT: ABNORMAL
ORGANISM: ABNORMAL

## 2019-06-06 PROCEDURE — 6370000000 HC RX 637 (ALT 250 FOR IP): Performed by: PHYSICIAN ASSISTANT

## 2019-06-06 PROCEDURE — 6360000002 HC RX W HCPCS: Performed by: PHYSICIAN ASSISTANT

## 2019-06-06 PROCEDURE — 97535 SELF CARE MNGMENT TRAINING: CPT

## 2019-06-06 PROCEDURE — 2580000003 HC RX 258: Performed by: PHYSICIAN ASSISTANT

## 2019-06-06 PROCEDURE — 1200000000 HC SEMI PRIVATE

## 2019-06-06 PROCEDURE — 97530 THERAPEUTIC ACTIVITIES: CPT

## 2019-06-06 PROCEDURE — 94660 CPAP INITIATION&MGMT: CPT

## 2019-06-06 PROCEDURE — 99231 SBSQ HOSP IP/OBS SF/LOW 25: CPT | Performed by: INTERNAL MEDICINE

## 2019-06-06 PROCEDURE — 6360000002 HC RX W HCPCS: Performed by: INTERNAL MEDICINE

## 2019-06-06 PROCEDURE — 2700000000 HC OXYGEN THERAPY PER DAY

## 2019-06-06 PROCEDURE — 94640 AIRWAY INHALATION TREATMENT: CPT

## 2019-06-06 RX ORDER — FUROSEMIDE 40 MG/1
40 TABLET ORAL DAILY
Qty: 60 TABLET | Refills: 3 | Status: ON HOLD | OUTPATIENT
Start: 2019-06-06 | End: 2019-08-02 | Stop reason: HOSPADM

## 2019-06-06 RX ORDER — PSEUDOEPHEDRINE HCL 30 MG
100 TABLET ORAL 2 TIMES DAILY
Qty: 30 CAPSULE | Refills: 0 | Status: SHIPPED | OUTPATIENT
Start: 2019-06-06 | End: 2020-11-26

## 2019-06-06 RX ORDER — HYDRALAZINE HYDROCHLORIDE 20 MG/ML
10 INJECTION INTRAMUSCULAR; INTRAVENOUS EVERY 6 HOURS PRN
Status: DISCONTINUED | OUTPATIENT
Start: 2019-06-06 | End: 2019-06-07 | Stop reason: HOSPADM

## 2019-06-06 RX ORDER — FUROSEMIDE 10 MG/ML
40 INJECTION INTRAMUSCULAR; INTRAVENOUS ONCE
Status: COMPLETED | OUTPATIENT
Start: 2019-06-06 | End: 2019-06-06

## 2019-06-06 RX ORDER — TIZANIDINE 4 MG/1
4 TABLET ORAL EVERY 6 HOURS PRN
Qty: 30 TABLET | Refills: 0 | Status: SHIPPED | OUTPATIENT
Start: 2019-06-06 | End: 2019-07-10 | Stop reason: SDUPTHER

## 2019-06-06 RX ORDER — POTASSIUM CHLORIDE 20 MEQ/1
40 TABLET, EXTENDED RELEASE ORAL DAILY
Qty: 60 TABLET | Refills: 3 | Status: ON HOLD | OUTPATIENT
Start: 2019-06-07 | End: 2021-05-21

## 2019-06-06 RX ADMIN — OXYBUTYNIN CHLORIDE 5 MG: 5 TABLET ORAL at 22:28

## 2019-06-06 RX ADMIN — ENOXAPARIN SODIUM 40 MG: 40 INJECTION, SOLUTION INTRAVENOUS; SUBCUTANEOUS at 08:59

## 2019-06-06 RX ADMIN — QUETIAPINE FUMARATE 25 MG: 25 TABLET ORAL at 22:28

## 2019-06-06 RX ADMIN — Medication 125 MG: at 22:27

## 2019-06-06 RX ADMIN — CEFTOLOZANE AND TAZOBACTAM 1.5 G: 1; .5 INJECTION, POWDER, LYOPHILIZED, FOR SOLUTION INTRAVENOUS at 14:50

## 2019-06-06 RX ADMIN — PROBENECID 500 MG: 500 TABLET, FILM COATED ORAL at 22:28

## 2019-06-06 RX ADMIN — SODIUM CHLORIDE, PRESERVATIVE FREE 300 UNITS: 5 INJECTION INTRAVENOUS at 08:58

## 2019-06-06 RX ADMIN — ALBUTEROL SULFATE 2.5 MG: 2.5 SOLUTION RESPIRATORY (INHALATION) at 07:45

## 2019-06-06 RX ADMIN — LIOTHYRONINE SODIUM 5 MCG: 5 TABLET ORAL at 08:58

## 2019-06-06 RX ADMIN — Medication 125 MG: at 08:58

## 2019-06-06 RX ADMIN — FUROSEMIDE 40 MG: 10 INJECTION, SOLUTION INTRAMUSCULAR; INTRAVENOUS at 14:50

## 2019-06-06 RX ADMIN — PROBENECID 500 MG: 500 TABLET, FILM COATED ORAL at 08:59

## 2019-06-06 RX ADMIN — Medication 10 ML: at 09:01

## 2019-06-06 RX ADMIN — NYSTATIN 500000 UNITS: 100000 SUSPENSION ORAL at 23:42

## 2019-06-06 RX ADMIN — ATENOLOL 25 MG: 25 TABLET ORAL at 22:28

## 2019-06-06 RX ADMIN — OXYBUTYNIN CHLORIDE 5 MG: 5 TABLET ORAL at 08:59

## 2019-06-06 RX ADMIN — Medication: at 14:51

## 2019-06-06 RX ADMIN — SODIUM CHLORIDE: 9 INJECTION, SOLUTION INTRAVENOUS at 18:11

## 2019-06-06 RX ADMIN — DOCUSATE SODIUM 100 MG: 100 CAPSULE, LIQUID FILLED ORAL at 22:28

## 2019-06-06 RX ADMIN — POTASSIUM CHLORIDE 40 MEQ: 20 TABLET, EXTENDED RELEASE ORAL at 08:57

## 2019-06-06 RX ADMIN — Medication: at 09:01

## 2019-06-06 RX ADMIN — Medication 125 MG: at 03:45

## 2019-06-06 RX ADMIN — LEVOTHYROXINE SODIUM 112 MCG: 0.11 TABLET ORAL at 06:01

## 2019-06-06 RX ADMIN — CEFTOLOZANE AND TAZOBACTAM 1.5 G: 1; .5 INJECTION, POWDER, LYOPHILIZED, FOR SOLUTION INTRAVENOUS at 22:27

## 2019-06-06 RX ADMIN — DOCUSATE SODIUM 100 MG: 100 CAPSULE, LIQUID FILLED ORAL at 08:59

## 2019-06-06 RX ADMIN — PRAVASTATIN SODIUM 40 MG: 20 TABLET ORAL at 22:28

## 2019-06-06 RX ADMIN — Medication: at 23:41

## 2019-06-06 RX ADMIN — CEFTOLOZANE AND TAZOBACTAM 1.5 G: 1; .5 INJECTION, POWDER, LYOPHILIZED, FOR SOLUTION INTRAVENOUS at 06:01

## 2019-06-06 RX ADMIN — SODIUM CHLORIDE, PRESERVATIVE FREE 300 UNITS: 5 INJECTION INTRAVENOUS at 22:29

## 2019-06-06 RX ADMIN — TIZANIDINE 4 MG: 4 TABLET ORAL at 14:50

## 2019-06-06 RX ADMIN — MULTIVITAMIN TABLET 1 TABLET: TABLET at 09:00

## 2019-06-06 RX ADMIN — HYDROCODONE BITARTRATE AND ACETAMINOPHEN 1 TABLET: 7.5; 325 TABLET ORAL at 08:59

## 2019-06-06 RX ADMIN — Medication 125 MG: at 15:06

## 2019-06-06 RX ADMIN — TIZANIDINE 4 MG: 4 TABLET ORAL at 09:00

## 2019-06-06 RX ADMIN — HYDROCODONE BITARTRATE AND ACETAMINOPHEN 1 TABLET: 7.5; 325 TABLET ORAL at 15:05

## 2019-06-06 RX ADMIN — VITAMIN D, TAB 1000IU (100/BT) 1000 UNITS: 25 TAB at 09:00

## 2019-06-06 ASSESSMENT — PAIN DESCRIPTION - DESCRIPTORS
DESCRIPTORS: ACHING;CONSTANT;THROBBING
DESCRIPTORS: ACHING;CONSTANT;THROBBING
DESCRIPTORS: ACHING

## 2019-06-06 ASSESSMENT — PAIN DESCRIPTION - ORIENTATION
ORIENTATION: RIGHT
ORIENTATION: RIGHT;LEFT;MID
ORIENTATION: RIGHT;LEFT;MID

## 2019-06-06 ASSESSMENT — PAIN DESCRIPTION - FREQUENCY
FREQUENCY: INTERMITTENT

## 2019-06-06 ASSESSMENT — PAIN SCALES - GENERAL
PAINLEVEL_OUTOF10: 6
PAINLEVEL_OUTOF10: 10
PAINLEVEL_OUTOF10: 5
PAINLEVEL_OUTOF10: 10
PAINLEVEL_OUTOF10: 5

## 2019-06-06 ASSESSMENT — PAIN DESCRIPTION - PAIN TYPE
TYPE: ACUTE PAIN;SURGICAL PAIN

## 2019-06-06 ASSESSMENT — PAIN SCALES - WONG BAKER: WONGBAKER_NUMERICALRESPONSE: 0

## 2019-06-06 ASSESSMENT — PAIN DESCRIPTION - ONSET
ONSET: ON-GOING
ONSET: ON-GOING

## 2019-06-06 ASSESSMENT — PAIN DESCRIPTION - LOCATION
LOCATION: BACK
LOCATION: BACK;LEG
LOCATION: BACK;LEG

## 2019-06-06 ASSESSMENT — PAIN DESCRIPTION - PROGRESSION
CLINICAL_PROGRESSION: NOT CHANGED
CLINICAL_PROGRESSION: NOT CHANGED

## 2019-06-06 NOTE — PROGRESS NOTES
Physical Therapy  Facility/Department: [facility]  Daily Treatment Note  NAME: José Miguel Buchanan  : 1945  MRN: [id number]    Date of Service: 2019   Re-Evaluating PT: Mayank Ramires, PT, DPT  [id number]     Room #:  2248-Q  Diagnosis: Pneumonia  Precautions: Fall risk, 4 L O2, reynolds, contact isolation, spinal, buttock wound, TLSO, pt goes by \"Nataliya\"  Procedures: Bilateral L3, L4, and L5 laminectomy with bilateral L3-L4 and L4-L5 medial facetectomy and bilateral L3, L4, and L5 foraminotomy and drainage of paraspinal abscess 6/3  PMHx: HTN, Gout, Thyroid disease, HLD, Arthritis     Pt was admitted from [facility] to [place] [address], pt lives with  (WC bound ~ 7 years; able to ambulate short distance and fairly independent) in a one story home with 1+1 stair(s) and 0 rail(s) to enter. Bed is on the first floor and bath is on the first floor. Pt reports that house is \"handicap accessible villa. \" Laundry is on the first floor. Pt states a stair lift is available to go to basement. Pt ambulated with no AD prior to [facility]. Pt reports she used a remy lift for transfers at [facility] and stood with assist x2 with therapy.                       Re-Evaluation  Date: 19 Treatment Date:  19 Short Term/ Long Term   Goals   AM-PAC 6 Clicks      Was pt agreeable to Eval/treatment? Yes  Yes     Does pt have pain?  3/10 B hips and low back 3/10 B hips      Bed Mobility  Rolling: Max A  Supine to sit: Depx 2  Sit to supine: Depx 2  Scooting: Max A x2  Rolling: Max A  Supine to sit: Depx 2  Sit to supine: Depx 2  Scooting: Max A x2 Rolling: Mod A  Supine to sit: Mod A  Sit to supine: Mod A  Scooting: Mod A   Transfers Sit to stand: Max A x2  Stand to sit: Max A x2  Stand pivot: NT  Sit to stand: Max A x2  Stand to sit: Max A x2  Stand pivot: NT Sit to stand: Mod A  Stand to sit: Mod A  Stand pivot: Mod A with [employer]   Ambulation   NT  NT 25 feet with [facility] Mod A   Stair negotiation: NT  NT 1+1 platform step(s) with 0 rail(s) with [employer] with Mod A   ROM B UE: Refer to OT note  B LE: WFL passively  B UE: Refer to OT note  B LE: WFL passively     Strength B UE: Refer to OT note  RLE: grossly 2/5 hip and knee; 3/5 ankle  LLE grossly 2/5 hip and knee; 3/5 ankle  B UE: Refer to OT note  RLE: grossly 2/5 hip and knee; 3/5 ankle  LLE grossly 2/5 hip and knee; 3/5 ankle     Balance Sitting EOB: Max A progressed to Mod A  Dynamic standing: Max A x2   Sitting EOB: Max A progressed to Mod A  Dynamic standing: Max A x2  Sitting EOB: Mod A  Dynamic standing: Mod A with [employer]      Pt is A & O x: 2  self, place  Sensation: Pt reporting intermittent parasthesia in B hips.   Coordination: NT  Edema: Mild to moderate pitting edema in B LE.      Pt performed therapeutic exercise of the following: Pt educated to continue supine therapeutic exercise as a HEP to improve strength, ROM, and facilitate independence with functional transfers. Pt instructed to perform Hip ABD/ ADD; Glute set, Quad set, APs    Patient education  Pt was educated on safety, role of PT, spinal precautions    Patient response to education:   Pt verbalized understanding Pt demonstrated skill Pt requires further education in this area   yes partial yes     Additional Comments: Pt received in supine agreeable to PT treatment. Pt educated on spinal precautions, as she recalled 2/3. Pt Pt requiring verbal and tactile cues for rolling bilaterally to don TLSO. Pt requiring assistance with trunk and BLEs to perform supine to sit transfer via log roll. Pt with severe posterior lean at edge of bed, requiring assistance to correct. Pt performing 1x Sit to stand transfer with max verbal and tactile cues, as well as knee block bilaterally. Pt attempting 2 additional sit to stand transfers, achieving 5-10% stance limited by weakness. Pt requiring assistance with trunk and BLEs to perform sit to supine transfer.  Pt positioned with HOB elevated, TLSO still donned, all needs met, and call bell in reach. Pt would benefit from continued skilled PT to maximize functional mobility independence. Time in: 1121  Time out: 1150    Pt is making progress toward established Physical Therapy goals. Continue with physical therapy current plan of care.     Kyrie Stewart, PT, DPT  [id number]

## 2019-06-06 NOTE — CARE COORDINATION
6/6/2019 social work transition of care  Sw followed up with pt about finding an accepting facility that can obtain atb that pt is currently using. Sw notified pt that [facility] is willing to accept pt, but pt used 18/20 covered and will be going into co pay days. Sw awaiting for pt to discuss this matter with her spouse. sw will follow.   Electronically signed by LENCHO Herrera on 6/6/2019 at 1:44 PM

## 2019-06-06 NOTE — CARE COORDINATION
6/6/2019 social work transition of care  Sw notified by [facility] that the facility can not obtain the atb pt is currently on. Sw notified charge nurse (she will message ID). Sw following.   Electronically signed by LENCHO Aranda on 6/6/2019 at 10:11 AM

## 2019-06-06 NOTE — PROGRESS NOTES
Occupational Therapy  OT BEDSIDE TREATMENT NOTE      Date:2019  Patient Name: Estelle Winn  MRN: [id number]  : 1945  Room: [address] [place] [address]     Re-evaluating OT: Guerda Simpson OTR/L #[id number]      OT re-evaluation completed following lumbar surgery      AM-PAC Daily Activity Raw Score:      Recommended Adaptive Equipment:  TBD      Diagnosis: PNA   AM-PAC Daily Activity Raw Score:   Recommended Adaptive Equipment: TBD      Diagnosis: Pneumonia   Pt presented to ED on 19 for fever   Pertinent Medical History: arthritis, gout, HTN     Precautions:  Falls, contact isolation, O2, reynolds, spine neutrality, TLSO   RN approved OT session this date.     Home Living: Pt admitted from [place] [address]  Pt permanently resides w/ spouse (spounse is w/c bound) in 1 floor home 1, 0 HR  \"handicap accessible villa\" - laundry, bath, bed on 1st floor  Stair lift to basement  Bathroom: walk-in shower     Prior Level of Function: Independent with ADLs and IADLs, ambulated without AD. Pt reports that she has been in [facility] for 1 week and was a remy lift/ x2 assist with therapy      Prior to all hospitalizations, independent w/ ADL's/IADL's and ambulated w/o AD  Driving: yes (prior to admissions)        Pain Level: No pain reported at this time.     Cognition: A&O: 4/4; Follows 2 step directions              Memory:  good               Sequencing:  fair               Problem solving:  fair               Judgement/safety:  fair     Functional Assessment:    Re- Eval Status  Date: 19 Treatment Status  Date: 19 Short Term Goals  Treatment frequency: PRN    Feeding SBA SBA  Independent    Grooming Moderate Assist   Seated EOB Min A  To comb hair while seated EOB.  Modified [place]    UB Dressing Moderate Assist   Gown  Dependent for TLSO  Max A  Max A to don/doff gown while seated. Dependent to don/doff TLSO rolling side to side. Stand by Assist    LB Dressing Dependent   Dependent  To don/doff socks while seated. Moderate Assist    Bathing Dependent Dependent  Per eval  Moderate Assist    Toileting Dependent   Dependent- hygiene  Following incontinence of bowel. Maximal Assist    Bed Mobility  Rolling: Maximal Assist  Supine to sit: Dependent x2  Sit to supine: Dependent x2 Rolling: Maximal Assist  Supine to sit: Dependent x2  Sit to supine: Dependent x2  Educated on technique to increase to increase independence.  Rolling: Mod A  Supine to sit: Maximal Assist   Sit to supine: Maximal Assist    Functional Transfers Max A x2  (sit to stand transfer) Max A x 2- sit<->stand  Cuing on hand placement and body mechanics.  Max A   Functional Mobility NT N/T  Max A   Balance Sitting EOB: varying assist levels (Max><Min A)   Standing: Max A x2 w/ ww (limited stand tolerance) Sitting: Min- Max A seated EOB. Standing: Max A x 2      Activity Tolerance Poor+  Poor+ Fair   Visual/  Perceptual Glasses: yes                        Comments: Upon arrival pt supine in bed. Educated on spinal precautions with pt requiring being repeated several times. Pt educated on adaptive techniques to increase independence and safety during ADL's, bed mobility, and functional transfers while maintaining precautions. At end of session pt left seated upright in bed, call light within reach. · Pt has made limited progress towards set goals.      · Continue with current plan of care    Time in: 11:21  Time out: 11:49  Total Tx Time: 28 mins    Isaías Cobb

## 2019-06-06 NOTE — CARE COORDINATION
6/6/2019 social work transition of care  Sw notified by charge nurse that Id will not change atb. Sw followed up with Facility liaison, they can not take pt back on that med because they are unable to obtain it. Sw will have discuss other options with pt and family.   Electronically signed by LENCHO Canchola on 6/6/2019 at 11:38 AM

## 2019-06-06 NOTE — DISCHARGE SUMMARY
Physician Discharge Summary     Patient ID:  Cullen Lieberman  [id number]  46 y.o.  1945    Admit date: 5/27/2019    Discharge date and time: 6/6/2019    Admission Diagnoses:   Patient Active Problem List   Diagnosis    Right lower quadrant pain    Pneumatosis coli    Troponin level elevated    Sepsis ([address]    HTN (hypertension)    HLD (hyperlipidemia)    Obesity    Acute metabolic encephalopathy    Clostridium difficile diarrhea    Pneumonia    Acute cystitis    Acute midline low back pain without sciatica    Acute osteomyelitis of lumbar spine ([address]       Discharge Diagnoses: as above / GNR bacteremia , Diskitis ,resp failure hypioxemic on admission     Consults:ID, neurosurgery , pulm     Procedures: LUMBAR LAMINECTOMY POSTERIOR L3-L5, BONE BIOPSY L4 - Aris Salazar, X-RAY,  [facility] Course: The patient is a 74 y.o. female of [facility] with significant past medical history of c-diff, HTN,  HLD, elevated troponin who presents with a fever and dark urine over the past 1 day. She is currently being treated for c-diff with vancomycin, and notices that she has been persistently weak with ongoing loose stools since her diagnosis in mid-May. She presented to the ER with a fever of 102 per EMS. She also noticed dark urine starting yesterday. ED course: Vitals 112/50, pulse 104, resp 18, temp 98.9, SpO2 90%. Labs: K 3.1, BUN/Cr 32/1.4, Trop 0.4, WBC 11.8, Hgb 9.7, Hct 30.3, UA: Small blood, 30 protein, moderate bacteria. Blood cultures: gram negative rods. CXR: subacute chronic infiltrates which have progressed. Pt tolerated procedure well, she has been on IV abx therapy per ID and required picc for ongoing treatments. She was treated initially for uti AND ? PNA AS SHE WAS HYPOXEMIC. SHE HAS Improved. There was concern for PE - pulm followed. No evidence of pe and res status has improved. Diuresis which was held during inpt stay is resumed at higher dose at hernandez Lin for discharge ,   No acute complaints . Resting comfortably on dc . Admitted to [facility] for evaluation of hypoxemia on continue pulse ox monitoring   Sats in mid 90's - pulm following   Continue nebs and supportive care      GNR in blood cultures from     Discitis /osteo on mri   POD 3 for   LUMBAR LAMINECTOMY POSTERIOR L3-L5, BONE BIOPSY L4  IV abx therapy - ID following   Continue supportive care   TLSO brace       Stable for dc to [address]       Recent Labs     19  2245   WBC 10.1   HGB 10.1*   HCT 32.2*          Recent Labs     19  2245      K 3.5   *   CO2 23   BUN 17   CREATININE 0.7   CALCIUM 8.0*       Ct Abdomen Pelvis Wo Contrast    Result Date: 2019  Patient MRN:  [id number] : 1945 Age: 74 years Gender: Female Order Date:  2019 1:15 PM EXAM: CT ABDOMEN PELVIS WO CONTRAST number of images 405 Technique: Low-dose CT  acquisition technique included one of following options; 1 . Automated exposure control, 2. Adjustment of MA and or KV according to patient's size or 3. Use of iterative reconstruction. INDICATION:  ABSCESS, KIDNEY  COMPARISON: Previous examinations 2019 FINDINGS: Lack of intravenous contrast limits the study especially for the evaluation of kidneys and liver. There is increasing patchy bibasal infiltrates concerning for pneumonia or edema. There is coronary artery calcifications. Grossly, the liver is of normal architecture. The gallbladder is absent. Spleen, pancreas and adrenals are normal. The kidneys appear somewhat atrophic with perinephric stranding which could be due to inflammation versus renal failure. Pyelonephritis or renal abscess is difficult to evaluate without contrast. 4 to 5 mm hyperdense lesions indeterminate lesions are identified in the left kidney. Pelvis. The bladder is distended. There is distended colon with large amount retained fecal matter and fluid.  Appendix is normal. There is progressive compression, destruction and bone erosion of L3 with the inflammatory phlegmon in the paraspinal area. Pathologic process is considered. Discitis/osteomyelitis are favored. Underlying pathologic compression fracture with superimposed inflammation is possible. Very limited examination for evaluation of renal pathology due to the lack of intravenous contrast. There is perinephric stranding which may due to renal failure. Pyelonephritis or renal abscess cannot be evaluated. Consider either CT with contrast if possible or ultrasonography. Patchy bibasilar infiltrates likely pneumonia or edema. Progressive compression of L3 with the bone erosion and bone destruction with inflammatory phlegmon in the paraspinal area which may due to discitis/osteomyelitis. Consider MRI surveillance. Dilated fluid-filled colon which may due to colonic ileus/colitis ALERT:  THIS IS AN ABNORMAL REPORT     Xr Chest Portable    Result Date: 2019  Patient MRN:  [id number] : 1945 Age: 74 years Gender: Female Order Date:  2019 6:45 PM TECHNIQUE/NUMBER OF IMAGES/COMPARISON/CLINICAL HISTORY: Chest AP 1 image one view Comparison with study of November 3, 2018, 2019 and May 14, 2018. Clinical history fever. FINDINGS: There are more likely subacute/chronic infiltrates in both bases as observed on the study of . The infiltrates are persistent with areas of subsegmental atelectasis a relative loss of volume in the left lower lobe and also in the right infrahilar region. Cardiac has borderline size. Blunting of the costophrenic sulcus is seen on the left side. Subacute chronic infiltrates in both bases which have been overall progressive since previous examinations recently performed. Please correlate clinically.     Us Dup Lower Extremity Right Matt    Result Date: 2019  Patient MRN:  [id number] : 1945 Age: 74 years Gender: Female Order Date:  2019 6:45 PM TECHNIQUE/NUMBER OF IMAGES/COMPARISON/CLINICAL HISTORY: Peripheral venous duplex study of the right lower extremity. Pain swelling DVT. Patient on anticoagulation therapy. Images: 27 Grayscale/color Doppler real-time ultrasound. FINDINGS: There are normal compressibility, phase flow pattern and augmentation demonstration for the right common femoral, superficial femoral and popliteal veins. There are patency of the deep veins in the right calf area. There are patency of the distal right external iliac vein and the joints between the great saphenous vein with common femoral vein. Patent deep venous system of the right lower extremity, no evidence for DVT. Discharge Exam:    HEENT: NCAT,  PERRLA, No JVD  Heart:  RRR, no murmurs, gallops, or rubs.   Lungs:  CTA bilaterally, no wheeze, rales or rhonchi  Abd: bowel sounds present, nontender, nondistended, no masses  Extrem:  No clubbing, cyanosis, or edema    Disposition: [facility]     Patient Condition at Discharge: stable     Patient Instructions:      Medication List      START taking these medications    potassium chloride 20 MEQ extended release tablet  Commonly known as:  KLOR-CON M  Take 2 tablets by mouth daily  Start taking on:  6/7/2019     tiZANidine 4 MG tablet  Commonly known as:  ZANAFLEX  Take 1 tablet by mouth every 6 hours as needed (back pain /spasms)        CHANGE how you take these medications    furosemide 40 MG tablet  Commonly known as:  LASIX  Take 1 tablet by mouth daily  What changed:    · medication strength  · how much to take        CONTINUE taking these medications    acetaminophen 325 MG tablet  Commonly known as:  TYLENOL     albuterol sulfate  (90 Base) MCG/ACT inhaler     aspirin 81 MG tablet     * atenolol 50 MG tablet  Commonly known as:  TENORMIN     * atenolol 25 MG tablet  Commonly known as:  TENORMIN     clotrimazole 10 MG shanthi  Commonly known as:  MYCELEX     CO Q 10 PO     cyanocobalamin 1000 MCG/ML injection     docusate 100 MG Caps  Commonly known as:  COLACE, DULCOLAX  Take 100 mg by mouth 2 times daily     enoxaparin 40 MG/0.4ML injection  Commonly known as:  LOVENOX  Inject 0.4 mLs into the skin daily     fluconazole 200 MG tablet  Commonly known as:  DIFLUCAN     HYDROcodone-acetaminophen 7.5-325 MG per tablet  Commonly known as:  NORCO     LACTOBACILLUS PO     levothyroxine 112 MCG tablet  Commonly known as:  SYNTHROID     losartan 50 MG tablet  Commonly known as:  COZAAR  Take 1 tablet by mouth daily     magnesium hydroxide 400 MG/5ML suspension  Commonly known as:  MILK OF MAGNESIA     multivitamin per tablet     nystatin 238734 UNIT/ML suspension  Commonly known as:  MYCOSTATIN  Take 5 mLs by mouth 4 times daily     oxybutynin 5 MG tablet  Commonly known as:  DITROPAN     pravastatin 40 MG tablet  Commonly known as:  PRAVACHOL     probenecid 500 MG tablet  Commonly known as:  BENEMID     * QUEtiapine 25 MG tablet  Commonly known as:  SEROQUEL  Take 1 tablet by mouth nightly     * QUEtiapine 25 MG tablet  Commonly known as:  SEROQUEL  Take 0.5 tablets by mouth every 6 hours as needed for Agitation     vitamin D 1000 UNIT Tabs tablet  Commonly known as:  CHOLECALCIFEROL         * This list has 4 medication(s) that are the same as other medications prescribed for you. Read the directions carefully, and ask your doctor or other care provider to review them with you. STOP taking these medications    ertapenem 1 GM injection  Commonly known as:  INVANZ     liothyronine 5 MCG tablet  Commonly known as:  CYTOMEL     potassium chloride 10 MEQ extended release capsule  Commonly known as:  MICRO-K     torsemide 20 MG tablet  Commonly known as:  [address] your doctor about these medications    vancomycin 50 mg/mL oral solution  Commonly known as:  VANCOCIN  Take 2.5 mLs by mouth every 6 hours for 10 days  Ask about: Should I take this medication?            Where to Get Your Medications      These medications were sent to [address] [facility], [facility]

## 2019-06-06 NOTE — PROGRESS NOTES
Department of Neurosurgery  Progress Note    CHIEF COMPLAINT: s/p lumbar laminectomy and evacuation of epidural abscess    SUBJECTIVE:  Tolerating op site pain well. C/o leg spasms last PM.  Leg numbness/weakness unchanged    REVIEW OF SYSTEMS :  Constitutional: Negative for chills and fever. Neurological: Negative for dizziness, tremors and speech change. OBJECTIVE:   VITALS:  BP (!) 193/74   Pulse 80   Temp 97.9 °F (36.6 °C) (Temporal)   Resp 16   Ht 5' 6\" (1.676 m)   Wt 250 lb (113.4 kg)   SpO2 91%   BMI 40.35 kg/m²   PHYSICAL:  CONSTITUTIONAL:  awake, alert, cooperative, no apparent distress, and appears stated age. MZEA. Bilateral LE weakness 3/5.   Decreased sensation to light touch    DATA:  CBC:   Lab Results   Component Value Date    WBC 10.1 06/05/2019    RBC 3.41 06/05/2019    HGB 10.1 06/05/2019    HCT 32.2 06/05/2019    MCV 94.4 06/05/2019    MCH 29.6 06/05/2019    MCHC 31.4 06/05/2019    RDW 16.2 06/05/2019     06/05/2019    MPV 11.7 06/05/2019     BMP:    Lab Results   Component Value Date     06/05/2019    K 3.5 06/05/2019    K 2.7 05/29/2019     06/05/2019    CO2 23 06/05/2019    BUN 17 06/05/2019    LABALBU 2.2 05/28/2019    CREATININE 0.7 06/05/2019    CALCIUM 8.0 06/05/2019    GFRAA >60 06/05/2019    LABGLOM >60 06/05/2019    GLUCOSE 110 06/05/2019     PT/INR:    Lab Results   Component Value Date    PROTIME 14.6 05/14/2019    INR 1.3 05/14/2019     PTT:    Lab Results   Component Value Date    APTT 29.9 05/14/2019   [APTT}    Current Inpatient Medications  Current Facility-Administered Medications: furosemide (LASIX) injection 40 mg, 40 mg, Intravenous, Once  hydrALAZINE (APRESOLINE) injection 10 mg, 10 mg, Intravenous, Q6H PRN  heparin flush 100 UNIT/ML injection 300 Units, 300 Units, Intracatheter, PRN  heparin flush 100 UNIT/ML injection 300 Units, 300 Units, Intracatheter, BID  sodium chloride flush 0.9 % injection 10 mL, 10 mL, Intravenous, 2 times per day  sodium chloride flush 0.9 % injection 10 mL, 10 mL, Intravenous, PRN  ondansetron (ZOFRAN) injection 4 mg, 4 mg, Intravenous, Q6H PRN  HYDROmorphone (DILAUDID) injection 0.25 mg, 0.25 mg, Intravenous, Q3H PRN **OR** HYDROmorphone (DILAUDID) injection 0.5 mg, 0.5 mg, Intravenous, Q3H PRN  tiZANidine (ZANAFLEX) tablet 4 mg, 4 mg, Oral, Q6H PRN  0.9 % sodium chloride infusion, , Intravenous, Continuous  ceftolozane-tazobactam (ZERBAXA) 1.5 g in dextrose 5 % 100 mL IVPB, 1.5 g, Intravenous, Q8H  potassium chloride (KLOR-CON M) extended release tablet 40 mEq, 40 mEq, Oral, Daily  albuterol (PROVENTIL) nebulizer solution 2.5 mg, 2.5 mg, Nebulization, 4x daily  miconazole nitrate 2 % ointment, , Topical, TID **AND** miconazole nitrate 2 % ointment, , Topical, TID PRN  acetaminophen (TYLENOL) tablet 650 mg, 650 mg, Oral, Q4H PRN  atenolol (TENORMIN) tablet 25 mg, 25 mg, Oral, Nightly  docusate sodium (COLACE) capsule 100 mg, 100 mg, Oral, BID  enoxaparin (LOVENOX) injection 40 mg, 40 mg, Subcutaneous, Daily  vitamin D (CHOLECALCIFEROL) tablet 1,000 Units, 1,000 Units, Oral, Daily  vancomycin (VANCOCIN) oral solution 125 mg, 125 mg, Oral, 4 times per day  QUEtiapine (SEROQUEL) tablet 12.5 mg, 12.5 mg, Oral, Q6H PRN  QUEtiapine (SEROQUEL) tablet 25 mg, 25 mg, Oral, Nightly  probenecid (BENEMID) tablet 500 mg, 500 mg, Oral, BID  pravastatin (PRAVACHOL) tablet 40 mg, 40 mg, Oral, Nightly  oxybutynin (DITROPAN) tablet 5 mg, 5 mg, Oral, BID  nystatin (MYCOSTATIN) 838530 UNIT/ML suspension 500,000 Units, 5 mL, Oral, 4x Daily  multivitamin 1 tablet, 1 tablet, Oral, Daily  liothyronine (CYTOMEL) tablet 5 mcg, 5 mcg, Oral, Daily  levothyroxine (SYNTHROID) tablet 112 mcg, 112 mcg, Oral, Daily  HYDROcodone-acetaminophen (NORCO) 7.5-325 MG per tablet 1 tablet, 1 tablet, Oral, Q6H PRN  magnesium hydroxide (MILK OF MAGNESIA) 400 MG/5ML suspension 30 mL, 30 mL, Oral, Daily PRN    ASSESSMENT:   · 74year old female s/p L3-5 lumbar laminectomy and evacuation of epidural abscess on 6/3 - stable    PLAN:  · PT/OT  · WBAT with brace  · Ok for Prophylactic lovenox only  · AB per ID  · Pain control  · Lumbar Dressing change QOD      Electronically signed by HERMAN De La Torer on 6/6/2019 at 9:48 AM

## 2019-06-06 NOTE — PROGRESS NOTES
Pulmonary [address] [facility]                             Pulmonary Progress Note :          Patient: Meaghan Gardiner  MRN: [id number]  : 1945      Date of Admission: .2019  6:29 PM          Reason for Consultation:  CC : Follow up on Hypoxia     HPI:   Doing very well  Breathing much better  On 2 L   No fever or chills     PHYSICAL EXAMINATION:     VITAL SIGNS:  BP (!) 145/63   Pulse 91   Temp 98.4 °F (36.9 °C) (Temporal)   Resp 16   Ht 5' 6\" (1.676 m)   Wt 252 lb 2 oz (114.4 kg)   SpO2 97%   BMI 40.69 kg/m²   Wt Readings from Last 3 Encounters:   19 252 lb 2 oz (114.4 kg)   19 231 lb 4.8 oz (104.9 kg)   19 243 lb (110.2 kg)     Temp Readings from Last 3 Encounters:   19 98.4 °F (36.9 °C) (Temporal)   19 97.9 °F (36.6 °C) (Temporal)   19 97.8 °F (36.6 °C) (Oral)     TMAX:  BP Readings from Last 3 Encounters:   19 (!) 145/63   19 (!) 143/69   19 (!) 150/75     Pulse Readings from Last 3 Encounters:   19 91   19 86   19 79           INTAKE/OUTPUTS:  I/O last 3 completed shifts: In: 2155 [P.O.:240;  I.V.:1815; IV Piggyback:100]  Out: 1725 [Urine:1725]    Intake/Output Summary (Last 24 hours) at 2019 2344  Last data filed at 2019 1529  Gross per 24 hour   Intake 2155 ml   Output 1375 ml   Net 780 ml       General Appearance: alert and oriented to person, place and time, well-developed and   well-nourished, in no acute distress   Eyes: pupils equal, round, and reactive to light, extraocular eye movements intact, conjunctivae normal and sclera anicteric   Neck: neck supple and non tender without mass, no thyromegaly, no thyroid nodules and no cervical adenopathy   Pulmonary/Chest:decrease breath sounds bibasilar   Cardiovascular: normal rate, regular rhythm, normal S1 and S2, no murmurs, rubs, clicks or gallops, distal pulses intact, no carotid bruits, no murmurs, no gallops, no  Pneumatosis coli    Troponin level elevated    Sepsis (HCC)    HTN (hypertension)    HLD (hyperlipidemia)    Obesity    Acute metabolic encephalopathy    Clostridium difficile diarrhea    Pneumonia    Acute cystitis    Acute midline low back pain without sciatica    Acute osteomyelitis of lumbar spine (HCC)               ASSESSMENT:  1.) Acute Hypoxic resp failure   2.) History of C diff   3. )Bilateral atelectasis   4.)Possible LEILANI   5.)Hypokalemia       PLAN:    *-Did well with surgery ,will continue     aggressive spirometry and flutter valve   *- Encouraged to do more spirometry   *- BiPAP at night   *- wean Fio2   *- BD   *-Sepsis with GNR as per ID? E coli  ,and going for back surgery/I&D  ? \" epidural abscess   *- CPAP as needed   *-ambulate as needed and tolerated    Goal for sat 92-61        [facility]     NOTE: This report was transcribed using voice recognition software. Every effort was made to ensure accuracy; however, inadvertent computerized transcription errors may be present.

## 2019-06-07 VITALS
TEMPERATURE: 97.9 F | DIASTOLIC BLOOD PRESSURE: 70 MMHG | OXYGEN SATURATION: 94 % | SYSTOLIC BLOOD PRESSURE: 152 MMHG | HEART RATE: 81 BPM | RESPIRATION RATE: 16 BRPM | HEIGHT: 66 IN | BODY MASS INDEX: 40.5 KG/M2 | WEIGHT: 252 LBS

## 2019-06-07 PROCEDURE — 2580000003 HC RX 258: Performed by: PHYSICIAN ASSISTANT

## 2019-06-07 PROCEDURE — 6360000002 HC RX W HCPCS: Performed by: PHYSICIAN ASSISTANT

## 2019-06-07 PROCEDURE — 2700000000 HC OXYGEN THERAPY PER DAY

## 2019-06-07 PROCEDURE — 6360000002 HC RX W HCPCS: Performed by: INTERNAL MEDICINE

## 2019-06-07 PROCEDURE — 6370000000 HC RX 637 (ALT 250 FOR IP): Performed by: PHYSICIAN ASSISTANT

## 2019-06-07 PROCEDURE — 94660 CPAP INITIATION&MGMT: CPT

## 2019-06-07 RX ORDER — OXYCODONE AND ACETAMINOPHEN 10; 325 MG/1; MG/1
1 TABLET ORAL EVERY 4 HOURS PRN
Status: DISCONTINUED | OUTPATIENT
Start: 2019-06-07 | End: 2019-06-07 | Stop reason: HOSPADM

## 2019-06-07 RX ADMIN — HYDROCODONE BITARTRATE AND ACETAMINOPHEN 1 TABLET: 7.5; 325 TABLET ORAL at 08:39

## 2019-06-07 RX ADMIN — ENOXAPARIN SODIUM 40 MG: 40 INJECTION, SOLUTION INTRAVENOUS; SUBCUTANEOUS at 08:39

## 2019-06-07 RX ADMIN — SODIUM CHLORIDE, PRESERVATIVE FREE 300 UNITS: 5 INJECTION INTRAVENOUS at 08:40

## 2019-06-07 RX ADMIN — CEFTOLOZANE AND TAZOBACTAM 1.5 G: 1; .5 INJECTION, POWDER, LYOPHILIZED, FOR SOLUTION INTRAVENOUS at 06:32

## 2019-06-07 RX ADMIN — Medication: at 08:50

## 2019-06-07 RX ADMIN — SODIUM CHLORIDE: 9 INJECTION, SOLUTION INTRAVENOUS at 08:50

## 2019-06-07 RX ADMIN — DOCUSATE SODIUM 100 MG: 100 CAPSULE, LIQUID FILLED ORAL at 08:39

## 2019-06-07 RX ADMIN — VITAMIN D, TAB 1000IU (100/BT) 1000 UNITS: 25 TAB at 08:39

## 2019-06-07 RX ADMIN — OXYBUTYNIN CHLORIDE 5 MG: 5 TABLET ORAL at 08:39

## 2019-06-07 RX ADMIN — MULTIVITAMIN TABLET 1 TABLET: TABLET at 08:39

## 2019-06-07 RX ADMIN — POTASSIUM CHLORIDE 40 MEQ: 20 TABLET, EXTENDED RELEASE ORAL at 08:39

## 2019-06-07 RX ADMIN — LEVOTHYROXINE SODIUM 112 MCG: 0.11 TABLET ORAL at 06:32

## 2019-06-07 RX ADMIN — LIOTHYRONINE SODIUM 5 MCG: 5 TABLET ORAL at 08:39

## 2019-06-07 RX ADMIN — Medication 125 MG: at 08:39

## 2019-06-07 RX ADMIN — NYSTATIN 500000 UNITS: 100000 SUSPENSION ORAL at 08:39

## 2019-06-07 RX ADMIN — PROBENECID 500 MG: 500 TABLET, FILM COATED ORAL at 08:38

## 2019-06-07 ASSESSMENT — PAIN DESCRIPTION - LOCATION: LOCATION: BACK

## 2019-06-07 ASSESSMENT — PAIN SCALES - GENERAL
PAINLEVEL_OUTOF10: 0
PAINLEVEL_OUTOF10: 9
PAINLEVEL_OUTOF10: 0

## 2019-06-07 ASSESSMENT — PAIN DESCRIPTION - DESCRIPTORS: DESCRIPTORS: ACHING;CONSTANT;DISCOMFORT

## 2019-06-07 ASSESSMENT — PAIN DESCRIPTION - ORIENTATION: ORIENTATION: RIGHT

## 2019-06-07 ASSESSMENT — PAIN - FUNCTIONAL ASSESSMENT: PAIN_FUNCTIONAL_ASSESSMENT: PREVENTS OR INTERFERES SOME ACTIVE ACTIVITIES AND ADLS

## 2019-06-07 ASSESSMENT — PAIN DESCRIPTION - FREQUENCY: FREQUENCY: INTERMITTENT

## 2019-06-07 ASSESSMENT — PAIN SCALES - WONG BAKER: WONGBAKER_NUMERICALRESPONSE: 0

## 2019-06-07 ASSESSMENT — PAIN DESCRIPTION - PAIN TYPE: TYPE: ACUTE PAIN;SURGICAL PAIN

## 2019-06-07 NOTE — PROGRESS NOTES
Pulmonary [address] [facility]                             Pulmonary Progress Note :          Patient: Meaghan Gardiner  MRN: [id number]  : 1945      Date of Admission: .2019  6:29 PM          Reason for Consultation:  CC : Follow up on Hypoxia     HPI:   Doing very well with no SOB ,doing better with spirometry   Breathing much better  On 2 L   No fever or chills     PHYSICAL EXAMINATION:     VITAL SIGNS:  BP (!) 168/110   Pulse 88   Temp 98.6 °F (37 °C) (Temporal)   Resp 16   Ht 5' 6\" (1.676 m)   Wt 250 lb (113.4 kg)   SpO2 95%   BMI 40.35 kg/m²   Wt Readings from Last 3 Encounters:   19 250 lb (113.4 kg)   19 231 lb 4.8 oz (104.9 kg)   19 243 lb (110.2 kg)     Temp Readings from Last 3 Encounters:   19 98.6 °F (37 °C) (Temporal)   19 97.9 °F (36.6 °C) (Temporal)   19 97.8 °F (36.6 °C) (Oral)     TMAX:  BP Readings from Last 3 Encounters:   19 (!) 168/110   19 (!) 143/69   19 (!) 150/75     Pulse Readings from Last 3 Encounters:   19 88   19 86   19 79           INTAKE/OUTPUTS:  I/O last 3 completed shifts:   In: 480 [P.O.:480]  Out: 3075 [Urine:3075]    Intake/Output Summary (Last 24 hours) at 2019 2206  Last data filed at 2019 1859  Gross per 24 hour   Intake 480 ml   Output 5600 ml   Net -5120 ml       General Appearance: alert and oriented to person, place and time, well-developed and   well-nourished, in no acute distress   Eyes: pupils equal, round, and reactive to light, extraocular eye movements intact, conjunctivae normal and sclera anicteric   Neck: neck supple and non tender without mass, no thyromegaly, no thyroid nodules and no cervical adenopathy   Pulmonary/Chest:decrease breath sounds bibasilar   Cardiovascular: normal rate, regular rhythm, normal S1 and S2, no murmurs, rubs, clicks or gallops, distal pulses intact, no carotid bruits, no murmurs, no gallops, no carotid bruits and no JVD   Abdomen: obese, soft, non-tender, non-distended, normal bowel sounds, no masses or organomegaly   Extremities:+2 edema   Musculoskeletal: normal range of motion, no joint swelling, deformity or tenderness   Neurologic: reflexes normal and symmetric, no cranial nerve deficit noted    LABS/IMAGING:    CBC:  Lab Results   Component Value Date    WBC 10.1 06/05/2019    HGB 10.1 (L) 06/05/2019    HCT 32.2 (L) 06/05/2019    MCV 94.4 06/05/2019     06/05/2019    LYMPHOPCT 13.0 (L) 06/05/2019    RBC 3.41 (L) 06/05/2019    MCH 29.6 06/05/2019    MCHC 31.4 (L) 06/05/2019    RDW 16.2 (H) 06/05/2019    NEUTOPHILPCT 77.4 06/05/2019    MONOPCT 5.8 06/05/2019    BASOPCT 0.6 06/05/2019    NEUTROABS 7.80 (H) 06/05/2019    LYMPHSABS 1.31 (L) 06/05/2019    MONOSABS 0.58 06/05/2019    EOSABS 0.20 06/05/2019    BASOSABS 0.06 06/05/2019       Recent Labs     06/05/19  2245 06/03/19  0530 06/02/19  1303   WBC 10.1 11.6* 12.5*   HGB 10.1* 10.3* 10.6*   HCT 32.2* 32.4* 32.7*   MCV 94.4 92.0 91.9    224 218       BMP:   Recent Labs     06/05/19  2245      K 3.5   *   CO2 23   BUN 17   CREATININE 0.7       MG:   Lab Results   Component Value Date    MG 1.9 05/29/2019     Ca/Phos:   Lab Results   Component Value Date    CALCIUM 8.0 (L) 06/05/2019    PHOS 2.4 (L) 05/28/2019     Amylase: No results found for: AMYLASE  Lipase: No results found for: LIPASE  LIVER PROFILE:   No results for input(s): AST, ALT, LIPASE, BILIDIR, BILITOT, ALKPHOS in the last 72 hours. Invalid input(s): AMYLASE,  ALB    PT/INR: No results for input(s): PROTIME, INR in the last 72 hours. APTT: No results for input(s): APTT in the last 72 hours.     Cardiac Enzymes:  Lab Results   Component Value Date    CKTOTAL 47 05/28/2019    CKMB 1.7 05/29/2019    TROPONINI 0.04 (H) 05/28/2019               PROBLEM LIST:  Patient Active Problem List   Diagnosis    Right lower quadrant pain    Pneumatosis coli    Troponin level elevated    Sepsis ([facility] [address]    HTN (hypertension)    HLD (hyperlipidemia)    Obesity    Acute metabolic encephalopathy    Clostridium difficile diarrhea    Pneumonia    Acute cystitis    Acute midline low back pain without sciatica    Acute osteomyelitis of lumbar spine (HCC)               ASSESSMENT:  1.) Acute Hypoxic resp failure   2.) History of C diff   3. )Bilateral atelectasis   4.)Possible LEILANI   5.)Hypokalemia       PLAN:    *-Did well with surgery ,will continue aggressive spirometry and flutter valve   *- BiPAP at night if needed ,she will need sleep study   *- BD   *-Sepsis with GNR as per ID? E coli  ,and going for   back surgery/I&D  ? \" epidural abscess   *- CPAP as needed   *-ambulate as needed and tolerated  *- Paraspinal fluid grow E coli ,ID on board  Goal for sat 92-94  Can go from Pul stand point       [facility]     NOTE: This report was transcribed using voice recognition software. Every effort was made to ensure accuracy; however, inadvertent computerized transcription errors may be present.

## 2019-06-07 NOTE — PROGRESS NOTES
Physician Discharge Summary     Patient ID:  Lorenza Matthews  [id number]  86 y.o.  1945    Admit date: 5/27/2019    Discharge date and time: 6/7/2019    Admission Diagnoses:   Patient Active Problem List   Diagnosis    Right lower quadrant pain    Pneumatosis coli    Troponin level elevated    Sepsis ([facility] [address]    HTN (hypertension)    HLD (hyperlipidemia)    Obesity    Acute metabolic encephalopathy    Clostridium difficile diarrhea    Pneumonia    Acute cystitis    Acute midline low back pain without sciatica    Acute osteomyelitis of lumbar spine ([facility] [address]       Discharge Diagnoses: as above / GNR bacteremia , Diskitis ,resp failure hypioxemic on admission     Consults:ID, neurosurgery , pulm     Procedures: LUMBAR LAMINECTOMY POSTERIOR L3-L5, BONE BIOPSY L4 - Rickey Saenz, X-RAY,  [facility] Course: The patient is a 74 y.o. female of [facility] with significant past medical history of c-diff, HTN,  HLD, elevated troponin who presents with a fever and dark urine over the past 1 day. She is currently being treated for c-diff with vancomycin, and notices that she has been persistently weak with ongoing loose stools since her diagnosis in mid-May. She presented to the ER with a fever of 102 per EMS. She also noticed dark urine starting yesterday. ED course: Vitals 112/50, pulse 104, resp 18, temp 98.9, SpO2 90%. Labs: K 3.1, BUN/Cr 32/1.4, Trop 0.4, WBC 11.8, Hgb 9.7, Hct 30.3, UA: Small blood, 30 protein, moderate bacteria. Blood cultures: gram negative rods. CXR: subacute chronic infiltrates which have progressed. Pt tolerated procedure well, she has been on IV abx therapy per ID and required picc for ongoing treatments. She was treated initially for uti AND ? PNA AS SHE WAS HYPOXEMIC. SHE HAS Improved. There was concern for PE - pulm followed. No evidence of pe and res status has improved. Diuresis which was held during inpt stay is resumed at higher dose at hernandez Kitchen for discharge ,   No acute complaints . Resting comfortably on dc . Admitted to [facility] for evaluation of hypoxemia on continue pulse ox monitoring   Sats in mid 90's - pulm following   Continue nebs and supportive care      GNR in blood cultures from     Discitis /osteo on mri   POD 3 for   LUMBAR LAMINECTOMY POSTERIOR L3-L5, BONE BIOPSY L4  IV abx therapy - ID following   Continue supportive care   TLSO brace       Stable for dc to [address]       Recent Labs     19  2245   WBC 10.1   HGB 10.1*   HCT 32.2*          Recent Labs     19  2245      K 3.5   *   CO2 23   BUN 17   CREATININE 0.7   CALCIUM 8.0*       Ct Abdomen Pelvis Wo Contrast    Result Date: 2019  Patient MRN:  [id number] : 1945 Age: 74 years Gender: Female Order Date:  2019 1:15 PM EXAM: CT ABDOMEN PELVIS WO CONTRAST number of images 405 Technique: Low-dose CT  acquisition technique included one of following options; 1 . Automated exposure control, 2. Adjustment of MA and or KV according to patient's size or 3. Use of iterative reconstruction. INDICATION:  ABSCESS, KIDNEY  COMPARISON: Previous examinations 2019 FINDINGS: Lack of intravenous contrast limits the study especially for the evaluation of kidneys and liver. There is increasing patchy bibasal infiltrates concerning for pneumonia or edema. There is coronary artery calcifications. Grossly, the liver is of normal architecture. The gallbladder is absent. Spleen, pancreas and adrenals are normal. The kidneys appear somewhat atrophic with perinephric stranding which could be due to inflammation versus renal failure. Pyelonephritis or renal abscess is difficult to evaluate without contrast. 4 to 5 mm hyperdense lesions indeterminate lesions are identified in the left kidney. Pelvis. The bladder is distended. There is distended colon with large amount retained fecal matter and fluid.  Appendix is normal. There is progressive compression, destruction and venous duplex study of the right lower extremity. Pain swelling DVT. Patient on anticoagulation therapy. Images: 27 Grayscale/color Doppler real-time ultrasound. FINDINGS: There are normal compressibility, phase flow pattern and augmentation demonstration for the right common femoral, superficial femoral and popliteal veins. There are patency of the deep veins in the right calf area. There are patency of the distal right external iliac vein and the joints between the great saphenous vein with common femoral vein. Patent deep venous system of the right lower extremity, no evidence for DVT. Discharge Exam:    HEENT: NCAT,  PERRLA, No JVD  Heart:  RRR, no murmurs, gallops, or rubs.   Lungs:  CTA bilaterally, no wheeze, rales or rhonchi  Abd: bowel sounds present, nontender, nondistended, no masses  Extrem:  No clubbing, cyanosis, or edema    Disposition: [facility]     Patient Condition at Discharge: stable     Patient Instructions:      Medication List      START taking these medications    potassium chloride 20 MEQ extended release tablet  Commonly known as:  KLOR-CON M  Take 2 tablets by mouth daily     tiZANidine 4 MG tablet  Commonly known as:  ZANAFLEX  Take 1 tablet by mouth every 6 hours as needed (back pain /spasms)        CHANGE how you take these medications    furosemide 40 MG tablet  Commonly known as:  LASIX  Take 1 tablet by mouth daily  What changed:    · medication strength  · how much to take        CONTINUE taking these medications    acetaminophen 325 MG tablet  Commonly known as:  TYLENOL     albuterol sulfate  (90 Base) MCG/ACT inhaler     aspirin 81 MG tablet     * atenolol 50 MG tablet  Commonly known as:  TENORMIN     * atenolol 25 MG tablet  Commonly known as:  TENORMIN     clotrimazole 10 MG shanthi  Commonly known as:  MYCELEX     CO Q 10 PO     cyanocobalamin 1000 MCG/ML injection     docusate 100 MG Caps  Commonly known as:  COLACE, DULCOLAX  Take 100 mg by mouth 2 times daily enoxaparin 40 MG/0.4ML injection  Commonly known as:  LOVENOX  Inject 0.4 mLs into the skin daily     fluconazole 200 MG tablet  Commonly known as:  DIFLUCAN     HYDROcodone-acetaminophen 7.5-325 MG per tablet  Commonly known as:  NORCO     LACTOBACILLUS PO     levothyroxine 112 MCG tablet  Commonly known as:  SYNTHROID     losartan 50 MG tablet  Commonly known as:  COZAAR  Take 1 tablet by mouth daily     magnesium hydroxide 400 MG/5ML suspension  Commonly known as:  MILK OF MAGNESIA     multivitamin per tablet     nystatin 505927 UNIT/ML suspension  Commonly known as:  MYCOSTATIN  Take 5 mLs by mouth 4 times daily     oxybutynin 5 MG tablet  Commonly known as:  DITROPAN     pravastatin 40 MG tablet  Commonly known as:  PRAVACHOL     probenecid 500 MG tablet  Commonly known as:  BENEMID     * QUEtiapine 25 MG tablet  Commonly known as:  SEROQUEL  Take 1 tablet by mouth nightly     * QUEtiapine 25 MG tablet  Commonly known as:  SEROQUEL  Take 0.5 tablets by mouth every 6 hours as needed for Agitation     vitamin D 1000 UNIT Tabs tablet  Commonly known as:  CHOLECALCIFEROL         * This list has 4 medication(s) that are the same as other medications prescribed for you. Read the directions carefully, and ask your doctor or other care provider to review them with you. STOP taking these medications    ertapenem 1 GM injection  Commonly known as:  INVANZ     liothyronine 5 MCG tablet  Commonly known as:  CYTOMEL     potassium chloride 10 MEQ extended release capsule  Commonly known as:  MICRO-K     torsemide 20 MG tablet  Commonly known as:  [address] your doctor about these medications    vancomycin 50 mg/mL oral solution  Commonly known as:  VANCOCIN  Take 2.5 mLs by mouth every 6 hours for 10 days  Ask about: Should I take this medication?            Where to Get Your Medications      These medications were sent to [address] [place] [address],[address], [facility] [phone] - [address]    Phone:  [phone]   · docusate 100 MG Caps  · furosemide 40 MG tablet  · potassium chloride 20 MEQ extended release tablet  · tiZANidine 4 MG tablet       Activity: activity as tolerated  Diet: regular diet    Pt has been advised to: Follow-up with Elicia Devries DO in 1 week.   Follow-up with consultants as recommended by them    Note that over 30 minutes was spent in preparing discharge papers, discussing discharge with patient, medication review, etc.    Signed:  Lydia Garcia MD  6/7/2019  8:59 AM

## 2019-06-07 NOTE — PROGRESS NOTES
Department of Neurosurgery  Progress Note    CHIEF COMPLAINT: s/p lumbar laminectomy and evacuation of epidural abscess    SUBJECTIVE:  Tolerating op site pain well. C/o leg \"twitching\". Leg numbness/weakness unchanged    REVIEW OF SYSTEMS :  Constitutional: Negative for chills and fever. Neurological: Negative for dizziness, tremors and speech change. OBJECTIVE:   VITALS:  BP (!) 178/86   Pulse 81   Temp 97.9 °F (36.6 °C) (Temporal)   Resp 16   Ht 5' 6\" (1.676 m)   Wt 252 lb (114.3 kg)   SpO2 94%   BMI 40.67 kg/m²   PHYSICAL:  CONSTITUTIONAL:  awake, alert, cooperative, no apparent distress, and appears stated age. MEZA. Bilateral LE weakness 3/5.   Decreased sensation to light touch    DATA:  CBC:   Lab Results   Component Value Date    WBC 10.1 06/05/2019    RBC 3.41 06/05/2019    HGB 10.1 06/05/2019    HCT 32.2 06/05/2019    MCV 94.4 06/05/2019    MCH 29.6 06/05/2019    MCHC 31.4 06/05/2019    RDW 16.2 06/05/2019     06/05/2019    MPV 11.7 06/05/2019     BMP:    Lab Results   Component Value Date     06/05/2019    K 3.5 06/05/2019    K 2.7 05/29/2019     06/05/2019    CO2 23 06/05/2019    BUN 17 06/05/2019    LABALBU 2.2 05/28/2019    CREATININE 0.7 06/05/2019    CALCIUM 8.0 06/05/2019    GFRAA >60 06/05/2019    LABGLOM >60 06/05/2019    GLUCOSE 110 06/05/2019     PT/INR:    Lab Results   Component Value Date    PROTIME 14.6 05/14/2019    INR 1.3 05/14/2019     PTT:    Lab Results   Component Value Date    APTT 29.9 05/14/2019   [APTT}    Current Inpatient Medications  Current Facility-Administered Medications: hydrALAZINE (APRESOLINE) injection 10 mg, 10 mg, Intravenous, Q6H PRN  heparin flush 100 UNIT/ML injection 300 Units, 300 Units, Intracatheter, PRN  heparin flush 100 UNIT/ML injection 300 Units, 300 Units, Intracatheter, BID  sodium chloride flush 0.9 % injection 10 mL, 10 mL, Intravenous, 2 times per day  sodium chloride flush 0.9 % injection 10 mL, 10 mL, Intravenous, PRN  ondansetron (ZOFRAN) injection 4 mg, 4 mg, Intravenous, Q6H PRN  HYDROmorphone (DILAUDID) injection 0.25 mg, 0.25 mg, Intravenous, Q3H PRN **OR** HYDROmorphone (DILAUDID) injection 0.5 mg, 0.5 mg, Intravenous, Q3H PRN  tiZANidine (ZANAFLEX) tablet 4 mg, 4 mg, Oral, Q6H PRN  0.9 % sodium chloride infusion, , Intravenous, Continuous  ceftolozane-tazobactam (ZERBAXA) 1.5 g in dextrose 5 % 100 mL IVPB, 1.5 g, Intravenous, Q8H  potassium chloride (KLOR-CON M) extended release tablet 40 mEq, 40 mEq, Oral, Daily  albuterol (PROVENTIL) nebulizer solution 2.5 mg, 2.5 mg, Nebulization, 4x daily  miconazole nitrate 2 % ointment, , Topical, TID **AND** miconazole nitrate 2 % ointment, , Topical, TID PRN  acetaminophen (TYLENOL) tablet 650 mg, 650 mg, Oral, Q4H PRN  atenolol (TENORMIN) tablet 25 mg, 25 mg, Oral, Nightly  docusate sodium (COLACE) capsule 100 mg, 100 mg, Oral, BID  enoxaparin (LOVENOX) injection 40 mg, 40 mg, Subcutaneous, Daily  vitamin D (CHOLECALCIFEROL) tablet 1,000 Units, 1,000 Units, Oral, Daily  vancomycin (VANCOCIN) oral solution 125 mg, 125 mg, Oral, 4 times per day  QUEtiapine (SEROQUEL) tablet 12.5 mg, 12.5 mg, Oral, Q6H PRN  QUEtiapine (SEROQUEL) tablet 25 mg, 25 mg, Oral, Nightly  probenecid (BENEMID) tablet 500 mg, 500 mg, Oral, BID  pravastatin (PRAVACHOL) tablet 40 mg, 40 mg, Oral, Nightly  oxybutynin (DITROPAN) tablet 5 mg, 5 mg, Oral, BID  nystatin (MYCOSTATIN) 641540 UNIT/ML suspension 500,000 Units, 5 mL, Oral, 4x Daily  multivitamin 1 tablet, 1 tablet, Oral, Daily  liothyronine (CYTOMEL) tablet 5 mcg, 5 mcg, Oral, Daily  levothyroxine (SYNTHROID) tablet 112 mcg, 112 mcg, Oral, Daily  HYDROcodone-acetaminophen (NORCO) 7.5-325 MG per tablet 1 tablet, 1 tablet, Oral, Q6H PRN  magnesium hydroxide (MILK OF MAGNESIA) 400 MG/5ML suspension 30 mL, 30 mL, Oral, Daily PRN    ASSESSMENT:   · 74year old female s/p L3-5 lumbar laminectomy and evacuation of epidural abscess on 6/3 - stable    PLAN:  · PT/OT  · WBAT with brace  · Ok for Prophylactic lovenox only  · AB per ID  · Pain control  · Lumbar Dressing change QOD      Electronically signed by Montell Bernheim, PA on 6/7/2019 at 8:26 AM

## 2019-06-07 NOTE — PROGRESS NOTES
CC- SOB    Subjective: Tolerating medications. Reports no side effects. Afebrile. More awake today  10 ROS otherwise negative unless otherwise specified above. Objective:    Vitals:    06/07/19 0800   BP: (!) 178/86   Pulse: 81   Resp: 16   Temp: 97.9 °F (36.6 °C)   SpO2:        General Appearance:    Awake, alert , no acute distress. HEENT:    Normocephalic,PERRL,neck supple, no JVD, mucosa moist, no thrush   Lungs:     Orthotic brace, no wheeze , crackles   Heart:    Regular rate and rhythm, no murmur   Abdomen:     Soft, non-tender, not distended  bowel sounds present,   Extremities:  B/L LE edema,no open wound,no erythema, non  tender   Skin:   no rashes or lesions   CBC+dif:  Reviewed and trend followed    Radiology:  · Ct abdomen pelvis w/o contrast- perinpephric stranding, L3 vertebral body destruction  · MRI spine- L2- L3 VOM    Microbiology:  5/27/19- BC- ESBL E coli 2 sets  Surgical culture- ESBL E COLI    Assessment:  · Sepsis from acute osteomyelitis of lumbar vertebrae with recurrent ESBL E coli bacteremia s/p L3-5 lumbar laminectomy and evacuation of epidural abscess on 6/3/19  · C diff colitis ongoing treatment  · 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  PICC line  · PO vancomycin 125 mg q6 while on iv antibiotics  · F/u with me in 2 weeks  · Weekly CBC, CMP and ESR, CRP          Electronically signed by Martin Gallardo MD on 6/7/2019 at 9:35 AM

## 2019-06-07 NOTE — CARE COORDINATION
6/7/2019 social work transition of care  Sw set up ambulance transport with [facility] for 1 pm. Sw notified facility [id number] nurse, and pt at bedside.   Electronically signed by LENCHO Torres on 6/7/2019 at 9:22 AM

## 2019-06-07 NOTE — FLOWSHEET NOTE
DR. Jay Jay Grant messaged about needing [place] script for facility.  Vicente Mcgraw  6/7/2019  10:04 AM

## 2019-06-10 ENCOUNTER — TELEPHONE (OUTPATIENT)
Dept: NEUROSURGERY | Age: 74
End: 2019-06-10

## 2019-06-10 DIAGNOSIS — M48.061 DEGENERATIVE LUMBAR SPINAL STENOSIS: Primary | ICD-10-CM

## 2019-06-10 RX ORDER — OXYCODONE HYDROCHLORIDE AND ACETAMINOPHEN 5; 325 MG/1; MG/1
2 TABLET ORAL EVERY 4 HOURS PRN
Qty: 84 TABLET | Refills: 0 | Status: SHIPPED | OUTPATIENT
Start: 2019-06-10 | End: 2019-07-10 | Stop reason: SDUPTHER

## 2019-06-10 NOTE — TELEPHONE ENCOUNTER
Ashley Rodriguez [facility] called - patient is currently on [address] [facility] and states she is 9/10 pain. Ariane Bermeo is asking to be put back on Percocet because it works better than [address]. Their house doctor would need instructions from us to change prescription. [phone] and ask for rehab.

## 2019-06-11 LAB
EKG ATRIAL RATE: 120 BPM
EKG P AXIS: 56 DEGREES
EKG P-R INTERVAL: 140 MS
EKG Q-T INTERVAL: 298 MS
EKG QRS DURATION: 86 MS
EKG QTC CALCULATION (BAZETT): 421 MS
EKG R AXIS: 46 DEGREES
EKG T AXIS: 1 DEGREES
EKG VENTRICULAR RATE: 120 BPM

## 2019-07-02 ENCOUNTER — OFFICE VISIT (OUTPATIENT)
Dept: NEUROSURGERY | Age: 74
End: 2019-07-02

## 2019-07-02 VITALS — DIASTOLIC BLOOD PRESSURE: 54 MMHG | SYSTOLIC BLOOD PRESSURE: 93 MMHG | HEART RATE: 79 BPM

## 2019-07-02 DIAGNOSIS — M46.26 OSTEOMYELITIS OF LUMBAR SPINE (HCC): Primary | ICD-10-CM

## 2019-07-02 PROCEDURE — 99024 POSTOP FOLLOW-UP VISIT: CPT | Performed by: PHYSICIAN ASSISTANT

## 2019-07-08 LAB
FUNGUS (MYCOLOGY) CULTURE: NORMAL
FUNGUS STAIN: NORMAL

## 2019-07-10 ENCOUNTER — TELEPHONE (OUTPATIENT)
Dept: NEUROSURGERY | Age: 74
End: 2019-07-10

## 2019-07-10 DIAGNOSIS — M48.061 DEGENERATIVE LUMBAR SPINAL STENOSIS: ICD-10-CM

## 2019-07-10 RX ORDER — OXYCODONE HYDROCHLORIDE AND ACETAMINOPHEN 5; 325 MG/1; MG/1
2 TABLET ORAL EVERY 4 HOURS PRN
Qty: 84 TABLET | Refills: 0 | OUTPATIENT
Start: 2019-07-10 | End: 2019-07-12

## 2019-07-10 RX ORDER — TIZANIDINE 4 MG/1
4 TABLET ORAL EVERY 6 HOURS PRN
Qty: 30 TABLET | Refills: 0 | Status: ON HOLD | OUTPATIENT
Start: 2019-07-10 | End: 2019-08-02 | Stop reason: HOSPADM

## 2019-07-12 ENCOUNTER — TELEPHONE (OUTPATIENT)
Dept: NEUROSURGERY | Age: 74
End: 2019-07-12

## 2019-07-12 DIAGNOSIS — M48.061 DEGENERATIVE LUMBAR SPINAL STENOSIS: ICD-10-CM

## 2019-07-12 RX ORDER — OXYCODONE AND ACETAMINOPHEN 10; 325 MG/1; MG/1
1 TABLET ORAL EVERY 4 HOURS PRN
Qty: 42 TABLET | Refills: 0 | OUTPATIENT
Start: 2019-07-12 | End: 2019-07-19

## 2019-07-15 ENCOUNTER — TELEPHONE (OUTPATIENT)
Dept: NEUROSURGERY | Age: 74
End: 2019-07-15

## 2019-07-23 LAB
AFB CULTURE (MYCOBACTERIA): NORMAL
AFB SMEAR: NORMAL

## 2019-07-30 ENCOUNTER — APPOINTMENT (OUTPATIENT)
Dept: CT IMAGING | Age: 74
DRG: 683 | End: 2019-07-30
Payer: COMMERCIAL

## 2019-07-30 ENCOUNTER — HOSPITAL ENCOUNTER (INPATIENT)
Age: 74
LOS: 3 days | Discharge: NURSING, REHAB OR LONG-TERM CARE | DRG: 683 | End: 2019-08-02
Attending: EMERGENCY MEDICINE | Admitting: INTERNAL MEDICINE
Payer: COMMERCIAL

## 2019-07-30 DIAGNOSIS — N17.9 AKI (ACUTE KIDNEY INJURY) (HCC): ICD-10-CM

## 2019-07-30 DIAGNOSIS — E86.0 DEHYDRATION: ICD-10-CM

## 2019-07-30 DIAGNOSIS — I95.9 HYPOTENSION, UNSPECIFIED HYPOTENSION TYPE: Primary | ICD-10-CM

## 2019-07-30 DIAGNOSIS — M54.50 ACUTE MIDLINE LOW BACK PAIN WITHOUT SCIATICA: ICD-10-CM

## 2019-07-30 PROBLEM — G06.1 ABSCESS IN EPIDURAL SPACE OF LUMBAR SPINE: Status: ACTIVE | Noted: 2019-07-30

## 2019-07-30 PROBLEM — R74.01 TRANSAMINITIS: Status: ACTIVE | Noted: 2019-07-30

## 2019-07-30 PROBLEM — J90 PLEURAL EFFUSION: Status: ACTIVE | Noted: 2019-07-30

## 2019-07-30 PROBLEM — E86.1 HYPOTENSION DUE TO HYPOVOLEMIA: Status: ACTIVE | Noted: 2019-07-30

## 2019-07-30 PROBLEM — I95.89 HYPOTENSION DUE TO HYPOVOLEMIA: Status: ACTIVE | Noted: 2019-07-30

## 2019-07-30 LAB
ALBUMIN SERPL-MCNC: 2.5 G/DL (ref 3.5–5.2)
ALP BLD-CCNC: 96 U/L (ref 35–104)
ALT SERPL-CCNC: 33 U/L (ref 0–32)
ANION GAP SERPL CALCULATED.3IONS-SCNC: 10 MMOL/L (ref 7–16)
APTT: 33.7 SEC (ref 24.5–35.1)
AST SERPL-CCNC: 55 U/L (ref 0–31)
BASOPHILS ABSOLUTE: 0.07 E9/L (ref 0–0.2)
BASOPHILS RELATIVE PERCENT: 1.3 % (ref 0–2)
BILIRUB SERPL-MCNC: <0.2 MG/DL (ref 0–1.2)
BILIRUBIN URINE: NEGATIVE
BLOOD, URINE: NEGATIVE
BUN BLDV-MCNC: 17 MG/DL (ref 8–23)
CALCIUM SERPL-MCNC: 8.9 MG/DL (ref 8.6–10.2)
CHLORIDE BLD-SCNC: 111 MMOL/L (ref 98–107)
CLARITY: CLEAR
CO2: 24 MMOL/L (ref 22–29)
COLOR: YELLOW
CREAT SERPL-MCNC: 1.4 MG/DL (ref 0.5–1)
EOSINOPHILS ABSOLUTE: 0.13 E9/L (ref 0.05–0.5)
EOSINOPHILS RELATIVE PERCENT: 2.3 % (ref 0–6)
GFR AFRICAN AMERICAN: 44
GFR NON-AFRICAN AMERICAN: 37 ML/MIN/1.73
GLUCOSE BLD-MCNC: 120 MG/DL (ref 74–99)
GLUCOSE URINE: NEGATIVE MG/DL
HCT VFR BLD CALC: 34.8 % (ref 34–48)
HEMOGLOBIN: 10.9 G/DL (ref 11.5–15.5)
IMMATURE GRANULOCYTES #: 0.02 E9/L
IMMATURE GRANULOCYTES %: 0.4 % (ref 0–5)
INR BLD: 1.4
KETONES, URINE: NEGATIVE MG/DL
LACTIC ACID: 1.7 MMOL/L (ref 0.5–2.2)
LEUKOCYTE ESTERASE, URINE: NEGATIVE
LYMPHOCYTES ABSOLUTE: 1.21 E9/L (ref 1.5–4)
LYMPHOCYTES RELATIVE PERCENT: 21.8 % (ref 20–42)
MCH RBC QN AUTO: 30.8 PG (ref 26–35)
MCHC RBC AUTO-ENTMCNC: 31.3 % (ref 32–34.5)
MCV RBC AUTO: 98.3 FL (ref 80–99.9)
MONOCYTES ABSOLUTE: 0.7 E9/L (ref 0.1–0.95)
MONOCYTES RELATIVE PERCENT: 12.6 % (ref 2–12)
NEUTROPHILS ABSOLUTE: 3.43 E9/L (ref 1.8–7.3)
NEUTROPHILS RELATIVE PERCENT: 61.6 % (ref 43–80)
NITRITE, URINE: NEGATIVE
PDW BLD-RTO: 16.4 FL (ref 11.5–15)
PH UA: 5 (ref 5–9)
PLATELET # BLD: 249 E9/L (ref 130–450)
PMV BLD AUTO: 10.7 FL (ref 7–12)
POTASSIUM SERPL-SCNC: 4.9 MMOL/L (ref 3.5–5)
PRO-BNP: 2364 PG/ML (ref 0–450)
PROTEIN UA: NEGATIVE MG/DL
PROTHROMBIN TIME: 15.6 SEC (ref 9.3–12.4)
RBC # BLD: 3.54 E12/L (ref 3.5–5.5)
SODIUM BLD-SCNC: 145 MMOL/L (ref 132–146)
SPECIFIC GRAVITY UA: 1.01 (ref 1–1.03)
TOTAL PROTEIN: 5.2 G/DL (ref 6.4–8.3)
TROPONIN: 0.09 NG/ML (ref 0–0.03)
UROBILINOGEN, URINE: 0.2 E.U./DL
VANCOMYCIN RANDOM: <4 MCG/ML (ref 5–40)
WBC # BLD: 5.6 E9/L (ref 4.5–11.5)

## 2019-07-30 PROCEDURE — 51701 INSERT BLADDER CATHETER: CPT

## 2019-07-30 PROCEDURE — 85730 THROMBOPLASTIN TIME PARTIAL: CPT

## 2019-07-30 PROCEDURE — 93005 ELECTROCARDIOGRAM TRACING: CPT | Performed by: PREVENTIVE MEDICINE

## 2019-07-30 PROCEDURE — 2580000003 HC RX 258: Performed by: PREVENTIVE MEDICINE

## 2019-07-30 PROCEDURE — 87088 URINE BACTERIA CULTURE: CPT

## 2019-07-30 PROCEDURE — 82140 ASSAY OF AMMONIA: CPT

## 2019-07-30 PROCEDURE — 85610 PROTHROMBIN TIME: CPT

## 2019-07-30 PROCEDURE — 83605 ASSAY OF LACTIC ACID: CPT

## 2019-07-30 PROCEDURE — 81003 URINALYSIS AUTO W/O SCOPE: CPT

## 2019-07-30 PROCEDURE — 2060000000 HC ICU INTERMEDIATE R&B

## 2019-07-30 PROCEDURE — 36415 COLL VENOUS BLD VENIPUNCTURE: CPT

## 2019-07-30 PROCEDURE — 72132 CT LUMBAR SPINE W/DYE: CPT

## 2019-07-30 PROCEDURE — 85025 COMPLETE CBC W/AUTO DIFF WBC: CPT

## 2019-07-30 PROCEDURE — 84484 ASSAY OF TROPONIN QUANT: CPT

## 2019-07-30 PROCEDURE — 6360000004 HC RX CONTRAST MEDICATION: Performed by: RADIOLOGY

## 2019-07-30 PROCEDURE — 71275 CT ANGIOGRAPHY CHEST: CPT

## 2019-07-30 PROCEDURE — 80202 ASSAY OF VANCOMYCIN: CPT

## 2019-07-30 PROCEDURE — 80053 COMPREHEN METABOLIC PANEL: CPT

## 2019-07-30 PROCEDURE — 87040 BLOOD CULTURE FOR BACTERIA: CPT

## 2019-07-30 PROCEDURE — 99285 EMERGENCY DEPT VISIT HI MDM: CPT

## 2019-07-30 PROCEDURE — 83880 ASSAY OF NATRIURETIC PEPTIDE: CPT

## 2019-07-30 PROCEDURE — 94761 N-INVAS EAR/PLS OXIMETRY MLT: CPT

## 2019-07-30 RX ORDER — SODIUM CHLORIDE 9 MG/ML
INJECTION, SOLUTION INTRAVENOUS CONTINUOUS
Status: DISCONTINUED | OUTPATIENT
Start: 2019-07-30 | End: 2019-07-31 | Stop reason: SDUPTHER

## 2019-07-30 RX ORDER — ACETAMINOPHEN 325 MG/1
650 TABLET ORAL EVERY 4 HOURS PRN
Status: ON HOLD | COMMUNITY
End: 2019-08-02 | Stop reason: HOSPADM

## 2019-07-30 RX ORDER — SODIUM CHLORIDE 0.9 % (FLUSH) 0.9 %
10 SYRINGE (ML) INJECTION PRN
Status: DISCONTINUED | OUTPATIENT
Start: 2019-07-30 | End: 2019-07-31 | Stop reason: SDUPTHER

## 2019-07-30 RX ORDER — 0.9 % SODIUM CHLORIDE 0.9 %
2000 INTRAVENOUS SOLUTION INTRAVENOUS ONCE
Status: COMPLETED | OUTPATIENT
Start: 2019-07-30 | End: 2019-07-30

## 2019-07-30 RX ORDER — OXYCODONE HYDROCHLORIDE AND ACETAMINOPHEN 5; 325 MG/1; MG/1
1 TABLET ORAL EVERY 6 HOURS PRN
Status: ON HOLD | COMMUNITY
End: 2019-08-02 | Stop reason: SDUPTHER

## 2019-07-30 RX ADMIN — SODIUM CHLORIDE: 9 INJECTION, SOLUTION INTRAVENOUS at 23:17

## 2019-07-30 RX ADMIN — SODIUM CHLORIDE 2000 ML: 9 INJECTION, SOLUTION INTRAVENOUS at 18:53

## 2019-07-30 RX ADMIN — IOPAMIDOL 90 ML: 755 INJECTION, SOLUTION INTRAVENOUS at 21:51

## 2019-07-30 ASSESSMENT — ENCOUNTER SYMPTOMS
ALLERGIC/IMMUNOLOGIC NEGATIVE: 1
VOMITING: 0
ABDOMINAL PAIN: 0
NAUSEA: 0
DIARRHEA: 0
CONSTIPATION: 0
SHORTNESS OF BREATH: 0
CHEST TIGHTNESS: 0
COUGH: 0

## 2019-07-30 NOTE — ED PROVIDER NOTES
and skin are pale appearing. Psychiatric: She has a normal mood and affect. Her behavior is normal. Judgment and thought content normal.   Vitals reviewed. Procedures    MDM  Number of Diagnoses or Management Options  Diagnosis management comments: 53-year-old female with the above-stated history, signs, symptoms. During the patient's emergency department stay she was found to have no evidence of pulmonary embolism. CT scan of the chest did demonstrate a stable pleural effusion. Laboratory studies demonstrated an acute kidney injury and the patient demonstrated significant improvement in blood pressure after receiving IV fluids. She was hemodynamically stable at the time of disposition, case was discussed with internal medicine 's physician assistant Missael Jean who is agreeable to plan for admission at this time. I have discussed the results of the workup as well as the plan with the patient and family who indicate understanding and agreement with the plan. All questions answered at this time. --------------------------------------------- PAST HISTORY ---------------------------------------------  Past Medical History:  has a past medical history of Arthritis, Gout, Hyperlipidemia, Hypertension, Macular hole, right eye, and Thyroid disease. Past Surgical History:  has a past surgical history that includes Breast surgery (Right, 1982); Hysterectomy (1985); Cholecystectomy (1997); laminectomy (N/A, 6/3/2019); and skin biopsy. Social History:  reports that she has never smoked. She has never used smokeless tobacco. She reports that she drinks alcohol. She reports that she does not use drugs. Family History: family history includes Other in her mother. The patients home medications have been reviewed.     Allergies: Adrenalin [epinephrine] and Meropenem    -------------------------------------------------- RESULTS -------------------------------------------------    LABS:  Results pH, UA 5.0 5.0 - 9.0    Protein, UA Negative Negative mg/dL    Urobilinogen, Urine 0.2 <2.0 E.U./dL    Nitrite, Urine Negative Negative    Leukocyte Esterase, Urine Negative Negative   Protime-INR   Result Value Ref Range    Protime 15.6 (H) 9.3 - 12.4 sec    INR 1.4    APTT   Result Value Ref Range    aPTT 33.7 24.5 - 35.1 sec   Lactic Acid, Plasma   Result Value Ref Range    Lactic Acid 1.7 0.5 - 2.2 mmol/L   VANCOMYCIN, RANDOM   Result Value Ref Range    Vancomycin Rm <4.0 (L) 5.0 - 40.0 mcg/mL   Ammonia   Result Value Ref Range    Ammonia 20.0 11.0 - 51.0 umol/L   EKG 12 Lead   Result Value Ref Range    Ventricular Rate 76 BPM    Atrial Rate 76 BPM    P-R Interval 148 ms    QRS Duration 74 ms    Q-T Interval 384 ms    QTc Calculation (Bazett) 432 ms    P Axis 36 degrees    R Axis 45 degrees    T Axis 55 degrees       RADIOLOGY:  CTA PULMONARY W CONTRAST   Final Result   1. No indication for an acute pulmonary emboli. 2. Mild to moderate right-sided pleural effusion with compression   place in the dependent portion of the right lower lobe. 3. No aneurysm formation or dissection of the thoracic aorta. 4. No superimposed acute pulmonary process. CT LUMBAR SPINE W CONTRAST   Final Result   1. Since the MRI study of May 30, patient had laminectomy in L3-L4 and   L5 level. 2. Amorphous soft tissue density surrounding the thecal sac   posteriorly at the site of the surgery and also anteriorly the level   of the L2-3 disc space in the L3 vertebral body fragments which are   projecting posteriorly. 3. There are also fragments from the posterior inferior corner of L2   which was not seen previously towards anterior spinal canal stenosis. Combine as the causing compression on the thecal sac but the canal is   decompressed by the laminectomy.       4. For proper evaluation of epidural abscess it can correlate with MRI   study of the lumbar spine without and with IV contrast. ------------------------- NURSING NOTES AND VITALS REVIEWED ---------------------------  Date / Time Roomed:  7/30/2019  5:35 PM  ED Bed Assignment:  [id number]    The nursing notes within the ED encounter and vital signs as below have been reviewed. Patient Vitals for the past 24 hrs:   BP Temp Temp src Pulse Resp SpO2 Height Weight   07/31/19 0030 139/66 98.2 °F (36.8 °C) Temporal 69 18 96 % 5' 6\" (1.676 m) 203 lb 6.4 oz (92.3 kg)   07/30/19 2346 123/75 98.6 °F (37 °C) -- 74 21 99 % -- --   07/30/19 2252 135/68 98.5 °F (36.9 °C) Oral 73 18 95 % -- --   07/30/19 2054 (!) 103/48 98.7 °F (37.1 °C) Temporal 74 16 97 % -- --   07/30/19 1909 (!) 102/53 -- -- 74 19 96 % -- --   07/30/19 1838 (!) 86/59 -- -- -- -- -- -- --   07/30/19 1754 (!) 88/42 -- -- 78 18 96 % -- --   07/30/19 1742 (!) 67/36 97.1 °F (36.2 °C) Temporal 75 18 96 % 5' 6\" (1.676 m) 201 lb 2 oz (91.2 kg)       Oxygen Saturation Interpretation: Normal    ------------------------------------------ PROGRESS NOTES ------------------------------------------      Counseling:  I have spoken with the patient and discussed todays results, in addition to providing specific details for the plan of care and counseling regarding the diagnosis and prognosis. Their questions are answered at this time and they are agreeable with the plan of admission.    --------------------------------- ADDITIONAL PROVIDER NOTES ---------------------------------    This patient's ED course included: a personal history and physicial examination and re-evaluation prior to disposition    This patient has remained hemodynamically stable during their ED course. Diagnosis:  1. Hypotension, unspecified hypotension type    2. NASRA (acute kidney injury) ([facility] [address]    3. Dehydration        Disposition:  Patient's disposition: Admit to telemetry  Patient's condition is stable.                 Sharon Lopez DO  Resident  07/31/19 5343

## 2019-07-31 ENCOUNTER — APPOINTMENT (OUTPATIENT)
Dept: GENERAL RADIOLOGY | Age: 74
DRG: 683 | End: 2019-07-31
Payer: COMMERCIAL

## 2019-07-31 LAB
ALBUMIN SERPL-MCNC: 2.8 G/DL (ref 3.5–5.2)
ALP BLD-CCNC: 99 U/L (ref 35–104)
ALT SERPL-CCNC: 34 U/L (ref 0–32)
AMMONIA: 20 UMOL/L (ref 11–51)
ANION GAP SERPL CALCULATED.3IONS-SCNC: 13 MMOL/L (ref 7–16)
APTT: 32.2 SEC (ref 24.5–35.1)
AST SERPL-CCNC: 54 U/L (ref 0–31)
BILIRUB SERPL-MCNC: <0.2 MG/DL (ref 0–1.2)
BUN BLDV-MCNC: 14 MG/DL (ref 8–23)
C-REACTIVE PROTEIN: 0.4 MG/DL (ref 0–0.4)
CALCIUM SERPL-MCNC: 8.7 MG/DL (ref 8.6–10.2)
CHLORIDE BLD-SCNC: 111 MMOL/L (ref 98–107)
CO2: 21 MMOL/L (ref 22–29)
CREAT SERPL-MCNC: 1.1 MG/DL (ref 0.5–1)
EKG ATRIAL RATE: 76 BPM
EKG P AXIS: 36 DEGREES
EKG P-R INTERVAL: 148 MS
EKG Q-T INTERVAL: 384 MS
EKG QRS DURATION: 74 MS
EKG QTC CALCULATION (BAZETT): 432 MS
EKG R AXIS: 45 DEGREES
EKG T AXIS: 55 DEGREES
EKG VENTRICULAR RATE: 76 BPM
GFR AFRICAN AMERICAN: 58
GFR NON-AFRICAN AMERICAN: 48 ML/MIN/1.73
GLUCOSE BLD-MCNC: 84 MG/DL (ref 74–99)
HCT VFR BLD CALC: 37.8 % (ref 34–48)
HEMOGLOBIN: 11.5 G/DL (ref 11.5–15.5)
INR BLD: 1.2
MCH RBC QN AUTO: 30.1 PG (ref 26–35)
MCHC RBC AUTO-ENTMCNC: 30.4 % (ref 32–34.5)
MCV RBC AUTO: 99 FL (ref 80–99.9)
PDW BLD-RTO: 16.5 FL (ref 11.5–15)
PLATELET # BLD: 220 E9/L (ref 130–450)
PMV BLD AUTO: 11.1 FL (ref 7–12)
POTASSIUM REFLEX MAGNESIUM: 4.5 MMOL/L (ref 3.5–5)
PROCALCITONIN: 0.25 NG/ML (ref 0–0.08)
PROTHROMBIN TIME: 13.6 SEC (ref 9.3–12.4)
RBC # BLD: 3.82 E12/L (ref 3.5–5.5)
SEDIMENTATION RATE, ERYTHROCYTE: 32 MM/HR (ref 0–20)
SODIUM BLD-SCNC: 145 MMOL/L (ref 132–146)
T4 FREE: 0.83 NG/DL (ref 0.93–1.7)
TOTAL PROTEIN: 5.4 G/DL (ref 6.4–8.3)
TROPONIN: 0.07 NG/ML (ref 0–0.03)
TROPONIN: 0.09 NG/ML (ref 0–0.03)
TROPONIN: 0.11 NG/ML (ref 0–0.03)
TSH SERPL DL<=0.05 MIU/L-ACNC: 6.02 UIU/ML (ref 0.27–4.2)
WBC # BLD: 5 E9/L (ref 4.5–11.5)

## 2019-07-31 PROCEDURE — 51798 US URINE CAPACITY MEASURE: CPT

## 2019-07-31 PROCEDURE — 84484 ASSAY OF TROPONIN QUANT: CPT

## 2019-07-31 PROCEDURE — 6370000000 HC RX 637 (ALT 250 FOR IP): Performed by: INTERNAL MEDICINE

## 2019-07-31 PROCEDURE — 87040 BLOOD CULTURE FOR BACTERIA: CPT

## 2019-07-31 PROCEDURE — 85651 RBC SED RATE NONAUTOMATED: CPT

## 2019-07-31 PROCEDURE — 51702 INSERT TEMP BLADDER CATH: CPT

## 2019-07-31 PROCEDURE — 86140 C-REACTIVE PROTEIN: CPT

## 2019-07-31 PROCEDURE — 99024 POSTOP FOLLOW-UP VISIT: CPT | Performed by: NEUROLOGICAL SURGERY

## 2019-07-31 PROCEDURE — 2580000003 HC RX 258: Performed by: NURSE PRACTITIONER

## 2019-07-31 PROCEDURE — 2060000000 HC ICU INTERMEDIATE R&B

## 2019-07-31 PROCEDURE — 6370000000 HC RX 637 (ALT 250 FOR IP): Performed by: NURSE PRACTITIONER

## 2019-07-31 PROCEDURE — 93010 ELECTROCARDIOGRAM REPORT: CPT | Performed by: INTERNAL MEDICINE

## 2019-07-31 PROCEDURE — 84145 PROCALCITONIN (PCT): CPT

## 2019-07-31 PROCEDURE — 84439 ASSAY OF FREE THYROXINE: CPT

## 2019-07-31 PROCEDURE — 84443 ASSAY THYROID STIM HORMONE: CPT

## 2019-07-31 PROCEDURE — 85610 PROTHROMBIN TIME: CPT

## 2019-07-31 PROCEDURE — 80053 COMPREHEN METABOLIC PANEL: CPT

## 2019-07-31 PROCEDURE — 36415 COLL VENOUS BLD VENIPUNCTURE: CPT

## 2019-07-31 PROCEDURE — 71045 X-RAY EXAM CHEST 1 VIEW: CPT

## 2019-07-31 PROCEDURE — 6360000002 HC RX W HCPCS: Performed by: NURSE PRACTITIONER

## 2019-07-31 PROCEDURE — 85730 THROMBOPLASTIN TIME PARTIAL: CPT

## 2019-07-31 PROCEDURE — 85027 COMPLETE CBC AUTOMATED: CPT

## 2019-07-31 RX ORDER — ONDANSETRON 2 MG/ML
4 INJECTION INTRAMUSCULAR; INTRAVENOUS EVERY 6 HOURS PRN
Status: DISCONTINUED | OUTPATIENT
Start: 2019-07-31 | End: 2019-08-02 | Stop reason: HOSPADM

## 2019-07-31 RX ORDER — PROBENECID 500 MG/1
500 TABLET, FILM COATED ORAL 2 TIMES DAILY
Status: DISCONTINUED | OUTPATIENT
Start: 2019-07-31 | End: 2019-08-02 | Stop reason: HOSPADM

## 2019-07-31 RX ORDER — ATENOLOL 25 MG/1
25 TABLET ORAL NIGHTLY
Status: DISCONTINUED | OUTPATIENT
Start: 2019-07-31 | End: 2019-08-02 | Stop reason: HOSPADM

## 2019-07-31 RX ORDER — ACETAMINOPHEN 325 MG/1
650 TABLET ORAL EVERY 4 HOURS PRN
Status: DISCONTINUED | OUTPATIENT
Start: 2019-07-31 | End: 2019-08-02 | Stop reason: HOSPADM

## 2019-07-31 RX ORDER — POTASSIUM CHLORIDE 20 MEQ/1
40 TABLET, EXTENDED RELEASE ORAL PRN
Status: DISCONTINUED | OUTPATIENT
Start: 2019-07-31 | End: 2019-08-02 | Stop reason: HOSPADM

## 2019-07-31 RX ORDER — POTASSIUM CHLORIDE 7.45 MG/ML
10 INJECTION INTRAVENOUS PRN
Status: DISCONTINUED | OUTPATIENT
Start: 2019-07-31 | End: 2019-08-02 | Stop reason: HOSPADM

## 2019-07-31 RX ORDER — ASPIRIN 81 MG/1
81 TABLET, CHEWABLE ORAL DAILY
Status: DISCONTINUED | OUTPATIENT
Start: 2019-07-31 | End: 2019-08-02 | Stop reason: HOSPADM

## 2019-07-31 RX ORDER — ATENOLOL 50 MG/1
50 TABLET ORAL DAILY
Status: DISCONTINUED | OUTPATIENT
Start: 2019-07-31 | End: 2019-08-02 | Stop reason: HOSPADM

## 2019-07-31 RX ORDER — DOCUSATE SODIUM 100 MG/1
100 CAPSULE, LIQUID FILLED ORAL 2 TIMES DAILY
Status: DISCONTINUED | OUTPATIENT
Start: 2019-07-31 | End: 2019-08-02 | Stop reason: HOSPADM

## 2019-07-31 RX ORDER — SODIUM CHLORIDE 9 MG/ML
INJECTION, SOLUTION INTRAVENOUS CONTINUOUS
Status: DISCONTINUED | OUTPATIENT
Start: 2019-07-31 | End: 2019-08-01

## 2019-07-31 RX ORDER — SODIUM CHLORIDE 0.9 % (FLUSH) 0.9 %
10 SYRINGE (ML) INJECTION EVERY 12 HOURS SCHEDULED
Status: DISCONTINUED | OUTPATIENT
Start: 2019-07-31 | End: 2019-08-02 | Stop reason: HOSPADM

## 2019-07-31 RX ORDER — OXYCODONE HYDROCHLORIDE AND ACETAMINOPHEN 5; 325 MG/1; MG/1
1 TABLET ORAL EVERY 6 HOURS PRN
Status: DISCONTINUED | OUTPATIENT
Start: 2019-07-31 | End: 2019-08-02 | Stop reason: HOSPADM

## 2019-07-31 RX ORDER — LEVOTHYROXINE SODIUM 112 UG/1
112 TABLET ORAL DAILY
Status: DISCONTINUED | OUTPATIENT
Start: 2019-07-31 | End: 2019-07-31

## 2019-07-31 RX ORDER — LEVOTHYROXINE SODIUM 0.12 MG/1
125 TABLET ORAL DAILY
Status: DISCONTINUED | OUTPATIENT
Start: 2019-08-01 | End: 2019-08-02 | Stop reason: HOSPADM

## 2019-07-31 RX ORDER — MAGNESIUM SULFATE 1 G/100ML
1 INJECTION INTRAVENOUS PRN
Status: DISCONTINUED | OUTPATIENT
Start: 2019-07-31 | End: 2019-08-02 | Stop reason: HOSPADM

## 2019-07-31 RX ORDER — PETROLATUM 42 G/100G
OINTMENT TOPICAL 2 TIMES DAILY PRN
Status: DISCONTINUED | OUTPATIENT
Start: 2019-07-31 | End: 2019-08-02 | Stop reason: HOSPADM

## 2019-07-31 RX ORDER — GABAPENTIN 100 MG/1
100 CAPSULE ORAL 3 TIMES DAILY
Status: DISCONTINUED | OUTPATIENT
Start: 2019-07-31 | End: 2019-08-02 | Stop reason: HOSPADM

## 2019-07-31 RX ORDER — SODIUM CHLORIDE 0.9 % (FLUSH) 0.9 %
10 SYRINGE (ML) INJECTION PRN
Status: DISCONTINUED | OUTPATIENT
Start: 2019-07-31 | End: 2019-08-02 | Stop reason: HOSPADM

## 2019-07-31 RX ADMIN — OXYCODONE HYDROCHLORIDE AND ACETAMINOPHEN 1 TABLET: 5; 325 TABLET ORAL at 01:39

## 2019-07-31 RX ADMIN — ATENOLOL 50 MG: 50 TABLET ORAL at 09:22

## 2019-07-31 RX ADMIN — ASPIRIN 81 MG 81 MG: 81 TABLET ORAL at 09:23

## 2019-07-31 RX ADMIN — ENOXAPARIN SODIUM 40 MG: 40 INJECTION SUBCUTANEOUS at 09:23

## 2019-07-31 RX ADMIN — ATENOLOL 25 MG: 25 TABLET ORAL at 01:39

## 2019-07-31 RX ADMIN — NYSTATIN 500000 UNITS: 100000 SUSPENSION ORAL at 21:51

## 2019-07-31 RX ADMIN — NYSTATIN 500000 UNITS: 100000 SUSPENSION ORAL at 18:13

## 2019-07-31 RX ADMIN — ATENOLOL 25 MG: 25 TABLET ORAL at 21:50

## 2019-07-31 RX ADMIN — LEVOTHYROXINE SODIUM 112 MCG: 112 TABLET ORAL at 06:27

## 2019-07-31 RX ADMIN — OXYCODONE HYDROCHLORIDE AND ACETAMINOPHEN 1 TABLET: 5; 325 TABLET ORAL at 18:13

## 2019-07-31 RX ADMIN — PROBENECID 500 MG: 500 TABLET, FILM COATED ORAL at 09:22

## 2019-07-31 RX ADMIN — GABAPENTIN 100 MG: 100 CAPSULE ORAL at 14:36

## 2019-07-31 RX ADMIN — PETROLATUM: 42 OINTMENT TOPICAL at 21:49

## 2019-07-31 RX ADMIN — GABAPENTIN 100 MG: 100 CAPSULE ORAL at 21:50

## 2019-07-31 RX ADMIN — Medication 10 ML: at 21:50

## 2019-07-31 RX ADMIN — PROBENECID 500 MG: 500 TABLET, FILM COATED ORAL at 21:50

## 2019-07-31 ASSESSMENT — PAIN DESCRIPTION - PAIN TYPE
TYPE: CHRONIC PAIN;ACUTE PAIN
TYPE: CHRONIC PAIN
TYPE: CHRONIC PAIN

## 2019-07-31 ASSESSMENT — PAIN DESCRIPTION - PROGRESSION: CLINICAL_PROGRESSION: GRADUALLY IMPROVING

## 2019-07-31 ASSESSMENT — PAIN DESCRIPTION - LOCATION
LOCATION: BACK;LEG
LOCATION: LEG;ANKLE
LOCATION: BACK;LEG

## 2019-07-31 ASSESSMENT — PAIN SCALES - GENERAL
PAINLEVEL_OUTOF10: 9
PAINLEVEL_OUTOF10: 0
PAINLEVEL_OUTOF10: 10
PAINLEVEL_OUTOF10: 7
PAINLEVEL_OUTOF10: 0
PAINLEVEL_OUTOF10: 10
PAINLEVEL_OUTOF10: 8
PAINLEVEL_OUTOF10: 0
PAINLEVEL_OUTOF10: 0

## 2019-07-31 ASSESSMENT — PAIN DESCRIPTION - ONSET
ONSET: ON-GOING

## 2019-07-31 ASSESSMENT — PAIN DESCRIPTION - FREQUENCY
FREQUENCY: CONTINUOUS

## 2019-07-31 ASSESSMENT — PAIN DESCRIPTION - ORIENTATION
ORIENTATION: RIGHT;LEFT
ORIENTATION: LEFT
ORIENTATION: RIGHT;LEFT

## 2019-07-31 ASSESSMENT — PAIN DESCRIPTION - DESCRIPTORS
DESCRIPTORS: ACHING;DISCOMFORT;SORE
DESCRIPTORS: ACHING;DISCOMFORT;SORE
DESCRIPTORS: ACHING;CONSTANT;DISCOMFORT;PINS AND NEEDLES

## 2019-07-31 NOTE — CONSULTS
[address]                  [address] [facility], [address] [place] [address]                                  CONSULTATION    PATIENT NAME: Karli Bo               :        1945  MED REC NO:   [id number]                            ROOM:       4522  ACCOUNT NO:   [de-identified]                           ADMIT DATE: 2019  PROVIDER:     HERMAN Kaur      CONSULT DATE:  2019    NEUROSURGICAL CONSULTATION    REASON FOR CONSULTATION:  Management of epidural abscess, osteomyelitis  lumbar spine. HISTORY OF PRESENT ILLNESS:  A 68-year-old female who is admitted  through the ED for hypotension and altered mental status. We were asked  to see the patient because she is known for previous epidural abscess  and osteomyelitis and previous lumbar laminectomy back on 2019. She did well after the procedure. She was treated by Infectious  Disease. She was discharged to a nursing home. She continued to have  some leg weakness making ambulation very difficult. The back pain has  continued to improve. She really does not have any radicular pain down  into the legs. She does have some numbness in her feet that has been  persistent. They did do a lumbar CT which shows evidence of the L3-L5  laminectomy and evidence of the L2 and L3 fractures. These all appear  to be unchanged from previous imaging. PAST MEDICAL HISTORY:  Significant for epidural abscess, hypertension,  hyperlipidemia. PAST SURGICAL HISTORY:  Significant for laminectomy, cholecystectomy,  hysterectomy, right breast procedure. SOCIAL HISTORY:  The patient denies any tobacco use. Was using some  alcohol prior to going to the nursing home. ALLERGIES:  To ADRENALIN and MEROPENEM. MEDICATIONS:  Please see electronic medical record. REVIEW OF SYSTEMS:  The patient is denying any chest pain or shortness  of breath at the present time.     PHYSICAL
moderate right-sided pleural effusion with compression   place in the dependent portion of the right lower lobe. 3. No aneurysm formation or dissection of the thoracic aorta. 4. No superimposed acute pulmonary process. CT LUMBAR SPINE W CONTRAST   Final Result   1. Since the MRI study of May 30, patient had laminectomy in L3-L4 and   L5 level. 2. Amorphous soft tissue density surrounding the thecal sac   posteriorly at the site of the surgery and also anteriorly the level   of the L2-3 disc space in the L3 vertebral body fragments which are   projecting posteriorly. 3. There are also fragments from the posterior inferior corner of L2   which was not seen previously towards anterior spinal canal stenosis. Combine as the causing compression on the thecal sac but the canal is   decompressed by the laminectomy. 4. For proper evaluation of epidural abscess it can correlate with MRI   study of the lumbar spine without and with IV contrast.              Assessment and Plan:      · Acute osteomyelitis of L3-L4/epidural abscess with ESBL E. 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   Stop ceftolazone tazobactam  -Discussed with Dr. Odalys Stark, patient is going to follow-up with infectious disease clinic in 1 month with repeat ESR and CRP  -Rest of the management as per the primary team            Estrella Silver MD

## 2019-07-31 NOTE — H&P
daily  furosemide (LASIX) 40 MG tablet, Take 1 tablet by mouth daily  cyanocobalamin 1000 MCG/ML injection, Inject 1,000 mcg into the muscle every 7 days 7/30/19 Takes every SUNDAY  LACTOBACILLUS PO, Take 1 capsule by mouth daily  vitamin D (CHOLECALCIFEROL) 1000 UNIT TABS tablet, Take 1,000 Units by mouth daily  albuterol sulfate  (90 Base) MCG/ACT inhaler, Inhale 2 puffs into the lungs every 6 hours as needed for Wheezing  acetaminophen (TYLENOL) 325 MG tablet, Take 650 mg by mouth every 4 hours as needed for Pain  oxybutynin (DITROPAN) 5 MG tablet, Take 10 mg by mouth daily   losartan (COZAAR) 50 MG tablet, Take 1 tablet by mouth daily  enoxaparin (LOVENOX) 40 MG/0.4ML injection, Inject 0.4 mLs into the skin daily  atenolol (TENORMIN) 50 MG tablet, Take 50 mg by mouth daily 7/30/19 Pt takes a total of 75 mg daily, takes 50 mg in the morning  atenolol (TENORMIN) 25 MG tablet, Take 25 mg by mouth nightly 7/30/19 Pt takes a total dose of 75 mg, 25 mg at night  levothyroxine (SYNTHROID) 112 MCG tablet, Take 112 mcg by mouth Daily. Instructed to take morning of surgery with a sip of water  pravastatin (PRAVACHOL) 40 MG tablet, Take 40 mg by mouth nightly. probenecid (BENEMID) 500 MG tablet, Take 500 mg by mouth 2 times daily. aspirin 81 MG tablet, Take 81 mg by mouth daily. LAST DOSE 1/11/13  multivitamin (THERAGRAN) per tablet, Take 1 tablet by mouth daily. Allergies:    Adrenalin [epinephrine] and Meropenem    Social History:    reports that she has never smoked. She has never used smokeless tobacco. She reports that she drinks alcohol. She reports that she does not use drugs. Family History:   family history includes Other in her mother.     REVIEW OF SYSTEMS:  As above in the HPI, otherwise negative    PHYSICAL EXAM:    Vitals:  BP (!) 180/75   Pulse 66   Temp 99.2 °F (37.3 °C) (Temporal)   Resp 18   Ht 5' 6\" (1.676 m)   Wt 203 lb 9.6 oz (92.4 kg)   SpO2 96%   BMI 32.86 kg/m²     General:

## 2019-08-01 LAB
ALBUMIN SERPL-MCNC: 2.6 G/DL (ref 3.5–5.2)
ALP BLD-CCNC: 95 U/L (ref 35–104)
ALT SERPL-CCNC: 34 U/L (ref 0–32)
ANION GAP SERPL CALCULATED.3IONS-SCNC: 15 MMOL/L (ref 7–16)
APTT: 33 SEC (ref 24.5–35.1)
AST SERPL-CCNC: 60 U/L (ref 0–31)
BILIRUB SERPL-MCNC: <0.2 MG/DL (ref 0–1.2)
BUN BLDV-MCNC: 10 MG/DL (ref 8–23)
CALCIUM SERPL-MCNC: 8.9 MG/DL (ref 8.6–10.2)
CHLORIDE BLD-SCNC: 113 MMOL/L (ref 98–107)
CO2: 20 MMOL/L (ref 22–29)
CREAT SERPL-MCNC: 0.9 MG/DL (ref 0.5–1)
GFR AFRICAN AMERICAN: >60
GFR NON-AFRICAN AMERICAN: >60 ML/MIN/1.73
GLUCOSE BLD-MCNC: 73 MG/DL (ref 74–99)
HCT VFR BLD CALC: 37.1 % (ref 34–48)
HEMOGLOBIN: 11.5 G/DL (ref 11.5–15.5)
INR BLD: 1.3
MCH RBC QN AUTO: 30.7 PG (ref 26–35)
MCHC RBC AUTO-ENTMCNC: 31 % (ref 32–34.5)
MCV RBC AUTO: 99.2 FL (ref 80–99.9)
PDW BLD-RTO: 16.4 FL (ref 11.5–15)
PLATELET # BLD: 264 E9/L (ref 130–450)
PMV BLD AUTO: 10.7 FL (ref 7–12)
POTASSIUM REFLEX MAGNESIUM: 3.8 MMOL/L (ref 3.5–5)
PROTHROMBIN TIME: 14.5 SEC (ref 9.3–12.4)
RBC # BLD: 3.74 E12/L (ref 3.5–5.5)
SODIUM BLD-SCNC: 148 MMOL/L (ref 132–146)
TOTAL PROTEIN: 5 G/DL (ref 6.4–8.3)
URINE CULTURE, ROUTINE: NORMAL
WBC # BLD: 5.7 E9/L (ref 4.5–11.5)

## 2019-08-01 PROCEDURE — 97530 THERAPEUTIC ACTIVITIES: CPT

## 2019-08-01 PROCEDURE — 97535 SELF CARE MNGMENT TRAINING: CPT

## 2019-08-01 PROCEDURE — 85027 COMPLETE CBC AUTOMATED: CPT

## 2019-08-01 PROCEDURE — 6370000000 HC RX 637 (ALT 250 FOR IP): Performed by: INTERNAL MEDICINE

## 2019-08-01 PROCEDURE — 85730 THROMBOPLASTIN TIME PARTIAL: CPT

## 2019-08-01 PROCEDURE — 2580000003 HC RX 258: Performed by: NURSE PRACTITIONER

## 2019-08-01 PROCEDURE — 85610 PROTHROMBIN TIME: CPT

## 2019-08-01 PROCEDURE — 6370000000 HC RX 637 (ALT 250 FOR IP): Performed by: NURSE PRACTITIONER

## 2019-08-01 PROCEDURE — 97161 PT EVAL LOW COMPLEX 20 MIN: CPT

## 2019-08-01 PROCEDURE — 36592 COLLECT BLOOD FROM PICC: CPT

## 2019-08-01 PROCEDURE — 80053 COMPREHEN METABOLIC PANEL: CPT

## 2019-08-01 PROCEDURE — 97166 OT EVAL MOD COMPLEX 45 MIN: CPT

## 2019-08-01 PROCEDURE — 6360000002 HC RX W HCPCS: Performed by: NURSE PRACTITIONER

## 2019-08-01 PROCEDURE — 2060000000 HC ICU INTERMEDIATE R&B

## 2019-08-01 RX ORDER — LOSARTAN POTASSIUM 50 MG/1
50 TABLET ORAL DAILY
Status: DISCONTINUED | OUTPATIENT
Start: 2019-08-01 | End: 2019-08-02 | Stop reason: HOSPADM

## 2019-08-01 RX ADMIN — LOSARTAN POTASSIUM 50 MG: 50 TABLET, FILM COATED ORAL at 11:02

## 2019-08-01 RX ADMIN — GABAPENTIN 100 MG: 100 CAPSULE ORAL at 09:50

## 2019-08-01 RX ADMIN — NYSTATIN 500000 UNITS: 100000 SUSPENSION ORAL at 09:51

## 2019-08-01 RX ADMIN — OXYCODONE HYDROCHLORIDE AND ACETAMINOPHEN 1 TABLET: 5; 325 TABLET ORAL at 06:32

## 2019-08-01 RX ADMIN — GABAPENTIN 100 MG: 100 CAPSULE ORAL at 14:36

## 2019-08-01 RX ADMIN — NYSTATIN 500000 UNITS: 100000 SUSPENSION ORAL at 21:41

## 2019-08-01 RX ADMIN — ATENOLOL 25 MG: 25 TABLET ORAL at 21:42

## 2019-08-01 RX ADMIN — ATENOLOL 50 MG: 50 TABLET ORAL at 09:51

## 2019-08-01 RX ADMIN — DOCUSATE SODIUM 100 MG: 100 CAPSULE, LIQUID FILLED ORAL at 21:42

## 2019-08-01 RX ADMIN — Medication 10 ML: at 09:51

## 2019-08-01 RX ADMIN — PROBENECID 500 MG: 500 TABLET, FILM COATED ORAL at 09:50

## 2019-08-01 RX ADMIN — LEVOTHYROXINE SODIUM 125 MCG: 125 TABLET ORAL at 06:28

## 2019-08-01 RX ADMIN — ENOXAPARIN SODIUM 40 MG: 40 INJECTION SUBCUTANEOUS at 09:53

## 2019-08-01 RX ADMIN — Medication 10 ML: at 21:42

## 2019-08-01 RX ADMIN — NYSTATIN 500000 UNITS: 100000 SUSPENSION ORAL at 18:13

## 2019-08-01 RX ADMIN — ASPIRIN 81 MG 81 MG: 81 TABLET ORAL at 09:51

## 2019-08-01 RX ADMIN — SODIUM CHLORIDE: 9 INJECTION, SOLUTION INTRAVENOUS at 06:28

## 2019-08-01 RX ADMIN — NYSTATIN 500000 UNITS: 100000 SUSPENSION ORAL at 14:35

## 2019-08-01 RX ADMIN — OXYCODONE HYDROCHLORIDE AND ACETAMINOPHEN 1 TABLET: 5; 325 TABLET ORAL at 18:13

## 2019-08-01 RX ADMIN — OXYCODONE HYDROCHLORIDE AND ACETAMINOPHEN 1 TABLET: 5; 325 TABLET ORAL at 00:23

## 2019-08-01 RX ADMIN — PROBENECID 500 MG: 500 TABLET, FILM COATED ORAL at 21:42

## 2019-08-01 RX ADMIN — GABAPENTIN 100 MG: 100 CAPSULE ORAL at 21:41

## 2019-08-01 ASSESSMENT — PAIN DESCRIPTION - ORIENTATION
ORIENTATION: LEFT

## 2019-08-01 ASSESSMENT — PAIN SCALES - GENERAL
PAINLEVEL_OUTOF10: 4
PAINLEVEL_OUTOF10: 9
PAINLEVEL_OUTOF10: 0
PAINLEVEL_OUTOF10: 0
PAINLEVEL_OUTOF10: 5
PAINLEVEL_OUTOF10: 0
PAINLEVEL_OUTOF10: 10
PAINLEVEL_OUTOF10: 6

## 2019-08-01 ASSESSMENT — PAIN DESCRIPTION - ONSET
ONSET: ON-GOING

## 2019-08-01 ASSESSMENT — PAIN DESCRIPTION - DESCRIPTORS
DESCRIPTORS: ACHING;CONSTANT;DISCOMFORT
DESCRIPTORS: ACHING;CONSTANT;DISCOMFORT
DESCRIPTORS: ACHING;DISCOMFORT;SORE

## 2019-08-01 ASSESSMENT — PAIN DESCRIPTION - PAIN TYPE
TYPE: CHRONIC PAIN

## 2019-08-01 ASSESSMENT — PAIN DESCRIPTION - PROGRESSION
CLINICAL_PROGRESSION: GRADUALLY IMPROVING
CLINICAL_PROGRESSION: GRADUALLY IMPROVING

## 2019-08-01 ASSESSMENT — PAIN DESCRIPTION - FREQUENCY
FREQUENCY: CONTINUOUS

## 2019-08-01 ASSESSMENT — PAIN DESCRIPTION - LOCATION
LOCATION: ANKLE;LEG
LOCATION: LEG;ANKLE
LOCATION: ANKLE;LEG

## 2019-08-01 NOTE — PROGRESS NOTES
Occupational Therapy  OCCUPATIONAL THERAPY INITIAL EVALUATION      Date:2019  Patient Name: Dayne Slaughter  MRN: [id number]  : 1945  Room: [address] [place], [place] [address]      Evaluating OT:  SALLY Gunter, OTR/L  # [id number]      AM-PAC Daily Activity Raw Score:    Recommended Adaptive Equipment:  TBD as pt progresses    Reason for Admission:  Pt was transferred from [facility] w/ Hypotension    Pt was initially hospitalized 2019 for sepsis. Underwent Laminectomy w/ Dr. Pia Urena 6-3-19. At [facility] in the interim. Has not been home since April. Diagnosis:  Hypotension, NASRA, Dehydration      Procedures this admission:  None     Pertinent Medical History:  Macular Hole Right Eye, Lumbar Laminectomy L3-L5     Precautions:  Falls  Spinal Precautions, WBAT w/ TLSO  HOB <45* w/o TLSO  PICC R UE  Cardiac Diet/No Added Salt/No Caffeine    Home Living: Prior to 2019, pt lived with her Spouse in a single-level Handicapped [facility], [address] w/ No HR. Bed/bath/Laundry on the 1st floor. Stair Lift to basement   [place] setup:  [employer], [employer], Grab bars throughout   Equipment owned:  None    Available Family Assist:  Currently  assist from Staff    Prior Level of Function:  Prior to 2019, pt was IND with all ADLs, IADLs, Transfers and Mobility using No AD for ambulation. Since in [facility], She has required Max A of 2 or use of Xander Sides for all transfers. All ADLs performed at bed level.   (Spouse is W/C bound - IND from W/C level)  Driving:  Yes - prior to April  Occupation:  None reported    Pain Level:  Denies pain this session;  Nsg Notified   Additional Complaints:  None    Vitals/Lab Values:  /86, Room Air    Cognition: A & O x 4   Able to Follow Multi-step Commands INDly   Memory:  good    Sequencing:  good    Problem solving:  good    Judgement/safety:  good   Additional Comments:  Pt was very pleasant, cooperative, Good insight, Motivated to participate in
Pharmacy Note    Lady Collins was ordered CoQ10. As per the [address] [place] [address], herbals and certain dietary supplements will be discontinued.   The herbal or dietary supplement may be continued after discharge from the hospital.
Subjective:  Feeling better No CP, SOB, F, V, D, P  Objective:    BP (!) 154/86   Pulse 66   Temp 97.8 °F (36.6 °C) (Temporal)   Resp 16   Ht 5' 6\" (1.676 m)   Wt 204 lb 1.6 oz (92.6 kg)   SpO2 97%   BMI 32.94 kg/m²     24HR INTAKE/OUTPUT:      Intake/Output Summary (Last 24 hours) at 8/1/2019 0848  Last data filed at 8/1/2019 5347  Gross per 24 hour   Intake 2392.15 ml   Output 1825 ml   Net 567.15 ml     nad  Heart:  RRR, no murmurs, gallops, or rubs. Lungs:  CTA bilaterally, no wheeze, rales or rhonchi  Abd: bowel sounds present, nontender, nondistended, no masses  Extrem:  No clubbing, cyanosis, or edema    Most Recent Labs  Lab Results   Component Value Date    WBC 5.7 08/01/2019    HGB 11.5 08/01/2019    HCT 37.1 08/01/2019     08/01/2019     (H) 08/01/2019    K 3.8 08/01/2019     (H) 08/01/2019    CREATININE 0.9 08/01/2019    BUN 10 08/01/2019    CO2 20 (L) 08/01/2019    GLUCOSE 73 (L) 08/01/2019    ALT 34 (H) 08/01/2019    AST 60 (H) 08/01/2019    INR 1.3 08/01/2019    TSH 6.020 (H) 07/31/2019    LABMICR 26.1 (H) 05/01/2019     No results for input(s): MG in the last 72 hours. Lab Results   Component Value Date    CALCIUM 8.9 08/01/2019    PHOS 2.4 (L) 05/28/2019        XR CHEST PORTABLE   Final Result   1. Stable, large cardiomediastinal silhouette with underlying central   pulmonary vascular congestion and thoracic aortic vascular   calcifications. 2. Nonspecific bibasilar airspace disease, findings can be seen in   infiltrate/pneumonia and/or atelectasis. Luis F Leigh 3. Right-sided PICC line placement as above      CTA PULMONARY W CONTRAST   Final Result   1. No indication for an acute pulmonary emboli. 2. Mild to moderate right-sided pleural effusion with compression   place in the dependent portion of the right lower lobe. 3. No aneurysm formation or dissection of the thoracic aorta. 4. No superimposed acute pulmonary process.       CT LUMBAR SPINE W CONTRAST   Final
and that the physician needs to know. Asked patient's RN to message NS to address brace. Patient able to initiate rolling side to side with assist for hips to complete the task. Patient returned to supine and positioned to the [facility] with call light and tray table in reach. HOB elevated to comfort but below 45°. Unable to assess further functional tasks as TLSO is at the facility. Discussed with SW/CM the need for TLSO to complete functional tasks. Patient education  Pt educated on purpose of PT for discharge planning, importance of mobility, safety with mobility, purpose of proper fitting brace, spine precautions, log roll    Patient response to education:   Pt verbalized understanding Pt demonstrated skill Pt requires further education in this area   Yes  Yes with assist and verbal cues Yes      Rehab potential is Good for reaching above PT goals. Pts/ family goals   1. To go back for rehab. Patient and or family understand(s) diagnosis, prognosis, and plan of care. Yes     PLAN  PT care will be provided in accordance with the objectives noted above. Whenever appropriate, clear delegation orders will be provided for nursing staff. Exercises and functional mobility practice will be used as well as appropriate assistive devices or modalities to obtain goals. Patient and family education will also be administered as needed. Frequency of treatments will be 2-5x/week x 3-5 days.     Time in: 0384  Time out: 4391 Dangelo Murphy PT, DPT   License number:  [id number]

## 2019-08-02 VITALS
HEIGHT: 66 IN | RESPIRATION RATE: 28 BRPM | TEMPERATURE: 98.5 F | OXYGEN SATURATION: 97 % | HEART RATE: 84 BPM | BODY MASS INDEX: 33.61 KG/M2 | SYSTOLIC BLOOD PRESSURE: 162 MMHG | WEIGHT: 209.1 LBS | DIASTOLIC BLOOD PRESSURE: 96 MMHG

## 2019-08-02 LAB
ALBUMIN SERPL-MCNC: 2.7 G/DL (ref 3.5–5.2)
ALP BLD-CCNC: 99 U/L (ref 35–104)
ALT SERPL-CCNC: 33 U/L (ref 0–32)
ANION GAP SERPL CALCULATED.3IONS-SCNC: 12 MMOL/L (ref 7–16)
APTT: 33.4 SEC (ref 24.5–35.1)
AST SERPL-CCNC: 59 U/L (ref 0–31)
BILIRUB SERPL-MCNC: 0.2 MG/DL (ref 0–1.2)
BUN BLDV-MCNC: 8 MG/DL (ref 8–23)
CALCIUM SERPL-MCNC: 9.2 MG/DL (ref 8.6–10.2)
CHLORIDE BLD-SCNC: 110 MMOL/L (ref 98–107)
CO2: 23 MMOL/L (ref 22–29)
CREAT SERPL-MCNC: 0.8 MG/DL (ref 0.5–1)
GFR AFRICAN AMERICAN: >60
GFR NON-AFRICAN AMERICAN: >60 ML/MIN/1.73
GLUCOSE BLD-MCNC: 84 MG/DL (ref 74–99)
HCT VFR BLD CALC: 40.4 % (ref 34–48)
HEMOGLOBIN: 12.6 G/DL (ref 11.5–15.5)
INR BLD: 1.2
MCH RBC QN AUTO: 30.7 PG (ref 26–35)
MCHC RBC AUTO-ENTMCNC: 31.2 % (ref 32–34.5)
MCV RBC AUTO: 98.3 FL (ref 80–99.9)
PDW BLD-RTO: 16.1 FL (ref 11.5–15)
PLATELET # BLD: 293 E9/L (ref 130–450)
PMV BLD AUTO: 10.6 FL (ref 7–12)
POTASSIUM REFLEX MAGNESIUM: 3.9 MMOL/L (ref 3.5–5)
PROTHROMBIN TIME: 13.9 SEC (ref 9.3–12.4)
RBC # BLD: 4.11 E12/L (ref 3.5–5.5)
SODIUM BLD-SCNC: 145 MMOL/L (ref 132–146)
TOTAL PROTEIN: 5.4 G/DL (ref 6.4–8.3)
WBC # BLD: 6.8 E9/L (ref 4.5–11.5)

## 2019-08-02 PROCEDURE — 6360000002 HC RX W HCPCS: Performed by: INTERNAL MEDICINE

## 2019-08-02 PROCEDURE — 6370000000 HC RX 637 (ALT 250 FOR IP): Performed by: INTERNAL MEDICINE

## 2019-08-02 PROCEDURE — 85610 PROTHROMBIN TIME: CPT

## 2019-08-02 PROCEDURE — 2580000003 HC RX 258: Performed by: NURSE PRACTITIONER

## 2019-08-02 PROCEDURE — 80053 COMPREHEN METABOLIC PANEL: CPT

## 2019-08-02 PROCEDURE — 6370000000 HC RX 637 (ALT 250 FOR IP): Performed by: NURSE PRACTITIONER

## 2019-08-02 PROCEDURE — 36415 COLL VENOUS BLD VENIPUNCTURE: CPT

## 2019-08-02 PROCEDURE — 85027 COMPLETE CBC AUTOMATED: CPT

## 2019-08-02 PROCEDURE — 6360000002 HC RX W HCPCS: Performed by: NURSE PRACTITIONER

## 2019-08-02 PROCEDURE — 85730 THROMBOPLASTIN TIME PARTIAL: CPT

## 2019-08-02 RX ORDER — FUROSEMIDE 10 MG/ML
40 INJECTION INTRAMUSCULAR; INTRAVENOUS ONCE
Status: COMPLETED | OUTPATIENT
Start: 2019-08-02 | End: 2019-08-02

## 2019-08-02 RX ORDER — OXYCODONE HYDROCHLORIDE AND ACETAMINOPHEN 5; 325 MG/1; MG/1
1 TABLET ORAL EVERY 6 HOURS PRN
Qty: 5 TABLET | Refills: 0 | Status: SHIPPED | OUTPATIENT
Start: 2019-08-02 | End: 2019-08-05

## 2019-08-02 RX ORDER — GABAPENTIN 100 MG/1
100 CAPSULE ORAL 3 TIMES DAILY
Qty: 90 CAPSULE | Refills: 3 | Status: ON HOLD | DISCHARGE
Start: 2019-08-02 | End: 2019-12-27

## 2019-08-02 RX ORDER — PETROLATUM 42 G/100G
OINTMENT TOPICAL
Refills: 0 | Status: ON HOLD | DISCHARGE
Start: 2019-08-02 | End: 2019-11-22 | Stop reason: ALTCHOICE

## 2019-08-02 RX ORDER — CLONIDINE HYDROCHLORIDE 0.1 MG/1
0.1 TABLET ORAL 2 TIMES DAILY
Qty: 60 TABLET | Refills: 3 | Status: ON HOLD | DISCHARGE
Start: 2019-08-02 | End: 2019-09-16 | Stop reason: HOSPADM

## 2019-08-02 RX ORDER — CLONIDINE HYDROCHLORIDE 0.1 MG/1
0.1 TABLET ORAL 2 TIMES DAILY
Status: DISCONTINUED | OUTPATIENT
Start: 2019-08-02 | End: 2019-08-02 | Stop reason: HOSPADM

## 2019-08-02 RX ADMIN — PROBENECID 500 MG: 500 TABLET, FILM COATED ORAL at 09:11

## 2019-08-02 RX ADMIN — ASPIRIN 81 MG 81 MG: 81 TABLET ORAL at 09:11

## 2019-08-02 RX ADMIN — NYSTATIN 500000 UNITS: 100000 SUSPENSION ORAL at 13:09

## 2019-08-02 RX ADMIN — FUROSEMIDE 40 MG: 10 INJECTION, SOLUTION INTRAMUSCULAR; INTRAVENOUS at 14:14

## 2019-08-02 RX ADMIN — OXYCODONE HYDROCHLORIDE AND ACETAMINOPHEN 1 TABLET: 5; 325 TABLET ORAL at 13:09

## 2019-08-02 RX ADMIN — LOSARTAN POTASSIUM 50 MG: 50 TABLET, FILM COATED ORAL at 09:11

## 2019-08-02 RX ADMIN — ENOXAPARIN SODIUM 40 MG: 40 INJECTION SUBCUTANEOUS at 09:13

## 2019-08-02 RX ADMIN — ATENOLOL 50 MG: 50 TABLET ORAL at 09:12

## 2019-08-02 RX ADMIN — GABAPENTIN 100 MG: 100 CAPSULE ORAL at 09:15

## 2019-08-02 RX ADMIN — Medication 10 ML: at 14:14

## 2019-08-02 RX ADMIN — GABAPENTIN 100 MG: 100 CAPSULE ORAL at 13:09

## 2019-08-02 RX ADMIN — LEVOTHYROXINE SODIUM 125 MCG: 125 TABLET ORAL at 05:33

## 2019-08-02 RX ADMIN — Medication 10 ML: at 09:12

## 2019-08-02 RX ADMIN — CLONIDINE HYDROCHLORIDE 0.1 MG: 0.1 TABLET ORAL at 18:56

## 2019-08-02 ASSESSMENT — PAIN DESCRIPTION - DESCRIPTORS: DESCRIPTORS: ACHING;DISCOMFORT;SORE

## 2019-08-02 ASSESSMENT — PAIN DESCRIPTION - ORIENTATION: ORIENTATION: LEFT

## 2019-08-02 ASSESSMENT — PAIN SCALES - GENERAL
PAINLEVEL_OUTOF10: 10

## 2019-08-02 ASSESSMENT — PAIN DESCRIPTION - LOCATION: LOCATION: LEG

## 2019-08-02 ASSESSMENT — PAIN DESCRIPTION - PAIN TYPE: TYPE: CHRONIC PAIN

## 2019-08-02 ASSESSMENT — PAIN DESCRIPTION - ONSET: ONSET: ON-GOING

## 2019-08-02 ASSESSMENT — PAIN DESCRIPTION - FREQUENCY: FREQUENCY: CONTINUOUS

## 2019-08-02 NOTE — DISCHARGE INSTR - COC
Continuity of Care Form    Patient Name: Winsome Cook   :  1945  MRN:  [id number]    Admit date:  2019  Discharge date:  2019    Code Status Order: Full Code   Advance Directives:   Advance Care Flowsheet Documentation     Date/Time Healthcare Directive Type of Healthcare Directive Copy in [address] Agent's Name Healthcare Agent's Phone Number    [phone]  Yes, patient has an advance directive for healthcare treatment  Living will;Durable power of  for health care  No, copy requested from family  --  --  --          Admitting Physician:  Dhara Pennington MD  PCP: Lula Mcconnell DO    Discharging Nurse: [facility] Unit/Room#: 9442/2650-Y  Discharging Unit Phone Number: [phone]    Emergency Contact:   Extended Emergency Contact Information  Primary Emergency Contact: Deborah Escalera  Address: [address] Phone: [phone]  Relation: Spouse  Secondary Emergency Contact: Mk Chance [address] Phone: [phone]  Mobile Phone: [phone]  Relation: Child    Past Surgical History:  Past Surgical History:   Procedure Laterality Date    BREAST SURGERY Right [address] N/A 6/3/2019    LUMBAR LAMINECTOMY POSTERIOR L3-L5, BONE BIOPSY L4 - GIOVANNY, X-RAY,  WANTS TF performed by Rubens Martines MD at [address]         Immunization History: There is no immunization history on file for this patient.     Active Problems:  Patient Active Problem List   Diagnosis Code    Right lower quadrant pain R10.31    Pneumatosis coli K63.89    Troponin level elevated R74.8    Sepsis ([facility] [address] A41.9    HTN (hypertension) I10    HLD (hyperlipidemia) E78.5    Obesity E66.9    Acute metabolic encephalopathy N14.32    Clostridium difficile diarrhea

## 2019-08-02 NOTE — DISCHARGE SUMMARY
Physician Discharge Summary     Patient ID:  Mendy Bean  [id number]  71 y.o.  1945    Admit date: 7/30/2019    Discharge date and time:  8/2/2019    Admission Diagnoses:   Patient Active Problem List   Diagnosis    Right lower quadrant pain    Pneumatosis coli    Troponin level elevated    Sepsis ([address]    HTN (hypertension)    HLD (hyperlipidemia)    Obesity    Acute metabolic encephalopathy    Clostridium difficile diarrhea    Pneumonia    Acute cystitis    Acute midline low back pain without sciatica    Acute osteomyelitis of lumbar spine (HCC)    Dehydration, moderate    Acute kidney injury ([address]    Hypotension due to hypovolemia    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    Pleural effusion    Transaminitis       Discharge Diagnoses: Principal Problem:    Hypotension due to hypovolemia  Active Problems:    Troponin level elevated    HTN (hypertension)    HLD (hyperlipidemia)    Dehydration, moderate    Acute kidney injury ([address]    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  *      Consults: IP CONSULT TO INTERNAL MEDICINE  IP CONSULT TO INFECTIOUS DISEASES  IP CONSULT TO NEUROSURGERY    Procedures: N/A    Hospital Course: 68-year-old female who is status post L3-L5 lumbar laminectomy for evacuation of epidural abscess on 06/03/2019 admitted w hypotension      Hypotension resolved blood pressures elevated, reinstitute blood pressure medications  IVF stop, Lasix x1  Stop Abx per ID  Urine culture NGTD  Blood culture NGTD  Suspect min troponin elevation 2/2 demand/perfusion mismatch assoc hypotension- no evidence of ACS  Monitor labs closely   Neurosurgery Consult appreciated-no further evaluation and treatment recommended, plan for 4-week outpatient follow-up  ID Consult appreciated- completed antibiotics, plan for follow-up as an outpatient in 1 month, DC PICC line  Medications for other

## 2019-08-02 NOTE — CARE COORDINATION
Precert pending for [facility]. Ambulance form on soft chart. IVF stopped, waiting on TLSA brace to complete therapy evals.  CM will follow

## 2019-08-04 LAB
BLOOD CULTURE, ROUTINE: NORMAL
CULTURE, BLOOD 2: NORMAL

## 2019-08-05 LAB
BLOOD CULTURE, ROUTINE: NORMAL
CULTURE, BLOOD 2: NORMAL

## 2019-08-14 ENCOUNTER — TELEPHONE (OUTPATIENT)
Dept: NEUROSURGERY | Age: 74
End: 2019-08-14

## 2019-08-14 NOTE — TELEPHONE ENCOUNTER
This not a request that is made by out service. This needs to go through the individual facilities and their medical staff.

## 2019-08-28 ENCOUNTER — OFFICE VISIT (OUTPATIENT)
Dept: NEUROSURGERY | Age: 74
End: 2019-08-28

## 2019-08-28 VITALS — HEART RATE: 67 BPM | DIASTOLIC BLOOD PRESSURE: 65 MMHG | SYSTOLIC BLOOD PRESSURE: 117 MMHG

## 2019-08-28 DIAGNOSIS — M46.26 OSTEOMYELITIS OF LUMBAR SPINE (HCC): Primary | ICD-10-CM

## 2019-08-28 PROCEDURE — 99024 POSTOP FOLLOW-UP VISIT: CPT | Performed by: NEUROLOGICAL SURGERY

## 2019-09-12 ENCOUNTER — HOSPITAL ENCOUNTER (INPATIENT)
Age: 74
LOS: 3 days | Discharge: NURSING, REHAB OR LONG-TERM CARE | DRG: 871 | End: 2019-09-16
Attending: EMERGENCY MEDICINE | Admitting: INTERNAL MEDICINE
Payer: COMMERCIAL

## 2019-09-12 ENCOUNTER — APPOINTMENT (OUTPATIENT)
Dept: GENERAL RADIOLOGY | Age: 74
DRG: 871 | End: 2019-09-12
Payer: COMMERCIAL

## 2019-09-12 DIAGNOSIS — N30.00 ACUTE CYSTITIS WITHOUT HEMATURIA: ICD-10-CM

## 2019-09-12 DIAGNOSIS — M46.26 ACUTE OSTEOMYELITIS OF LUMBAR SPINE (HCC): ICD-10-CM

## 2019-09-12 DIAGNOSIS — A41.9 SEPTIC SHOCK (HCC): Primary | ICD-10-CM

## 2019-09-12 DIAGNOSIS — B37.9 YEAST INFECTION: ICD-10-CM

## 2019-09-12 DIAGNOSIS — R65.21 SEPTIC SHOCK (HCC): Primary | ICD-10-CM

## 2019-09-12 DIAGNOSIS — N17.9 AKI (ACUTE KIDNEY INJURY) (HCC): ICD-10-CM

## 2019-09-12 LAB
APTT: 35.1 SEC (ref 24.5–35.1)
BACTERIA: ABNORMAL /HPF
BASOPHILS ABSOLUTE: 0.04 E9/L (ref 0–0.2)
BASOPHILS RELATIVE PERCENT: 0.3 % (ref 0–2)
BILIRUBIN URINE: NEGATIVE
BLOOD, URINE: ABNORMAL
CLARITY: ABNORMAL
COLOR: YELLOW
EOSINOPHILS ABSOLUTE: 0.02 E9/L (ref 0.05–0.5)
EOSINOPHILS RELATIVE PERCENT: 0.1 % (ref 0–6)
EPITHELIAL CELLS, UA: ABNORMAL /HPF
GLUCOSE URINE: NEGATIVE MG/DL
HCT VFR BLD CALC: 35 % (ref 34–48)
HEMOGLOBIN: 11.2 G/DL (ref 11.5–15.5)
IMMATURE GRANULOCYTES #: 0.11 E9/L
IMMATURE GRANULOCYTES %: 0.7 % (ref 0–5)
INR BLD: 1.3
KETONES, URINE: NEGATIVE MG/DL
LACTIC ACID: 0.9 MMOL/L (ref 0.5–2.2)
LEUKOCYTE ESTERASE, URINE: ABNORMAL
LYMPHOCYTES ABSOLUTE: 1.21 E9/L (ref 1.5–4)
LYMPHOCYTES RELATIVE PERCENT: 7.6 % (ref 20–42)
MCH RBC QN AUTO: 30.2 PG (ref 26–35)
MCHC RBC AUTO-ENTMCNC: 32 % (ref 32–34.5)
MCV RBC AUTO: 94.3 FL (ref 80–99.9)
MONOCYTES ABSOLUTE: 1.47 E9/L (ref 0.1–0.95)
MONOCYTES RELATIVE PERCENT: 9.2 % (ref 2–12)
NEUTROPHILS ABSOLUTE: 13.15 E9/L (ref 1.8–7.3)
NEUTROPHILS RELATIVE PERCENT: 82.1 % (ref 43–80)
NITRITE, URINE: NEGATIVE
PDW BLD-RTO: 13.4 FL (ref 11.5–15)
PH UA: 5.5 (ref 5–9)
PLATELET # BLD: 326 E9/L (ref 130–450)
PMV BLD AUTO: 10.3 FL (ref 7–12)
PROTEIN UA: NEGATIVE MG/DL
PROTHROMBIN TIME: 14.9 SEC (ref 9.3–12.4)
RBC # BLD: 3.71 E12/L (ref 3.5–5.5)
RBC UA: ABNORMAL /HPF (ref 0–2)
SPECIFIC GRAVITY UA: 1.02 (ref 1–1.03)
UROBILINOGEN, URINE: 0.2 E.U./DL
WBC # BLD: 16 E9/L (ref 4.5–11.5)
WBC UA: >20 /HPF (ref 0–5)
YEAST: ABNORMAL

## 2019-09-12 PROCEDURE — 36569 INSJ PICC 5 YR+ W/O IMAGING: CPT

## 2019-09-12 PROCEDURE — 85730 THROMBOPLASTIN TIME PARTIAL: CPT

## 2019-09-12 PROCEDURE — 83605 ASSAY OF LACTIC ACID: CPT

## 2019-09-12 PROCEDURE — 2580000003 HC RX 258: Performed by: EMERGENCY MEDICINE

## 2019-09-12 PROCEDURE — 81001 URINALYSIS AUTO W/SCOPE: CPT

## 2019-09-12 PROCEDURE — 85610 PROTHROMBIN TIME: CPT

## 2019-09-12 PROCEDURE — 84443 ASSAY THYROID STIM HORMONE: CPT

## 2019-09-12 PROCEDURE — 71045 X-RAY EXAM CHEST 1 VIEW: CPT

## 2019-09-12 PROCEDURE — 84484 ASSAY OF TROPONIN QUANT: CPT

## 2019-09-12 PROCEDURE — 93005 ELECTROCARDIOGRAM TRACING: CPT | Performed by: EMERGENCY MEDICINE

## 2019-09-12 PROCEDURE — 99285 EMERGENCY DEPT VISIT HI MDM: CPT

## 2019-09-12 PROCEDURE — 80053 COMPREHEN METABOLIC PANEL: CPT

## 2019-09-12 PROCEDURE — 87040 BLOOD CULTURE FOR BACTERIA: CPT

## 2019-09-12 PROCEDURE — 02HV33Z INSERTION OF INFUSION DEVICE INTO SUPERIOR VENA CAVA, PERCUTANEOUS APPROACH: ICD-10-PCS | Performed by: EMERGENCY MEDICINE

## 2019-09-12 PROCEDURE — 87088 URINE BACTERIA CULTURE: CPT

## 2019-09-12 PROCEDURE — 87450 HC DIRECT STREP B ANTIGEN: CPT

## 2019-09-12 PROCEDURE — 85025 COMPLETE CBC W/AUTO DIFF WBC: CPT

## 2019-09-12 RX ORDER — 0.9 % SODIUM CHLORIDE 0.9 %
1000 INTRAVENOUS SOLUTION INTRAVENOUS ONCE
Status: COMPLETED | OUTPATIENT
Start: 2019-09-12 | End: 2019-09-13

## 2019-09-12 RX ADMIN — SODIUM CHLORIDE 1000 ML: 9 INJECTION, SOLUTION INTRAVENOUS at 22:50

## 2019-09-12 RX ADMIN — SODIUM CHLORIDE 1000 ML: 9 INJECTION, SOLUTION INTRAVENOUS at 23:00

## 2019-09-13 PROBLEM — R65.21 SEPTIC SHOCK (HCC): Status: ACTIVE | Noted: 2019-09-13

## 2019-09-13 PROBLEM — A41.9 SEPTIC SHOCK (HCC): Status: ACTIVE | Noted: 2019-09-13

## 2019-09-13 LAB
ALBUMIN SERPL-MCNC: 2.1 G/DL (ref 3.5–5.2)
ALP BLD-CCNC: 104 U/L (ref 35–104)
ALT SERPL-CCNC: 14 U/L (ref 0–32)
ANION GAP SERPL CALCULATED.3IONS-SCNC: 12 MMOL/L (ref 7–16)
ANION GAP SERPL CALCULATED.3IONS-SCNC: 12 MMOL/L (ref 7–16)
AST SERPL-CCNC: 25 U/L (ref 0–31)
B.E.: -5.1 MMOL/L (ref -3–3)
BILIRUB SERPL-MCNC: 0.2 MG/DL (ref 0–1.2)
BUN BLDV-MCNC: 29 MG/DL (ref 8–23)
BUN BLDV-MCNC: 32 MG/DL (ref 8–23)
CALCIUM SERPL-MCNC: 7.6 MG/DL (ref 8.6–10.2)
CALCIUM SERPL-MCNC: 7.8 MG/DL (ref 8.6–10.2)
CHLORIDE BLD-SCNC: 103 MMOL/L (ref 98–107)
CHLORIDE BLD-SCNC: 104 MMOL/L (ref 98–107)
CO2: 19 MMOL/L (ref 22–29)
CO2: 20 MMOL/L (ref 22–29)
COHB: 0.7 % (ref 0–1.5)
CREAT SERPL-MCNC: 1.4 MG/DL (ref 0.5–1)
CREAT SERPL-MCNC: 1.7 MG/DL (ref 0.5–1)
CRITICAL: ABNORMAL
DATE ANALYZED: ABNORMAL
DATE OF COLLECTION: ABNORMAL
EKG ATRIAL RATE: 79 BPM
EKG P AXIS: 32 DEGREES
EKG P-R INTERVAL: 156 MS
EKG Q-T INTERVAL: 370 MS
EKG QRS DURATION: 84 MS
EKG QTC CALCULATION (BAZETT): 424 MS
EKG R AXIS: 46 DEGREES
EKG T AXIS: 33 DEGREES
EKG VENTRICULAR RATE: 79 BPM
FILM ARRAY ADENOVIRUS: NORMAL
FILM ARRAY BORDETELLA PERTUSSIS: NORMAL
FILM ARRAY CHLAMYDOPHILIA PNEUMONIAE: NORMAL
FILM ARRAY CORONAVIRUS 229E: NORMAL
FILM ARRAY CORONAVIRUS HKU1: NORMAL
FILM ARRAY CORONAVIRUS NL63: NORMAL
FILM ARRAY CORONAVIRUS OC43: NORMAL
FILM ARRAY INFLUENZA A VIRUS 09H1: NORMAL
FILM ARRAY INFLUENZA A VIRUS H1: NORMAL
FILM ARRAY INFLUENZA A VIRUS H3: NORMAL
FILM ARRAY INFLUENZA A VIRUS: NORMAL
FILM ARRAY INFLUENZA B: NORMAL
FILM ARRAY METAPNEUMOVIRUS: NORMAL
FILM ARRAY MYCOPLASMA PNEUMONIAE: NORMAL
FILM ARRAY PARAINFLUENZA VIRUS 1: NORMAL
FILM ARRAY PARAINFLUENZA VIRUS 2: NORMAL
FILM ARRAY PARAINFLUENZA VIRUS 3: NORMAL
FILM ARRAY PARAINFLUENZA VIRUS 4: NORMAL
FILM ARRAY RESPIRATORY SYNCITIAL VIRUS: NORMAL
FILM ARRAY RHINOVIRUS/ENTEROVIRUS: NORMAL
GFR AFRICAN AMERICAN: 35
GFR AFRICAN AMERICAN: 44
GFR NON-AFRICAN AMERICAN: 29 ML/MIN/1.73
GFR NON-AFRICAN AMERICAN: 37 ML/MIN/1.73
GLUCOSE BLD-MCNC: 110 MG/DL (ref 74–99)
GLUCOSE BLD-MCNC: 138 MG/DL (ref 74–99)
HCO3: 18.6 MMOL/L (ref 22–26)
HCT VFR BLD CALC: 41.5 % (ref 34–48)
HEMOGLOBIN: 13 G/DL (ref 11.5–15.5)
HHB: 3.9 % (ref 0–5)
L. PNEUMOPHILA SEROGP 1 UR AG: NORMAL
LAB: ABNORMAL
LACTIC ACID: 0.8 MMOL/L (ref 0.5–2.2)
Lab: ABNORMAL
MAGNESIUM: 2.2 MG/DL (ref 1.6–2.6)
MCH RBC QN AUTO: 30.1 PG (ref 26–35)
MCHC RBC AUTO-ENTMCNC: 31.3 % (ref 32–34.5)
MCV RBC AUTO: 96.1 FL (ref 80–99.9)
METHB: 0.2 % (ref 0–1.5)
MODE: ABNORMAL
O2 CONTENT: 17.6 ML/DL
O2 SATURATION: 96.1 % (ref 92–98.5)
O2HB: 95.2 % (ref 94–97)
OPERATOR ID: ABNORMAL
PATIENT TEMP: 37 C
PCO2: 30.5 MMHG (ref 35–45)
PDW BLD-RTO: 13.6 FL (ref 11.5–15)
PH BLOOD GAS: 7.4 (ref 7.35–7.45)
PHOSPHORUS: 2.8 MG/DL (ref 2.5–4.5)
PLATELET # BLD: 457 E9/L (ref 130–450)
PMV BLD AUTO: 10.1 FL (ref 7–12)
PO2: 78.5 MMHG (ref 60–100)
POTASSIUM REFLEX MAGNESIUM: 3.8 MMOL/L (ref 3.5–5)
POTASSIUM SERPL-SCNC: 3.7 MMOL/L (ref 3.5–5)
PROCALCITONIN: 0.8 NG/ML (ref 0–0.08)
RBC # BLD: 4.32 E12/L (ref 3.5–5.5)
SODIUM BLD-SCNC: 134 MMOL/L (ref 132–146)
SODIUM BLD-SCNC: 136 MMOL/L (ref 132–146)
SOURCE, BLOOD GAS: ABNORMAL
STREP PNEUMONIAE ANTIGEN, URINE: NORMAL
THB: 13.1 G/DL (ref 11.5–16.5)
TIME ANALYZED: 1050
TOTAL PROTEIN: 4.4 G/DL (ref 6.4–8.3)
TROPONIN: 0.06 NG/ML (ref 0–0.03)
TSH SERPL DL<=0.05 MIU/L-ACNC: 4.51 UIU/ML (ref 0.27–4.2)
WBC # BLD: 22.4 E9/L (ref 4.5–11.5)

## 2019-09-13 PROCEDURE — 2000000000 HC ICU R&B

## 2019-09-13 PROCEDURE — C9113 INJ PANTOPRAZOLE SODIUM, VIA: HCPCS | Performed by: STUDENT IN AN ORGANIZED HEALTH CARE EDUCATION/TRAINING PROGRAM

## 2019-09-13 PROCEDURE — 2500000003 HC RX 250 WO HCPCS: Performed by: INTERNAL MEDICINE

## 2019-09-13 PROCEDURE — C1751 CATH, INF, PER/CENT/MIDLINE: HCPCS

## 2019-09-13 PROCEDURE — 85027 COMPLETE CBC AUTOMATED: CPT

## 2019-09-13 PROCEDURE — 87633 RESP VIRUS 12-25 TARGETS: CPT

## 2019-09-13 PROCEDURE — 84100 ASSAY OF PHOSPHORUS: CPT

## 2019-09-13 PROCEDURE — 2580000003 HC RX 258: Performed by: INTERNAL MEDICINE

## 2019-09-13 PROCEDURE — 87798 DETECT AGENT NOS DNA AMP: CPT

## 2019-09-13 PROCEDURE — 2580000003 HC RX 258: Performed by: EMERGENCY MEDICINE

## 2019-09-13 PROCEDURE — 80048 BASIC METABOLIC PNL TOTAL CA: CPT

## 2019-09-13 PROCEDURE — 36592 COLLECT BLOOD FROM PICC: CPT

## 2019-09-13 PROCEDURE — 96365 THER/PROPH/DIAG IV INF INIT: CPT

## 2019-09-13 PROCEDURE — 2580000003 HC RX 258

## 2019-09-13 PROCEDURE — 87081 CULTURE SCREEN ONLY: CPT

## 2019-09-13 PROCEDURE — 2580000003 HC RX 258: Performed by: STUDENT IN AN ORGANIZED HEALTH CARE EDUCATION/TRAINING PROGRAM

## 2019-09-13 PROCEDURE — 36415 COLL VENOUS BLD VENIPUNCTURE: CPT

## 2019-09-13 PROCEDURE — 87581 M.PNEUMON DNA AMP PROBE: CPT

## 2019-09-13 PROCEDURE — 82805 BLOOD GASES W/O2 SATURATION: CPT

## 2019-09-13 PROCEDURE — 6360000002 HC RX W HCPCS: Performed by: INTERNAL MEDICINE

## 2019-09-13 PROCEDURE — 37799 UNLISTED PX VASCULAR SURGERY: CPT

## 2019-09-13 PROCEDURE — 2500000003 HC RX 250 WO HCPCS: Performed by: EMERGENCY MEDICINE

## 2019-09-13 PROCEDURE — 96375 TX/PRO/DX INJ NEW DRUG ADDON: CPT

## 2019-09-13 PROCEDURE — 6370000000 HC RX 637 (ALT 250 FOR IP): Performed by: INTERNAL MEDICINE

## 2019-09-13 PROCEDURE — 84145 PROCALCITONIN (PCT): CPT

## 2019-09-13 PROCEDURE — 83605 ASSAY OF LACTIC ACID: CPT

## 2019-09-13 PROCEDURE — 36620 INSERTION CATHETER ARTERY: CPT

## 2019-09-13 PROCEDURE — 93010 ELECTROCARDIOGRAM REPORT: CPT | Performed by: INTERNAL MEDICINE

## 2019-09-13 PROCEDURE — 87486 CHLMYD PNEUM DNA AMP PROBE: CPT

## 2019-09-13 PROCEDURE — 83735 ASSAY OF MAGNESIUM: CPT

## 2019-09-13 PROCEDURE — 6370000000 HC RX 637 (ALT 250 FOR IP): Performed by: STUDENT IN AN ORGANIZED HEALTH CARE EDUCATION/TRAINING PROGRAM

## 2019-09-13 PROCEDURE — 6360000002 HC RX W HCPCS: Performed by: EMERGENCY MEDICINE

## 2019-09-13 PROCEDURE — 6360000002 HC RX W HCPCS: Performed by: STUDENT IN AN ORGANIZED HEALTH CARE EDUCATION/TRAINING PROGRAM

## 2019-09-13 RX ORDER — 0.9 % SODIUM CHLORIDE 0.9 %
1000 INTRAVENOUS SOLUTION INTRAVENOUS ONCE
Status: COMPLETED | OUTPATIENT
Start: 2019-09-13 | End: 2019-09-13

## 2019-09-13 RX ORDER — PANTOPRAZOLE SODIUM 20 MG/1
20 TABLET, DELAYED RELEASE ORAL DAILY
Status: ON HOLD | COMMUNITY
End: 2019-11-22 | Stop reason: ALTCHOICE

## 2019-09-13 RX ORDER — FLUCONAZOLE 2 MG/ML
200 INJECTION, SOLUTION INTRAVENOUS ONCE
Status: COMPLETED | OUTPATIENT
Start: 2019-09-13 | End: 2019-09-13

## 2019-09-13 RX ORDER — PANTOPRAZOLE SODIUM 40 MG/10ML
40 INJECTION, POWDER, LYOPHILIZED, FOR SOLUTION INTRAVENOUS
Status: DISCONTINUED | OUTPATIENT
Start: 2019-09-13 | End: 2019-09-16 | Stop reason: DRUGHIGH

## 2019-09-13 RX ORDER — HYDROCODONE BITARTRATE AND ACETAMINOPHEN 7.5; 325 MG/1; MG/1
1 TABLET ORAL EVERY 8 HOURS PRN
Status: ON HOLD | COMMUNITY
End: 2019-11-25 | Stop reason: HOSPADM

## 2019-09-13 RX ORDER — SODIUM CHLORIDE 0.9 % (FLUSH) 0.9 %
10 SYRINGE (ML) INJECTION PRN
Status: DISCONTINUED | OUTPATIENT
Start: 2019-09-13 | End: 2019-09-16 | Stop reason: HOSPADM

## 2019-09-13 RX ORDER — SODIUM CHLORIDE 9 MG/ML
INJECTION, SOLUTION INTRAVENOUS CONTINUOUS
Status: DISCONTINUED | OUTPATIENT
Start: 2019-09-13 | End: 2019-09-15

## 2019-09-13 RX ORDER — LEVOTHYROXINE SODIUM 0.12 MG/1
125 TABLET ORAL DAILY
COMMUNITY
End: 2020-02-18 | Stop reason: ALTCHOICE

## 2019-09-13 RX ORDER — SODIUM CHLORIDE 0.9 % (FLUSH) 0.9 %
10 SYRINGE (ML) INJECTION EVERY 12 HOURS SCHEDULED
Status: DISCONTINUED | OUTPATIENT
Start: 2019-09-13 | End: 2019-09-16 | Stop reason: HOSPADM

## 2019-09-13 RX ORDER — POTASSIUM CHLORIDE 29.8 MG/ML
20 INJECTION INTRAVENOUS PRN
Status: DISCONTINUED | OUTPATIENT
Start: 2019-09-13 | End: 2019-09-15

## 2019-09-13 RX ORDER — MIDODRINE HYDROCHLORIDE 5 MG/1
10 TABLET ORAL
Status: DISCONTINUED | OUTPATIENT
Start: 2019-09-13 | End: 2019-09-16 | Stop reason: HOSPADM

## 2019-09-13 RX ORDER — FLUCONAZOLE 2 MG/ML
200 INJECTION, SOLUTION INTRAVENOUS ONCE
Status: DISCONTINUED | OUTPATIENT
Start: 2019-09-13 | End: 2019-09-13 | Stop reason: SDUPTHER

## 2019-09-13 RX ORDER — ACETAMINOPHEN 325 MG/1
650 TABLET ORAL EVERY 6 HOURS PRN
Status: ON HOLD | COMMUNITY
End: 2019-09-16 | Stop reason: HOSPADM

## 2019-09-13 RX ORDER — ACETAMINOPHEN 325 MG/1
650 TABLET ORAL EVERY 4 HOURS PRN
Status: DISCONTINUED | OUTPATIENT
Start: 2019-09-13 | End: 2019-09-16 | Stop reason: SDUPTHER

## 2019-09-13 RX ORDER — CYCLOBENZAPRINE HCL 10 MG
10 TABLET ORAL EVERY 8 HOURS PRN
Status: ON HOLD | COMMUNITY
End: 2020-11-13

## 2019-09-13 RX ORDER — DIMETHICONE, OXYBENZONE, AND PADIMATE O 2; 2.5; 6.6 G/100G; G/100G; G/100G
STICK TOPICAL PRN
Status: DISCONTINUED | OUTPATIENT
Start: 2019-09-13 | End: 2019-09-16 | Stop reason: HOSPADM

## 2019-09-13 RX ORDER — SIMVASTATIN 20 MG
20 TABLET ORAL NIGHTLY
Status: ON HOLD | COMMUNITY
End: 2019-09-16 | Stop reason: HOSPADM

## 2019-09-13 RX ORDER — MAGNESIUM SULFATE 1 G/100ML
1 INJECTION INTRAVENOUS PRN
Status: DISCONTINUED | OUTPATIENT
Start: 2019-09-13 | End: 2019-09-16 | Stop reason: HOSPADM

## 2019-09-13 RX ORDER — OXYCODONE AND ACETAMINOPHEN 10; 325 MG/1; MG/1
2 TABLET ORAL EVERY 8 HOURS PRN
Status: ON HOLD | COMMUNITY
End: 2019-09-16 | Stop reason: HOSPADM

## 2019-09-13 RX ADMIN — THIAMINE HYDROCHLORIDE 200 MG: 100 INJECTION, SOLUTION INTRAMUSCULAR; INTRAVENOUS at 11:04

## 2019-09-13 RX ADMIN — SODIUM CHLORIDE: 9 INJECTION, SOLUTION INTRAVENOUS at 16:07

## 2019-09-13 RX ADMIN — THIAMINE HYDROCHLORIDE 200 MG: 100 INJECTION, SOLUTION INTRAMUSCULAR; INTRAVENOUS at 22:17

## 2019-09-13 RX ADMIN — ACETAMINOPHEN 650 MG: 325 TABLET ORAL at 23:52

## 2019-09-13 RX ADMIN — CEFEPIME HYDROCHLORIDE 1 G: 1 INJECTION, POWDER, FOR SOLUTION INTRAMUSCULAR; INTRAVENOUS at 23:21

## 2019-09-13 RX ADMIN — ASCORBIC ACID 1500 MG: 500 INJECTION, SOLUTION INTRAMUSCULAR; INTRAVENOUS; SUBCUTANEOUS at 17:30

## 2019-09-13 RX ADMIN — HYDROCORTISONE SODIUM SUCCINATE 50 MG: 100 INJECTION, POWDER, FOR SOLUTION INTRAMUSCULAR; INTRAVENOUS at 18:13

## 2019-09-13 RX ADMIN — HYDROCORTISONE SODIUM SUCCINATE 50 MG: 100 INJECTION, POWDER, FOR SOLUTION INTRAMUSCULAR; INTRAVENOUS at 22:15

## 2019-09-13 RX ADMIN — PANTOPRAZOLE SODIUM 40 MG: 40 INJECTION, POWDER, LYOPHILIZED, FOR SOLUTION INTRAVENOUS at 10:41

## 2019-09-13 RX ADMIN — ASCORBIC ACID 1500 MG: 500 INJECTION, SOLUTION INTRAMUSCULAR; INTRAVENOUS; SUBCUTANEOUS at 11:18

## 2019-09-13 RX ADMIN — ENOXAPARIN SODIUM 40 MG: 40 INJECTION SUBCUTANEOUS at 10:40

## 2019-09-13 RX ADMIN — SODIUM CHLORIDE: 9 INJECTION, SOLUTION INTRAVENOUS at 18:52

## 2019-09-13 RX ADMIN — FLUCONAZOLE 200 MG: 200 INJECTION, SOLUTION INTRAVENOUS at 00:53

## 2019-09-13 RX ADMIN — MIDODRINE HYDROCHLORIDE 10 MG: 5 TABLET ORAL at 18:13

## 2019-09-13 RX ADMIN — WATER 10 ML: 1 INJECTION INTRAMUSCULAR; INTRAVENOUS; SUBCUTANEOUS at 11:04

## 2019-09-13 RX ADMIN — MIDODRINE HYDROCHLORIDE 10 MG: 5 TABLET ORAL at 11:29

## 2019-09-13 RX ADMIN — HYDROCORTISONE SODIUM SUCCINATE 50 MG: 100 INJECTION, POWDER, FOR SOLUTION INTRAMUSCULAR; INTRAVENOUS at 11:04

## 2019-09-13 RX ADMIN — SODIUM CHLORIDE: 9 INJECTION, SOLUTION INTRAVENOUS at 07:56

## 2019-09-13 RX ADMIN — CEFTRIAXONE 1 G: 1 INJECTION, POWDER, FOR SOLUTION INTRAMUSCULAR; INTRAVENOUS at 00:21

## 2019-09-13 RX ADMIN — Medication 10 ML: at 18:13

## 2019-09-13 RX ADMIN — CEFEPIME HYDROCHLORIDE 1 G: 1 INJECTION, POWDER, FOR SOLUTION INTRAMUSCULAR; INTRAVENOUS at 16:07

## 2019-09-13 RX ADMIN — SODIUM CHLORIDE 1000 ML: 9 INJECTION, SOLUTION INTRAVENOUS at 00:25

## 2019-09-13 RX ADMIN — ASCORBIC ACID 1500 MG: 500 INJECTION, SOLUTION INTRAMUSCULAR; INTRAVENOUS; SUBCUTANEOUS at 22:17

## 2019-09-13 RX ADMIN — SODIUM CHLORIDE 1000 ML: 9 INJECTION, SOLUTION INTRAVENOUS at 18:00

## 2019-09-13 RX ADMIN — Medication 10 ML: at 20:03

## 2019-09-13 RX ADMIN — SODIUM BICARBONATE 150 MEQ: 84 INJECTION, SOLUTION INTRAVENOUS at 11:10

## 2019-09-13 RX ADMIN — Medication 10 ML: at 11:05

## 2019-09-13 ASSESSMENT — PAIN DESCRIPTION - PAIN TYPE: TYPE: CHRONIC PAIN

## 2019-09-13 ASSESSMENT — PAIN DESCRIPTION - ONSET: ONSET: GRADUAL

## 2019-09-13 ASSESSMENT — PAIN SCALES - GENERAL
PAINLEVEL_OUTOF10: 0
PAINLEVEL_OUTOF10: 3
PAINLEVEL_OUTOF10: 0

## 2019-09-13 ASSESSMENT — PAIN DESCRIPTION - ORIENTATION: ORIENTATION: LEFT;LOWER

## 2019-09-13 ASSESSMENT — PAIN DESCRIPTION - FREQUENCY: FREQUENCY: INTERMITTENT

## 2019-09-13 ASSESSMENT — PAIN DESCRIPTION - DESCRIPTORS: DESCRIPTORS: ACHING;CRAMPING;DISCOMFORT

## 2019-09-13 ASSESSMENT — PAIN DESCRIPTION - PROGRESSION: CLINICAL_PROGRESSION: GRADUALLY WORSENING

## 2019-09-13 ASSESSMENT — PAIN - FUNCTIONAL ASSESSMENT: PAIN_FUNCTIONAL_ASSESSMENT: PREVENTS OR INTERFERES SOME ACTIVE ACTIVITIES AND ADLS

## 2019-09-13 ASSESSMENT — PAIN DESCRIPTION - LOCATION: LOCATION: LEG

## 2019-09-13 NOTE — ED NOTES
Bed: 24  Expected date:   Expected time:   Means of arrival:   Comments:  EMS     Kai Ordoñez RN  09/12/19 7713

## 2019-09-13 NOTE — ED PROVIDER NOTES
Supple. Full ROM. Respiratory: Lungs clear to auscultation bilaterally, no wheezes, rales, or rhonchi. Not in respiratory distress   Cardiovascular:  Regular rate. Regular rhythm. No murmurs, gallops, or rubs. 2+ distal pulses  GI:  Abdomen Soft, Non tender, Non distended. No rebound, guarding, or rigidity. Musculoskeletal: Moves all extremities x 4. Warm and well perfused. Healing incision to the lower lumbar region without erythema or fluctuance. Integument: skin warm and dry. No rashes. Neurologic: GCS 15  -------------------------------------------------- RESULTS -------------------------------------------------  I have personally reviewed all laboratory and imaging results for this patient. Results are listed below.      LABS:  Results for orders placed or performed during the hospital encounter of 09/12/19   CBC Auto Differential   Result Value Ref Range    WBC 16.0 (H) 4.5 - 11.5 E9/L    RBC 3.71 3.50 - 5.50 E12/L    Hemoglobin 11.2 (L) 11.5 - 15.5 g/dL    Hematocrit 35.0 34.0 - 48.0 %    MCV 94.3 80.0 - 99.9 fL    MCH 30.2 26.0 - 35.0 pg    MCHC 32.0 32.0 - 34.5 %    RDW 13.4 11.5 - 15.0 fL    Platelets 468 962 - 356 E9/L    MPV 10.3 7.0 - 12.0 fL    Neutrophils % 82.1 (H) 43.0 - 80.0 %    Immature Granulocytes % 0.7 0.0 - 5.0 %    Lymphocytes % 7.6 (L) 20.0 - 42.0 %    Monocytes % 9.2 2.0 - 12.0 %    Eosinophils % 0.1 0.0 - 6.0 %    Basophils % 0.3 0.0 - 2.0 %    Neutrophils Absolute 13.15 (H) 1.80 - 7.30 E9/L    Immature Granulocytes # 0.11 E9/L    Lymphocytes Absolute 1.21 (L) 1.50 - 4.00 E9/L    Monocytes Absolute 1.47 (H) 0.10 - 0.95 E9/L    Eosinophils Absolute 0.02 (L) 0.05 - 0.50 E9/L    Basophils Absolute 0.04 0.00 - 0.20 E9/L   Comprehensive Metabolic Panel w/ Reflex to MG   Result Value Ref Range    Sodium 136 132 - 146 mmol/L    Potassium reflex Magnesium 3.8 3.5 - 5.0 mmol/L    Chloride 104 98 - 107 mmol/L    CO2 20 (L) 22 - 29 mmol/L    Anion Gap 12 7 - 16 mmol/L    Glucose 110 (H) 74 - 99 mg/dL    BUN 32 (H) 8 - 23 mg/dL    CREATININE 1.7 (H) 0.5 - 1.0 mg/dL    GFR Non-African American 29 >=60 mL/min/1.73    GFR African American 35     Calcium 7.6 (L) 8.6 - 10.2 mg/dL    Total Protein 4.4 (L) 6.4 - 8.3 g/dL    Alb 2.1 (L) 3.5 - 5.2 g/dL    Total Bilirubin 0.2 0.0 - 1.2 mg/dL    Alkaline Phosphatase 104 35 - 104 U/L    ALT 14 0 - 32 U/L    AST 25 0 - 31 U/L   Troponin   Result Value Ref Range    Troponin 0.06 (H) 0.00 - 0.03 ng/mL   Protime-INR   Result Value Ref Range    Protime 14.9 (H) 9.3 - 12.4 sec    INR 1.3    APTT   Result Value Ref Range    aPTT 35.1 24.5 - 35.1 sec   TSH without Reflex   Result Value Ref Range    TSH 4.510 (H) 0.270 - 4.200 uIU/mL   Urinalysis, reflex to microscopic   Result Value Ref Range    Color, UA Yellow Straw/Yellow    Clarity, UA CLOUDY (A) Clear    Glucose, Ur Negative Negative mg/dL    Bilirubin Urine Negative Negative    Ketones, Urine Negative Negative mg/dL    Specific Gravity, UA 1.020 1.005 - 1.030    Blood, Urine SMALL (A) Negative    pH, UA 5.5 5.0 - 9.0    Protein, UA Negative Negative mg/dL    Urobilinogen, Urine 0.2 <2.0 E.U./dL    Nitrite, Urine Negative Negative    Leukocyte Esterase, Urine MODERATE (A) Negative   Lactic Acid, Plasma   Result Value Ref Range    Lactic Acid 0.9 0.5 - 2.2 mmol/L   Microscopic Urinalysis   Result Value Ref Range    WBC, UA >20 0 - 5 /HPF    RBC, UA 1-3 0 - 2 /HPF    Epi Cells RARE /HPF    Bacteria, UA RARE (A) /HPF    Yeast, UA MODERATE        RADIOLOGY:  Interpreted by Radiologist.  XR CHEST PORTABLE   Final Result      No airspace opacities or pleural effusion. EKG Interpretation    EKG: This EKG is signed and interpreted by the EP. [place] ST and T wave changes noted diffusely. Normal axis. Normal QRS. Normal intervals otherwise. No ST elevation or depression. No STEMI.     PROCEDURE  9/12/19       Time: 2247    CENTRAL LINE INSERTION  Risks, benefits and alternatives (for applicable

## 2019-09-13 NOTE — PROCEDURES
Arterial Line Placement Procedure Note                     Indication: severe hypotension    Consent: The spouse was counseled regarding the procedure, its indications, risks, potential complications and alternatives, and any questions were answered. Consent was obtained to proceed. Michele's Test: Not indicated in this particular procedure    Procedure: The skin over the left brachial artery was prepped with betadine and draped in a sterile fashion. Local anesthesia was obtained by infiltration using 1% Lidocaine without epinephrine. An arterial line catheter was then inserted, using a modified Seldinger technique, into the vessel. The transducer set was then attached and securely fastened to the skin with sutures. Waveforms on the monitor were observed and found to be adequate. The patient had good distal perfusion after the procedure. The site was then dressed in a sterile fashion. The patient tolerated the procedure well. Complications: None    Romie Gardner DO PGY-2    Attending Physician Attestation: Dr. Amalia Dupotn    Thank you very much for allowing me to see this patient in consultation and follow up. I personally saw, examined and provided care for the patient. Radiographs, labs and medication list were reviewed by me independently. I spoke with bedside nursing, respiratory therapists and consultants. Critical care services and times documented are independent of procedures and multidisciplinary rounds with Residents. Additionally comprehensive, multidisciplinary rounds were conducted with the MICU team. The case was discussed in detail and plans for care were established. Review of Residents documentation was conducted and revisions were made as appropriate. I agree with the the above documented information. I was personally present during the completion of procedure.     Amalia Dupont  9/13/2019  11:08 AM

## 2019-09-13 NOTE — DISCHARGE INSTR - COC
Continuity of Care Form    Patient Name: Errol Taylor   :  1945  MRN:  [id number]    Admit date:  2019  Discharge date:  19      Code Status Order: Full Code   Advance Directives:   Advance Care Flowsheet Documentation     Date/Time Healthcare Directive Type of Healthcare Directive Copy in [address] Agent's Name Healthcare Agent's Phone Number    [phone]  No, patient does not have an advance directive for healthcare treatment -- -- -- -- --          Admitting Physician:  Stan Solorzano DO  PCP: Stan Solorzano DO    Discharging Nurse: [facility] Unit/Room#: 0216/0216-A  Discharging Unit Phone Number: [phone]    Emergency Contact:   Extended Emergency Contact Information  Primary Emergency Contact: Dru Hooper  Address: [address] Phone: [phone]  Relation: Spouse  Secondary Emergency Contact: Arialilliana Ketan [address] Phone: [phone]  Mobile Phone: [phone]  Relation: Child    Past Surgical History:  Past Surgical History:   Procedure Laterality Date    BREAST SURGERY Right 1982    BENIGN CYST   [place] N/A 6/3/2019    LUMBAR LAMINECTOMY POSTERIOR L3-L5, BONE BIOPSY L4 - GIOVANNY, X-RAY,  WANTS TF performed by Jolly Childress MD at [address] [place]         Immunization History: There is no immunization history on file for this patient.     Active Problems:  Patient Active Problem List   Diagnosis Code    Right lower quadrant pain R10.31    Pneumatosis coli K63.89    Troponin level elevated R74.8    Sepsis ([address] A41.9    HTN (hypertension) I10    HLD (hyperlipidemia) E78.5    Obesity E66.9    Acute metabolic encephalopathy E80.49    Clostridium difficile diarrhea A04.72    Pneumonia J18.9    Acute cystitis N30.00    Acute midline low back pain

## 2019-09-13 NOTE — CARE COORDINATION
Met w/ patient. Explained role of . From [address] and plan is to return on discharge. Uses Chris lift and WC @ NH. PCP is Dr. Marquita Gu and pharmacy is [address] [place] [address]. Prior to going to [facility]. 3 weeks ago, pt lived with her  in a handicap accessible [address] [facility]. Will continue to follow for discharge needs.  Mona Finley

## 2019-09-13 NOTE — CONSULTS
Narrative    Not on file       Family History:       Problem Relation Age of Onset    Other Mother    . Otherwise non-pertinent to the chief complaint.     REVIEW OF SYSTEMS:    14 ROS negative unless otherwise specified in the HPI    PHYSICAL EXAM:    Vitals:    BP (!) 103/46   Pulse 86   Temp 99.9 °F (37.7 °C) (Axillary)   Resp 22   Ht 5' 6\" (1.676 m)   Wt 181 lb 14.1 oz (82.5 kg)   SpO2 98%   BMI 29.36 kg/m²     General Appearance:    Alert, cooperative, no distress, appears stated age   Head:    Normocephalic, without obvious abnormality, atraumatic   Eyes:    PERRL, conjunctiva/corneas clear      Ears:    Normal and external ear canals, both ears   Nose:   Nares normal, septum midline, mucosa normal, no drainage    or sinus tenderness   Throat:   Lips, mucosa, and tongue normal; teeth and gums normal   Neck:   Supple, trachea midline, no adenopathy; no JVD   Lungs:     Clear to auscultation bilaterally, respirations unlabored   Chest wall:    No tenderness or deformity   Heart:    Regular rate and rhythm, S1 and S2 normal, no murmur, rub   or gallop   Abdomen:     Soft, non-tender, bowel sounds active all four quadrants,     no masses, no organomegaly   Extremities:   Extremities normal, atraumatic, no cyanosis or edema   Pulses:   2+ and symmetric all extremities   Skin:   Skin color, texture, turgor normal, no rashes or lesions   CBC+dif:  Reviewed and trend followed     Radiology:  · Ct lumbar spine without contrast on 7/30/2019 no abscess noted      Microbiology:  5/27/19- BC- ESBL E coli 2 sets  Neuro surgical culture- ESBL E COLI     Assessment:  · Concern for septic shock from UTI vs Percocet causing acute encephalopathy and relative adrenal insufficiency  · H/o acute osteomyelitis of lumbar vertebrae with recurrent ESBL E coli bacteremia s/p L3-5 lumbar laminectomy and evacuation of epidural abscess on 6/3/19-she had 8 weeks of IV Zerbaxa following this-her sed rate and CRP normalized after
Outputs  Harris Catheter   Exam     PHYSICAL EXAM:  CONSTITUTIONAL:  awake, alert, cooperative, pleasantly confused, pale, chronically ill appearing, in no acute distress. LUNGS:  No increased work of breathing, good air exchange, no crackles or wheezing. Diminished at the bilateral bases. CARDIOVASCULAR:  Normal apical impulse, regular rate and rhythm, normal S1 and S2, no S3 or S4, and no murmur noted  ABDOMEN:  No scars, normal bowel sounds, soft, non-distended, non-tender, no masses palpated, no hepatosplenomegally  MUSCULOSKELETAL:  There is no redness, warmth, or swelling of the joints. Full range of motion noted. Motor strength is 5 out of 5 all extremities bilaterally. Tone is normal.  NEUROLOGIC:  Awake, alert, oriented to person and place, disoriented to time. No focal neurological deficits.    SKIN:  no bruising or bleeding, normal skin color, texture, turgor and no redness, warmth, or swelling    Data   Old records and images have been reviewed    Lab Results   CBC     Lab Results   Component Value Date    WBC 22.4 09/13/2019    RBC 4.32 09/13/2019    HGB 13.0 09/13/2019    HCT 41.5 09/13/2019     09/13/2019    MCV 96.1 09/13/2019    MCH 30.1 09/13/2019    MCHC 31.3 09/13/2019    RDW 13.6 09/13/2019    LYMPHOPCT 7.6 09/12/2019    MONOPCT 9.2 09/12/2019    BASOPCT 0.3 09/12/2019    MONOSABS 1.47 09/12/2019    LYMPHSABS 1.21 09/12/2019    EOSABS 0.02 09/12/2019    BASOSABS 0.04 09/12/2019       BMP   Lab Results   Component Value Date     09/12/2019    K 3.8 09/12/2019     09/12/2019    CO2 20 09/12/2019    BUN 32 09/12/2019    CREATININE 1.7 09/12/2019    GLUCOSE 110 09/12/2019    CALCIUM 7.6 09/12/2019       LFTS  Lab Results   Component Value Date    ALKPHOS 104 09/12/2019    ALT 14 09/12/2019    AST 25 09/12/2019    PROT 4.4 09/12/2019    BILITOT 0.2 09/12/2019    LABALBU 2.1 09/12/2019       INR  Recent Labs     09/12/19  2316   PROTIME 14.9*   INR 1.3       APTT  Recent Labs

## 2019-09-13 NOTE — H&P
CHIEF COMPLAINT:  confusion      HISTORY OF PRESENT ILLNESS:      The patient is a 74 y.o. female patient presents with sudden onset of confusion. She apparently wok up yesterday and became confused not making sense. No fever. No localizing symptoms. Denies cough. She is appropriate at times this am-other times confused    Past Medical History:    Past Medical History:   Diagnosis Date    Arthritis     Gout     CONTROLLED    Hyperlipidemia     Hypertension     STABLE    Macular hole, right eye     Thyroid disease        Past Surgical History:    Past Surgical History:   Procedure Laterality Date    BREAST SURGERY Right 1982    BENIGN CYST    CHOLECYSTECTOMY  1997   [address] N/A 6/3/2019    LUMBAR LAMINECTOMY POSTERIOR L3-L5, BONE BIOPSY L4 - GIOVANNY, X-RAY,  WANTS TF performed by Peng Rinaldi MD at [address] [place]         Medications Prior to Admission:    Medications Prior to Admission: pantoprazole (PROTONIX) 20 MG tablet, Take 20 mg by mouth daily  simvastatin (ZOCOR) 20 MG tablet, Take 20 mg by mouth nightly  levothyroxine (SYNTHROID) 125 MCG tablet, Take 125 mcg by mouth Daily  nystatin (MYCOSTATIN) 240727 UNIT/ML suspension, Take 500,000 Units by mouth 4 times daily  cyclobenzaprine (FLEXERIL) 10 MG tablet, Take 10 mg by mouth every 8 hours as needed for Muscle spasms  HYDROcodone-acetaminophen (NORCO) 5-325 MG per tablet, Take 1 tablet by mouth every 8 hours as needed for Pain. oxyCODONE-acetaminophen (PERCOCET)  MG per tablet, Take 2 tablets by mouth every 8 hours as needed for Pain. cloNIDine (CATAPRES) 0.1 MG tablet, Take 1 tablet by mouth 2 times daily Hold BPsys <120  gabapentin (NEURONTIN) 100 MG capsule, Take 1 capsule by mouth 3 times daily for 90 days. (Patient taking differently: Take 100 mg by mouth every 8 hours.  Indications: complete on 9/13/19 )  potassium chloride (KLOR-CON M) 20 MEQ extended release tablet, Take 2 tablets by mouth daily

## 2019-09-14 LAB
ANION GAP SERPL CALCULATED.3IONS-SCNC: 10 MMOL/L (ref 7–16)
BUN BLDV-MCNC: 22 MG/DL (ref 8–23)
CALCIUM SERPL-MCNC: 7 MG/DL (ref 8.6–10.2)
CHLORIDE BLD-SCNC: 112 MMOL/L (ref 98–107)
CO2: 23 MMOL/L (ref 22–29)
CREAT SERPL-MCNC: 0.9 MG/DL (ref 0.5–1)
GFR AFRICAN AMERICAN: >60
GFR NON-AFRICAN AMERICAN: >60 ML/MIN/1.73
GLUCOSE BLD-MCNC: 130 MG/DL (ref 74–99)
HCT VFR BLD CALC: 33.6 % (ref 34–48)
HCT VFR BLD CALC: 36.4 % (ref 34–48)
HEMOGLOBIN: 10.8 G/DL (ref 11.5–15.5)
HEMOGLOBIN: 11.7 G/DL (ref 11.5–15.5)
LACTIC ACID: 1 MMOL/L (ref 0.5–2.2)
MAGNESIUM: 2 MG/DL (ref 1.6–2.6)
MCH RBC QN AUTO: 30.7 PG (ref 26–35)
MCH RBC QN AUTO: 30.7 PG (ref 26–35)
MCHC RBC AUTO-ENTMCNC: 32.1 % (ref 32–34.5)
MCHC RBC AUTO-ENTMCNC: 32.1 % (ref 32–34.5)
MCV RBC AUTO: 95.5 FL (ref 80–99.9)
MCV RBC AUTO: 95.5 FL (ref 80–99.9)
MRSA CULTURE ONLY: NORMAL
PDW BLD-RTO: 13.7 FL (ref 11.5–15)
PDW BLD-RTO: 13.9 FL (ref 11.5–15)
PHOSPHORUS: 2.1 MG/DL (ref 2.5–4.5)
PLATELET # BLD: 249 E9/L (ref 130–450)
PLATELET # BLD: 359 E9/L (ref 130–450)
PMV BLD AUTO: 10.5 FL (ref 7–12)
PMV BLD AUTO: 10.7 FL (ref 7–12)
POTASSIUM SERPL-SCNC: 3.1 MMOL/L (ref 3.5–5)
RBC # BLD: 3.52 E12/L (ref 3.5–5.5)
RBC # BLD: 3.81 E12/L (ref 3.5–5.5)
SODIUM BLD-SCNC: 145 MMOL/L (ref 132–146)
WBC # BLD: 12.3 E9/L (ref 4.5–11.5)
WBC # BLD: 9.3 E9/L (ref 4.5–11.5)

## 2019-09-14 PROCEDURE — 36415 COLL VENOUS BLD VENIPUNCTURE: CPT

## 2019-09-14 PROCEDURE — C9113 INJ PANTOPRAZOLE SODIUM, VIA: HCPCS | Performed by: STUDENT IN AN ORGANIZED HEALTH CARE EDUCATION/TRAINING PROGRAM

## 2019-09-14 PROCEDURE — 2580000003 HC RX 258: Performed by: INTERNAL MEDICINE

## 2019-09-14 PROCEDURE — 36592 COLLECT BLOOD FROM PICC: CPT

## 2019-09-14 PROCEDURE — 85027 COMPLETE CBC AUTOMATED: CPT

## 2019-09-14 PROCEDURE — 6370000000 HC RX 637 (ALT 250 FOR IP): Performed by: INTERNAL MEDICINE

## 2019-09-14 PROCEDURE — 83605 ASSAY OF LACTIC ACID: CPT

## 2019-09-14 PROCEDURE — 6360000002 HC RX W HCPCS: Performed by: INTERNAL MEDICINE

## 2019-09-14 PROCEDURE — 80048 BASIC METABOLIC PNL TOTAL CA: CPT

## 2019-09-14 PROCEDURE — 2580000003 HC RX 258: Performed by: STUDENT IN AN ORGANIZED HEALTH CARE EDUCATION/TRAINING PROGRAM

## 2019-09-14 PROCEDURE — 84100 ASSAY OF PHOSPHORUS: CPT

## 2019-09-14 PROCEDURE — 6360000002 HC RX W HCPCS: Performed by: STUDENT IN AN ORGANIZED HEALTH CARE EDUCATION/TRAINING PROGRAM

## 2019-09-14 PROCEDURE — 2500000003 HC RX 250 WO HCPCS: Performed by: INTERNAL MEDICINE

## 2019-09-14 PROCEDURE — 6370000000 HC RX 637 (ALT 250 FOR IP): Performed by: STUDENT IN AN ORGANIZED HEALTH CARE EDUCATION/TRAINING PROGRAM

## 2019-09-14 PROCEDURE — 2060000000 HC ICU INTERMEDIATE R&B

## 2019-09-14 PROCEDURE — 83735 ASSAY OF MAGNESIUM: CPT

## 2019-09-14 RX ADMIN — ASCORBIC ACID 1500 MG: 500 INJECTION, SOLUTION INTRAMUSCULAR; INTRAVENOUS; SUBCUTANEOUS at 11:08

## 2019-09-14 RX ADMIN — ASCORBIC ACID 1500 MG: 500 INJECTION, SOLUTION INTRAMUSCULAR; INTRAVENOUS; SUBCUTANEOUS at 16:36

## 2019-09-14 RX ADMIN — HYDROCORTISONE SODIUM SUCCINATE 50 MG: 100 INJECTION, POWDER, FOR SOLUTION INTRAMUSCULAR; INTRAVENOUS at 22:30

## 2019-09-14 RX ADMIN — PANTOPRAZOLE SODIUM 40 MG: 40 INJECTION, POWDER, LYOPHILIZED, FOR SOLUTION INTRAVENOUS at 06:01

## 2019-09-14 RX ADMIN — HYDROCORTISONE SODIUM SUCCINATE 50 MG: 100 INJECTION, POWDER, FOR SOLUTION INTRAMUSCULAR; INTRAVENOUS at 16:44

## 2019-09-14 RX ADMIN — ASCORBIC ACID 1500 MG: 500 INJECTION, SOLUTION INTRAMUSCULAR; INTRAVENOUS; SUBCUTANEOUS at 21:46

## 2019-09-14 RX ADMIN — ASCORBIC ACID 1500 MG: 500 INJECTION, SOLUTION INTRAMUSCULAR; INTRAVENOUS; SUBCUTANEOUS at 04:35

## 2019-09-14 RX ADMIN — HYDROCORTISONE SODIUM SUCCINATE 50 MG: 100 INJECTION, POWDER, FOR SOLUTION INTRAMUSCULAR; INTRAVENOUS at 04:35

## 2019-09-14 RX ADMIN — SODIUM CHLORIDE: 9 INJECTION, SOLUTION INTRAVENOUS at 21:45

## 2019-09-14 RX ADMIN — CEFEPIME HYDROCHLORIDE 1 G: 1 INJECTION, POWDER, FOR SOLUTION INTRAMUSCULAR; INTRAVENOUS at 16:35

## 2019-09-14 RX ADMIN — SODIUM CHLORIDE: 9 INJECTION, SOLUTION INTRAVENOUS at 04:34

## 2019-09-14 RX ADMIN — MIDODRINE HYDROCHLORIDE 10 MG: 5 TABLET ORAL at 14:23

## 2019-09-14 RX ADMIN — Medication 10 ML: at 08:30

## 2019-09-14 RX ADMIN — POTASSIUM PHOSPHATE, MONOBASIC AND POTASSIUM PHOSPHATE, DIBASIC 30 MMOL: 224; 236 INJECTION, SOLUTION, CONCENTRATE INTRAVENOUS at 11:07

## 2019-09-14 RX ADMIN — CEFEPIME HYDROCHLORIDE 1 G: 1 INJECTION, POWDER, FOR SOLUTION INTRAMUSCULAR; INTRAVENOUS at 06:51

## 2019-09-14 RX ADMIN — CEFEPIME HYDROCHLORIDE 1 G: 1 INJECTION, POWDER, FOR SOLUTION INTRAMUSCULAR; INTRAVENOUS at 22:46

## 2019-09-14 RX ADMIN — MIDODRINE HYDROCHLORIDE 10 MG: 5 TABLET ORAL at 16:44

## 2019-09-14 RX ADMIN — MIDODRINE HYDROCHLORIDE 10 MG: 5 TABLET ORAL at 08:30

## 2019-09-14 RX ADMIN — THIAMINE HYDROCHLORIDE 200 MG: 100 INJECTION, SOLUTION INTRAMUSCULAR; INTRAVENOUS at 22:31

## 2019-09-14 RX ADMIN — THIAMINE HYDROCHLORIDE 200 MG: 100 INJECTION, SOLUTION INTRAMUSCULAR; INTRAVENOUS at 11:08

## 2019-09-14 RX ADMIN — HYDROCORTISONE SODIUM SUCCINATE 50 MG: 100 INJECTION, POWDER, FOR SOLUTION INTRAMUSCULAR; INTRAVENOUS at 11:07

## 2019-09-14 RX ADMIN — ENOXAPARIN SODIUM 40 MG: 40 INJECTION SUBCUTANEOUS at 08:30

## 2019-09-14 ASSESSMENT — PAIN SCALES - GENERAL
PAINLEVEL_OUTOF10: 0

## 2019-09-14 ASSESSMENT — ENCOUNTER SYMPTOMS
SHORTNESS OF BREATH: 0
NAUSEA: 0
VOMITING: 0
DIARRHEA: 0

## 2019-09-15 LAB
ANION GAP SERPL CALCULATED.3IONS-SCNC: 11 MMOL/L (ref 7–16)
BUN BLDV-MCNC: 18 MG/DL (ref 8–23)
CALCIUM SERPL-MCNC: 7.2 MG/DL (ref 8.6–10.2)
CHLORIDE BLD-SCNC: 108 MMOL/L (ref 98–107)
CO2: 22 MMOL/L (ref 22–29)
CREAT SERPL-MCNC: 0.9 MG/DL (ref 0.5–1)
GFR AFRICAN AMERICAN: >60
GFR NON-AFRICAN AMERICAN: >60 ML/MIN/1.73
GLUCOSE BLD-MCNC: 93 MG/DL (ref 74–99)
MAGNESIUM: 1.9 MG/DL (ref 1.6–2.6)
ORGANISM: ABNORMAL
PHOSPHORUS: 2.5 MG/DL (ref 2.5–4.5)
POTASSIUM SERPL-SCNC: 3 MMOL/L (ref 3.5–5)
SODIUM BLD-SCNC: 141 MMOL/L (ref 132–146)
URINE CULTURE, ROUTINE: ABNORMAL

## 2019-09-15 PROCEDURE — 80048 BASIC METABOLIC PNL TOTAL CA: CPT

## 2019-09-15 PROCEDURE — 83735 ASSAY OF MAGNESIUM: CPT

## 2019-09-15 PROCEDURE — 84100 ASSAY OF PHOSPHORUS: CPT

## 2019-09-15 PROCEDURE — 36415 COLL VENOUS BLD VENIPUNCTURE: CPT

## 2019-09-15 PROCEDURE — 6360000002 HC RX W HCPCS: Performed by: INTERNAL MEDICINE

## 2019-09-15 PROCEDURE — 2580000003 HC RX 258: Performed by: INTERNAL MEDICINE

## 2019-09-15 PROCEDURE — 6370000000 HC RX 637 (ALT 250 FOR IP): Performed by: INTERNAL MEDICINE

## 2019-09-15 PROCEDURE — C9113 INJ PANTOPRAZOLE SODIUM, VIA: HCPCS | Performed by: INTERNAL MEDICINE

## 2019-09-15 PROCEDURE — 2060000000 HC ICU INTERMEDIATE R&B

## 2019-09-15 RX ORDER — POTASSIUM CHLORIDE 20 MEQ/1
40 TABLET, EXTENDED RELEASE ORAL 2 TIMES DAILY WITH MEALS
Status: DISCONTINUED | OUTPATIENT
Start: 2019-09-15 | End: 2019-09-16 | Stop reason: DRUGHIGH

## 2019-09-15 RX ORDER — HYDROCODONE BITARTRATE AND ACETAMINOPHEN 5; 325 MG/1; MG/1
1 TABLET ORAL EVERY 6 HOURS PRN
Status: DISCONTINUED | OUTPATIENT
Start: 2019-09-15 | End: 2019-09-16 | Stop reason: SDUPTHER

## 2019-09-15 RX ADMIN — MIDODRINE HYDROCHLORIDE 10 MG: 5 TABLET ORAL at 18:11

## 2019-09-15 RX ADMIN — Medication 10 ML: at 12:18

## 2019-09-15 RX ADMIN — HYDROCODONE BITARTRATE AND ACETAMINOPHEN 1 TABLET: 5; 325 TABLET ORAL at 12:56

## 2019-09-15 RX ADMIN — Medication 10 ML: at 21:11

## 2019-09-15 RX ADMIN — HYDROCORTISONE SODIUM SUCCINATE 50 MG: 100 INJECTION, POWDER, FOR SOLUTION INTRAMUSCULAR; INTRAVENOUS at 12:28

## 2019-09-15 RX ADMIN — MIDODRINE HYDROCHLORIDE 10 MG: 5 TABLET ORAL at 09:13

## 2019-09-15 RX ADMIN — MIDODRINE HYDROCHLORIDE 10 MG: 5 TABLET ORAL at 12:28

## 2019-09-15 RX ADMIN — Medication 10 ML: at 18:12

## 2019-09-15 RX ADMIN — PANTOPRAZOLE SODIUM 40 MG: 40 INJECTION, POWDER, LYOPHILIZED, FOR SOLUTION INTRAVENOUS at 07:00

## 2019-09-15 RX ADMIN — POTASSIUM CHLORIDE 40 MEQ: 20 TABLET, EXTENDED RELEASE ORAL at 12:42

## 2019-09-15 RX ADMIN — HYDROCORTISONE SODIUM SUCCINATE 50 MG: 100 INJECTION, POWDER, FOR SOLUTION INTRAMUSCULAR; INTRAVENOUS at 21:11

## 2019-09-15 RX ADMIN — HYDROCORTISONE SODIUM SUCCINATE 50 MG: 100 INJECTION, POWDER, FOR SOLUTION INTRAMUSCULAR; INTRAVENOUS at 18:11

## 2019-09-15 RX ADMIN — HYDROCORTISONE SODIUM SUCCINATE 50 MG: 100 INJECTION, POWDER, FOR SOLUTION INTRAMUSCULAR; INTRAVENOUS at 05:00

## 2019-09-15 RX ADMIN — POTASSIUM CHLORIDE 40 MEQ: 20 TABLET, EXTENDED RELEASE ORAL at 18:11

## 2019-09-15 RX ADMIN — ENOXAPARIN SODIUM 40 MG: 40 INJECTION SUBCUTANEOUS at 09:13

## 2019-09-15 ASSESSMENT — PAIN DESCRIPTION - PAIN TYPE
TYPE: CHRONIC PAIN
TYPE: CHRONIC PAIN

## 2019-09-15 ASSESSMENT — PAIN DESCRIPTION - ORIENTATION
ORIENTATION: RIGHT;LEFT
ORIENTATION: RIGHT;LEFT

## 2019-09-15 ASSESSMENT — PAIN DESCRIPTION - LOCATION
LOCATION: KNEE
LOCATION: KNEE

## 2019-09-15 ASSESSMENT — ENCOUNTER SYMPTOMS
NAUSEA: 0
VOMITING: 0
DIARRHEA: 0
SHORTNESS OF BREATH: 0

## 2019-09-15 ASSESSMENT — PAIN DESCRIPTION - FREQUENCY
FREQUENCY: CONTINUOUS
FREQUENCY: CONTINUOUS

## 2019-09-15 ASSESSMENT — PAIN DESCRIPTION - DESCRIPTORS
DESCRIPTORS: ACHING;SHARP
DESCRIPTORS: ACHING;SHARP

## 2019-09-15 ASSESSMENT — PAIN SCALES - GENERAL
PAINLEVEL_OUTOF10: 7
PAINLEVEL_OUTOF10: 0
PAINLEVEL_OUTOF10: 9
PAINLEVEL_OUTOF10: 0
PAINLEVEL_OUTOF10: 9

## 2019-09-16 VITALS
DIASTOLIC BLOOD PRESSURE: 74 MMHG | OXYGEN SATURATION: 97 % | RESPIRATION RATE: 16 BRPM | HEART RATE: 69 BPM | TEMPERATURE: 98 F | BODY MASS INDEX: 33.22 KG/M2 | WEIGHT: 206.7 LBS | HEIGHT: 66 IN | SYSTOLIC BLOOD PRESSURE: 127 MMHG

## 2019-09-16 LAB
ANION GAP SERPL CALCULATED.3IONS-SCNC: 10 MMOL/L (ref 7–16)
BUN BLDV-MCNC: 16 MG/DL (ref 8–23)
CALCIUM SERPL-MCNC: 7.8 MG/DL (ref 8.6–10.2)
CHLORIDE BLD-SCNC: 110 MMOL/L (ref 98–107)
CO2: 22 MMOL/L (ref 22–29)
CREAT SERPL-MCNC: 0.9 MG/DL (ref 0.5–1)
GFR AFRICAN AMERICAN: >60
GFR NON-AFRICAN AMERICAN: >60 ML/MIN/1.73
GLUCOSE BLD-MCNC: 80 MG/DL (ref 74–99)
HCT VFR BLD CALC: 37.8 % (ref 34–48)
HEMOGLOBIN: 12 G/DL (ref 11.5–15.5)
MAGNESIUM: 2 MG/DL (ref 1.6–2.6)
MCH RBC QN AUTO: 30.1 PG (ref 26–35)
MCHC RBC AUTO-ENTMCNC: 31.7 % (ref 32–34.5)
MCV RBC AUTO: 94.7 FL (ref 80–99.9)
PDW BLD-RTO: 13.7 FL (ref 11.5–15)
PHOSPHORUS: 1.9 MG/DL (ref 2.5–4.5)
PLATELET # BLD: 322 E9/L (ref 130–450)
PMV BLD AUTO: 10.3 FL (ref 7–12)
POTASSIUM SERPL-SCNC: 4.6 MMOL/L (ref 3.5–5)
RBC # BLD: 3.99 E12/L (ref 3.5–5.5)
SODIUM BLD-SCNC: 142 MMOL/L (ref 132–146)
WBC # BLD: 9.5 E9/L (ref 4.5–11.5)

## 2019-09-16 PROCEDURE — 84100 ASSAY OF PHOSPHORUS: CPT

## 2019-09-16 PROCEDURE — 97166 OT EVAL MOD COMPLEX 45 MIN: CPT

## 2019-09-16 PROCEDURE — 2580000003 HC RX 258

## 2019-09-16 PROCEDURE — C9113 INJ PANTOPRAZOLE SODIUM, VIA: HCPCS | Performed by: INTERNAL MEDICINE

## 2019-09-16 PROCEDURE — 83735 ASSAY OF MAGNESIUM: CPT

## 2019-09-16 PROCEDURE — 85027 COMPLETE CBC AUTOMATED: CPT

## 2019-09-16 PROCEDURE — 97161 PT EVAL LOW COMPLEX 20 MIN: CPT

## 2019-09-16 PROCEDURE — 36415 COLL VENOUS BLD VENIPUNCTURE: CPT

## 2019-09-16 PROCEDURE — 6360000002 HC RX W HCPCS: Performed by: INTERNAL MEDICINE

## 2019-09-16 PROCEDURE — 97535 SELF CARE MNGMENT TRAINING: CPT

## 2019-09-16 PROCEDURE — 2580000003 HC RX 258: Performed by: INTERNAL MEDICINE

## 2019-09-16 PROCEDURE — 80048 BASIC METABOLIC PNL TOTAL CA: CPT

## 2019-09-16 PROCEDURE — 6370000000 HC RX 637 (ALT 250 FOR IP): Performed by: INTERNAL MEDICINE

## 2019-09-16 PROCEDURE — 97530 THERAPEUTIC ACTIVITIES: CPT

## 2019-09-16 RX ORDER — ASPIRIN 81 MG/1
81 TABLET ORAL DAILY
Status: DISCONTINUED | OUTPATIENT
Start: 2019-09-16 | End: 2019-09-16 | Stop reason: HOSPADM

## 2019-09-16 RX ORDER — CYCLOBENZAPRINE HCL 10 MG
10 TABLET ORAL EVERY 8 HOURS PRN
Status: DISCONTINUED | OUTPATIENT
Start: 2019-09-16 | End: 2019-09-16 | Stop reason: HOSPADM

## 2019-09-16 RX ORDER — MULTIVITAMIN WITH FOLIC ACID 400 MCG
1 TABLET ORAL DAILY
Status: DISCONTINUED | OUTPATIENT
Start: 2019-09-16 | End: 2019-09-16 | Stop reason: HOSPADM

## 2019-09-16 RX ORDER — LEVOTHYROXINE SODIUM 0.12 MG/1
125 TABLET ORAL DAILY
Status: DISCONTINUED | OUTPATIENT
Start: 2019-09-16 | End: 2019-09-16 | Stop reason: HOSPADM

## 2019-09-16 RX ORDER — PANTOPRAZOLE SODIUM 20 MG/1
20 TABLET, DELAYED RELEASE ORAL
Status: DISCONTINUED | OUTPATIENT
Start: 2019-09-17 | End: 2019-09-16 | Stop reason: HOSPADM

## 2019-09-16 RX ORDER — GABAPENTIN 100 MG/1
100 CAPSULE ORAL EVERY 8 HOURS
Status: DISCONTINUED | OUTPATIENT
Start: 2019-09-16 | End: 2019-09-16 | Stop reason: HOSPADM

## 2019-09-16 RX ORDER — ATENOLOL 25 MG/1
25 TABLET ORAL NIGHTLY
Status: DISCONTINUED | OUTPATIENT
Start: 2019-09-16 | End: 2019-09-16 | Stop reason: HOSPADM

## 2019-09-16 RX ORDER — DOCUSATE SODIUM 100 MG/1
100 CAPSULE, LIQUID FILLED ORAL 2 TIMES DAILY
Status: DISCONTINUED | OUTPATIENT
Start: 2019-09-16 | End: 2019-09-16 | Stop reason: HOSPADM

## 2019-09-16 RX ORDER — LOSARTAN POTASSIUM 50 MG/1
50 TABLET ORAL DAILY
Status: DISCONTINUED | OUTPATIENT
Start: 2019-09-16 | End: 2019-09-16 | Stop reason: HOSPADM

## 2019-09-16 RX ORDER — FUROSEMIDE 10 MG/ML
20 INJECTION INTRAMUSCULAR; INTRAVENOUS ONCE
Status: COMPLETED | OUTPATIENT
Start: 2019-09-16 | End: 2019-09-16

## 2019-09-16 RX ORDER — OXYBUTYNIN CHLORIDE 5 MG/1
10 TABLET ORAL DAILY
Status: DISCONTINUED | OUTPATIENT
Start: 2019-09-16 | End: 2019-09-16 | Stop reason: HOSPADM

## 2019-09-16 RX ORDER — PANTOPRAZOLE SODIUM 20 MG/1
20 TABLET, DELAYED RELEASE ORAL
Status: DISCONTINUED | OUTPATIENT
Start: 2019-09-16 | End: 2019-09-16

## 2019-09-16 RX ORDER — PROBENECID 500 MG/1
500 TABLET, FILM COATED ORAL 2 TIMES DAILY
Status: DISCONTINUED | OUTPATIENT
Start: 2019-09-16 | End: 2019-09-16 | Stop reason: HOSPADM

## 2019-09-16 RX ORDER — POTASSIUM CHLORIDE 20 MEQ/1
20 TABLET, EXTENDED RELEASE ORAL 2 TIMES DAILY
Status: DISCONTINUED | OUTPATIENT
Start: 2019-09-16 | End: 2019-09-16 | Stop reason: HOSPADM

## 2019-09-16 RX ORDER — HYDROCODONE BITARTRATE AND ACETAMINOPHEN 5; 325 MG/1; MG/1
1 TABLET ORAL EVERY 8 HOURS PRN
Qty: 9 TABLET | Refills: 0 | Status: SHIPPED | OUTPATIENT
Start: 2019-09-16 | End: 2019-09-19

## 2019-09-16 RX ORDER — CYANOCOBALAMIN 1000 UG/ML
1000 INJECTION INTRAMUSCULAR; SUBCUTANEOUS
Status: DISCONTINUED | OUTPATIENT
Start: 2019-10-06 | End: 2019-09-16 | Stop reason: HOSPADM

## 2019-09-16 RX ORDER — ATENOLOL 50 MG/1
50 TABLET ORAL DAILY
Status: DISCONTINUED | OUTPATIENT
Start: 2019-09-16 | End: 2019-09-16 | Stop reason: HOSPADM

## 2019-09-16 RX ORDER — ACETAMINOPHEN 325 MG/1
650 TABLET ORAL EVERY 4 HOURS PRN
Status: DISCONTINUED | OUTPATIENT
Start: 2019-09-16 | End: 2019-09-16 | Stop reason: HOSPADM

## 2019-09-16 RX ORDER — HYDROCODONE BITARTRATE AND ACETAMINOPHEN 5; 325 MG/1; MG/1
1 TABLET ORAL EVERY 8 HOURS PRN
Status: DISCONTINUED | OUTPATIENT
Start: 2019-09-16 | End: 2019-09-16 | Stop reason: HOSPADM

## 2019-09-16 RX ORDER — PRAVASTATIN SODIUM 20 MG
40 TABLET ORAL NIGHTLY
Status: DISCONTINUED | OUTPATIENT
Start: 2019-09-16 | End: 2019-09-16 | Stop reason: HOSPADM

## 2019-09-16 RX ADMIN — ATENOLOL 50 MG: 50 TABLET ORAL at 08:35

## 2019-09-16 RX ADMIN — HYDROCORTISONE SODIUM SUCCINATE 50 MG: 100 INJECTION, POWDER, FOR SOLUTION INTRAMUSCULAR; INTRAVENOUS at 11:18

## 2019-09-16 RX ADMIN — Medication 10 ML: at 08:37

## 2019-09-16 RX ADMIN — DOCUSATE SODIUM 100 MG: 100 CAPSULE, LIQUID FILLED ORAL at 08:36

## 2019-09-16 RX ADMIN — OXYBUTYNIN CHLORIDE 10 MG: 5 TABLET ORAL at 08:36

## 2019-09-16 RX ADMIN — GABAPENTIN 100 MG: 100 CAPSULE ORAL at 08:36

## 2019-09-16 RX ADMIN — POTASSIUM CHLORIDE 20 MEQ: 20 TABLET, EXTENDED RELEASE ORAL at 08:36

## 2019-09-16 RX ADMIN — PROBENECID 500 MG: 500 TABLET, FILM COATED ORAL at 09:10

## 2019-09-16 RX ADMIN — MULTIVITAMIN TABLET 1 TABLET: TABLET at 08:36

## 2019-09-16 RX ADMIN — ENOXAPARIN SODIUM 40 MG: 40 INJECTION SUBCUTANEOUS at 08:35

## 2019-09-16 RX ADMIN — FUROSEMIDE 20 MG: 10 INJECTION, SOLUTION INTRAVENOUS at 08:35

## 2019-09-16 RX ADMIN — CYCLOBENZAPRINE HYDROCHLORIDE 10 MG: 10 TABLET, FILM COATED ORAL at 08:36

## 2019-09-16 RX ADMIN — ASPIRIN 81 MG: 81 TABLET, COATED ORAL at 08:36

## 2019-09-16 RX ADMIN — VITAMIN D, TAB 1000IU (100/BT) 1000 UNITS: 25 TAB at 08:36

## 2019-09-16 RX ADMIN — LEVOTHYROXINE SODIUM 125 MCG: 125 TABLET ORAL at 06:45

## 2019-09-16 RX ADMIN — PANTOPRAZOLE SODIUM 40 MG: 40 INJECTION, POWDER, LYOPHILIZED, FOR SOLUTION INTRAVENOUS at 05:39

## 2019-09-16 RX ADMIN — WATER 10 ML: 1 INJECTION INTRAMUSCULAR; INTRAVENOUS; SUBCUTANEOUS at 11:18

## 2019-09-16 RX ADMIN — MIDODRINE HYDROCHLORIDE 10 MG: 5 TABLET ORAL at 08:35

## 2019-09-16 RX ADMIN — LOSARTAN POTASSIUM 50 MG: 50 TABLET, FILM COATED ORAL at 08:36

## 2019-09-16 RX ADMIN — MIDODRINE HYDROCHLORIDE 10 MG: 5 TABLET ORAL at 11:18

## 2019-09-16 RX ADMIN — HYDROCORTISONE SODIUM SUCCINATE 50 MG: 100 INJECTION, POWDER, FOR SOLUTION INTRAMUSCULAR; INTRAVENOUS at 05:39

## 2019-09-16 ASSESSMENT — PAIN SCALES - GENERAL
PAINLEVEL_OUTOF10: 0
PAINLEVEL_OUTOF10: 10

## 2019-09-16 NOTE — PROGRESS NOTES
CC-confusion    Subjective: The patient is awake and alert. Tolerating medications. Reports no side effects. Afebrile. for Transfer out of the ICU today  10 ROS otherwise negative unless otherwise specified above. Objective:    Vitals:    09/14/19 1205   BP: 127/60   Pulse: 75   Resp: 20   Temp: 97.5 °F (36.4 °C)   SpO2: 100%       General Appearance:    Awake, alert , no acute distress.    HEENT:    Normocephalic,PERRL,neck supple, no JVD, mucosa moist, no thrush   Lungs:     Clear to auscultation bilaterally, no wheeze , crackles   Heart:    Regular rate and rhythm, no murmur   Abdomen:     Soft, non-tender, not distended  bowel sounds present,   Extremities:   No edema,no open wound,no erythema, non  tender   Skin:   no rashes or lesions   CBC+dif:  Reviewed and trend followed     Radiology:  · Ct lumbar spine without contrast on 7/30/2019 no abscess noted      Microbiology:  5/27/19- BC- ESBL E coli 2 sets  Neuro surgical culture- ESBL E COLI  Culture on this admission no growth to date  Urine culture with yeast     Assessment:  · Concern for septic shock from UTI vs Percocet causing acute encephalopathy and relative adrenal insufficiency  · H/o acute osteomyelitis of lumbar vertebrae with recurrent ESBL E coli bacteremia s/p L3-5 lumbar laminectomy and evacuation of epidural abscess on 6/3/19-she had 8 weeks of IV Zerbaxa following this-her sed rate and CRP normalized after treatment  · H/o C diff colitis  · H/o encephalopathy from therapy with carbapenem  · NASRA improving     Plan:    · IV cefepime 1 g every 8 day #2  · Follow-up urine culture and blood culture    Electronically signed by Duglas Torrez MD on 9/14/2019 at 2:41 PM
Initial Inpatient Wound Care    Admit Date: 9/12/2019 10:16 PM    Reason for consult: wound care  Significant history:  Adm with confusion  lumb ar autumn 6/3/19 Dr. Delfino Ariza  Findings:  Alert and oriented  Therapy working with patient  Mid sacrum small pink denuded area, requests barrier cream to area  . Lumbar area, pink incisional scare  Bruises left lateral thigh. , left abd  Therapy apply back brace  Heels red, rt very boggy and discolored        Plan: continue above, and SOS  Heel protectors    Judith Workman 9/16/2019 11:32 AM
Occupational Therapy  OCCUPATIONAL THERAPY INITIAL EVALUATION      Date:2019  Patient Name: Errol Taylor  MRN: [id number]  : 1945  Room: [address] [place] [address]    Evaluating OT: Mae El OTR/L [id number]    AM-PAC Daily Activity Raw Score:     Recommended Adaptive Equipment: TBD    Diagnosis: septic shock. Pt presents to ED with AMS from ECF. Surgery: 6/3/19 Lumbar L3-5 laminectomy   Pertinent Medical History: gout, HTN, macular hole R eye   Precautions:  Falls, HOB <45 degrees with TLSO, spinal precautions, WBAT     Home Living: Pt has not been home since 2019. Pt lives with spouse in a single story home with level entry. Pt reports at rehab she has been utilizing a hoyerlift in all transfers. Assist to mobilize wheelchair. Pt reports she completes the parallel bars with therapy and was just getting ready to attempt use of transfer with out remy lift. Pain Level: B knee pain with any movement    Cognition: A&O: 4/4. Pt is alert and oriented. Easily distracted. Problem solving:  fair   Judgement/safety:  fair     Functional Assessment:   Initial Eval Status  Date: 19 Treatment session:  Short Term Goals  Treatment frequency: 2-5x/wk PRN x1-3 wks   Feeding Independent     Grooming Set up  Independent   UB Dressing Max A  Donning/doffing TLSO bed level and management of hospital gown  Min A   LB Dressing Dependent  Donning/doffing B socks  Mod A    Bathing Max A  Mod A   Toileting Max A x2  Incontinent of BM total assist in tim care bed level  Mod A   Bed Mobility  Supine to sit:  Max A x2  Difficulty in follow through of log rolling technique     Functional Transfers STS: Max A x2  Elevated bed surface  Mod A   Functional Mobility Mod A with WW  Small steps to [address] required in weight shifting to allow for stepping  Min A during ADLs   Balance Sitting: initially poor plus, able to achieve fair minus sitting several minutes    Standing: poor plus at [employer]     Activity
Rad Morin is a 74 y.o. female patient seen for follow up of septic shock. Patient doing better today. She is alert and oriented x2. Confusion continues to improve. She denies any fevers, chills, chest pain, shortness of breath, nausea, vomiting or diarrhea.      Current Facility-Administered Medications   Medication Dose Route Frequency Provider Last Rate Last Dose    sodium chloride flush 0.9 % injection 10 mL  10 mL Intravenous 2 times per day Jaxon Taylor MD   10 mL at 09/14/19 0830    sodium chloride flush 0.9 % injection 10 mL  10 mL Intravenous PRN Jaxon Taylor MD   10 mL at 09/13/19 1813    acetaminophen (TYLENOL) tablet 650 mg  650 mg Oral Q4H PRN Jaxon Taylor MD   650 mg at 09/13/19 2352    enoxaparin (LOVENOX) injection 40 mg  40 mg Subcutaneous Daily Jaxon Taylor MD   40 mg at 09/15/19 0913    medicated lip balm (BLISTEX/CARMEX) stick   Topical PRN Jaxon Taylor MD        pantoprazole (PROTONIX) injection 40 mg  40 mg Intravenous QAM AC Jaxon Taylor MD   40 mg at 09/14/19 0601    potassium chloride 20 mEq/50 mL IVPB (Central Line)  20 mEq Intravenous PRN Jaxon Taylor MD        magnesium sulfate 1 g in dextrose 5% 100 mL IVPB  1 g Intravenous PRN Jaxon Taylor MD        0.9 % sodium chloride infusion   Intravenous Continuous Jaxon Taylor MD 40 mL/hr at 09/14/19 2145      hydrocortisone sodium succinate PF (SOLU-CORTEF) injection 50 mg  50 mg Intravenous Q6H Jaxon Taylor MD   50 mg at 09/14/19 2230    midodrine (PROAMATINE) tablet 10 mg  10 mg Oral TID WC Jaxon Taylor MD   10 mg at 09/15/19 0913    cefepime (MAXIPIME) 1 g IVPB extended (mini-bag)  1 g Intravenous Q8H Jaxon Taylor MD 12.5 mL/hr at 09/14/19 2246 1 g at 09/14/19 2246     Allergies   Allergen Reactions    Adrenalin [Epinephrine]      CAUSES TACHYCARDIA    Meropenem Other (See Comments)     Severe confusion     Active Problems:    Septic shock ([address]  Resolved
Report given to [facility] all questions concerns addressed. Angy Meléndez () notified, new room number given and nurses station number given.
functional ability as noted in the objective portion of this evaluation. Comments/Treatment:  TLSO donned prior to mobility after hygiene performed. Pt needed assist with ww maneuvering and weight shifting with side stepping to HOB. Pt RTB after mobility due to pain in knees       Examination of body systems Decreased   Functional mobility x   ROM    Strength x   Safety Awareness    Cognition    Endurance x   Sensation    Balance x   Vision/Visual Deficits    Coordination x       Patient education  Pt educated on  Fall risk, spinal precautions    Patient response to education:   Pt verbalized understanding Pt demonstrated skill Pt requires further education in this area    x  x     Rehab potential is Good for reaching above PT goals. Pts/ family goals   1. Increase strength, decreased pain     Patient and or family understand(s) diagnosis, prognosis, and plan of care. -  Yes     PLAN  PT care will be provided in accordance with the objectives noted above. Whenever appropriate, clear delegation orders will be provided for nursing staff. Exercises and functional mobility practice will be used as well as appropriate assistive devices or modalities to obtain goals. Patient and family education will also be administered as needed. Frequency of treatments will be 2-3 x/week x  5 days.     Time in:  4184   Time out: [address] [facility] number:  PT [id number]
shock-Improved   -arterial line, off Levophed   -Midodrine 10 mg TID   -NS @ 100      Gastrointestinal   -Protonix for prophylaxis      Renal   Metabolic Acidosis: compensating, secondary to septic shock-Improved    UTI: resulting in septic shock  -Fluconazole and Rocephin in the ED  -HX of ESBL, contact isolation   -Cultures pending  -Cefepime      Infectious Disease   Septic Shock: secondary to UTI vs osteomyelitis/epidural abscess  -cultures pending  -Continue Cefepime   -Vitamin C protocol  -ID input appreciated       Hematology/Oncology   Leukocytosis: secondary to acute infection   -Daily CBC     Endocrine   -Blood sugar was 100-130's   -Continue to monitor      Social/Spiritual/DNR/Other   -Full Code          The patient is hemodynamically stable and improving. Plan for downgrade possibly later today if pressure remains stable or tomorrow. Arsh Martinez DO                        9/14/2019, 4:20 AM     Attending Physician Attestation: Dr. Clotilde Stacy    Thank you very much for allowing me to see this patient in consultation and follow up. I personally saw, examined and provided care for the patient. Radiographs, labs and medication list were reviewed by me independently. I spoke with bedside nursing, respiratory therapists and consultants. Critical care services and times documented are independent of procedures and multidisciplinary rounds with Residents. Additionally comprehensive, multidisciplinary rounds were conducted with the MICU team. The case was discussed in detail and plans for care were established. Review of Residents documentation was conducted and revisions were made as appropriate. I agree with the the above documented information.      ASSESSMENT:  1.) Severe Sepsis with septic Shock - Complicated Cystitis  2.) Recent Epidural Abscess/Osteomyelitis   3.) Anion gap Metabolic Acidosis   4.) Leukocytosis      In addition the following applies:     Check: N/A  Medication

## 2019-09-18 LAB
BLOOD CULTURE, ROUTINE: NORMAL
CULTURE, BLOOD 2: NORMAL

## 2019-09-20 ENCOUNTER — HOSPITAL ENCOUNTER (EMERGENCY)
Age: 74
Discharge: HOME | End: 2019-09-21
Attending: EMERGENCY MEDICINE
Payer: COMMERCIAL

## 2019-09-20 ENCOUNTER — APPOINTMENT (OUTPATIENT)
Dept: CT IMAGING | Age: 74
End: 2019-09-20
Payer: COMMERCIAL

## 2019-09-20 DIAGNOSIS — L81.9 DISCOLORATION OF SKIN OF MULTIPLE SITES OF LOWER EXTREMITY: Primary | ICD-10-CM

## 2019-09-20 LAB
ANION GAP SERPL CALCULATED.3IONS-SCNC: 11 MMOL/L (ref 7–16)
APTT: 36.1 SEC (ref 24.5–35.1)
BASOPHILS ABSOLUTE: 0.07 E9/L (ref 0–0.2)
BASOPHILS RELATIVE PERCENT: 0.8 % (ref 0–2)
BUN BLDV-MCNC: 13 MG/DL (ref 8–23)
CALCIUM SERPL-MCNC: 8.5 MG/DL (ref 8.6–10.2)
CHLORIDE BLD-SCNC: 101 MMOL/L (ref 98–107)
CO2: 28 MMOL/L (ref 22–29)
CREAT SERPL-MCNC: 0.9 MG/DL (ref 0.5–1)
EOSINOPHILS ABSOLUTE: 0.37 E9/L (ref 0.05–0.5)
EOSINOPHILS RELATIVE PERCENT: 4.4 % (ref 0–6)
GFR AFRICAN AMERICAN: >60
GFR NON-AFRICAN AMERICAN: >60 ML/MIN/1.73
GLUCOSE BLD-MCNC: 83 MG/DL (ref 74–99)
HCT VFR BLD CALC: 47.9 % (ref 34–48)
HEMOGLOBIN: 15.7 G/DL (ref 11.5–15.5)
IMMATURE GRANULOCYTES #: 0.11 E9/L
IMMATURE GRANULOCYTES %: 1.3 % (ref 0–5)
INR BLD: 0.9
LACTIC ACID: 1.6 MMOL/L (ref 0.5–2.2)
LYMPHOCYTES ABSOLUTE: 2.23 E9/L (ref 1.5–4)
LYMPHOCYTES RELATIVE PERCENT: 26.5 % (ref 20–42)
MCH RBC QN AUTO: 30.4 PG (ref 26–35)
MCHC RBC AUTO-ENTMCNC: 32.8 % (ref 32–34.5)
MCV RBC AUTO: 92.8 FL (ref 80–99.9)
MONOCYTES ABSOLUTE: 0.81 E9/L (ref 0.1–0.95)
MONOCYTES RELATIVE PERCENT: 9.6 % (ref 2–12)
NEUTROPHILS ABSOLUTE: 4.81 E9/L (ref 1.8–7.3)
NEUTROPHILS RELATIVE PERCENT: 57.4 % (ref 43–80)
PDW BLD-RTO: 14.2 FL (ref 11.5–15)
PLATELET # BLD: 431 E9/L (ref 130–450)
PMV BLD AUTO: 10.9 FL (ref 7–12)
POTASSIUM SERPL-SCNC: 4.3 MMOL/L (ref 3.5–5)
PROTHROMBIN TIME: 11.2 SEC (ref 9.3–12.4)
RBC # BLD: 5.16 E12/L (ref 3.5–5.5)
REASON FOR REJECTION: NORMAL
REJECTED TEST: NORMAL
SODIUM BLD-SCNC: 140 MMOL/L (ref 132–146)
WBC # BLD: 8.4 E9/L (ref 4.5–11.5)

## 2019-09-20 PROCEDURE — 99283 EMERGENCY DEPT VISIT LOW MDM: CPT

## 2019-09-20 PROCEDURE — 99283 EMERGENCY DEPT VISIT LOW MDM: CPT | Performed by: SURGERY

## 2019-09-20 PROCEDURE — 80048 BASIC METABOLIC PNL TOTAL CA: CPT

## 2019-09-20 PROCEDURE — 85730 THROMBOPLASTIN TIME PARTIAL: CPT

## 2019-09-20 PROCEDURE — 6360000004 HC RX CONTRAST MEDICATION: Performed by: RADIOLOGY

## 2019-09-20 PROCEDURE — 75635 CT ANGIO ABDOMINAL ARTERIES: CPT

## 2019-09-20 PROCEDURE — 85025 COMPLETE CBC W/AUTO DIFF WBC: CPT

## 2019-09-20 PROCEDURE — 83605 ASSAY OF LACTIC ACID: CPT

## 2019-09-20 PROCEDURE — 85610 PROTHROMBIN TIME: CPT

## 2019-09-20 RX ADMIN — IOPAMIDOL 120 ML: 755 INJECTION, SOLUTION INTRAVENOUS at 20:48

## 2019-09-20 SDOH — HEALTH STABILITY: MENTAL HEALTH: HOW OFTEN DO YOU HAVE A DRINK CONTAINING ALCOHOL?: NEVER

## 2019-09-20 NOTE — ED PROVIDER NOTES
Patient is a 49-year-old female who presents to ED for evaluation of circulatory concern. Patient resides at [facility]). Report from facility was for concern of bilateral ischemic lower extremities and inability to obtain distal pulses. Patient denies any pain to extremities. Denies loss of motor or sensory function in lower extremities. Denies any new symptoms. Patient denies history of peripheral vascular disease, DM or history of smoking. Review of Systems   Constitutional: Negative for chills, diaphoresis, fatigue and fever. HENT: Negative for congestion, ear pain, rhinorrhea, sinus pressure, sneezing, sore throat and trouble swallowing. Eyes: Negative for photophobia, pain and redness. Respiratory: Negative for cough, chest tightness, shortness of breath and wheezing. Cardiovascular: Negative for chest pain and palpitations. Gastrointestinal: Negative for abdominal distention, abdominal pain, blood in stool, constipation, diarrhea, nausea and vomiting. Endocrine: Negative for polydipsia and polyuria. Genitourinary: Negative for difficulty urinating, dysuria, flank pain, hematuria and urgency. Musculoskeletal: Negative for arthralgias, back pain, myalgias and neck pain. Skin: Positive for color change. Negative for rash and wound. Neurological: Negative for weakness, light-headedness, numbness and headaches. Psychiatric/Behavioral: Negative for agitation and confusion. The patient is not nervous/anxious and is not hyperactive. Physical Exam   Constitutional: She is oriented to person, place, and time. She appears well-developed and well-nourished. No distress. HENT:   Head: Normocephalic and atraumatic. Right Ear: External ear normal.   Left Ear: External ear normal.   Mouth/Throat: Oropharynx is clear and moist. No oropharyngeal exudate. Eyes: Pupils are equal, round, and reactive to light.  Conjunctivae and EOM are normal. Right eye exhibits no Mild disease of the sigmoid colon without surrounding inflammation or   fluid.                ------------------------- NURSING NOTES AND VITALS REVIEWED ---------------------------  Date / Time Roomed:  9/20/2019  6:06 PM  ED Bed Assignment:  14/14    The nursing notes within the ED encounter and vital signs as below have been reviewed. /62   Pulse 88   Temp 97.9 °F (36.6 °C) (Oral)   Resp 16   Ht 5' 6\" (1.676 m)   Wt 201 lb (91.2 kg)   SpO2 94%   BMI 32.44 kg/m²   Oxygen Saturation Interpretation: Normal      ------------------------------------------ PROGRESS NOTES ------------------------------------------  10:24 PM  I have spoken with the patient and discussed todays results, in addition to providing specific details for the plan of care and counseling regarding the diagnosis and prognosis. Their questions are answered at this time and they are agreeable with the plan. I discussed at length with them reasons for immediate return here for re evaluation. They will followup with their primary care physician by calling their office tomorrow. --------------------------------- ADDITIONAL PROVIDER NOTES ---------------------------------  At this time the patient is without objective evidence of an acute process requiring hospitalization or inpatient management. They have remained hemodynamically stable throughout their entire ED visit and are stable for discharge with outpatient follow-up. The plan has been discussed in detail and they are aware of the specific conditions for emergent return, as well as the importance of follow-up. Discharge Medication List as of 9/21/2019  2:05 AM          Diagnosis:  1. Discoloration of skin of multiple sites of lower extremity        Disposition:  Patient's disposition: Discharge to home  Patient's condition is stable.          Adriel Jaramillo DO  Resident  09/21/19 [id number]

## 2019-09-20 NOTE — ED NOTES
Bed: 14  Expected date:   Expected time:   Means of arrival:   Comments:  EMS     Corine Tabares RN  09/20/19 2798

## 2019-09-21 VITALS
OXYGEN SATURATION: 94 % | HEIGHT: 66 IN | BODY MASS INDEX: 32.3 KG/M2 | DIASTOLIC BLOOD PRESSURE: 62 MMHG | HEART RATE: 88 BPM | WEIGHT: 201 LBS | RESPIRATION RATE: 16 BRPM | SYSTOLIC BLOOD PRESSURE: 101 MMHG | TEMPERATURE: 97.9 F

## 2019-09-21 ASSESSMENT — ENCOUNTER SYMPTOMS
WHEEZING: 0
ABDOMINAL PAIN: 0
SINUS PRESSURE: 0
CHEST TIGHTNESS: 0
VOMITING: 0
RHINORRHEA: 0
BLOOD IN STOOL: 0
PHOTOPHOBIA: 0
ABDOMINAL DISTENTION: 0
DIARRHEA: 0
SORE THROAT: 0
TROUBLE SWALLOWING: 0
EYE REDNESS: 0
SHORTNESS OF BREATH: 0
EYE PAIN: 0
COUGH: 0
BACK PAIN: 0
CONSTIPATION: 0
NAUSEA: 0
COLOR CHANGE: 1

## 2019-09-21 NOTE — ED NOTES
This RN called Bessie. Spoke with RN [facility]. Updated RN on pt condition. Pt returning back to facility.      Akilah Grant RN  09/20/19 [id number]

## 2019-09-27 ENCOUNTER — OUTSIDE SERVICES (OUTPATIENT)
Dept: PODIATRY | Age: 74
End: 2019-09-27
Payer: COMMERCIAL

## 2019-09-27 DIAGNOSIS — I73.9 PVD (PERIPHERAL VASCULAR DISEASE) (HCC): ICD-10-CM

## 2019-09-27 DIAGNOSIS — M79.674 PAIN OF TOE OF RIGHT FOOT: ICD-10-CM

## 2019-09-27 DIAGNOSIS — M79.675 PAIN OF TOE OF LEFT FOOT: ICD-10-CM

## 2019-09-27 DIAGNOSIS — B35.1 ONYCHOMYCOSIS: Primary | ICD-10-CM

## 2019-09-27 DIAGNOSIS — R26.2 DIFFICULTY WALKING: ICD-10-CM

## 2019-09-27 PROCEDURE — 11721 DEBRIDE NAIL 6 OR MORE: CPT | Performed by: PODIATRIST

## 2019-09-28 NOTE — PROGRESS NOTES
daily, Disp: 30 capsule, Rfl: 0    cyanocobalamin 1000 MCG/ML injection, Inject 1,000 mcg into the muscle every 7 days 7/30/19 Takes every SUNDAY, Disp: , Rfl:     vitamin D (CHOLECALCIFEROL) 1000 UNIT TABS tablet, Take 1,000 Units by mouth daily, Disp: , Rfl:     acetaminophen (TYLENOL) 325 MG tablet, Take 650 mg by mouth every 4 hours as needed for Pain, Disp: , Rfl:     oxybutynin (DITROPAN) 5 MG tablet, Take 10 mg by mouth daily , Disp: , Rfl: 1    losartan (COZAAR) 50 MG tablet, Take 1 tablet by mouth daily, Disp: 30 tablet, Rfl: 3    enoxaparin (LOVENOX) 40 MG/0.4ML injection, Inject 0.4 mLs into the skin daily, Disp: 1 mL, Rfl: 0    atenolol (TENORMIN) 50 MG tablet, Take 50 mg by mouth daily 7/30/19 Pt takes a total of 75 mg daily, takes 50 mg in the morning, Disp: , Rfl:     atenolol (TENORMIN) 25 MG tablet, Take 25 mg by mouth nightly 7/30/19 Pt takes a total dose of 75 mg, 25 mg at night, Disp: , Rfl:     pravastatin (PRAVACHOL) 40 MG tablet, Take 40 mg by mouth nightly., Disp: , Rfl:     probenecid (BENEMID) 500 MG tablet, Take 500 mg by mouth 2 times daily. , Disp: , Rfl:     aspirin 81 MG tablet, Take 81 mg by mouth daily. LAST DOSE 1/11/13, Disp: , Rfl:     multivitamin (THERAGRAN) per tablet, Take 1 tablet by mouth daily. , Disp: , Rfl:     Allergies:   Allergies   Allergen Reactions    Adrenalin [Epinephrine] Anaphylaxis    Meropenem Other (See Comments)     Severe confusion       Past Surgical History:   Procedure Laterality Date    BREAST SURGERY Right 1982    BENIGN CYST    CHOLECYSTECTOMY  1997    HYSTERECTOMY  1985    LAMINECTOMY N/A 6/3/2019    LUMBAR LAMINECTOMY POSTERIOR L3-L5, BONE BIOPSY L4 - GIOVANNY, X-RAY,  WANTS TF performed by Alexis Avila [facility] at [facility] OR    SKIN BIOPSY       Past Medical History:   Diagnosis Date    Arthritis     Gout     CONTROLLED    Hyperlipidemia     Hypertension     STABLE    Macular hole, right eye     Thyroid disease Exam:  Neurovascular status unchanged. At this time digital nails 1, 2, 5 right foot and nails 1, 2, 5 left foot are noted to be thickened, dystrophic and discolored with subungual debris present. Cap fill time is delayed in digits 1-5 bilaterally. Coolness is noted to the digital regions to palpation to the bilateral foot. Hair growth is diminished to both lower extremities. Edematous issues noted to both lower extremities. No maceration of the web spaces nor any heel fissuring is noted at this time. Plan per Assessment  Pablo Haynes was seen today for nail problem. Diagnoses and all orders for this visit:    Onychomycosis    Pain of toe of right foot    Pain of toe of left foot    PVD (peripheral vascular disease) ([address]    Difficulty walking        1. Evaluation and Management  2. Manual and electrical debridement of the mycotic nails was performed for thickness and length to prevent injection and/or ulceration. I recommended antifungal cream to the nails daily. 3. It was discussed in detail with the patient and nursing staff proper caring for the vascular compromised foot. The fact that they have compromised blood flow put the patient at risk for infection/gangrene/amputation. The patient should not walk barefoot. Shoe gear should fit properly and socks should be worn with shoes. Exercise is very important to prevent worsening of the disease process but before performing an exercise program should check with their family physician first.  If any skin lesions are noted, they are instructed to contact the office immediately. 4. We will see the patient back at a later date for continued podiatric management and care. Seen By:    Elio Riojas DPM    Electronically signed by Elio Riojas DPM on 9/27/2019 at 9:07 PM      This note was created using voice recognition software. The note was reviewed however may contain grammatical errors.

## 2019-10-15 ENCOUNTER — OFFICE VISIT (OUTPATIENT)
Dept: NEUROSURGERY | Age: 74
End: 2019-10-15
Payer: COMMERCIAL

## 2019-10-15 ENCOUNTER — HOSPITAL ENCOUNTER (OUTPATIENT)
Dept: CT IMAGING | Age: 74
Discharge: HOME | End: 2019-10-17
Payer: COMMERCIAL

## 2019-10-15 VITALS
HEIGHT: 66 IN | SYSTOLIC BLOOD PRESSURE: 113 MMHG | HEART RATE: 75 BPM | BODY MASS INDEX: 32.44 KG/M2 | DIASTOLIC BLOOD PRESSURE: 66 MMHG

## 2019-10-15 DIAGNOSIS — M46.26 OSTEOMYELITIS OF LUMBAR SPINE (HCC): ICD-10-CM

## 2019-10-15 DIAGNOSIS — M46.26 OSTEOMYELITIS OF LUMBAR SPINE (HCC): Primary | ICD-10-CM

## 2019-10-15 PROCEDURE — 72131 CT LUMBAR SPINE W/O DYE: CPT

## 2019-10-15 PROCEDURE — 99213 OFFICE O/P EST LOW 20 MIN: CPT | Performed by: NEUROLOGICAL SURGERY

## 2019-11-22 ENCOUNTER — HOSPITAL ENCOUNTER (INPATIENT)
Age: 74
LOS: 4 days | Discharge: NURSING, REHAB OR LONG-TERM CARE | DRG: 947 | End: 2019-11-26
Attending: EMERGENCY MEDICINE | Admitting: INTERNAL MEDICINE
Payer: COMMERCIAL

## 2019-11-22 DIAGNOSIS — R53.1 GENERAL WEAKNESS: Primary | ICD-10-CM

## 2019-11-22 LAB
ALBUMIN SERPL-MCNC: 2.6 G/DL (ref 3.5–5.2)
ALP BLD-CCNC: 96 U/L (ref 35–104)
ALT SERPL-CCNC: 17 U/L (ref 0–32)
ANION GAP SERPL CALCULATED.3IONS-SCNC: 15 MMOL/L (ref 7–16)
AST SERPL-CCNC: 51 U/L (ref 0–31)
BASOPHILS ABSOLUTE: 0.06 E9/L (ref 0–0.2)
BASOPHILS RELATIVE PERCENT: 0.9 % (ref 0–2)
BILIRUB SERPL-MCNC: 0.2 MG/DL (ref 0–1.2)
BUN BLDV-MCNC: 14 MG/DL (ref 8–23)
CALCIUM SERPL-MCNC: 9 MG/DL (ref 8.6–10.2)
CHLORIDE BLD-SCNC: 102 MMOL/L (ref 98–107)
CO2: 24 MMOL/L (ref 22–29)
CREAT SERPL-MCNC: 1 MG/DL (ref 0.5–1)
EOSINOPHILS ABSOLUTE: 0.29 E9/L (ref 0.05–0.5)
EOSINOPHILS RELATIVE PERCENT: 4.3 % (ref 0–6)
GFR AFRICAN AMERICAN: >60
GFR NON-AFRICAN AMERICAN: 54 ML/MIN/1.73
GLUCOSE BLD-MCNC: 89 MG/DL (ref 74–99)
HCT VFR BLD CALC: 42.1 % (ref 34–48)
HEMOGLOBIN: 13.8 G/DL (ref 11.5–15.5)
IMMATURE GRANULOCYTES #: 0.01 E9/L
IMMATURE GRANULOCYTES %: 0.1 % (ref 0–5)
LYMPHOCYTES ABSOLUTE: 1.51 E9/L (ref 1.5–4)
LYMPHOCYTES RELATIVE PERCENT: 22.2 % (ref 20–42)
MCH RBC QN AUTO: 30.3 PG (ref 26–35)
MCHC RBC AUTO-ENTMCNC: 32.8 % (ref 32–34.5)
MCV RBC AUTO: 92.3 FL (ref 80–99.9)
MONOCYTES ABSOLUTE: 0.63 E9/L (ref 0.1–0.95)
MONOCYTES RELATIVE PERCENT: 9.3 % (ref 2–12)
NEUTROPHILS ABSOLUTE: 4.29 E9/L (ref 1.8–7.3)
NEUTROPHILS RELATIVE PERCENT: 63.2 % (ref 43–80)
PDW BLD-RTO: 16.4 FL (ref 11.5–15)
PLATELET # BLD: 350 E9/L (ref 130–450)
PMV BLD AUTO: 11.3 FL (ref 7–12)
POTASSIUM REFLEX MAGNESIUM: 4.5 MMOL/L (ref 3.5–5)
RBC # BLD: 4.56 E12/L (ref 3.5–5.5)
SODIUM BLD-SCNC: 141 MMOL/L (ref 132–146)
TOTAL PROTEIN: 5.5 G/DL (ref 6.4–8.3)
WBC # BLD: 6.8 E9/L (ref 4.5–11.5)

## 2019-11-22 PROCEDURE — 80053 COMPREHEN METABOLIC PANEL: CPT

## 2019-11-22 PROCEDURE — 1200000000 HC SEMI PRIVATE

## 2019-11-22 PROCEDURE — 99285 EMERGENCY DEPT VISIT HI MDM: CPT

## 2019-11-22 PROCEDURE — 85025 COMPLETE CBC W/AUTO DIFF WBC: CPT

## 2019-11-22 RX ORDER — MINERAL OIL/HYDROPHIL PETROLAT
1 OINTMENT (GRAM) TOPICAL PRN
Status: ON HOLD | COMMUNITY
End: 2019-12-27

## 2019-11-22 RX ORDER — HYDROCODONE BITARTRATE AND ACETAMINOPHEN 5; 325 MG/1; MG/1
1 TABLET ORAL EVERY 8 HOURS PRN
COMMUNITY
End: 2020-01-22 | Stop reason: SDUPTHER

## 2019-11-22 RX ORDER — OMEPRAZOLE 20 MG/1
20 CAPSULE, DELAYED RELEASE ORAL DAILY
Status: ON HOLD | COMMUNITY
End: 2020-11-13

## 2019-11-22 RX ORDER — SENNA PLUS 8.6 MG/1
1 TABLET ORAL PRN
Status: ON HOLD | COMMUNITY
End: 2021-05-21

## 2019-11-22 RX ORDER — TORSEMIDE 20 MG/1
20 TABLET ORAL DAILY
Status: ON HOLD | COMMUNITY
End: 2020-11-13

## 2019-11-22 ASSESSMENT — ENCOUNTER SYMPTOMS
HEMATOCHEZIA: 0
RESPIRATORY NEGATIVE: 1
VOMITING: 0
NAUSEA: 0
SHORTNESS OF BREATH: 0
GASTROINTESTINAL NEGATIVE: 1
ABDOMINAL PAIN: 0
DIARRHEA: 0
COUGH: 0
EYES NEGATIVE: 1
VISUAL CHANGE: 0

## 2019-11-22 ASSESSMENT — PAIN SCALES - GENERAL: PAINLEVEL_OUTOF10: 0

## 2019-11-23 PROBLEM — E43 SEVERE PROTEIN-CALORIE MALNUTRITION (HCC): Chronic | Status: ACTIVE | Noted: 2019-11-23

## 2019-11-23 LAB
ANION GAP SERPL CALCULATED.3IONS-SCNC: 11 MMOL/L (ref 7–16)
BUN BLDV-MCNC: 14 MG/DL (ref 8–23)
CALCIUM SERPL-MCNC: 8.6 MG/DL (ref 8.6–10.2)
CHLORIDE BLD-SCNC: 108 MMOL/L (ref 98–107)
CO2: 24 MMOL/L (ref 22–29)
CREAT SERPL-MCNC: 1.1 MG/DL (ref 0.5–1)
GFR AFRICAN AMERICAN: 58
GFR NON-AFRICAN AMERICAN: 48 ML/MIN/1.73
GLUCOSE BLD-MCNC: 84 MG/DL (ref 74–99)
HCT VFR BLD CALC: 35 % (ref 34–48)
HEMOGLOBIN: 11.3 G/DL (ref 11.5–15.5)
MCH RBC QN AUTO: 30.1 PG (ref 26–35)
MCHC RBC AUTO-ENTMCNC: 32.3 % (ref 32–34.5)
MCV RBC AUTO: 93.3 FL (ref 80–99.9)
PDW BLD-RTO: 16.4 FL (ref 11.5–15)
PLATELET # BLD: 268 E9/L (ref 130–450)
PMV BLD AUTO: 11.2 FL (ref 7–12)
POTASSIUM SERPL-SCNC: 3.4 MMOL/L (ref 3.5–5)
RBC # BLD: 3.75 E12/L (ref 3.5–5.5)
SODIUM BLD-SCNC: 143 MMOL/L (ref 132–146)
WBC # BLD: 5.5 E9/L (ref 4.5–11.5)

## 2019-11-23 PROCEDURE — 36415 COLL VENOUS BLD VENIPUNCTURE: CPT

## 2019-11-23 PROCEDURE — 6360000002 HC RX W HCPCS: Performed by: INTERNAL MEDICINE

## 2019-11-23 PROCEDURE — 2580000003 HC RX 258: Performed by: INTERNAL MEDICINE

## 2019-11-23 PROCEDURE — 85027 COMPLETE CBC AUTOMATED: CPT

## 2019-11-23 PROCEDURE — 6370000000 HC RX 637 (ALT 250 FOR IP): Performed by: INTERNAL MEDICINE

## 2019-11-23 PROCEDURE — 96372 THER/PROPH/DIAG INJ SC/IM: CPT

## 2019-11-23 PROCEDURE — 1200000000 HC SEMI PRIVATE

## 2019-11-23 PROCEDURE — 80048 BASIC METABOLIC PNL TOTAL CA: CPT

## 2019-11-23 RX ORDER — HYDROCODONE BITARTRATE AND ACETAMINOPHEN 5; 325 MG/1; MG/1
1 TABLET ORAL EVERY 8 HOURS PRN
Status: DISCONTINUED | OUTPATIENT
Start: 2019-11-23 | End: 2019-11-26 | Stop reason: HOSPADM

## 2019-11-23 RX ORDER — GABAPENTIN 100 MG/1
100 CAPSULE ORAL 3 TIMES DAILY
Status: DISCONTINUED | OUTPATIENT
Start: 2019-11-23 | End: 2019-11-26 | Stop reason: HOSPADM

## 2019-11-23 RX ORDER — ATENOLOL 25 MG/1
25 TABLET ORAL NIGHTLY
Status: DISCONTINUED | OUTPATIENT
Start: 2019-11-23 | End: 2019-11-23

## 2019-11-23 RX ORDER — CYCLOBENZAPRINE HCL 10 MG
10 TABLET ORAL EVERY 8 HOURS PRN
Status: DISCONTINUED | OUTPATIENT
Start: 2019-11-23 | End: 2019-11-26 | Stop reason: HOSPADM

## 2019-11-23 RX ORDER — SENNA PLUS 8.6 MG/1
1 TABLET ORAL DAILY
Status: DISCONTINUED | OUTPATIENT
Start: 2019-11-23 | End: 2019-11-26 | Stop reason: HOSPADM

## 2019-11-23 RX ORDER — LEVOTHYROXINE SODIUM 0.12 MG/1
125 TABLET ORAL DAILY
Status: DISCONTINUED | OUTPATIENT
Start: 2019-11-23 | End: 2019-11-26 | Stop reason: HOSPADM

## 2019-11-23 RX ORDER — ONDANSETRON 2 MG/ML
4 INJECTION INTRAMUSCULAR; INTRAVENOUS EVERY 6 HOURS PRN
Status: DISCONTINUED | OUTPATIENT
Start: 2019-11-23 | End: 2019-11-26 | Stop reason: HOSPADM

## 2019-11-23 RX ORDER — TORSEMIDE 20 MG/1
20 TABLET ORAL DAILY
Status: DISCONTINUED | OUTPATIENT
Start: 2019-11-23 | End: 2019-11-26 | Stop reason: HOSPADM

## 2019-11-23 RX ORDER — ATENOLOL 50 MG/1
50 TABLET ORAL DAILY
Status: DISCONTINUED | OUTPATIENT
Start: 2019-11-23 | End: 2019-11-23

## 2019-11-23 RX ORDER — ATENOLOL 25 MG/1
25 TABLET ORAL 2 TIMES DAILY
Status: DISCONTINUED | OUTPATIENT
Start: 2019-11-23 | End: 2019-11-26 | Stop reason: HOSPADM

## 2019-11-23 RX ORDER — POTASSIUM CHLORIDE 20 MEQ/1
20 TABLET, EXTENDED RELEASE ORAL 2 TIMES DAILY
Status: DISCONTINUED | OUTPATIENT
Start: 2019-11-23 | End: 2019-11-26 | Stop reason: HOSPADM

## 2019-11-23 RX ORDER — CYANOCOBALAMIN 1000 UG/ML
1000 INJECTION INTRAMUSCULAR; SUBCUTANEOUS
Status: DISCONTINUED | OUTPATIENT
Start: 2019-11-23 | End: 2019-11-26 | Stop reason: HOSPADM

## 2019-11-23 RX ORDER — SODIUM CHLORIDE 0.9 % (FLUSH) 0.9 %
10 SYRINGE (ML) INJECTION PRN
Status: DISCONTINUED | OUTPATIENT
Start: 2019-11-23 | End: 2019-11-26 | Stop reason: HOSPADM

## 2019-11-23 RX ORDER — POTASSIUM CHLORIDE 20 MEQ/1
40 TABLET, EXTENDED RELEASE ORAL ONCE
Status: COMPLETED | OUTPATIENT
Start: 2019-11-23 | End: 2019-11-23

## 2019-11-23 RX ORDER — PANTOPRAZOLE SODIUM 40 MG/1
40 TABLET, DELAYED RELEASE ORAL
Status: DISCONTINUED | OUTPATIENT
Start: 2019-11-24 | End: 2019-11-26 | Stop reason: HOSPADM

## 2019-11-23 RX ORDER — DOCUSATE SODIUM 100 MG/1
100 CAPSULE, LIQUID FILLED ORAL 2 TIMES DAILY
Status: DISCONTINUED | OUTPATIENT
Start: 2019-11-23 | End: 2019-11-26 | Stop reason: HOSPADM

## 2019-11-23 RX ORDER — VITAMIN B COMPLEX
1000 TABLET ORAL DAILY
Status: DISCONTINUED | OUTPATIENT
Start: 2019-11-23 | End: 2019-11-26 | Stop reason: HOSPADM

## 2019-11-23 RX ORDER — SODIUM CHLORIDE 0.9 % (FLUSH) 0.9 %
10 SYRINGE (ML) INJECTION EVERY 12 HOURS SCHEDULED
Status: DISCONTINUED | OUTPATIENT
Start: 2019-11-23 | End: 2019-11-26 | Stop reason: HOSPADM

## 2019-11-23 RX ORDER — OXYBUTYNIN CHLORIDE 5 MG/1
10 TABLET ORAL DAILY
Status: DISCONTINUED | OUTPATIENT
Start: 2019-11-23 | End: 2019-11-26 | Stop reason: HOSPADM

## 2019-11-23 RX ORDER — PROBENECID 500 MG/1
500 TABLET, FILM COATED ORAL 2 TIMES DAILY
Status: DISCONTINUED | OUTPATIENT
Start: 2019-11-23 | End: 2019-11-26 | Stop reason: HOSPADM

## 2019-11-23 RX ORDER — PRAVASTATIN SODIUM 20 MG
40 TABLET ORAL NIGHTLY
Status: DISCONTINUED | OUTPATIENT
Start: 2019-11-23 | End: 2019-11-26 | Stop reason: HOSPADM

## 2019-11-23 RX ORDER — ASPIRIN 81 MG/1
81 TABLET ORAL NIGHTLY
Status: DISCONTINUED | OUTPATIENT
Start: 2019-11-23 | End: 2019-11-26 | Stop reason: HOSPADM

## 2019-11-23 RX ADMIN — HYDROCODONE BITARTRATE AND ACETAMINOPHEN 1 TABLET: 5; 325 TABLET ORAL at 02:01

## 2019-11-23 RX ADMIN — ENOXAPARIN SODIUM 40 MG: 40 INJECTION SUBCUTANEOUS at 10:11

## 2019-11-23 RX ADMIN — Medication 10 ML: at 11:32

## 2019-11-23 RX ADMIN — PRAVASTATIN SODIUM 40 MG: 20 TABLET ORAL at 21:07

## 2019-11-23 RX ADMIN — OXYBUTYNIN CHLORIDE 10 MG: 5 TABLET ORAL at 10:02

## 2019-11-23 RX ADMIN — SENNOSIDES 8.6 MG: 8.6 TABLET, FILM COATED ORAL at 10:03

## 2019-11-23 RX ADMIN — PROBENECID 500 MG: 500 TABLET, FILM COATED ORAL at 10:10

## 2019-11-23 RX ADMIN — DOCUSATE SODIUM 100 MG: 100 CAPSULE, LIQUID FILLED ORAL at 21:07

## 2019-11-23 RX ADMIN — HYDROCODONE BITARTRATE AND ACETAMINOPHEN 1 TABLET: 5; 325 TABLET ORAL at 18:47

## 2019-11-23 RX ADMIN — ASPIRIN 81 MG: 81 TABLET, COATED ORAL at 21:07

## 2019-11-23 RX ADMIN — ATENOLOL 25 MG: 25 TABLET ORAL at 11:30

## 2019-11-23 RX ADMIN — HYDROCODONE BITARTRATE AND ACETAMINOPHEN 1 TABLET: 5; 325 TABLET ORAL at 10:08

## 2019-11-23 RX ADMIN — Medication 10 ML: at 21:08

## 2019-11-23 RX ADMIN — TORSEMIDE 20 MG: 20 TABLET ORAL at 10:02

## 2019-11-23 RX ADMIN — POTASSIUM CHLORIDE 40 MEQ: 20 TABLET, EXTENDED RELEASE ORAL at 10:08

## 2019-11-23 RX ADMIN — DOCUSATE SODIUM 100 MG: 100 CAPSULE, LIQUID FILLED ORAL at 10:02

## 2019-11-23 RX ADMIN — GABAPENTIN 100 MG: 100 CAPSULE ORAL at 10:03

## 2019-11-23 RX ADMIN — VITAMIN D, TAB 1000IU (100/BT) 1000 UNITS: 25 TAB at 10:02

## 2019-11-23 RX ADMIN — LEVOTHYROXINE SODIUM 125 MCG: 125 TABLET ORAL at 10:03

## 2019-11-23 RX ADMIN — GABAPENTIN 100 MG: 100 CAPSULE ORAL at 21:07

## 2019-11-23 RX ADMIN — POTASSIUM CHLORIDE 20 MEQ: 20 TABLET, EXTENDED RELEASE ORAL at 10:02

## 2019-11-23 RX ADMIN — GABAPENTIN 100 MG: 100 CAPSULE ORAL at 14:47

## 2019-11-23 RX ADMIN — PROBENECID 500 MG: 500 TABLET, FILM COATED ORAL at 21:07

## 2019-11-23 RX ADMIN — POTASSIUM CHLORIDE 20 MEQ: 20 TABLET, EXTENDED RELEASE ORAL at 21:07

## 2019-11-23 ASSESSMENT — PAIN SCALES - GENERAL
PAINLEVEL_OUTOF10: 6
PAINLEVEL_OUTOF10: 5
PAINLEVEL_OUTOF10: 0
PAINLEVEL_OUTOF10: 7

## 2019-11-23 ASSESSMENT — PAIN DESCRIPTION - ORIENTATION
ORIENTATION: LEFT;RIGHT

## 2019-11-23 ASSESSMENT — PAIN DESCRIPTION - FREQUENCY
FREQUENCY: CONTINUOUS

## 2019-11-23 ASSESSMENT — PAIN DESCRIPTION - ONSET
ONSET: ON-GOING

## 2019-11-23 ASSESSMENT — PAIN DESCRIPTION - PROGRESSION
CLINICAL_PROGRESSION: NOT CHANGED

## 2019-11-23 ASSESSMENT — PAIN DESCRIPTION - LOCATION
LOCATION: KNEE
LOCATION: KNEE
LOCATION: LEG

## 2019-11-23 ASSESSMENT — PAIN DESCRIPTION - DESCRIPTORS
DESCRIPTORS: ACHING;DISCOMFORT

## 2019-11-23 ASSESSMENT — PAIN DESCRIPTION - PAIN TYPE
TYPE: CHRONIC PAIN

## 2019-11-23 ASSESSMENT — PAIN - FUNCTIONAL ASSESSMENT
PAIN_FUNCTIONAL_ASSESSMENT: PREVENTS OR INTERFERES SOME ACTIVE ACTIVITIES AND ADLS

## 2019-11-24 LAB
ANION GAP SERPL CALCULATED.3IONS-SCNC: 13 MMOL/L (ref 7–16)
BASOPHILS ABSOLUTE: 0.07 E9/L (ref 0–0.2)
BASOPHILS RELATIVE PERCENT: 1.4 % (ref 0–2)
BUN BLDV-MCNC: 13 MG/DL (ref 8–23)
CALCIUM SERPL-MCNC: 8.8 MG/DL (ref 8.6–10.2)
CHLORIDE BLD-SCNC: 105 MMOL/L (ref 98–107)
CO2: 25 MMOL/L (ref 22–29)
CREAT SERPL-MCNC: 1.1 MG/DL (ref 0.5–1)
EOSINOPHILS ABSOLUTE: 0.24 E9/L (ref 0.05–0.5)
EOSINOPHILS RELATIVE PERCENT: 4.7 % (ref 0–6)
GFR AFRICAN AMERICAN: 58
GFR NON-AFRICAN AMERICAN: 48 ML/MIN/1.73
GLUCOSE BLD-MCNC: 92 MG/DL (ref 74–99)
HCT VFR BLD CALC: 41.9 % (ref 34–48)
HEMOGLOBIN: 13.3 G/DL (ref 11.5–15.5)
IMMATURE GRANULOCYTES #: 0.01 E9/L
IMMATURE GRANULOCYTES %: 0.2 % (ref 0–5)
LYMPHOCYTES ABSOLUTE: 1.43 E9/L (ref 1.5–4)
LYMPHOCYTES RELATIVE PERCENT: 27.7 % (ref 20–42)
MCH RBC QN AUTO: 30.3 PG (ref 26–35)
MCHC RBC AUTO-ENTMCNC: 31.7 % (ref 32–34.5)
MCV RBC AUTO: 95.4 FL (ref 80–99.9)
MONOCYTES ABSOLUTE: 0.47 E9/L (ref 0.1–0.95)
MONOCYTES RELATIVE PERCENT: 9.1 % (ref 2–12)
NEUTROPHILS ABSOLUTE: 2.94 E9/L (ref 1.8–7.3)
NEUTROPHILS RELATIVE PERCENT: 56.9 % (ref 43–80)
PDW BLD-RTO: 16.8 FL (ref 11.5–15)
PLATELET # BLD: 283 E9/L (ref 130–450)
PMV BLD AUTO: 10.7 FL (ref 7–12)
POTASSIUM SERPL-SCNC: 4.6 MMOL/L (ref 3.5–5)
RBC # BLD: 4.39 E12/L (ref 3.5–5.5)
SODIUM BLD-SCNC: 143 MMOL/L (ref 132–146)
WBC # BLD: 5.2 E9/L (ref 4.5–11.5)

## 2019-11-24 PROCEDURE — 6370000000 HC RX 637 (ALT 250 FOR IP): Performed by: INTERNAL MEDICINE

## 2019-11-24 PROCEDURE — 6360000002 HC RX W HCPCS: Performed by: INTERNAL MEDICINE

## 2019-11-24 PROCEDURE — 36415 COLL VENOUS BLD VENIPUNCTURE: CPT

## 2019-11-24 PROCEDURE — 85025 COMPLETE CBC W/AUTO DIFF WBC: CPT

## 2019-11-24 PROCEDURE — 2580000003 HC RX 258: Performed by: INTERNAL MEDICINE

## 2019-11-24 PROCEDURE — 80048 BASIC METABOLIC PNL TOTAL CA: CPT

## 2019-11-24 PROCEDURE — 1200000000 HC SEMI PRIVATE

## 2019-11-24 PROCEDURE — 96372 THER/PROPH/DIAG INJ SC/IM: CPT

## 2019-11-24 RX ORDER — POTASSIUM CHLORIDE 20 MEQ/1
20 TABLET, EXTENDED RELEASE ORAL ONCE
Status: DISCONTINUED | OUTPATIENT
Start: 2019-11-24 | End: 2019-11-24

## 2019-11-24 RX ADMIN — GABAPENTIN 100 MG: 100 CAPSULE ORAL at 22:38

## 2019-11-24 RX ADMIN — ENOXAPARIN SODIUM 40 MG: 40 INJECTION SUBCUTANEOUS at 08:59

## 2019-11-24 RX ADMIN — VITAMIN D, TAB 1000IU (100/BT) 1000 UNITS: 25 TAB at 08:58

## 2019-11-24 RX ADMIN — Medication 10 ML: at 22:39

## 2019-11-24 RX ADMIN — PANTOPRAZOLE SODIUM 40 MG: 40 TABLET, DELAYED RELEASE ORAL at 05:57

## 2019-11-24 RX ADMIN — ATENOLOL 25 MG: 25 TABLET ORAL at 22:38

## 2019-11-24 RX ADMIN — ASPIRIN 81 MG: 81 TABLET, COATED ORAL at 22:38

## 2019-11-24 RX ADMIN — PROBENECID 500 MG: 500 TABLET, FILM COATED ORAL at 08:58

## 2019-11-24 RX ADMIN — PROBENECID 500 MG: 500 TABLET, FILM COATED ORAL at 22:38

## 2019-11-24 RX ADMIN — GABAPENTIN 100 MG: 100 CAPSULE ORAL at 13:50

## 2019-11-24 RX ADMIN — PRAVASTATIN SODIUM 40 MG: 20 TABLET ORAL at 22:38

## 2019-11-24 RX ADMIN — ATENOLOL 25 MG: 25 TABLET ORAL at 08:58

## 2019-11-24 RX ADMIN — POTASSIUM CHLORIDE 20 MEQ: 20 TABLET, EXTENDED RELEASE ORAL at 22:39

## 2019-11-24 RX ADMIN — GABAPENTIN 100 MG: 100 CAPSULE ORAL at 08:58

## 2019-11-24 RX ADMIN — POTASSIUM CHLORIDE 20 MEQ: 20 TABLET, EXTENDED RELEASE ORAL at 08:59

## 2019-11-24 RX ADMIN — HYDROCODONE BITARTRATE AND ACETAMINOPHEN 1 TABLET: 5; 325 TABLET ORAL at 13:50

## 2019-11-24 RX ADMIN — TORSEMIDE 20 MG: 20 TABLET ORAL at 08:59

## 2019-11-24 RX ADMIN — SENNOSIDES 8.6 MG: 8.6 TABLET, FILM COATED ORAL at 08:58

## 2019-11-24 RX ADMIN — DOCUSATE SODIUM 100 MG: 100 CAPSULE, LIQUID FILLED ORAL at 08:58

## 2019-11-24 RX ADMIN — OXYBUTYNIN CHLORIDE 10 MG: 5 TABLET ORAL at 08:58

## 2019-11-24 RX ADMIN — LEVOTHYROXINE SODIUM 125 MCG: 125 TABLET ORAL at 05:57

## 2019-11-24 RX ADMIN — Medication 10 ML: at 09:02

## 2019-11-24 ASSESSMENT — PAIN DESCRIPTION - DESCRIPTORS
DESCRIPTORS: ACHING;DISCOMFORT

## 2019-11-24 ASSESSMENT — PAIN - FUNCTIONAL ASSESSMENT
PAIN_FUNCTIONAL_ASSESSMENT: PREVENTS OR INTERFERES SOME ACTIVE ACTIVITIES AND ADLS

## 2019-11-24 ASSESSMENT — PAIN DESCRIPTION - PAIN TYPE
TYPE: CHRONIC PAIN

## 2019-11-24 ASSESSMENT — PAIN SCALES - GENERAL
PAINLEVEL_OUTOF10: 0
PAINLEVEL_OUTOF10: 7
PAINLEVEL_OUTOF10: 0
PAINLEVEL_OUTOF10: 7
PAINLEVEL_OUTOF10: 3

## 2019-11-24 ASSESSMENT — PAIN DESCRIPTION - ONSET
ONSET: ON-GOING

## 2019-11-24 ASSESSMENT — PAIN DESCRIPTION - FREQUENCY
FREQUENCY: CONTINUOUS

## 2019-11-24 ASSESSMENT — PAIN DESCRIPTION - PROGRESSION
CLINICAL_PROGRESSION: NOT CHANGED

## 2019-11-24 ASSESSMENT — PAIN DESCRIPTION - LOCATION
LOCATION: KNEE

## 2019-11-24 ASSESSMENT — PAIN DESCRIPTION - ORIENTATION
ORIENTATION: LEFT;RIGHT
ORIENTATION: RIGHT;LEFT
ORIENTATION: RIGHT;LEFT

## 2019-11-25 ENCOUNTER — APPOINTMENT (OUTPATIENT)
Dept: GENERAL RADIOLOGY | Age: 74
DRG: 947 | End: 2019-11-25
Payer: COMMERCIAL

## 2019-11-25 PROBLEM — N30.00 ACUTE CYSTITIS: Status: RESOLVED | Noted: 2019-05-28 | Resolved: 2019-11-25

## 2019-11-25 PROBLEM — R65.21 SEPTIC SHOCK (HCC): Status: RESOLVED | Noted: 2019-09-13 | Resolved: 2019-11-25

## 2019-11-25 PROBLEM — G93.41 ACUTE METABOLIC ENCEPHALOPATHY: Status: RESOLVED | Noted: 2019-05-15 | Resolved: 2019-11-25

## 2019-11-25 PROBLEM — A41.9 SEPSIS (HCC): Status: RESOLVED | Noted: 2019-05-14 | Resolved: 2019-11-25

## 2019-11-25 PROBLEM — M54.50 ACUTE MIDLINE LOW BACK PAIN WITHOUT SCIATICA: Status: RESOLVED | Noted: 2019-06-01 | Resolved: 2019-11-25

## 2019-11-25 PROBLEM — R10.31 RIGHT LOWER QUADRANT PAIN: Status: RESOLVED | Noted: 2019-04-30 | Resolved: 2019-11-25

## 2019-11-25 PROBLEM — R74.01 TRANSAMINITIS: Status: RESOLVED | Noted: 2019-07-30 | Resolved: 2019-11-25

## 2019-11-25 PROBLEM — J18.9 PNEUMONIA: Status: RESOLVED | Noted: 2019-05-27 | Resolved: 2019-11-25

## 2019-11-25 PROBLEM — A41.9 SEPTIC SHOCK (HCC): Status: RESOLVED | Noted: 2019-09-13 | Resolved: 2019-11-25

## 2019-11-25 PROBLEM — A04.72 CLOSTRIDIUM DIFFICILE DIARRHEA: Status: RESOLVED | Noted: 2019-05-16 | Resolved: 2019-11-25

## 2019-11-25 PROCEDURE — 6370000000 HC RX 637 (ALT 250 FOR IP): Performed by: INTERNAL MEDICINE

## 2019-11-25 PROCEDURE — 6360000002 HC RX W HCPCS: Performed by: INTERNAL MEDICINE

## 2019-11-25 PROCEDURE — 73564 X-RAY EXAM KNEE 4 OR MORE: CPT

## 2019-11-25 PROCEDURE — 1200000000 HC SEMI PRIVATE

## 2019-11-25 PROCEDURE — 96372 THER/PROPH/DIAG INJ SC/IM: CPT

## 2019-11-25 PROCEDURE — 2580000003 HC RX 258: Performed by: INTERNAL MEDICINE

## 2019-11-25 RX ORDER — LIDOCAINE HYDROCHLORIDE 10 MG/ML
4 INJECTION, SOLUTION EPIDURAL; INFILTRATION; INTRACAUDAL; PERINEURAL ONCE
Status: DISCONTINUED | OUTPATIENT
Start: 2019-11-25 | End: 2019-11-25

## 2019-11-25 RX ORDER — TRIAMCINOLONE ACETONIDE 40 MG/ML
80 INJECTION, SUSPENSION INTRA-ARTICULAR; INTRAMUSCULAR ONCE
Status: DISCONTINUED | OUTPATIENT
Start: 2019-11-25 | End: 2019-11-25

## 2019-11-25 RX ORDER — LIDOCAINE HYDROCHLORIDE 10 MG/ML
INJECTION, SOLUTION EPIDURAL; INFILTRATION; INTRACAUDAL; PERINEURAL
Status: DISPENSED
Start: 2019-11-25 | End: 2019-11-25

## 2019-11-25 RX ADMIN — ASPIRIN 81 MG: 81 TABLET, COATED ORAL at 20:59

## 2019-11-25 RX ADMIN — TORSEMIDE 20 MG: 20 TABLET ORAL at 09:18

## 2019-11-25 RX ADMIN — Medication 10 ML: at 21:01

## 2019-11-25 RX ADMIN — VITAMIN D, TAB 1000IU (100/BT) 1000 UNITS: 25 TAB at 09:19

## 2019-11-25 RX ADMIN — PRAVASTATIN SODIUM 40 MG: 20 TABLET ORAL at 20:59

## 2019-11-25 RX ADMIN — DOCUSATE SODIUM 100 MG: 100 CAPSULE, LIQUID FILLED ORAL at 09:19

## 2019-11-25 RX ADMIN — GABAPENTIN 100 MG: 100 CAPSULE ORAL at 20:59

## 2019-11-25 RX ADMIN — POTASSIUM CHLORIDE 20 MEQ: 20 TABLET, EXTENDED RELEASE ORAL at 20:58

## 2019-11-25 RX ADMIN — OXYBUTYNIN CHLORIDE 10 MG: 5 TABLET ORAL at 09:19

## 2019-11-25 RX ADMIN — ATENOLOL 25 MG: 25 TABLET ORAL at 21:01

## 2019-11-25 RX ADMIN — PANTOPRAZOLE SODIUM 40 MG: 40 TABLET, DELAYED RELEASE ORAL at 05:40

## 2019-11-25 RX ADMIN — DOCUSATE SODIUM 100 MG: 100 CAPSULE, LIQUID FILLED ORAL at 20:59

## 2019-11-25 RX ADMIN — ENOXAPARIN SODIUM 40 MG: 40 INJECTION SUBCUTANEOUS at 09:18

## 2019-11-25 RX ADMIN — ATENOLOL 25 MG: 25 TABLET ORAL at 09:19

## 2019-11-25 RX ADMIN — GABAPENTIN 100 MG: 100 CAPSULE ORAL at 09:19

## 2019-11-25 RX ADMIN — HYDROCODONE BITARTRATE AND ACETAMINOPHEN 1 TABLET: 5; 325 TABLET ORAL at 13:30

## 2019-11-25 RX ADMIN — PROBENECID 500 MG: 500 TABLET, FILM COATED ORAL at 09:19

## 2019-11-25 RX ADMIN — GABAPENTIN 100 MG: 100 CAPSULE ORAL at 13:25

## 2019-11-25 RX ADMIN — LEVOTHYROXINE SODIUM 125 MCG: 125 TABLET ORAL at 05:40

## 2019-11-25 RX ADMIN — Medication 10 ML: at 09:20

## 2019-11-25 RX ADMIN — POTASSIUM CHLORIDE 20 MEQ: 20 TABLET, EXTENDED RELEASE ORAL at 09:18

## 2019-11-25 RX ADMIN — PROBENECID 500 MG: 500 TABLET, FILM COATED ORAL at 20:58

## 2019-11-25 ASSESSMENT — PAIN SCALES - GENERAL
PAINLEVEL_OUTOF10: 0
PAINLEVEL_OUTOF10: 6
PAINLEVEL_OUTOF10: 8

## 2019-11-26 VITALS
SYSTOLIC BLOOD PRESSURE: 113 MMHG | BODY MASS INDEX: 28.14 KG/M2 | WEIGHT: 175.06 LBS | RESPIRATION RATE: 18 BRPM | DIASTOLIC BLOOD PRESSURE: 59 MMHG | HEIGHT: 66 IN | TEMPERATURE: 98.6 F | OXYGEN SATURATION: 92 % | HEART RATE: 78 BPM

## 2019-11-26 PROCEDURE — 97530 THERAPEUTIC ACTIVITIES: CPT

## 2019-11-26 PROCEDURE — 6370000000 HC RX 637 (ALT 250 FOR IP): Performed by: INTERNAL MEDICINE

## 2019-11-26 PROCEDURE — 97110 THERAPEUTIC EXERCISES: CPT

## 2019-11-26 PROCEDURE — 97161 PT EVAL LOW COMPLEX 20 MIN: CPT

## 2019-11-26 PROCEDURE — 96372 THER/PROPH/DIAG INJ SC/IM: CPT

## 2019-11-26 PROCEDURE — 2580000003 HC RX 258: Performed by: INTERNAL MEDICINE

## 2019-11-26 PROCEDURE — 6360000002 HC RX W HCPCS: Performed by: INTERNAL MEDICINE

## 2019-11-26 PROCEDURE — 97165 OT EVAL LOW COMPLEX 30 MIN: CPT

## 2019-11-26 PROCEDURE — 97535 SELF CARE MNGMENT TRAINING: CPT

## 2019-11-26 RX ORDER — ESCITALOPRAM OXALATE 10 MG/1
10 TABLET ORAL DAILY
Qty: 30 TABLET | Refills: 3 | Status: ON HOLD | OUTPATIENT
Start: 2019-11-26 | End: 2019-12-27

## 2019-11-26 RX ORDER — ESCITALOPRAM OXALATE 10 MG/1
10 TABLET ORAL DAILY
Status: DISCONTINUED | OUTPATIENT
Start: 2019-11-26 | End: 2019-11-26 | Stop reason: HOSPADM

## 2019-11-26 RX ADMIN — Medication 10 ML: at 08:07

## 2019-11-26 RX ADMIN — OXYBUTYNIN CHLORIDE 10 MG: 5 TABLET ORAL at 08:06

## 2019-11-26 RX ADMIN — PANTOPRAZOLE SODIUM 40 MG: 40 TABLET, DELAYED RELEASE ORAL at 06:16

## 2019-11-26 RX ADMIN — ATENOLOL 25 MG: 25 TABLET ORAL at 08:06

## 2019-11-26 RX ADMIN — ENOXAPARIN SODIUM 40 MG: 40 INJECTION SUBCUTANEOUS at 08:06

## 2019-11-26 RX ADMIN — TORSEMIDE 20 MG: 20 TABLET ORAL at 08:06

## 2019-11-26 RX ADMIN — GABAPENTIN 100 MG: 100 CAPSULE ORAL at 13:23

## 2019-11-26 RX ADMIN — GABAPENTIN 100 MG: 100 CAPSULE ORAL at 08:06

## 2019-11-26 RX ADMIN — ESCITALOPRAM OXALATE 10 MG: 10 TABLET ORAL at 08:06

## 2019-11-26 RX ADMIN — VITAMIN D, TAB 1000IU (100/BT) 1000 UNITS: 25 TAB at 08:06

## 2019-11-26 RX ADMIN — POTASSIUM CHLORIDE 20 MEQ: 20 TABLET, EXTENDED RELEASE ORAL at 08:06

## 2019-11-26 RX ADMIN — PROBENECID 500 MG: 500 TABLET, FILM COATED ORAL at 08:08

## 2019-11-26 RX ADMIN — DOCUSATE SODIUM 100 MG: 100 CAPSULE, LIQUID FILLED ORAL at 08:06

## 2019-11-26 RX ADMIN — LEVOTHYROXINE SODIUM 125 MCG: 125 TABLET ORAL at 06:16

## 2019-11-26 ASSESSMENT — PAIN SCALES - GENERAL
PAINLEVEL_OUTOF10: 8
PAINLEVEL_OUTOF10: 9

## 2019-11-27 DIAGNOSIS — M46.26 OSTEOMYELITIS OF LUMBAR SPINE (HCC): Primary | ICD-10-CM

## 2019-12-10 ENCOUNTER — OFFICE VISIT (OUTPATIENT)
Dept: NEUROSURGERY | Age: 74
End: 2019-12-10
Payer: COMMERCIAL

## 2019-12-10 VITALS
HEART RATE: 76 BPM | HEIGHT: 66 IN | DIASTOLIC BLOOD PRESSURE: 55 MMHG | SYSTOLIC BLOOD PRESSURE: 105 MMHG | BODY MASS INDEX: 28.26 KG/M2

## 2019-12-10 DIAGNOSIS — M46.26 OSTEOMYELITIS OF LUMBAR SPINE (HCC): Primary | ICD-10-CM

## 2019-12-10 PROCEDURE — 99213 OFFICE O/P EST LOW 20 MIN: CPT | Performed by: NEUROLOGICAL SURGERY

## 2019-12-13 ENCOUNTER — OUTSIDE SERVICES (OUTPATIENT)
Dept: PODIATRY | Age: 74
End: 2019-12-13
Payer: COMMERCIAL

## 2019-12-13 DIAGNOSIS — M79.674 PAIN OF TOE OF RIGHT FOOT: ICD-10-CM

## 2019-12-13 DIAGNOSIS — B35.1 ONYCHOMYCOSIS: Primary | ICD-10-CM

## 2019-12-13 DIAGNOSIS — M79.675 PAIN OF TOE OF LEFT FOOT: ICD-10-CM

## 2019-12-13 DIAGNOSIS — R26.2 DIFFICULTY WALKING: ICD-10-CM

## 2019-12-13 DIAGNOSIS — I73.9 PVD (PERIPHERAL VASCULAR DISEASE) (HCC): ICD-10-CM

## 2019-12-13 PROCEDURE — 11721 DEBRIDE NAIL 6 OR MORE: CPT | Performed by: PODIATRIST

## 2019-12-26 ENCOUNTER — APPOINTMENT (OUTPATIENT)
Dept: GENERAL RADIOLOGY | Age: 74
End: 2019-12-26
Payer: COMMERCIAL

## 2019-12-26 ENCOUNTER — HOSPITAL ENCOUNTER (EMERGENCY)
Age: 74
Discharge: HOME | End: 2019-12-26
Attending: EMERGENCY MEDICINE
Payer: COMMERCIAL

## 2019-12-26 VITALS
TEMPERATURE: 97.8 F | SYSTOLIC BLOOD PRESSURE: 121 MMHG | HEART RATE: 85 BPM | WEIGHT: 170 LBS | RESPIRATION RATE: 18 BRPM | DIASTOLIC BLOOD PRESSURE: 62 MMHG | OXYGEN SATURATION: 98 % | BODY MASS INDEX: 27.44 KG/M2

## 2019-12-26 DIAGNOSIS — M25.512 ACUTE PAIN OF LEFT SHOULDER: ICD-10-CM

## 2019-12-26 DIAGNOSIS — M77.8 SHOULDER TENDINITIS, LEFT: Primary | ICD-10-CM

## 2019-12-26 PROCEDURE — 6370000000 HC RX 637 (ALT 250 FOR IP): Performed by: NURSE PRACTITIONER

## 2019-12-26 PROCEDURE — 73030 X-RAY EXAM OF SHOULDER: CPT

## 2019-12-26 PROCEDURE — 99283 EMERGENCY DEPT VISIT LOW MDM: CPT

## 2019-12-26 RX ORDER — ACETAMINOPHEN 500 MG
1000 TABLET ORAL ONCE
Status: COMPLETED | OUTPATIENT
Start: 2019-12-26 | End: 2019-12-26

## 2019-12-26 RX ORDER — METHYLPREDNISOLONE 4 MG/1
TABLET ORAL
Qty: 1 KIT | Refills: 0 | Status: ON HOLD | OUTPATIENT
Start: 2019-12-26 | End: 2019-12-27

## 2019-12-26 RX ADMIN — ACETAMINOPHEN 1000 MG: 500 TABLET ORAL at 13:35

## 2019-12-26 ASSESSMENT — PAIN DESCRIPTION - LOCATION
LOCATION: SHOULDER
LOCATION: SHOULDER

## 2019-12-26 ASSESSMENT — PAIN DESCRIPTION - DESCRIPTORS: DESCRIPTORS: ACHING

## 2019-12-26 ASSESSMENT — PAIN SCALES - GENERAL
PAINLEVEL_OUTOF10: 6
PAINLEVEL_OUTOF10: 9
PAINLEVEL_OUTOF10: 8

## 2019-12-26 ASSESSMENT — PAIN DESCRIPTION - PAIN TYPE
TYPE: ACUTE PAIN
TYPE: ACUTE PAIN

## 2019-12-26 ASSESSMENT — PAIN DESCRIPTION - ORIENTATION
ORIENTATION: LEFT
ORIENTATION: LEFT

## 2019-12-27 ENCOUNTER — APPOINTMENT (OUTPATIENT)
Dept: GENERAL RADIOLOGY | Age: 74
End: 2019-12-27
Payer: COMMERCIAL

## 2019-12-27 ENCOUNTER — HOSPITAL ENCOUNTER (OUTPATIENT)
Age: 74
Setting detail: OBSERVATION
Discharge: NURSING, REHAB OR LONG-TERM CARE | End: 2019-12-31
Attending: EMERGENCY MEDICINE | Admitting: INTERNAL MEDICINE
Payer: COMMERCIAL

## 2019-12-27 ENCOUNTER — APPOINTMENT (OUTPATIENT)
Dept: CT IMAGING | Age: 74
End: 2019-12-27
Payer: COMMERCIAL

## 2019-12-27 PROBLEM — M54.9 INTRACTABLE BACK PAIN: Status: ACTIVE | Noted: 2019-12-27

## 2019-12-27 LAB
ALBUMIN SERPL-MCNC: 2.5 G/DL (ref 3.5–5.2)
ALP BLD-CCNC: 96 U/L (ref 35–104)
ALT SERPL-CCNC: 12 U/L (ref 0–32)
ANION GAP SERPL CALCULATED.3IONS-SCNC: 12 MMOL/L (ref 7–16)
AST SERPL-CCNC: 24 U/L (ref 0–31)
BACTERIA: ABNORMAL /HPF
BASOPHILS ABSOLUTE: 0.02 E9/L (ref 0–0.2)
BASOPHILS RELATIVE PERCENT: 0.2 % (ref 0–2)
BILIRUB SERPL-MCNC: 0.3 MG/DL (ref 0–1.2)
BILIRUBIN URINE: NEGATIVE
BLOOD, URINE: NEGATIVE
BUN BLDV-MCNC: 25 MG/DL (ref 8–23)
CALCIUM SERPL-MCNC: 8.2 MG/DL (ref 8.6–10.2)
CHLORIDE BLD-SCNC: 100 MMOL/L (ref 98–107)
CLARITY: CLEAR
CO2: 19 MMOL/L (ref 22–29)
COLOR: YELLOW
CREAT SERPL-MCNC: 0.9 MG/DL (ref 0.5–1)
EKG ATRIAL RATE: 84 BPM
EKG P AXIS: 34 DEGREES
EKG P-R INTERVAL: 140 MS
EKG Q-T INTERVAL: 388 MS
EKG QRS DURATION: 76 MS
EKG QTC CALCULATION (BAZETT): 458 MS
EKG R AXIS: 15 DEGREES
EKG T AXIS: 24 DEGREES
EKG VENTRICULAR RATE: 84 BPM
EOSINOPHILS ABSOLUTE: 0.01 E9/L (ref 0.05–0.5)
EOSINOPHILS RELATIVE PERCENT: 0.1 % (ref 0–6)
GFR AFRICAN AMERICAN: >60
GFR NON-AFRICAN AMERICAN: >60 ML/MIN/1.73
GLUCOSE BLD-MCNC: 115 MG/DL (ref 74–99)
GLUCOSE URINE: NEGATIVE MG/DL
HCT VFR BLD CALC: 39.7 % (ref 34–48)
HEMOGLOBIN: 13 G/DL (ref 11.5–15.5)
IMMATURE GRANULOCYTES #: 0.06 E9/L
IMMATURE GRANULOCYTES %: 0.7 % (ref 0–5)
KETONES, URINE: ABNORMAL MG/DL
LEUKOCYTE ESTERASE, URINE: NEGATIVE
LYMPHOCYTES ABSOLUTE: 0.67 E9/L (ref 1.5–4)
LYMPHOCYTES RELATIVE PERCENT: 7.3 % (ref 20–42)
MAGNESIUM: 2 MG/DL (ref 1.6–2.6)
MCH RBC QN AUTO: 30.9 PG (ref 26–35)
MCHC RBC AUTO-ENTMCNC: 32.7 % (ref 32–34.5)
MCV RBC AUTO: 94.3 FL (ref 80–99.9)
MONOCYTES ABSOLUTE: 0.72 E9/L (ref 0.1–0.95)
MONOCYTES RELATIVE PERCENT: 7.9 % (ref 2–12)
NEUTROPHILS ABSOLUTE: 7.69 E9/L (ref 1.8–7.3)
NEUTROPHILS RELATIVE PERCENT: 83.8 % (ref 43–80)
NITRITE, URINE: POSITIVE
PDW BLD-RTO: 14.6 FL (ref 11.5–15)
PH UA: 5.5 (ref 5–9)
PLATELET # BLD: 176 E9/L (ref 130–450)
PMV BLD AUTO: 11.5 FL (ref 7–12)
POTASSIUM SERPL-SCNC: 4.4 MMOL/L (ref 3.5–5)
PROTEIN UA: NEGATIVE MG/DL
RBC # BLD: 4.21 E12/L (ref 3.5–5.5)
RBC UA: ABNORMAL /HPF (ref 0–2)
SODIUM BLD-SCNC: 131 MMOL/L (ref 132–146)
SPECIFIC GRAVITY UA: 1.01 (ref 1–1.03)
TOTAL PROTEIN: 5.6 G/DL (ref 6.4–8.3)
TROPONIN: 0.03 NG/ML (ref 0–0.03)
UROBILINOGEN, URINE: 0.2 E.U./DL
WBC # BLD: 9.2 E9/L (ref 4.5–11.5)
WBC UA: ABNORMAL /HPF (ref 0–5)

## 2019-12-27 PROCEDURE — 72131 CT LUMBAR SPINE W/O DYE: CPT

## 2019-12-27 PROCEDURE — 99285 EMERGENCY DEPT VISIT HI MDM: CPT

## 2019-12-27 PROCEDURE — 85025 COMPLETE CBC W/AUTO DIFF WBC: CPT

## 2019-12-27 PROCEDURE — G0378 HOSPITAL OBSERVATION PER HR: HCPCS

## 2019-12-27 PROCEDURE — 83735 ASSAY OF MAGNESIUM: CPT

## 2019-12-27 PROCEDURE — 93005 ELECTROCARDIOGRAM TRACING: CPT | Performed by: STUDENT IN AN ORGANIZED HEALTH CARE EDUCATION/TRAINING PROGRAM

## 2019-12-27 PROCEDURE — 71046 X-RAY EXAM CHEST 2 VIEWS: CPT

## 2019-12-27 PROCEDURE — 81001 URINALYSIS AUTO W/SCOPE: CPT

## 2019-12-27 PROCEDURE — 2580000003 HC RX 258: Performed by: STUDENT IN AN ORGANIZED HEALTH CARE EDUCATION/TRAINING PROGRAM

## 2019-12-27 PROCEDURE — 87186 SC STD MICRODIL/AGAR DIL: CPT

## 2019-12-27 PROCEDURE — 6370000000 HC RX 637 (ALT 250 FOR IP): Performed by: INTERNAL MEDICINE

## 2019-12-27 PROCEDURE — 70450 CT HEAD/BRAIN W/O DYE: CPT

## 2019-12-27 PROCEDURE — 80053 COMPREHEN METABOLIC PANEL: CPT

## 2019-12-27 PROCEDURE — 84484 ASSAY OF TROPONIN QUANT: CPT

## 2019-12-27 PROCEDURE — 87077 CULTURE AEROBIC IDENTIFY: CPT

## 2019-12-27 PROCEDURE — 36415 COLL VENOUS BLD VENIPUNCTURE: CPT

## 2019-12-27 PROCEDURE — 87088 URINE BACTERIA CULTURE: CPT

## 2019-12-27 RX ORDER — HYDROCODONE BITARTRATE AND ACETAMINOPHEN 5; 325 MG/1; MG/1
1 TABLET ORAL EVERY 8 HOURS PRN
Status: DISCONTINUED | OUTPATIENT
Start: 2019-12-27 | End: 2019-12-31 | Stop reason: HOSPADM

## 2019-12-27 RX ORDER — ATENOLOL 25 MG/1
25 TABLET ORAL DAILY
Status: DISCONTINUED | OUTPATIENT
Start: 2019-12-27 | End: 2019-12-31 | Stop reason: HOSPADM

## 2019-12-27 RX ORDER — PROBENECID 500 MG/1
500 TABLET, FILM COATED ORAL 2 TIMES DAILY
Status: DISCONTINUED | OUTPATIENT
Start: 2019-12-27 | End: 2019-12-31 | Stop reason: HOSPADM

## 2019-12-27 RX ORDER — ACETAMINOPHEN 325 MG/1
650 TABLET ORAL EVERY 4 HOURS PRN
Status: DISCONTINUED | OUTPATIENT
Start: 2019-12-27 | End: 2019-12-31 | Stop reason: HOSPADM

## 2019-12-27 RX ORDER — DOCUSATE SODIUM 100 MG/1
100 CAPSULE, LIQUID FILLED ORAL 2 TIMES DAILY
Status: DISCONTINUED | OUTPATIENT
Start: 2019-12-27 | End: 2019-12-31 | Stop reason: HOSPADM

## 2019-12-27 RX ORDER — ASPIRIN 81 MG/1
81 TABLET, CHEWABLE ORAL NIGHTLY
Status: DISCONTINUED | OUTPATIENT
Start: 2019-12-27 | End: 2019-12-31 | Stop reason: HOSPADM

## 2019-12-27 RX ORDER — 0.9 % SODIUM CHLORIDE 0.9 %
1000 INTRAVENOUS SOLUTION INTRAVENOUS ONCE
Status: DISCONTINUED | OUTPATIENT
Start: 2019-12-27 | End: 2019-12-31 | Stop reason: HOSPADM

## 2019-12-27 RX ORDER — SENNA PLUS 8.6 MG/1
1 TABLET ORAL DAILY
Status: DISCONTINUED | OUTPATIENT
Start: 2019-12-27 | End: 2019-12-31 | Stop reason: HOSPADM

## 2019-12-27 RX ORDER — LEVOTHYROXINE SODIUM 0.12 MG/1
125 TABLET ORAL DAILY
Status: DISCONTINUED | OUTPATIENT
Start: 2019-12-28 | End: 2019-12-31 | Stop reason: HOSPADM

## 2019-12-27 RX ORDER — 0.9 % SODIUM CHLORIDE 0.9 %
1000 INTRAVENOUS SOLUTION INTRAVENOUS ONCE
Status: COMPLETED | OUTPATIENT
Start: 2019-12-27 | End: 2019-12-27

## 2019-12-27 RX ORDER — PRAVASTATIN SODIUM 20 MG
40 TABLET ORAL NIGHTLY
Status: DISCONTINUED | OUTPATIENT
Start: 2019-12-27 | End: 2019-12-31 | Stop reason: HOSPADM

## 2019-12-27 RX ORDER — CYCLOBENZAPRINE HCL 10 MG
10 TABLET ORAL EVERY 8 HOURS PRN
Status: DISCONTINUED | OUTPATIENT
Start: 2019-12-27 | End: 2019-12-31 | Stop reason: HOSPADM

## 2019-12-27 RX ORDER — TORSEMIDE 20 MG/1
20 TABLET ORAL DAILY
Status: DISCONTINUED | OUTPATIENT
Start: 2019-12-27 | End: 2019-12-31 | Stop reason: HOSPADM

## 2019-12-27 RX ORDER — MULTIVITAMIN WITH FOLIC ACID 400 MCG
1 TABLET ORAL DAILY
Status: DISCONTINUED | OUTPATIENT
Start: 2019-12-27 | End: 2019-12-31 | Stop reason: HOSPADM

## 2019-12-27 RX ADMIN — PROBENECID 500 MG: 500 TABLET, FILM COATED ORAL at 23:04

## 2019-12-27 RX ADMIN — DOCUSATE SODIUM 100 MG: 100 CAPSULE, LIQUID FILLED ORAL at 21:47

## 2019-12-27 RX ADMIN — PRAVASTATIN SODIUM 40 MG: 20 TABLET ORAL at 21:47

## 2019-12-27 RX ADMIN — SODIUM CHLORIDE 1000 ML: 9 INJECTION, SOLUTION INTRAVENOUS at 16:50

## 2019-12-27 RX ADMIN — HYDROCODONE BITARTRATE AND ACETAMINOPHEN 1 TABLET: 5; 325 TABLET ORAL at 21:55

## 2019-12-27 RX ADMIN — ASPIRIN 81 MG 81 MG: 81 TABLET ORAL at 21:47

## 2019-12-27 ASSESSMENT — PAIN SCALES - GENERAL
PAINLEVEL_OUTOF10: 5
PAINLEVEL_OUTOF10: 7
PAINLEVEL_OUTOF10: 5
PAINLEVEL_OUTOF10: 3
PAINLEVEL_OUTOF10: 3

## 2019-12-27 ASSESSMENT — PAIN - FUNCTIONAL ASSESSMENT
PAIN_FUNCTIONAL_ASSESSMENT: PREVENTS OR INTERFERES WITH ALL ACTIVE AND SOME PASSIVE ACTIVITIES
PAIN_FUNCTIONAL_ASSESSMENT: PREVENTS OR INTERFERES WITH MANY ACTIVE NOT PASSIVE ACTIVITIES
PAIN_FUNCTIONAL_ASSESSMENT: PREVENTS OR INTERFERES SOME ACTIVE ACTIVITIES AND ADLS
PAIN_FUNCTIONAL_ASSESSMENT: PREVENTS OR INTERFERES SOME ACTIVE ACTIVITIES AND ADLS

## 2019-12-27 ASSESSMENT — PAIN DESCRIPTION - PROGRESSION
CLINICAL_PROGRESSION: NOT CHANGED
CLINICAL_PROGRESSION: GRADUALLY WORSENING
CLINICAL_PROGRESSION: NOT CHANGED
CLINICAL_PROGRESSION: GRADUALLY WORSENING

## 2019-12-27 ASSESSMENT — PAIN DESCRIPTION - ONSET
ONSET: GRADUAL
ONSET: ON-GOING
ONSET: ON-GOING
ONSET: GRADUAL

## 2019-12-27 ASSESSMENT — PAIN DESCRIPTION - DESCRIPTORS
DESCRIPTORS: ACHING;CONSTANT;DISCOMFORT
DESCRIPTORS: ACHING;DISCOMFORT
DESCRIPTORS: ACHING;DULL;DISCOMFORT;CONSTANT
DESCRIPTORS: ACHING;CONSTANT;DISCOMFORT
DESCRIPTORS: ACHING;SORE

## 2019-12-27 ASSESSMENT — PAIN DESCRIPTION - ORIENTATION
ORIENTATION: LEFT

## 2019-12-27 ASSESSMENT — PAIN DESCRIPTION - PAIN TYPE
TYPE: ACUTE PAIN

## 2019-12-27 ASSESSMENT — PAIN DESCRIPTION - LOCATION
LOCATION: SHOULDER
LOCATION: SHOULDER
LOCATION: GENERALIZED
LOCATION: SHOULDER
LOCATION: SHOULDER

## 2019-12-27 ASSESSMENT — PAIN DESCRIPTION - FREQUENCY
FREQUENCY: CONTINUOUS

## 2019-12-27 NOTE — ED NOTES
Bed:  [place] [address]  Expected date:   Expected time:   Means of arrival:   Comments:  ems     Milka Javier RN  12/27/19 7637

## 2019-12-27 NOTE — ED PROVIDER NOTES
The patient is a 74year-old-female who presents to the ED complaining of weakness, fatigue, and frequent falls. The patient was seen here yesterday after experiencing a fall, and she was discharged home and states when she went home that she was having difficulty ambulating. She states that when she walked into her home her legs gave out and she slid to the ground. The patient was recently staying at [facility], she states she was there for rehab and sent home. The patient lives at home by herself. The patient denies hitting her head, loss of consciousness, nausea, vomiting, diarrhea, abdominal pain, chest pain, shortness of breath, dizziness, lightheadedness, numbness, tingling, or other acute symptoms or concerns. The history is provided by the patient. Fatigue   Severity:  Moderate  Onset quality:  Gradual  Timing:  Constant  Progression:  Worsening  Chronicity:  New  Relieved by:  None tried  Worsened by:  Nothing  Ineffective treatments:  None tried  Associated symptoms: difficulty walking and falls    Associated symptoms: no abdominal pain, no chest pain, no cough, no diarrhea, no dizziness, no dysuria, no numbness in extremities, no fever, no headaches, no nausea, no near-syncope, no shortness of breath, no syncope and no vomiting         Review of Systems   Constitutional: Positive for fatigue. Negative for chills and fever. HENT: Negative for congestion and trouble swallowing. Eyes: Negative for photophobia and visual disturbance. Respiratory: Negative for cough and shortness of breath. Cardiovascular: Negative for chest pain, leg swelling, syncope and near-syncope. Gastrointestinal: Negative for abdominal pain, diarrhea, nausea and vomiting. Genitourinary: Negative for dysuria, flank pain and hematuria. Musculoskeletal: Positive for falls. Negative for back pain and neck pain. Skin: Negative for rash and wound. Neurological: Positive for weakness.  Negative for dizziness, dehydration consistent with Na of 131, no NASRA, CT head negative, CT lumbar spine without any acute abnormalities, and CXR negative. Consulted with PCP who accepted the patient for admission. Discussed results and plan with patient. She verbalized understanding and agreement to plan and admission. ED Course as of Dec 27 1654   Fri Dec 27, 2019   1647 Consulted with Dr. Pacheco Cagle, PCP, who accepted the patient for admission. [KG]      ED Course User Index  [KG] Puja Vega DO          EKG: This EKG is signed and interpreted by me. Rate: 84  Rhythm: Sinus  Interpretation: Normal sinus rhythm, normal axis, nonspecific ST changes in the anterior lateral leads, no ST depressions  Comparison: stable as compared to patient's most recent EKG 09/12/19    ED Course as of Dec 27 1654   Fri Dec 27, 2019   1647 Consulted with Dr. Pacheco Cagle, PCP, who accepted the patient for admission. [KG]      ED Course User Index  [KG] Puja Vega DO       --------------------------------------------- PAST HISTORY ---------------------------------------------  Past Medical History:  has a past medical history of Arthritis, Gout, Hyperlipidemia, Hypertension, Macular hole, right eye, and Thyroid disease. Past Surgical History:  has a past surgical history that includes Breast surgery (Right, 1982); Hysterectomy (1985); Cholecystectomy (1997); laminectomy (N/A, 6/3/2019); and skin biopsy. Social History:  reports that she has never smoked. She has never used smokeless tobacco. She reports previous alcohol use. She reports that she does not use drugs. Family History: family history includes Other in her mother. The patients home medications have been reviewed.     Allergies: Adrenalin [epinephrine] and Meropenem    -------------------------------------------------- RESULTS -------------------------------------------------    LABS:  Results for orders placed or performed during the hospital encounter of 12/27/19   CBC auto differential   Result Value Ref Range    WBC 9.2 4.5 - 11.5 E9/L    RBC 4.21 3.50 - 5.50 E12/L    Hemoglobin 13.0 11.5 - 15.5 g/dL    Hematocrit 39.7 34.0 - 48.0 %    MCV 94.3 80.0 - 99.9 fL    MCH 30.9 26.0 - 35.0 pg    MCHC 32.7 32.0 - 34.5 %    RDW 14.6 11.5 - 15.0 fL    Platelets 430 617 - 054 E9/L    MPV 11.5 7.0 - 12.0 fL    Neutrophils % 83.8 (H) 43.0 - 80.0 %    Immature Granulocytes % 0.7 0.0 - 5.0 %    Lymphocytes % 7.3 (L) 20.0 - 42.0 %    Monocytes % 7.9 2.0 - 12.0 %    Eosinophils % 0.1 0.0 - 6.0 %    Basophils % 0.2 0.0 - 2.0 %    Neutrophils Absolute 7.69 (H) 1.80 - 7.30 E9/L    Immature Granulocytes # 0.06 E9/L    Lymphocytes Absolute 0.67 (L) 1.50 - 4.00 E9/L    Monocytes Absolute 0.72 0.10 - 0.95 E9/L    Eosinophils Absolute 0.01 (L) 0.05 - 0.50 E9/L    Basophils Absolute 0.02 0.00 - 0.20 E9/L   Troponin   Result Value Ref Range    Troponin 0.03 0.00 - 0.03 ng/mL   Comprehensive Metabolic Panel   Result Value Ref Range    Sodium 131 (L) 132 - 146 mmol/L    Chloride 100 98 - 107 mmol/L    CO2 19 (L) 22 - 29 mmol/L    Anion Gap 12 7 - 16 mmol/L    Glucose 115 (H) 74 - 99 mg/dL    BUN 25 (H) 8 - 23 mg/dL    CREATININE 0.9 0.5 - 1.0 mg/dL    GFR Non-African American >60 >=60 mL/min/1.73    GFR African American >60     Calcium 8.2 (L) 8.6 - 10.2 mg/dL    Total Protein 5.6 (L) 6.4 - 8.3 g/dL    Alb 2.5 (L) 3.5 - 5.2 g/dL    Total Bilirubin 0.3 0.0 - 1.2 mg/dL    Alkaline Phosphatase 96 35 - 104 U/L    ALT 12 0 - 32 U/L    AST 24 0 - 31 U/L    Potassium 4.4 3.5 - 5.0 mmol/L   Magnesium   Result Value Ref Range    Magnesium 2.0 1.6 - 2.6 mg/dL   EKG 12 Lead   Result Value Ref Range    Ventricular Rate 84 BPM    Atrial Rate 84 BPM    P-R Interval 140 ms    QRS Duration 76 ms    Q-T Interval 388 ms    QTc Calculation (Bazett) 458 ms    P Axis 34 degrees    R Axis 15 degrees    T Axis 24 degrees       RADIOLOGY:  CT Lumbar Spine WO Contrast   Final Result      1.  No significant interval change as of 10/15/2019.   2. Severe chronic compression fracture deformity in the L3 vertebral   body, likely related to prior episode of discitis/osteomyelitis. 3. Defect along the posterior endplate of the L2 vertebral body is   likely the result of erosive changes from prior osteomyelitis. 4. Mild central canal stenosis at L2-3.   5. Multilevel neural foraminal stenoses, worst (severe) at the   bilateral L2-3 and right L3-4 levels. 6. Status post L3-4 and L4-5 decompressive laminectomies. CT Head WO Contrast   Final Result   Increasing atrophy and chronic microvascular ischemic disease. XR CHEST STANDARD (2 VW)   Final Result   No acute cardiopulmonary pathology. ------------------------- NURSING NOTES AND VITALS REVIEWED ---------------------------  Date / Time Roomed:  12/27/2019  1:30 PM  ED Bed Assignment:  18/18    The nursing notes within the ED encounter and vital signs as below have been reviewed. Patient Vitals for the past 24 hrs:   BP Temp Pulse Resp SpO2 Height Weight   12/27/19 1632 (!) 112/59 -- 79 16 94 % -- --   12/27/19 1347 -- -- 63 -- 94 % -- --   12/27/19 1332 124/67 98 °F (36.7 °C) -- 16 -- 5' 6\" (1.676 m) 170 lb (77.1 kg)       Oxygen Saturation Interpretation: Normal    ------------------------------------------ PROGRESS NOTES ------------------------------------------  Re-evaluation(s): Patients symptoms show no change  Repeat physical examination is not changed    Counseling:  I have spoken with the patient and discussed todays results, in addition to providing specific details for the plan of care and counseling regarding the diagnosis and prognosis. Their questions are answered at this time and they are agreeable with the plan of admission.    --------------------------------- ADDITIONAL PROVIDER NOTES ---------------------------------  Consultations: Spoke with Dr. Louis Henriquez. Discussed case. They will admit the patient.   This patient's ED course included: a personal history and physicial examination, re-evaluation prior to disposition, multiple bedside re-evaluations, IV medications, cardiac monitoring, continuous pulse oximetry and complex medical decision making and emergency management    This patient has remained hemodynamically stable during their ED course. Diagnosis:  1. Generalized weakness    2. Unable to ambulate    3. Acute hyponatremia        Disposition:  Patient's disposition: Admit to med/surg floor  Patient's condition is stable.             Sunny Maya DO  Resident  12/28/19 2011

## 2019-12-27 NOTE — ED NOTES
[facility] faxed to floor spoke with Kaveh Lopez, copy of fax verificarion received and in chart, pt. Is chimed.      Chino Mitchell RN  12/27/19 7985

## 2019-12-28 LAB
ANION GAP SERPL CALCULATED.3IONS-SCNC: 14 MMOL/L (ref 7–16)
BASOPHILS ABSOLUTE: 0.02 E9/L (ref 0–0.2)
BASOPHILS RELATIVE PERCENT: 0.3 % (ref 0–2)
BUN BLDV-MCNC: 20 MG/DL (ref 8–23)
CALCIUM SERPL-MCNC: 8.5 MG/DL (ref 8.6–10.2)
CHLORIDE BLD-SCNC: 101 MMOL/L (ref 98–107)
CO2: 22 MMOL/L (ref 22–29)
CREAT SERPL-MCNC: 1 MG/DL (ref 0.5–1)
EOSINOPHILS ABSOLUTE: 0.08 E9/L (ref 0.05–0.5)
EOSINOPHILS RELATIVE PERCENT: 1 % (ref 0–6)
GFR AFRICAN AMERICAN: >60
GFR NON-AFRICAN AMERICAN: 54 ML/MIN/1.73
GLUCOSE BLD-MCNC: 82 MG/DL (ref 74–99)
HCT VFR BLD CALC: 41.9 % (ref 34–48)
HEMOGLOBIN: 13.5 G/DL (ref 11.5–15.5)
IMMATURE GRANULOCYTES #: 0.05 E9/L
IMMATURE GRANULOCYTES %: 0.6 % (ref 0–5)
LYMPHOCYTES ABSOLUTE: 1.11 E9/L (ref 1.5–4)
LYMPHOCYTES RELATIVE PERCENT: 14.1 % (ref 20–42)
MCH RBC QN AUTO: 30.3 PG (ref 26–35)
MCHC RBC AUTO-ENTMCNC: 32.2 % (ref 32–34.5)
MCV RBC AUTO: 94.2 FL (ref 80–99.9)
MONOCYTES ABSOLUTE: 0.81 E9/L (ref 0.1–0.95)
MONOCYTES RELATIVE PERCENT: 10.3 % (ref 2–12)
NEUTROPHILS ABSOLUTE: 5.79 E9/L (ref 1.8–7.3)
NEUTROPHILS RELATIVE PERCENT: 73.7 % (ref 43–80)
PDW BLD-RTO: 14.6 FL (ref 11.5–15)
PLATELET # BLD: 207 E9/L (ref 130–450)
PMV BLD AUTO: 11.1 FL (ref 7–12)
POTASSIUM SERPL-SCNC: 3.9 MMOL/L (ref 3.5–5)
RBC # BLD: 4.45 E12/L (ref 3.5–5.5)
SODIUM BLD-SCNC: 137 MMOL/L (ref 132–146)
WBC # BLD: 7.9 E9/L (ref 4.5–11.5)

## 2019-12-28 PROCEDURE — 6370000000 HC RX 637 (ALT 250 FOR IP): Performed by: INTERNAL MEDICINE

## 2019-12-28 PROCEDURE — 36415 COLL VENOUS BLD VENIPUNCTURE: CPT

## 2019-12-28 PROCEDURE — 6360000002 HC RX W HCPCS: Performed by: INTERNAL MEDICINE

## 2019-12-28 PROCEDURE — 51798 US URINE CAPACITY MEASURE: CPT

## 2019-12-28 PROCEDURE — 51702 INSERT TEMP BLADDER CATH: CPT

## 2019-12-28 PROCEDURE — 85025 COMPLETE CBC W/AUTO DIFF WBC: CPT

## 2019-12-28 PROCEDURE — 80048 BASIC METABOLIC PNL TOTAL CA: CPT

## 2019-12-28 PROCEDURE — G0378 HOSPITAL OBSERVATION PER HR: HCPCS

## 2019-12-28 PROCEDURE — 96372 THER/PROPH/DIAG INJ SC/IM: CPT

## 2019-12-28 RX ORDER — METHYLPREDNISOLONE 4 MG/1
4 TABLET ORAL
Status: COMPLETED | OUTPATIENT
Start: 2019-12-29 | End: 2019-12-31

## 2019-12-28 RX ORDER — METHYLPREDNISOLONE 4 MG/1
8 TABLET ORAL NIGHTLY
Status: COMPLETED | OUTPATIENT
Start: 2019-12-29 | End: 2019-12-29

## 2019-12-28 RX ORDER — METHYLPREDNISOLONE 4 MG/1
24 TABLET ORAL ONCE
Status: DISCONTINUED | OUTPATIENT
Start: 2019-12-28 | End: 2019-12-31 | Stop reason: HOSPADM

## 2019-12-28 RX ORDER — METHYLPREDNISOLONE 4 MG/1
4 TABLET ORAL NIGHTLY
Status: DISCONTINUED | OUTPATIENT
Start: 2019-12-30 | End: 2019-12-31 | Stop reason: HOSPADM

## 2019-12-28 RX ORDER — METHYLPREDNISOLONE 4 MG/1
4 TABLET ORAL
Status: COMPLETED | OUTPATIENT
Start: 2019-12-29 | End: 2019-12-30

## 2019-12-28 RX ORDER — ATENOLOL 50 MG/1
50 TABLET ORAL DAILY
Qty: 30 TABLET | Refills: 3 | Status: SHIPPED | OUTPATIENT
Start: 2019-12-28 | End: 2020-02-18 | Stop reason: ALTCHOICE

## 2019-12-28 RX ORDER — METHYLPREDNISOLONE 4 MG/1
4 TABLET ORAL
Status: DISCONTINUED | OUTPATIENT
Start: 2019-12-29 | End: 2019-12-31 | Stop reason: HOSPADM

## 2019-12-28 RX ORDER — TRIAMCINOLONE ACETONIDE 40 MG/ML
80 INJECTION, SUSPENSION INTRA-ARTICULAR; INTRAMUSCULAR ONCE
Status: DISCONTINUED | OUTPATIENT
Start: 2019-12-28 | End: 2019-12-31 | Stop reason: HOSPADM

## 2019-12-28 RX ORDER — LIDOCAINE HYDROCHLORIDE 10 MG/ML
5 INJECTION, SOLUTION EPIDURAL; INFILTRATION; INTRACAUDAL; PERINEURAL ONCE
Status: DISCONTINUED | OUTPATIENT
Start: 2019-12-28 | End: 2019-12-31 | Stop reason: HOSPADM

## 2019-12-28 RX ADMIN — MULTIVITAMIN TABLET 1 TABLET: TABLET at 08:36

## 2019-12-28 RX ADMIN — CYCLOBENZAPRINE 10 MG: 10 TABLET, FILM COATED ORAL at 20:31

## 2019-12-28 RX ADMIN — CYCLOBENZAPRINE 10 MG: 10 TABLET, FILM COATED ORAL at 11:57

## 2019-12-28 RX ADMIN — SENNOSIDES 8.6 MG: 8.6 TABLET, FILM COATED ORAL at 08:38

## 2019-12-28 RX ADMIN — PROBENECID 500 MG: 500 TABLET, FILM COATED ORAL at 20:31

## 2019-12-28 RX ADMIN — ATENOLOL 25 MG: 25 TABLET ORAL at 08:37

## 2019-12-28 RX ADMIN — ASPIRIN 81 MG 81 MG: 81 TABLET ORAL at 20:31

## 2019-12-28 RX ADMIN — DOCUSATE SODIUM 100 MG: 100 CAPSULE, LIQUID FILLED ORAL at 20:31

## 2019-12-28 RX ADMIN — LEVOTHYROXINE SODIUM 125 MCG: 125 TABLET ORAL at 05:43

## 2019-12-28 RX ADMIN — PROBENECID 500 MG: 500 TABLET, FILM COATED ORAL at 08:36

## 2019-12-28 RX ADMIN — PRAVASTATIN SODIUM 40 MG: 20 TABLET ORAL at 20:31

## 2019-12-28 RX ADMIN — TORSEMIDE 20 MG: 20 TABLET ORAL at 08:36

## 2019-12-28 RX ADMIN — ENOXAPARIN SODIUM 40 MG: 40 INJECTION SUBCUTANEOUS at 08:38

## 2019-12-28 RX ADMIN — HYDROCODONE BITARTRATE AND ACETAMINOPHEN 1 TABLET: 5; 325 TABLET ORAL at 17:19

## 2019-12-28 RX ADMIN — DOCUSATE SODIUM 100 MG: 100 CAPSULE, LIQUID FILLED ORAL at 08:38

## 2019-12-28 ASSESSMENT — PAIN DESCRIPTION - PAIN TYPE
TYPE: ACUTE PAIN

## 2019-12-28 ASSESSMENT — PAIN SCALES - GENERAL
PAINLEVEL_OUTOF10: 7
PAINLEVEL_OUTOF10: 10
PAINLEVEL_OUTOF10: 3
PAINLEVEL_OUTOF10: 4

## 2019-12-28 ASSESSMENT — PAIN DESCRIPTION - LOCATION
LOCATION: SHOULDER
LOCATION: SHOULDER

## 2019-12-28 ASSESSMENT — ENCOUNTER SYMPTOMS
NAUSEA: 0
TROUBLE SWALLOWING: 0
PHOTOPHOBIA: 0
BACK PAIN: 0
DIARRHEA: 0
VOMITING: 0
COUGH: 0
SHORTNESS OF BREATH: 0
ABDOMINAL PAIN: 0

## 2019-12-28 ASSESSMENT — PAIN DESCRIPTION - ONSET
ONSET: ON-GOING
ONSET: ON-GOING

## 2019-12-28 ASSESSMENT — PAIN DESCRIPTION - FREQUENCY
FREQUENCY: CONTINUOUS
FREQUENCY: CONTINUOUS

## 2019-12-28 ASSESSMENT — PAIN DESCRIPTION - ORIENTATION
ORIENTATION: LEFT
ORIENTATION: LEFT

## 2019-12-28 ASSESSMENT — PAIN DESCRIPTION - DESCRIPTORS
DESCRIPTORS: THROBBING
DESCRIPTORS: ACHING;CONSTANT

## 2019-12-28 ASSESSMENT — PAIN DESCRIPTION - PROGRESSION: CLINICAL_PROGRESSION: NOT CHANGED

## 2019-12-28 NOTE — CARE COORDINATION
Social Work discharge 1 [facility] noted ER SW note re: [place]. PT OT evals are pending in Epic at 11:43 AM  for [facility] (or any SNF) to assess and then will need precert with [employer] [facility]. Pt will need PT OT evals or updates Monday am 64/52/75 for precert with [facility]. N17 started.   Electronically signed by Eloy Smyth on 12/28/2019 at 11:42 AM

## 2019-12-28 NOTE — PLAN OF CARE
Problem: Pain:  Goal: Control of acute pain  Description  Control of acute pain  Outcome: Met This Shift     Problem: Falls - Risk of:  Goal: Will remain free from falls  Description  Will remain free from falls  Outcome: Met This Shift  Goal: Absence of physical injury  Description  Absence of physical injury  Outcome: Met This Shift     Problem: Risk for Impaired Skin Integrity  Goal: Tissue integrity - skin and mucous membranes  Description  Structural intactness and normal physiological function of skin and  mucous membranes.   Outcome: Met This Shift

## 2019-12-28 NOTE — CONSULTS
12/28/2019 7:44 AM  Service: Urology  Group: [facility] ([facility]    Clem Books  [id number]     Chief Complaint:    Urinary retention    History of Present Illness: The patient is a 74 y.o. female patient who presented to the hospital 1 day ago complaints of generalized weakness disturbance. She did have back surgery and osteomyelitis in June 2019 per patient. During this admission bladder scan was performed that showed 517ml and a Harris catheter was inserted. Prior to admission she was taking oxybutynin 10 mg daily. She states she has never seen a urologist in the past. She denies history of stone disease.      Past Medical History:   Diagnosis Date    Arthritis     Gout     CONTROLLED    Hyperlipidemia     Hypertension     STABLE    Macular hole, right eye     Thyroid disease        Past Surgical History:   Procedure Laterality Date    ABDOMEN SURGERY      BACK SURGERY      BREAST SURGERY Right 1982    BENIGN CYST    CHOLECYSTECTOMY  1997    COLONOSCOPY      EYE SURGERY      HYSTERECTOMY  1985    JOINT REPLACEMENT      bilat knees     LAMINECTOMY N/A 6/3/2019    LUMBAR LAMINECTOMY POSTERIOR L3-L5, BONE BIOPSY L4 - GIOVANNY, X-RAY,  WANTS TF performed by Landen Brewer MD at [address] [facility]      TONSILLECTOMY         Medications Prior to Admission:    Medications Prior to Admission: diclofenac sodium 1 % GEL, Apply 2 g topically 4 times daily for 13 days  HYDROcodone-acetaminophen (NORCO) 5-325 MG per tablet, Take 1 tablet by mouth every 8 hours as needed for Pain.  levothyroxine (SYNTHROID) 125 MCG tablet, Take 125 mcg by mouth Daily  cyclobenzaprine (FLEXERIL) 10 MG tablet, Take 10 mg by mouth every 8 hours as needed for Muscle spasms  magnesium hydroxide (MILK OF MAGNESIA) 400 MG/5ML suspension, Take 30 mLs by mouth daily as needed for Constipation  potassium chloride (KLOR-CON M) 20 MEQ extended release tablet, Take 2 tablets by mouth daily (Patient taking differently: used smokeless tobacco. She reports previous alcohol use. She reports that she does not use drugs. Family History:   Non-contributory to this urological problem  family history includes Other in her mother. Review of Systems:  Respiratory: negative for cough and hemoptysis  Cardiovascular: negative for chest pain and dyspnea  Gastrointestinal: negative for abdominal pain, diarrhea, nausea and vomiting  Derm: negative for rash and skin lesion(s)  Neurological: negative for seizures and tremors  Endocrine: negative for diabetic symptoms including polydipsia and polyuria  : As above in the HPI, otherwise negative  All other reviews are negative    Physical Exam:   Vitals: /60   Pulse 68   Temp 98.2 °F (36.8 °C) (Oral)   Resp 16   Ht 5' 6\" (1.676 m)   Wt 181 lb 11.2 oz (82.4 kg)   SpO2 92%   BMI 29.33 kg/m²   General:  Awake, alert, oriented X 3. Well developed, well nourished, well groomed. No apparent distress. HEENT:  Normocephalic, atraumatic. Pupils equal, round. No scleral icterus. No conjunctival injection. Normal lips, teeth, and gums. No nasal discharge. Neck:  Supple, no masses. Heart:  RRR  Lungs:  No audible wheezing. Respirations symmetric and non-labored. Abdomen:  soft, nontender, no masses, no organomegaly, no peritoneal signs  Extremities:  No clubbing, cyanosis, or edema  Skin:  Warm and dry, no open lesions or rashes  Neuro:  Cranial nerves 2-12 intact, no focal deficits  Rectal: deferred  Genitalia:  Harris catheter intact draining clear yellow urine     Labs:   Recent Labs     12/27/19  1429   WBC 9.2   RBC 4.21   HGB 13.0   HCT 39.7   MCV 94.3   MCH 30.9   MCHC 32.7   RDW 14.6      MPV 11.5       Recent Labs     12/27/19  1429   CREATININE 0.9       Images:      Assessment: Steven Castro 74 y.o. female     Urinary retention    Plan:  Reviewed previous CTAPs from May 2019 and obvious bladder distention noted on these.  I recommend stopping all

## 2019-12-28 NOTE — DISCHARGE INSTR - COC
Obesity E66.9    Acute osteomyelitis of lumbar spine ([facility]) M46.26    Dehydration, moderate E86.0    Acute kidney injury ([address] N17.9    Hypotension due to hypovolemia I95.89, E86.1    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 G06.1    Pleural effusion J90    Onychomycosis B35.1    PVD (peripheral vascular disease) ([facility]) I73.9    Difficulty walking R26.2    Weakness R53.1    Severe protein-calorie malnutrition ([facility]) E43    Intractable back pain M54.9       Isolation/Infection:   Isolation          No Isolation        Patient Infection Status     Infection Onset Added Last Indicated Last Indicated By Review Planned Expiration Resolved Resolved By    None active    Resolved    ESBL (Extended Spectrum Beta Lactamase)  06/07/17 06/03/19 Body Fluid Culture   09/16/19 Madhu Orellana RN    E Coli Wound-Cervical paraspinal 6/3/19  E Coli Urine 4/30/19, 06/05/2017  E. Coli blood 5/27/19, 5/1/19, 4/30/19          Nurse Assessment:  Last Vital Signs: /60   Pulse 68   Temp 98.2 °F (36.8 °C) (Oral)   Resp 16   Ht 5' 6\" (1.676 m)   Wt 181 lb 11.2 oz (82.4 kg)   SpO2 92%   BMI 29.33 kg/m²     Last documented pain score (0-10 scale): Pain Level: 3  Last Weight:   Wt Readings from Last 1 Encounters:   12/28/19 181 lb 11.2 oz (82.4 kg)     Mental Status:  oriented and alert    IV Access:  - None    Nursing Mobility/ADLs:  Walking   Assisted  Transfer  Assisted  Bathing  Assisted  Dressing  Assisted  Toileting  Assisted  Feeding  Independent  Med Admin  Independent  Med Delivery   whole    Wound Care Documentation and Therapy:  Wound 05/29/19 Buttocks Right (Active)   Number of days: 213       Wound 05/29/19 Buttocks Left area is purple and pink with 2cm area of berakdown. (Active)   Number of days: 213       Wound Sacrum (Active)   Number of days:        Wound 09/13/19 Back Medial (Active)   Number of days: 105        Elimination:  Continence:   · Bowel: No  · Bladder: Harris  Urinary Catheter:  Insertion Date: 12/28/2019   Colostomy/Ileostomy/Ileal Conduit: No       Date of Last BM: 12/30/2019  No intake or output data in the 24 hours ending 12/28/19 0527  No intake/output data recorded. Safety Concerns: At Risk for Falls    Impairments/Disabilities:      None    Nutrition Therapy:  Current Nutrition Therapy:   - Oral Diet:  Cardiac    Routes of Feeding: Oral  Liquids: Thin Liquids  Daily Fluid Restriction: no  Last Modified Barium Swallow with Video (Video Swallowing Test): not done    Treatments at the Time of Hospital Discharge:   Respiratory Treatments: N/A  Oxygen Therapy:  is not on home oxygen therapy. Ventilator:    - No ventilator support    Rehab Therapies: Physical Therapy and Occupational Therapy  Weight Bearing Status/Restrictions: No weight bearing restirctions      Patient's personal belongings (please select all that are sent with patient):  Nakul    RN SIGNATURE:  Electronically signed by Kiarra Moctezuma RN on 12/31/19 at 5:50 PM    CASE MANAGEMENT/SOCIAL WORK SECTION    Inpatient Status Date: ***    Readmission Risk Assessment Score:  Readmission Risk              Risk of Unplanned Readmission:        35           Discharging to Facility/ Agency   · Name:   · Address:  · Phone:  · Fax:    Dialysis Facility (if applicable)   · Name:  · Address:  · Dialysis Schedule:  · Phone:  · Fax:    / signature: {Esignature:[id number]}    PHYSICIAN SECTION    Prognosis: {Prognosis:3774453506}    Condition at Discharge: Stable    Rehab Potential (if transferring to Rehab): {Prognosis:5250870462}    Recommended Labs or Other Treatments After Discharge: ***    Physician Certification: I certify the above information and transfer of Shameka Guzman  is necessary for the continuing treatment of the diagnosis listed and that she requires [facility] for less 30 days.      Update Admission H&P: {CHP DME Changes in BOXZO:[id number]}    PHYSICIAN SIGNATURE: Electronically

## 2019-12-28 NOTE — H&P
MG CAPS, Take 100 mg by mouth 2 times daily  acetaminophen (TYLENOL) 325 MG tablet, Take 650 mg by mouth every 4 hours as needed for Pain  atenolol (TENORMIN) 25 MG tablet, Take 25 mg by mouth daily   pravastatin (PRAVACHOL) 40 MG tablet, Take 40 mg by mouth nightly. probenecid (BENEMID) 500 MG tablet, Take 500 mg by mouth 2 times daily. aspirin 81 MG tablet, Take 81 mg by mouth nightly LAST DOSE 1/11/13   multivitamin (THERAGRAN) per tablet, Take 1 tablet by mouth daily. [DISCONTINUED] methylPREDNISolone (MEDROL, CHRISTIANE,) 4 MG tablet, Take as directed  [DISCONTINUED] escitalopram (LEXAPRO) 10 MG tablet, Take 1 tablet by mouth daily  omeprazole (PRILOSEC) 20 MG delayed release capsule, Take 20 mg by mouth Daily  senna (SENOKOT) 8.6 MG tablet, Take 1 tablet by mouth daily  torsemide (DEMADEX) 20 MG tablet, Take 20 mg by mouth daily  [DISCONTINUED] Mineral Oil-Hydrophil Petrolat OINT, Apply 1 applicator topically as needed (apply to coccyx topically for red coccyx)  [DISCONTINUED] nystatin (MYCOSTATIN) 836940 UNIT/ML suspension, Take 500,000 Units by mouth 4 times daily  [DISCONTINUED] gabapentin (NEURONTIN) 100 MG capsule, Take 1 capsule by mouth 3 times daily for 90 days. (Patient taking differently: Take 100 mg by mouth every 8 hours. )  cyanocobalamin 1000 MCG/ML injection, Inject 1,000 mcg into the muscle every 30 days 7/30/19 Takes every SUNDAY  [DISCONTINUED] vitamin D (CHOLECALCIFEROL) 1000 UNIT TABS tablet, Take 1,000 Units by mouth daily  [DISCONTINUED] oxybutynin (DITROPAN) 5 MG tablet, Take 10 mg by mouth daily   [DISCONTINUED] atenolol (TENORMIN) 50 MG tablet, Take 50 mg by mouth daily 7/30/19 Pt takes a total of 75 mg daily, takes 50 mg in the morning    Allergies:    Adrenalin [epinephrine] and Meropenem    Social History:    reports that she has never smoked. She has never used smokeless tobacco. She reports previous alcohol use. She reports that she does not use drugs.     Family History:   family history includes Other in her mother. REVIEW OF SYSTEMS:  As above in the HPI, otherwise negative    PHYSICAL EXAM:    Vitals:  /60   Pulse 68   Temp 98.2 °F (36.8 °C) (Oral)   Resp 16   Ht 5' 6\" (1.676 m)   Wt 181 lb 11.2 oz (82.4 kg)   SpO2 92%   BMI 29.33 kg/m²     General:  Awake, alert, oriented X 3. Well developed, well nourished, well groomed. No apparent distress. HEENT:  Normocephalic, atraumatic. Pupils equal, round, reactive to light. No scleral icterus. No conjunctival injection. Normal lips, teeth, and gums. No nasal discharge. Neck:  Supple  Heart:  RRR, no murmurs, gallops, rubs  Lungs:  CTA bilaterally, bilat symmetrical expansion, no wheeze, rales, or rhonchi  Abdomen:   Bowel sounds present, soft, nontender, no masses, no organomegaly, no peritoneal signs  Extremities:  No clubbing, cyanosis, or edema  Skin:  Warm and dry, no open lesions or rash  Neuro:  Cranial nerves 2-12 intact, no focal deficits  Breast: deferred  Rectal: deferred  Genitalia:  deferred    LABS:  CBC with Differential:    Lab Results   Component Value Date    WBC 9.2 12/27/2019    RBC 4.21 12/27/2019    HGB 13.0 12/27/2019    HCT 39.7 12/27/2019     12/27/2019    MCV 94.3 12/27/2019    MCH 30.9 12/27/2019    MCHC 32.7 12/27/2019    RDW 14.6 12/27/2019    LYMPHOPCT 7.3 12/27/2019    MONOPCT 7.9 12/27/2019    BASOPCT 0.2 12/27/2019    MONOSABS 0.72 12/27/2019    LYMPHSABS 0.67 12/27/2019    EOSABS 0.01 12/27/2019    BASOSABS 0.02 12/27/2019     CMP:    Lab Results   Component Value Date     12/27/2019    K 4.4 12/27/2019    K 4.5 11/22/2019     12/27/2019    CO2 19 12/27/2019    BUN 25 12/27/2019    CREATININE 0.9 12/27/2019    GFRAA >60 12/27/2019    LABGLOM >60 12/27/2019    GLUCOSE 115 12/27/2019    PROT 5.6 12/27/2019    LABALBU 2.5 12/27/2019    CALCIUM 8.2 12/27/2019    BILITOT 0.3 12/27/2019    ALKPHOS 96 12/27/2019    AST 24 12/27/2019    ALT 12 12/27/2019     PT/INR:    Lab Results   Component Value Date    PROTIME 11.2 09/20/2019    INR 0.9 09/20/2019     @cktotal:3,ckmb:3,ckmbindex:3,troponini:3@  U/A:    Lab Results   Component Value Date    NITRU POSITIVE 12/27/2019    COLORU Yellow 12/27/2019    PHUR 5.5 12/27/2019    LABCAST RARE 05/27/2019    WBCUA 2-5 12/27/2019    RBCUA 0-1 12/27/2019    YEAST MODERATE 09/12/2019    BACTERIA MANY 12/27/2019    CLARITYU Clear 12/27/2019    SPECGRAV 1.010 12/27/2019    UROBILINOGEN 0.2 12/27/2019    BILIRUBINUR Negative 12/27/2019    BLOODU Negative 12/27/2019    GLUCOSEU Negative 12/27/2019    KETUA TRACE 12/27/2019          ASSESSMENT:      Active Problems:    Intractable back pain  Resolved Problems:    * No resolved hospital problems. *          PLAN:    Weakness-  Nothing focal on exam today  Will continue PT/OT  Need rehab    Bacteruria-  Has had c.  Diff only treat if over 100 k of culture    Neurogenic bladder  Over 517 in bladder this am  Will ask urology to see  Stop any anticholnigeric agents we can    Left shoudler pain-  Ortho consult  Brie Sorto DO  5:23 AM  12/28/2019

## 2019-12-28 NOTE — PROGRESS NOTES
Patient refusing to elevate heels off of bed and wear heel protectors. Educated patient regarding potential outcomes of refusal.  Will continue to re-educated and encourage.

## 2019-12-28 NOTE — PROGRESS NOTES
Spoke to Marshall Mendez NP for [facility] urology group.  Consult information given  Electronically signed by Yesenia De Anda RN on 12/28/2019 at 7:23 AM

## 2019-12-29 ENCOUNTER — APPOINTMENT (OUTPATIENT)
Dept: ULTRASOUND IMAGING | Age: 74
End: 2019-12-29
Payer: COMMERCIAL

## 2019-12-29 PROCEDURE — 6360000002 HC RX W HCPCS: Performed by: INTERNAL MEDICINE

## 2019-12-29 PROCEDURE — 6370000000 HC RX 637 (ALT 250 FOR IP): Performed by: INTERNAL MEDICINE

## 2019-12-29 PROCEDURE — G0378 HOSPITAL OBSERVATION PER HR: HCPCS

## 2019-12-29 PROCEDURE — 96372 THER/PROPH/DIAG INJ SC/IM: CPT

## 2019-12-29 PROCEDURE — 2580000003 HC RX 258: Performed by: INTERNAL MEDICINE

## 2019-12-29 PROCEDURE — 93971 EXTREMITY STUDY: CPT

## 2019-12-29 PROCEDURE — 96365 THER/PROPH/DIAG IV INF INIT: CPT

## 2019-12-29 RX ADMIN — METHYLPREDNISOLONE 4 MG: 4 TABLET ORAL at 11:46

## 2019-12-29 RX ADMIN — ASPIRIN 81 MG 81 MG: 81 TABLET ORAL at 20:33

## 2019-12-29 RX ADMIN — METHYLPREDNISOLONE 4 MG: 4 TABLET ORAL at 16:37

## 2019-12-29 RX ADMIN — LEVOTHYROXINE SODIUM 125 MCG: 125 TABLET ORAL at 06:08

## 2019-12-29 RX ADMIN — PRAVASTATIN SODIUM 40 MG: 20 TABLET ORAL at 20:29

## 2019-12-29 RX ADMIN — APIXABAN 10 MG: 5 TABLET, FILM COATED ORAL at 20:29

## 2019-12-29 RX ADMIN — HYDROCODONE BITARTRATE AND ACETAMINOPHEN 1 TABLET: 5; 325 TABLET ORAL at 01:20

## 2019-12-29 RX ADMIN — PROBENECID 500 MG: 500 TABLET, FILM COATED ORAL at 22:07

## 2019-12-29 RX ADMIN — CYCLOBENZAPRINE 10 MG: 10 TABLET, FILM COATED ORAL at 11:48

## 2019-12-29 RX ADMIN — DOCUSATE SODIUM 100 MG: 100 CAPSULE, LIQUID FILLED ORAL at 20:33

## 2019-12-29 RX ADMIN — SENNOSIDES 8.6 MG: 8.6 TABLET, FILM COATED ORAL at 09:21

## 2019-12-29 RX ADMIN — ENOXAPARIN SODIUM 40 MG: 40 INJECTION SUBCUTANEOUS at 09:20

## 2019-12-29 RX ADMIN — HYDROCODONE BITARTRATE AND ACETAMINOPHEN 1 TABLET: 5; 325 TABLET ORAL at 14:57

## 2019-12-29 RX ADMIN — ATENOLOL 25 MG: 25 TABLET ORAL at 09:21

## 2019-12-29 RX ADMIN — METHYLPREDNISOLONE 4 MG: 4 TABLET ORAL at 06:08

## 2019-12-29 RX ADMIN — CYCLOBENZAPRINE 10 MG: 10 TABLET, FILM COATED ORAL at 22:07

## 2019-12-29 RX ADMIN — MULTIVITAMIN TABLET 1 TABLET: TABLET at 09:21

## 2019-12-29 RX ADMIN — TORSEMIDE 20 MG: 20 TABLET ORAL at 09:21

## 2019-12-29 RX ADMIN — PROBENECID 500 MG: 500 TABLET, FILM COATED ORAL at 09:22

## 2019-12-29 RX ADMIN — CEFTRIAXONE 1 G: 1 INJECTION, POWDER, FOR SOLUTION INTRAMUSCULAR; INTRAVENOUS at 09:20

## 2019-12-29 RX ADMIN — DOCUSATE SODIUM 100 MG: 100 CAPSULE, LIQUID FILLED ORAL at 09:22

## 2019-12-29 RX ADMIN — METHYLPREDNISOLONE 8 MG: 4 TABLET ORAL at 20:28

## 2019-12-29 ASSESSMENT — PAIN DESCRIPTION - DESCRIPTORS
DESCRIPTORS: ACHING;DISCOMFORT;SORE
DESCRIPTORS: THROBBING
DESCRIPTORS: ACHING;DISCOMFORT;THROBBING
DESCRIPTORS: THROBBING

## 2019-12-29 ASSESSMENT — PAIN DESCRIPTION - ORIENTATION
ORIENTATION: LEFT;UPPER
ORIENTATION: LEFT

## 2019-12-29 ASSESSMENT — PAIN - FUNCTIONAL ASSESSMENT
PAIN_FUNCTIONAL_ASSESSMENT: PREVENTS OR INTERFERES SOME ACTIVE ACTIVITIES AND ADLS

## 2019-12-29 ASSESSMENT — PAIN SCALES - GENERAL
PAINLEVEL_OUTOF10: 3
PAINLEVEL_OUTOF10: 6
PAINLEVEL_OUTOF10: 7
PAINLEVEL_OUTOF10: 4
PAINLEVEL_OUTOF10: 0
PAINLEVEL_OUTOF10: 0
PAINLEVEL_OUTOF10: 8

## 2019-12-29 ASSESSMENT — PAIN DESCRIPTION - PROGRESSION
CLINICAL_PROGRESSION: NOT CHANGED
CLINICAL_PROGRESSION: GRADUALLY WORSENING

## 2019-12-29 ASSESSMENT — PAIN DESCRIPTION - PAIN TYPE
TYPE: ACUTE PAIN

## 2019-12-29 ASSESSMENT — PAIN DESCRIPTION - ONSET
ONSET: ON-GOING

## 2019-12-29 ASSESSMENT — PAIN DESCRIPTION - LOCATION
LOCATION: SHOULDER

## 2019-12-29 ASSESSMENT — PAIN DESCRIPTION - FREQUENCY
FREQUENCY: CONTINUOUS

## 2019-12-29 NOTE — CONSULTS
Department of Orthopedic Surgery  Attending Consult Note        Reason for Consult:  Left shoulder pain  Requesting Physician:  Dr Roslyn Jordan:  Left shoulder pain and weakness    History Obtained From:  patient    HISTORY OF PRESENT ILLNESS:                The patient is a 74 y.o. female who presents with weakness and difficulty with ambulation. Pt also reports h/o of a fall onto her right side but reports that her left shoulder is what really bothers her currently. She is having pain with activity and ROM. She reports having lumbar surgery in June secondary to osteomyelitis and h/o C Diff from the antibiotics.     Past Medical History:        Diagnosis Date    Arthritis     Gout     CONTROLLED    Hyperlipidemia     Hypertension     STABLE    Macular hole, right eye     Thyroid disease      Past Surgical History:        Procedure Laterality Date    ABDOMEN SURGERY      BACK SURGERY      BREAST SURGERY Right 1982    BENIGN CYST    CHOLECYSTECTOMY  1997    COLONOSCOPY      EYE SURGERY      HYSTERECTOMY  1985    JOINT REPLACEMENT      bilat knees     LAMINECTOMY N/A 6/3/2019    LUMBAR LAMINECTOMY POSTERIOR L3-L5, BONE BIOPSY L4 - GIOVANNY, X-RAY,  WANTS TF performed by Zhen Troy MD at [address] [facility]      TONSILLECTOMY       Current Medications:   Current Facility-Administered Medications: methylPREDNISolone (MEDROL) tablet 24 mg, 24 mg, Oral, Once  methylPREDNISolone (MEDROL) tablet 4 mg, 4 mg, Oral, QAM AC  methylPREDNISolone (MEDROL) tablet 4 mg, 4 mg, Oral, Lunch  methylPREDNISolone (MEDROL) tablet 4 mg, 4 mg, Oral, Dinner  methylPREDNISolone (MEDROL) tablet 8 mg, 8 mg, Oral, Nightly  [START ON 12/30/2019] methylPREDNISolone (MEDROL) tablet 4 mg, 4 mg, Oral, Nightly  triamcinolone acetonide (KENALOG-40) injection 80 mg, 80 mg, Intra-articular, Once  lidocaine PF 1 % injection 5 mL, 5 mL, Other, Once  0.9 % sodium chloride bolus, 1,000 mL, Intravenous, Once  acetaminophen (TYLENOL) tablet 650 mg, 650 mg, Oral, Q4H PRN  aspirin chewable tablet 81 mg, 81 mg, Oral, Nightly  atenolol (TENORMIN) tablet 25 mg, 25 mg, Oral, Daily  cyclobenzaprine (FLEXERIL) tablet 10 mg, 10 mg, Oral, Q8H PRN  docusate sodium (COLACE) capsule 100 mg, 100 mg, Oral, BID  HYDROcodone-acetaminophen (NORCO) 5-325 MG per tablet 1 tablet, 1 tablet, Oral, Q8H PRN  levothyroxine (SYNTHROID) tablet 125 mcg, 125 mcg, Oral, Daily  magnesium hydroxide (MILK OF MAGNESIA) 400 MG/5ML suspension 30 mL, 30 mL, Oral, Daily PRN  multivitamin 1 tablet, 1 tablet, Oral, Daily  pravastatin (PRAVACHOL) tablet 40 mg, 40 mg, Oral, Nightly  probenecid (BENEMID) tablet 500 mg, 500 mg, Oral, BID  senna (SENOKOT) tablet 8.6 mg, 1 tablet, Oral, Daily  torsemide (DEMADEX) tablet 20 mg, 20 mg, Oral, Daily  enoxaparin (LOVENOX) injection 40 mg, 40 mg, Subcutaneous, Daily  Allergies:  Adrenalin [epinephrine] and Meropenem    Social History:   MARITAL STATUS:    Family History:       Problem Relation Age of Onset    Other Mother      REVIEW OF SYSTEMS:    CONSTITUTIONAL:  negative  RESPIRATORY:  negative  CARDIOVASCULAR:  negative  MUSCULOSKELETAL:  positive for  myalgias, pain, stiff joints, decreased range of motion and muscle weakness  NEUROLOGICAL:  positive for gait problems and weakness  BEHAVIOR/PSYCH:  negative    PHYSICAL EXAM:    VITALS:  BP (!) 119/59   Pulse 79   Temp 98.2 °F (36.8 °C) (Oral)   Resp 20   Ht 5' 6\" (1.676 m)   Wt 181 lb 11.2 oz (82.4 kg)   SpO2 96%   BMI 29.33 kg/m²   24HR INTAKE/OUTPUT:    Intake/Output Summary (Last 24 hours) at 12/29/2019 0058  Last data filed at 12/28/2019 1732  Gross per 24 hour   Intake 600 ml   Output 1150 ml   Net -550 ml     CONSTITUTIONAL:  awake, alert and cooperative  CARDIOVASCULAR:  Some LE edema  ABDOMEN:  soft  MUSCULOSKELETAL:  Left shoulder: no swelling or ecchymosis. NO warmth, erythema or concern for infection.  + Neer, Hankins and Painful arc impingement signs.   + pain with ROM. Difficult to assess for frozen shoulder d/t pain with ROM. Weak RTC with pain. Bernardo knee inc well healed. NO effusions  Some increased edema left > right  Wiggling toes and good EHL / FHL strength    NEUROLOGIC:  l touch intact  SKIN:  See above    DATA:    CBC:   Lab Results   Component Value Date    WBC 7.9 12/28/2019    RBC 4.45 12/28/2019    HGB 13.5 12/28/2019    HCT 41.9 12/28/2019    MCV 94.2 12/28/2019    MCH 30.3 12/28/2019    MCHC 32.2 12/28/2019    RDW 14.6 12/28/2019     12/28/2019    MPV 11.1 12/28/2019     BMP:    Lab Results   Component Value Date     12/28/2019    K 3.9 12/28/2019    K 4.5 11/22/2019     12/28/2019    CO2 22 12/28/2019    BUN 20 12/28/2019    LABALBU 2.5 12/27/2019    CREATININE 1.0 12/28/2019    CALCIUM 8.5 12/28/2019    GFRAA >60 12/28/2019    LABGLOM 54 12/28/2019    GLUCOSE 82 12/28/2019     Radiology Review:  Left shoulder XRAY:  12-27   NO fracture/ dislocation/ or significant OA     IMPRESSION/RECOMMENDATIONS:      A:  Left shoulder subacromial impingement with Rotator cuff weakness / tear. Difficulty ambulation with weakness. H/o spinal osteomyelitis- no current symptoms or concern for ongoing infection      P:  PT/OT        Rehab placement        Offered left shoulder subacromial injection:  Patient wished to pursue. R/B/A/O/C/C were thoroughly reviewed with the patient re:  Left shoulder subacromial injection. Patient understands these, voiced excellent informed consent and wishes to proceed with the injection. Therefore under sterile technique, patients left shoulder subacromial bursa was injection with 5 cc 1% lidocaine and 80 mg kenalog. Patient tolerated this very well. All her questions were answered. F/U in the office in 3-4 weeks. Thank you for the consult!     Ahsan Reich

## 2019-12-29 NOTE — PROGRESS NOTES
Subjective: The patient is awake and alert. No problems overnight. Denies chest pain, angina, and dyspnea. Denies abdominal pain. Tolerating diet. No nausea or vomiting. Objective:    BP (!) 119/59   Pulse 79   Temp 98.2 °F (36.8 °C) (Oral)   Resp 20   Ht 5' 6\" (1.676 m)   Wt 181 lb 4.8 oz (82.2 kg)   SpO2 96%   BMI 29.26 kg/m²     Heart:  RRR, no murmurs, gallops, or rubs.   Lungs:  CTA bilaterally, no wheeze, rales or rhonchi  Abd: bowel sounds present, nontender, nondistended, no masses  Extrem:  No clubbing, cyanosis, or edema    CBC with Differential:    Lab Results   Component Value Date    WBC 7.9 12/28/2019    RBC 4.45 12/28/2019    HGB 13.5 12/28/2019    HCT 41.9 12/28/2019     12/28/2019    MCV 94.2 12/28/2019    MCH 30.3 12/28/2019    MCHC 32.2 12/28/2019    RDW 14.6 12/28/2019    LYMPHOPCT 14.1 12/28/2019    MONOPCT 10.3 12/28/2019    BASOPCT 0.3 12/28/2019    MONOSABS 0.81 12/28/2019    LYMPHSABS 1.11 12/28/2019    EOSABS 0.08 12/28/2019    BASOSABS 0.02 12/28/2019     CMP:    Lab Results   Component Value Date     12/28/2019    K 3.9 12/28/2019    K 4.5 11/22/2019     12/28/2019    CO2 22 12/28/2019    BUN 20 12/28/2019    CREATININE 1.0 12/28/2019    GFRAA >60 12/28/2019    LABGLOM 54 12/28/2019    GLUCOSE 82 12/28/2019    PROT 5.6 12/27/2019    LABALBU 2.5 12/27/2019    CALCIUM 8.5 12/28/2019    BILITOT 0.3 12/27/2019    ALKPHOS 96 12/27/2019    AST 24 12/27/2019    ALT 12 12/27/2019     PT/INR:    Lab Results   Component Value Date    PROTIME 11.2 09/20/2019    INR 0.9 09/20/2019     @cktotal:3,ckmb:3,ckmbindex:3,troponini:3@  U/A:    Lab Results   Component Value Date    NITRU POSITIVE 12/27/2019    COLORU Yellow 12/27/2019    PHUR 5.5 12/27/2019    LABCAST RARE 05/27/2019    WBCUA 2-5 12/27/2019    RBCUA 0-1 12/27/2019    YEAST MODERATE 09/12/2019    BACTERIA MANY 12/27/2019    CLARITYU Clear 12/27/2019    SPECGRAV 1.010 12/27/2019    UROBILINOGEN 0.2 12/27/2019 BILIRUBINUR Negative 12/27/2019    BLOODU Negative 12/27/2019    GLUCOSEU Negative 12/27/2019    KETUA TRACE 12/27/2019        Assessment:    Patient Active Problem List   Diagnosis    Pneumatosis coli    HTN (hypertension)    HLD (hyperlipidemia)    Obesity    Acute osteomyelitis of lumbar spine (HCC)    Dehydration, moderate    Acute kidney injury ([facility] [address]    Hypotension due to hypovolemia    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    Pleural effusion    Onychomycosis    [facility])    Difficulty walking    Weakness    Severe protein-calorie malnutrition (HCC)    Intractable back pain       Plan:    UTI-  Will need to treat   100k of gram negative man-likely E.coli  Ceftriaxone for now    Shoulder pain-  Some improvement today  Finish medrol    Leg pain-  Check us of right leg    Disposition-  Await social issues since Friday  No PT or OT evaluation      Erasto Abarca DO  7:11 AM  12/29/2019

## 2019-12-30 PROCEDURE — 6360000002 HC RX W HCPCS: Performed by: INTERNAL MEDICINE

## 2019-12-30 PROCEDURE — 6370000000 HC RX 637 (ALT 250 FOR IP): Performed by: INTERNAL MEDICINE

## 2019-12-30 PROCEDURE — 96376 TX/PRO/DX INJ SAME DRUG ADON: CPT

## 2019-12-30 PROCEDURE — 2580000003 HC RX 258: Performed by: INTERNAL MEDICINE

## 2019-12-30 PROCEDURE — 97165 OT EVAL LOW COMPLEX 30 MIN: CPT

## 2019-12-30 PROCEDURE — 97161 PT EVAL LOW COMPLEX 20 MIN: CPT

## 2019-12-30 PROCEDURE — 97530 THERAPEUTIC ACTIVITIES: CPT

## 2019-12-30 PROCEDURE — G0378 HOSPITAL OBSERVATION PER HR: HCPCS

## 2019-12-30 RX ORDER — METHYLPREDNISOLONE 4 MG/1
4 TABLET ORAL
Qty: 6 TABLET | Refills: 0 | Status: SHIPPED | OUTPATIENT
Start: 2019-12-30 | End: 2020-01-05

## 2019-12-30 RX ORDER — METHYLPREDNISOLONE 8 MG/1
24 TABLET ORAL ONCE
Qty: 3 TABLET | Refills: 0 | Status: SHIPPED | OUTPATIENT
Start: 2019-12-30 | End: 2019-12-30

## 2019-12-30 RX ORDER — METHYLPREDNISOLONE 4 MG/1
4 TABLET ORAL
Qty: 6 TABLET | Refills: 0 | Status: SHIPPED | OUTPATIENT
Start: 2019-12-31 | End: 2020-01-06

## 2019-12-30 RX ORDER — TRIAMCINOLONE ACETONIDE 40 MG/ML
80 INJECTION, SUSPENSION INTRA-ARTICULAR; INTRAMUSCULAR ONCE
Qty: 2 ML | Refills: 0 | Status: SHIPPED | OUTPATIENT
Start: 2019-12-30 | End: 2019-12-30

## 2019-12-30 RX ORDER — METHYLPREDNISOLONE 4 MG/1
4 TABLET ORAL NIGHTLY
Qty: 6 TABLET | Refills: 0 | Status: SHIPPED | OUTPATIENT
Start: 2019-12-30 | End: 2020-01-05

## 2019-12-30 RX ADMIN — METHYLPREDNISOLONE 4 MG: 4 TABLET ORAL at 12:14

## 2019-12-30 RX ADMIN — APIXABAN 10 MG: 5 TABLET, FILM COATED ORAL at 21:20

## 2019-12-30 RX ADMIN — PROBENECID 500 MG: 500 TABLET, FILM COATED ORAL at 09:45

## 2019-12-30 RX ADMIN — TORSEMIDE 20 MG: 20 TABLET ORAL at 09:45

## 2019-12-30 RX ADMIN — CYCLOBENZAPRINE 10 MG: 10 TABLET, FILM COATED ORAL at 09:45

## 2019-12-30 RX ADMIN — DOCUSATE SODIUM 100 MG: 100 CAPSULE, LIQUID FILLED ORAL at 09:45

## 2019-12-30 RX ADMIN — METHYLPREDNISOLONE 4 MG: 4 TABLET ORAL at 17:14

## 2019-12-30 RX ADMIN — DOCUSATE SODIUM 100 MG: 100 CAPSULE, LIQUID FILLED ORAL at 21:21

## 2019-12-30 RX ADMIN — PROBENECID 500 MG: 500 TABLET, FILM COATED ORAL at 21:20

## 2019-12-30 RX ADMIN — ASPIRIN 81 MG 81 MG: 81 TABLET ORAL at 21:21

## 2019-12-30 RX ADMIN — ATENOLOL 25 MG: 25 TABLET ORAL at 09:45

## 2019-12-30 RX ADMIN — LEVOTHYROXINE SODIUM 125 MCG: 125 TABLET ORAL at 05:23

## 2019-12-30 RX ADMIN — APIXABAN 10 MG: 5 TABLET, FILM COATED ORAL at 09:45

## 2019-12-30 RX ADMIN — CYCLOBENZAPRINE 10 MG: 10 TABLET, FILM COATED ORAL at 17:13

## 2019-12-30 RX ADMIN — METHYLPREDNISOLONE 4 MG: 4 TABLET ORAL at 21:20

## 2019-12-30 RX ADMIN — METHYLPREDNISOLONE 4 MG: 4 TABLET ORAL at 05:23

## 2019-12-30 RX ADMIN — CEFTRIAXONE 1 G: 1 INJECTION, POWDER, FOR SOLUTION INTRAMUSCULAR; INTRAVENOUS at 07:56

## 2019-12-30 RX ADMIN — SENNOSIDES 8.6 MG: 8.6 TABLET, FILM COATED ORAL at 09:45

## 2019-12-30 RX ADMIN — MULTIVITAMIN TABLET 1 TABLET: TABLET at 09:45

## 2019-12-30 RX ADMIN — PRAVASTATIN SODIUM 40 MG: 20 TABLET ORAL at 21:21

## 2019-12-30 ASSESSMENT — PAIN SCALES - GENERAL
PAINLEVEL_OUTOF10: 0
PAINLEVEL_OUTOF10: 4
PAINLEVEL_OUTOF10: 5

## 2019-12-30 NOTE — CARE COORDINATION
Met with pt about new choices for discharge 1.) [id number] and no bed, 2.) [facility]- referral called to Alyssa Gan and awaiting call back, 3.) [facility](note they have beds according to [facility]), and 4.) [facility] (no beds according to [facility]). I am awaiting call back from Alyssa Gan at [facility] for Transition of Care is related to the following treatment goals: plan discharge to [facility]     The Patient and/or patient representative pt  was provided with a choice of provider and agrees   with the discharge plan. [x] Yes [] No    Freedom of choice list was provided with basic dialogue that supports the patient's individualized plan of care/goals, treatment preferences and shares the quality data associated with the providers.  [x] Yes [] No

## 2019-12-30 NOTE — PROGRESS NOTES
history and prior level of function, completion of standardized testing/informal observation of tasks, assessment of data, and development of POC/Goals      Art Lombardo OTR/L [id number]

## 2019-12-30 NOTE — CARE COORDINATION
[facility] unable to accept. Will discuss additional snf choices with patient / family.

## 2019-12-30 NOTE — PATIENT CARE CONFERENCE
P Quality Flow/Interdisciplinary Rounds Progress Note        Quality Flow Rounds held on December 30, 2019    Disciplines Attending:  Bedside Nurse, ,  and Nursing Unit Leadership    Hao Reddy was admitted on 12/27/2019  1:30 PM    Anticipated Discharge Date:       Disposition:    Kaveh Score:  Kaveh Scale Score: 17    Readmission Risk              Risk of Unplanned Readmission:        38           Discussed patient goal for the day, patient clinical progression, and barriers to discharge.   The following Goal(s) of the Day/Commitment(s) have been identified:  ID consult      Shwetha Miss  December 30, 2019

## 2019-12-30 NOTE — PROGRESS NOTES
Subjective: The patient is awake and alert. No problems overnight. Denies chest pain, angina, and dyspnea. Denies abdominal pain. Tolerating diet. No nausea or vomiting. Objective:    /66 Comment: manual  Pulse 71   Temp 98.3 °F (36.8 °C) (Oral)   Resp 16   Ht 5' 6\" (1.676 m)   Wt 179 lb 14.4 oz (81.6 kg)   SpO2 94%   BMI 29.04 kg/m²     Heart:  RRR, no murmurs, gallops, or rubs.   Lungs:  CTA bilaterally, no wheeze, rales or rhonchi  Abd: bowel sounds present, nontender, nondistended, no masses  Extrem:  No clubbing, cyanosis, or edema    CBC with Differential:    Lab Results   Component Value Date    WBC 7.9 12/28/2019    RBC 4.45 12/28/2019    HGB 13.5 12/28/2019    HCT 41.9 12/28/2019     12/28/2019    MCV 94.2 12/28/2019    MCH 30.3 12/28/2019    MCHC 32.2 12/28/2019    RDW 14.6 12/28/2019    LYMPHOPCT 14.1 12/28/2019    MONOPCT 10.3 12/28/2019    BASOPCT 0.3 12/28/2019    MONOSABS 0.81 12/28/2019    LYMPHSABS 1.11 12/28/2019    EOSABS 0.08 12/28/2019    BASOSABS 0.02 12/28/2019     CMP:    Lab Results   Component Value Date     12/28/2019    K 3.9 12/28/2019    K 4.5 11/22/2019     12/28/2019    CO2 22 12/28/2019    BUN 20 12/28/2019    CREATININE 1.0 12/28/2019    GFRAA >60 12/28/2019    LABGLOM 54 12/28/2019    GLUCOSE 82 12/28/2019    PROT 5.6 12/27/2019    LABALBU 2.5 12/27/2019    CALCIUM 8.5 12/28/2019    BILITOT 0.3 12/27/2019    ALKPHOS 96 12/27/2019    AST 24 12/27/2019    ALT 12 12/27/2019     PT/INR:    Lab Results   Component Value Date    PROTIME 11.2 09/20/2019    INR 0.9 09/20/2019     @cktotal:3,ckmb:3,ckmbindex:3,troponini:3@  U/A:    Lab Results   Component Value Date    NITRU POSITIVE 12/27/2019    COLORU Yellow 12/27/2019    PHUR 5.5 12/27/2019    LABCAST RARE 05/27/2019    WBCUA 2-5 12/27/2019    RBCUA 0-1 12/27/2019    YEAST MODERATE 09/12/2019    BACTERIA MANY 12/27/2019    CLARITYU Clear 12/27/2019    SPECGRAV 1.010 12/27/2019    UROBILINOGEN 0.2 12/27/2019    BILIRUBINUR Negative 12/27/2019    BLOODU Negative 12/27/2019    GLUCOSEU Negative 12/27/2019    KETUA TRACE 12/27/2019        Assessment:    Patient Active Problem List   Diagnosis    Pneumatosis coli    HTN (hypertension)    HLD (hyperlipidemia)    Obesity    Acute osteomyelitis of lumbar spine (HCC)    Dehydration, moderate    Acute kidney injury ([facility] [address]    Hypotension due to hypovolemia    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    Pleural effusion    Onychomycosis    [facility]) [facility])    Difficulty walking    Weakness    Severe protein-calorie malnutrition ([facility])    Intractable back pain       Plan:    Acute DVT-  eliquis 10 bid for 1 week then decrease to 5 bid for 3-6 months    UTI-  Will transition to oral when culture back    Disposition-  Still awaiting PT 3 days in to stay  Needs [facility] Escobar DO  6:44 AM  12/30/2019

## 2019-12-30 NOTE — PROGRESS NOTES
Physical Therapy    Facility/Department: [facility] MED SURG  Initial Assessment    NAME: Jas Salter  : 1945  MRN: [id number]    Date of Service: 2019    Evaluating Therapist: Delores Edwards. Adrianna Steele P.T. Room #: 9091/1019-A  DIAGNOSIS: Intractable Back pain, fell at home prior to admission, UTI  PRECAUTIONS: Falls, bed/chair alarm  PROCEDURE: L shoulder subacromial injection 19  PMH: Back Surgery 2019 and developed osteomyelitis. Social:  Pt lives with  (he uses a WC for mobility) in a 1 floor condo with 1/2 step and no rails to enter. Prior to admission pt walked with ww     Initial Evaluation  Date: 19 Treatment      Short Term/ Long Term   Goals   Was pt agreeable to Eval/treatment? yes     Does pt have pain? C/o severe L shoulder pain, worse with movement     Bed Mobility  Rolling: MOD A  Supine to sit: MAX A  Sit to supine: MAX A  Scooting: MAX A  Independent   Transfers Sit to stand: MAX A x2  Stand to sit: MAX A x2  Stand pivot: NA  MIN A   Ambulation    NA, pt unable to at this time. 50 feet with ww with MIN A   Stair negotiation: ascended and descended NA   1 step with 2 rails with MIN A   AM-PAC Raw Score         BLE ROM is WFL. BLE strength is grossly 3-/5 to 4-/5. Balance: sitting EOB SBA and standing with no AD MAX A x2  Sensation: No c/o numbness or tingling. Edema: none noted  Endurance: fair-       ASSESSMENT  Pt displays functional ability as noted in the objective portion of this evaluation. Comments/Treatment:  Pt c/o severe L shoulder pain with movement. AAROM in pain free range was administered with pt lying in bed supine and while sitting EOB. Pt's L humeral head is anterior and superior which may be causing impingement and pain. Instructed pt on importance of posture to keep L shoulder in alignment and she practiced this while sitting EOB. She sat EOB x10 min and stood with no AD x 1 min.   Pt c/o fatigue and L shoulder pain limiting ability to sit or stand any longer. Pt was left supine in bed with pillow under LUE to promote L humeral head staying posterior and aligning better in glenoid fossa with call light in reach. Bed/Chair alarm: bed alarm was re-activated. Patient education  Pt educated on transfers and LUE exercises. Patient response to education:   Pt verbalized understanding Pt demonstrated skill Pt requires further education in this area   yes yes yes     Rehab potential is Good for reaching above PT goals. Pts/ family goals   1. To decrease her pain and increase her strength so she can go home. Patient and or family understand(s) diagnosis, prognosis, and plan of care. PLAN  PT care will be provided in accordance with the objectives noted above. Whenever appropriate, clear delegation orders will be provided for nursing staff. Exercises and functional mobility practice will be used as well as appropriate assistive devices or modalities to obtain goals. Patient and family education will also be administered as needed. Frequency of treatments will be 2-5x/week x 3-5 days. Time in: 10:10  Time out: 10:40    Jon Callejas, P.T.    License number:  [id number]

## 2019-12-30 NOTE — CARE COORDINATION
[facility] is unable to accept patient d/t no bed availability. Discussed at length with pt.  snf list provided and discussed. Pt chooses [facility]. Patient relays she will only have current payer source until the end of the year and will have [employer] insurance in 2020. Therapy evals reviewed ([facility] score of 11, requiring max asst). We discussed my concern neither insurance company will give auth for snf placement. Pt was privately paying for rehab at [facility] in the recent past.  She relays she will be willing to privately pay for rehab again if necessary. Referral called to [facility].      ------------------------------------------------------         The Plan for Transition of Care is related to the following treatment goals: snf placement    The Patient and/or patient representative patient was provided with a choice of provider and agrees   with the discharge plan. [x] Yes [] No    Freedom of choice list was provided with basic dialogue that supports the patient's individualized plan of care/goals, treatment preferences and shares the quality data associated with the providers.  [x] Yes [] No

## 2019-12-31 VITALS
DIASTOLIC BLOOD PRESSURE: 74 MMHG | BODY MASS INDEX: 29.09 KG/M2 | RESPIRATION RATE: 16 BRPM | TEMPERATURE: 98.9 F | SYSTOLIC BLOOD PRESSURE: 144 MMHG | OXYGEN SATURATION: 96 % | HEIGHT: 66 IN | WEIGHT: 181 LBS | HEART RATE: 105 BPM

## 2019-12-31 LAB
ANION GAP SERPL CALCULATED.3IONS-SCNC: 14 MMOL/L (ref 7–16)
BASOPHILS ABSOLUTE: 0.01 E9/L (ref 0–0.2)
BASOPHILS RELATIVE PERCENT: 0.1 % (ref 0–2)
BUN BLDV-MCNC: 15 MG/DL (ref 8–23)
C-REACTIVE PROTEIN: 4 MG/DL (ref 0–0.4)
CALCIUM SERPL-MCNC: 8.8 MG/DL (ref 8.6–10.2)
CHLORIDE BLD-SCNC: 97 MMOL/L (ref 98–107)
CO2: 23 MMOL/L (ref 22–29)
CREAT SERPL-MCNC: 0.8 MG/DL (ref 0.5–1)
EOSINOPHILS ABSOLUTE: 0 E9/L (ref 0.05–0.5)
EOSINOPHILS RELATIVE PERCENT: 0 % (ref 0–6)
GFR AFRICAN AMERICAN: >60
GFR NON-AFRICAN AMERICAN: >60 ML/MIN/1.73
GLUCOSE BLD-MCNC: 82 MG/DL (ref 74–99)
HCT VFR BLD CALC: 33.9 % (ref 34–48)
HEMOGLOBIN: 11.1 G/DL (ref 11.5–15.5)
IMMATURE GRANULOCYTES #: 0.08 E9/L
IMMATURE GRANULOCYTES %: 0.9 % (ref 0–5)
LYMPHOCYTES ABSOLUTE: 0.9 E9/L (ref 1.5–4)
LYMPHOCYTES RELATIVE PERCENT: 10.2 % (ref 20–42)
MCH RBC QN AUTO: 30.5 PG (ref 26–35)
MCHC RBC AUTO-ENTMCNC: 32.7 % (ref 32–34.5)
MCV RBC AUTO: 93.1 FL (ref 80–99.9)
MONOCYTES ABSOLUTE: 0.69 E9/L (ref 0.1–0.95)
MONOCYTES RELATIVE PERCENT: 7.8 % (ref 2–12)
NEUTROPHILS ABSOLUTE: 7.12 E9/L (ref 1.8–7.3)
NEUTROPHILS RELATIVE PERCENT: 81 % (ref 43–80)
PDW BLD-RTO: 14.3 FL (ref 11.5–15)
PLATELET # BLD: 342 E9/L (ref 130–450)
PMV BLD AUTO: 11.9 FL (ref 7–12)
POTASSIUM SERPL-SCNC: 4 MMOL/L (ref 3.5–5)
RBC # BLD: 3.64 E12/L (ref 3.5–5.5)
SEDIMENTATION RATE, ERYTHROCYTE: 60 MM/HR (ref 0–20)
SODIUM BLD-SCNC: 134 MMOL/L (ref 132–146)
WBC # BLD: 8.8 E9/L (ref 4.5–11.5)

## 2019-12-31 PROCEDURE — 85651 RBC SED RATE NONAUTOMATED: CPT

## 2019-12-31 PROCEDURE — 86140 C-REACTIVE PROTEIN: CPT

## 2019-12-31 PROCEDURE — 85025 COMPLETE CBC W/AUTO DIFF WBC: CPT

## 2019-12-31 PROCEDURE — 80048 BASIC METABOLIC PNL TOTAL CA: CPT

## 2019-12-31 PROCEDURE — 6370000000 HC RX 637 (ALT 250 FOR IP): Performed by: INTERNAL MEDICINE

## 2019-12-31 PROCEDURE — 36415 COLL VENOUS BLD VENIPUNCTURE: CPT

## 2019-12-31 PROCEDURE — G0378 HOSPITAL OBSERVATION PER HR: HCPCS

## 2019-12-31 PROCEDURE — 6360000002 HC RX W HCPCS: Performed by: INTERNAL MEDICINE

## 2019-12-31 RX ADMIN — ATENOLOL 25 MG: 25 TABLET ORAL at 09:55

## 2019-12-31 RX ADMIN — MULTIVITAMIN TABLET 1 TABLET: TABLET at 09:55

## 2019-12-31 RX ADMIN — TORSEMIDE 20 MG: 20 TABLET ORAL at 09:54

## 2019-12-31 RX ADMIN — METHYLPREDNISOLONE 4 MG: 4 TABLET ORAL at 06:12

## 2019-12-31 RX ADMIN — DOCUSATE SODIUM 100 MG: 100 CAPSULE, LIQUID FILLED ORAL at 09:55

## 2019-12-31 RX ADMIN — PROBENECID 500 MG: 500 TABLET, FILM COATED ORAL at 09:54

## 2019-12-31 RX ADMIN — LEVOTHYROXINE SODIUM 125 MCG: 125 TABLET ORAL at 06:12

## 2019-12-31 RX ADMIN — SENNOSIDES 8.6 MG: 8.6 TABLET, FILM COATED ORAL at 09:54

## 2019-12-31 RX ADMIN — APIXABAN 10 MG: 5 TABLET, FILM COATED ORAL at 09:54

## 2019-12-31 RX ADMIN — METHYLPREDNISOLONE 4 MG: 4 TABLET ORAL at 11:54

## 2019-12-31 RX ADMIN — HYDROCODONE BITARTRATE AND ACETAMINOPHEN 1 TABLET: 5; 325 TABLET ORAL at 17:31

## 2019-12-31 RX ADMIN — CYCLOBENZAPRINE 10 MG: 10 TABLET, FILM COATED ORAL at 17:31

## 2019-12-31 ASSESSMENT — PAIN SCALES - GENERAL
PAINLEVEL_OUTOF10: 7
PAINLEVEL_OUTOF10: 0
PAINLEVEL_OUTOF10: 0

## 2019-12-31 NOTE — PROGRESS NOTES
[facility]  [facility]  Progress Note    SUBJECTIVE:  Chief Complaint   Patient presents with    Extremity Weakness     Patient was seen here yesterday for left shoulder pain, states after she left she had a fall and is now having extremity weakness    Fall     -hit head, -blood thinners     Patient is tolerating medications. No reported adverse drug reactions. No nausea, vomiting, diarrhea. C/o chronic b/l spasms. Left shoulder pain improving    Review of systems:  As stated above in the chief complaint, otherwise negative. Medications:  Scheduled Meds:   apixaban  10 mg Oral BID    methylPREDNISolone  24 mg Oral Once    methylPREDNISolone  4 mg Oral QAM AC    methylPREDNISolone  4 mg Oral Lunch    methylPREDNISolone  4 mg Oral Nightly    triamcinolone acetonide  80 mg Intra-articular Once    lidocaine PF  5 mL Other Once    sodium chloride  1,000 mL Intravenous Once    aspirin  81 mg Oral Nightly    atenolol  25 mg Oral Daily    docusate sodium  100 mg Oral BID    levothyroxine  125 mcg Oral Daily    multivitamin  1 tablet Oral Daily    pravastatin  40 mg Oral Nightly    probenecid  500 mg Oral BID    senna  1 tablet Oral Daily    torsemide  20 mg Oral Daily     Continuous Infusions:  PRN Meds:acetaminophen, cyclobenzaprine, HYDROcodone-acetaminophen, magnesium hydroxide    OBJECTIVE:  BP (!) 144/74   Pulse 105   Temp 98.9 °F (37.2 °C) (Oral)   Resp 16   Ht 5' 6\" (1.676 m)   Wt 181 lb (82.1 kg)   SpO2 96%   BMI 29.21 kg/m²   Temp  Av.5 °F (36.9 °C)  Min: 98 °F (36.7 °C)  Max: 98.9 °F (37.2 °C)  Constitutional: The patient is awake, alert, and oriented. Skin: Warm and dry. No rashes were noted. HEENT:   Moist mucous membranes. No ulcerations or thrush. Neck: Supple to movements. Chest: No use of accessory muscles to breathe. Symmetrical expansion. No wheezing, crackles or rhonchi. Cardiovascular: S1 and S2 are rhythmic and regular.  No murmurs appreciated. Abdomen: Positive bowel sounds to auscultation. Benign to palpation. Extremities: + b/l LE edema right>left  Lines: peripheral  F/c draining clear yellow urine    Laboratory and Tests Review:  Lab Results   Component Value Date    WBC 8.8 12/31/2019    WBC 7.9 12/28/2019    WBC 9.2 12/27/2019    HGB 11.1 (L) 12/31/2019    HCT 33.9 (L) 12/31/2019    MCV 93.1 12/31/2019     12/31/2019     Lab Results   Component Value Date    NEUTROABS 7.12 12/31/2019    NEUTROABS 5.79 12/28/2019    NEUTROABS 7.69 (H) 12/27/2019     No results found for: CRP  Lab Results   Component Value Date    ALT 12 12/27/2019    AST 24 12/27/2019    ALKPHOS 96 12/27/2019    BILITOT 0.3 12/27/2019     Lab Results   Component Value Date     12/28/2019    K 3.9 12/28/2019    K 4.5 11/22/2019     12/28/2019    CO2 22 12/28/2019    BUN 20 12/28/2019    CREATININE 1.0 12/28/2019    CREATININE 0.9 12/27/2019    CREATININE 1.1 11/24/2019    GFRAA >60 12/28/2019    LABGLOM 54 12/28/2019    GLUCOSE 82 12/28/2019    PROT 5.6 12/27/2019    LABALBU 2.5 12/27/2019    CALCIUM 8.5 12/28/2019    BILITOT 0.3 12/27/2019    ALKPHOS 96 12/27/2019    AST 24 12/27/2019    ALT 12 12/27/2019     Lab Results   Component Value Date    CRP 4.0 (H) 12/31/2019    CRP 0.4 07/31/2019    CRP 19.7 (H) 05/01/2019     Lab Results   Component Value Date    SEDRATE 60 (H) 12/31/2019    SEDRATE 32 (H) 07/31/2019    SEDRATE 76 (H) 05/01/2019     Radiology:      Microbiology:   Urine cx >100k GNRs    ASSESSMENT:  · Left shoulder pain. Currently on methylprednisolone  · Mild hyponatremia  · Neurogenic bladder  · Asymptomatic bacteriuria associated to neurogenic bladder. I do not think the patient had a UTI     Plan:    · Observe off atb  · F/u cx  · Await SNF placement  · Will follow with you    April Irby Blanca  11:49 AM  12/31/2019     Patient seen and examined. I had a face to face encounter with the patient.  Agree with exam. Assessment and plan as outlined above and directed by me. Addition and corrections were done as deemed appropriate. My exam and plan include: The patient has no new complaints today. She continues to have no urinary symptoms. There are 2 different gram-negative rods growing in the urine but she has no ongoing infection. No need to treat asymptomatic bacteriuria. Awaiting for SNF placement.     Maryagnes Homans  12/31/2019

## 2019-12-31 NOTE — CARE COORDINATION
Social Work discharge planning addendum-    Pt agreeable to $300 a day for Jessica and said she will have her son bring her check book and her new [employer] insurance card to SNF on Thursday 1/2/2020. Pt to [facility] today at 6p via [facility]. SW did [facility] since pt is obs. Pt notified her family herself. Charge RN [employer] aware.   Electronically signed by Rajani Camilo on 12/31/2019 at 2:43 PM

## 2019-12-31 NOTE — PATIENT CARE CONFERENCE
P Quality Flow/Interdisciplinary Rounds Progress Note        Quality Flow Rounds held on December 31, 2019    Disciplines Attending:  Bedside Nurse, ,  and Nursing Unit Leadership    Jas Salter was admitted on 12/27/2019  1:30 PM    Anticipated Discharge Date:       Disposition:    Kaveh Score:  Kaveh Scale Score: 17    Readmission Risk              Risk of Unplanned Readmission:        33           Discussed patient goal for the day, patient clinical progression, and barriers to discharge.   The following Goal(s) of the Day/Commitment(s) have been identified:  Discharge - Obtain Order- planning      Cesar Hansen  December 31, 2019

## 2019-12-31 NOTE — PLAN OF CARE
Problem: Pain:  Goal: Pain level will decrease  Description  Pain level will decrease  12/30/2019 2307 by Arturo Jefferson RN  Outcome: Met This Shift  12/30/2019 1842 by Melany Galeazzi, RN  Outcome: Not Met This Shift  Goal: Control of acute pain  Description  Control of acute pain  Outcome: Met This Shift     Problem: Falls - Risk of:  Goal: Will remain free from falls  Description  Will remain free from falls  12/30/2019 2307 by Arturo Jefferson RN  Outcome: Met This Shift  12/30/2019 1842 by Melany Galeazzi, RN  Outcome: Not Met This Shift  Goal: Absence of physical injury  Description  Absence of physical injury  Outcome: Met This Shift     Problem: Risk for Impaired Skin Integrity  Goal: Tissue integrity - skin and mucous membranes  Description  Structural intactness and normal physiological function of skin and  mucous membranes.   Outcome: Met This Shift     Problem: Mobility - Impaired:  Goal: Mobility will improve  Description  Mobility will improve  Outcome: Met This Shift

## 2019-12-31 NOTE — CARE COORDINATION
Social Work discharge planning   Today is pt's birthday. Sw met with pt and reviewed the following snfs that do not have a bed for pt at this time: [facility], [address] [place] [address], [facility]. Pt's [employer] will  today, and pt said she will have [employer] Medicare starting tomorrow 20. Pt chose [facility] \"on [address]\" or [facility] on [facility]. Sw made referral to Diane Mccann with [facility]. Await their eval.  Electronically signed by Nestor Mercado on 2019 at 9:53 AM     Addendum-    Per Yoko with [facility], pt would need to pay $300 a day until they were able to get precert with her new [employer] insurance after 20. Sw notified pt of this. Pt said she will not qualify for Medicaid, as she said she has funds to private pay. Pt asked Sw to check [facility] [place]'s cost. SW made referral to Miriam Partida for [facility] and asked her to check private pay cost for pt and if they would be able to try precert with [employer] afetr 20. Await their eval. [facility] still option as well IF pt willing to pay $300 a day.   Electronically signed by Nestor Mercado on 2019 at 11:16 AM

## 2019-12-31 NOTE — PROGRESS NOTES
Subjective: The patient is awake and alert. No problems overnight. Denies chest pain, angina, and dyspnea. Denies abdominal pain. Tolerating diet. No nausea or vomiting. Objective:    /69   Pulse 71   Temp 98 °F (36.7 °C) (Oral)   Resp 16   Ht 5' 6\" (1.676 m)   Wt 181 lb (82.1 kg)   SpO2 95%   BMI 29.21 kg/m²     Heart:  RRR, no murmurs, gallops, or rubs.   Lungs:  CTA bilaterally, no wheeze, rales or rhonchi  Abd: bowel sounds present, nontender, nondistended, no masses  Extrem:  No clubbing, cyanosis, or edema    CBC with Differential:    Lab Results   Component Value Date    WBC 7.9 12/28/2019    RBC 4.45 12/28/2019    HGB 13.5 12/28/2019    HCT 41.9 12/28/2019     12/28/2019    MCV 94.2 12/28/2019    MCH 30.3 12/28/2019    MCHC 32.2 12/28/2019    RDW 14.6 12/28/2019    LYMPHOPCT 14.1 12/28/2019    MONOPCT 10.3 12/28/2019    BASOPCT 0.3 12/28/2019    MONOSABS 0.81 12/28/2019    LYMPHSABS 1.11 12/28/2019    EOSABS 0.08 12/28/2019    BASOSABS 0.02 12/28/2019     CMP:    Lab Results   Component Value Date     12/28/2019    K 3.9 12/28/2019    K 4.5 11/22/2019     12/28/2019    CO2 22 12/28/2019    BUN 20 12/28/2019    CREATININE 1.0 12/28/2019    GFRAA >60 12/28/2019    LABGLOM 54 12/28/2019    GLUCOSE 82 12/28/2019    PROT 5.6 12/27/2019    LABALBU 2.5 12/27/2019    CALCIUM 8.5 12/28/2019    BILITOT 0.3 12/27/2019    ALKPHOS 96 12/27/2019    AST 24 12/27/2019    ALT 12 12/27/2019     PT/INR:    Lab Results   Component Value Date    PROTIME 11.2 09/20/2019    INR 0.9 09/20/2019     @cktotal:3,ckmb:3,ckmbindex:3,troponini:3@  U/A:    Lab Results   Component Value Date    NITRU POSITIVE 12/27/2019    COLORU Yellow 12/27/2019    PHUR 5.5 12/27/2019    LABCAST RARE 05/27/2019    WBCUA 2-5 12/27/2019    RBCUA 0-1 12/27/2019    YEAST MODERATE 09/12/2019    BACTERIA MANY 12/27/2019    CLARITYU Clear 12/27/2019    SPECGRAV 1.010 12/27/2019    UROBILINOGEN 0.2 12/27/2019

## 2020-01-02 LAB
ORGANISM: ABNORMAL
ORGANISM: ABNORMAL
URINE CULTURE, ROUTINE: ABNORMAL
URINE CULTURE, ROUTINE: ABNORMAL

## 2020-02-07 ENCOUNTER — HOSPITAL ENCOUNTER (OUTPATIENT)
Dept: CT IMAGING | Age: 75
Discharge: HOME | End: 2020-02-09
Payer: MEDICARE

## 2020-02-07 PROCEDURE — 74176 CT ABD & PELVIS W/O CONTRAST: CPT

## 2020-02-18 ENCOUNTER — APPOINTMENT (OUTPATIENT)
Dept: CT IMAGING | Age: 75
End: 2020-02-18
Payer: MEDICARE

## 2020-02-18 ENCOUNTER — HOSPITAL ENCOUNTER (EMERGENCY)
Age: 75
Discharge: HOME | End: 2020-02-18
Attending: EMERGENCY MEDICINE
Payer: MEDICARE

## 2020-02-18 VITALS
DIASTOLIC BLOOD PRESSURE: 68 MMHG | SYSTOLIC BLOOD PRESSURE: 108 MMHG | HEART RATE: 88 BPM | RESPIRATION RATE: 16 BRPM | OXYGEN SATURATION: 96 % | TEMPERATURE: 97.9 F | HEIGHT: 66 IN | WEIGHT: 170 LBS | BODY MASS INDEX: 27.32 KG/M2

## 2020-02-18 LAB
ALBUMIN SERPL-MCNC: 3.1 G/DL (ref 3.5–5.2)
ALP BLD-CCNC: 101 U/L (ref 35–104)
ALT SERPL-CCNC: 8 U/L (ref 0–32)
ANION GAP SERPL CALCULATED.3IONS-SCNC: 15 MMOL/L (ref 7–16)
AST SERPL-CCNC: 33 U/L (ref 0–31)
BASOPHILS ABSOLUTE: 0.11 E9/L (ref 0–0.2)
BASOPHILS RELATIVE PERCENT: 1.3 % (ref 0–2)
BILIRUB SERPL-MCNC: 0.3 MG/DL (ref 0–1.2)
BUN BLDV-MCNC: 18 MG/DL (ref 8–23)
CALCIUM SERPL-MCNC: 9.7 MG/DL (ref 8.6–10.2)
CHLORIDE BLD-SCNC: 101 MMOL/L (ref 98–107)
CO2: 23 MMOL/L (ref 22–29)
CREAT SERPL-MCNC: 1.1 MG/DL (ref 0.5–1)
EOSINOPHILS ABSOLUTE: 0.46 E9/L (ref 0.05–0.5)
EOSINOPHILS RELATIVE PERCENT: 5.5 % (ref 0–6)
GFR AFRICAN AMERICAN: 58
GFR NON-AFRICAN AMERICAN: 48 ML/MIN/1.73
GLUCOSE BLD-MCNC: 95 MG/DL (ref 74–99)
HCT VFR BLD CALC: 40.4 % (ref 34–48)
HEMOGLOBIN: 12.2 G/DL (ref 11.5–15.5)
IMMATURE GRANULOCYTES #: 0.02 E9/L
IMMATURE GRANULOCYTES %: 0.2 % (ref 0–5)
LACTIC ACID: 2 MMOL/L (ref 0.5–2.2)
LYMPHOCYTES ABSOLUTE: 1.29 E9/L (ref 1.5–4)
LYMPHOCYTES RELATIVE PERCENT: 15.5 % (ref 20–42)
MCH RBC QN AUTO: 29.4 PG (ref 26–35)
MCHC RBC AUTO-ENTMCNC: 30.2 % (ref 32–34.5)
MCV RBC AUTO: 97.3 FL (ref 80–99.9)
MONOCYTES ABSOLUTE: 0.74 E9/L (ref 0.1–0.95)
MONOCYTES RELATIVE PERCENT: 8.9 % (ref 2–12)
NEUTROPHILS ABSOLUTE: 5.72 E9/L (ref 1.8–7.3)
NEUTROPHILS RELATIVE PERCENT: 68.6 % (ref 43–80)
PDW BLD-RTO: 16.2 FL (ref 11.5–15)
PLATELET # BLD: 328 E9/L (ref 130–450)
PMV BLD AUTO: 10.3 FL (ref 7–12)
POTASSIUM SERPL-SCNC: 4.7 MMOL/L (ref 3.5–5)
RBC # BLD: 4.15 E12/L (ref 3.5–5.5)
SODIUM BLD-SCNC: 139 MMOL/L (ref 132–146)
TOTAL PROTEIN: 6.6 G/DL (ref 6.4–8.3)
WBC # BLD: 8.3 E9/L (ref 4.5–11.5)

## 2020-02-18 PROCEDURE — 83605 ASSAY OF LACTIC ACID: CPT

## 2020-02-18 PROCEDURE — 99284 EMERGENCY DEPT VISIT MOD MDM: CPT

## 2020-02-18 PROCEDURE — 74176 CT ABD & PELVIS W/O CONTRAST: CPT

## 2020-02-18 PROCEDURE — 36415 COLL VENOUS BLD VENIPUNCTURE: CPT

## 2020-02-18 PROCEDURE — 72131 CT LUMBAR SPINE W/O DYE: CPT

## 2020-02-18 PROCEDURE — 85025 COMPLETE CBC W/AUTO DIFF WBC: CPT

## 2020-02-18 PROCEDURE — 80053 COMPREHEN METABOLIC PANEL: CPT

## 2020-02-18 PROCEDURE — 2580000003 HC RX 258: Performed by: EMERGENCY MEDICINE

## 2020-02-18 RX ORDER — 0.9 % SODIUM CHLORIDE 0.9 %
1000 INTRAVENOUS SOLUTION INTRAVENOUS ONCE
Status: COMPLETED | OUTPATIENT
Start: 2020-02-18 | End: 2020-02-18

## 2020-02-18 RX ORDER — MIRTAZAPINE 15 MG/1
15 TABLET, FILM COATED ORAL NIGHTLY
Status: ON HOLD | COMMUNITY
End: 2020-11-13

## 2020-02-18 RX ORDER — ATENOLOL 50 MG/1
50 TABLET ORAL
Status: ON HOLD | COMMUNITY
End: 2020-11-17 | Stop reason: HOSPADM

## 2020-02-18 RX ORDER — HYDROCODONE BITARTRATE AND ACETAMINOPHEN 5; 325 MG/1; MG/1
1 TABLET ORAL EVERY 8 HOURS PRN
COMMUNITY
End: 2020-03-04 | Stop reason: SDUPTHER

## 2020-02-18 RX ORDER — GABAPENTIN 100 MG/1
100 CAPSULE ORAL 2 TIMES DAILY
Status: ON HOLD | COMMUNITY
End: 2020-11-13

## 2020-02-18 RX ORDER — LEVOTHYROXINE SODIUM 137 UG/1
137 TABLET ORAL DAILY
COMMUNITY

## 2020-02-18 RX ADMIN — SODIUM CHLORIDE 1000 ML: 9 INJECTION, SOLUTION INTRAVENOUS at 13:31

## 2020-02-18 ASSESSMENT — PAIN DESCRIPTION - ORIENTATION: ORIENTATION: LEFT

## 2020-02-18 ASSESSMENT — PAIN DESCRIPTION - DESCRIPTORS: DESCRIPTORS: BURNING

## 2020-02-18 ASSESSMENT — PAIN DESCRIPTION - LOCATION: LOCATION: BACK;LEG

## 2020-02-18 ASSESSMENT — PAIN DESCRIPTION - PAIN TYPE: TYPE: ACUTE PAIN

## 2020-02-18 ASSESSMENT — PAIN SCALES - GENERAL: PAINLEVEL_OUTOF10: 0

## 2020-02-18 NOTE — ED NOTES
Yao Newsome called to return pt to [facility]. Attempted to call nurse there, no answer. Message left with Randall Damico and nurse may call [facility] for report.      Phan Gamino RN  02/18/20 5827

## 2020-02-18 NOTE — ED PROVIDER NOTES
HPI:  2/18/20, Time: 11:53 AM         Randee Cosme is a 66 y.o. female presenting to the ED for low back pain radiation around the sides of her abdomen and down the left leg, beginning about 2 weeks ago. Had xray at nursing home and they cannot rule out compression fracture so she is here for evaluation. States it's worse if she sits up. Focal paresthesias, focal weakness, saddle paresthesias, urinary or stool incontinence or retention, fever or chills. The complaint has been intermittent, moderate in severity. States they put ice on her back an it helps. He is status post lumbar surgery for osteomyelitis of the spine in October. States the incision has healed well and has no current complications from that. Patient denies fever/chills, cough, congestion, chest pain, shortness of breath, edema, headache, visual disturbances, focal paresthesias, focal weakness, abdominal pain, nausea, vomiting, diarrhea, constipation, dysuria, hematuria, trauma, back pain or other complaints. ROS:   Pertinent positives and negatives are stated within HPI, all other systems reviewed and are negative.      --------------------------------------------- PAST HISTORY ---------------------------------------------  Past Medical History:  has a past medical history of Arthritis, CAD (coronary artery disease), Constipation, Gout, Hyperlipidemia, Hypertension, Macular hole, right eye, and Thyroid disease. Past Surgical History:  has a past surgical history that includes Breast surgery (Right, 1982); Hysterectomy (1985); Cholecystectomy (1997); laminectomy (N/A, 6/3/2019); skin biopsy; Abdomen surgery; back surgery; Colonoscopy; eye surgery; joint replacement; and Tonsillectomy. Social History:  reports that she has never smoked. She has never used smokeless tobacco. She reports previous alcohol use. She reports that she does not use drugs. Family History: family history includes Other in her mother.      The patients epidural phlegmon/abscess. MRI surveillance recommended. ALERT:  THIS IS AN ABNORMAL REPORT                ------------------------- NURSING NOTES AND VITALS REVIEWED ---------------------------   The nursing notes within the ED encounter and vital signs as below have been reviewed by myself. /68   Pulse 88   Temp 97.9 °F (36.6 °C) (Temporal)   Resp 16   Ht 5' 6\" (1.676 m)   Wt 170 lb (77.1 kg)   SpO2 96%   BMI 27.44 kg/m²   Oxygen Saturation Interpretation: normal    The patients available past medical records and past encounters were reviewed. ------------------------------ ED COURSE/MEDICAL DECISION MAKING----------------------  Medications   0.9 % sodium chloride bolus (1,000 mLs Intravenous New Bag 2/18/20 1331)           Procedures:  none      Medical Decision Making:    Reviewed CT scan with neurosurgery, looks similar to previous scans. Recommends MRI to eval for soft tissue abscess but this may occur as an outpatient, will see her Friday morning and schedule an MRI   She is NV intact and appears well    This patient's ED course included: re-evaluation prior to disposition and a personal history and physicial eaxmination    This patient has remained hemodynamically stable and improved during their ED course. Re-Evaluations:             Time: 2:21 PM  Re-evaluation. Patients symptoms are improving        Consultations:             2:21 PM  Spoke with Kiana De La Rosa neurosurgical PA for Dr Rossi Morley, discussed case, see above recommendations in [facility]    Critical Care: none        Counseling: The emergency provider has spoken with the patient and discussed todays results, in addition to providing specific details for the plan of care and counseling regarding the diagnosis and prognosis. Questions are answered at this time and they are agreeable with the plan.       --------------------------------- IMPRESSION AND DISPOSITION ---------------------------------    IMPRESSION  1.  Chronic low back pain, unspecified back pain laterality, unspecified whether sciatica present        DISPOSITION  Disposition: Discharge to nursing home  Patient condition is stable                  Jaycob Murphy DO  02/18/20 8969

## 2020-02-18 NOTE — ED NOTES
Bed: 07  Expected date:   Expected time:   Means of arrival:   Comments:  Collins Jefferson RN  02/18/20 1142

## 2020-02-20 ENCOUNTER — OFFICE VISIT (OUTPATIENT)
Dept: NEUROSURGERY | Age: 75
End: 2020-02-20
Payer: MEDICARE

## 2020-02-20 VITALS
BODY MASS INDEX: 27.32 KG/M2 | HEART RATE: 85 BPM | HEIGHT: 66 IN | WEIGHT: 170 LBS | SYSTOLIC BLOOD PRESSURE: 111 MMHG | DIASTOLIC BLOOD PRESSURE: 73 MMHG

## 2020-02-20 PROCEDURE — G8484 FLU IMMUNIZE NO ADMIN: HCPCS | Performed by: PHYSICIAN ASSISTANT

## 2020-02-20 PROCEDURE — 1123F ACP DISCUSS/DSCN MKR DOCD: CPT | Performed by: PHYSICIAN ASSISTANT

## 2020-02-20 PROCEDURE — 1090F PRES/ABSN URINE INCON ASSESS: CPT | Performed by: PHYSICIAN ASSISTANT

## 2020-02-20 PROCEDURE — 4040F PNEUMOC VAC/ADMIN/RCVD: CPT | Performed by: PHYSICIAN ASSISTANT

## 2020-02-20 PROCEDURE — G8417 CALC BMI ABV UP PARAM F/U: HCPCS | Performed by: PHYSICIAN ASSISTANT

## 2020-02-20 PROCEDURE — 99214 OFFICE O/P EST MOD 30 MIN: CPT | Performed by: PHYSICIAN ASSISTANT

## 2020-02-20 PROCEDURE — 1036F TOBACCO NON-USER: CPT | Performed by: PHYSICIAN ASSISTANT

## 2020-02-20 PROCEDURE — G8427 DOCREV CUR MEDS BY ELIG CLIN: HCPCS | Performed by: PHYSICIAN ASSISTANT

## 2020-02-20 PROCEDURE — G8400 PT W/DXA NO RESULTS DOC: HCPCS | Performed by: PHYSICIAN ASSISTANT

## 2020-02-20 ASSESSMENT — ENCOUNTER SYMPTOMS
ABDOMINAL PAIN: 0
BACK PAIN: 1
SHORTNESS OF BREATH: 0
PHOTOPHOBIA: 0
TROUBLE SWALLOWING: 0

## 2020-02-20 NOTE — PROGRESS NOTES
Subjective:      Patient ID: Shameka Guzman is a 66 y.o. female. Marizol Mckenzie is a 66year old female with a past medical history of thyroid disease, HTN, HLD, gout, CAD, arthritis, macular degeneration, and bilateral knee replacements. She was previously seen in the hospital 5/31/19 with low back pain and leg weakness. She was found to have lumbar osteomyelitis with a pathologic fracture at L3 and an epidural abscess. Pt underwent L3-L5 laminectomy and evacuation of epidural abscess with Dr. Kenroy Mederos on 6/3/19. Pt followed up with Infectious Disease and in our office post operatively. She presents to the office today c/o new low back pain with radiation into her left leg for the past 2 weeks. Pt resides at [facility] and denies injury. Describes the pain as sharp starting midline lumber spine and shooting down her left leg. Admits to numbness of her anterior left thigh. She has tried Norco and laying flat without relief. Pt went to the ED 2/18 where CT lumbar spine performed demonstrating similar findings compared with prior imaging. Denies changes in bowel or bladder, saddle anesthesia, fever, chills, N/V, SOB, or chest pain. Review of Systems   Constitutional: Negative for fever. HENT: Negative for trouble swallowing. Eyes: Negative for photophobia. Respiratory: Negative for shortness of breath. Cardiovascular: Negative for chest pain. Gastrointestinal: Negative for abdominal pain. Endocrine: Negative for heat intolerance. Genitourinary: Negative for flank pain. Musculoskeletal: Positive for arthralgias, back pain and gait problem. Skin: Negative for wound. Neurological: Positive for weakness and numbness. Negative for headaches. Psychiatric/Behavioral: Negative for confusion. Objective:   Physical Exam  Constitutional:       Appearance: Normal appearance. She is well-developed.       Comments: Presents in wheelchair   HENT:      Head: Normocephalic and

## 2020-03-10 ENCOUNTER — HOSPITAL ENCOUNTER (OUTPATIENT)
Dept: MRI IMAGING | Age: 75
Discharge: HOME | End: 2020-03-12
Payer: MEDICARE

## 2020-03-10 PROCEDURE — 6360000004 HC RX CONTRAST MEDICATION: Performed by: RADIOLOGY

## 2020-03-10 PROCEDURE — 72158 MRI LUMBAR SPINE W/O & W/DYE: CPT

## 2020-03-10 PROCEDURE — 2580000003 HC RX 258: Performed by: RADIOLOGY

## 2020-03-10 PROCEDURE — A9577 INJ MULTIHANCE: HCPCS | Performed by: RADIOLOGY

## 2020-03-10 RX ORDER — SODIUM CHLORIDE 0.9 % (FLUSH) 0.9 %
10 SYRINGE (ML) INJECTION 2 TIMES DAILY
Status: DISCONTINUED | OUTPATIENT
Start: 2020-03-10 | End: 2020-03-13 | Stop reason: HOSPADM

## 2020-03-10 RX ADMIN — GADOBENATE DIMEGLUMINE 15 ML: 529 INJECTION, SOLUTION INTRAVENOUS at 13:35

## 2020-03-10 RX ADMIN — Medication 10 ML: at 13:35

## 2020-03-12 ENCOUNTER — TELEPHONE (OUTPATIENT)
Dept: NEUROSURGERY | Age: 75
End: 2020-03-12

## 2020-03-13 NOTE — TELEPHONE ENCOUNTER
Called Gustavo, patient's  and discussed MRI. Looks like pt might have infection/abscess in left psoas muscle causing her pain. Case discussed and MRI reviewed with Dr. Richard Houston. Pt referred for CT guided biopsy of left psoas ordered along with gram stain and culture. Pt has already seen Infectious Disease and referral placed. Verbalized understanding.

## 2020-03-23 ENCOUNTER — PROCEDURE VISIT (OUTPATIENT)
Dept: NEUROSURGERY | Age: 75
End: 2020-03-23

## 2020-03-23 ENCOUNTER — CLINICAL DOCUMENTATION (OUTPATIENT)
Dept: INTERVENTIONAL RADIOLOGY/VASCULAR | Age: 75
End: 2020-03-23

## 2020-03-23 NOTE — PROGRESS NOTES
Our department spoke with Dr. Khushi Rosales regarding scheduling the Ct guided needle placement for psoas abscess on the left and deemed that it is  medically necessary to proceed with the intervention at this time and not delay the procedure due to the Covid-19 screenings.

## 2020-04-06 ENCOUNTER — HOSPITAL ENCOUNTER (OUTPATIENT)
Age: 75
Discharge: HOME | End: 2020-04-06
Payer: MEDICARE

## 2020-04-06 ENCOUNTER — HOSPITAL ENCOUNTER (OUTPATIENT)
Dept: CT IMAGING | Age: 75
Discharge: HOME | End: 2020-04-08
Payer: MEDICARE

## 2020-04-06 VITALS
WEIGHT: 167 LBS | RESPIRATION RATE: 16 BRPM | SYSTOLIC BLOOD PRESSURE: 132 MMHG | HEART RATE: 70 BPM | BODY MASS INDEX: 26.84 KG/M2 | OXYGEN SATURATION: 92 % | HEIGHT: 66 IN | DIASTOLIC BLOOD PRESSURE: 57 MMHG | TEMPERATURE: 97 F

## 2020-04-06 LAB
ANION GAP SERPL CALCULATED.3IONS-SCNC: 11 MMOL/L (ref 7–16)
BASOPHILS ABSOLUTE: 0.04 E9/L (ref 0–0.2)
BASOPHILS RELATIVE PERCENT: 0.7 % (ref 0–2)
BUN BLDV-MCNC: 15 MG/DL (ref 8–23)
CALCIUM SERPL-MCNC: 10.2 MG/DL (ref 8.6–10.2)
CHLORIDE BLD-SCNC: 100 MMOL/L (ref 98–107)
CO2: 30 MMOL/L (ref 22–29)
CREAT SERPL-MCNC: 1.2 MG/DL (ref 0.5–1)
EOSINOPHILS ABSOLUTE: 0.25 E9/L (ref 0.05–0.5)
EOSINOPHILS RELATIVE PERCENT: 4.5 % (ref 0–6)
GFR AFRICAN AMERICAN: 53
GFR NON-AFRICAN AMERICAN: 44 ML/MIN/1.73
GLUCOSE BLD-MCNC: 94 MG/DL (ref 74–99)
HCT VFR BLD CALC: 40 % (ref 34–48)
HEMOGLOBIN: 12.6 G/DL (ref 11.5–15.5)
IMMATURE GRANULOCYTES #: 0.01 E9/L
IMMATURE GRANULOCYTES %: 0.2 % (ref 0–5)
INR BLD: 1
LYMPHOCYTES ABSOLUTE: 1.21 E9/L (ref 1.5–4)
LYMPHOCYTES RELATIVE PERCENT: 21.9 % (ref 20–42)
MCH RBC QN AUTO: 30.2 PG (ref 26–35)
MCHC RBC AUTO-ENTMCNC: 31.5 % (ref 32–34.5)
MCV RBC AUTO: 95.9 FL (ref 80–99.9)
MONOCYTES ABSOLUTE: 0.49 E9/L (ref 0.1–0.95)
MONOCYTES RELATIVE PERCENT: 8.9 % (ref 2–12)
NEUTROPHILS ABSOLUTE: 3.53 E9/L (ref 1.8–7.3)
NEUTROPHILS RELATIVE PERCENT: 63.8 % (ref 43–80)
PDW BLD-RTO: 14.4 FL (ref 11.5–15)
PLATELET # BLD: 215 E9/L (ref 130–450)
PMV BLD AUTO: 10.1 FL (ref 7–12)
POTASSIUM SERPL-SCNC: 4.4 MMOL/L (ref 3.5–5)
PROTHROMBIN TIME: 12.1 SEC (ref 9.3–12.4)
RBC # BLD: 4.17 E12/L (ref 3.5–5.5)
SODIUM BLD-SCNC: 141 MMOL/L (ref 132–146)
WBC # BLD: 5.5 E9/L (ref 4.5–11.5)

## 2020-04-06 PROCEDURE — 88305 TISSUE EXAM BY PATHOLOGIST: CPT

## 2020-04-06 PROCEDURE — 87075 CULTR BACTERIA EXCEPT BLOOD: CPT

## 2020-04-06 PROCEDURE — 36415 COLL VENOUS BLD VENIPUNCTURE: CPT

## 2020-04-06 PROCEDURE — 20206 BIOPSY MUSCLE PERQ NEEDLE: CPT

## 2020-04-06 PROCEDURE — 80048 BASIC METABOLIC PNL TOTAL CA: CPT

## 2020-04-06 PROCEDURE — 85025 COMPLETE CBC W/AUTO DIFF WBC: CPT

## 2020-04-06 PROCEDURE — 2500000003 HC RX 250 WO HCPCS: Performed by: RADIOLOGY

## 2020-04-06 PROCEDURE — 7100000011 HC PHASE II RECOVERY - ADDTL 15 MIN

## 2020-04-06 PROCEDURE — 87205 SMEAR GRAM STAIN: CPT

## 2020-04-06 PROCEDURE — 87070 CULTURE OTHR SPECIMN AEROBIC: CPT

## 2020-04-06 PROCEDURE — 7100000010 HC PHASE II RECOVERY - FIRST 15 MIN

## 2020-04-06 PROCEDURE — 2709999900 CT GUIDED NEEDLE PLACEMENT

## 2020-04-06 PROCEDURE — 85610 PROTHROMBIN TIME: CPT

## 2020-04-06 RX ORDER — LIDOCAINE HYDROCHLORIDE 20 MG/ML
10 INJECTION, SOLUTION INFILTRATION; PERINEURAL ONCE
Status: COMPLETED | OUTPATIENT
Start: 2020-04-06 | End: 2020-04-06

## 2020-04-06 RX ORDER — AMOXICILLIN AND CLAVULANATE POTASSIUM 875; 125 MG/1; MG/1
1 TABLET, FILM COATED ORAL 2 TIMES DAILY
Status: ON HOLD | COMMUNITY
End: 2020-11-13

## 2020-04-06 RX ORDER — SODIUM CHLORIDE 0.9 % (FLUSH) 0.9 %
10 SYRINGE (ML) INJECTION PRN
Status: DISCONTINUED | OUTPATIENT
Start: 2020-04-06 | End: 2020-04-09 | Stop reason: HOSPADM

## 2020-04-06 RX ADMIN — LIDOCAINE HYDROCHLORIDE 10 ML: 20 INJECTION, SOLUTION INFILTRATION; PERINEURAL at 09:39

## 2020-04-06 ASSESSMENT — PAIN - FUNCTIONAL ASSESSMENT: PAIN_FUNCTIONAL_ASSESSMENT: 0-10

## 2020-04-06 ASSESSMENT — PAIN SCALES - GENERAL
PAINLEVEL_OUTOF10: 0

## 2020-04-06 ASSESSMENT — PAIN DESCRIPTION - DESCRIPTORS: DESCRIPTORS: BURNING

## 2020-04-06 NOTE — H&P
spasms, Disp: , Rfl:     magnesium hydroxide (MILK OF MAGNESIA) 400 MG/5ML suspension, Take 30 mLs by mouth daily as needed for Constipation, Disp: , Rfl:     potassium chloride (KLOR-CON M) 20 MEQ extended release tablet, Take 2 tablets by mouth daily, Disp: 60 tablet, Rfl: 3    docusate sodium (COLACE, DULCOLAX) 100 MG CAPS, Take 100 mg by mouth 2 times daily, Disp: 30 capsule, Rfl: 0    cyanocobalamin 1000 MCG/ML injection, Inject 1,000 mcg into the muscle every 30 days , Disp: , Rfl:     pravastatin (PRAVACHOL) 40 MG tablet, Take 40 mg by mouth nightly., Disp: , Rfl:     probenecid (BENEMID) 500 MG tablet, Take 500 mg by mouth 2 times daily. , Disp: , Rfl:     multivitamin (THERAGRAN) per tablet, Take 1 tablet by mouth daily. , Disp: , Rfl:     diclofenac sodium 1 % GEL, Apply 2 g topically 4 times daily for 13 days, Disp: 1 Tube, Rfl: 0    acetaminophen (TYLENOL) 325 MG tablet, Take 650 mg by mouth every 4 hours as needed for Pain, Disp: , Rfl:     atenolol (TENORMIN) 25 MG tablet, Take 25 mg by mouth nightly Hold for SBP <100 or P<60, Disp: , Rfl:     aspirin 81 MG tablet, Take 81 mg by mouth nightly , Disp: , Rfl:     Current Facility-Administered Medications:     sodium chloride flush 0.9 % injection 10 mL, 10 mL, Intravenous, PRN, Brandi Tucker MD    Allergies   Allergen Reactions    Adrenalin [Epinephrine] Anaphylaxis    Meropenem Other (See Comments)     Severe confusion       Past Medical History:   Diagnosis Date    Arthritis     CAD (coronary artery disease)     Constipation     Gout     CONTROLLED    Hyperlipidemia     Hypertension     STABLE    Macular hole, right eye     Thyroid disease        Past Surgical History:   Procedure Laterality Date    ABDOMEN SURGERY      BACK SURGERY      BREAST SURGERY Right 1982    BENIGN CYST   [address]    JOINT REPLACEMENT      bilat knees     LAMINECTOMY N/A 6/3/2019 LUMBAR LAMINECTOMY POSTERIOR L3-L5, BONE BIOPSY L4 - GIOVANNY, X-RAY,  WANTS TF performed by Drew Iverson [facility] OR    SKIN BIOPSY      TONSILLECTOMY         Family History   Problem Relation Age of Onset    Other Mother        Social History     Socioeconomic History    Marital status:      Spouse name: Not on file    Number of children: Not on file    Years of education: Not on file    Highest education level: Not on file   Occupational History    Not on file   Social Needs    Financial resource strain: Not on file    Food insecurity     Worry: Not on file     Inability: Not on file    Transportation needs     Medical: Not on file     Non-medical: Not on file   Tobacco Use    Smoking status: Never Smoker    Smokeless tobacco: Never Used   Substance and Sexual Activity    Alcohol use: Not Currently     Frequency: Never    Drug use: No    Sexual activity: Not Currently   Lifestyle    Physical activity     Days per week: Not on file     Minutes per session: Not on file    Stress: Not on file   Relationships    Social connections     Talks on phone: Not on file     Gets together: Not on file     Attends [employer] service: Not on file     Active member of club or organization: Not on file     Attends meetings of clubs or organizations: Not on file     Relationship status: Not on file    Intimate partner violence     Fear of current or ex partner: Not on file     Emotionally abused: Not on file     Physically abused: Not on file     Forced sexual activity: Not on file   Other Topics Concern    Not on file   Social History Narrative    Not on file       ROS: Non-contributory other than as noted above    PHYSICAL EXAM:      Heent: Alert and orientated.     Heart:  Rapid regular rhythm    Lungs:  demonstrate no contraindications to proceed      Abdomen:  normal      DATA:  CBC:   Lab Results   Component Value Date    WBC 5.5 04/06/2020    RBC 4.17 04/06/2020    HGB 12.6 04/06/2020    HCT

## 2020-04-07 LAB — GRAM STAIN ORDERABLE: NORMAL

## 2020-04-10 LAB
BODY FLUID CULTURE, STERILE: NORMAL
GRAM STAIN RESULT: NORMAL

## 2020-04-11 LAB — ANAEROBIC CULTURE: NORMAL

## 2020-04-13 ENCOUNTER — TELEPHONE (OUTPATIENT)
Dept: NEUROSURGERY | Age: 75
End: 2020-04-13

## 2020-04-17 ENCOUNTER — TELEPHONE (OUTPATIENT)
Dept: NEUROSURGERY | Age: 75
End: 2020-04-17

## 2020-08-26 ENCOUNTER — HOSPITAL ENCOUNTER (OUTPATIENT)
Dept: CT IMAGING | Age: 75
Discharge: HOME | End: 2020-08-28
Payer: MEDICARE

## 2020-08-26 PROCEDURE — 73700 CT LOWER EXTREMITY W/O DYE: CPT

## 2020-11-13 ENCOUNTER — HOSPITAL ENCOUNTER (INPATIENT)
Age: 75
LOS: 4 days | Discharge: HOME | DRG: 565 | End: 2020-11-17
Attending: EMERGENCY MEDICINE | Admitting: INTERNAL MEDICINE
Payer: MEDICARE

## 2020-11-13 ENCOUNTER — APPOINTMENT (OUTPATIENT)
Dept: GENERAL RADIOLOGY | Age: 75
DRG: 565 | End: 2020-11-13
Payer: MEDICARE

## 2020-11-13 ENCOUNTER — APPOINTMENT (OUTPATIENT)
Dept: CT IMAGING | Age: 75
DRG: 565 | End: 2020-11-13
Payer: MEDICARE

## 2020-11-13 LAB
ANION GAP SERPL CALCULATED.3IONS-SCNC: 6 MMOL/L (ref 7–16)
BACTERIA: NORMAL /HPF
BASOPHILS ABSOLUTE: 0.11 E9/L (ref 0–0.2)
BASOPHILS RELATIVE PERCENT: 1.6 % (ref 0–2)
BILIRUBIN URINE: NEGATIVE
BLOOD, URINE: NEGATIVE
BUN BLDV-MCNC: 17 MG/DL (ref 8–23)
CALCIUM SERPL-MCNC: 8.8 MG/DL (ref 8.6–10.2)
CHLORIDE BLD-SCNC: 104 MMOL/L (ref 98–107)
CLARITY: CLEAR
CO2: 25 MMOL/L (ref 22–29)
COLOR: YELLOW
CREAT SERPL-MCNC: 0.9 MG/DL (ref 0.5–1)
EKG ATRIAL RATE: 72 BPM
EKG P AXIS: 55 DEGREES
EKG P-R INTERVAL: 164 MS
EKG Q-T INTERVAL: 388 MS
EKG QRS DURATION: 84 MS
EKG QTC CALCULATION (BAZETT): 424 MS
EKG R AXIS: 83 DEGREES
EKG T AXIS: 47 DEGREES
EKG VENTRICULAR RATE: 72 BPM
EOSINOPHILS ABSOLUTE: 0.39 E9/L (ref 0.05–0.5)
EOSINOPHILS RELATIVE PERCENT: 5.5 % (ref 0–6)
GFR AFRICAN AMERICAN: >60
GFR NON-AFRICAN AMERICAN: >60 ML/MIN/1.73
GLUCOSE BLD-MCNC: 123 MG/DL (ref 74–99)
GLUCOSE URINE: NEGATIVE MG/DL
HCT VFR BLD CALC: 36.4 % (ref 34–48)
HEMOGLOBIN: 11.1 G/DL (ref 11.5–15.5)
IMMATURE GRANULOCYTES #: 0.03 E9/L
IMMATURE GRANULOCYTES %: 0.4 % (ref 0–5)
KETONES, URINE: NEGATIVE MG/DL
LEUKOCYTE ESTERASE, URINE: NEGATIVE
LYMPHOCYTES ABSOLUTE: 0.85 E9/L (ref 1.5–4)
LYMPHOCYTES RELATIVE PERCENT: 12 % (ref 20–42)
MCH RBC QN AUTO: 28.5 PG (ref 26–35)
MCHC RBC AUTO-ENTMCNC: 30.5 % (ref 32–34.5)
MCV RBC AUTO: 93.6 FL (ref 80–99.9)
MONOCYTES ABSOLUTE: 0.64 E9/L (ref 0.1–0.95)
MONOCYTES RELATIVE PERCENT: 9 % (ref 2–12)
NEUTROPHILS ABSOLUTE: 5.06 E9/L (ref 1.8–7.3)
NEUTROPHILS RELATIVE PERCENT: 71.5 % (ref 43–80)
NITRITE, URINE: NEGATIVE
PDW BLD-RTO: 16.1 FL (ref 11.5–15)
PH UA: 6 (ref 5–9)
PLATELET # BLD: 373 E9/L (ref 130–450)
PMV BLD AUTO: 10.1 FL (ref 7–12)
POTASSIUM REFLEX MAGNESIUM: 4.6 MMOL/L (ref 3.5–5)
PROTEIN UA: NEGATIVE MG/DL
RBC # BLD: 3.89 E12/L (ref 3.5–5.5)
RBC UA: NORMAL /HPF (ref 0–2)
SODIUM BLD-SCNC: 135 MMOL/L (ref 132–146)
SPECIFIC GRAVITY UA: 1.01 (ref 1–1.03)
TROPONIN: 0.05 NG/ML (ref 0–0.03)
UROBILINOGEN, URINE: 0.2 E.U./DL
WBC # BLD: 7.1 E9/L (ref 4.5–11.5)
WBC UA: NORMAL /HPF (ref 0–5)

## 2020-11-13 PROCEDURE — 2580000003 HC RX 258: Performed by: INTERNAL MEDICINE

## 2020-11-13 PROCEDURE — 73560 X-RAY EXAM OF KNEE 1 OR 2: CPT

## 2020-11-13 PROCEDURE — 70450 CT HEAD/BRAIN W/O DYE: CPT

## 2020-11-13 PROCEDURE — 1200000000 HC SEMI PRIVATE

## 2020-11-13 PROCEDURE — 6360000002 HC RX W HCPCS: Performed by: INTERNAL MEDICINE

## 2020-11-13 PROCEDURE — 81001 URINALYSIS AUTO W/SCOPE: CPT

## 2020-11-13 PROCEDURE — 85025 COMPLETE CBC W/AUTO DIFF WBC: CPT

## 2020-11-13 PROCEDURE — 73552 X-RAY EXAM OF FEMUR 2/>: CPT

## 2020-11-13 PROCEDURE — 84484 ASSAY OF TROPONIN QUANT: CPT

## 2020-11-13 PROCEDURE — 72100 X-RAY EXAM L-S SPINE 2/3 VWS: CPT

## 2020-11-13 PROCEDURE — 72125 CT NECK SPINE W/O DYE: CPT

## 2020-11-13 PROCEDURE — 80048 BASIC METABOLIC PNL TOTAL CA: CPT

## 2020-11-13 PROCEDURE — 02HV33Z INSERTION OF INFUSION DEVICE INTO SUPERIOR VENA CAVA, PERCUTANEOUS APPROACH: ICD-10-PCS | Performed by: RADIOLOGY

## 2020-11-13 PROCEDURE — 6370000000 HC RX 637 (ALT 250 FOR IP): Performed by: INTERNAL MEDICINE

## 2020-11-13 PROCEDURE — 99284 EMERGENCY DEPT VISIT MOD MDM: CPT

## 2020-11-13 PROCEDURE — 71046 X-RAY EXAM CHEST 2 VIEWS: CPT

## 2020-11-13 PROCEDURE — 87088 URINE BACTERIA CULTURE: CPT

## 2020-11-13 PROCEDURE — 73521 X-RAY EXAM HIPS BI 2 VIEWS: CPT

## 2020-11-13 PROCEDURE — 93010 ELECTROCARDIOGRAM REPORT: CPT | Performed by: INTERNAL MEDICINE

## 2020-11-13 PROCEDURE — 93005 ELECTROCARDIOGRAM TRACING: CPT | Performed by: STUDENT IN AN ORGANIZED HEALTH CARE EDUCATION/TRAINING PROGRAM

## 2020-11-13 RX ORDER — ACETAMINOPHEN 325 MG/1
650 TABLET ORAL EVERY 6 HOURS PRN
Status: DISCONTINUED | OUTPATIENT
Start: 2020-11-13 | End: 2020-11-17 | Stop reason: HOSPADM

## 2020-11-13 RX ORDER — MIRTAZAPINE 15 MG/1
15 TABLET, FILM COATED ORAL NIGHTLY
Status: DISCONTINUED | OUTPATIENT
Start: 2020-11-13 | End: 2020-11-14

## 2020-11-13 RX ORDER — ONDANSETRON 2 MG/ML
4 INJECTION INTRAMUSCULAR; INTRAVENOUS EVERY 6 HOURS PRN
Status: DISCONTINUED | OUTPATIENT
Start: 2020-11-13 | End: 2020-11-17 | Stop reason: HOSPADM

## 2020-11-13 RX ORDER — SODIUM CHLORIDE 0.9 % (FLUSH) 0.9 %
10 SYRINGE (ML) INJECTION PRN
Status: DISCONTINUED | OUTPATIENT
Start: 2020-11-13 | End: 2020-11-17 | Stop reason: HOSPADM

## 2020-11-13 RX ORDER — OXYCODONE HYDROCHLORIDE 15 MG/1
5 TABLET, FILM COATED, EXTENDED RELEASE ORAL EVERY 4 HOURS PRN
COMMUNITY
End: 2020-11-26

## 2020-11-13 RX ORDER — OXYCODONE HYDROCHLORIDE 15 MG/1
15 TABLET ORAL EVERY 4 HOURS PRN
Status: DISCONTINUED | OUTPATIENT
Start: 2020-11-13 | End: 2020-11-17 | Stop reason: HOSPADM

## 2020-11-13 RX ORDER — METHOCARBAMOL 500 MG/1
1000 TABLET, FILM COATED ORAL EVERY 8 HOURS PRN
Status: DISCONTINUED | OUTPATIENT
Start: 2020-11-13 | End: 2020-11-17 | Stop reason: HOSPADM

## 2020-11-13 RX ORDER — NALOXONE HYDROCHLORIDE 0.4 MG/ML
0.4 INJECTION, SOLUTION INTRAMUSCULAR; INTRAVENOUS; SUBCUTANEOUS PRN
Status: DISCONTINUED | OUTPATIENT
Start: 2020-11-13 | End: 2020-11-17 | Stop reason: HOSPADM

## 2020-11-13 RX ORDER — ACETAMINOPHEN 650 MG/1
650 SUPPOSITORY RECTAL EVERY 6 HOURS PRN
Status: DISCONTINUED | OUTPATIENT
Start: 2020-11-13 | End: 2020-11-17 | Stop reason: HOSPADM

## 2020-11-13 RX ORDER — AMOXICILLIN AND CLAVULANATE POTASSIUM 875; 125 MG/1; MG/1
1 TABLET, FILM COATED ORAL 2 TIMES DAILY
Status: DISCONTINUED | OUTPATIENT
Start: 2020-11-13 | End: 2020-11-13

## 2020-11-13 RX ORDER — TORSEMIDE 20 MG/1
20 TABLET ORAL DAILY
Status: DISCONTINUED | OUTPATIENT
Start: 2020-11-13 | End: 2020-11-17 | Stop reason: HOSPADM

## 2020-11-13 RX ORDER — SODIUM CHLORIDE 9 MG/ML
INJECTION, SOLUTION INTRAVENOUS EVERY 8 HOURS
Status: DISCONTINUED | OUTPATIENT
Start: 2020-11-13 | End: 2020-11-17 | Stop reason: HOSPADM

## 2020-11-13 RX ORDER — GABAPENTIN 100 MG/1
100 CAPSULE ORAL 2 TIMES DAILY
Status: DISCONTINUED | OUTPATIENT
Start: 2020-11-13 | End: 2020-11-14

## 2020-11-13 RX ORDER — LEVOTHYROXINE SODIUM 0.03 MG/1
25 TABLET ORAL DAILY
Status: DISCONTINUED | OUTPATIENT
Start: 2020-11-14 | End: 2020-11-17 | Stop reason: HOSPADM

## 2020-11-13 RX ORDER — ATENOLOL 25 MG/1
50 TABLET ORAL
Status: DISCONTINUED | OUTPATIENT
Start: 2020-11-14 | End: 2020-11-16

## 2020-11-13 RX ORDER — PROMETHAZINE HYDROCHLORIDE 25 MG/1
12.5 TABLET ORAL EVERY 6 HOURS PRN
Status: DISCONTINUED | OUTPATIENT
Start: 2020-11-13 | End: 2020-11-17 | Stop reason: HOSPADM

## 2020-11-13 RX ORDER — SODIUM CHLORIDE 0.9 % (FLUSH) 0.9 %
10 SYRINGE (ML) INJECTION EVERY 12 HOURS SCHEDULED
Status: DISCONTINUED | OUTPATIENT
Start: 2020-11-13 | End: 2020-11-17 | Stop reason: HOSPADM

## 2020-11-13 RX ORDER — METHOCARBAMOL 500 MG/1
1000 TABLET, FILM COATED ORAL EVERY 8 HOURS PRN
Status: ON HOLD | COMMUNITY
End: 2020-12-01 | Stop reason: HOSPADM

## 2020-11-13 RX ORDER — ATENOLOL 25 MG/1
25 TABLET ORAL NIGHTLY
Status: DISCONTINUED | OUTPATIENT
Start: 2020-11-13 | End: 2020-11-17 | Stop reason: HOSPADM

## 2020-11-13 RX ORDER — OXYCODONE HYDROCHLORIDE 15 MG/1
15 TABLET, FILM COATED, EXTENDED RELEASE ORAL EVERY 4 HOURS PRN
Status: DISCONTINUED | OUTPATIENT
Start: 2020-11-13 | End: 2020-11-13 | Stop reason: CLARIF

## 2020-11-13 RX ORDER — POTASSIUM CHLORIDE 7.45 MG/ML
10 INJECTION INTRAVENOUS PRN
Status: DISCONTINUED | OUTPATIENT
Start: 2020-11-13 | End: 2020-11-17 | Stop reason: HOSPADM

## 2020-11-13 RX ORDER — PROBENECID 500 MG/1
500 TABLET, FILM COATED ORAL 2 TIMES DAILY
Status: DISCONTINUED | OUTPATIENT
Start: 2020-11-13 | End: 2020-11-17 | Stop reason: HOSPADM

## 2020-11-13 RX ORDER — CYCLOBENZAPRINE HCL 10 MG
10 TABLET ORAL EVERY 8 HOURS PRN
Status: DISCONTINUED | OUTPATIENT
Start: 2020-11-13 | End: 2020-11-14

## 2020-11-13 RX ORDER — MULTIVITAMIN WITH IRON
1 TABLET ORAL DAILY
Status: DISCONTINUED | OUTPATIENT
Start: 2020-11-13 | End: 2020-11-17 | Stop reason: HOSPADM

## 2020-11-13 RX ORDER — LEVOTHYROXINE SODIUM 137 UG/1
137 TABLET ORAL DAILY
Status: DISCONTINUED | OUTPATIENT
Start: 2020-11-13 | End: 2020-11-13 | Stop reason: CLARIF

## 2020-11-13 RX ORDER — POTASSIUM CHLORIDE 20 MEQ/1
40 TABLET, EXTENDED RELEASE ORAL PRN
Status: DISCONTINUED | OUTPATIENT
Start: 2020-11-13 | End: 2020-11-17 | Stop reason: HOSPADM

## 2020-11-13 RX ORDER — VANCOMYCIN HYDROCHLORIDE 250 MG/1
250 CAPSULE ORAL 4 TIMES DAILY
Status: ON HOLD | COMMUNITY
End: 2020-11-16 | Stop reason: HOSPADM

## 2020-11-13 RX ORDER — SENNA PLUS 8.6 MG/1
1 TABLET ORAL DAILY PRN
Status: DISCONTINUED | OUTPATIENT
Start: 2020-11-13 | End: 2020-11-17 | Stop reason: HOSPADM

## 2020-11-13 RX ORDER — ASPIRIN 81 MG/1
81 TABLET ORAL NIGHTLY
Status: DISCONTINUED | OUTPATIENT
Start: 2020-11-13 | End: 2020-11-17 | Stop reason: HOSPADM

## 2020-11-13 RX ORDER — LEVOTHYROXINE SODIUM 112 UG/1
112 TABLET ORAL DAILY
Status: DISCONTINUED | OUTPATIENT
Start: 2020-11-14 | End: 2020-11-17 | Stop reason: HOSPADM

## 2020-11-13 RX ORDER — PRAVASTATIN SODIUM 20 MG
40 TABLET ORAL NIGHTLY
Status: DISCONTINUED | OUTPATIENT
Start: 2020-11-13 | End: 2020-11-17 | Stop reason: HOSPADM

## 2020-11-13 RX ORDER — ALPRAZOLAM 0.5 MG/1
0.5 TABLET ORAL 2 TIMES DAILY PRN
Status: ON HOLD | COMMUNITY
End: 2021-05-21

## 2020-11-13 RX ADMIN — ATENOLOL 25 MG: 25 TABLET ORAL at 22:59

## 2020-11-13 RX ADMIN — OXYCODONE HYDROCHLORIDE 15 MG: 15 TABLET ORAL at 22:59

## 2020-11-13 RX ADMIN — ACETAMINOPHEN 650 MG: 325 TABLET ORAL at 22:58

## 2020-11-13 RX ADMIN — ASPIRIN 81 MG: 81 TABLET, COATED ORAL at 22:59

## 2020-11-13 RX ADMIN — MEROPENEM 1 G: 1 INJECTION, POWDER, FOR SOLUTION INTRAVENOUS at 22:59

## 2020-11-13 RX ADMIN — CYCLOBENZAPRINE 10 MG: 10 TABLET, FILM COATED ORAL at 22:59

## 2020-11-13 ASSESSMENT — PAIN DESCRIPTION - DESCRIPTORS
DESCRIPTORS: ACHING;SHARP;SORE
DESCRIPTORS: ACHING;THROBBING;SORE

## 2020-11-13 ASSESSMENT — ENCOUNTER SYMPTOMS
DIARRHEA: 0
TROUBLE SWALLOWING: 0
VOMITING: 0
BACK PAIN: 0
SHORTNESS OF BREATH: 0
NAUSEA: 0
ABDOMINAL PAIN: 0
PHOTOPHOBIA: 0
COUGH: 0

## 2020-11-13 ASSESSMENT — PAIN DESCRIPTION - PROGRESSION
CLINICAL_PROGRESSION: NOT CHANGED
CLINICAL_PROGRESSION: NOT CHANGED

## 2020-11-13 ASSESSMENT — PAIN DESCRIPTION - LOCATION
LOCATION: KNEE;HIP
LOCATION: KNEE;HIP
LOCATION: KNEE;ANKLE

## 2020-11-13 ASSESSMENT — PAIN DESCRIPTION - ORIENTATION
ORIENTATION: RIGHT

## 2020-11-13 ASSESSMENT — PAIN DESCRIPTION - FREQUENCY
FREQUENCY: INTERMITTENT
FREQUENCY: INTERMITTENT

## 2020-11-13 ASSESSMENT — PAIN SCALES - GENERAL
PAINLEVEL_OUTOF10: 8
PAINLEVEL_OUTOF10: 6
PAINLEVEL_OUTOF10: 6

## 2020-11-13 ASSESSMENT — PAIN DESCRIPTION - ONSET
ONSET: ON-GOING
ONSET: ON-GOING

## 2020-11-13 ASSESSMENT — PAIN DESCRIPTION - PAIN TYPE
TYPE: ACUTE PAIN
TYPE: ACUTE PAIN

## 2020-11-13 NOTE — ED NOTES
Bed: 28  Expected date:   Expected time:   Means of arrival:   Comments:  EMS     Paulette Olivas RN  11/13/20 [id number]

## 2020-11-13 NOTE — ED PROVIDER NOTES
The patient is a 78-year-old female who presents to the emergency department via EMS complaining of a fall. The patient has a history of a psoas abscess and had surgery performed at [facility] on September 9. She states that she was in the hospital from September 9 to September 24 in [facility]. The patient states that she then was discharged to the [address] [place] [address],[address] and has been residing there until approximately 1 week ago. She states that she has been living at home for the past week with her  who is wheelchair-bound. She states that she did hire a caregiver to help her. The patient states that she also has C. difficile and is currently being treated on oral vancomycin. She states that she fell like she had to have a bowel movement and when she stood up to walk to the bathroom she was rushing because she really had to go. She states that she then began to go and her legs gave out and she fell on the ceramic bathroom floor hitting the back of her head off of the floor. The patient states she is not on any anticoagulation; however, Eliquis is on her medication list.  Patient states she is unsure if she is taking that. Patient states she is on oral vancomycin for her C. difficile. She states that she also is receiving meropenem through a PICC line in her right arm \"for her back\". She states that she is on these medications until just before [place] time. The patient denies any fever, chills, chest pain, shortness of breath, syncope, lightheadedness, dizziness, loss of consciousness, abdominal pain, other injuries, or other acute symptoms or concerns. The history is provided by the patient. Review of Systems   Constitutional: Negative for chills, fatigue and fever. HENT: Negative for congestion and trouble swallowing. Eyes: Negative for photophobia and visual disturbance. Respiratory: Negative for cough and shortness of breath.     Cardiovascular: Negative for chest pain and leg swelling. Gastrointestinal: Negative for abdominal pain, diarrhea, nausea and vomiting. Genitourinary: Negative for dysuria, flank pain, frequency and hematuria. Musculoskeletal: Positive for arthralgias. Negative for back pain and neck pain. Skin: Negative for rash and wound. Neurological: Positive for weakness. Negative for dizziness, syncope, light-headedness, numbness and headaches. All other systems reviewed and are negative. Physical Exam  Vitals signs and nursing note reviewed. Constitutional:       General: She is not in acute distress. Appearance: She is well-developed. She is obese. She is ill-appearing (Chronically ill in appearance but no acute distress). She is not toxic-appearing or diaphoretic. HENT:      Head: Normocephalic and atraumatic. No abrasion, contusion, masses or laceration. Eyes:      General: Lids are normal.      Extraocular Movements: Extraocular movements intact. Neck:      Musculoskeletal: Normal range of motion and neck supple. No spinous process tenderness or muscular tenderness. Cardiovascular:      Rate and Rhythm: Normal rate and regular rhythm. Heart sounds: Normal heart sounds, S1 normal and S2 normal. No murmur. Pulmonary:      Effort: Pulmonary effort is normal. No respiratory distress. Breath sounds: Normal breath sounds and air entry. No decreased breath sounds, wheezing, rhonchi or rales. Abdominal:      General: Bowel sounds are normal.      Palpations: Abdomen is soft. Tenderness: There is no abdominal tenderness. There is no guarding or rebound. Musculoskeletal:      Right knee: She exhibits swelling (Mild diffuse swelling and tenderness over the right knee. There is no obvious deformity. The patient is exquisitely tender over the fibular head. There is no bruising or evidence of trauma. ). Cervical back: She exhibits no tenderness. Thoracic back: She exhibits no tenderness.       Lumbar back: She (coronary artery disease), Constipation, Gout, Hyperlipidemia, Hypertension, Macular hole, right eye, and Thyroid disease. Past Surgical History:  has a past surgical history that includes Breast surgery (Right, 1982); Hysterectomy (1985); Cholecystectomy (1997); laminectomy (N/A, 6/3/2019); skin biopsy; Abdomen surgery; back surgery; Colonoscopy; eye surgery; joint replacement; and Tonsillectomy. Social History:  reports that she has never smoked. She has never used smokeless tobacco. She reports current alcohol use. She reports that she does not use drugs. Family History: family history includes Other in her mother. The patients home medications have been reviewed.     Allergies: Adrenalin [epinephrine hcl (nasal)] and Meropenem    -------------------------------------------------- RESULTS -------------------------------------------------    LABS:  Results for orders placed or performed during the hospital encounter of 11/13/20   CBC auto differential   Result Value Ref Range    WBC 7.1 4.5 - 11.5 E9/L    RBC 3.89 3.50 - 5.50 E12/L    Hemoglobin 11.1 (L) 11.5 - 15.5 g/dL    Hematocrit 36.4 34.0 - 48.0 %    MCV 93.6 80.0 - 99.9 fL    MCH 28.5 26.0 - 35.0 pg    MCHC 30.5 (L) 32.0 - 34.5 %    RDW 16.1 (H) 11.5 - 15.0 fL    Platelets 637 280 - 946 E9/L    MPV 10.1 7.0 - 12.0 fL    Neutrophils % 71.5 43.0 - 80.0 %    Immature Granulocytes % 0.4 0.0 - 5.0 %    Lymphocytes % 12.0 (L) 20.0 - 42.0 %    Monocytes % 9.0 2.0 - 12.0 %    Eosinophils % 5.5 0.0 - 6.0 %    Basophils % 1.6 0.0 - 2.0 %    Neutrophils Absolute 5.06 1.80 - 7.30 E9/L    Immature Granulocytes # 0.03 E9/L    Lymphocytes Absolute 0.85 (L) 1.50 - 4.00 E9/L    Monocytes Absolute 0.64 0.10 - 0.95 E9/L    Eosinophils Absolute 0.39 0.05 - 0.50 E9/L    Basophils Absolute 0.11 0.00 - 0.20 T2/M   Basic Metabolic Panel w/ Reflex to MG   Result Value Ref Range    Sodium 135 132 - 146 mmol/L    Potassium reflex Magnesium 4.6 3.5 - 5.0 mmol/L Chloride 104 98 - 107 mmol/L    CO2 25 22 - 29 mmol/L    Anion Gap 6 (L) 7 - 16 mmol/L    Glucose 123 (H) 74 - 99 mg/dL    BUN 17 8 - 23 mg/dL    CREATININE 0.9 0.5 - 1.0 mg/dL    GFR Non-African American >60 >=60 mL/min/1.73    GFR African American >60     Calcium 8.8 8.6 - 10.2 mg/dL   Troponin   Result Value Ref Range    Troponin 0.05 (H) 0.00 - 0.03 ng/mL   Urinalysis with Microscopic   Result Value Ref Range    Color, UA Yellow Straw/Yellow    Clarity, UA Clear Clear    Glucose, Ur Negative Negative mg/dL    Bilirubin Urine Negative Negative    Ketones, Urine Negative Negative mg/dL    Specific Gravity, UA 1.015 1.005 - 1.030    Blood, Urine Negative Negative    pH, UA 6.0 5.0 - 9.0    Protein, UA Negative Negative mg/dL    Urobilinogen, Urine 0.2 <2.0 E.U./dL    Nitrite, Urine Negative Negative    Leukocyte Esterase, Urine Negative Negative    WBC, UA NONE 0 - 5 /HPF    RBC, UA NONE 0 - 2 /HPF    Bacteria, UA NONE SEEN None Seen /HPF   EKG 12 Lead   Result Value Ref Range    Ventricular Rate 72 BPM    Atrial Rate 72 BPM    P-R Interval 164 ms    QRS Duration 84 ms    Q-T Interval 388 ms    QTc Calculation (Bazett) 424 ms    P Axis 55 degrees    R Axis 83 degrees    T Axis 47 degrees       RADIOLOGY:  XR CHEST (2 VW)   Final Result   No acute process. XR HIP BILATERAL W AP PELVIS (2 VIEWS)   Final Result   L-spine: Postsurgical changes with prior compression injury at L3. Severe osteoarthritis at the right hip and mild osteoarthritis at the left   hip. Anatomically aligned right knee arthroplasty. Osteopenia. XR FEMUR RIGHT (MIN 2 VIEWS)   Final Result   No acute osseous abnormality of the right femur. XR KNEE RIGHT (1-2 VIEWS)   Final Result   L-spine: Postsurgical changes with prior compression injury at L3. Severe osteoarthritis at the right hip and mild osteoarthritis at the left   hip. Anatomically aligned right knee arthroplasty. Osteopenia.          XR LUMBAR SPINE (2-3 VIEWS)   Final Result   L-spine: Postsurgical changes with prior compression injury at L3. Severe osteoarthritis at the right hip and mild osteoarthritis at the left   hip. Anatomically aligned right knee arthroplasty. Osteopenia. CT HEAD WO CONTRAST   Final Result   1. There is no acute intracranial abnormality. Specifically, there is no   intracranial hemorrhage. 2. Atrophy and periventricular leukomalacia,         CT CERVICAL SPINE WO CONTRAST   Final Result   No acute displaced fracture. Degenerative changes with multilevel disc bulges from C2-T1. CT head      There is diffuse atrophy with dilated ventricles and prominence of the sulci. Punctate and confluent areas of decreased attenuation are present in the   periventricular white matter and brainstem consistent with   microvascular/ischemic changes. Old lacunar CVA is noted in the left basal   ganglia. There is no acute stroke, mass or hemorrhage. There is   calcification of the vertebrobasilar arteries. Impression      Atrophy with small vessel ischemic disease and old lacunar CVA in the left   basal ganglia. There is no acute stroke or hemorrhage.               ------------------------- NURSING NOTES AND VITALS REVIEWED ---------------------------  Date / Time Roomed:  11/13/2020  3:12 PM  ED Bed Assignment:  [id number]    The nursing notes within the ED encounter and vital signs as below have been reviewed.      Patient Vitals for the past 24 hrs:   BP Temp Temp src Pulse Resp SpO2 Height Weight   11/13/20 1821 124/65 -- -- 75 14 95 % -- --   11/13/20 1515 (!) 108/58 97.7 °F (36.5 °C) Oral 77 14 99 % 5' 5\" (1.651 m) 165 lb (74.8 kg)       Oxygen Saturation Interpretation: Normal    ------------------------------------------ PROGRESS NOTES ------------------------------------------  Re-evaluation(s): Patients symptoms show no change  Repeat physical examination is not changed    Counseling:  I have spoken with the patient and discussed todays results, in addition to providing specific details for the plan of care and counseling regarding the diagnosis and prognosis. Their questions are answered at this time and they are agreeable with the plan of admission.    --------------------------------- ADDITIONAL PROVIDER NOTES ---------------------------------  Consultations: Spoke with Dr. Blanca Espinosa. Discussed case. They will admit the patient. This patient's ED course included: a personal history and physicial examination, re-evaluation prior to disposition, multiple bedside re-evaluations, IV medications, cardiac monitoring, continuous pulse oximetry and complex medical decision making and emergency management    This patient has remained hemodynamically stable during their ED course. Diagnosis: Fall, generalized weakness, unable to ambulate, elevated troponin, right foot drop. Disposition:  Patient's disposition: Admit to med/surg floor  Patient's condition is stable.          Supriya aMrcum DO  Resident  11/13/20 0065

## 2020-11-13 NOTE — ED NOTES
RN faxed SBAR to floor. Called floor to confirm receipt. Spoke with floor staff who confirmed.       Eduin Velez RN  11/13/20 [id number]

## 2020-11-14 ENCOUNTER — APPOINTMENT (OUTPATIENT)
Dept: GENERAL RADIOLOGY | Age: 75
DRG: 565 | End: 2020-11-14
Payer: MEDICARE

## 2020-11-14 LAB
ALBUMIN SERPL-MCNC: 2.6 G/DL (ref 3.5–5.2)
ALP BLD-CCNC: 158 U/L (ref 35–104)
ALT SERPL-CCNC: 7 U/L (ref 0–32)
ANION GAP SERPL CALCULATED.3IONS-SCNC: 6 MMOL/L (ref 7–16)
AST SERPL-CCNC: 19 U/L (ref 0–31)
BASOPHILS ABSOLUTE: 0.07 E9/L (ref 0–0.2)
BASOPHILS RELATIVE PERCENT: 1.4 % (ref 0–2)
BILIRUB SERPL-MCNC: 0.2 MG/DL (ref 0–1.2)
BUN BLDV-MCNC: 18 MG/DL (ref 8–23)
C-REACTIVE PROTEIN: 2.9 MG/DL (ref 0–0.4)
CALCIUM SERPL-MCNC: 8.6 MG/DL (ref 8.6–10.2)
CHLORIDE BLD-SCNC: 105 MMOL/L (ref 98–107)
CO2: 25 MMOL/L (ref 22–29)
CREAT SERPL-MCNC: 1.1 MG/DL (ref 0.5–1)
EOSINOPHILS ABSOLUTE: 0.35 E9/L (ref 0.05–0.5)
EOSINOPHILS RELATIVE PERCENT: 6.9 % (ref 0–6)
GFR AFRICAN AMERICAN: 58
GFR NON-AFRICAN AMERICAN: 48 ML/MIN/1.73
GLUCOSE BLD-MCNC: 96 MG/DL (ref 74–99)
HCT VFR BLD CALC: 33.5 % (ref 34–48)
HEMOGLOBIN: 10.6 G/DL (ref 11.5–15.5)
IMMATURE GRANULOCYTES #: 0.02 E9/L
IMMATURE GRANULOCYTES %: 0.4 % (ref 0–5)
LYMPHOCYTES ABSOLUTE: 1.58 E9/L (ref 1.5–4)
LYMPHOCYTES RELATIVE PERCENT: 31 % (ref 20–42)
MCH RBC QN AUTO: 29 PG (ref 26–35)
MCHC RBC AUTO-ENTMCNC: 31.6 % (ref 32–34.5)
MCV RBC AUTO: 91.8 FL (ref 80–99.9)
MONOCYTES ABSOLUTE: 0.73 E9/L (ref 0.1–0.95)
MONOCYTES RELATIVE PERCENT: 14.3 % (ref 2–12)
NEUTROPHILS ABSOLUTE: 2.34 E9/L (ref 1.8–7.3)
NEUTROPHILS RELATIVE PERCENT: 46 % (ref 43–80)
PDW BLD-RTO: 16.2 FL (ref 11.5–15)
PLATELET # BLD: 373 E9/L (ref 130–450)
PMV BLD AUTO: 10.2 FL (ref 7–12)
POTASSIUM REFLEX MAGNESIUM: 4.4 MMOL/L (ref 3.5–5)
RBC # BLD: 3.65 E12/L (ref 3.5–5.5)
SEDIMENTATION RATE, ERYTHROCYTE: 52 MM/HR (ref 0–20)
SODIUM BLD-SCNC: 136 MMOL/L (ref 132–146)
TOTAL PROTEIN: 6 G/DL (ref 6.4–8.3)
TROPONIN: 0.06 NG/ML (ref 0–0.03)
WBC # BLD: 5.1 E9/L (ref 4.5–11.5)

## 2020-11-14 PROCEDURE — 1200000000 HC SEMI PRIVATE

## 2020-11-14 PROCEDURE — 6360000002 HC RX W HCPCS: Performed by: INTERNAL MEDICINE

## 2020-11-14 PROCEDURE — 80053 COMPREHEN METABOLIC PANEL: CPT

## 2020-11-14 PROCEDURE — 85025 COMPLETE CBC W/AUTO DIFF WBC: CPT

## 2020-11-14 PROCEDURE — 6370000000 HC RX 637 (ALT 250 FOR IP): Performed by: INTERNAL MEDICINE

## 2020-11-14 PROCEDURE — 72170 X-RAY EXAM OF PELVIS: CPT

## 2020-11-14 PROCEDURE — 86140 C-REACTIVE PROTEIN: CPT

## 2020-11-14 PROCEDURE — 85651 RBC SED RATE NONAUTOMATED: CPT

## 2020-11-14 PROCEDURE — 2580000003 HC RX 258: Performed by: INTERNAL MEDICINE

## 2020-11-14 PROCEDURE — 36415 COLL VENOUS BLD VENIPUNCTURE: CPT

## 2020-11-14 PROCEDURE — 84484 ASSAY OF TROPONIN QUANT: CPT

## 2020-11-14 RX ORDER — HEPARIN SODIUM (PORCINE) LOCK FLUSH IV SOLN 100 UNIT/ML 100 UNIT/ML
300 SOLUTION INTRAVENOUS PRN
Status: DISCONTINUED | OUTPATIENT
Start: 2020-11-14 | End: 2020-11-17 | Stop reason: HOSPADM

## 2020-11-14 RX ORDER — HEPARIN SODIUM (PORCINE) LOCK FLUSH IV SOLN 100 UNIT/ML 100 UNIT/ML
300 SOLUTION INTRAVENOUS 2 TIMES DAILY
Status: DISCONTINUED | OUTPATIENT
Start: 2020-11-14 | End: 2020-11-17 | Stop reason: HOSPADM

## 2020-11-14 RX ADMIN — Medication 10 ML: at 21:21

## 2020-11-14 RX ADMIN — PRAVASTATIN SODIUM 40 MG: 20 TABLET ORAL at 21:25

## 2020-11-14 RX ADMIN — MICONAZOLE NITRATE: 2 OINTMENT TOPICAL at 10:48

## 2020-11-14 RX ADMIN — SODIUM CHLORIDE: 9 INJECTION, SOLUTION INTRAVENOUS at 00:21

## 2020-11-14 RX ADMIN — ATENOLOL 50 MG: 25 TABLET ORAL at 06:52

## 2020-11-14 RX ADMIN — SODIUM CHLORIDE, PRESERVATIVE FREE 10 ML: 5 INJECTION INTRAVENOUS at 06:51

## 2020-11-14 RX ADMIN — MEROPENEM 1 G: 1 INJECTION, POWDER, FOR SOLUTION INTRAVENOUS at 13:30

## 2020-11-14 RX ADMIN — ALTEPLASE 1 MG: 2.2 INJECTION, POWDER, LYOPHILIZED, FOR SOLUTION INTRAVENOUS at 10:30

## 2020-11-14 RX ADMIN — OXYCODONE HYDROCHLORIDE 15 MG: 15 TABLET ORAL at 06:52

## 2020-11-14 RX ADMIN — Medication 250 MG: at 21:26

## 2020-11-14 RX ADMIN — PROBENECID 500 MG: 500 TABLET, FILM COATED ORAL at 10:34

## 2020-11-14 RX ADMIN — OXYCODONE HYDROCHLORIDE 15 MG: 15 TABLET ORAL at 21:24

## 2020-11-14 RX ADMIN — SODIUM CHLORIDE: 9 INJECTION, SOLUTION INTRAVENOUS at 06:54

## 2020-11-14 RX ADMIN — SODIUM CHLORIDE, PRESERVATIVE FREE 300 UNITS: 5 INJECTION INTRAVENOUS at 21:31

## 2020-11-14 RX ADMIN — Medication 250 MG: at 06:23

## 2020-11-14 RX ADMIN — OXYCODONE HYDROCHLORIDE 15 MG: 15 TABLET ORAL at 16:58

## 2020-11-14 RX ADMIN — MULTIVITAMIN TABLET 1 TABLET: TABLET at 10:35

## 2020-11-14 RX ADMIN — MEROPENEM 1 G: 1 INJECTION, POWDER, FOR SOLUTION INTRAVENOUS at 21:21

## 2020-11-14 RX ADMIN — SODIUM CHLORIDE, PRESERVATIVE FREE 300 UNITS: 5 INJECTION INTRAVENOUS at 10:29

## 2020-11-14 RX ADMIN — TORSEMIDE 20 MG: 20 TABLET ORAL at 10:34

## 2020-11-14 RX ADMIN — LEVOTHYROXINE SODIUM 112 MCG: 25 TABLET ORAL at 06:53

## 2020-11-14 RX ADMIN — OXYCODONE HYDROCHLORIDE 15 MG: 15 TABLET ORAL at 11:30

## 2020-11-14 RX ADMIN — ALTEPLASE 1 MG: 2.2 INJECTION, POWDER, LYOPHILIZED, FOR SOLUTION INTRAVENOUS at 16:54

## 2020-11-14 RX ADMIN — MICONAZOLE NITRATE: 2 OINTMENT TOPICAL at 21:32

## 2020-11-14 RX ADMIN — LEVOTHYROXINE SODIUM 25 MCG: 25 TABLET ORAL at 06:52

## 2020-11-14 RX ADMIN — Medication 10 ML: at 01:15

## 2020-11-14 RX ADMIN — PROBENECID 500 MG: 500 TABLET, FILM COATED ORAL at 21:25

## 2020-11-14 RX ADMIN — ATENOLOL 25 MG: 25 TABLET ORAL at 21:26

## 2020-11-14 RX ADMIN — Medication 250 MG: at 10:48

## 2020-11-14 RX ADMIN — MEROPENEM 1 G: 1 INJECTION, POWDER, FOR SOLUTION INTRAVENOUS at 06:51

## 2020-11-14 RX ADMIN — SODIUM CHLORIDE: 9 INJECTION, SOLUTION INTRAVENOUS at 16:54

## 2020-11-14 RX ADMIN — Medication 250 MG: at 16:59

## 2020-11-14 RX ADMIN — ASPIRIN 81 MG: 81 TABLET, COATED ORAL at 21:25

## 2020-11-14 ASSESSMENT — PAIN DESCRIPTION - LOCATION
LOCATION: KNEE;HIP
LOCATION: KNEE;HIP

## 2020-11-14 ASSESSMENT — PAIN SCALES - GENERAL
PAINLEVEL_OUTOF10: 2
PAINLEVEL_OUTOF10: 6
PAINLEVEL_OUTOF10: 4
PAINLEVEL_OUTOF10: 7
PAINLEVEL_OUTOF10: 7
PAINLEVEL_OUTOF10: 5
PAINLEVEL_OUTOF10: 0
PAINLEVEL_OUTOF10: 7

## 2020-11-14 ASSESSMENT — ENCOUNTER SYMPTOMS
DIARRHEA: 1
EYE DISCHARGE: 0
WHEEZING: 0
APNEA: 0
CHEST TIGHTNESS: 0
NAUSEA: 0
SHORTNESS OF BREATH: 0
BACK PAIN: 0
EYE ITCHING: 0
ABDOMINAL PAIN: 0
CONSTIPATION: 0

## 2020-11-14 ASSESSMENT — PAIN DESCRIPTION - ORIENTATION
ORIENTATION: RIGHT
ORIENTATION: RIGHT

## 2020-11-14 ASSESSMENT — PAIN DESCRIPTION - PROGRESSION
CLINICAL_PROGRESSION: NOT CHANGED
CLINICAL_PROGRESSION: NOT CHANGED

## 2020-11-14 ASSESSMENT — PAIN DESCRIPTION - ONSET
ONSET: ON-GOING
ONSET: ON-GOING

## 2020-11-14 ASSESSMENT — PAIN DESCRIPTION - DESCRIPTORS
DESCRIPTORS: ACHING;SHARP;SORE
DESCRIPTORS: ACHING;SHARP;SORE

## 2020-11-14 ASSESSMENT — PAIN DESCRIPTION - FREQUENCY
FREQUENCY: INTERMITTENT
FREQUENCY: INTERMITTENT

## 2020-11-14 ASSESSMENT — PAIN DESCRIPTION - PAIN TYPE
TYPE: ACUTE PAIN
TYPE: ACUTE PAIN

## 2020-11-14 NOTE — CONSULTS
[facility]     Consult Note                                 [address] [facility] [address], [place], [address]                   Phone [phone]     Fax [phone]        Date:   11/14/2020  Patient name:  Darshan Paul  Date of admission:  11/13/2020  3:12 PM  MRN:   [id number]  YOB: 1945    Reason for Consult: Recent psoas abscess and C. difficile colitis    CC:   Chief Complaint   Patient presents with    Fall     -LOC, -thinners       HISTORY OF PRESENT ILLNESS:                The patient is a 66 y.o. female who is currently being treated for psoas abscess and C. difficile colitis and is currently getting meropenem and oral vancomycin respectively. ID has been asked to continue with the antibiotic management. Apparently she was getting her rehab at [address] [place] [address] and then later discharged to home a week ago however she suffered a fall at home and that brought him to the hospital.  She denies having any fever, chills, no diarrhea, no abdominal pain, no urinary symptoms. She has been tolerating the antibiotics very well through right arm PICC line in place. Patient was seen at [facility] in September where she had a lumbar surgery and I&D of spinal wounds with ESBL E. coli bacteremia from UTI. She had L3 postinfectious vertebral collapse status post L2 L5 revision decompression, L1-L5 fusion. She was started on meropenem on 10/7/2020 and plan was to give up to 8 weeks of therapy. He was also tested positive for C. difficile colitis for which she is getting treatment    Past Medical History:   has a past medical history of Arthritis, CAD (coronary artery disease), Constipation, Gout, Hyperlipidemia, Hypertension, Macular hole, right eye, and Thyroid disease. Past Surgical History:   has a past surgical history that includes Breast surgery (Right, 1982);  Hysterectomy (1985); for Constipation    Historical Provider, MD   docusate sodium (COLACE, DULCOLAX) 100 MG CAPS Take 100 mg by mouth 2 times daily  Patient taking differently: Take 100 mg by mouth daily as needed  6/6/19   Renetta Meyers MD   acetaminophen (TYLENOL) 325 MG tablet Take 650 mg by mouth every 4 hours as needed for Pain    Historical Provider, MD       Allergies:  Adrenalin [epinephrine hcl (nasal)]    Social History:   reports that she has never smoked. She has never used smokeless tobacco. She reports current alcohol use. She reports that she does not use drugs. Family History: family history includes Other in her mother. REVIEW OF SYSTEMS:    12 point ROS has been done and pertinent neg and positive has been included in HPI and rest are non contributory        PHYSICAL EXAM:    BP (!) 100/59   Pulse 79   Temp 98.8 °F (37.1 °C) (Oral)   Resp 18   Ht 5' 5\" (1.651 m)   Wt 168 lb 9.6 oz (76.5 kg)   SpO2 96%   BMI 28.06 kg/m²    VENT SETTINGS:   Vent Information  SpO2: 96 %    General appearance: Alert, Awake, Oriented times 3, no distress  Skin: Warm and dry  Eyes: Pink palpebral conjunctivae. PERRL  Ears: External ears normal.  Nose/Sinuses: Nares normal. Septum midline. Mucosa normal. No sinus tenderness. Oropharynx: Oropharynx clear with no exudates seen  Neck: Neck supple. No jugular venous distension, lymphadenopathy or thyromegaly Trachea midline  Lungs: Lungs clear to auscultation bilaterally. No rhonchi, crackles or wheezes  Heart: S1 S2  Regular rate and rhythm. No rub, murmur or gallop  Abdomen: Abdomen soft, non-tender.  BS normal. No masses, organomegaly  Extremities: No edema, Peripheral pulses palpable  Musculoskeletal: Left hip incision site healing well, no pain or tenderness over the right side as well      DATA:    Labs:     Last 3 CBC:  Recent Labs     11/13/20  1530   WBC 7.1   RBC 3.89   HGB 11.1*      MPV 10.1       Last 3 BMP  Recent Labs     11/13/20  1530      K 4.6    CO2 25   BUN 17   CREATININE 0.9   GLUCOSE 123*   CALCIUM 8.8       LIVER PROFILE:No results for input(s): AST, ALT, LABALBU in the last 72 hours. URINARY CATHETER OUTPUT (Harris):       DRAIN/TUBE OUTPUT:     Microbiology :  No results for input(s): BC in the last 72 hours. No results for input(s): Starlene Ewings in the last 72 hours. Recent Labs     11/13/20  1823   LABURIN <10,000 CFU/mL  Gram positive organism       No results for input(s): CULTRESP in the last 72 hours. No results for input(s): WNDABS in the last 72 hours. Radiology :  XR PELVIS (1-2 VIEWS)   Final Result   No acute fractures of the pelvic bones. The no acute fractures of the right   or left hip joints. XR CHEST (2 VW)   Final Result   No acute process. XR HIP BILATERAL W AP PELVIS (2 VIEWS)   Final Result   L-spine: Postsurgical changes with prior compression injury at L3. Severe osteoarthritis at the right hip and mild osteoarthritis at the left   hip. Anatomically aligned right knee arthroplasty. Osteopenia. XR FEMUR RIGHT (MIN 2 VIEWS)   Final Result   No acute osseous abnormality of the right femur. XR KNEE RIGHT (1-2 VIEWS)   Final Result   L-spine: Postsurgical changes with prior compression injury at L3. Severe osteoarthritis at the right hip and mild osteoarthritis at the left   hip. Anatomically aligned right knee arthroplasty. Osteopenia. XR LUMBAR SPINE (2-3 VIEWS)   Final Result   L-spine: Postsurgical changes with prior compression injury at L3. Severe osteoarthritis at the right hip and mild osteoarthritis at the left   hip. Anatomically aligned right knee arthroplasty. Osteopenia. CT HEAD WO CONTRAST   Final Result   1. There is no acute intracranial abnormality. Specifically, there is no   intracranial hemorrhage.    2. Atrophy and periventricular leukomalacia,         CT CERVICAL SPINE WO CONTRAST   Final Result   No acute displaced fracture. Degenerative changes with multilevel disc bulges from C2-T1. CT head      There is diffuse atrophy with dilated ventricles and prominence of the sulci. Punctate and confluent areas of decreased attenuation are present in the   periventricular white matter and brainstem consistent with   microvascular/ischemic changes. Old lacunar CVA is noted in the left basal   ganglia. There is no acute stroke, mass or hemorrhage. There is   calcification of the vertebrobasilar arteries. Impression      Atrophy with small vessel ischemic disease and old lacunar CVA in the left   basal ganglia. There is no acute stroke or hemorrhage. Assessment and Plan:      · UTI with ESBL E. coli bacteremia  · Lumbar spine surgery with L3 collapse, spinal abscess drainage  · status post L2 L5 revision decompression, L1-L5 fusion done at [facility] in September 2020  · C. difficile colitis on vancomycin  · Generalized weakness    Plan  -Continue meropenem 1 g every 8 hourly extended infusion as planned from [facility] to be continued for 8 weeks from 10/7/2020.  -Check CRP/ESR  -Continue oral vancomycin while she is on meropenem otherwise it might cause relapse of her infection  -No concern for UTI  -There is no point in checking C. difficile again as she is on the treatment and she does not have any diarrhea at this time.     Will benefit from going to rehab                Moreno Weldon MD

## 2020-11-14 NOTE — H&P
levothyroxine (SYNTHROID) 137 MCG tablet Take 137 mcg by mouth Daily Yes Historical Provider, MD   potassium chloride (KLOR-CON M) 20 MEQ extended release tablet Take 2 tablets by mouth daily Yes Felicia Lee MD   atenolol (TENORMIN) 25 MG tablet Take 25 mg by mouth nightly Hold for SBP <100 or P<60 Yes Historical Provider, MD   pravastatin (PRAVACHOL) 40 MG tablet Take 40 mg by mouth 2 times daily  Yes Historical Provider, MD   probenecid (BENEMID) 500 MG tablet Take 500 mg by mouth 2 times daily. Yes Historical Provider, MD   aspirin 81 MG tablet Take 81 mg by mouth nightly  Yes Historical Provider, MD   multivitamin (THERAGRAN) per tablet Take 1 tablet by mouth daily.  Yes Historical Provider, MD   diclofenac sodium 1 % GEL Apply 2 g topically 4 times daily for 13 days  María Daly APRN - CNP   senna (SENOKOT) 8.6 MG tablet Take 1 tablet by mouth as needed   Historical Provider, MD   magnesium hydroxide (MILK OF MAGNESIA) 400 MG/5ML suspension Take 30 mLs by mouth daily as needed for Constipation  Historical Provider, MD   docusate sodium (COLACE, DULCOLAX) 100 MG CAPS Take 100 mg by mouth 2 times daily  Patient taking differently: Take 100 mg by mouth daily as needed   Felicia Lee MD   acetaminophen (TYLENOL) 325 MG tablet Take 650 mg by mouth every 4 hours as needed for Pain  Historical Provider, MD     Social History     Tobacco Use    Smoking status: Never Smoker    Smokeless tobacco: Never Used   Substance Use Topics    Alcohol use: Yes     Frequency: Never     Comment: Occasional    Drug use: No     Family History   Problem Relation Age of Onset    Other Mother      Past Surgical History:   Procedure Laterality Date    ABDOMEN SURGERY      BACK SURGERY      BREAST SURGERY Right 1982    BENIGN CYST    CHOLECYSTECTOMY  1997    COLON[address]     [address]    JOINT REPLACEMENT      bilat knees     LAMINECTOMY N/A 6/3/2019    LUMBAR LAMINECTOMY POSTERIOR L3-L5, BONE BIOPSY L4 - GIOVANNY, X-RAY,  WANTS TF performed by Kita Bolton MD at [facility] OR    SKIN BIOPSY      TONSILLECTOMY       Past Medical History:   Diagnosis Date    Arthritis     CAD (coronary artery disease)     Constipation     Gout     CONTROLLED    Hyperlipidemia     Hypertension     STABLE    Macular hole, right eye     Thyroid disease      Review of Systems   Constitutional: Positive for activity change. Negative for chills, fatigue and fever. HENT: Negative for congestion, drooling and ear discharge. Eyes: Negative for discharge and itching. Respiratory: Negative for apnea, chest tightness, shortness of breath and wheezing. Cardiovascular: Negative for chest pain, palpitations and leg swelling. Gastrointestinal: Positive for diarrhea. Negative for abdominal pain, constipation and nausea. Endocrine: Negative for cold intolerance and heat intolerance. Genitourinary: Negative for difficulty urinating, flank pain and frequency. Musculoskeletal: Positive for gait problem. Negative for arthralgias and back pain. Allergic/Immunologic: Negative for environmental allergies and food allergies. Neurological: Positive for weakness (Right foot drop. ). Negative for dizziness, seizures, light-headedness and headaches. Hematological: Negative for adenopathy. Does not bruise/bleed easily. Psychiatric/Behavioral: Negative for agitation, confusion and decreased concentration. Vitals:    11/14/20 0615   BP: (!) 116/56   Pulse: 82   Resp: 16   Temp: 98.8 °F (37.1 °C)   SpO2:        Physical Exam  Constitutional:       Appearance: Normal appearance. HENT:      Head: Normocephalic and atraumatic. Right Ear: External ear normal.      Left Ear: External ear normal.      Nose: Nose normal. No congestion. Mouth/Throat:      Mouth: Mucous membranes are moist.      Pharynx: Oropharynx is clear. Eyes:      Extraocular Movements: Extraocular movements intact.       Pupils: Pupils are equal, round, and reactive to light. Neck:      Musculoskeletal: Normal range of motion and neck supple. Cardiovascular:      Rate and Rhythm: Normal rate and regular rhythm. Pulses: Normal pulses. Pulmonary:      Effort: Pulmonary effort is normal.      Breath sounds: Normal breath sounds. No wheezing or rales. Abdominal:      General: Bowel sounds are normal. There is no distension. Palpations: Abdomen is soft. Tenderness: There is no abdominal tenderness. There is no right CVA tenderness or left CVA tenderness. Musculoskeletal:      Right lower leg: No edema. Left lower leg: No edema. Comments: Right foot drop. Skin:     General: Skin is warm and dry. Capillary Refill: Capillary refill takes less than 2 seconds. Neurological:      General: No focal deficit present. Mental Status: She is alert and oriented to person, place, and time. Mental status is at baseline. Cranial Nerves: No cranial nerve deficit. Motor: Weakness (Right foot no dorsiflexion. ) present. Gait: Gait abnormal.   Psychiatric:         Mood and Affect: Mood normal.         Behavior: Behavior normal.     Picc line in right upper extremity. Active Problems:    Weakness  Resolved Problems:    * No resolved hospital problems. *  1. Right foot drop  2. Recent history of ESBL ecoli Bacteremia  3. C diff colitis   4. Post infectious vetebral Collapse   5. Fall  6. Hypertension   7. CAD     Plan: Will continue on meropenem and oral vancomycin. Ask ID to see. Consult orthopedics with persistent right knee pain, history of replacement and now right foot drop concerned about perineal nerve injury vs from lumbar spine. Lumbar xray reviewed. PT/OT consulted. DVT Prophylaxis: Lovenox.    Code Status: Full     Delorse Cooler, DO      Electronically signed by Thang Melendez DO on 11/14/2020 at 6:01 AM

## 2020-11-14 NOTE — CONSULTS
Department of Orthopedic Surgery  Attending Consult Note        Reason for Consult:  Right knee and hip pain s/p fall  Requesting Physician:  ER    CHIEF COMPLAINT:  Right mid thigh pain    History Obtained From:  patient    HISTORY OF PRESENT ILLNESS:                The patient is a 66 y.o. female who presents with right thigh pain after a fall at home in her bathroom. Patient was recently at [facility] and had two procedures to her lumbar spine by Dr. Isabella Faulkner. She developedan psoas abscess and incisional with a wound in her back for which she has been getting IV meropenem. +C diff. She was at acute rehab at [address] [place] [address] but was discharged home on 11/7/2020. Pt c/o is mostly in there medial thigh / Adductor region. No specific knee pain. Left knee was her more problematic knee in the past but she reports that is \"just fine\" and not having any trouble with it.     Past Medical History:        Diagnosis Date    Arthritis     CAD (coronary artery disease)     Constipation     Gout     CONTROLLED    Hyperlipidemia     Hypertension     STABLE    Macular hole, right eye     Thyroid disease      Past Surgical History:        Procedure Laterality Date    ABDOMEN SURGERY      BACK SURGERY      BREAST SURGERY Right 1982    BENIGN CYST    CHOLECYSTECTOMY  1997    COLONOSCOPY      EYE SURGERY      HYSTERECTOMY  1985    JOINT REPLACEMENT      bilat knees     LAMINECTOMY N/A 6/3/2019    LUMBAR LAMINECTOMY POSTERIOR L3-L5, BONE BIOPSY L4 - GIOVANNY, X-RAY,  WANTS TF performed by Barbara Mccann MD at [facility] [place]    SKIN BIOPSY      TONSILLECTOMY       Current Medications:   Current Facility-Administered Medications: miconazole nitrate 2 % ointment, , Topical, BID  alteplase (CATHFLO) injection 1 mg, 1 mg, Intracatheter, Once  heparin flush 100 UNIT/ML injection 300 Units, 300 Units, Intracatheter, BID  heparin flush 100 UNIT/ML injection 300 Units, 300 Units, Intracatheter, PRN  [START ON 11/15/2020] enoxaparin (LOVENOX) injection 40 mg, 40 mg, Subcutaneous, Daily  sodium chloride flush 0.9 % injection 10 mL, 10 mL, Intravenous, 2 times per day  sodium chloride flush 0.9 % injection 10 mL, 10 mL, Intravenous, PRN  potassium chloride (KLOR-CON M) extended release tablet 40 mEq, 40 mEq, Oral, PRN **OR** potassium bicarb-citric acid (EFFER-K) effervescent tablet 40 mEq, 40 mEq, Oral, PRN **OR** potassium chloride 10 mEq/100 mL IVPB (Peripheral Line), 10 mEq, Intravenous, PRN  acetaminophen (TYLENOL) tablet 650 mg, 650 mg, Oral, Q6H PRN **OR** acetaminophen (TYLENOL) suppository 650 mg, 650 mg, Rectal, Q6H PRN  senna (SENOKOT) tablet 8.6 mg, 1 tablet, Oral, Daily PRN  promethazine (PHENERGAN) tablet 12.5 mg, 12.5 mg, Oral, Q6H PRN **OR** ondansetron (ZOFRAN) injection 4 mg, 4 mg, Intravenous, Q6H PRN  aspirin EC tablet 81 mg, 81 mg, Oral, Nightly  atenolol (TENORMIN) tablet 25 mg, 25 mg, Oral, Nightly  atenolol (TENORMIN) tablet 50 mg, 50 mg, Oral, Daily with breakfast  multivitamin 1 tablet, 1 tablet, Oral, Daily  pravastatin (PRAVACHOL) tablet 40 mg, 40 mg, Oral, Nightly  probenecid (BENEMID) tablet 500 mg, 500 mg, Oral, BID  torsemide (DEMADEX) tablet 20 mg, 20 mg, Oral, Daily  vancomycin (VANCOCIN) oral solution 250 mg, 250 mg, Oral, 4 times per day  meropenem (MERREM) 1 g in sodium chloride 0.9 % 100 mL IVPB (mini-bag), 1 g, Intravenous, Q8H  NS - run after Merrem has infused, , Intravenous, Q8H  levothyroxine (SYNTHROID) tablet 25 mcg, 25 mcg, Oral, Daily **AND** levothyroxine (SYNTHROID) tablet 112 mcg, 112 mcg, Oral, Daily  methocarbamol (ROBAXIN) tablet 1,000 mg, 1,000 mg, Oral, Q8H PRN  naloxone (NARCAN) injection 0.4 mg, 0.4 mg, Intravenous, PRN  oxyCODONE (OXY-IR) immediate release tablet 15 mg, 15 mg, Oral, Q4H PRN  Allergies:  Adrenalin [epinephrine hcl (nasal)]    Social History:   MARITAL STATUS:    Family History:       Problem Relation Age of Onset    Other Mother      REVIEW OF SYSTEMS: 11/13/2020    GLUCOSE 123 11/13/2020     PT/INR:    Lab Results   Component Value Date    PROTIME 12.1 04/06/2020    INR 1.0 04/06/2020     Radiology Review:    FINDINGS:    L-spine: Posterior fusion L1-L5 with a spacer present at L3 and a disc    prosthetic at L4-5.  There is underlying vertebral height loss at L3. Unremarkable soft tissues.         Pelvis and bilateral hips: Severe osteoarthritis at the right hip.  Moderate    osteoarthritis at the left hip.  Unremarkable soft tissues.         Right knee: Anatomically aligned right knee arthroplasty.  No findings to    suggest loosening.  Unremarkable soft tissues.         The osseous structures are are all demineralized.              Impression    L-spine: Postsurgical changes with prior compression injury at L3.         Severe osteoarthritis at the right hip and mild osteoarthritis at the left    hip.         Anatomically aligned right knee arthroplasty.         Osteopenia. L-spine: Postsurgical changes with prior compression injury at L3.         Severe osteoarthritis at the right hip and mild osteoarthritis at the left    hip.         Anatomically aligned right knee arthroplasty.         Osteopenia. FINDINGS:    Status post right knee arthroplasty.  No acute fracture or dislocation is    demonstrated. Ga Levans is moderate right hip osteoarthritis.  No focal soft    tissue swelling.               Impression    No acute osseous abnormality of the right femur. Pelvis:  No acute fractures appreciated    IMPRESSION/RECOMMENDATIONS:        1. Right thigh adductor muscle strain from her fall  2. Recent history of ESBL ecoli Bacteremia- Meropenem  3. C diff colitis - vanc  4. Post infectious vetebral Collapse   5. Fall  6. Hypertension   7. CAD   8. Stable jeyson TKA without evidence of extensor mechanism injury. 9.  Right foot drop- I think most likely related to her spine.   No acute trauma appreciate to fibular neck area concerning for nerve damage    PT/OT WBAT with assistance  No acute surgical issues. Right hip OA- will not consider VINNIE anytime soon. Patient understanding of this.   May need rehab placement pending PT mary Gomes

## 2020-11-15 LAB
ALBUMIN SERPL-MCNC: 2.4 G/DL (ref 3.5–5.2)
ALP BLD-CCNC: 154 U/L (ref 35–104)
ALT SERPL-CCNC: 5 U/L (ref 0–32)
ANION GAP SERPL CALCULATED.3IONS-SCNC: 9 MMOL/L (ref 7–16)
AST SERPL-CCNC: 18 U/L (ref 0–31)
BASOPHILS ABSOLUTE: 0.07 E9/L (ref 0–0.2)
BASOPHILS RELATIVE PERCENT: 1.4 % (ref 0–2)
BILIRUB SERPL-MCNC: 0.3 MG/DL (ref 0–1.2)
BUN BLDV-MCNC: 18 MG/DL (ref 8–23)
CALCIUM SERPL-MCNC: 8.5 MG/DL (ref 8.6–10.2)
CHLORIDE BLD-SCNC: 104 MMOL/L (ref 98–107)
CO2: 25 MMOL/L (ref 22–29)
CREAT SERPL-MCNC: 1 MG/DL (ref 0.5–1)
EOSINOPHILS ABSOLUTE: 0.41 E9/L (ref 0.05–0.5)
EOSINOPHILS RELATIVE PERCENT: 8.3 % (ref 0–6)
GFR AFRICAN AMERICAN: >60
GFR NON-AFRICAN AMERICAN: 54 ML/MIN/1.73
GLUCOSE BLD-MCNC: 88 MG/DL (ref 74–99)
HCT VFR BLD CALC: 31.4 % (ref 34–48)
HEMOGLOBIN: 10 G/DL (ref 11.5–15.5)
IMMATURE GRANULOCYTES #: 0.03 E9/L
IMMATURE GRANULOCYTES %: 0.6 % (ref 0–5)
LYMPHOCYTES ABSOLUTE: 1.58 E9/L (ref 1.5–4)
LYMPHOCYTES RELATIVE PERCENT: 32 % (ref 20–42)
MCH RBC QN AUTO: 29.3 PG (ref 26–35)
MCHC RBC AUTO-ENTMCNC: 31.8 % (ref 32–34.5)
MCV RBC AUTO: 92.1 FL (ref 80–99.9)
MONOCYTES ABSOLUTE: 0.72 E9/L (ref 0.1–0.95)
MONOCYTES RELATIVE PERCENT: 14.6 % (ref 2–12)
NEUTROPHILS ABSOLUTE: 2.13 E9/L (ref 1.8–7.3)
NEUTROPHILS RELATIVE PERCENT: 43.1 % (ref 43–80)
PDW BLD-RTO: 16.4 FL (ref 11.5–15)
PLATELET # BLD: 308 E9/L (ref 130–450)
PMV BLD AUTO: 10.2 FL (ref 7–12)
POTASSIUM REFLEX MAGNESIUM: 4 MMOL/L (ref 3.5–5)
RBC # BLD: 3.41 E12/L (ref 3.5–5.5)
SODIUM BLD-SCNC: 138 MMOL/L (ref 132–146)
TOTAL PROTEIN: 5.7 G/DL (ref 6.4–8.3)
URINE CULTURE, ROUTINE: NORMAL
WBC # BLD: 4.9 E9/L (ref 4.5–11.5)

## 2020-11-15 PROCEDURE — 1200000000 HC SEMI PRIVATE

## 2020-11-15 PROCEDURE — 6360000002 HC RX W HCPCS: Performed by: INTERNAL MEDICINE

## 2020-11-15 PROCEDURE — 36415 COLL VENOUS BLD VENIPUNCTURE: CPT

## 2020-11-15 PROCEDURE — 6370000000 HC RX 637 (ALT 250 FOR IP): Performed by: INTERNAL MEDICINE

## 2020-11-15 PROCEDURE — 80053 COMPREHEN METABOLIC PANEL: CPT

## 2020-11-15 PROCEDURE — 85025 COMPLETE CBC W/AUTO DIFF WBC: CPT

## 2020-11-15 PROCEDURE — 2580000003 HC RX 258: Performed by: INTERNAL MEDICINE

## 2020-11-15 PROCEDURE — 36592 COLLECT BLOOD FROM PICC: CPT

## 2020-11-15 RX ADMIN — ENOXAPARIN SODIUM 40 MG: 40 INJECTION SUBCUTANEOUS at 09:06

## 2020-11-15 RX ADMIN — SODIUM CHLORIDE: 9 INJECTION, SOLUTION INTRAVENOUS at 07:10

## 2020-11-15 RX ADMIN — OXYCODONE HYDROCHLORIDE 15 MG: 15 TABLET ORAL at 14:45

## 2020-11-15 RX ADMIN — OXYCODONE HYDROCHLORIDE 15 MG: 15 TABLET ORAL at 10:46

## 2020-11-15 RX ADMIN — Medication 250 MG: at 09:06

## 2020-11-15 RX ADMIN — MEROPENEM 1 G: 1 INJECTION, POWDER, FOR SOLUTION INTRAVENOUS at 11:47

## 2020-11-15 RX ADMIN — MEROPENEM 1 G: 1 INJECTION, POWDER, FOR SOLUTION INTRAVENOUS at 03:44

## 2020-11-15 RX ADMIN — SODIUM CHLORIDE: 9 INJECTION, SOLUTION INTRAVENOUS at 00:20

## 2020-11-15 RX ADMIN — SODIUM CHLORIDE: 9 INJECTION, SOLUTION INTRAVENOUS at 14:48

## 2020-11-15 RX ADMIN — PROBENECID 500 MG: 500 TABLET, FILM COATED ORAL at 09:05

## 2020-11-15 RX ADMIN — MULTIVITAMIN TABLET 1 TABLET: TABLET at 09:05

## 2020-11-15 RX ADMIN — SODIUM CHLORIDE, PRESERVATIVE FREE 300 UNITS: 5 INJECTION INTRAVENOUS at 21:59

## 2020-11-15 RX ADMIN — SODIUM CHLORIDE: 9 INJECTION, SOLUTION INTRAVENOUS at 23:10

## 2020-11-15 RX ADMIN — LEVOTHYROXINE SODIUM 25 MCG: 25 TABLET ORAL at 06:22

## 2020-11-15 RX ADMIN — Medication 250 MG: at 19:58

## 2020-11-15 RX ADMIN — LEVOTHYROXINE SODIUM 112 MCG: 25 TABLET ORAL at 06:21

## 2020-11-15 RX ADMIN — ATENOLOL 50 MG: 25 TABLET ORAL at 09:05

## 2020-11-15 RX ADMIN — TORSEMIDE 20 MG: 20 TABLET ORAL at 09:05

## 2020-11-15 RX ADMIN — Medication 10 ML: at 20:00

## 2020-11-15 RX ADMIN — Medication 250 MG: at 14:43

## 2020-11-15 RX ADMIN — OXYCODONE HYDROCHLORIDE 15 MG: 15 TABLET ORAL at 06:22

## 2020-11-15 RX ADMIN — MEROPENEM 1 G: 1 INJECTION, POWDER, FOR SOLUTION INTRAVENOUS at 20:00

## 2020-11-15 RX ADMIN — PROBENECID 500 MG: 500 TABLET, FILM COATED ORAL at 22:31

## 2020-11-15 RX ADMIN — ASPIRIN 81 MG: 81 TABLET, COATED ORAL at 20:00

## 2020-11-15 RX ADMIN — Medication 10 ML: at 09:06

## 2020-11-15 RX ADMIN — Medication 250 MG: at 03:32

## 2020-11-15 RX ADMIN — OXYCODONE HYDROCHLORIDE 15 MG: 15 TABLET ORAL at 19:59

## 2020-11-15 RX ADMIN — SODIUM CHLORIDE, PRESERVATIVE FREE 300 UNITS: 5 INJECTION INTRAVENOUS at 09:06

## 2020-11-15 RX ADMIN — METHOCARBAMOL TABLETS 1000 MG: 500 TABLET, COATED ORAL at 19:59

## 2020-11-15 RX ADMIN — OXYCODONE HYDROCHLORIDE 15 MG: 15 TABLET ORAL at 02:13

## 2020-11-15 RX ADMIN — SODIUM CHLORIDE, PRESERVATIVE FREE 10 ML: 5 INJECTION INTRAVENOUS at 03:32

## 2020-11-15 RX ADMIN — MICONAZOLE NITRATE: 2 OINTMENT TOPICAL at 09:07

## 2020-11-15 RX ADMIN — MICONAZOLE NITRATE: 2 OINTMENT TOPICAL at 20:07

## 2020-11-15 ASSESSMENT — PAIN SCALES - GENERAL
PAINLEVEL_OUTOF10: 3
PAINLEVEL_OUTOF10: 5
PAINLEVEL_OUTOF10: 7
PAINLEVEL_OUTOF10: 6
PAINLEVEL_OUTOF10: 7
PAINLEVEL_OUTOF10: 0
PAINLEVEL_OUTOF10: 7

## 2020-11-15 ASSESSMENT — PAIN DESCRIPTION - PROGRESSION
CLINICAL_PROGRESSION: NOT CHANGED
CLINICAL_PROGRESSION: NOT CHANGED

## 2020-11-15 ASSESSMENT — PAIN DESCRIPTION - FREQUENCY
FREQUENCY: INTERMITTENT
FREQUENCY: INTERMITTENT

## 2020-11-15 ASSESSMENT — PAIN DESCRIPTION - DESCRIPTORS
DESCRIPTORS: ACHING;SORE
DESCRIPTORS: ACHING;SORE

## 2020-11-15 ASSESSMENT — PAIN DESCRIPTION - ORIENTATION
ORIENTATION: RIGHT;LEFT
ORIENTATION: RIGHT;LEFT

## 2020-11-15 ASSESSMENT — ENCOUNTER SYMPTOMS
COUGH: 0
NAUSEA: 0
DIARRHEA: 0
VOMITING: 0
SHORTNESS OF BREATH: 0

## 2020-11-15 ASSESSMENT — PAIN DESCRIPTION - ONSET
ONSET: ON-GOING
ONSET: ON-GOING

## 2020-11-15 ASSESSMENT — PAIN DESCRIPTION - LOCATION
LOCATION: KNEE;BACK
LOCATION: BACK;KNEE

## 2020-11-15 ASSESSMENT — PAIN DESCRIPTION - PAIN TYPE
TYPE: ACUTE PAIN
TYPE: ACUTE PAIN

## 2020-11-15 NOTE — PROGRESS NOTES
[address] [place] [address] [facility]  Progress Note    SUBJECTIVE:  Chief Complaint   Patient presents with    Fall     -LOC, -thinners     Patient is tolerating medications. No reported adverse drug reactions. No nausea, vomiting, diarrhea. Feeling much better. No dysuria. + pain across right hip and down leg. No fevers overnight    Review of systems:  As stated above in the chief complaint, otherwise negative. Medications:  Scheduled Meds:   miconazole nitrate   Topical BID    heparin flush  300 Units Intracatheter BID    enoxaparin  40 mg Subcutaneous Daily    sodium chloride flush  10 mL Intravenous 2 times per day    aspirin  81 mg Oral Nightly    atenolol  25 mg Oral Nightly    atenolol  50 mg Oral Daily with breakfast    multivitamin  1 tablet Oral Daily    pravastatin  40 mg Oral Nightly    probenecid  500 mg Oral BID    torsemide  20 mg Oral Daily    vancomycin  250 mg Oral 4 times per day    meropenem  1 g Intravenous Q8H    levothyroxine  25 mcg Oral Daily    And    levothyroxine  112 mcg Oral Daily     Continuous Infusions:   sodium chloride 33.3 mL/hr at 11/15/20 0710     PRN Meds:heparin flush, sodium chloride flush, potassium chloride **OR** potassium alternative oral replacement **OR** potassium chloride, acetaminophen **OR** acetaminophen, senna, promethazine **OR** ondansetron, methocarbamol, naloxone, oxyCODONE    OBJECTIVE:  BP (!) 103/55   Pulse 67   Temp 98.1 °F (36.7 °C) (Oral)   Resp 16   Ht 5' 5\" (1.651 m)   Wt 164 lb 8 oz (74.6 kg)   SpO2 94%   BMI 27.37 kg/m²   Temp  Av.2 °F (36.8 °C)  Min: 97.6 °F (36.4 °C)  Max: 98.6 °F (37 °C)  Constitutional: The patient is awake, alert, and oriented. Lying in bed in NAD  Skin: Warm and dry. No rashes were noted. HEENT: Round and reactive pupils. Moist mucous membranes. No ulcerations or thrush. Neck: Supple to movements. Chest: No use of accessory muscles to breathe. Symmetrical expansion.   No wheezing, crackles or rhonchi. Cardiovascular: S1 and S2 are rhythmic and regular. No murmurs appreciated. Abdomen: Positive bowel sounds to auscultation. Benign to palpation.    Extremities: No clubbing, no cyanosis, trace edema b/l LEs  Lines: PICC RUE dressed    Laboratory and Tests Review:  Lab Results   Component Value Date    WBC 4.9 11/15/2020    WBC 5.1 11/14/2020    WBC 7.1 11/13/2020    HGB 10.0 (L) 11/15/2020    HCT 31.4 (L) 11/15/2020    MCV 92.1 11/15/2020     11/15/2020     Lab Results   Component Value Date    NEUTROABS 2.13 11/15/2020    NEUTROABS 2.34 11/14/2020    NEUTROABS 5.06 11/13/2020     No results found for: [facility]  Lab Results   Component Value Date    ALT 5 11/15/2020    AST 18 11/15/2020    ALKPHOS 154 (H) 11/15/2020    BILITOT 0.3 11/15/2020     Lab Results   Component Value Date     11/15/2020    K 4.0 11/15/2020     11/15/2020    CO2 25 11/15/2020    BUN 18 11/15/2020    CREATININE 1.0 11/15/2020    CREATININE 1.1 11/14/2020    CREATININE 0.9 11/13/2020    GFRAA >60 11/15/2020    LABGLOM 54 11/15/2020    GLUCOSE 88 11/15/2020    PROT 5.7 11/15/2020    LABALBU 2.4 11/15/2020    CALCIUM 8.5 11/15/2020    BILITOT 0.3 11/15/2020    ALKPHOS 154 11/15/2020    AST 18 11/15/2020    ALT 5 11/15/2020     Lab Results   Component Value Date    CRP 2.9 (H) 11/14/2020    CRP 4.0 (H) 12/31/2019    CRP 0.4 07/31/2019     Lab Results   Component Value Date    SEDRATE 52 (H) 11/14/2020    SEDRATE 60 (H) 12/31/2019    SEDRATE 32 (H) 07/31/2019     Radiology:      Microbiology:   Urine cx < 10k GPOs    ASSESSMENT:  · UTI with ESBL E. coli bacteremia  · Lumbar spine surgery with L3 collapse, spinal abscess drainage  · status post L2 L5 revision decompression, L1-L5 fusion done at [facility] in September 2020  · C. difficile colitis on vancomycin  · Generalized weakness     Plan  -Continue meropenem 1 g every 8 hourly extended infusion as planned from [facility] to be continued for 8 weeks from 10/7/2020.  -Continue oral vancomycin while she is on meropenem otherwise it might cause relapse of her infection  -There is no point in checking C. difficile again as she is on the treatment and she does not have any diarrhea at this time. Gael Lisbet  2:21 PM  11/15/2020     Pt seen and examined. I discussed and co-formulated the plan with advanced practice nurse. Labs, cultures, and radiographs reviewed. Face to Face encounter occurred. Changes made as necessary by me.      Unique Vaughn MD  Infectious Disease

## 2020-11-15 NOTE — PROGRESS NOTES
Progress Note  Date:11/15/2020       [facility]:5432/5442-B  Patient Name:Laurie Hall     YOB: 1945     Age:74 y.o. Patient seen for follow up of fall and right foot drop. Patient this am states she has a little more strength in both legs. Patient denies any fevers, chills, chest pains, shortness of breath, nausea, vomiting or diarrhea. Subjective    Subjective:  Symptoms:  No shortness of breath, cough, chest pain, headache, chest pressure or diarrhea. Diet:  No nausea or vomiting. Review of Systems   Respiratory: Negative for cough and shortness of breath. Cardiovascular: Negative for chest pain. Gastrointestinal: Negative for diarrhea, nausea and vomiting. Objective         Vitals Last 24 Hours:  TEMPERATURE:  Temp  Av.3 °F (36.8 °C)  Min: 97.6 °F (36.4 °C)  Max: 98.6 °F (37 °C)  RESPIRATIONS RANGE: Resp  Av.5  Min: 16  Max: 18  PULSE OXIMETRY RANGE: SpO2  Av %  Min: 92 %  Max: 96 %  PULSE RANGE: Pulse  Av.5  Min: 67  Max: 79  BLOOD PRESSURE RANGE: Systolic (87RUU), UJQ:542 , Min:100 , YIE:795   ; Diastolic (79BRT), RDX:90, Min:49, Max:72    I/O (24Hr): Intake/Output Summary (Last 24 hours) at 11/15/2020 0739  Last data filed at 2020 1136  Gross per 24 hour   Intake 120 ml   Output --   Net 120 ml     Objective:  General Appearance:  Comfortable, well-appearing and in no acute distress. Vital signs: (most recent): Blood pressure (!) 103/55, pulse 67, temperature 98.1 °F (36.7 °C), temperature source Oral, resp. rate 16, height 5' 5\" (1.651 m), weight 164 lb 8 oz (74.6 kg), SpO2 94 %, not currently breastfeeding. Lungs:  Normal effort and normal respiratory rate. Breath sounds clear to auscultation. No rales or rhonchi. Heart: Normal rate. Regular rhythm. S1 normal and S2 normal.  No friction rub. Abdomen: Abdomen is soft and non-distended. Bowel sounds are normal.   There is no abdominal tenderness.   There is no rebound tenderness. There is no guarding. Extremities: Normal range of motion. There is no dependent edema. Neurological: Patient is alert and oriented to person, place and time. Skin:  Warm and dry. Labs/Imaging/Diagnostics    Labs:  CBC:  Recent Labs     11/13/20  1530 11/14/20  1423 11/15/20  0342   WBC 7.1 5.1 4.9   RBC 3.89 3.65 3.41*   HGB 11.1* 10.6* 10.0*   HCT 36.4 33.5* 31.4*   MCV 93.6 91.8 92.1   RDW 16.1* 16.2* 16.4*    373 308     CHEMISTRIES:  Recent Labs     11/13/20  1530 11/14/20  1423 11/15/20  0342    136 138   K 4.6 4.4 4.0    105 104   CO2 25 25 25   BUN 17 18 18   CREATININE 0.9 1.1* 1.0   GLUCOSE 123* 96 88     PT/INR:No results for input(s): PROTIME, INR in the last 72 hours. APTT:No results for input(s): APTT in the last 72 hours. LIVER PROFILE:  Recent Labs     11/14/20  1423 11/15/20  0342   AST 19 18   ALT 7 5   BILITOT 0.2 0.3   ALKPHOS 158* 154*       Imaging Last 24 Hours:  Xr Chest (2 Vw)    Result Date: 11/13/2020  EXAMINATION: TWO XRAY VIEWS OF THE CHEST 11/13/2020 2:55 pm COMPARISON: 27 December 2019 HISTORY: ORDERING SYSTEM PROVIDED HISTORY: chest pain TECHNOLOGIST PROVIDED HISTORY: Reason for exam:->chest pain FINDINGS: Right-sided PICC line terminates in the SVC. The lungs are without acute focal process. There is no effusion or pneumothorax. The cardiomediastinal silhouette is without acute process. The osseous structures are without acute process. No acute process. Xr Lumbar Spine (2-3 Views)    Result Date: 11/13/2020  EXAMINATION: TWO XRAY VIEWS OF THE RIGHT KNEE; ONE XRAY VIEW OF THE PELVIS AND TWO XRAY VIEWS OF EACH OF THE BILATERAL HIPS; THREE XRAY VIEWS OF THE LUMBAR SPINE 11/13/2020 2:55 pm COMPARISON: None.  HISTORY: ORDERING SYSTEM PROVIDED HISTORY: fall TECHNOLOGIST PROVIDED HISTORY: Reason for exam:->fall; ORDERING SYSTEM PROVIDED HISTORY: Osteomyelitis of lumbar spine ([address] FINDINGS: L-spine: Posterior fusion L1-L5 with a spacer present at L3 and a disc prosthetic at L4-5. There is underlying vertebral height loss at L3. Unremarkable soft tissues. Pelvis and bilateral hips: Severe osteoarthritis at the right hip. Moderate osteoarthritis at the left hip. Unremarkable soft tissues. Right knee: Anatomically aligned right knee arthroplasty. No findings to suggest loosening. Unremarkable soft tissues. The osseous structures are are all demineralized. L-spine: Postsurgical changes with prior compression injury at L3. Severe osteoarthritis at the right hip and mild osteoarthritis at the left hip. Anatomically aligned right knee arthroplasty. Osteopenia. Xr Pelvis (1-2 Views)    Result Date: 11/14/2020  EXAMINATION: ONE XRAY VIEW OF THE PELVIS 11/14/2020 9:32 am COMPARISON: None. HISTORY: ORDERING SYSTEM PROVIDED HISTORY: Fall TECHNOLOGIST PROVIDED HISTORY: Reason for exam:->Fall Right hip pain after fall FINDINGS: Some degenerative changes in the new mild-to-moderate degree are seen in the right hip joint in the minimal degree in the left hip joint. Visualized bones have normal cortical bone contour and trabecular bone texture. There are no short and deformity of the right or left femoral heads. No conspicuous acute fractures are seen in the right or in the left hip joints. SI joints, symphysis and obturators foramens are intact. Patient had previous posterior fusion of the lower lumbar spine at L5 and above level. No acute fractures of the pelvic bones. The no acute fractures of the right or left hip joints. Xr Hip Bilateral W Ap Pelvis (2 Views)    Result Date: 11/13/2020  EXAMINATION: TWO XRAY VIEWS OF THE RIGHT KNEE; ONE XRAY VIEW OF THE PELVIS AND TWO XRAY VIEWS OF EACH OF THE BILATERAL HIPS; THREE XRAY VIEWS OF THE LUMBAR SPINE 11/13/2020 2:55 pm COMPARISON: None.  HISTORY: ORDERING SYSTEM PROVIDED HISTORY: fall TECHNOLOGIST PROVIDED HISTORY: Reason for exam:->fall; ORDERING SYSTEM PROVIDED HISTORY: Osteomyelitis of lumbar spine ([facility] [address] FINDINGS: L-spine: Posterior fusion L1-L5 with a spacer present at L3 and a disc prosthetic at L4-5. There is underlying vertebral height loss at L3. Unremarkable soft tissues. Pelvis and bilateral hips: Severe osteoarthritis at the right hip. Moderate osteoarthritis at the left hip. Unremarkable soft tissues. Right knee: Anatomically aligned right knee arthroplasty. No findings to suggest loosening. Unremarkable soft tissues. The osseous structures are are all demineralized. L-spine: Postsurgical changes with prior compression injury at L3. Severe osteoarthritis at the right hip and mild osteoarthritis at the left hip. Anatomically aligned right knee arthroplasty. Osteopenia. Xr Femur Right (min 2 Views)    Result Date: 11/13/2020  EXAMINATION: 2 XRAY VIEWS OF THE RIGHT FEMUR 11/13/2020 3:55 pm COMPARISON: Pelvis radiograph of the same date HISTORY: ORDERING SYSTEM PROVIDED HISTORY: fall TECHNOLOGIST PROVIDED HISTORY: Reason for exam:->fall Fall with acute pain. Initial study. FINDINGS: Status post right knee arthroplasty. No acute fracture or dislocation is demonstrated. There is moderate right hip osteoarthritis. No focal soft tissue swelling. No acute osseous abnormality of the right femur. Xr Knee Right (1-2 Views)    Result Date: 11/13/2020  EXAMINATION: TWO XRAY VIEWS OF THE RIGHT KNEE; ONE XRAY VIEW OF THE PELVIS AND TWO XRAY VIEWS OF EACH OF THE BILATERAL HIPS; THREE XRAY VIEWS OF THE LUMBAR SPINE 11/13/2020 2:55 pm COMPARISON: None. HISTORY: ORDERING SYSTEM PROVIDED HISTORY: fall TECHNOLOGIST PROVIDED HISTORY: Reason for exam:->fall; ORDERING SYSTEM PROVIDED HISTORY: Osteomyelitis of lumbar spine ([facility] [address] FINDINGS: L-spine: Posterior fusion L1-L5 with a spacer present at L3 and a disc prosthetic at L4-5. There is underlying vertebral height loss at L3. Unremarkable soft tissues. Pelvis and bilateral hips: Severe osteoarthritis at the right hip.   Moderate osteoarthritis at the left hip. Unremarkable soft tissues. Right knee: Anatomically aligned right knee arthroplasty. No findings to suggest loosening. Unremarkable soft tissues. The osseous structures are are all demineralized. L-spine: Postsurgical changes with prior compression injury at L3. Severe osteoarthritis at the right hip and mild osteoarthritis at the left hip. Anatomically aligned right knee arthroplasty. Osteopenia. Ct Head Wo Contrast    Result Date: 11/13/2020  EXAMINATION: CT OF THE HEAD WITHOUT CONTRAST  11/13/2020 3:41 pm TECHNIQUE: CT of the head was performed without the administration of intravenous contrast. Dose modulation, iterative reconstruction, and/or weight based adjustment of the mA/kV was utilized to reduce the radiation dose to as low as reasonably achievable. COMPARISON: 12/27/2019 HISTORY: ORDERING SYSTEM PROVIDED HISTORY: fall TECHNOLOGIST PROVIDED HISTORY: Has a \"code stroke\" or \"stroke alert\" been called? ->No Reason for exam:->fall FINDINGS: BRAIN/VENTRICLES: There is no acute intracranial hemorrhage, mass effect or midline shift. No abnormal extra-axial fluid collection. The gray-white differentiation is maintained without evidence of an acute infarct. There is no evidence of hydrocephalus. Scattered foci of low attenuation involving the periventricular white matter compatible with microvascular ischemic changes. ORBITS: The visualized portion of the orbits demonstrate no acute abnormality. SINUSES: The visualized paranasal sinuses and mastoid air cells demonstrate no acute abnormality. SOFT TISSUES/SKULL:  No acute abnormality of the visualized skull or soft tissues. 1. There is no acute intracranial abnormality. Specifically, there is no intracranial hemorrhage.  2. Atrophy and periventricular leukomalacia,     Ct Cervical Spine Wo Contrast    Result Date: 11/13/2020  EXAMINATION: CT OF THE CERVICAL SPINE WITHOUT CONTRAST 11/13/2020 3:41 pm TECHNIQUE: CT of

## 2020-11-15 NOTE — PROGRESS NOTES
Department of Orthopedic Surgery  Joint Service  Attending Progress Note        Subjective:  Feeling better today and feels like she can move better. Vitals  VITALS:  BP (!) 103/55   Pulse 67   Temp 98.1 °F (36.7 °C) (Oral)   Resp 16   Ht 5' 5\" (1.651 m)   Wt 164 lb 8 oz (74.6 kg)   SpO2 94%   BMI 27.37 kg/m²   24HR INTAKE/OUTPUT:    Intake/Output Summary (Last 24 hours) at 11/15/2020 1106  Last data filed at 11/15/2020 0734  Gross per 24 hour   Intake 300 ml   Output --   Net 300 ml       PHYSICAL EXAM:    Orientation:  alert and oriented to person, place and time    Right Lower Extremity    Lower Extremity Motor :   Quadriceps:  5/5  Extensor hallucis:  5/5  Dorsiflexion:  2/5  Plantarflexion:  4/5    Lower Extremity Sensory:  L touch intact jeyson LE including in Peroneal nerve distribution    Small bruise starting to show over proximal anterior compartment of RLE    Pulses:    Good cap refill jeyson LE    Abnormal Exam findings:  Right foot drop    Brace:  May need AFO but nothing in place currently    LABS:    HgB:    Lab Results   Component Value Date    HGB 10.0 11/15/2020     INR:  No results found for: PTINR  CBC:   Lab Results   Component Value Date    WBC 4.9 11/15/2020    RBC 3.41 11/15/2020    HGB 10.0 11/15/2020    HCT 31.4 11/15/2020    MCV 92.1 11/15/2020    MCH 29.3 11/15/2020    MCHC 31.8 11/15/2020    RDW 16.4 11/15/2020     11/15/2020    MPV 10.2 11/15/2020     BMP:    Lab Results   Component Value Date     11/15/2020    K 4.0 11/15/2020     11/15/2020    CO2 25 11/15/2020    BUN 18 11/15/2020    LABALBU 2.4 11/15/2020    CREATININE 1.0 11/15/2020    CALCIUM 8.5 11/15/2020    GFRAA >60 11/15/2020    LABGLOM 54 11/15/2020    GLUCOSE 88 11/15/2020     PT/INR:    Lab Results   Component Value Date    PROTIME 12.1 04/06/2020    INR 1.0 04/06/2020       ASSESSMENT AND PLAN:      1. Right thigh adductor muscle strain from her fall. Some evidence of bruising today of RLE.   Foot drop might be from direct trauma from the fall of the anterior compartment muscle vs possibly Peroneal nerve. However, Light touch intact which is a good predictor of recovery. Hopefully sooner than later. I discussed this with the patient today. She reports it moves easier and less pain today. Will get her an AFO incase delayed recovery and help keep her from tripping over her toes. 2. Recent history of ESBL ecoli Bacteremia- Meropenem  3. C diff colitis - vanc  4. Post infectious vetebral Collapse   5. Fall  6. Hypertension   7. CAD   8.  Stable jesyon TKA without evidence of extensor mechanism injury.     PT/OT WBAT with assistance. Will get AFO ordered through  orthotics. Right hip OA- will not consider VINNIE anytime soon. Patient understanding of this. May need rehab placement pending PT eval    F/U in 3-4 weeks upon discharge.   OK for discharge to rehab (most likely) when approved and ok with primary team     Yadi Hurley [facility]

## 2020-11-16 LAB
ALBUMIN SERPL-MCNC: 2.5 G/DL (ref 3.5–5.2)
ALP BLD-CCNC: 158 U/L (ref 35–104)
ALT SERPL-CCNC: <5 U/L (ref 0–32)
ANION GAP SERPL CALCULATED.3IONS-SCNC: 5 MMOL/L (ref 7–16)
AST SERPL-CCNC: 11 U/L (ref 0–31)
BASOPHILS ABSOLUTE: 0.08 E9/L (ref 0–0.2)
BASOPHILS RELATIVE PERCENT: 1.7 % (ref 0–2)
BILIRUB SERPL-MCNC: 0.3 MG/DL (ref 0–1.2)
BUN BLDV-MCNC: 19 MG/DL (ref 8–23)
CALCIUM SERPL-MCNC: 8.4 MG/DL (ref 8.6–10.2)
CHLORIDE BLD-SCNC: 102 MMOL/L (ref 98–107)
CO2: 28 MMOL/L (ref 22–29)
CREAT SERPL-MCNC: 1 MG/DL (ref 0.5–1)
EOSINOPHILS ABSOLUTE: 0.38 E9/L (ref 0.05–0.5)
EOSINOPHILS RELATIVE PERCENT: 7.9 % (ref 0–6)
GFR AFRICAN AMERICAN: >60
GFR NON-AFRICAN AMERICAN: 54 ML/MIN/1.73
GLUCOSE BLD-MCNC: 94 MG/DL (ref 74–99)
HCT VFR BLD CALC: 32.4 % (ref 34–48)
HEMOGLOBIN: 9.9 G/DL (ref 11.5–15.5)
IMMATURE GRANULOCYTES #: 0.01 E9/L
IMMATURE GRANULOCYTES %: 0.2 % (ref 0–5)
LYMPHOCYTES ABSOLUTE: 1.67 E9/L (ref 1.5–4)
LYMPHOCYTES RELATIVE PERCENT: 34.9 % (ref 20–42)
MCH RBC QN AUTO: 28.5 PG (ref 26–35)
MCHC RBC AUTO-ENTMCNC: 30.6 % (ref 32–34.5)
MCV RBC AUTO: 93.4 FL (ref 80–99.9)
MONOCYTES ABSOLUTE: 0.7 E9/L (ref 0.1–0.95)
MONOCYTES RELATIVE PERCENT: 14.6 % (ref 2–12)
NEUTROPHILS ABSOLUTE: 1.94 E9/L (ref 1.8–7.3)
NEUTROPHILS RELATIVE PERCENT: 40.7 % (ref 43–80)
PDW BLD-RTO: 16.2 FL (ref 11.5–15)
PLATELET # BLD: 299 E9/L (ref 130–450)
PMV BLD AUTO: 10.1 FL (ref 7–12)
POTASSIUM REFLEX MAGNESIUM: 4.3 MMOL/L (ref 3.5–5)
RBC # BLD: 3.47 E12/L (ref 3.5–5.5)
SODIUM BLD-SCNC: 135 MMOL/L (ref 132–146)
TOTAL PROTEIN: 5.6 G/DL (ref 6.4–8.3)
WBC # BLD: 4.8 E9/L (ref 4.5–11.5)

## 2020-11-16 PROCEDURE — 97161 PT EVAL LOW COMPLEX 20 MIN: CPT

## 2020-11-16 PROCEDURE — 1200000000 HC SEMI PRIVATE

## 2020-11-16 PROCEDURE — 85025 COMPLETE CBC W/AUTO DIFF WBC: CPT

## 2020-11-16 PROCEDURE — 36415 COLL VENOUS BLD VENIPUNCTURE: CPT

## 2020-11-16 PROCEDURE — 6370000000 HC RX 637 (ALT 250 FOR IP): Performed by: INTERNAL MEDICINE

## 2020-11-16 PROCEDURE — 80053 COMPREHEN METABOLIC PANEL: CPT

## 2020-11-16 PROCEDURE — 2580000003 HC RX 258: Performed by: INTERNAL MEDICINE

## 2020-11-16 PROCEDURE — 97110 THERAPEUTIC EXERCISES: CPT

## 2020-11-16 PROCEDURE — 36592 COLLECT BLOOD FROM PICC: CPT

## 2020-11-16 PROCEDURE — 6360000002 HC RX W HCPCS: Performed by: INTERNAL MEDICINE

## 2020-11-16 RX ORDER — ATENOLOL 25 MG/1
25 TABLET ORAL
Status: DISCONTINUED | OUTPATIENT
Start: 2020-11-16 | End: 2020-11-17 | Stop reason: HOSPADM

## 2020-11-16 RX ORDER — VANCOMYCIN HYDROCHLORIDE 250 MG/1
250 CAPSULE ORAL 2 TIMES DAILY
Qty: 34 CAPSULE | Refills: 0 | Status: SHIPPED | OUTPATIENT
Start: 2020-11-16 | End: 2020-11-26

## 2020-11-16 RX ADMIN — MICONAZOLE NITRATE: 2 OINTMENT TOPICAL at 21:14

## 2020-11-16 RX ADMIN — SODIUM CHLORIDE: 9 INJECTION, SOLUTION INTRAVENOUS at 05:35

## 2020-11-16 RX ADMIN — Medication 10 ML: at 21:15

## 2020-11-16 RX ADMIN — MEROPENEM 1 G: 1 INJECTION, POWDER, FOR SOLUTION INTRAVENOUS at 03:01

## 2020-11-16 RX ADMIN — MULTIVITAMIN TABLET 1 TABLET: TABLET at 08:45

## 2020-11-16 RX ADMIN — Medication 250 MG: at 16:02

## 2020-11-16 RX ADMIN — MICONAZOLE NITRATE: 2 OINTMENT TOPICAL at 08:46

## 2020-11-16 RX ADMIN — OXYCODONE HYDROCHLORIDE 15 MG: 15 TABLET ORAL at 05:37

## 2020-11-16 RX ADMIN — LEVOTHYROXINE SODIUM 25 MCG: 25 TABLET ORAL at 05:37

## 2020-11-16 RX ADMIN — SODIUM CHLORIDE, PRESERVATIVE FREE 300 UNITS: 5 INJECTION INTRAVENOUS at 21:15

## 2020-11-16 RX ADMIN — PROBENECID 500 MG: 500 TABLET, FILM COATED ORAL at 21:15

## 2020-11-16 RX ADMIN — SODIUM CHLORIDE: 9 INJECTION, SOLUTION INTRAVENOUS at 15:14

## 2020-11-16 RX ADMIN — MEROPENEM 1 G: 1 INJECTION, POWDER, FOR SOLUTION INTRAVENOUS at 12:05

## 2020-11-16 RX ADMIN — OXYCODONE HYDROCHLORIDE 15 MG: 15 TABLET ORAL at 21:15

## 2020-11-16 RX ADMIN — OXYCODONE HYDROCHLORIDE 15 MG: 15 TABLET ORAL at 09:58

## 2020-11-16 RX ADMIN — ASPIRIN 81 MG: 81 TABLET, COATED ORAL at 21:14

## 2020-11-16 RX ADMIN — METHOCARBAMOL TABLETS 1000 MG: 500 TABLET, COATED ORAL at 09:08

## 2020-11-16 RX ADMIN — Medication 250 MG: at 21:14

## 2020-11-16 RX ADMIN — MEROPENEM 1 G: 1 INJECTION, POWDER, FOR SOLUTION INTRAVENOUS at 21:14

## 2020-11-16 RX ADMIN — ATENOLOL 25 MG: 25 TABLET ORAL at 21:15

## 2020-11-16 RX ADMIN — Medication 250 MG: at 08:51

## 2020-11-16 RX ADMIN — TORSEMIDE 20 MG: 20 TABLET ORAL at 08:45

## 2020-11-16 RX ADMIN — PROBENECID 500 MG: 500 TABLET, FILM COATED ORAL at 08:50

## 2020-11-16 RX ADMIN — Medication 10 ML: at 08:50

## 2020-11-16 RX ADMIN — ENOXAPARIN SODIUM 40 MG: 40 INJECTION SUBCUTANEOUS at 08:44

## 2020-11-16 RX ADMIN — OXYCODONE HYDROCHLORIDE 15 MG: 15 TABLET ORAL at 16:02

## 2020-11-16 RX ADMIN — OXYCODONE HYDROCHLORIDE 15 MG: 15 TABLET ORAL at 00:13

## 2020-11-16 RX ADMIN — LEVOTHYROXINE SODIUM 112 MCG: 25 TABLET ORAL at 05:38

## 2020-11-16 RX ADMIN — PRAVASTATIN SODIUM 40 MG: 20 TABLET ORAL at 21:15

## 2020-11-16 RX ADMIN — METHOCARBAMOL TABLETS 1000 MG: 500 TABLET, COATED ORAL at 21:34

## 2020-11-16 RX ADMIN — Medication 250 MG: at 03:02

## 2020-11-16 RX ADMIN — SODIUM CHLORIDE, PRESERVATIVE FREE 300 UNITS: 5 INJECTION INTRAVENOUS at 08:44

## 2020-11-16 ASSESSMENT — PAIN SCALES - GENERAL
PAINLEVEL_OUTOF10: 8
PAINLEVEL_OUTOF10: 6
PAINLEVEL_OUTOF10: 0
PAINLEVEL_OUTOF10: 6
PAINLEVEL_OUTOF10: 7
PAINLEVEL_OUTOF10: 7
PAINLEVEL_OUTOF10: 0
PAINLEVEL_OUTOF10: 0

## 2020-11-16 ASSESSMENT — PAIN DESCRIPTION - PAIN TYPE: TYPE: ACUTE PAIN

## 2020-11-16 ASSESSMENT — PAIN - FUNCTIONAL ASSESSMENT: PAIN_FUNCTIONAL_ASSESSMENT: PREVENTS OR INTERFERES SOME ACTIVE ACTIVITIES AND ADLS

## 2020-11-16 ASSESSMENT — PAIN DESCRIPTION - LOCATION: LOCATION: BACK

## 2020-11-16 ASSESSMENT — PAIN DESCRIPTION - PROGRESSION: CLINICAL_PROGRESSION: NOT CHANGED

## 2020-11-16 ASSESSMENT — PAIN DESCRIPTION - DESCRIPTORS: DESCRIPTORS: ACHING;DISCOMFORT

## 2020-11-16 ASSESSMENT — PAIN DESCRIPTION - FREQUENCY: FREQUENCY: CONTINUOUS

## 2020-11-16 ASSESSMENT — PAIN DESCRIPTION - ONSET: ONSET: ON-GOING

## 2020-11-16 ASSESSMENT — PAIN DESCRIPTION - ORIENTATION: ORIENTATION: RIGHT;LEFT

## 2020-11-16 NOTE — DISCHARGE INSTR - COC
Continuity of Care Form    Patient Name: Aaron Kidd   :  1945  MRN:  [id number]    Admit date:  2020  Discharge date:  ***    Code Status Order: Full Code   Advance Directives:   Advance Care Flowsheet Documentation       Date/Time Healthcare Directive Type of Healthcare Directive Copy in [address] Agent's Name Healthcare Agent's Phone Number    20  Yes, patient has an advance directive for healthcare treatment  Durable power of  for health care  No, copy requested from family  Spouse  Leida Cobos  [phone]             Admitting Physician:  Stuart Kothari DO  PCP: Leanne Zaman DO    Discharging Nurse: [facility] Unit/Room#: 5607/0340-J  Discharging Unit Phone Number: ***    Emergency Contact:   Extended Emergency Contact Information  Primary Emergency Contact: [facility]  Address: [place] [address], [address] Phone: [phone]  Relation: Spouse  Secondary Emergency Contact: [facility] [address] Phone: [phone]  Mobile Phone: [phone]  Relation: Child   needed?  No    Past Surgical History:  Past Surgical History:   Procedure Laterality Date    ABDOMEN SURGERY      BACK SURGERY      BREAST SURGERY Right 1982    BENIGN CYST    CHOLECYSTECTOMY      COLONOSCOPY      EYE SURGERY      HYSTERECTOMY  1985    JOINT REPLACEMENT      bilat knees     LAMINECTOMY N/A 6/3/2019    LUMBAR LAMINECTOMY POSTERIOR L3-L5, BONE BIOPSY L4 - GIOVANNY, X-RAY,  WANTS TF performed by Barbara Mccann MD at [address]         Immunization History:   Immunization History   Administered Date(s) Administered    Zoster Recombinant (Shingrix) 2018       Active Problems:  Patient Active Problem List   Diagnosis Code    Pneumatosis coli K63.89    HTN (hypertension) I10    HLD (hyperlipidemia) E78.5 Obesity E66.9    Acute osteomyelitis of lumbar spine ([facility]) M46.26    Dehydration, moderate E86.0    Acute kidney injury ([facility] [address] N17.9    Hypotension due to hypovolemia I95.89, E86.1    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 G06.1    Pleural effusion J90    Onychomycosis B35.1    PVD (peripheral vascular disease) ([facility]) I73.9    Difficulty walking R26.2    Weakness R53.1    Severe protein-calorie malnutrition ([facility]) E43    Intractable back pain M54.9       Isolation/Infection:   Isolation            C Diff Contact          Patient Infection Status       Infection Onset Added Last Indicated Last Indicated By Review Planned Expiration Resolved Resolved By    C-diff Rule Out 11/13/20 11/13/20 11/13/20 Clostridium difficile EIA (Ordered)        MDRO (multi-drug resistant organism) 12/27/19 01/01/20 12/27/19 Urine culture        Klebsiella aerogenes urine 12/271/9    ESBL (Extended Spectrum Beta Lactamase) 12/27/19 12/31/19 12/27/19 Urine culture        E coli urine 12/27/19    Resolved    ESBL (Extended Spectrum Beta Lactamase)  06/07/17 06/03/19 Body Fluid Culture   09/16/19 Angelo Posadas RN    E Coli Wound-Cervical paraspinal 6/3/19  E Coli Urine 4/30/19, 06/05/2017  E. Coli blood 5/27/19, 5/1/19, 4/30/19            Nurse Assessment:  Last Vital Signs: BP (!) 118/56   Pulse 64   Temp 97.9 °F (36.6 °C) (Oral)   Resp 16   Ht 5' 5\" (1.651 m)   Wt 166 lb 6 oz (75.5 kg)   SpO2 95%   BMI 27.69 kg/m²     Last documented pain score (0-10 scale): Pain Level: 6  Last Weight:   Wt Readings from Last 1 Encounters:   11/15/20 166 lb 6 oz (75.5 kg)     Mental Status:  {IP PT MENTAL STATUS:20030:::0}    IV Access:  { GLADYS IV ACCESS:507257827:::0}    Nursing Mobility/ADLs:  Walking   {P DME ADLs:002516163:::0}  Transfer  {CHP DME ADLs:083733797:::0}  Bathing  {CHP DME ADLs:562973125:::0}  Dressing  {CHP DME ADLs:083546797:::0}  Toileting  {CHP DME ADLs:355117723:::0}  Feeding  {CHP DME ADLs:097476580:::0}  Med Admin  {P DME ADLs:959068520:::0}  Med Delivery   [address] [employer] MED Delivery:374680775:::0}    Wound Care Documentation and Therapy:  Wound 20 Back Lower;Mid (Active)   Dressing/Treatment Open to air 11/15/20 1945   Wound Length (cm) 0.7 cm 20   Wound Width (cm) 2.8 cm 20   Wound Depth (cm) 0.1 cm 20   Wound Surface Area (cm^2) 1.96 cm^2 20   Wound Volume (cm^3) 0.2 cm^3 20   Wound Assessment Pink/red 11/15/20 1945   Drainage Amount None 11/15/20 1945   Odor None 11/15/20 1945   Malika-wound Assessment Intact; Blanchable erythema 11/15/20 1945   Number of days: 2        Elimination:  Continence: Bowel: {YES / GJ:54500}  Bladder: {YES / AK:25575}  Urinary Catheter: {Urinary Catheter:013657709:::0}   Colostomy/Ileostomy/Ileal Conduit: {YES / KM:82800}       Date of Last BM: ***    Intake/Output Summary (Last 24 hours) at 2020 0629  Last data filed at 11/15/2020 1714  Gross per 24 hour   Intake 540 ml   Output --   Net 540 ml     I/O last 3 completed shifts:   In: 5 [P.O.:540]  Out: -     Safety Concerns:     [address] [employer] Safety Concerns:545505376:::0}    Impairments/Disabilities:      [address] [employer] Impairments/Disabilities:417392206:::0}    Nutrition Therapy:  Current Nutrition Therapy:   [employer] Diet List:352955910:::0}    Routes of Feeding: {CHP DME Other Feedings:855780280:::0}  Liquids: {Slp liquid thickness:65406}  Daily Fluid Restriction: {CHP DME Yes amt example:596190214:::0}  Last Modified Barium Swallow with Video (Video Swallowing Test): {Done Not Done VOXK:045714270:::1}    Treatments at the Time of Hospital Discharge:   Respiratory Treatments: ***  Oxygen Therapy:  {Therapy; copd oxygen:72887:::0}  Ventilator:    { KEVIN Vent List:477081540:::0}    Rehab Therapies: {THERAPEUTIC INTERVENTION:6575187944}  Weight Bearing Status/Restrictions: {[facility] Weight Bearin:::0}  Other Medical Equipment (for information only, NOT a DME order): {EQUIPMENT:502204257}  Other Treatments: ***    Patient's personal belongings (please select all that are sent with patient):  {CHP DME Belongings:655430742:::0}    RN SIGNATURE:  {Esignature:991794881:::0}    CASE MANAGEMENT/SOCIAL WORK SECTION    Inpatient Status Date: ***    Readmission Risk Assessment Score:  Readmission Risk              Risk of Unplanned Readmission:        18           Discharging to Facility/ Agency   Name:   Address:  Phone:  Fax:    Dialysis Facility (if applicable)   Name:  Address:  Dialysis Schedule:  Phone:  Fax:    / signature: {Esignature:216818557:::0}    PHYSICIAN SECTION    Prognosis: {Prognosis:7100535000:::0}    Condition at Discharge: [address] [place] [address] Patient Condition:865492267:::0}    Rehab Potential (if transferring to Rehab): {Prognosis:9441176889:::0}    Recommended Labs or Other Treatments After Discharge: ***    Physician Certification: I certify the above information and transfer of James Pleitez  is necessary for the continuing treatment of the diagnosis listed and that she requires {Admit to Appropriate Level of Care:01880:::0} for {GREATER/LESS:647488605} 30 days.      Update Admission H&P: {CHP DME Changes in HandP:762927376:::0}    PHYSICIAN SIGNATURE:  Electronically signed by Dania Hatfield DO on 11/16/2020 at 6:30 AM

## 2020-11-16 NOTE — PROGRESS NOTES
Subjective: The patient is awake and alert. No problems overnight. Denies chest pain, angina, and dyspnea. Denies abdominal pain. Tolerating diet. No nausea or vomiting. Objective:    BP (!) 118/56   Pulse 64   Temp 97.9 °F (36.6 °C) (Oral)   Resp 16   Ht 5' 5\" (1.651 m)   Wt 166 lb 6 oz (75.5 kg)   SpO2 95%   BMI 27.69 kg/m²     Heart:  RRR, no murmurs, gallops, or rubs.   Lungs:  CTA bilaterally, no wheeze, rales or rhonchi  Abd: bowel sounds present, nontender, nondistended, no masses  Extrem:  No clubbing, cyanosis, or edema    CBC with Differential:    Lab Results   Component Value Date    WBC 4.8 11/16/2020    RBC 3.47 11/16/2020    HGB 9.9 11/16/2020    HCT 32.4 11/16/2020     11/16/2020    MCV 93.4 11/16/2020    MCH 28.5 11/16/2020    MCHC 30.6 11/16/2020    RDW 16.2 11/16/2020    LYMPHOPCT 34.9 11/16/2020    MONOPCT 14.6 11/16/2020    BASOPCT 1.7 11/16/2020    MONOSABS 0.70 11/16/2020    LYMPHSABS 1.67 11/16/2020    EOSABS 0.38 11/16/2020    BASOSABS 0.08 11/16/2020     CMP:    Lab Results   Component Value Date     11/16/2020    K 4.3 11/16/2020     11/16/2020    CO2 28 11/16/2020    BUN 19 11/16/2020    CREATININE 1.0 11/16/2020    GFRAA >60 11/16/2020    LABGLOM 54 11/16/2020    GLUCOSE 94 11/16/2020    PROT 5.6 11/16/2020    LABALBU 2.5 11/16/2020    CALCIUM 8.4 11/16/2020    BILITOT 0.3 11/16/2020    ALKPHOS 158 11/16/2020    AST 11 11/16/2020    ALT <5 11/16/2020     PT/INR:    Lab Results   Component Value Date    PROTIME 12.1 04/06/2020    INR 1.0 04/06/2020     @cktotal:3,ckmb:3,ckmbindex:3,troponini:3@  U/A:    Lab Results   Component Value Date    NITRU Negative 11/13/2020    COLORU Yellow 11/13/2020    PHUR 6.0 11/13/2020    LABCAST RARE 05/27/2019    WBCUA NONE 11/13/2020    RBCUA NONE 11/13/2020    YEAST MODERATE 09/12/2019    BACTERIA NONE SEEN 11/13/2020    CLARITYU Clear 11/13/2020    SPECGRAV 1.015 11/13/2020    UROBILINOGEN 0.2 11/13/2020    BILIRUBINUR Negative 11/13/2020    BLOODU Negative 11/13/2020    GLUCOSEU Negative 11/13/2020    KETUA Negative 11/13/2020        Assessment:    Active Problems:    Weakness  Resolved Problems:    * No resolved hospital problems. *      Plan:    E.coli bacteriemia  Finish course of meropenem    C.  Diff colitis  vancomyocin po while on meropenem    fott drop  Will need therpay  Weakness noted and unable to flexion foot  Will need therapy and likely brace    Right thigh adductor strain  Pt/ot            Tameka Ponce,   6:24 AM  11/16/2020

## 2020-11-16 NOTE — PROGRESS NOTES
Inpatient Wound Care    Admit Date: 11/13/2020  3:12 PM    Reason for consult:  Mid lower back wound. Red groin folds l>r  Significant history:  Back surgery- with infection  C diff, rt foot drop  Wound history:  POA  Findings:  Alert and verbally appropriate  States she fell due to knee weakness and landed on back  Recent back surgery     [facility]  Redness proximal to incision. Blanchable . consistent with laying on area. 3/4 down incision. Soft purplesoval area. [id number]  No drainage or induration  Rt heel very red. - heel protector applied  Patient resistant to heel protectors. Instructed on important for protection   Had splint in room for heel drop  Plan: SOS, mepilex to area for protection  Nystatin in place groins  Berkley Mejia.  RYLAN Watt [place] 11/16/2020 1:34 PM

## 2020-11-16 NOTE — PROGRESS NOTES
Physician Progress Note      PATIENTLane Boas  CSN #:                  [id number]  :                       1945  ADMIT DATE:       2020 3:12 PM  [address] [place] DATE:  RESPONDING  PROVIDER #:        Asha Mariscal MD          QUERY TEXT:    Dear Consulting Provider,    Patient admitted with weakness. Noted documentation of UTI in ID consult note   on . If possible, please document in progress notes and discharge   summary:    The medical record reflects the following:  Risk Factors: Recent UTI  Clinical Indicators: per ID in consult note  \"Assessment and Plan: UTI   with ESBL E. coli bacteremia. Marnell Mateus Marnell Mateus No concern for UTI. Marnell Mateus Marnell Mateus \"  UC shows : <10,000 CFU/mL Mixed gram positive organisms  Treatment: IV abx, labs, monitoring    Claire Noel RN CDIS  Clinical Documentation Improvement  [phone]  Options provided:  -- UTI present as evidenced by, Please document evidence. -- UTI was ruled out  -- Other - I will add my own diagnosis  -- Disagree - Not applicable / Not valid  -- Disagree - Clinically unable to determine / Unknown  -- Refer to Clinical Documentation Reviewer    PROVIDER RESPONSE TEXT:    UTI was ruled out after study.     Query created by: Job Bah on 2020 10:59 AM      Electronically signed by:  Asha Mariscal MD 2020 4:07 PM

## 2020-11-16 NOTE — PROGRESS NOTES
Physical Therapy    Facility/Department: [facility] MED SURG  Initial Assessment    NAME: Olivia Goodman  : 1945  MRN: [id number]    Date of Service: 2020    Attending Provider:  Chuy Villareal MD    Evaluating PT:  Nehal Ramsey P.T. Room #:  9719/2669-P  Diagnosis:  Weakness  Pertinent PMHx/PSHx:  LUMBAR LAMINECTOMY POSTERIOR L3-L5, BONE BIOPSY L4 (2019)  Precautions:  Falls, bed/chair alarms, TLSO brace per pt    SUBJECTIVE:    Pt lives with  in a 1 story home with ramp to enter. Pt ambulated without AD, but required TLSO brace for all activities PTA, and pt reports to own a ww. OBJECTIVE:   Initial Evaluation  Date: 20 Treatment Short Term/ Long Term   Goals   Was pt agreeable to Eval/treatment? yes     Does pt have pain? C/o pain in inner thigh     Bed Mobility  Rolling: MOD A  Supine to sit: NA  Sit to supine: NA  Scooting: NA  Independent   Transfers NA, pt does not have TLSO brace   SBA   Ambulation   NA  50 feet with AAD SBA   Stair negotiation: ascended and descended NA  NA   AM-PAC 6 Clicks 73/03       BLE ROM is WFL except R ankle AROM from full PF cannot achieve neural ankle DF. LLE strength is grossly 3+/5 to 4-/5 and RLE is 3-/5 to 3/5 except ankle DF  2/5. Sensation:  Pt denies numbness and tingling to extremities  Edema:  None noted  Balance: NA  Endurance: poor    Therapeutic Exercises:  Pt was instructed in/performed supine exercises with the BLE: ankle PF/DF with  2 x 15 reps with manual resistance for PF and AAROM for R ankle DF, heel slides and quad sets 2 x 15 reps with manual resistance, abd hip ABD/ADD AAROM 2 x 15 reps.     Patient education  Pt educated on above exercises and how to translate them to functional movement    Patient response to education:   Pt verbalized understanding Pt demonstrated skill Pt requires further education in this area   yes yes yes     ASSESSMENT:    Comments:  Pt was found lying supine in bed with nursing in the room and was agreeable to PT evaluation. Pt requested to use the bed pan and rolled with assistance to get on the bed pan. Pt was cleaned and bed mat was removed. Pt performed log roll in bed with assistance to each side 2 times to remove old bed pad and replace with new bed pad. Pt reported to not be able to perform OOB activities without the use of her TLSO brace. Pt was unsure of when brace would arrive, but wished to practice motions in bed to prepare for movement once she has her TLSO brace and new R AFO. Treatment:  Patient practiced and was instructed in the following treatment:     Bed mobility to improve functional strength and endurance.  Above exercises to improve ROM, strength, and mobility. Pt was left lying supine in bed with call light left by patient. Chair/bed alarm: bed alarm re-activated    Pt's/ family goals   1. To get stronger    Patient and or family understand(s) diagnosis, prognosis, and plan of care. PLAN OF CARE:    Current Treatment Recommendations     [x] Strengthening     [x] ROM   [x] Balance Training   [x] Endurance Training   [x] Transfer Training   [x] Gait Training   [] Stair Training   [x] Positioning   [x] Safety and Education Training   [] Patient/Caregiver Education   [] HEP  [] Other     PT care will be provided in accordance with the objectives noted above. Exercises and functional mobility practice will be used as well as appropriate assistive devices or modalities to obtain goals. Patient and family education will also be administered as needed. Frequency of treatments: 2-5x/week x 1-2 weeks.     Time in  09:55  Time out  10:20    Total Treatment Time  15 minutes     Evaluation Time includes thorough review of current medical information, gathering information on past medical history/social history and prior level of function, completion of standardized testing/informal observation of tasks, assessment of data and education on plan of care and goals.    CPT codes:  [x] Low Complexity PT evaluation 73866  [] Moderate Complexity PT evaluation 10587  [] High Complexity PT evaluation 48312  [] PT Re-evaluation 13942  [] Gait training 92972 ** minutes  [] Manual therapy 58213 ** minutes  [] Therapeutic activities 87057 ** minutes  [x] Therapeutic exercises 55754 15 minutes  [] Neuromuscular reeducation 82625 ** minutes     Janette Natarajan, SPT    Jon Beasley, P.T.   License Number: [id number]

## 2020-11-16 NOTE — PROGRESS NOTES
ID Progress Note                [address], [place], [address] [facility]            Phone [phone]     Fax [phone]      Chief complaint   unchanged    Subjective:  Her weakness is much better over the legs now  The patient is awake and alert. Tolerating medications. Reports no side effects. Afebrile. 10 ROS otherwise negative unless otherwise specified above. Objective:    Vitals:    11/16/20 0830   BP: (!) 100/50   Pulse: 60   Resp: 18   Temp: 97.9 °F (36.6 °C)   SpO2: 98%        General appearance: Alert, Awake, Oriented times 3, no distress  Skin: Warm and dry  Eyes: Pink palpebral conjunctivae. PERRL  Ears: External ears normal.  Nose/Sinuses: Nares normal. Septum midline. Mucosa normal. No sinus tenderness. Oropharynx: Oropharynx clear with no exudates seen  Neck: Neck supple. No jugular venous distension, lymphadenopathy or thyromegaly Trachea midline  Lungs: Lungs clear to auscultation bilaterally. No rhonchi, crackles or wheezes  Heart: S1 S2  Regular rate and rhythm. No rub, murmur or gallop  Abdomen: Abdomen soft, non-tender.  BS normal. No masses, organomegaly  Extremities: No edema, Peripheral pulses palpable  Musculoskeletal: Left hip incision site healing well, no pain or tenderness over the right side as well    Labs:  Recent Labs     11/14/20  1423 11/15/20  0342 11/16/20  0309   WBC 5.1 4.9 4.8   RBC 3.65 3.41* 3.47*   HGB 10.6* 10.0* 9.9*   HCT 33.5* 31.4* 32.4*   MCV 91.8 92.1 93.4   MCH 29.0 29.3 28.5   MCHC 31.6* 31.8* 30.6*   RDW 16.2* 16.4* 16.2*    308 299   MPV 10.2 10.2 10.1     CMP:    Lab Results   Component Value Date     11/16/2020    K 4.3 11/16/2020     11/16/2020    CO2 28 11/16/2020    BUN 19 11/16/2020    CREATININE 1.0 11/16/2020    GFRAA >60 11/16/2020    LABGLOM 54 11/16/2020    GLUCOSE 94 11/16/2020    PROT 5.6 11/16/2020    LABALBU 2.5 11/16/2020    CALCIUM 8.4 11/16/2020    BILITOT 0.3 11/16/2020    ALKPHOS 158 11/16/2020    AST 11 11/16/2020    ALT <5 11/16/2020          Microbiology :  No results for input(s): BC in the last 72 hours. No results for input(s): Mahogany Reynoso in the last 72 hours. Recent Labs     11/13/20  1823   LABURIN <10,000 CFU/mL  Mixed gram positive organisms       No results for input(s): CULTRESP in the last 72 hours. No results for input(s): WNDABS in the last 72 hours. Radiology :  NOTED     Assessment and Plan:      · UTI with ESBL E. coli bacteremia outside the hospital  · Lumbar spine surgery with L3 collapse, spinal abscess drainage  · status post L2 L5 revision decompression, L1-L5 fusion done at [facility] in September 2020  · C. difficile colitis on vancomycin  · Generalized weakness     Plan  -Continue meropenem 1 g every 8 hourly extended infusion as planned from [facility] to be continued for 8 weeks from 10/7/2020.   She wants to follow-up locally now  -Check CRP- 2.9/ESR- 52 - OK   -Continue oral vancomycin while she is on meropenem otherwise it might cause relapse of her infection  -No new UTI  -There is no point in checking C. difficile again as she is on the treatment and she does not have any diarrhea at this time.     Will benefit from going to rehab, however she wants to go home now as she has help arranged at home            Electronically signed by Josue Herrera MD on 11/16/2020 at 1:03 PM

## 2020-11-16 NOTE — PLAN OF CARE
Problem: Skin Integrity:  Goal: Absence of new skin breakdown  Description: Absence of new skin breakdown  Outcome: Met This Shift     Problem: Mobility - Impaired:  Goal: Mobility will improve  Description: Mobility will improve  Outcome: Met This Shift

## 2020-11-16 NOTE — PATIENT CARE CONFERENCE
[facility] Quality Flow/Interdisciplinary Rounds Progress Note        Quality Flow Rounds held on November 16, 2020    Disciplines Attending:  Bedside Nurse, ,  and Nursing Unit Leadership    Gumaro Griffiths was admitted on 11/13/2020  3:12 PM    Anticipated Discharge Date:       Disposition:    Kaveh Score:  Kaveh Scale Score: 19    Readmission Risk              Risk of Unplanned Readmission:        18           Discussed patient goal for the day, patient clinical progression, and barriers to discharge.   The following Goal(s) of the Day/Commitment(s) have been identified:  Labs - Report Results      Deny Denson  November 16, 2020

## 2020-11-17 VITALS
HEIGHT: 65 IN | BODY MASS INDEX: 27.99 KG/M2 | OXYGEN SATURATION: 96 % | DIASTOLIC BLOOD PRESSURE: 85 MMHG | SYSTOLIC BLOOD PRESSURE: 128 MMHG | HEART RATE: 77 BPM | TEMPERATURE: 98.9 F | WEIGHT: 168 LBS | RESPIRATION RATE: 16 BRPM

## 2020-11-17 LAB
ALBUMIN SERPL-MCNC: 2.6 G/DL (ref 3.5–5.2)
ALP BLD-CCNC: 140 U/L (ref 35–104)
ALT SERPL-CCNC: <5 U/L (ref 0–32)
ANION GAP SERPL CALCULATED.3IONS-SCNC: 7 MMOL/L (ref 7–16)
AST SERPL-CCNC: 19 U/L (ref 0–31)
BASOPHILS ABSOLUTE: 0.07 E9/L (ref 0–0.2)
BASOPHILS RELATIVE PERCENT: 1.7 % (ref 0–2)
BILIRUB SERPL-MCNC: <0.2 MG/DL (ref 0–1.2)
BUN BLDV-MCNC: 15 MG/DL (ref 8–23)
CALCIUM SERPL-MCNC: 8.4 MG/DL (ref 8.6–10.2)
CHLORIDE BLD-SCNC: 104 MMOL/L (ref 98–107)
CO2: 28 MMOL/L (ref 22–29)
CREAT SERPL-MCNC: 0.9 MG/DL (ref 0.5–1)
EOSINOPHILS ABSOLUTE: 0.27 E9/L (ref 0.05–0.5)
EOSINOPHILS RELATIVE PERCENT: 6.5 % (ref 0–6)
GFR AFRICAN AMERICAN: >60
GFR NON-AFRICAN AMERICAN: >60 ML/MIN/1.73
GLUCOSE BLD-MCNC: 108 MG/DL (ref 74–99)
HCT VFR BLD CALC: 31.2 % (ref 34–48)
HEMOGLOBIN: 10 G/DL (ref 11.5–15.5)
IMMATURE GRANULOCYTES #: 0 E9/L
IMMATURE GRANULOCYTES %: 0 % (ref 0–5)
LYMPHOCYTES ABSOLUTE: 1.32 E9/L (ref 1.5–4)
LYMPHOCYTES RELATIVE PERCENT: 31.7 % (ref 20–42)
MCH RBC QN AUTO: 29.2 PG (ref 26–35)
MCHC RBC AUTO-ENTMCNC: 32.1 % (ref 32–34.5)
MCV RBC AUTO: 91.2 FL (ref 80–99.9)
MONOCYTES ABSOLUTE: 0.65 E9/L (ref 0.1–0.95)
MONOCYTES RELATIVE PERCENT: 15.6 % (ref 2–12)
NEUTROPHILS ABSOLUTE: 1.86 E9/L (ref 1.8–7.3)
NEUTROPHILS RELATIVE PERCENT: 44.5 % (ref 43–80)
PDW BLD-RTO: 15.9 FL (ref 11.5–15)
PLATELET # BLD: 300 E9/L (ref 130–450)
PMV BLD AUTO: 10.1 FL (ref 7–12)
POTASSIUM REFLEX MAGNESIUM: 4.1 MMOL/L (ref 3.5–5)
RBC # BLD: 3.42 E12/L (ref 3.5–5.5)
SODIUM BLD-SCNC: 139 MMOL/L (ref 132–146)
TOTAL PROTEIN: 5.6 G/DL (ref 6.4–8.3)
WBC # BLD: 4.2 E9/L (ref 4.5–11.5)

## 2020-11-17 PROCEDURE — 6370000000 HC RX 637 (ALT 250 FOR IP): Performed by: INTERNAL MEDICINE

## 2020-11-17 PROCEDURE — 6360000002 HC RX W HCPCS: Performed by: INTERNAL MEDICINE

## 2020-11-17 PROCEDURE — 2580000003 HC RX 258: Performed by: INTERNAL MEDICINE

## 2020-11-17 PROCEDURE — 85025 COMPLETE CBC W/AUTO DIFF WBC: CPT

## 2020-11-17 PROCEDURE — 80053 COMPREHEN METABOLIC PANEL: CPT

## 2020-11-17 PROCEDURE — 36415 COLL VENOUS BLD VENIPUNCTURE: CPT

## 2020-11-17 RX ORDER — ATENOLOL 25 MG/1
25 TABLET ORAL
Qty: 30 TABLET | Refills: 3 | Status: SHIPPED | OUTPATIENT
Start: 2020-11-17 | End: 2021-03-24 | Stop reason: ALTCHOICE

## 2020-11-17 RX ADMIN — Medication 250 MG: at 15:25

## 2020-11-17 RX ADMIN — Medication 10 ML: at 09:07

## 2020-11-17 RX ADMIN — LEVOTHYROXINE SODIUM 25 MCG: 25 TABLET ORAL at 06:09

## 2020-11-17 RX ADMIN — PROBENECID 500 MG: 500 TABLET, FILM COATED ORAL at 09:07

## 2020-11-17 RX ADMIN — MEROPENEM 1 G: 1 INJECTION, POWDER, FOR SOLUTION INTRAVENOUS at 11:49

## 2020-11-17 RX ADMIN — TORSEMIDE 20 MG: 20 TABLET ORAL at 09:06

## 2020-11-17 RX ADMIN — OXYCODONE HYDROCHLORIDE 15 MG: 15 TABLET ORAL at 02:47

## 2020-11-17 RX ADMIN — SODIUM CHLORIDE, PRESERVATIVE FREE 10 ML: 5 INJECTION INTRAVENOUS at 17:42

## 2020-11-17 RX ADMIN — SODIUM CHLORIDE, PRESERVATIVE FREE 10 ML: 5 INJECTION INTRAVENOUS at 11:49

## 2020-11-17 RX ADMIN — LEVOTHYROXINE SODIUM 112 MCG: 25 TABLET ORAL at 06:08

## 2020-11-17 RX ADMIN — Medication 250 MG: at 09:07

## 2020-11-17 RX ADMIN — SODIUM CHLORIDE, PRESERVATIVE FREE 10 ML: 5 INJECTION INTRAVENOUS at 02:48

## 2020-11-17 RX ADMIN — METHOCARBAMOL TABLETS 1000 MG: 500 TABLET, COATED ORAL at 13:59

## 2020-11-17 RX ADMIN — Medication 300 UNITS: at 17:42

## 2020-11-17 RX ADMIN — OXYCODONE HYDROCHLORIDE 15 MG: 15 TABLET ORAL at 17:47

## 2020-11-17 RX ADMIN — OXYCODONE HYDROCHLORIDE 15 MG: 15 TABLET ORAL at 09:13

## 2020-11-17 RX ADMIN — SODIUM CHLORIDE, PRESERVATIVE FREE 300 UNITS: 5 INJECTION INTRAVENOUS at 09:08

## 2020-11-17 RX ADMIN — SODIUM CHLORIDE: 9 INJECTION, SOLUTION INTRAVENOUS at 06:09

## 2020-11-17 RX ADMIN — MEROPENEM 1 G: 1 INJECTION, POWDER, FOR SOLUTION INTRAVENOUS at 02:48

## 2020-11-17 RX ADMIN — ATENOLOL 25 MG: 25 TABLET ORAL at 09:06

## 2020-11-17 RX ADMIN — OXYCODONE HYDROCHLORIDE 15 MG: 15 TABLET ORAL at 13:54

## 2020-11-17 RX ADMIN — Medication 250 MG: at 02:47

## 2020-11-17 RX ADMIN — MICONAZOLE NITRATE: 2 OINTMENT TOPICAL at 09:06

## 2020-11-17 RX ADMIN — SODIUM CHLORIDE: 9 INJECTION, SOLUTION INTRAVENOUS at 15:21

## 2020-11-17 RX ADMIN — ENOXAPARIN SODIUM 40 MG: 40 INJECTION SUBCUTANEOUS at 09:06

## 2020-11-17 RX ADMIN — SODIUM CHLORIDE: 9 INJECTION, SOLUTION INTRAVENOUS at 00:15

## 2020-11-17 RX ADMIN — MULTIVITAMIN TABLET 1 TABLET: TABLET at 09:06

## 2020-11-17 ASSESSMENT — PAIN DESCRIPTION - LOCATION
LOCATION: BACK

## 2020-11-17 ASSESSMENT — PAIN SCALES - GENERAL
PAINLEVEL_OUTOF10: 7
PAINLEVEL_OUTOF10: 0
PAINLEVEL_OUTOF10: 8
PAINLEVEL_OUTOF10: 0
PAINLEVEL_OUTOF10: 7
PAINLEVEL_OUTOF10: 7
PAINLEVEL_OUTOF10: 0
PAINLEVEL_OUTOF10: 8
PAINLEVEL_OUTOF10: 4

## 2020-11-17 ASSESSMENT — PAIN DESCRIPTION - FREQUENCY
FREQUENCY: INTERMITTENT

## 2020-11-17 ASSESSMENT — PAIN DESCRIPTION - PAIN TYPE
TYPE: ACUTE PAIN

## 2020-11-17 ASSESSMENT — PAIN DESCRIPTION - PROGRESSION
CLINICAL_PROGRESSION: NOT CHANGED

## 2020-11-17 ASSESSMENT — PAIN DESCRIPTION - ORIENTATION
ORIENTATION: LOWER;MID

## 2020-11-17 ASSESSMENT — PAIN DESCRIPTION - ONSET
ONSET: ON-GOING

## 2020-11-17 ASSESSMENT — PAIN DESCRIPTION - DESCRIPTORS
DESCRIPTORS: ACHING;DISCOMFORT

## 2020-11-17 ASSESSMENT — PAIN - FUNCTIONAL ASSESSMENT
PAIN_FUNCTIONAL_ASSESSMENT: PREVENTS OR INTERFERES WITH MANY ACTIVE NOT PASSIVE ACTIVITIES

## 2020-11-17 NOTE — PROGRESS NOTES
ID Progress Note                [address], [place], [address] [facility]            Phone [phone]     Fax [phone]      Chief complaint   unchanged    Subjective:  Doing well today. Plan is for discharge home. The patient is awake and alert. Tolerating medications. Reports no side effects. Afebrile. 10 ROS otherwise negative unless otherwise specified above. Objective:    Vitals:    11/17/20 0904   BP: 128/85   Pulse: 77   Resp: 16   Temp: 98.9 °F (37.2 °C)   SpO2: 96%        General appearance: Alert, Awake, Oriented times 3, no distress  Skin: Warm and dry  Eyes: Pink palpebral conjunctivae. PERRL  Ears: External ears normal.  Nose/Sinuses: Nares normal. Septum midline. Mucosa normal. No sinus tenderness. Oropharynx: Oropharynx clear with no exudates seen  Neck: Neck supple. No jugular venous distension, lymphadenopathy or thyromegaly Trachea midline  Lungs: Lungs clear to auscultation bilaterally. No rhonchi, crackles or wheezes  Heart: S1 S2  Regular rate and rhythm. No rub, murmur or gallop  Abdomen: Abdomen soft, non-tender.  BS normal. No masses, organomegaly  Extremities: No edema, Peripheral pulses palpable  Musculoskeletal: Left hip incision site healing well, no pain or tenderness over the right side as well    Labs:  Recent Labs     11/15/20  0342 11/16/20  0309 11/17/20  0620   WBC 4.9 4.8 4.2*   RBC 3.41* 3.47* 3.42*   HGB 10.0* 9.9* 10.0*   HCT 31.4* 32.4* 31.2*   MCV 92.1 93.4 91.2   MCH 29.3 28.5 29.2   MCHC 31.8* 30.6* 32.1   RDW 16.4* 16.2* 15.9*    299 300   MPV 10.2 10.1 10.1     CMP:    Lab Results   Component Value Date     11/17/2020    K 4.1 11/17/2020     11/17/2020    CO2 28 11/17/2020    BUN 15 11/17/2020    CREATININE 0.9 11/17/2020    GFRAA >60 11/17/2020    LABGLOM >60 11/17/2020    GLUCOSE 108 11/17/2020    PROT 5.6 11/17/2020    LABALBU 2.6 11/17/2020    CALCIUM 8.4 11/17/2020    BILITOT <0.2 11/17/2020    ALKPHOS 140 11/17/2020    AST 19 11/17/2020    ALT <5 11/17/2020          Microbiology :  No results for input(s): BC in the last 72 hours. No results for input(s): Sia Aniyah in the last 72 hours. No results for input(s): LABURIN in the last 72 hours. No results for input(s): CULTRESP in the last 72 hours. No results for input(s): WNDABS in the last 72 hours. Radiology :  NOTED     Assessment and Plan:      · UTI with ESBL E. coli bacteremia outside the hospital  · Lumbar spine surgery with L3 collapse, spinal abscess drainage  · status post L2 L5 revision decompression, L1-L5 fusion done at [facility] in September 2020  · C. difficile colitis on vancomycin  · Generalized weakness     Plan  -Continue meropenem 1 g every 8 hourly extended infusion as planned from [facility] to be continued for 8 weeks from 10/7/2020. She wants to follow-up locally now  -11/14/2020 CRP- 2.9/ESR- 52 - OK   -Continue oral vancomycin while she is on meropenem otherwise it might cause relapse of her infection  -No new UTI  -There is no point in checking C. difficile again as she is on the treatment and she does not have any diarrhea at this time.     Will benefit from going to rehab, however she wants to go home now as she has help arranged at home  Plan home today-med rec done        Electronically signed by JACOB Mota CNP on 11/17/2020 at 1:56 PM      Pt seen and examined. I discussed and co-formulated the plan with advanced practice nurse. Labs, cultures, and radiographs reviewed. Face to Face encounter occurred. Changes made as necessary by me.      Whitney Cage MD  Infectious Disease

## 2020-11-17 NOTE — PATIENT CARE CONFERENCE
[facility] Quality Flow/Interdisciplinary Rounds Progress Note        Quality Flow Rounds held on November 17, 2020    Disciplines Attending:  Bedside Nurse, ,  and Nursing Unit Leadership    Marisel Mccarthy was admitted on 11/13/2020  3:12 PM    Anticipated Discharge Date:       Disposition:    Kaveh Score:  Kaveh Scale Score: 19    Readmission Risk              Risk of Unplanned Readmission:        19           Discussed patient goal for the day, patient clinical progression, and barriers to discharge.   The following Goal(s) of the Day/Commitment(s) have been identified:  Discharge - Obtain Order- planning      Faisal Small  November 17, 2020

## 2020-11-17 NOTE — CARE COORDINATION
Spoke to patient regarding discharge planning. Pt stated that after talking with the ID doctor last evening she wishes to transition back home with [address]. Discussed the importance of a safe discharge plan- pt verbalized understanding. Patient stated that she has everything she needs at home including a hospital bed, wheelchair, built in shower chair, and bedside commode. Pt stated she wishes to continue [address] with [facility], as she was active with them prior to admission. Spoke to [facility]- she confirmed that they can continue [address] services with the patient and will see her this evening. CARMINA orders needed.      Charge RN updated

## 2020-11-18 NOTE — ADT AUTH CERT
PA recommends Inpatient letter by Carmelina Chew RN         Review Status  Review Entered    In Primary  11/14/2020 16:34        Criteria Review    slr keep in     -----------------------------------------------------------------------------  [facility] Physician Advisor Recommendation     Based on the clinical acuity, complexity, hospital course, data review and  physician/consultant impressions and findings noted below it is our  recommendation to keep patient in CURRENT status.     The final decision of the patient's hospitalization status depends on the  attending physician's judgment.     --------------------------------------------------------------------------------  -  Summary of impressions and findings:      Clinical summary - 66 y.o. diarrhea and R foot drop, C diff +, ESBL E. Coli,   complex issues. Post infectious vetebral Collapse Hx of fall.   on IV meropenem and oral vancomycin. Ask ID Consult orthopedics consult with R  TKR and now right foot drop concerned about perineal nerve injury vs from lumbar  spine. Lumbar xray reviewed. PT/OT   Vitals -   Labs and imaging -  MCG criteria -   Comments - Keep patient as Inpatient status.     Chart reviewed and [facility] notified.     Kadi Strickland MD MPH  Physician [address]  Cell   Karen Mcdowell. [email or web address]

## 2020-11-26 ENCOUNTER — HOSPITAL ENCOUNTER (EMERGENCY)
Age: 75
Discharge: HOME | End: 2020-11-26
Attending: EMERGENCY MEDICINE
Payer: MEDICARE

## 2020-11-26 ENCOUNTER — APPOINTMENT (OUTPATIENT)
Dept: GENERAL RADIOLOGY | Age: 75
End: 2020-11-26
Payer: MEDICARE

## 2020-11-26 VITALS
TEMPERATURE: 98 F | RESPIRATION RATE: 16 BRPM | DIASTOLIC BLOOD PRESSURE: 59 MMHG | SYSTOLIC BLOOD PRESSURE: 135 MMHG | HEIGHT: 65 IN | BODY MASS INDEX: 27.96 KG/M2 | WEIGHT: 167.8 LBS | HEART RATE: 88 BPM | OXYGEN SATURATION: 98 %

## 2020-11-26 PROCEDURE — 96372 THER/PROPH/DIAG INJ SC/IM: CPT

## 2020-11-26 PROCEDURE — 73502 X-RAY EXAM HIP UNI 2-3 VIEWS: CPT

## 2020-11-26 PROCEDURE — 73562 X-RAY EXAM OF KNEE 3: CPT

## 2020-11-26 PROCEDURE — 6360000002 HC RX W HCPCS: Performed by: STUDENT IN AN ORGANIZED HEALTH CARE EDUCATION/TRAINING PROGRAM

## 2020-11-26 PROCEDURE — 99285 EMERGENCY DEPT VISIT HI MDM: CPT

## 2020-11-26 RX ORDER — LOSARTAN POTASSIUM 100 MG/1
100 TABLET ORAL DAILY
COMMUNITY
End: 2021-03-24 | Stop reason: ALTCHOICE

## 2020-11-26 RX ORDER — OXYCODONE HYDROCHLORIDE 5 MG/1
5 TABLET ORAL EVERY 8 HOURS PRN
Qty: 9 TABLET | Refills: 0 | Status: ON HOLD | OUTPATIENT
Start: 2020-11-26 | End: 2020-12-01 | Stop reason: HOSPADM

## 2020-11-26 RX ORDER — OXYCODONE HYDROCHLORIDE 5 MG/1
5 TABLET ORAL EVERY 4 HOURS PRN
COMMUNITY
End: 2020-11-26 | Stop reason: SDUPTHER

## 2020-11-26 RX ADMIN — HYDROMORPHONE HYDROCHLORIDE 1 MG: 1 INJECTION, SOLUTION INTRAMUSCULAR; INTRAVENOUS; SUBCUTANEOUS at 19:40

## 2020-11-26 ASSESSMENT — ENCOUNTER SYMPTOMS
COUGH: 0
TROUBLE SWALLOWING: 0
SHORTNESS OF BREATH: 0
BACK PAIN: 0
DIARRHEA: 0
ABDOMINAL DISTENTION: 0
COLOR CHANGE: 0
CONSTIPATION: 0
VOMITING: 0
VOICE CHANGE: 0
SORE THROAT: 0
NAUSEA: 0
WHEEZING: 0
SINUS PRESSURE: 0
ABDOMINAL PAIN: 0

## 2020-11-26 ASSESSMENT — PAIN DESCRIPTION - DESCRIPTORS: DESCRIPTORS: STABBING;CONSTANT

## 2020-11-26 ASSESSMENT — PAIN DESCRIPTION - FREQUENCY: FREQUENCY: CONTINUOUS

## 2020-11-26 ASSESSMENT — PAIN DESCRIPTION - PROGRESSION: CLINICAL_PROGRESSION: GRADUALLY IMPROVING

## 2020-11-26 ASSESSMENT — PAIN DESCRIPTION - DIRECTION: RADIATING_TOWARDS: DOWN LEG TO ANKLE

## 2020-11-26 ASSESSMENT — PAIN SCALES - GENERAL
PAINLEVEL_OUTOF10: 4
PAINLEVEL_OUTOF10: 7

## 2020-11-26 ASSESSMENT — PAIN DESCRIPTION - ORIENTATION: ORIENTATION: RIGHT

## 2020-11-26 ASSESSMENT — PAIN DESCRIPTION - PAIN TYPE: TYPE: ACUTE PAIN

## 2020-11-26 ASSESSMENT — PAIN DESCRIPTION - LOCATION: LOCATION: HIP

## 2020-11-26 ASSESSMENT — PAIN DESCRIPTION - ONSET: ONSET: ON-GOING

## 2020-11-27 NOTE — ED NOTES
Dr Jai Velasquez spoke with son, Tennie Aschoff, to update on test results and discharge. Discharge instructions given and reviewed with patient. RX given. Instructed to follow up with Dr Simba Pickett and [facility] doctor. Questions and concerns addressed. Pt departed ED in w/c with staff in no apparent distress. Personal belongings taken.      Lexis Jolly RN  11/26/20 [id number]

## 2020-11-27 NOTE — ED PROVIDER NOTES
[facility] ENCOUNTER      Pt Name: Arnav Garner  MRN: [id number]  [place] 1945  Date of evaluation: 11/26/2020      CHIEF COMPLAINT       Chief Complaint   Patient presents with    Fall     one week PTA    Head Injury    Hip Pain     right down to ankle    Leg Pain        HPI  Arnav Garner is a 66 y.o. female with a past medical history of a right fibular fracture on 11/13/2020 presents with complaints of right eye pain. Describes the pain as severe, sharp, achy, nonradiating right leg pain. Exacerbated with movement. Alleviated with rest.  Patient is on analgesic medicine at home that has given her no relief. No other associated symptoms. Denies any fevers, chills, nausea, vomiting, chest pain, shortness of breath, abdominal pain, flank, dysuria, hematuria, diarrhea, constipation, new rashes or sores. Except as noted above the remainder of the review of systems was reviewed and negative. Review of Systems   Constitutional: Negative for chills and fever. HENT: Negative for ear pain, sinus pressure, sore throat, trouble swallowing and voice change. Eyes: Negative for visual disturbance. Respiratory: Negative for cough, shortness of breath and wheezing. Cardiovascular: Negative for chest pain, palpitations and leg swelling. Gastrointestinal: Negative for abdominal distention, abdominal pain, constipation, diarrhea, nausea and vomiting. Endocrine: Negative for polyuria. Genitourinary: Negative for dysuria and frequency. Musculoskeletal: Negative for arthralgias and back pain. Right hip pain, right knee pain for 1 week. Skin: Negative for color change, pallor, rash and wound. Neurological: Negative for weakness and headaches. Hematological: Negative for adenopathy. Psychiatric/Behavioral: Negative for agitation. All other systems reviewed and are negative. Physical Exam  Vitals signs and nursing note reviewed. Constitutional:       General: She is not in acute distress. Appearance: Normal appearance. She is well-developed. She is obese. She is not ill-appearing or diaphoretic. HENT:      Head: Normocephalic and atraumatic. Eyes:      Pupils: Pupils are equal, round, and reactive to light. Neck:      Musculoskeletal: Normal range of motion and neck supple. Cardiovascular:      Rate and Rhythm: Normal rate and regular rhythm. Pulses: Normal pulses. Heart sounds: Normal heart sounds. Pulmonary:      Effort: Pulmonary effort is normal. No respiratory distress. Breath sounds: Normal breath sounds. No wheezing or rales. Abdominal:      General: Abdomen is flat. Bowel sounds are normal.      Palpations: Abdomen is soft. Tenderness: There is no abdominal tenderness. There is no left CVA tenderness, guarding or rebound. Musculoskeletal:         General: Tenderness present. No swelling or deformity. Right lower leg: No edema. Left lower leg: No edema. Comments: Mild tenderness at the medial aspect of patient's proximal one third of the right tibia. No anterior posterior drawer sign. No varus or valgus laxity. Patient has full sensation of her distal lower extremities bilaterally. Capillary refill less than 2 seconds. Dorsal pedal pulses 2+ equal bilateral.    Patient's hip is nontender. Full range of motion. Skin:     General: Skin is warm and dry. Capillary Refill: Capillary refill takes less than 2 seconds. Neurological:      General: No focal deficit present. Mental Status: She is alert and oriented to person, place, and time. Cranial Nerves: No cranial nerve deficit. Motor: No weakness.       Coordination: Coordination normal.   Psychiatric:         Mood and Affect: Mood normal.          Procedures     MDM  Number of Diagnoses or Management Options  Closed fracture of right tibia and fibula with delayed healing, subsequent encounter:   Diagnosis management comments: 61-year-old female with a past medical history of right fibular fracture on 11/13/2020 presents with complaints of right knee pain for the last 7 days. Patient states that 7 days ago she fell onto her knees as her knees gave out. States that she hurt her right knee at that time. Vitals within normal limits. On physical exam patient is in no acute distress, speaking full sentences, alert and oriented x3. Lungs clear to auscultation bilaterally. No wheeze rales rhonchi. Normal S1-S2. No murmurs rubs gallops. Abdomen soft nontender, no rebound or guarding. Patient's right knee is nontender to palpation. The medial aspect of the proximal one third of patient's right tibia is mildly tender to palpation. No anterior posterior drawer sign. No varus or valgus laxity. Patient has full sensation of her distal lower extremities. Capillary refill less than 2 seconds. Dorsal pedal pulses 2+ equal and bilateral.  Imaging shows a continued right navicular fracture that was noticed on 11/13/2020. Patient following with Dr. Nam Gave in. Patient was informed not to weight-bear on that leg unless she is wearing a leg brace. Patient states that she got her leg brace yesterday but has not tried it out yet. Patient is form of all the results of her evaluation. She is agreeable with plan for discharge and follow-up with Dr. Alison Espinosa. She was given analgesic medication in the department with relief of her symptoms. She is agreeable plan for discharge. Patient is awake alert, hemodynamic stable, afebrile and in no respiratory distress. Discussed with patient plan for close outpatient follow-up with the patient's PCP as well as return precautions and the patient understands and agrees to the plan.     ED Course as of Nov 27 0421   Thu Nov 26, 2020 2020 XR KNEE RIGHT (3 VIEWS) [JV]   2027 Patient on reevaluation is in no acute distress, speaking full sentences alert and oriented x3. She was informed that she still has a fibular fracture on her right fibula the same 1 that she was informed about on 11/13/2020. She was instructed to wear the brace that she is been given as an outpatient that she received yesterday. And to follow-up with her orthopedic surgeon Dr. Lupillo Carlson. [JV]   0433 I spoke with the patient's son about patient status. He was concerned about his mother's care. He stated that he was sure that she was going to be admitted because of her right leg pain. So they dismissed the patient's caregiver home. And now that he has to go to work at 4 AM he is angry that the patient will be not admitted to the hospital for her right leg pain. Ultimately he was agreeable with plan to discharge the patient back home with close follow-up with patient's orthopedic surgeon who has been taking care of her fibular fracture as an outpatient. [JV]      ED Course User Index  [JV] Yevgeniy Vargas MD               --------------------------------------------- PAST HISTORY ---------------------------------------------  Past Medical History:  has a past medical history of Arthritis, CAD (coronary artery disease), Constipation, Gout, Hyperlipidemia, Hypertension, Macular hole, right eye, and Thyroid disease. Past Surgical History:  has a past surgical history that includes Breast surgery (Right, 1982); Hysterectomy (1985); Cholecystectomy (1997); laminectomy (N/A, 6/3/2019); skin biopsy; Abdomen surgery; Colonoscopy; eye surgery; joint replacement; Tonsillectomy; and back surgery (09/2020). Social History:  reports that she has never smoked. She has never used smokeless tobacco. She reports current alcohol use. She reports that she does not use drugs. Family History: family history includes Other in her mother. The patients home medications have been reviewed.     Allergies: Adrenalin [epinephrine hcl they are aware of the specific conditions for emergent return, as well as the importance of follow-up. Discharge Medication List as of 11/26/2020  9:16 PM          Diagnosis:  1. Closed fracture of right tibia and fibula with delayed healing, subsequent encounter        Disposition:  Patient's disposition: Discharge to home  Patient's condition is stable.       Lisa Dan MD  Resident  11/27/20 8987

## 2020-11-28 ENCOUNTER — APPOINTMENT (OUTPATIENT)
Dept: GENERAL RADIOLOGY | Age: 75
DRG: 373 | End: 2020-11-28
Payer: MEDICARE

## 2020-11-28 ENCOUNTER — HOSPITAL ENCOUNTER (INPATIENT)
Age: 75
LOS: 1 days | Discharge: ANOTHER HOSPITAL | DRG: 373 | End: 2020-12-03
Attending: EMERGENCY MEDICINE | Admitting: INTERNAL MEDICINE
Payer: MEDICARE

## 2020-11-28 PROBLEM — R52 UNCONTROLLED PAIN: Status: ACTIVE | Noted: 2020-11-28

## 2020-11-28 PROBLEM — M25.559 HIP PAIN: Status: ACTIVE | Noted: 2020-11-28

## 2020-11-28 LAB
ALBUMIN SERPL-MCNC: 3 G/DL (ref 3.5–5.2)
ALP BLD-CCNC: 165 U/L (ref 35–104)
ALT SERPL-CCNC: 7 U/L (ref 0–32)
ANION GAP SERPL CALCULATED.3IONS-SCNC: 9 MMOL/L (ref 7–16)
AST SERPL-CCNC: 19 U/L (ref 0–31)
BACTERIA: ABNORMAL /HPF
BASOPHILS ABSOLUTE: 0.07 E9/L (ref 0–0.2)
BASOPHILS RELATIVE PERCENT: 1 % (ref 0–2)
BILIRUB SERPL-MCNC: 0.3 MG/DL (ref 0–1.2)
BILIRUBIN URINE: NEGATIVE
BLOOD, URINE: NEGATIVE
BUN BLDV-MCNC: 16 MG/DL (ref 8–23)
CALCIUM SERPL-MCNC: 9.5 MG/DL (ref 8.6–10.2)
CHLORIDE BLD-SCNC: 104 MMOL/L (ref 98–107)
CLARITY: CLEAR
CO2: 26 MMOL/L (ref 22–29)
COLOR: YELLOW
CREAT SERPL-MCNC: 0.8 MG/DL (ref 0.5–1)
EOSINOPHILS ABSOLUTE: 0.21 E9/L (ref 0.05–0.5)
EOSINOPHILS RELATIVE PERCENT: 2.9 % (ref 0–6)
GFR AFRICAN AMERICAN: >60
GFR NON-AFRICAN AMERICAN: >60 ML/MIN/1.73
GLUCOSE BLD-MCNC: 109 MG/DL (ref 74–99)
GLUCOSE URINE: NEGATIVE MG/DL
HCT VFR BLD CALC: 38.4 % (ref 34–48)
HEMOGLOBIN: 12 G/DL (ref 11.5–15.5)
IMMATURE GRANULOCYTES #: 0.02 E9/L
IMMATURE GRANULOCYTES %: 0.3 % (ref 0–5)
KETONES, URINE: NEGATIVE MG/DL
LEUKOCYTE ESTERASE, URINE: ABNORMAL
LYMPHOCYTES ABSOLUTE: 1.31 E9/L (ref 1.5–4)
LYMPHOCYTES RELATIVE PERCENT: 17.8 % (ref 20–42)
MCH RBC QN AUTO: 28.4 PG (ref 26–35)
MCHC RBC AUTO-ENTMCNC: 31.3 % (ref 32–34.5)
MCV RBC AUTO: 90.8 FL (ref 80–99.9)
MONOCYTES ABSOLUTE: 0.69 E9/L (ref 0.1–0.95)
MONOCYTES RELATIVE PERCENT: 9.4 % (ref 2–12)
NEUTROPHILS ABSOLUTE: 5.04 E9/L (ref 1.8–7.3)
NEUTROPHILS RELATIVE PERCENT: 68.6 % (ref 43–80)
NITRITE, URINE: NEGATIVE
PDW BLD-RTO: 15.5 FL (ref 11.5–15)
PH UA: 5.5 (ref 5–9)
PLATELET # BLD: 392 E9/L (ref 130–450)
PMV BLD AUTO: 9.8 FL (ref 7–12)
POTASSIUM REFLEX MAGNESIUM: 4.6 MMOL/L (ref 3.5–5)
PROTEIN UA: NEGATIVE MG/DL
RBC # BLD: 4.23 E12/L (ref 3.5–5.5)
RBC UA: ABNORMAL /HPF (ref 0–2)
SODIUM BLD-SCNC: 139 MMOL/L (ref 132–146)
SPECIFIC GRAVITY UA: 1.02 (ref 1–1.03)
TOTAL CK: 106 U/L (ref 20–180)
TOTAL PROTEIN: 6.6 G/DL (ref 6.4–8.3)
TROPONIN: 0.03 NG/ML (ref 0–0.03)
UROBILINOGEN, URINE: 0.2 E.U./DL
WBC # BLD: 7.3 E9/L (ref 4.5–11.5)
WBC UA: ABNORMAL /HPF (ref 0–5)
YEAST: PRESENT /HPF

## 2020-11-28 PROCEDURE — 81001 URINALYSIS AUTO W/SCOPE: CPT

## 2020-11-28 PROCEDURE — 1200000000 HC SEMI PRIVATE

## 2020-11-28 PROCEDURE — G0378 HOSPITAL OBSERVATION PER HR: HCPCS

## 2020-11-28 PROCEDURE — 72070 X-RAY EXAM THORAC SPINE 2VWS: CPT

## 2020-11-28 PROCEDURE — 72170 X-RAY EXAM OF PELVIS: CPT

## 2020-11-28 PROCEDURE — 85025 COMPLETE CBC W/AUTO DIFF WBC: CPT

## 2020-11-28 PROCEDURE — 6370000000 HC RX 637 (ALT 250 FOR IP): Performed by: INTERNAL MEDICINE

## 2020-11-28 PROCEDURE — 72100 X-RAY EXAM L-S SPINE 2/3 VWS: CPT

## 2020-11-28 PROCEDURE — 82550 ASSAY OF CK (CPK): CPT

## 2020-11-28 PROCEDURE — 80053 COMPREHEN METABOLIC PANEL: CPT

## 2020-11-28 PROCEDURE — 2580000003 HC RX 258: Performed by: INTERNAL MEDICINE

## 2020-11-28 PROCEDURE — 99284 EMERGENCY DEPT VISIT MOD MDM: CPT

## 2020-11-28 PROCEDURE — 93005 ELECTROCARDIOGRAM TRACING: CPT | Performed by: STUDENT IN AN ORGANIZED HEALTH CARE EDUCATION/TRAINING PROGRAM

## 2020-11-28 PROCEDURE — 71045 X-RAY EXAM CHEST 1 VIEW: CPT

## 2020-11-28 PROCEDURE — 84484 ASSAY OF TROPONIN QUANT: CPT

## 2020-11-28 RX ORDER — OXYCODONE HYDROCHLORIDE 5 MG/1
5 TABLET ORAL EVERY 8 HOURS PRN
Status: DISCONTINUED | OUTPATIENT
Start: 2020-11-28 | End: 2020-12-02

## 2020-11-28 RX ORDER — SODIUM CHLORIDE 0.9 % (FLUSH) 0.9 %
10 SYRINGE (ML) INJECTION PRN
Status: DISCONTINUED | OUTPATIENT
Start: 2020-11-28 | End: 2020-12-03 | Stop reason: HOSPADM

## 2020-11-28 RX ORDER — LOSARTAN POTASSIUM 50 MG/1
100 TABLET ORAL DAILY
Status: DISCONTINUED | OUTPATIENT
Start: 2020-11-29 | End: 2020-12-03 | Stop reason: HOSPADM

## 2020-11-28 RX ORDER — POLYETHYLENE GLYCOL 3350 17 G/17G
17 POWDER, FOR SOLUTION ORAL DAILY PRN
Status: DISCONTINUED | OUTPATIENT
Start: 2020-11-28 | End: 2020-12-03 | Stop reason: HOSPADM

## 2020-11-28 RX ORDER — QUETIAPINE FUMARATE 25 MG/1
25 TABLET, FILM COATED ORAL NIGHTLY
Status: DISCONTINUED | OUTPATIENT
Start: 2020-11-28 | End: 2020-12-03 | Stop reason: HOSPADM

## 2020-11-28 RX ORDER — ACETAMINOPHEN 500 MG
1000 TABLET ORAL EVERY 8 HOURS SCHEDULED
Status: DISCONTINUED | OUTPATIENT
Start: 2020-11-28 | End: 2020-12-03 | Stop reason: HOSPADM

## 2020-11-28 RX ORDER — ACETAMINOPHEN 650 MG/1
650 SUPPOSITORY RECTAL EVERY 6 HOURS PRN
Status: DISCONTINUED | OUTPATIENT
Start: 2020-11-28 | End: 2020-12-03 | Stop reason: HOSPADM

## 2020-11-28 RX ORDER — PROBENECID 500 MG/1
500 TABLET, FILM COATED ORAL 2 TIMES DAILY
Status: DISCONTINUED | OUTPATIENT
Start: 2020-11-28 | End: 2020-12-03 | Stop reason: HOSPADM

## 2020-11-28 RX ORDER — ONDANSETRON 2 MG/ML
4 INJECTION INTRAMUSCULAR; INTRAVENOUS EVERY 6 HOURS PRN
Status: DISCONTINUED | OUTPATIENT
Start: 2020-11-28 | End: 2020-12-03 | Stop reason: HOSPADM

## 2020-11-28 RX ORDER — LEVOTHYROXINE SODIUM 137 UG/1
137 TABLET ORAL DAILY
Status: DISCONTINUED | OUTPATIENT
Start: 2020-11-29 | End: 2020-12-03 | Stop reason: HOSPADM

## 2020-11-28 RX ORDER — PRAVASTATIN SODIUM 20 MG
40 TABLET ORAL NIGHTLY
Status: DISCONTINUED | OUTPATIENT
Start: 2020-11-28 | End: 2020-12-03 | Stop reason: HOSPADM

## 2020-11-28 RX ORDER — ALPRAZOLAM 0.25 MG/1
0.25 TABLET ORAL 2 TIMES DAILY PRN
Status: DISCONTINUED | OUTPATIENT
Start: 2020-11-28 | End: 2020-12-03 | Stop reason: HOSPADM

## 2020-11-28 RX ORDER — ATENOLOL 25 MG/1
25 TABLET ORAL NIGHTLY
Status: DISCONTINUED | OUTPATIENT
Start: 2020-11-28 | End: 2020-12-03 | Stop reason: HOSPADM

## 2020-11-28 RX ORDER — SODIUM CHLORIDE 0.9 % (FLUSH) 0.9 %
10 SYRINGE (ML) INJECTION EVERY 12 HOURS SCHEDULED
Status: DISCONTINUED | OUTPATIENT
Start: 2020-11-28 | End: 2020-12-03 | Stop reason: HOSPADM

## 2020-11-28 RX ORDER — SENNA PLUS 8.6 MG/1
1 TABLET ORAL NIGHTLY
Status: DISCONTINUED | OUTPATIENT
Start: 2020-11-28 | End: 2020-12-03 | Stop reason: HOSPADM

## 2020-11-28 RX ORDER — PROMETHAZINE HYDROCHLORIDE 25 MG/1
12.5 TABLET ORAL EVERY 6 HOURS PRN
Status: DISCONTINUED | OUTPATIENT
Start: 2020-11-28 | End: 2020-12-03 | Stop reason: HOSPADM

## 2020-11-28 RX ORDER — MULTIVITAMIN WITH IRON
1 TABLET ORAL DAILY
Status: DISCONTINUED | OUTPATIENT
Start: 2020-11-28 | End: 2020-12-03 | Stop reason: HOSPADM

## 2020-11-28 RX ORDER — ACETAMINOPHEN 325 MG/1
650 TABLET ORAL EVERY 6 HOURS PRN
Status: DISCONTINUED | OUTPATIENT
Start: 2020-11-28 | End: 2020-12-03 | Stop reason: HOSPADM

## 2020-11-28 RX ORDER — ASPIRIN 81 MG/1
81 TABLET, CHEWABLE ORAL NIGHTLY
Status: DISCONTINUED | OUTPATIENT
Start: 2020-11-28 | End: 2020-12-03 | Stop reason: HOSPADM

## 2020-11-28 RX ADMIN — PROBENECID 500 MG: 500 TABLET, FILM COATED ORAL at 22:00

## 2020-11-28 RX ADMIN — ASPIRIN 81 MG CHEWABLE TABLET 81 MG: 81 TABLET CHEWABLE at 22:00

## 2020-11-28 RX ADMIN — SODIUM CHLORIDE, PRESERVATIVE FREE 10 ML: 5 INJECTION INTRAVENOUS at 22:00

## 2020-11-28 RX ADMIN — ACETAMINOPHEN 650 MG: 325 TABLET ORAL at 22:00

## 2020-11-28 RX ADMIN — ALPRAZOLAM 0.25 MG: 0.25 TABLET ORAL at 22:00

## 2020-11-28 RX ADMIN — ACETAMINOPHEN 1000 MG: 325 TABLET ORAL at 18:22

## 2020-11-28 RX ADMIN — OXYCODONE HYDROCHLORIDE 5 MG: 5 TABLET ORAL at 18:23

## 2020-11-28 RX ADMIN — PRAVASTATIN SODIUM 40 MG: 20 TABLET ORAL at 22:00

## 2020-11-28 ASSESSMENT — PAIN SCALES - GENERAL
PAINLEVEL_OUTOF10: 4
PAINLEVEL_OUTOF10: 0
PAINLEVEL_OUTOF10: 0
PAINLEVEL_OUTOF10: 8
PAINLEVEL_OUTOF10: 8
PAINLEVEL_OUTOF10: 9

## 2020-11-28 ASSESSMENT — ENCOUNTER SYMPTOMS
SHORTNESS OF BREATH: 0
SINUS PAIN: 0
ABDOMINAL DISTENTION: 0
COLOR CHANGE: 0
VOMITING: 0
SINUS PRESSURE: 0
CONSTIPATION: 0
ABDOMINAL PAIN: 0
SORE THROAT: 0
BLOOD IN STOOL: 0
NAUSEA: 0
CHEST TIGHTNESS: 0
COUGH: 0
DIARRHEA: 0
BACK PAIN: 0

## 2020-11-28 ASSESSMENT — PAIN DESCRIPTION - LOCATION: LOCATION: LEG

## 2020-11-28 ASSESSMENT — PAIN DESCRIPTION - DESCRIPTORS: DESCRIPTORS: ACHING;THROBBING;SORE

## 2020-11-28 ASSESSMENT — PAIN DESCRIPTION - PAIN TYPE: TYPE: ACUTE PAIN

## 2020-11-28 ASSESSMENT — PAIN DESCRIPTION - FREQUENCY: FREQUENCY: INTERMITTENT

## 2020-11-28 ASSESSMENT — PAIN DESCRIPTION - ORIENTATION: ORIENTATION: RIGHT

## 2020-11-28 ASSESSMENT — PAIN DESCRIPTION - ONSET: ONSET: ON-GOING

## 2020-11-28 NOTE — ED PROVIDER NOTES
15-year-old female presents ED with complaint of right leg pain. Patient was recently seen at [facility] emergency department for a fall 1 week ago and got x-rays of the hip and right knee reveal any fractures did reveal an old healing tibial fracture on the right leg below. Patient has had worsening pain since then. Patient states she has not been able to walk since then has had foot drop in the right foot since then as well. Patient has been bedbound since that time. Patient denies any chest pain shortness breath fevers chills recent illnesses changes of bowel or bladder habits, denies any loss of control of bowels or bladder. Denies any numbness tingling in the extremities. The history is provided by the patient and medical records. Hip Pain   This is a recurrent problem. The current episode started more than 1 week ago. The problem occurs constantly. The problem has not changed since onset. Pertinent negatives include no chest pain, no abdominal pain and no shortness of breath. Nothing aggravates the symptoms. Nothing relieves the symptoms. She has tried nothing for the symptoms. Review of Systems   Constitutional: Negative for chills, fatigue and fever. HENT: Negative for congestion, sinus pressure, sinus pain and sore throat. Respiratory: Negative for cough, chest tightness and shortness of breath. Cardiovascular: Negative for chest pain and leg swelling. Gastrointestinal: Negative for abdominal distention, abdominal pain, blood in stool, constipation, diarrhea, nausea and vomiting. Endocrine: Negative for polyuria. Genitourinary: Negative for difficulty urinating, dysuria, frequency, hematuria and urgency. Musculoskeletal: Negative for arthralgias and back pain. Skin: Negative for color change and pallor. Neurological: Positive for weakness. Negative for dizziness. Physical Exam  Vitals signs and nursing note reviewed.    Constitutional:       Appearance: Normal appearance. She is normal weight. HENT:      Head: Normocephalic and atraumatic. Eyes:      Conjunctiva/sclera: Conjunctivae normal.   Cardiovascular:      Rate and Rhythm: Normal rate and regular rhythm. Pulses: Normal pulses. Heart sounds: Normal heart sounds. No murmur. No gallop. Pulmonary:      Effort: Pulmonary effort is normal. No respiratory distress. Breath sounds: Normal breath sounds. No wheezing or rales. Abdominal:      General: Abdomen is flat. Bowel sounds are normal. There is no distension. Palpations: Abdomen is soft. Tenderness: There is no abdominal tenderness. There is no guarding. Skin:     General: Skin is warm and dry. Capillary Refill: Capillary refill takes less than 2 seconds. Neurological:      General: No focal deficit present. Mental Status: She is alert and oriented to person, place, and time. Cranial Nerves: No cranial nerve deficit. Sensory: No sensory deficit. Motor: No weakness. Comments: Patient does have foot drop in the right foot          Procedures     MDM  Number of Diagnoses or Management Options  General weakness:   Right leg pain:   Uncontrolled pain:   Diagnosis management comments: 49-year-old female presents ED complaint of right leg pain. Patient did fall approximately 1 week ago on her right hip did not show any acute fractures that time has had continued pain nothing to walk since then. Due to this data repeat x-rays of her hip did also x-ray of her lumbar and thoracic spine due to patient having recent surgery back in September from that as well. Patient's x-rays did not feel any acute fractures at this time. Blood work was also unremarkable for any acute abnormalities. Due to patient's continued pain and her son not being able to take care of her at home and this being her third visit to the emergency department for the same pain, decision was made to admit the patient at this time.   Patient was agreeable to getting admitted. Dr. Tanya Dumont was also agreeable with admission. Amount and/or Complexity of Data Reviewed  Clinical lab tests: reviewed  Tests in the radiology section of CPT®: reviewed  Decide to obtain previous medical records or to obtain history from someone other than the patient: yes         ED Course as of Nov 28 1520   Sat Nov 28, 2020 1330 Patient did state her primary care doctor told her that if she were not getting be admitted that we should transfer by EMS to [facility]    [CB]   1340 EKG: This EKG is signed by emergency department physician. Rate: 67  Rhythm: Sinus  Interpretation: No acute ST elevations or depressions, normal axis, normal intervals, patient does have frequent premature atrial complexes, these were not seen on previous EKG on November 13. Comparison: changes compared to previous EKG         [CB]   1410 Troponin not elevated compared to previous levels. CBC CMP unremarkable CK also not elevated. [CB]   1512 Acute fractures of the lumbar thoracic spine, no fracture noted in the pelvis, chest x-ray was unremarkable. [CB]      ED Course User Index  [CB] Jovanny Proctor MD        ED Course as of Nov 28 1520   Sat Nov 28, 2020 1330 Patient did state her primary care doctor told her that if she were not getting be admitted that we should transfer by EMS to [facility]    [CB]   1340 EKG: This EKG is signed by emergency department physician. Rate: 67  Rhythm: Sinus  Interpretation: No acute ST elevations or depressions, normal axis, normal intervals, patient does have frequent premature atrial complexes, these were not seen on previous EKG on November 13. Comparison: changes compared to previous EKG         [CB]   1410 Troponin not elevated compared to previous levels. CBC CMP unremarkable CK also not elevated. [CB]   1512 Acute fractures of the lumbar thoracic spine, no fracture noted in the pelvis, chest x-ray was unremarkable. [CB]      ED Course User Index  [CB] Bijal Brooks MD       --------------------------------------------- PAST HISTORY ---------------------------------------------  Past Medical History:  has a past medical history of Arthritis, CAD (coronary artery disease), Constipation, Gout, Hyperlipidemia, Hypertension, Macular hole, right eye, and Thyroid disease. Past Surgical History:  has a past surgical history that includes Breast surgery (Right, 1982); Hysterectomy (1985); Cholecystectomy (1997); laminectomy (N/A, 6/3/2019); skin biopsy; Abdomen surgery; Colonoscopy; eye surgery; joint replacement; Tonsillectomy; and back surgery (09/2020). Social History:  reports that she has never smoked. She has never used smokeless tobacco. She reports current alcohol use. She reports that she does not use drugs. Family History: family history includes Other in her mother. The patients home medications have been reviewed.     Allergies: Adrenalin [epinephrine hcl (nasal)]    -------------------------------------------------- RESULTS -------------------------------------------------    LABS:  Results for orders placed or performed during the hospital encounter of 11/28/20   CBC Auto Differential   Result Value Ref Range    WBC 7.3 4.5 - 11.5 E9/L    RBC 4.23 3.50 - 5.50 E12/L    Hemoglobin 12.0 11.5 - 15.5 g/dL    Hematocrit 38.4 34.0 - 48.0 %    MCV 90.8 80.0 - 99.9 fL    MCH 28.4 26.0 - 35.0 pg    MCHC 31.3 (L) 32.0 - 34.5 %    RDW 15.5 (H) 11.5 - 15.0 fL    Platelets 646 959 - 377 E9/L    MPV 9.8 7.0 - 12.0 fL    Neutrophils % 68.6 43.0 - 80.0 %    Immature Granulocytes % 0.3 0.0 - 5.0 %    Lymphocytes % 17.8 (L) 20.0 - 42.0 %    Monocytes % 9.4 2.0 - 12.0 %    Eosinophils % 2.9 0.0 - 6.0 %    Basophils % 1.0 0.0 - 2.0 %    Neutrophils Absolute 5.04 1.80 - 7.30 E9/L    Immature Granulocytes # 0.02 E9/L    Lymphocytes Absolute 1.31 (L) 1.50 - 4.00 E9/L    Monocytes Absolute 0.69 0.10 - 0.95 E9/L    Eosinophils Absolute 0.21 0.05 - 0.50 E9/L    Basophils Absolute 0.07 0.00 - 0.20 E9/L   Comprehensive Metabolic Panel w/ Reflex to MG   Result Value Ref Range    Sodium 139 132 - 146 mmol/L    Potassium reflex Magnesium 4.6 3.5 - 5.0 mmol/L    Chloride 104 98 - 107 mmol/L    CO2 26 22 - 29 mmol/L    Anion Gap 9 7 - 16 mmol/L    Glucose 109 (H) 74 - 99 mg/dL    BUN 16 8 - 23 mg/dL    CREATININE 0.8 0.5 - 1.0 mg/dL    GFR Non-African American >60 >=60 mL/min/1.73    GFR African American >60     Calcium 9.5 8.6 - 10.2 mg/dL    Total Protein 6.6 6.4 - 8.3 g/dL    Alb 3.0 (L) 3.5 - 5.2 g/dL    Total Bilirubin 0.3 0.0 - 1.2 mg/dL    Alkaline Phosphatase 165 (H) 35 - 104 U/L    ALT 7 0 - 32 U/L    AST 19 0 - 31 U/L   Troponin   Result Value Ref Range    Troponin 0.03 0.00 - 0.03 ng/mL   CK   Result Value Ref Range    Total  20 - 180 U/L       RADIOLOGY:  XR THORACIC SPINE (2 VIEWS)   Final Result   Limited views of the thoracic spine. There is no gross compression fracture. Spondylosis      XR PELVIS (1-2 VIEWS)   Final Result   This exam is limited to exclude fractures in the lumbar spine/pelvis. In the   lumbar spine cross-table lateral views were obtained and on the image of the   pelvis the patient is rotated. If there is high concern for underlying acute   osseous injury, cross-sectional imaging should be considered. 1.  L1-L5 posterior spinal fusion without obvious complication. Mild   degenerative changes at the thoracolumbar and lumbosacral junctions. 2.  Mild bilateral SI joint degenerative changes. 3.  Moderate to severe right and mild left hip joint osteoarthritis. XR CHEST PORTABLE   Final Result   No acute process. XR LUMBAR SPINE (2-3 VIEWS)   Final Result   This exam is limited to exclude fractures in the lumbar spine/pelvis. In the   lumbar spine cross-table lateral views were obtained and on the image of the   pelvis the patient is rotated.   If there is high concern for underlying acute osseous injury, cross-sectional imaging should be considered. 1.  L1-L5 posterior spinal fusion without obvious complication. Mild   degenerative changes at the thoracolumbar and lumbosacral junctions. 2.  Mild bilateral SI joint degenerative changes. 3.  Moderate to severe right and mild left hip joint osteoarthritis.                ------------------------- NURSING NOTES AND VITALS REVIEWED ---------------------------  Date / Time Roomed:  11/28/2020 12:34 PM  ED Bed Assignment:  KARLOS JC/HARSH    The nursing notes within the ED encounter and vital signs as below have been reviewed. Patient Vitals for the past 24 hrs:   BP Temp Temp src Pulse Resp SpO2 Height Weight   11/28/20 1237 121/68 98.5 °F (36.9 °C) Temporal 71 17 98 % 5' 5\" (1.651 m) 168 lb (76.2 kg)       Oxygen Saturation Interpretation: Normal    ------------------------------------------ PROGRESS NOTES ------------------------------------------  Re-evaluation(s):  Time: 1500  Patients symptoms show no change  Repeat physical examination is not changed    Counseling:  I have spoken with the patient and discussed todays results, in addition to providing specific details for the plan of care and counseling regarding the diagnosis and prognosis. Their questions are answered at this time and they are agreeable with the plan of admission.    --------------------------------- ADDITIONAL PROVIDER NOTES ---------------------------------  Consultations:  Time: 1520. Spoke with Dr. Charity Hurd. Discussed case. They will admit the patient. This patient's ED course included: a personal history and physicial examination, re-evaluation prior to disposition, multiple bedside re-evaluations, cardiac monitoring and continuous pulse oximetry    This patient has remained hemodynamically stable during their ED course. Diagnosis:  1. Uncontrolled pain    2. Right leg pain    3.  General weakness        Disposition:  Patient's disposition: Admit to med/surg floor  Patient's condition is stable.          Marguerite Melvin MD  Resident  11/28/20 8401

## 2020-11-29 ENCOUNTER — APPOINTMENT (OUTPATIENT)
Dept: CT IMAGING | Age: 75
DRG: 373 | End: 2020-11-29
Payer: MEDICARE

## 2020-11-29 ENCOUNTER — APPOINTMENT (OUTPATIENT)
Dept: MRI IMAGING | Age: 75
DRG: 373 | End: 2020-11-29
Payer: MEDICARE

## 2020-11-29 LAB
ANION GAP SERPL CALCULATED.3IONS-SCNC: 9 MMOL/L (ref 7–16)
BUN BLDV-MCNC: 15 MG/DL (ref 8–23)
CALCIUM SERPL-MCNC: 9.1 MG/DL (ref 8.6–10.2)
CHLORIDE BLD-SCNC: 104 MMOL/L (ref 98–107)
CO2: 24 MMOL/L (ref 22–29)
CREAT SERPL-MCNC: 0.8 MG/DL (ref 0.5–1)
EKG ATRIAL RATE: 67 BPM
EKG P AXIS: 56 DEGREES
EKG P-R INTERVAL: 184 MS
EKG Q-T INTERVAL: 382 MS
EKG QRS DURATION: 66 MS
EKG QTC CALCULATION (BAZETT): 403 MS
EKG R AXIS: 54 DEGREES
EKG T AXIS: 35 DEGREES
EKG VENTRICULAR RATE: 67 BPM
GFR AFRICAN AMERICAN: >60
GFR NON-AFRICAN AMERICAN: >60 ML/MIN/1.73
GLUCOSE BLD-MCNC: 80 MG/DL (ref 74–99)
HCT VFR BLD CALC: 35.2 % (ref 34–48)
HEMOGLOBIN: 10.8 G/DL (ref 11.5–15.5)
MCH RBC QN AUTO: 27.9 PG (ref 26–35)
MCHC RBC AUTO-ENTMCNC: 30.7 % (ref 32–34.5)
MCV RBC AUTO: 91 FL (ref 80–99.9)
PDW BLD-RTO: 15.7 FL (ref 11.5–15)
PLATELET # BLD: 331 E9/L (ref 130–450)
PMV BLD AUTO: 10.1 FL (ref 7–12)
POTASSIUM REFLEX MAGNESIUM: 4.2 MMOL/L (ref 3.5–5)
RBC # BLD: 3.87 E12/L (ref 3.5–5.5)
SODIUM BLD-SCNC: 137 MMOL/L (ref 132–146)
WBC # BLD: 6 E9/L (ref 4.5–11.5)

## 2020-11-29 PROCEDURE — 1200000000 HC SEMI PRIVATE

## 2020-11-29 PROCEDURE — 6360000002 HC RX W HCPCS: Performed by: INTERNAL MEDICINE

## 2020-11-29 PROCEDURE — G0378 HOSPITAL OBSERVATION PER HR: HCPCS

## 2020-11-29 PROCEDURE — 93010 ELECTROCARDIOGRAM REPORT: CPT | Performed by: INTERNAL MEDICINE

## 2020-11-29 PROCEDURE — 85027 COMPLETE CBC AUTOMATED: CPT

## 2020-11-29 PROCEDURE — 2580000003 HC RX 258: Performed by: INTERNAL MEDICINE

## 2020-11-29 PROCEDURE — 6370000000 HC RX 637 (ALT 250 FOR IP): Performed by: INTERNAL MEDICINE

## 2020-11-29 PROCEDURE — 73700 CT LOWER EXTREMITY W/O DYE: CPT

## 2020-11-29 PROCEDURE — 72148 MRI LUMBAR SPINE W/O DYE: CPT

## 2020-11-29 PROCEDURE — 80048 BASIC METABOLIC PNL TOTAL CA: CPT

## 2020-11-29 PROCEDURE — 36415 COLL VENOUS BLD VENIPUNCTURE: CPT

## 2020-11-29 RX ADMIN — QUETIAPINE FUMARATE 25 MG: 25 TABLET ORAL at 20:36

## 2020-11-29 RX ADMIN — LEVOTHYROXINE SODIUM 137 MCG: 0.14 TABLET ORAL at 08:02

## 2020-11-29 RX ADMIN — PRAVASTATIN SODIUM 40 MG: 20 TABLET ORAL at 20:36

## 2020-11-29 RX ADMIN — PROBENECID 500 MG: 500 TABLET, FILM COATED ORAL at 08:01

## 2020-11-29 RX ADMIN — ACETAMINOPHEN 1000 MG: 325 TABLET ORAL at 21:53

## 2020-11-29 RX ADMIN — LOSARTAN POTASSIUM 100 MG: 50 TABLET, FILM COATED ORAL at 08:02

## 2020-11-29 RX ADMIN — ALPRAZOLAM 0.25 MG: 0.25 TABLET ORAL at 21:53

## 2020-11-29 RX ADMIN — Medication 1 TABLET: at 08:02

## 2020-11-29 RX ADMIN — ATENOLOL 25 MG: 25 TABLET ORAL at 20:36

## 2020-11-29 RX ADMIN — ACETAMINOPHEN 1000 MG: 325 TABLET ORAL at 13:20

## 2020-11-29 RX ADMIN — OXYCODONE HYDROCHLORIDE 5 MG: 5 TABLET ORAL at 08:01

## 2020-11-29 RX ADMIN — ENOXAPARIN SODIUM 40 MG: 40 INJECTION SUBCUTANEOUS at 08:02

## 2020-11-29 RX ADMIN — ASPIRIN 81 MG CHEWABLE TABLET 81 MG: 81 TABLET CHEWABLE at 20:36

## 2020-11-29 RX ADMIN — PROBENECID 500 MG: 500 TABLET, FILM COATED ORAL at 20:37

## 2020-11-29 RX ADMIN — SODIUM CHLORIDE, PRESERVATIVE FREE 10 ML: 5 INJECTION INTRAVENOUS at 08:02

## 2020-11-29 RX ADMIN — SODIUM CHLORIDE, PRESERVATIVE FREE 10 ML: 5 INJECTION INTRAVENOUS at 20:37

## 2020-11-29 RX ADMIN — ALPRAZOLAM 0.25 MG: 0.25 TABLET ORAL at 08:07

## 2020-11-29 RX ADMIN — OXYCODONE HYDROCHLORIDE 5 MG: 5 TABLET ORAL at 16:58

## 2020-11-29 ASSESSMENT — PAIN SCALES - GENERAL
PAINLEVEL_OUTOF10: 9
PAINLEVEL_OUTOF10: 4
PAINLEVEL_OUTOF10: 9
PAINLEVEL_OUTOF10: 8
PAINLEVEL_OUTOF10: 6
PAINLEVEL_OUTOF10: 0
PAINLEVEL_OUTOF10: 4

## 2020-11-29 ASSESSMENT — PAIN DESCRIPTION - ORIENTATION
ORIENTATION: RIGHT

## 2020-11-29 ASSESSMENT — PAIN DESCRIPTION - ONSET
ONSET: ON-GOING

## 2020-11-29 ASSESSMENT — PAIN DESCRIPTION - FREQUENCY
FREQUENCY: INTERMITTENT

## 2020-11-29 ASSESSMENT — PAIN DESCRIPTION - PAIN TYPE
TYPE: ACUTE PAIN

## 2020-11-29 ASSESSMENT — PAIN DESCRIPTION - PROGRESSION
CLINICAL_PROGRESSION: NOT CHANGED

## 2020-11-29 ASSESSMENT — PAIN - FUNCTIONAL ASSESSMENT
PAIN_FUNCTIONAL_ASSESSMENT: PREVENTS OR INTERFERES SOME ACTIVE ACTIVITIES AND ADLS

## 2020-11-29 ASSESSMENT — PAIN DESCRIPTION - DESCRIPTORS
DESCRIPTORS: ACHING;CONSTANT;DISCOMFORT

## 2020-11-29 ASSESSMENT — PAIN DESCRIPTION - LOCATION
LOCATION: LEG

## 2020-11-29 NOTE — PROGRESS NOTES
Date: 2020       Patient Name: Tim Montgomery  : 1945      MRN: [id number]  PT order received and chart reviewed. PT evaluation held await Ortho and Neurosurgery recommendations/ activity order.   Will follow up as appropriate    Ailyn Oakes PT, DPT  [id number]

## 2020-11-29 NOTE — H&P
(ROXICODONE) 5 MG immediate release tablet Take 1 tablet by mouth every 8 hours as needed for Pain for up to 3 days. 11/26/20 11/29/20 Yes Malinda Barakat MD   atenolol (TENORMIN) 25 MG tablet Take 1 tablet by mouth daily (with breakfast)  Patient taking differently: Take 37.5 mg by mouth every morning  11/17/20  Yes Chika Oneill DO   levothyroxine (SYNTHROID) 137 MCG tablet Take 137 mcg by mouth Daily   Yes Historical Provider, MD   potassium chloride (KLOR-CON M) 20 MEQ extended release tablet Take 2 tablets by mouth daily  Patient taking differently: Take 10 mEq by mouth 2 times daily  6/7/19  Yes Renetta Meyers MD   atenolol (TENORMIN) 25 MG tablet Take 25 mg by mouth nightly Hold for SBP <100 or P<60   Yes Historical Provider, MD   probenecid (BENEMID) 500 MG tablet Take 500 mg by mouth 2 times daily. Yes Historical Provider, MD   aspirin 81 MG tablet Take 81 mg by mouth nightly    Yes Historical Provider, MD   multivitamin (THERAGRAN) per tablet Take 1 tablet by mouth daily. Yes Historical Provider, MD   losartan (COZAAR) 100 MG tablet Take 100 mg by mouth daily    Historical Provider, MD   methocarbamol (ROBAXIN) 500 MG tablet Take 1,000 mg by mouth every 8 hours as needed    Historical Provider, MD   ALPRAZolam (XANAX) 0.5 MG tablet Take 0.5 mg by mouth 2 times daily as needed for Sleep or Anxiety.     Historical Provider, MD   senna (SENOKOT) 8.6 MG tablet Take 1 tablet by mouth as needed     Historical Provider, MD   pravastatin (PRAVACHOL) 40 MG tablet Take 40 mg by mouth daily     Historical Provider, MD        Social History     Socioeconomic History    Marital status:      Spouse name: None    Number of children: None    Years of education: None    Highest education level: None   Occupational History    None   Social Needs    Financial resource strain: None    Food insecurity     Worry: None     Inability: None    Transportation needs     Medical: None     Non-medical: None   Tobacco Use    Smoking status: Never Smoker    Smokeless tobacco: Never Used   Substance and Sexual Activity    Alcohol use: Yes     Frequency: Never     Comment: Occasional    Drug use: No    Sexual activity: Not Currently   Lifestyle    Physical activity     Days per week: None     Minutes per session: None    Stress: None   Relationships    Social connections     Talks on phone: None     Gets together: None     Attends Orthodox service: None     Active member of club or organization: None     Attends meetings of clubs or organizations: None     Relationship status: None    Intimate partner violence     Fear of current or ex partner: None     Emotionally abused: None     Physically abused: None     Forced sexual activity: None   Other Topics Concern    None   Social History Narrative    None       Allergies   Allergen Reactions    Adrenalin [Epinephrine Hcl (Nasal)] Anaphylaxis       The patient's medical records have been reviewed contingent on availability        Review of Systems:   · General: Denies malaise or weakness. Denies fever or chills. · Eyes: No visual changes or diplopia. No swelling or pain. · ENT: No Headaches, tinnitus or vertigo. No mouth sores or sore throat. · Cardiovascular: No chest pain, dyspnea on exertion, palpitations, syncope. · Respiratory: No cough or wheezing, hemoptysis, sob, pleuritic pain. · Gastrointestinal: No abdominal pain, anorexia, hematochezia, melena, hematemesis or change in bowels. · Genitourinary: No dysuria, trouble voiding, or hematuria. No change in urination. · Musculoskeletal: As per HPI  · Integumentary: No rash or pruritis. No abnormal pigmentation,  masses, hair or nail changes  · Neurological: No unusual headaches, weakness, paresthesias. No change in gait, balance, coordination, memory, mentation or behavior. · Psychiatric: No anxiety, or depression. Mood and affect reported as normal  · Endocrine: No temperature intolerance.  No polydipsia or polyuria. · Hematologic: No abnormal bruising or bleeding, blood clots or swollen lymph nodes. no anemia, no fever,chills, night sweats, swollen glands. · Allergic/Immunologic: No nasal congestion or hives. Physical Examination:      Wt Readings from Last 3 Encounters:   11/28/20 168 lb (76.2 kg)   11/26/20 167 lb 12.8 oz (76.1 kg)   11/17/20 168 lb (76.2 kg)     Temp Readings from Last 3 Encounters:   11/29/20 96.9 °F (36.1 °C) (Temporal)   11/26/20 98 °F (36.7 °C) (Oral)   11/17/20 98.9 °F (37.2 °C) (Oral)     BP Readings from Last 3 Encounters:   11/29/20 (!) 146/66   11/26/20 (!) 135/59   11/17/20 128/85     Pulse Readings from Last 3 Encounters:   11/29/20 77   11/26/20 88   11/17/20 77       General appearance: Normal, awake, alert no distress. Skin: Color, texture, turgor normal. No rashes or lesions. Head: Normocephalic. No masses, lesions, tenderness or abnormalities   Face: Symmetric no visible lesions  Eyes: Conjunctivae/cornea clear. Wilhelmena Doles. Sclera non icteric. Ears: External appearance normal.  Hearing grossly normal  Nose/Sinuses: Nares normal. No paranasal sinus tenderness. Mouth: Lips and tongue appear normal. Dentition noted  Neck:  Symmetric. No adenopathy. Thyroid symmetric, normal size, without nodules. Trachea is midline. Chest: Even excursion   Lungs: Clear to auscultation. No rhonchi, crackles or rales. Heart: S1 > S2. Rhythm is regular and rate is normal. No gallop rub or murmur. Abdomen: Soft, mildly protuberant, non-tender. BS normal. No masses, organomegaly. Anatomic contours appear normal.  Extremities: No deformities, edema, or skin discoloration. Peripheral perfusion assessed in all exremities. No cyanosis  Musculoskeletal: As noted above she has weakness of the right leg per orthopedic evaluation right leg raising is positive and there is a mild foot drop but sensation is intact  Neuro:   · Cranial nerves grossly intact. · Motor: Strength grossly normal.  Right leg weakness  · Sensory: grossly normal to touch. Except as outlined above  Mental status: Awake, alert, cognizant of person, place, time. Patient appears capable of directing self care   Mood: Normal and appropriate affect  Gait & balance: not assessed:     Labs     CBC:   Lab Results   Component Value Date    WBC 6.0 11/29/2020    RBC 3.87 11/29/2020    HGB 10.8 11/29/2020    HCT 35.2 11/29/2020     11/29/2020    MCV 91.0 11/29/2020     BMP:    Lab Results   Component Value Date     11/29/2020    K 4.2 11/29/2020     11/29/2020    CO2 24 11/29/2020    BUN 15 11/29/2020    CREATININE 0.8 11/29/2020    GLUCOSE 80 11/29/2020    CALCIUM 9.1 11/29/2020     Hepatic Function Panel:    Lab Results   Component Value Date    ALKPHOS 165 11/28/2020    AST 19 11/28/2020    ALT 7 11/28/2020    PROT 6.6 11/28/2020    LABALBU 3.0 11/28/2020    BILITOT 0.3 11/28/2020     Magnesium:    Lab Results   Component Value Date    MG 2.0 12/27/2019     Cardiac Enzymes:   Lab Results   Component Value Date    CKTOTAL 106 11/28/2020    CKTOTAL 47 05/28/2019    CKTOTAL 288 (H) 05/01/2019    CKMB 1.7 05/29/2019    CKMB 1.5 05/29/2019    CKMB 2.0 05/28/2019    TROPONINI 0.03 11/28/2020    TROPONINI 0.06 (H) 11/14/2020    TROPONINI 0.05 (H) 11/13/2020     LDH:  No results found for: LDH  PT/INR:    Lab Results   Component Value Date    PROTIME 12.1 04/06/2020    INR 1.0 04/06/2020     BNP: No results for input(s): BNP in the last 72 hours.    TSH:   Lab Results   Component Value Date    TSH 4.510 (H) 09/12/2019      Cardiac Injury Profile:   Recent Labs     11/28/20  1330   CKTOTAL 106   TROPONINI 0.03      Lipid Profile: No results found for: TRIG, HDL, LDLCALC, CHOL   Hemoglobin A1C: No components found for: HGBA1C   U/A:   Lab Results   Component Value Date    LEUKOCYTESUR TRACE 11/28/2020    PHUR 5.5 11/28/2020    WBCUA 5-10 11/28/2020    RBCUA NONE 11/28/2020    BACTERIA FEW 11/28/2020    SPECGRAV 1.025 11/28/2020    BLOODU Negative 11/28/2020    GLUCOSEU Negative 11/28/2020         ADMISSION SCHEDULED MEDS:   Current Facility-Administered Medications   Medication Dose Route Frequency Provider Last Rate Last Dose    ALPRAZolam (XANAX) tablet 0.25 mg  0.25 mg Oral BID PRN Magali Merchant MD   0.25 mg at 11/29/20 8119    aspirin chewable tablet 81 mg  81 mg Oral Nightly Magali Merchant MD   81 mg at 11/28/20 2200    atenolol (TENORMIN) tablet 25 mg  25 mg Oral Nightly Magali Merchant MD        levothyroxine (SYNTHROID) tablet 137 mcg  137 mcg Oral Daily Magali Merchant MD   137 mcg at 11/29/20 0802    losartan (COZAAR) tablet 100 mg  100 mg Oral Daily Magali Merchant MD   100 mg at 11/29/20 0802    multivitamin 1 tablet  1 tablet Oral Daily Magali Merchant MD   1 tablet at 11/29/20 0802    oxyCODONE (ROXICODONE) immediate release tablet 5 mg  5 mg Oral Q8H PRN Magali Merchant MD   5 mg at 11/29/20 0801    acetaminophen (TYLENOL) tablet 1,000 mg  1,000 mg Oral 3 times per day Magali Merchant MD   1,000 mg at 11/28/20 1822    pravastatin (PRAVACHOL) tablet 40 mg  40 mg Oral Nightly Magali Merchant MD   40 mg at 11/28/20 2200    probenecid (BENEMID) tablet 500 mg  500 mg Oral BID Magali Merchant MD   500 mg at 11/29/20 0801    senna (SENOKOT) tablet 8.6 mg  1 tablet Oral Nightly Magali Merchant MD        QUEtiapine (SEROQUEL) tablet 25 mg  25 mg Oral Nightly Magali Merchant MD        sodium chloride flush 0.9 % injection 10 mL  10 mL Intravenous 2 times per day Magali Merchant MD   10 mL at 11/29/20 0802    sodium chloride flush 0.9 % injection 10 mL  10 mL Intravenous PRN Magali Merchant MD        acetaminophen (TYLENOL) tablet 650 mg  650 mg Oral Q6H PRN Magali Merchant MD   650 mg at 11/28/20 2200    Or    acetaminophen (TYLENOL) suppository 650 mg  650 mg Rectal Q6H PRN Magali Merchant MD        polyethylene glycol (GLYCOLAX) packet 17 g  17 g Oral Daily PRN Anita Harris MD        promethazine (PHENERGAN) tablet 12.5 mg  12.5 mg Oral Q6H PRN Anita Harris MD        Or    [facility]ansetron [facility]) injection 4 mg  4 mg Intravenous Q6H PRN Anita Harris MD        enoxaparin (LOVENOX) injection 40 mg  40 mg Subcutaneous Daily Anita Harris MD   40 mg at 11/29/20 7871       Current  Infusions      Prn Meds  ALPRAZolam, oxyCODONE, sodium chloride flush, acetaminophen **OR** acetaminophen, polyethylene glycol, promethazine **OR** ondansetron    Radiology Review:  XR THORACIC SPINE (2 VIEWS)   Final Result   Limited views of the thoracic spine. There is no gross compression fracture. Spondylosis      XR PELVIS (1-2 VIEWS)   Final Result   This exam is limited to exclude fractures in the lumbar spine/pelvis. In the   lumbar spine cross-table lateral views were obtained and on the image of the   pelvis the patient is rotated. If there is high concern for underlying acute   osseous injury, cross-sectional imaging should be considered. 1.  L1-L5 posterior spinal fusion without obvious complication. Mild   degenerative changes at the thoracolumbar and lumbosacral junctions. 2.  Mild bilateral SI joint degenerative changes. 3.  Moderate to severe right and mild left hip joint osteoarthritis. XR CHEST PORTABLE   Final Result   No acute process. XR LUMBAR SPINE (2-3 VIEWS)   Final Result   This exam is limited to exclude fractures in the lumbar spine/pelvis. In the   lumbar spine cross-table lateral views were obtained and on the image of the   pelvis the patient is rotated. If there is high concern for underlying acute   osseous injury, cross-sectional imaging should be considered. 1.  L1-L5 posterior spinal fusion without obvious complication. Mild   degenerative changes at the thoracolumbar and lumbosacral junctions. 2.  Mild bilateral SI joint degenerative changes.    3.  Moderate

## 2020-11-29 NOTE — PLAN OF CARE
Problem: Falls - Risk of:  Goal: Will remain free from falls  Description: Will remain free from falls  Outcome: Met This Shift     Problem: Falls - Risk of:  Goal: Absence of physical injury  Description: Absence of physical injury  Outcome: Met This Shift     Problem: Pain:  Goal: Pain level will decrease  Description: Pain level will decrease  Outcome: Met This Shift     Problem: Pain:  Goal: Control of acute pain  Description: Control of acute pain  Outcome: Met This Shift     Problem: Pain:  Goal: Control of chronic pain  Description: Control of chronic pain  Outcome: Met This Shift Cryotherapy Text: The wound bed was treated with cryotherapy after the biopsy was performed.

## 2020-11-29 NOTE — CONSULTS
Department of Orthopedic Surgery  Attending Consult Note        Reason for Consult:  pain  Requesting Physician:  Dr Sea Terryaming:  RLE radiating pain    History Obtained From:  patient, RN    HISTORY OF PRESENT ILLNESS:                The patient is a 66 y.o. female who presents with low back, right thigh, and radiating pain to her right foot. Pt was just discharge 11-17 after a fall at home which she developed a right foot drop. Patient was recently at [facility] and had two procedures to her lumbar spine by Dr. Katharina Benitez. 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 +C diff which she was taking po Vanc for. Patient reports picc line removed and Abx stopped. I thought it was ending until this upcoming week. She was at acute rehab at [address] [place] [address] but was discharged home on 11/7/2020. She was discharged home again on 11-17. Her  is no longer able to take care of her as he is in the hospital for the past several days after a fall as well and will be needing rehab placement also.                Past Medical History:        Diagnosis Date    Arthritis     CAD (coronary artery disease)     Constipation     Gout     CONTROLLED    Hyperlipidemia     Hypertension     STABLE    Macular hole, right eye     Thyroid disease      Past Surgical History:        Procedure Laterality Date    ABDOMEN SURGERY      [facility]  09/[facility]    BREAST SURGERY Right 1982    BENIGN CYST    CHOLECYSTECTOMY  1997    COLONOSCOPY      EYE SURGERY      HYSTERECTOMY  1985    JOINT REPLACEMENT      bilat knees     LAMINECTOMY N/A 6/3/2019    LUMBAR LAMINECTOMY POSTERIOR L3-L5, BONE BIOPSY L4 - GIOVANNY, X-RAY,  WANTS TF performed by Eyad Gomez MD [facility] OR    SKIN BIOPSY      TONSILLECTOMY       Current Medications:   Current Facility-Administered Medications: ALPRAZolam (XANAX) tablet 0.25 mg, 0.25 mg, Oral, BID PRN  aspirin chewable tablet 81 mg, 81 mg, Oral, Nightly  atenolol (TENORMIN) tablet 25 mg, 25 mg, Oral, Nightly  levothyroxine (SYNTHROID) tablet 137 mcg, 137 mcg, Oral, Daily  losartan (COZAAR) tablet 100 mg, 100 mg, Oral, Daily  multivitamin 1 tablet, 1 tablet, Oral, Daily  oxyCODONE (ROXICODONE) immediate release tablet 5 mg, 5 mg, Oral, Q8H PRN  acetaminophen (TYLENOL) tablet 1,000 mg, 1,000 mg, Oral, 3 times per day  pravastatin (PRAVACHOL) tablet 40 mg, 40 mg, Oral, Nightly  probenecid (BENEMID) tablet 500 mg, 500 mg, Oral, BID  senna (SENOKOT) tablet 8.6 mg, 1 tablet, Oral, Nightly  QUEtiapine (SEROQUEL) tablet 25 mg, 25 mg, Oral, Nightly  sodium chloride flush 0.9 % injection 10 mL, 10 mL, Intravenous, 2 times per day  sodium chloride flush 0.9 % injection 10 mL, 10 mL, Intravenous, PRN  acetaminophen (TYLENOL) tablet 650 mg, 650 mg, Oral, Q6H PRN **OR** acetaminophen (TYLENOL) suppository 650 mg, 650 mg, Rectal, Q6H PRN  polyethylene glycol (GLYCOLAX) packet 17 g, 17 g, Oral, Daily PRN  promethazine (PHENERGAN) tablet 12.5 mg, 12.5 mg, Oral, Q6H PRN **OR** ondansetron (ZOFRAN) injection 4 mg, 4 mg, Intravenous, Q6H PRN  enoxaparin (LOVENOX) injection 40 mg, 40 mg, Subcutaneous, Daily  Allergies:  Adrenalin [epinephrine hcl (nasal)]    Social History:   MARITAL STATUS:    Family History:       Problem Relation Age of Onset    Other Mother      REVIEW OF SYSTEMS:    CONSTITUTIONAL:  negative  RESPIRATORY:  negative  CARDIOVASCULAR:  negative  MUSCULOSKELETAL:  positive for  myalgias, arthralgias, pain and decreased range of motion  NEUROLOGICAL:  negative  BEHAVIOR/PSYCH:  positive for depressed mood    PHYSICAL EXAM:    VITALS:  BP (!) 146/66   Pulse 77   Temp 96.9 °F (36.1 °C) (Temporal)   Resp 16   Ht 5' 5\" (1.651 m)   Wt 168 lb (76.2 kg)   SpO2 95%   BMI 27.96 kg/m²   24HR INTAKE/OUTPUT:  No intake or output data in the 24 hours ending 11/29/20 0917  CONSTITUTIONAL:  awake, alert, cooperative, no apparent distress, and appears stated age  LUNGS:  no increased work of breathing and good air exchange  CARDIOVASCULAR:  no edema  MUSCULOSKELETAL:  + SLR  + drop foot but sensation is intact.  + ecchymosis of right leg anterior tibial compartment. NO knee effusions. No cellulitis  NEUROLOGIC:  Motor Exam:  Right drop foot  Sensory:  Sensory intact  SKIN:  See above. Incisions from TKA well healed. DATA:    CBC:   Lab Results   Component Value Date    WBC 6.0 11/29/2020    RBC 3.87 11/29/2020    HGB 10.8 11/29/2020    HCT 35.2 11/29/2020    MCV 91.0 11/29/2020    MCH 27.9 11/29/2020    MCHC 30.7 11/29/2020    RDW 15.7 11/29/2020     11/29/2020    MPV 10.1 11/29/2020     BMP:    Lab Results   Component Value Date     11/29/2020    K 4.2 11/29/2020     11/29/2020    CO2 24 11/29/2020    BUN 15 11/29/2020    LABALBU 3.0 11/28/2020    CREATININE 0.8 11/29/2020    CALCIUM 9.1 11/29/2020    GFRAA >60 11/29/2020    LABGLOM >60 11/29/2020    GLUCOSE 80 11/29/2020     PT/INR:    Lab Results   Component Value Date    PROTIME 12.1 04/06/2020    INR 1.0 04/06/2020     Radiology Review:      XRAY VIEWS OF THE THORACIC SPINE    11/28/2020 2:16 pm    COMPARISON:    None. HISTORY:    ORDERING SYSTEM PROVIDED HISTORY: back pain    TECHNOLOGIST PROVIDED HISTORY:    Reason for exam:->back pain    What reading provider will be dictating this exam?->CRC    FINDINGS:    There are four views of the thoracic spine which are limited due to rotation    as well as the patient being placed in the supine position on the x-ray    table.  Cross-table lateral view is limited. There is no gross compression fracture.  There are degenerative changes of    the thoracic spine. The paraspinal soft tissues are unremarkable.  Degenerative endplate spurs    are noted.         Impression    Limited views of the thoracic spine.  There is no gross compression fracture.     Spondylosis      FINDINGS:    Lumbar spine: No obvious displaced rib fractures in the imaged ribs.  5    non-rib-bearing lumbar type vertebral bodies.  Patient has undergone L1-L5    fusion.  Cross-table lateral views obtained.  The thoracolumbar junction    demonstrates minimal degenerative spurring but is otherwise unremarkable. Similarly there are mild degenerative changes at the lumbosacral junction. Overall the alignment appears maintained. Pelvis: SI joints appear open with noted inferior spurring.  The patient is    rotated on this exam which somewhat limits evaluation of the left acetabulum    and portions of the left superior and inferior pubic rami.  The right    superior and inferior pubic rami appear intact.  Limited evaluation of the    left pelvic brim.  Right pelvic brim appears intact.  Moderate to severe    right and mild left hip joint space narrowing with degenerative spurring and    sclerosis.  Grossly normal appearance of the femoral heads with preserved    femoral head/neck trabeculations.  No obvious cortical irregularities that    would suggest fracture at this time.         Impression    This exam is limited to exclude fractures in the lumbar spine/pelvis.  In the    lumbar spine cross-table lateral views were obtained and on the image of the    pelvis the patient is rotated.  If there is high concern for underlying acute    osseous injury, cross-sectional imaging should be considered. 1.  L1-L5 posterior spinal fusion without obvious complication.  Mild    degenerative changes at the thoracolumbar and lumbosacral junctions. 2.  Mild bilateral SI joint degenerative changes. 3.  Moderate to severe right and mild left hip joint osteoarthritis. ONE XRAY VIEW OF THE CHEST    11/28/2020 2:15 pm    COMPARISON:    None.     HISTORY:    ORDERING SYSTEM PROVIDED HISTORY: weakness    TECHNOLOGIST PROVIDED HISTORY:    Reason for exam:->weakness    What reading provider will be dictating this exam?->CRC    FINDINGS:

## 2020-11-29 NOTE — PROGRESS NOTES
Spoke with Dr. Uday Castro discussed need for ortho consult as to the origin of patients pain whether the pain is originating in the hip or possible from a history of back surgery. Also, addressed patients concern for pain control.

## 2020-11-29 NOTE — PROGRESS NOTES
neurosurg consult given to Dr Yahir Grijalva who is on call this evening.  He ordered an MRI of lumbar spine and pt added to his list.

## 2020-11-29 NOTE — PROGRESS NOTES
Occupational Therapy  OT consult received to eval/treat and chart review complete. Pt I son hold, await Neurosurgery consult and recommendation. OT to re-attempt at a later time. Thank You.        [place], [place]  [id number]

## 2020-11-30 LAB
C-REACTIVE PROTEIN: 1.6 MG/DL (ref 0–0.4)
SEDIMENTATION RATE, ERYTHROCYTE: 27 MM/HR (ref 0–20)

## 2020-11-30 PROCEDURE — 85651 RBC SED RATE NONAUTOMATED: CPT

## 2020-11-30 PROCEDURE — 87040 BLOOD CULTURE FOR BACTERIA: CPT

## 2020-11-30 PROCEDURE — 86140 C-REACTIVE PROTEIN: CPT

## 2020-11-30 PROCEDURE — 2580000003 HC RX 258: Performed by: INTERNAL MEDICINE

## 2020-11-30 PROCEDURE — 36415 COLL VENOUS BLD VENIPUNCTURE: CPT

## 2020-11-30 PROCEDURE — G0378 HOSPITAL OBSERVATION PER HR: HCPCS

## 2020-11-30 PROCEDURE — 6360000002 HC RX W HCPCS: Performed by: INTERNAL MEDICINE

## 2020-11-30 PROCEDURE — 6370000000 HC RX 637 (ALT 250 FOR IP): Performed by: INTERNAL MEDICINE

## 2020-11-30 PROCEDURE — 1200000000 HC SEMI PRIVATE

## 2020-11-30 RX ADMIN — PROBENECID 500 MG: 500 TABLET, FILM COATED ORAL at 20:36

## 2020-11-30 RX ADMIN — ENOXAPARIN SODIUM 40 MG: 40 INJECTION SUBCUTANEOUS at 09:40

## 2020-11-30 RX ADMIN — SODIUM CHLORIDE, PRESERVATIVE FREE 10 ML: 5 INJECTION INTRAVENOUS at 09:44

## 2020-11-30 RX ADMIN — ACETAMINOPHEN 1000 MG: 325 TABLET ORAL at 13:18

## 2020-11-30 RX ADMIN — LOSARTAN POTASSIUM 100 MG: 50 TABLET, FILM COATED ORAL at 09:42

## 2020-11-30 RX ADMIN — SENNOSIDES 8.6 MG: 8.6 TABLET, FILM COATED ORAL at 20:37

## 2020-11-30 RX ADMIN — LEVOTHYROXINE SODIUM 137 MCG: 0.14 TABLET ORAL at 06:19

## 2020-11-30 RX ADMIN — OXYCODONE HYDROCHLORIDE 5 MG: 5 TABLET ORAL at 10:26

## 2020-11-30 RX ADMIN — ASPIRIN 81 MG CHEWABLE TABLET 81 MG: 81 TABLET CHEWABLE at 20:37

## 2020-11-30 RX ADMIN — PROBENECID 500 MG: 500 TABLET, FILM COATED ORAL at 09:42

## 2020-11-30 RX ADMIN — SODIUM CHLORIDE, PRESERVATIVE FREE 10 ML: 5 INJECTION INTRAVENOUS at 20:37

## 2020-11-30 RX ADMIN — Medication 1 TABLET: at 09:41

## 2020-11-30 RX ADMIN — ATENOLOL 25 MG: 25 TABLET ORAL at 20:35

## 2020-11-30 RX ADMIN — OXYCODONE HYDROCHLORIDE 5 MG: 5 TABLET ORAL at 19:04

## 2020-11-30 RX ADMIN — QUETIAPINE FUMARATE 25 MG: 25 TABLET ORAL at 20:36

## 2020-11-30 RX ADMIN — OXYCODONE HYDROCHLORIDE 5 MG: 5 TABLET ORAL at 02:11

## 2020-11-30 RX ADMIN — PRAVASTATIN SODIUM 40 MG: 20 TABLET ORAL at 20:36

## 2020-11-30 RX ADMIN — ACETAMINOPHEN 1000 MG: 325 TABLET ORAL at 06:19

## 2020-11-30 RX ADMIN — ALPRAZOLAM 0.25 MG: 0.25 TABLET ORAL at 23:48

## 2020-11-30 ASSESSMENT — PAIN SCALES - GENERAL
PAINLEVEL_OUTOF10: 10
PAINLEVEL_OUTOF10: 6
PAINLEVEL_OUTOF10: 6
PAINLEVEL_OUTOF10: 4
PAINLEVEL_OUTOF10: 6
PAINLEVEL_OUTOF10: 9
PAINLEVEL_OUTOF10: 7
PAINLEVEL_OUTOF10: 0
PAINLEVEL_OUTOF10: 3
PAINLEVEL_OUTOF10: 6

## 2020-11-30 ASSESSMENT — PAIN DESCRIPTION - DESCRIPTORS
DESCRIPTORS: ACHING;CONSTANT;DISCOMFORT
DESCRIPTORS: ACHING;CONSTANT;DISCOMFORT

## 2020-11-30 ASSESSMENT — PAIN DESCRIPTION - PAIN TYPE
TYPE: ACUTE PAIN
TYPE: ACUTE PAIN

## 2020-11-30 ASSESSMENT — PAIN DESCRIPTION - LOCATION
LOCATION: HIP
LOCATION: HIP

## 2020-11-30 ASSESSMENT — PAIN DESCRIPTION - PROGRESSION: CLINICAL_PROGRESSION: NOT CHANGED

## 2020-11-30 ASSESSMENT — PAIN - FUNCTIONAL ASSESSMENT: PAIN_FUNCTIONAL_ASSESSMENT: PREVENTS OR INTERFERES SOME ACTIVE ACTIVITIES AND ADLS

## 2020-11-30 ASSESSMENT — PAIN DESCRIPTION - ORIENTATION
ORIENTATION: RIGHT
ORIENTATION: RIGHT

## 2020-11-30 ASSESSMENT — PAIN DESCRIPTION - FREQUENCY: FREQUENCY: INTERMITTENT

## 2020-11-30 ASSESSMENT — PAIN DESCRIPTION - ONSET: ONSET: ON-GOING

## 2020-11-30 NOTE — PROGRESS NOTES
Impression:  1. Status post lumbar interbody fusion, L1 to L5, with corpectomy of L3  and possible infection of the psoas muscle. 2.  Right foot drop, the etiology of which is not clear, though the  possibility of Peroneal Nerve contusion is not ruled out. Anterior tibial compartment syndrome was ruled out by orthopedic service. 3.  Possible infection of the surgical site with possible psoas muscle  abscess. 4.  Edema of L5 which could be due to infectious process or edema  secondary to trauma with possible fracture and multiple medical  Comorbidities. Awaiting transfer to [facility] [employer] [employer]  Having lot of pain.   Recommend IOD consult

## 2020-11-30 NOTE — CONSULTS
QIANAIDA CONSULT NOTE    Reason for Consult: Lumbar infection   Requested by: Dr. Leola Acosta     Chief complaint: Right leg pain    History Obtained From: Patient and EMR    HISTORY Maykel              The patient is a 66 y.o. female with history of CAD, gout, hypertension, hyperlipidemia, previously admitted from 11/14-11/17 after a fall and was seen by Dr. Katie Mast for continuation of antibiotic treatment for L3 postinfectious vertebral collapse status post L2-L5 revision decompression and L1-L5 posterior interbody fusion at [facility] in 16/1077, complicated by ESBL E. coli bacteremia with pyelonephritis, psoas abscess and C. difficile colitis for which she was started on an 8-week course of meropenem from 10/07 and oral vancomycin, presented on 11/28 with worsening right leg pain. She reports having finished her antibiotics last week. On admission, she was afebrile and hemodynamically stable with no leukocytosis. CT of the right hip showed postsurgical changes from posterior fusion at L4-L5 with lucency surrounding pedicle screws at L5 measuring up to 3 to 4 mm probably associated with loosening, 3.8 x 4.5 cm fluid collection posteriorly at L4-L5 with no internal foci of gas probably seroma versus infection. MRI of lumbar spine showed postsurgical changes status post L3 corpectomy, multilevel laminectomies and posterior lumbar fusion from L1-L5, edema within the right psoas muscle. She has been evaluated by Neurosurgery and has been recommended transfer to [facility]. ID service was subsequently consulted for further recommendations.     Past Medical History  Past Medical History:   Diagnosis Date    Arthritis     CAD (coronary artery disease)     Constipation     Gout     CONTROLLED    Hyperlipidemia     Hypertension     STABLE    Macular hole, right eye     Thyroid disease        Current Facility-Administered Medications   Medication Dose Route Frequency Provider Last Rate Last Dose    ALPRAZolam [facility]) tablet 0.25 mg  0.25 mg Oral BID PRN Gernettiene MD Janel   0.25 mg at 11/29/20 2153    aspirin chewable tablet 81 mg  81 mg Oral Nightly Gerardine MD Janel   81 mg at 11/29/20 2036    atenolol (TENORMIN) tablet 25 mg  25 mg Oral Nightly Gerardine MD Jaenl   25 mg at 11/29/20 2036    levothyroxine (SYNTHROID) tablet 137 mcg  137 mcg Oral Daily Gerardine MD Janel   137 mcg at 11/30/20 7156    losartan (COZAAR) tablet 100 mg  100 mg Oral Daily Gerardine MD Janel   100 mg at 11/30/20 7276    multivitamin 1 tablet  1 tablet Oral Daily Darielne MD Janel   1 tablet at 11/30/20 0941    oxyCODONE (ROXICODONE) immediate release tablet 5 mg  5 mg Oral Q8H PRN Gernettiene MD Janel   5 mg at 11/30/20 1026    acetaminophen (TYLENOL) tablet 1,000 mg  1,000 mg Oral 3 times per day Contreras Islas MD   1,000 mg at 11/30/20 1318    pravastatin (PRAVACHOL) tablet 40 mg  40 mg Oral Nightly Darielne MD Janel   40 mg at 11/29/20 2036    probenecid (BENEMID) tablet 500 mg  500 mg Oral BID Gerardine Janel MD   500 mg at 11/30/20 2455    senna (SENOKOT) tablet 8.6 mg  1 tablet Oral Nightly Contreras Islas MD        QUEtiapine (SEROQUEL) tablet 25 mg  25 mg Oral Nightly Darielne MD Janel   25 mg at 11/29/20 2036    sodium chloride flush 0.9 % injection 10 mL  10 mL Intravenous 2 times per day Darielne MD Janel   10 mL at 11/30/20 0944    sodium chloride flush 0.9 % injection 10 mL  10 mL Intravenous PRN Contreras Islas MD        acetaminophen (TYLENOL) tablet 650 mg  650 mg Oral Q6H PRN Gersweta Islas MD   650 mg at 11/28/20 2200    Or    acetaminophen (TYLENOL) suppository 650 mg  650 mg Rectal Q6H PRN Contreras Islas MD        polyethylene glycol [facility]) packet 17 g  17 g Oral Daily PRN Contreras Islas MD        promethazine (PHENERGAN) tablet 12.5 mg  12.5 mg Oral Q6H PRN Contreras Islas MD        Or    ondansetron [facility] injection 4 mg  4 mg Intravenous Q6H PRN Samuel Rizvi MD        enoxaparin (LOVENOX) injection 40 mg  40 mg Subcutaneous Daily Samuel Rizvi MD   40 mg at 11/30/20 0940       Allergies   Allergen Reactions    Adrenalin [Epinephrine Hcl (Nasal)] Anaphylaxis       Surgical History  Past Surgical History:   Procedure Laterality Date    ABDOMEN SURGERY      [facility]  09/2020    [facility]    BREAST SURGERY Right 1982    BENIGN CYST    CHOLECYSTECTOMY  1997    COLONOSCOPY      EYE SURGERY      HYSTERECTOMY  1985    JOINT REPLACEMENT      bilat knees     LAMINECTOMY N/A 6/3/2019    LUMBAR LAMINECTOMY POSTERIOR L3-L5, BONE BIOPSY L4 - GIOVANNY, X-RAY,  WANTS TF performed by Elena Workman MD at [facility]    SKIN BIOPSY      TONSILLECTOMY          Social History  Social History     Socioeconomic History    Marital status:    Tobacco Use    Smoking status: Never Smoker    Smokeless tobacco: Never Used   Substance and Sexual Activity    Alcohol use: Yes     Frequency: Never     Comment: Occasional    Drug use: No     Family Medical History  Family History   Problem Relation Age of Onset    Other Mother        Review of Systems:  Constitutional: No fever, no chills  Eyes: No vision changes, no retroorbital pain  ENT: No hearing changes, no ear pain  Respiratory: No cough, no dyspnea  Cardiovascular: No chest pain, no palpitations  Gastrointestinal: No abdominal pain, no diarrhea  Genitourinary: No dysuria, no hematuria  Integumentary: No rash, no itching  Musculoskeletal: Has right leg pain, no joint pain  Neurologic: No headache, no numbness in extremities    Physical Examination:  Vitals:    11/29/20 2002 11/30/20 0000 11/30/20 0800 11/30/20 0908   BP: (!) 110/59 122/61 133/63    Pulse: 69 82 64    Resp: 17 14 15    Temp: 98 °F (36.7 °C) 97.1 °F (36.2 °C) 97.3 °F (36.3 °C)    TempSrc: Temporal Temporal Temporal    SpO2:  97% 95%    Weight:       Height:    5' 5\" (1.651 m)     Constitutional: Alert, not in distress  Eyes: Sclerae anicteric, no conjunctival erythema  ENT: No buccal lesion, no pharyngeal exudates  Neck: No nuchal rigidity, no cervical adenopathy  Lungs: Clear breath sounds, no crackles, no wheezes  Heart: Regular rate and rhythm, no murmurs  Abdomen: Bowel sounds present, soft, nontender  Skin: Warm and dry, no active dermatoses  Musculoskeletal: No joint erythema, no joint swelling    Labs, imaging, and medical records/notes were personally reviewed. Assessment:  Fluid collection, L5, with hardware in situ, concerning for infection  History of C difficile infection    Plan:  Check blood cultures. Check ESR and CRP. Monitor clinically off antibiotics for now. There is no urgency to start antibiotic treatment for now until appropriate deep tissue and bone biopsy cultures are obtained since the patient is not septic and there is neither evidence of cord compression nor evidence of neurologic deficits. Recommend sending tissue specimen and bone biopsy for Gram stain, aerobic and anaerobic cultures, surgical pathology. Follow up transfer to [facility]. Thank you for involving me in the care of Lisa Garcia. I will continue to follow. Please do not hesitate to call for any questions or concerns.       Electronically signed by Berlin Pino MD on 11/30/2020 at 3:50 PM

## 2020-11-30 NOTE — PLAN OF CARE
Problem: Falls - Risk of:  Goal: Will remain free from falls  Description: Will remain free from falls  11/30/2020 1119 by Vesna [email or web address].[email or web address]  Outcome: Met This Shift  11/30/2020 0102 by Perez Haque RN  Outcome: Met This Shift  Goal: Absence of physical injury  Description: Absence of physical injury  11/30/2020 1119 by Punchd  Outcome: Met This Shift  11/30/2020 0102 by Perez Haque RN  Outcome: Met This Shift     Problem: Increased nutrient needs (NI-5.1)  Goal: Food and/or Nutrient Delivery  Description: Individualized approach for food/nutrient provision.   11/30/2020 0924 by Lenin Matthews, MS, RD, LD  Outcome: Met This Shift     Problem: Pain:  Goal: Pain level will decrease  Description: Pain level will decrease  11/30/2020 1119 by Vesna indoo.rs  Outcome: Not Met This Shift  11/30/2020 0102 by Perez Haque RN  Outcome: Met This Shift  Goal: Control of acute pain  Description: Control of acute pain  Outcome: Not Met This Shift  Goal: Control of chronic pain  Description: Control of chronic pain  Outcome: Not Met This Shift

## 2020-11-30 NOTE — PROGRESS NOTES
Date: 2020       Patient Name: Violeta Matias  : 1945      MRN: [id number]    PT order received and chart reviewed. PT evaluation was cleared by Dr. Rosa Nix. Evaluation attempted, pt declined due to reports of severe pain in low back radiating to RLE. Pt will need to obtain TLSO brace prior to mobility as well.    Will follow up as appropriate    Jammie Dobbs PT, DPT  [id number]

## 2020-11-30 NOTE — PROGRESS NOTES
OT consult received and appreciated. Chart reviewed. Will hold evaluation due to patient with prior PLIF and needs LSO brace and foot drop brace . Will evaluate at a later time. Thank you.  Kat Mejia, OTR/L #[id number]

## 2020-11-30 NOTE — PROGRESS NOTES
Department of Orthopedic Surgery  Joint Service  Attending Progress Note        Subjective:  Much more comfortable this morning. Reports that she sleep very well. Vitals  VITALS:  /61   Pulse 82   Temp 97.1 °F (36.2 °C) (Temporal)   Resp 14   Ht 5' 5\" (1.651 m)   Wt 168 lb (76.2 kg)   SpO2 97%   BMI 27.96 kg/m²   24HR INTAKE/OUTPUT:    Intake/Output Summary (Last 24 hours) at 11/30/2020 0715  Last data filed at 11/30/2020 0000  Gross per 24 hour   Intake 250 ml   Output --   Net 250 ml       PHYSICAL EXAM:    Orientation:  alert and oriented to person, place and time    Right Lower Extremity    NO pain with log roll of her right hip. No pain with passive DF/ PF RLE. NO pain in anterior tibial compartment on palpation. Compartment soft. Small area of ecchymosis noted but no swelling noted. L touch is intact throughout lower extremity including in peroneal nerve distribution. Good cap refill.     Lower Extremity Motor :   Quadriceps:  5/5  Extensor hallucis:  2/5  Dorsiflexion:  2/5  Plantarflexion:  5/5    Lower Extremity Sensory:  Tibial Nerve:  intact  Superficial Peroneal Nerve:  intact  Deep Peroneal Nerve:  intact    Brace:  AFO not at bedside currently (at home)    LABS:    HgB:    Lab Results   Component Value Date    HGB 10.8 11/29/2020     INR:  No results found for: PTINR  CBC:   Lab Results   Component Value Date    WBC 6.0 11/29/2020    RBC 3.87 11/29/2020    HGB 10.8 11/29/2020    HCT 35.2 11/29/2020    MCV 91.0 11/29/2020    MCH 27.9 11/29/2020    MCHC 30.7 11/29/2020    RDW 15.7 11/29/2020     11/29/2020    MPV 10.1 11/29/2020     BMP:    Lab Results   Component Value Date     11/29/2020    K 4.2 11/29/2020     11/29/2020    CO2 24 11/29/2020    BUN 15 11/29/2020    LABALBU 3.0 11/28/2020    CREATININE 0.8 11/29/2020    CALCIUM 9.1 11/29/2020    GFRAA >60 11/29/2020    LABGLOM >60 11/29/2020    GLUCOSE 80 11/29/2020     ESR and CRP pending    Radiology Review: CT of the right hip was performed without the administration of intravenous    contrast.  Multiplanar reformatted images are provided for review. Dose    modulation, iterative reconstruction, and/or weight based adjustment of the    mA/kV was utilized to reduce the radiation dose to as low as reasonably    achievable. COMPARISON:    None. HISTORY    ORDERING SYSTEM PROVIDED HISTORY: right hip pain    TECHNOLOGIST PROVIDED HISTORY:    Reason for exam:->right hip pain    What reading provider will be dictating this exam?->CRC    FINDINGS:    No acute fracture or dislocation.  Osteopenia.  Moderate to severe joint    space narrowing with subchondral cystic change along the acetabulum and    femoral head.  No femoral head collapse.  Small marginal osteophytes. No significant right hip joint effusion. Postsurgical changes from fusion at L4-L5.  Pedicle screw in the right aspect    of L5 demonstrates lucency surrounding the screw measuring up to 3 mm. Lucency surrounding the pedicle screw left aspect of L5 measures up to 3-4    mm.  Laminectomy also noted at L4-L5 with fluid collection posteriorly    measuring up to 3.8 x 4.5 cm.  No internal foci of gas. Small fat containing umbilical hernia.  No adenopathy. Sigmoid diverticulosis.  No acute diverticulitis.         Impression    Right hip osteoarthrosis.  No fracture.  No significant right hip joint    effusion. Postsurgical changes from posterior fusion at L4-L5 which is incompletely    evaluated.  Lucency surrounding pedicle screws at L5 measuring up to 3-4 mm. Findings can be seen in loosening. 3.8 x 4.5 cm fluid collection posterior soft tissues midline.  No internal    foci of gas.  Findings may represent seroma however infection cannot be    entirely excluded.         ASSESSMENT AND PLAN:    1. No clinical signs of anterior compartment syndrome of RLE this admission or last.  Possible Right LE Anterior tibial compartment contusion with drop foot. L touch intact however. Possibly coming from spine but cause of drop foot a little uncertain. Continue with AFO. Patient to have family bring it to the hospital.  May need EMG at some point to further evaluate if it doesn't improve. 2. Recent history of ESBL ecoli Bacteremia- Meropenem  8 weeks   PICC line just removed  3. C diff colitis - po  vanc  Just finished  4. Post infectious vetebral Collapse   5. H/o Fall-  NO new fall from last admission. Just increasing pain  6. Hypertension   7. CAD   8.  Stable jeyson TKA without evidence of extensor mechanism injury. 9.  Appreciate Dr Tamra Cary consult and evaluation. CRP  ESR pending    Plan to transfer to [facility] for Dr Bonnie Ulrich evaluation per Dr Tamra Cary discussion with her. Patient is agreeable to this.     Deb Reich

## 2020-11-30 NOTE — PROGRESS NOTES
Progress note                                                                                 Katarzyna Garcia MD, FACP                   Patient Name: Stephanie Villegas                   Age:  66 y.o. Gender:   female    CC: Fall with right leg pain    HPI: Patient states that she was instructed by staff to get up and walk to the bathroom she did so and fell. After falling she noted to have right leg pain.   Currently she states that as long as she is lying on her back she feels fine but when she gets up her right leg really hurts  This apparently occurred a week ago however her pain has been worsening  Imaging did not disclose any acute problems  This morning she is lying comfortably in bed  Labs are satisfactory  Vitals are stable  Orthopedic evaluation concludes right lower extremity anterior tibial compartment contusion with foot drop  Interestingly she has a history of postinfectious vertebral collapse, and recent C. difficile colitis    11/30  Patient has been evaluated by neurosurgery  Suspicion is possible infection of the psoas muscle  Orthopedic reevaluation  Plan is to transfer to [facility] per orthopedics for evaluation of possible infection  Patient is stable and is aware of this and is agreeable      Past Medical History:   Diagnosis Date    Arthritis     CAD (coronary artery disease)     Constipation     Gout     CONTROLLED    Hyperlipidemia     Hypertension     STABLE    Macular hole, right eye     Thyroid disease        Past Surgical History:   Procedure Laterality Date    ABDOMEN SURGERY      [facility]    BREAST SURGERY Right 1982    BENIGN CYST    CHOLECYSTECTOMY  1997    COLONOSCOPY      EYE SURGERY      HYSTERECTOMY  1985    JOINT REPLACEMENT      bilat knees     LAMINECTOMY N/A 6/3/2019    LUMBAR LAMINECTOMY POSTERIOR L3-L5, BONE BIOPSY LEXUS - GIOVANNY, X-RAY,  KOFI TF performed by Juliet Sweeney MD at [address] [facility]           Review of Systems:   · General: There is no change in her system review      Physical Examination:      Wt Readings from Last 3 Encounters:   11/28/20 168 lb (76.2 kg)   11/26/20 167 lb 12.8 oz (76.1 kg)   11/17/20 168 lb (76.2 kg)     Temp Readings from Last 3 Encounters:   11/30/20 97.3 °F (36.3 °C) (Temporal)   11/26/20 98 °F (36.7 °C) (Oral)   11/17/20 98.9 °F (37.2 °C) (Oral)     BP Readings from Last 3 Encounters:   11/30/20 133/63   11/26/20 (!) 135/59   11/17/20 128/85     Pulse Readings from Last 3 Encounters:   11/30/20 64   11/26/20 88   11/17/20 77       General appearance: Normal, awake, alert no distress. Skin: Color, texture, turgor normal. No rashes or lesions. Head: Normocephalic. No masses, lesions, tenderness or abnormalities   Face: Symmetric no visible lesions  Eyes: Conjunctivae/cornea clear. Olga Dirk. Sclera non icteric. Neck:  Symmetric. No adenopathy. Chest: Even excursion   Lungs: Clear to auscultation. No rhonchi, crackles or rales. Heart: S1 > S2. Rhythm is regular and rate is normal. No gallop rub or murmur. Extremities as per orthopedics  Musculoskeletal: As noted above she has weakness of the right leg per orthopedic evaluation right leg raising is positive and there is a mild foot drop but sensation is intact  Neuro:   · Cranial nerves grossly intact. · Motor: Strength grossly normal.  Right leg weakness  · Sensory: grossly normal to touch. Except as outlined above  Mental status: Awake, alert, cognizant of person, place, time.     Patient appears capable of directing self care   Mood: Normal and appropriate affect  Gait & balance: not assessed:     Labs     CBC:   Lab Results   Component Value Date    WBC 6.0 11/29/2020    RBC 3.87 11/29/2020    HGB 10.8 11/29/2020    HCT 35.2 11/29/2020     11/29/2020    MCV 91.0 11/29/2020     BMP:    Lab Results   Component Value Date  11/29/2020    K 4.2 11/29/2020     11/29/2020    CO2 24 11/29/2020    BUN 15 11/29/2020    CREATININE 0.8 11/29/2020    GLUCOSE 80 11/29/2020    CALCIUM 9.1 11/29/2020     Hepatic Function Panel:    Lab Results   Component Value Date    ALKPHOS 165 11/28/2020    AST 19 11/28/2020    ALT 7 11/28/2020    PROT 6.6 11/28/2020    LABALBU 3.0 11/28/2020    BILITOT 0.3 11/28/2020     Magnesium:    Lab Results   Component Value Date    MG 2.0 12/27/2019     Cardiac Enzymes:   Lab Results   Component Value Date    CKTOTAL 106 11/28/2020    CKTOTAL 47 05/28/2019    CKTOTAL 288 (H) 05/01/2019    CKMB 1.7 05/29/2019    CKMB 1.5 05/29/2019    CKMB 2.0 05/28/2019    TROPONINI 0.03 11/28/2020    TROPONINI 0.06 (H) 11/14/2020    TROPONINI 0.05 (H) 11/13/2020     LDH:  No results found for: LDH  PT/INR:    Lab Results   Component Value Date    PROTIME 12.1 04/06/2020    INR 1.0 04/06/2020     BNP: No results for input(s): BNP in the last 72 hours.    TSH:   Lab Results   Component Value Date    TSH 4.510 (H) 09/12/2019      Cardiac Injury Profile:   Recent Labs     11/28/20  1330   CKTOTAL 106   TROPONINI 0.03      Lipid Profile: No results found for: TRIG, HDL, LDLCALC, CHOL   Hemoglobin A1C: No components found for: HGBA1C   U/A:   Lab Results   Component Value Date    LEUKOCYTESUR TRACE 11/28/2020    PHUR 5.5 11/28/2020    WBCUA 5-10 11/28/2020    RBCUA NONE 11/28/2020    BACTERIA FEW 11/28/2020    SPECGRAV 1.025 11/28/2020    BLOODU Negative 11/28/2020    GLUCOSEU Negative 11/28/2020         ADMISSION SCHEDULED MEDS:   Current Facility-Administered Medications   Medication Dose Route Frequency Provider Last Rate Last Dose    ALPRAZolam (XANAX) tablet 0.25 mg  0.25 mg Oral BID PRN Anita Harris MD   0.25 mg at 11/29/20 2153    aspirin chewable tablet 81 mg  81 mg Oral Nightly Anita Harris MD   81 mg at 11/29/20 2036    atenolol (TENORMIN) tablet 25 mg  25 mg Oral Nightly Anita Harris MD   25 mg at 11/29/20 2036    levothyroxine (SYNTHROID) tablet 137 mcg  137 mcg Oral Daily Sophia Taylor MD   137 mcg at 11/30/20 9599    losartan (COZAAR) tablet 100 mg  100 mg Oral Daily Sophia Taylor MD   100 mg at 11/30/20 5124    multivitamin 1 tablet  1 tablet Oral Daily Sophia Taylor MD   1 tablet at 11/30/20 0941    oxyCODONE (ROXICODONE) immediate release tablet 5 mg  5 mg Oral Q8H PRN Sophia Taylor MD   5 mg at 11/30/20 0211    acetaminophen (TYLENOL) tablet 1,000 mg  1,000 mg Oral 3 times per day Sophia Taylor MD   1,000 mg at 11/30/20 1293    pravastatin (PRAVACHOL) tablet 40 mg  40 mg Oral Nightly Sophia Taylor MD   40 mg at 11/29/20 2036    probenecid (BENEMID) tablet 500 mg  500 mg Oral BID Sophia Taylor MD   500 mg at 11/30/20 6746    senna (SENOKOT) tablet 8.6 mg  1 tablet Oral Nightly Sophia Taylor MD        QUEtiapine (SEROQUEL) tablet 25 mg  25 mg Oral Nightly Sophia Taylor MD   25 mg at 11/29/20 2036    sodium chloride flush 0.9 % injection 10 mL  10 mL Intravenous 2 times per day Sophia Taylor MD   10 mL at 11/30/20 0944    sodium chloride flush 0.9 % injection 10 mL  10 mL Intravenous PRN Sophia Taylor MD        acetaminophen (TYLENOL) tablet 650 mg  650 mg Oral Q6H PRN Sophia Taylor MD   650 mg at 11/28/20 2200    Or    acetaminophen (TYLENOL) suppository 650 mg  650 mg Rectal Q6H PRN Sophia Taylor MD        polyethylene glycol [facility]) packet 17 g  17 g Oral Daily PRN Sophia Taylor MD        promethazine (PHENERGAN) tablet 12.5 mg  12.5 mg Oral Q6H PRN Sophia Taylor MD        Or    ondansetron [facility]) injection 4 mg  4 mg Intravenous Q6H PRN Sophia Taylor MD        enoxaparin (LOVENOX) injection 40 mg  40 mg Subcutaneous Daily Sophia Taylor MD   40 mg at 11/30/20 0940       Current  Infusions      Prn Meds  ALPRAZolam, oxyCODONE, sodium chloride flush, acetaminophen **OR** acetaminophen, polyethylene glycol, promethazine **OR** ondansetron    Radiology Review:  MRI LUMBAR SPINE WO CONTRAST   Final Result   1. Postsurgical changes status post L3 corpectomy, multilevel laminectomies   and posterior lumbar fusion from L1-L5. 2. No significant spinal canal stenosis evident. 3. Mild-to-moderate right neural foraminal narrowing at L5-S1 secondary to a   disc bulge and facet arthropathy. 4. Edema within the right psoas muscle suggestive of a muscular strain. 5. Nonspecific edema within the L5 vertebral body without fracture evident. CT HIP RIGHT WO CONTRAST   Final Result   Right hip osteoarthrosis. No fracture. No significant right hip joint   effusion. Postsurgical changes from posterior fusion at L4-L5 which is incompletely   evaluated. Lucency surrounding pedicle screws at L5 measuring up to 3-4 mm. Findings can be seen in loosening. 3.8 x 4.5 cm fluid collection posterior soft tissues midline. No internal   foci of gas. Findings may represent seroma however infection cannot be   entirely excluded. XR THORACIC SPINE (2 VIEWS)   Final Result   Limited views of the thoracic spine. There is no gross compression fracture. Spondylosis      XR PELVIS (1-2 VIEWS)   Final Result   This exam is limited to exclude fractures in the lumbar spine/pelvis. In the   lumbar spine cross-table lateral views were obtained and on the image of the   pelvis the patient is rotated. If there is high concern for underlying acute   osseous injury, cross-sectional imaging should be considered. 1.  L1-L5 posterior spinal fusion without obvious complication. Mild   degenerative changes at the thoracolumbar and lumbosacral junctions. 2.  Mild bilateral SI joint degenerative changes. 3.  Moderate to severe right and mild left hip joint osteoarthritis. XR CHEST PORTABLE   Final Result   No acute process.       XR LUMBAR SPINE (2-3 VIEWS)   Final Result   This exam is limited Board of Internal Medicine  Diplomate, [address] of Internal Medicine  10:04 AM  11/30/2020

## 2020-11-30 NOTE — PROGRESS NOTES
Comprehensive Nutrition Assessment    Type and Reason for Visit:  Initial, Wound    Nutrition Recommendations/Plan: Pt w/ increased needs for wound healing. Recommend Ensure HP BID w/ Nishant BID. Nutrition Assessment:  Pt w/ pmh malnutrition admin for fall w/ leg pain. S/p lumbar interbody infusion to L1 to L5 w/ corpectomy. Pt w/ no significant wt loss over the past year, w/ current poor po intake. Recs above. Malnutrition Assessment:  Malnutrition Status:  No malnutrition    Context:  Acute Illness     Findings of the 6 clinical characteristics of malnutrition:  Energy Intake:  Mild decrease in energy intake (Comment)  Weight Loss:  No significant weight loss     Body Fat Loss:  Unable to assess     Muscle Mass Loss:  Unable to assess    Fluid Accumulation:  No significant fluid accumulation     Strength:  Not Performed    Estimated Daily Nutrient Needs:  Energy (kcal):  1503; kcal; Weight Used for Energy Requirements:  Current     Protein (g):   g; Weight Used for Protein Requirements:  Ideal(1.5-1.8 g/kg IBW(56.81 kg))        Fluid (ml/day):  1600 ml; Method Used for Fluid Requirements:  1 ml/kcal      Nutrition Related Findings:  A/Ox4, abd wdl, +1 edema, I/O's WDL      Wounds:  Surgical Incision(Back incision)       Current Nutrition Therapies:    DIET GENERAL; Anthropometric Measures:  · Height: 5' 5\" (165.1 cm)  · Current Body Weight: 168 lb (76.2 kg)(11/28)   · Admission Body Weight: (Same as CBW)    · Usual Body Weight: 169 lb 15.6 oz (77.1 kg)(02/18/20 BS)     · Ideal Body Weight: 125 lbs; % Ideal Body Weight 134.4 %   · BMI: 28  · BMI Categories: Overweight (BMI 25.0-29. 9)       Nutrition Diagnosis:   · Increased nutrient needs related to increase demand for energy/nutrients as evidenced by wounds      Nutrition Interventions:   Food and/or Nutrient Delivery:  Continue Current Diet, Start Oral Nutrition Supplement  Nutrition Education/Counseling:  No recommendation at this time   Coordination of Nutrition Care:  Continue to monitor while inpatient    Goals:  Pt to consume >50% of all meals/ONS       Nutrition Monitoring and Evaluation:   Food/Nutrient Intake Outcomes:  Food and Nutrient Intake, Supplement Intake  Physical Signs/Symptoms Outcomes:  Biochemical Data, Nutrition Focused Physical Findings, Skin, Weight, GI Status, Fluid Status or Edema, Hemodynamic Status     Discharge Planning:     Too soon to determine     Electronically signed by YVONNE Reid RD on 11/30/20 at 9:23 AM EST    Contact: [phone]

## 2020-11-30 NOTE — CONSULTS
[address]                  [address] [facility]  [facility]                                  CONSULTATION    PATIENT NAME: Ciara Peoples               :        1945  MED REC NO:   [id number]                            ROOM:       8655  ACCOUNT NO:   [de-identified]                           ADMIT DATE: 2020  PROVIDER:     Kylee Manzanares MD    CONSULT DATE:  2020    CHIEF COMPLAINT:  Pain in the right lower extremity and status post  lumbar interbody fusion. HISTORY OF PRESENT ILLNESS:  This is a 57-year-old female who was  initially operated for epidural abscess by Dr. Eun Shah in 2019. She  was doing well until subsequently, she was found to have possible  infection in the psoas muscle. The patient subsequently was evaluated  at the [facility] in [facility] by Dr. Herbie Cox. He did posterior  lumbar interbody fusion, L1 to L5, in October and corpectomy of L3. Subsequently, she was found to have infection, which was managed. She  apparently fell a few days ago and since then has right foot drop and  excruciating pain in the right leg when she sits up. She is very  uncomfortable when she sits up but as long as she is lying down, she is  actually comfortable. The MRI scan of the lumbar spine which was  completed today was reported to show postsurgical changes post L3  corpectomy, and multiple level laminectomy, and posterior lumbar  interbody fusion from L1 to L5. She also had mild-to-moderate right  neuroforaminal narrowing at the level of L5-S1 secondary to disk bulge  and edema within the right psoas muscle suggestive of muscular strain  and infection cannot be ruled out. She also had edema in the L5  vertebral body without evident fracture. This study was reviewed by me.     PAST MEDICAL HISTORY:  Arthritis, coronary artery disease, constipation,  gout, hyperlipidemia, hypertension, macular hole of right eye, thyroid  disease. PAST SURGICAL HISTORY:  Abdominal surgery, back surgery, breast surgery,  cholecystectomy, colonoscopy, eye surgery, hysterectomy, joint  replacement, laminectomy, and tonsillectomy. PERSONAL HISTORY:  Allergy to ADRENALINE. SOCIAL HISTORY:  Not a smoker. Does use alcohol, but denies use of  street drugs. PHYSICAL EXAMINATION:  GENERAL:  She is a well-developed, well-nourished female in no acute  distress. NEUROLOGICAL:  She is alert, awake, and oriented to time, place and  person. Speech is good. Memory is good. Cranial nerves are grossly  intact. Handgrip is equal bilaterally. No focal deficit noted in the  upper extremities. She does have weakness, both lower extremities. She  has difficulty lifting the legs off the bed. She has foot drop on the  right side. Quadriceps muscle strength is about 2-3/5 on the right  side. She says sensations are relatively intact in the right lower  extremity. She has strength in the right lower extremity of 4/5. SKIN:  She has scar tissue which is relatively healed in the back. IMPRESSION:  1. Status post lumbar interbody fusion, L1 to L5, with corpectomy of L3  and possible infection of the psoas muscle. 2.  Right foot drop, the etiology of which is not clear, though the  possibility of Peroneal Nerve contusion is not ruled out. Anterior tibial compartment syndrome was ruled out by orthopedic service. 3.  Possible infection of the surgical site with possible psoas muscle  abscess. 4.  Edema of L5 which could be due to infectious process or edema  secondary to trauma with possible fracture and multiple medical  comorbidities. I have discussed the various options available to her and she is willing  to go back to [facility] in [facility] [employer] under the care of Dr. Isabella Faulkner for further management. I recommend the arrangements be made for  her transfer to [facility] [employer].         Celine Lofton MD    D: 11/29/2020 14:36:50       T: 11/29/2020 17:23:23     [email or web address]  Job#: [id number]     Doc#: [id number]    CC:

## 2020-12-01 PROBLEM — R26.2 UNABLE TO WALK: Status: ACTIVE | Noted: 2020-12-01

## 2020-12-01 LAB — SARS-COV-2, NAAT: NOT DETECTED

## 2020-12-01 PROCEDURE — U0002 COVID-19 LAB TEST NON-CDC: HCPCS

## 2020-12-01 PROCEDURE — 6370000000 HC RX 637 (ALT 250 FOR IP): Performed by: INTERNAL MEDICINE

## 2020-12-01 PROCEDURE — G0378 HOSPITAL OBSERVATION PER HR: HCPCS

## 2020-12-01 PROCEDURE — 6360000002 HC RX W HCPCS: Performed by: INTERNAL MEDICINE

## 2020-12-01 PROCEDURE — 2580000003 HC RX 258: Performed by: INTERNAL MEDICINE

## 2020-12-01 PROCEDURE — 1200000000 HC SEMI PRIVATE

## 2020-12-01 RX ORDER — QUETIAPINE FUMARATE 25 MG/1
25 TABLET, FILM COATED ORAL NIGHTLY
Qty: 60 TABLET | Refills: 3 | Status: ON HOLD | DISCHARGE
Start: 2020-12-01 | End: 2021-05-21

## 2020-12-01 RX ADMIN — ACETAMINOPHEN 1000 MG: 325 TABLET ORAL at 13:34

## 2020-12-01 RX ADMIN — SODIUM CHLORIDE, PRESERVATIVE FREE 10 ML: 5 INJECTION INTRAVENOUS at 20:14

## 2020-12-01 RX ADMIN — LEVOTHYROXINE SODIUM 137 MCG: 0.14 TABLET ORAL at 06:24

## 2020-12-01 RX ADMIN — ALPRAZOLAM 0.25 MG: 0.25 TABLET ORAL at 20:18

## 2020-12-01 RX ADMIN — ENOXAPARIN SODIUM 40 MG: 40 INJECTION SUBCUTANEOUS at 08:54

## 2020-12-01 RX ADMIN — OXYCODONE HYDROCHLORIDE 5 MG: 5 TABLET ORAL at 23:06

## 2020-12-01 RX ADMIN — PROBENECID 500 MG: 500 TABLET, FILM COATED ORAL at 20:18

## 2020-12-01 RX ADMIN — ATENOLOL 25 MG: 25 TABLET ORAL at 20:14

## 2020-12-01 RX ADMIN — OXYCODONE HYDROCHLORIDE 5 MG: 5 TABLET ORAL at 14:57

## 2020-12-01 RX ADMIN — Medication 1 TABLET: at 08:54

## 2020-12-01 RX ADMIN — ACETAMINOPHEN 1000 MG: 325 TABLET ORAL at 06:24

## 2020-12-01 RX ADMIN — ASPIRIN 81 MG CHEWABLE TABLET 81 MG: 81 TABLET CHEWABLE at 20:14

## 2020-12-01 RX ADMIN — PROBENECID 500 MG: 500 TABLET, FILM COATED ORAL at 08:53

## 2020-12-01 RX ADMIN — ACETAMINOPHEN 1000 MG: 325 TABLET ORAL at 20:18

## 2020-12-01 RX ADMIN — LOSARTAN POTASSIUM 100 MG: 50 TABLET, FILM COATED ORAL at 08:53

## 2020-12-01 RX ADMIN — PRAVASTATIN SODIUM 40 MG: 20 TABLET ORAL at 20:18

## 2020-12-01 RX ADMIN — OXYCODONE HYDROCHLORIDE 5 MG: 5 TABLET ORAL at 06:25

## 2020-12-01 RX ADMIN — SODIUM CHLORIDE, PRESERVATIVE FREE 10 ML: 5 INJECTION INTRAVENOUS at 08:54

## 2020-12-01 ASSESSMENT — PAIN DESCRIPTION - DESCRIPTORS
DESCRIPTORS: ACHING;CONSTANT;DISCOMFORT

## 2020-12-01 ASSESSMENT — PAIN DESCRIPTION - ONSET
ONSET: ON-GOING

## 2020-12-01 ASSESSMENT — PAIN SCALES - GENERAL
PAINLEVEL_OUTOF10: 9
PAINLEVEL_OUTOF10: 5
PAINLEVEL_OUTOF10: 0
PAINLEVEL_OUTOF10: 9
PAINLEVEL_OUTOF10: 8
PAINLEVEL_OUTOF10: 4
PAINLEVEL_OUTOF10: 6
PAINLEVEL_OUTOF10: 10

## 2020-12-01 ASSESSMENT — PAIN DESCRIPTION - PROGRESSION
CLINICAL_PROGRESSION: NOT CHANGED

## 2020-12-01 ASSESSMENT — PAIN DESCRIPTION - ORIENTATION
ORIENTATION: RIGHT

## 2020-12-01 ASSESSMENT — PAIN DESCRIPTION - LOCATION
LOCATION: BACK;HIP;LEG
LOCATION: BACK;HIP;LEG
LOCATION: HIP
LOCATION: BACK;HIP;LEG
LOCATION: BACK;HIP;LEG
LOCATION: HIP

## 2020-12-01 ASSESSMENT — PAIN DESCRIPTION - PAIN TYPE
TYPE: ACUTE PAIN

## 2020-12-01 ASSESSMENT — PAIN DESCRIPTION - FREQUENCY
FREQUENCY: INTERMITTENT

## 2020-12-01 NOTE — PROGRESS NOTES
[facility] PROGRESS NOTE      Chief complaint: Follow-up of suspected lumbar infection    The patient is a 66 y.o. female with history of CAD, gout, hypertension, hyperlipidemia, previously admitted from 11/14-11/17 after a fall and was seen by Dr. Vernon Ordonez for continuation of antibiotic treatment for L3 postinfectious vertebral collapse status post L2-L5 revision decompression and L1-L5 posterior interbody fusion at [facility] in 62/2604, complicated by ESBL E. coli bacteremia with pyelonephritis, psoas abscess and C. difficile colitis for which she was started on an 8-week course of meropenem from 10/07 and oral vancomycin, presented on 11/28 with worsening right leg pain. She reports having finished her antibiotics last week.      On admission, she was afebrile and hemodynamically stable with no leukocytosis. Inflammatory markers are slightly elevated with ESR of 27 mm/hr and CRP of 1.6 mg/dL. CT of the right hip showed postsurgical changes from posterior fusion at L4-L5 with lucency surrounding pedicle screws at L5 measuring up to 3 to 4 mm probably associated with loosening, 3.8 x 4.5 cm fluid collection posteriorly at L4-L5 with no internal foci of gas probably seroma versus infection. MRI of lumbar spine showed postsurgical changes status post L3 corpectomy, multilevel laminectomies and posterior lumbar fusion from L1-L5, edema within the right psoas muscle. She has been evaluated by Neurosurgery and has been recommended transfer to [facility] SARS-CoV-2 PCR was not detected. Subjective: Patient was seen and examined. No chills, no abdominal pain, no diarrhea, no rash, no itching. She reports unchanged back and right leg pain.       Objective:    Vitals:    12/01/20 0000   BP: (!) 107/55   Pulse: 77   Resp: 16   Temp: 97.8 °F (36.6 °C)   SpO2: 97%     Constitutional: Alert, not in distress  Respiratory: Clear breath sounds, no crackles, no wheezes  Cardiovascular: Regular rate and rhythm, no murmurs  Gastrointestinal: Bowel sounds present, soft, nontender  Skin: Warm and dry, no active dermatoses  Musculoskeletal: No joint swelling, no joint erythema    Labs, imaging, and medical records/notes were personally reviewed. Assessment:  Fluid collection, L5, with hardware in situ, concerning for infection  History of C difficile infection    Recommendations: Follow up blood cultures. Monitor clinically off antibiotics for now. There is no urgency to start antibiotic treatment for now until appropriate deep tissue and bone biopsy cultures are obtained since the patient is not septic and there is neither evidence of cord compression nor evidence of neurologic deficits. Recommend sending tissue specimen and bone biopsy for Gram stain, aerobic and anaerobic cultures, surgical pathology, which will likely be done at [facility]. Follow up transfer to [facility].     Thank you for involving me in the care of Tim Montgomery. I will continue to follow. Please do not hesitate to call for any questions or concerns.     Electronically signed by Aisha Lieberman MD on 12/1/2020 at 10:13 AM

## 2020-12-01 NOTE — PROGRESS NOTES
Impression:  1.  Status post lumbar interbody fusion, L1 to L5, with corpectomy of L3  and possible infection of the psoas muscle. 2.  Right foot drop, the etiology of which is not clear, though the  possibility of Peroneal Nerve contusion is not ruled out. Anterior tibial compartment syndrome was ruled out by orthopedic service. 3.  Possible infection of the surgical site with possible psoas muscle  abscess. 4.  Edema of L5 which could be due to infectious process or edema  secondary to trauma with possible fracture and multiple medical  Comorbidities. Awaiting transfer to [place]  Having lot of pain. Evaluated by ID:[email or web address] appreciated.

## 2020-12-01 NOTE — PROGRESS NOTES
Physical Therapy    Date: 2020       Patient Name: No Lai  : 1945      MRN: [id number]    Patient per medical record is transferring to [facility]. No PT attempted this date.       Augustina Sanchez, PT

## 2020-12-01 NOTE — CARE COORDINATION
12/1/2020 social work transition of care planning  Awaiting transfer to [address] [place] [address],[address] notes. A physician needs to initiate this action. Sw following.   Electronically signed by LENCHO Hong on 12/1/2020 at 8:04 AM

## 2020-12-01 NOTE — PROGRESS NOTES
Spoke to [facility] regarding pt transferring to [facility]. He will review this am and advise.

## 2020-12-01 NOTE — PLAN OF CARE
Problem: Falls - Risk of:  Goal: Will remain free from falls  Description: Will remain free from falls  12/1/2020 1850 by Igor viVood  Outcome: Met This Shift  12/1/2020 1210 by Igor viVood  Outcome: Met This Shift  Goal: Absence of physical injury  Description: Absence of physical injury  12/1/2020 1850 by Igor viVood  Outcome: Met This Shift  12/1/2020 1210 by Igor viVood  Outcome: Met This Shift     Problem: Pain:  Goal: Pain level will decrease  Description: Pain level will decrease  12/1/2020 1850 by Igor viVood  Outcome: Ongoing  12/1/2020 1210 by Igor viVood  Outcome: Not Met This Shift  Goal: Control of acute pain  Description: Control of acute pain  12/1/2020 1850 by Igor viVood  Outcome: Ongoing  12/1/2020 1210 by Igor viVood  Outcome: Not Met This Shift  Goal: Control of chronic pain  Description: Control of chronic pain  12/1/2020 1850 by Igor viVood  Outcome: Ongoing  12/1/2020 1210 by Igor viVood  Outcome: Not Met This Shift

## 2020-12-01 NOTE — DISCHARGE SUMMARY
Discharge Summary    James Pleitez  :  1945  MRN:  [id number]    Admit date:  2020  Discharge date:  2020 11:12 AM    Admitting Physician:  Terry Nelson MD    Discharge Diagnoses:    Patient Active Problem List    Diagnosis Date Noted    Uncontrolled pain 2020    Hip pain 2020    Intractable back pain 2019    Severe protein-calorie malnutrition ([address] 2019    Weakness 2019    Onychomycosis 2019    PVD (peripheral vascular disease) ([address] 2019    Difficulty walking 2019    Dehydration, moderate 2019    Acute kidney injury ([address] 2019    Hypotension due to hypovolemia 2019    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 2019    Pleural effusion 2019    Acute osteomyelitis of lumbar spine ([address] 2019    HLD (hyperlipidemia) 05/15/2019    Obesity 05/15/2019    HTN (hypertension) 2019    Pneumatosis coli        Past Medical Hx :   Past Medical History:   Diagnosis Date    Arthritis     CAD (coronary artery disease)     Constipation     Gout     CONTROLLED    Hyperlipidemia     Hypertension     STABLE    Macular hole, right eye     Thyroid disease        Past Surgical Hx :   Past Surgical History:   Procedure Laterality Date    ABDOMEN SURGERY      BACK SURGERY  2020    [facility] hospital    BREAST SURGERY Right 1982    BENIGN CYST    CHOLECYSTECTOMY      COLONOSCOPY      EYE SURGERY      HYSTERECTOMY  1985    JOINT REPLACEMENT      bilat knees     LAMINECTOMY N/A 6/3/2019    LUMBAR LAMINECTOMY POSTERIOR L3-L5, BONE BIOPSY L4 - GIOVANNY, X-RAY,  WANTS TF performed by Ga Medina MD at [facility] TONSILLECTOMY         Admission Condition:  fair    Discharged Condition:  fair    Labs:  CBC:   Lab Results   Component Value Date    WBC 6.0 2020    RBC 3.87 2020    HGB 10.8 2020    HCT 35.2 2020    MCV 91.0 2020    MCH 27.9 11/29/2020    MCHC 30.7 11/29/2020    RDW 15.7 11/29/2020     11/29/2020    MPV 10.1 11/29/2020     CMP:    Lab Results   Component Value Date     11/29/2020    K 4.2 11/29/2020     11/29/2020    CO2 24 11/29/2020    BUN 15 11/29/2020    CREATININE 0.8 11/29/2020    GFRAA >60 11/29/2020    LABGLOM >60 11/29/2020    GLUCOSE 80 11/29/2020    PROT 6.6 11/28/2020    LABALBU 3.0 11/28/2020    CALCIUM 9.1 11/29/2020    BILITOT 0.3 11/28/2020    ALKPHOS 165 11/28/2020    AST 19 11/28/2020    ALT 7 11/28/2020        Radiology Results: Xr Thoracic Spine (2 Views)    Result Date: 11/28/2020  EXAMINATION: XRAY VIEWS OF THE THORACIC SPINE 11/28/2020 2:16 pm COMPARISON: None. HISTORY: ORDERING SYSTEM PROVIDED HISTORY: back pain TECHNOLOGIST PROVIDED HISTORY: Reason for exam:->back pain What reading provider will be dictating this exam?->CRC FINDINGS: There are four views of the thoracic spine which are limited due to rotation as well as the patient being placed in the supine position on the x-ray table. Cross-table lateral view is limited. There is no gross compression fracture. There are degenerative changes of the thoracic spine. The paraspinal soft tissues are unremarkable. Degenerative endplate spurs are noted. Limited views of the thoracic spine. There is no gross compression fracture. Spondylosis    Xr Lumbar Spine (2-3 Views)    Result Date: 11/28/2020  EXAMINATION: THREE XRAY VIEWS OF THE LUMBAR SPINE; ONE XRAY VIEW OF THE PELVIS 11/28/2020 2:16 pm COMPARISON: Lumbar spine series from November 13, 2020. HISTORY: ORDERING SYSTEM PROVIDED HISTORY: back pain TECHNOLOGIST PROVIDED HISTORY: Reason for exam:->back pain What reading provider will be dictating this exam?->CRC FINDINGS: Lumbar spine: No obvious displaced rib fractures in the imaged ribs. 5 non-rib-bearing lumbar type vertebral bodies. Patient has undergone L1-L5 fusion. Cross-table lateral views obtained.   The thoracolumbar junction demonstrates minimal degenerative spurring but is otherwise unremarkable. Similarly there are mild degenerative changes at the lumbosacral junction. Overall the alignment appears maintained. Pelvis: SI joints appear open with noted inferior spurring. The patient is rotated on this exam which somewhat limits evaluation of the left acetabulum and portions of the left superior and inferior pubic rami. The right superior and inferior pubic rami appear intact. Limited evaluation of the left pelvic brim. Right pelvic brim appears intact. Moderate to severe right and mild left hip joint space narrowing with degenerative spurring and sclerosis. Grossly normal appearance of the femoral heads with preserved femoral head/neck trabeculations. No obvious cortical irregularities that would suggest fracture at this time. This exam is limited to exclude fractures in the lumbar spine/pelvis. In the lumbar spine cross-table lateral views were obtained and on the image of the pelvis the patient is rotated. If there is high concern for underlying acute osseous injury, cross-sectional imaging should be considered. 1.  L1-L5 posterior spinal fusion without obvious complication. Mild degenerative changes at the thoracolumbar and lumbosacral junctions. 2.  Mild bilateral SI joint degenerative changes. 3.  Moderate to severe right and mild left hip joint osteoarthritis. Xr Pelvis (1-2 Views)    Result Date: 11/28/2020  EXAMINATION: THREE XRAY VIEWS OF THE LUMBAR SPINE; ONE XRAY VIEW OF THE PELVIS 11/28/2020 2:16 pm COMPARISON: Lumbar spine series from November 13, 2020. HISTORY: ORDERING SYSTEM PROVIDED HISTORY: back pain TECHNOLOGIST PROVIDED HISTORY: Reason for exam:->back pain What reading provider will be dictating this exam?->CRC FINDINGS: Lumbar spine: No obvious displaced rib fractures in the imaged ribs. 5 non-rib-bearing lumbar type vertebral bodies. Patient has undergone L1-L5 fusion.   Cross-table lateral views obtained. The thoracolumbar junction demonstrates minimal degenerative spurring but is otherwise unremarkable. Similarly there are mild degenerative changes at the lumbosacral junction. Overall the alignment appears maintained. Pelvis: SI joints appear open with noted inferior spurring. The patient is rotated on this exam which somewhat limits evaluation of the left acetabulum and portions of the left superior and inferior pubic rami. The right superior and inferior pubic rami appear intact. Limited evaluation of the left pelvic brim. Right pelvic brim appears intact. Moderate to severe right and mild left hip joint space narrowing with degenerative spurring and sclerosis. Grossly normal appearance of the femoral heads with preserved femoral head/neck trabeculations. No obvious cortical irregularities that would suggest fracture at this time. This exam is limited to exclude fractures in the lumbar spine/pelvis. In the lumbar spine cross-table lateral views were obtained and on the image of the pelvis the patient is rotated. If there is high concern for underlying acute osseous injury, cross-sectional imaging should be considered. 1.  L1-L5 posterior spinal fusion without obvious complication. Mild degenerative changes at the thoracolumbar and lumbosacral junctions. 2.  Mild bilateral SI joint degenerative changes. 3.  Moderate to severe right and mild left hip joint osteoarthritis.     Mri Lumbar Spine Wo Contrast    Result Date: 11/29/2020  EXAMINATION: MRI OF THE LUMBAR SPINE WITHOUT CONTRAST, 11/29/2020 9:00 am TECHNIQUE: Multiplanar multisequence MRI of the lumbar spine was performed without the administration of intravenous contrast. COMPARISON: Radiographs 11/28/2020 and MRI lumbar spine 03/10/2020 HISTORY: ORDERING SYSTEM PROVIDED HISTORY: right hip pain, raidiating down leg TECHNOLOGIST PROVIDED HISTORY: Reason for exam:->right hip pain, raidiating down leg What reading provider will be dictating this exam?->CRC FINDINGS: BONES/ALIGNMENT: Postsurgical changes status post L3 corpectomy and posterior lumbar fusion from L1-L5 are noted. There is nonspecific bone marrow edema within the L5 vertebral body without fracture evident. Alignment is normal. There is disc desiccation and mild disc space narrowing at L5-S1. SPINAL CORD: The conus medullaris is normal in size and signal intensities and terminates normally. SOFT TISSUES: There is no paraspinal mass identified. There is edema within the right psoas muscle which could represent a muscular strain. L1-L2: There is no disc bulge or protrusion present. There is no significant spinal canal stenosis or neural foraminal narrowing present. L2-L3: Susceptibility artifact limits evaluation. There is however no significant spinal canal stenosis or neural foraminal narrowing evident. The sequelae of bilateral laminectomies is noted. L3-L4: Susceptibility artifact limits evaluation. There is however no significant spinal canal stenosis or neural foraminal narrowing. The sequelae of bilateral laminectomies is noted. L4-L5: The sequelae of bilateral laminectomies is noted. There is susceptibility artifact limited evaluation. There is no significant spinal canal stenosis or neural foraminal narrowing evident. L5-S1: There is a disc bulge and facet arthropathy. Mild-to-moderate right neural foraminal narrowing is present. There is no significant spinal canal stenosis or left neural foraminal narrowing. 1. Postsurgical changes status post L3 corpectomy, multilevel laminectomies and posterior lumbar fusion from L1-L5. 2. No significant spinal canal stenosis evident. 3. Mild-to-moderate right neural foraminal narrowing at L5-S1 secondary to a disc bulge and facet arthropathy. 4. Edema within the right psoas muscle suggestive of a muscular strain. 5. Nonspecific edema within the L5 vertebral body without fracture evident.     Xr Chest Portable    Result Date: 11/28/2020  EXAMINATION: ONE XRAY VIEW OF THE CHEST 11/28/2020 2:15 pm COMPARISON: None. HISTORY: ORDERING SYSTEM PROVIDED HISTORY: weakness TECHNOLOGIST PROVIDED HISTORY: Reason for exam:->weakness What reading provider will be dictating this exam?->CRC FINDINGS: The lungs are without acute focal process. There is no effusion or pneumothorax. The cardiomediastinal silhouette is without acute process. The osseous structures are without acute process. No acute process. Ct Hip Right Wo Contrast    Result Date: 11/29/2020  EXAMINATION: CT OF THE RIGHT HIP WITHOUT CONTRAST 11/29/2020 8:18 am TECHNIQUE: CT of the right hip was performed without the administration of intravenous contrast.  Multiplanar reformatted images are provided for review. Dose modulation, iterative reconstruction, and/or weight based adjustment of the mA/kV was utilized to reduce the radiation dose to as low as reasonably achievable. COMPARISON: None. HISTORY ORDERING SYSTEM PROVIDED HISTORY: right hip pain TECHNOLOGIST PROVIDED HISTORY: Reason for exam:->right hip pain What reading provider will be dictating this exam?->CRC FINDINGS: No acute fracture or dislocation. Osteopenia. Moderate to severe joint space narrowing with subchondral cystic change along the acetabulum and femoral head. No femoral head collapse. Small marginal osteophytes. No significant right hip joint effusion. Postsurgical changes from fusion at L4-L5. Pedicle screw in the right aspect of L5 demonstrates lucency surrounding the screw measuring up to 3 mm. Lucency surrounding the pedicle screw left aspect of L5 measures up to 3-4 mm. Laminectomy also noted at L4-L5 with fluid collection posteriorly measuring up to 3.8 x 4.5 cm. No internal foci of gas. Small fat containing umbilical hernia. No adenopathy. Sigmoid diverticulosis. No acute diverticulitis. Right hip osteoarthrosis. No fracture. No significant right hip joint effusion.  Postsurgical changes from posterior fusion at L4-L5 which is incompletely evaluated. Lucency surrounding pedicle screws at L5 measuring up to 3-4 mm. Findings can be seen in loosening. 3.8 x 4.5 cm fluid collection posterior soft tissues midline. No internal foci of gas. Findings may represent seroma however infection cannot be entirely excluded. Hospital Course:  CC: Fall with right leg pain     HPI: Patient states that she was instructed by staff to get up and walk to the bathroom she did so and fell. After falling she noted to have right leg pain. Currently she states that as long as she is lying on her back she feels fine but when she gets up her right leg really hurts  This apparently occurred a week ago however her pain has been worsening  Imaging did not disclose any acute problems  This morning she is lying comfortably in bed  Labs are satisfactory  Vitals are stable  Orthopedic evaluation concludes right lower extremity anterior tibial compartment contusion with foot drop  Interestingly she has a history of postinfectious vertebral collapse, and recent C. difficile colitis     11/30  Patient has been evaluated by neurosurgery  Suspicion is possible infection of the psoas muscle  Orthopedic reevaluation  Plan is to transfer to [facility] per orthopedics for evaluation of possible infection  Patient is stable and is aware of this and is agreeable     Initial orthopedic ASSESSMENT AND PLAN:  1. No clinical signs of anterior compartment syndrome of RLE this admission or last.  Possible Right LE Anterior tibial compartment contusion with drop foot.  L touch intact however. Possibly coming from spine but cause of drop foot a little uncertain. Continue with AFO. Patient to have family bring it to the hospital.  May need EMG at some point to further evaluate if it doesn't improve.   2. Recent history of ESBL ecoli Bacteremia- Meropenem  8 weeks   PICC line just removed  3. C diff colitis - po  vanc  Just finished  4. Post infectious vetebral Collapse   5. H/o Fall-  NO new fall from last admission.  Just increasing pain  6. Hypertension   7. CAD   8.  Stable jeyson TKA without evidence of extensor mechanism injury. 9.  Appreciate Dr Frank Quispe consult and evaluation. Neurosurgery impression:  1.  Status post lumbar interbody fusion, L1 to L5, with corpectomy of L3  and possible infection of the psoas muscle. 2.  Right foot drop, the etiology of which is not clear, though the  possibility of Peroneal Nerve contusion is not ruled out. Anterior tibial compartment syndrome was ruled out by orthopedic service. 3.  Possible infection of the surgical site with possible psoas muscle  abscess. 4.  Edema of L5 which could be due to infectious process or edema  secondary to trauma with possible fracture and multiple medical  Comorbidities. Awaiting transfer to [facility]  Having lot of pain. Recommend IOD consult    Infectious disease assessment:  Fluid collection, L5, with hardware in situ, concerning for infection  History of C difficile infection     Plan:  Check blood cultures. Check ESR and CRP. Monitor clinically off antibiotics for now. There is no urgency to start antibiotic treatment for now until appropriate deep tissue and bone biopsy cultures are obtained since the patient is not septic and there is neither evidence of cord compression nor evidence of neurologic deficits. Recommend sending tissue specimen and bone biopsy for Gram stain, aerobic and anaerobic cultures, surgical pathology. Follow up transfer to [facility].     Today all of the information as noted above along with radiology reports and labs were discussed in conference with Dr. Katia Swain  He has agreed to accept the patient general medical assessment at [facility]   transfer is expected today    Discharge Medications:    Vicente Cooper   Home Medication Instructions DWD:[id number]    Printed on:12/01/20 1112   Medication Information ALPRAZolam (XANAX) 0.5 MG tablet  Take 0.5 mg by mouth 2 times daily as needed for Sleep or Anxiety. aspirin 81 MG tablet  Take 81 mg by mouth nightly              atenolol (TENORMIN) 25 MG tablet  Take 25 mg by mouth nightly Hold for SBP <100 or P<60             atenolol (TENORMIN) 25 MG tablet  Take 1 tablet by mouth daily (with breakfast)             enoxaparin (LOVENOX) 40 MG/0.4ML injection  Inject 0.4 mLs into the skin daily             levothyroxine (SYNTHROID) 137 MCG tablet  Take 137 mcg by mouth Daily             losartan (COZAAR) 100 MG tablet  Take 100 mg by mouth daily             potassium chloride (KLOR-CON M) 20 MEQ extended release tablet  Take 2 tablets by mouth daily             pravastatin (PRAVACHOL) 40 MG tablet  Take 40 mg by mouth daily              probenecid (BENEMID) 500 MG tablet  Take 500 mg by mouth 2 times daily. QUEtiapine (SEROQUEL) 25 MG tablet  Take 1 tablet by mouth nightly             senna (SENOKOT) 8.6 MG tablet  Take 1 tablet by mouth as needed                  Discharge Exam:     General appearance: Normal, awake, alert no distress. Skin: Color, texture, turgor normal. No rashes or lesions. Head: Normocephalic. No masses, lesions, tenderness or abnormalities   Face: Symmetric no visible lesions  Eyes: Conjunctivae/cornea clear. [place]. Sclera non icteric. Neck:  Symmetric. No adenopathy. Chest: Even excursion   Lungs: Clear to auscultation. No rhonchi, crackles or rales. Heart: S1 > S2. Rhythm is regular and rate is normal. No gallop rub or murmur. Extremities as per orthopedics  Musculoskeletal: As noted above she has weakness of the right leg per orthopedic evaluation right leg raising is positive and there is a mild foot drop but sensation is intact  Neuro:   · Cranial nerves grossly intact. · Motor: Strength grossly normal.  Right leg weakness  · Sensory: grossly normal to touch.   Except as outlined above  Mental status: Awake, alert, cognizant of person, place, time.     Patient appears capable of directing self care   Mood: Normal and appropriate affect  Gait & balance: not assessed:           Disposition: Transferred to the service of Dr. Teresa Ragsdale at [facility]    Discussed the patient's status with Dr. Teresa Ragsdale at [facility] prior to initiating transfer protocol    Patient Instructions:   REFER TO AVR or [facility]AD[facility] document    Signed:  Treva Husain MD [address] [place] [address] Geriatric Medicine  12/1/2020, 11:12 AM

## 2020-12-02 LAB
ALBUMIN SERPL-MCNC: 2.5 G/DL (ref 3.5–5.2)
ALP BLD-CCNC: 143 U/L (ref 35–104)
ALT SERPL-CCNC: 5 U/L (ref 0–32)
ANION GAP SERPL CALCULATED.3IONS-SCNC: 9 MMOL/L (ref 7–16)
AST SERPL-CCNC: 18 U/L (ref 0–31)
BASOPHILS ABSOLUTE: 0.04 E9/L (ref 0–0.2)
BASOPHILS RELATIVE PERCENT: 0.8 % (ref 0–2)
BILIRUB SERPL-MCNC: 0.2 MG/DL (ref 0–1.2)
BUN BLDV-MCNC: 15 MG/DL (ref 8–23)
CALCIUM SERPL-MCNC: 8.8 MG/DL (ref 8.6–10.2)
CHLORIDE BLD-SCNC: 105 MMOL/L (ref 98–107)
CO2: 24 MMOL/L (ref 22–29)
CREAT SERPL-MCNC: 0.7 MG/DL (ref 0.5–1)
EOSINOPHILS ABSOLUTE: 0.23 E9/L (ref 0.05–0.5)
EOSINOPHILS RELATIVE PERCENT: 4.4 % (ref 0–6)
GFR AFRICAN AMERICAN: >60
GFR NON-AFRICAN AMERICAN: >60 ML/MIN/1.73
GLUCOSE BLD-MCNC: 125 MG/DL (ref 74–99)
HCT VFR BLD CALC: 37.8 % (ref 34–48)
HEMOGLOBIN: 11.4 G/DL (ref 11.5–15.5)
IMMATURE GRANULOCYTES #: 0 E9/L
IMMATURE GRANULOCYTES %: 0 % (ref 0–5)
LYMPHOCYTES ABSOLUTE: 1.14 E9/L (ref 1.5–4)
LYMPHOCYTES RELATIVE PERCENT: 21.9 % (ref 20–42)
MCH RBC QN AUTO: 28.7 PG (ref 26–35)
MCHC RBC AUTO-ENTMCNC: 30.2 % (ref 32–34.5)
MCV RBC AUTO: 95.2 FL (ref 80–99.9)
MONOCYTES ABSOLUTE: 0.49 E9/L (ref 0.1–0.95)
MONOCYTES RELATIVE PERCENT: 9.4 % (ref 2–12)
NEUTROPHILS ABSOLUTE: 3.3 E9/L (ref 1.8–7.3)
NEUTROPHILS RELATIVE PERCENT: 63.5 % (ref 43–80)
PDW BLD-RTO: 15.9 FL (ref 11.5–15)
PLATELET # BLD: 282 E9/L (ref 130–450)
PMV BLD AUTO: 10.3 FL (ref 7–12)
POTASSIUM SERPL-SCNC: 4.5 MMOL/L (ref 3.5–5)
RBC # BLD: 3.97 E12/L (ref 3.5–5.5)
SODIUM BLD-SCNC: 138 MMOL/L (ref 132–146)
TOTAL PROTEIN: 5.6 G/DL (ref 6.4–8.3)
WBC # BLD: 5.2 E9/L (ref 4.5–11.5)

## 2020-12-02 PROCEDURE — 36415 COLL VENOUS BLD VENIPUNCTURE: CPT

## 2020-12-02 PROCEDURE — 80053 COMPREHEN METABOLIC PANEL: CPT

## 2020-12-02 PROCEDURE — 6370000000 HC RX 637 (ALT 250 FOR IP): Performed by: INTERNAL MEDICINE

## 2020-12-02 PROCEDURE — 1200000000 HC SEMI PRIVATE

## 2020-12-02 PROCEDURE — 2580000003 HC RX 258: Performed by: INTERNAL MEDICINE

## 2020-12-02 PROCEDURE — 6360000002 HC RX W HCPCS: Performed by: INTERNAL MEDICINE

## 2020-12-02 PROCEDURE — 85025 COMPLETE CBC W/AUTO DIFF WBC: CPT

## 2020-12-02 RX ORDER — NYSTATIN AND TRIAMCINOLONE ACETONIDE 100000; 1 [USP'U]/G; MG/G
OINTMENT TOPICAL 4 TIMES DAILY
Status: DISCONTINUED | OUTPATIENT
Start: 2020-12-02 | End: 2020-12-03 | Stop reason: HOSPADM

## 2020-12-02 RX ORDER — TRAMADOL HYDROCHLORIDE 50 MG/1
50 TABLET ORAL EVERY 6 HOURS PRN
Status: DISCONTINUED | OUTPATIENT
Start: 2020-12-02 | End: 2020-12-03 | Stop reason: HOSPADM

## 2020-12-02 RX ORDER — OXYCODONE HYDROCHLORIDE 10 MG/1
10 TABLET ORAL EVERY 6 HOURS PRN
Status: DISCONTINUED | OUTPATIENT
Start: 2020-12-02 | End: 2020-12-03 | Stop reason: HOSPADM

## 2020-12-02 RX ORDER — KETOROLAC TROMETHAMINE 30 MG/ML
15 INJECTION, SOLUTION INTRAMUSCULAR; INTRAVENOUS EVERY 6 HOURS PRN
Status: DISCONTINUED | OUTPATIENT
Start: 2020-12-02 | End: 2020-12-03 | Stop reason: HOSPADM

## 2020-12-02 RX ORDER — FLUCONAZOLE 100 MG/1
150 TABLET ORAL DAILY
Status: DISCONTINUED | OUTPATIENT
Start: 2020-12-02 | End: 2020-12-03 | Stop reason: HOSPADM

## 2020-12-02 RX ADMIN — Medication 1 TABLET: at 09:07

## 2020-12-02 RX ADMIN — ACETAMINOPHEN 1000 MG: 325 TABLET ORAL at 22:27

## 2020-12-02 RX ADMIN — ATENOLOL 25 MG: 25 TABLET ORAL at 22:23

## 2020-12-02 RX ADMIN — OXYCODONE HYDROCHLORIDE 5 MG: 5 TABLET ORAL at 09:13

## 2020-12-02 RX ADMIN — PRAVASTATIN SODIUM 40 MG: 20 TABLET ORAL at 22:22

## 2020-12-02 RX ADMIN — ENOXAPARIN SODIUM 40 MG: 40 INJECTION SUBCUTANEOUS at 09:07

## 2020-12-02 RX ADMIN — SODIUM CHLORIDE, PRESERVATIVE FREE 10 ML: 5 INJECTION INTRAVENOUS at 09:07

## 2020-12-02 RX ADMIN — ACETAMINOPHEN 1000 MG: 325 TABLET ORAL at 06:10

## 2020-12-02 RX ADMIN — PROBENECID 500 MG: 500 TABLET, FILM COATED ORAL at 09:06

## 2020-12-02 RX ADMIN — SODIUM CHLORIDE, PRESERVATIVE FREE 10 ML: 5 INJECTION INTRAVENOUS at 22:24

## 2020-12-02 RX ADMIN — ALPRAZOLAM 0.25 MG: 0.25 TABLET ORAL at 22:33

## 2020-12-02 RX ADMIN — TRAMADOL HYDROCHLORIDE 50 MG: 50 TABLET ORAL at 15:44

## 2020-12-02 RX ADMIN — OXYCODONE HYDROCHLORIDE 10 MG: 10 TABLET ORAL at 18:11

## 2020-12-02 RX ADMIN — PROBENECID 500 MG: 500 TABLET, FILM COATED ORAL at 22:22

## 2020-12-02 RX ADMIN — FLUCONAZOLE 150 MG: 100 TABLET ORAL at 22:21

## 2020-12-02 RX ADMIN — LEVOTHYROXINE SODIUM 137 MCG: 0.14 TABLET ORAL at 06:10

## 2020-12-02 RX ADMIN — ASPIRIN 81 MG CHEWABLE TABLET 81 MG: 81 TABLET CHEWABLE at 22:26

## 2020-12-02 RX ADMIN — NYSTATIN AND TRIAMCINOLONE ACETONIDE: 100000; 1 OINTMENT TOPICAL at 22:23

## 2020-12-02 RX ADMIN — LOSARTAN POTASSIUM 100 MG: 50 TABLET, FILM COATED ORAL at 09:07

## 2020-12-02 RX ADMIN — ACETAMINOPHEN 1000 MG: 325 TABLET ORAL at 15:33

## 2020-12-02 ASSESSMENT — PAIN DESCRIPTION - DESCRIPTORS
DESCRIPTORS: ACHING;DISCOMFORT;SORE
DESCRIPTORS: ACHING;DISCOMFORT;SORE

## 2020-12-02 ASSESSMENT — PAIN SCALES - GENERAL
PAINLEVEL_OUTOF10: 4
PAINLEVEL_OUTOF10: 8
PAINLEVEL_OUTOF10: 8
PAINLEVEL_OUTOF10: 7
PAINLEVEL_OUTOF10: 0
PAINLEVEL_OUTOF10: 5
PAINLEVEL_OUTOF10: 8
PAINLEVEL_OUTOF10: 0
PAINLEVEL_OUTOF10: 4
PAINLEVEL_OUTOF10: 5

## 2020-12-02 ASSESSMENT — PAIN DESCRIPTION - PAIN TYPE
TYPE: ACUTE PAIN;CHRONIC PAIN
TYPE: ACUTE PAIN;CHRONIC PAIN

## 2020-12-02 ASSESSMENT — PAIN DESCRIPTION - LOCATION
LOCATION: BACK
LOCATION: BACK

## 2020-12-02 ASSESSMENT — PAIN DESCRIPTION - ONSET
ONSET: ON-GOING
ONSET: ON-GOING

## 2020-12-02 ASSESSMENT — PAIN DESCRIPTION - FREQUENCY
FREQUENCY: CONTINUOUS
FREQUENCY: CONTINUOUS

## 2020-12-02 NOTE — PROGRESS NOTES
Spoke to [address],Fourth Floor [phone]. Pt is on the list, they are just waiting on bed availability. They anticipate dc's today.   They will call us when bed is available

## 2020-12-02 NOTE — PROGRESS NOTES
Date: 2020       Patient Name: Amanda Young  : 1945      MRN: [id number]    Pt declining physical therapy due to pain and wanting to be transferred to [facility]. Back and foot brace present.   Will follow up as appropriate    Vik Santana PT, DPT  [id number]

## 2020-12-02 NOTE — PROGRESS NOTES
[facility] PROGRESS NOTE      Chief complaint: Follow-up of suspected lumbar infection    The patient is a 66 y.o. female with history of CAD, gout, hypertension, hyperlipidemia, previously admitted from 11/14-11/17 after a fall and was seen by Dr. Vernon Ordonez for continuation of antibiotic treatment for L3 postinfectious vertebral collapse status post L2-L5 revision decompression and L1-L5 posterior interbody fusion at [facility] in [id number], complicated by ESBL E. coli bacteremia with pyelonephritis, psoas abscess and C. difficile colitis for which she was started on an 8-week course of meropenem from 10/07 and oral vancomycin, presented on 11/28 with worsening right leg pain. She reports having finished her antibiotics last week.      On admission, she was afebrile and hemodynamically stable with no leukocytosis. Inflammatory markers are slightly elevated with ESR of 27 mm/hr and CRP of 1.6 mg/dL. CT of the right hip showed postsurgical changes from posterior fusion at L4-L5 with lucency surrounding pedicle screws at L5 measuring up to 3 to 4 mm probably associated with loosening, 3.8 x 4.5 cm fluid collection posteriorly at L4-L5 with no internal foci of gas probably seroma versus infection. MRI of lumbar spine showed postsurgical changes status post L3 corpectomy, multilevel laminectomies and posterior lumbar fusion from L1-L5, edema within the right psoas muscle. She has been evaluated by Neurosurgery and has been recommended transfer to [facility]. SARS-CoV-2 PCR was not detected. Blood cultures showed no growth to date. Subjective: Patient was seen and examined. No chills, no abdominal pain, no diarrhea, no rash, no itching. She reports unchanged back and right leg pain, less so if she lays flat.       Objective:    Vitals:    12/02/20 0800   BP: 136/61   Pulse: 70   Resp: 18   Temp: 97 °F (36.1 °C)   SpO2: 96%     Constitutional: Alert, not in distress  Respiratory: Clear breath sounds, no crackles, no wheezes  Cardiovascular: Regular rate and rhythm, no murmurs  Gastrointestinal: Bowel sounds present, soft, nontender  Skin: Warm and dry, no active dermatoses  Musculoskeletal: No joint swelling, no joint erythema    Labs, imaging, and medical records/notes were personally reviewed. Assessment:  Fluid collection, L5, with hardware in situ, concerning for infection  History of C difficile infection    Recommendations: Follow up blood cultures. Monitor clinically off antibiotics for now. There is no urgency to start antibiotic treatment for now until appropriate deep tissue and bone biopsy cultures are obtained since the patient is not septic and there is neither evidence of cord compression nor evidence of neurologic deficits. Recommend sending tissue specimen and bone biopsy for Gram stain, aerobic and anaerobic cultures, surgical pathology, which [facility]. Follow up transfer to [facility].     Thank you for involving me in the care of No Lai. I will continue to follow. Please do not hesitate to call for any questions or concerns.     Electronically signed by Parth Canada MD on 12/2/2020 at 10:50 AM

## 2020-12-02 NOTE — PROGRESS NOTES
Chief Complaint:  Chief Complaint   Patient presents with    Hip Pain     fall 1 week ago, seen at [address] [place] [address] on 11/26 with diagnosis of old healing fracture     Hip pain     Subjective:    Patient continues to complain of severe low back pain. States that the oxycodone 5 mg every 8 hours is not doing anything for her. Wants a stronger dose. Objective:    /61   Pulse 70   Temp 97 °F (36.1 °C) (Temporal)   Resp 18   Ht 5' 5\" (1.651 m)   Wt 168 lb (76.2 kg)   SpO2 96%   BMI 27.96 kg/m²     Current medications that patient is taking have been reviewed.     General appearance: NAD, conversant  HEENT: AT/NC, MMM  Neck: FROM, supple  Lungs: Clear to auscultation, WOB normal  CV: RRR, no MRGs  Abdomen: Soft, non-tender; no masses or HSM, +BS  Extremities: No peripheral edema or digital cyanosis  Skin: no rash, lesions or ulcers  Psych: Calm and cooperative  Neuro: Alert and interactive, nonfocal    Labs:  CBC with Differential:    Lab Results   Component Value Date    WBC 6.0 11/29/2020    RBC 3.87 11/29/2020    HGB 10.8 11/29/2020    HCT 35.2 11/29/2020     11/29/2020    MCV 91.0 11/29/2020    MCH 27.9 11/29/2020    MCHC 30.7 11/29/2020    RDW 15.7 11/29/2020    LYMPHOPCT 17.8 11/28/2020    MONOPCT 9.4 11/28/2020    BASOPCT 1.0 11/28/2020    MONOSABS 0.69 11/28/2020    LYMPHSABS 1.31 11/28/2020    EOSABS 0.21 11/28/2020    BASOSABS 0.07 11/28/2020     CMP:    Lab Results   Component Value Date     11/29/2020    K 4.2 11/29/2020     11/29/2020    CO2 24 11/29/2020    BUN 15 11/29/2020    CREATININE 0.8 11/29/2020    GFRAA >60 11/29/2020    LABGLOM >60 11/29/2020    GLUCOSE 80 11/29/2020    PROT 6.6 11/28/2020    LABALBU 3.0 11/28/2020    CALCIUM 9.1 11/29/2020    BILITOT 0.3 11/28/2020    ALKPHOS 165 11/28/2020    AST 19 11/28/2020    ALT 7 11/28/2020          Assessment/Plan:  Principal Problem:    Hip pain  Active Problems:    HTN (hypertension)    HLD (hyperlipidemia)    Difficulty walking    Uncontrolled pain    Unable to walk  Resolved Problems:    * No resolved hospital problems. *       Increase pain medication to oxycodone 10 mg every 6 hours as needed. Add Toradol and tramadol for lower degrees of pain    Has been several days since labs has been checked. Recheck today. Hold on antibiotics per ID.     Awaiting bed at [facility]    3:53 PM  12/2/2020  Cell: [phone]

## 2020-12-02 NOTE — PLAN OF CARE
Problem: Falls - Risk of:  Goal: Will remain free from falls  Description: Will remain free from falls  12/2/2020 0210 by Gayatri Sutton RN  Outcome: Met This Shift     Problem: Falls - Risk of:  Goal: Absence of physical injury  Description: Absence of physical injury  12/2/2020 0210 by Gayatri Sutton RN  Outcome: Met This Shift

## 2020-12-02 NOTE — CARE COORDINATION
12/2/2020 social work transition of care planning  Sw notified [facility] [phone] of pt waiting for a bed at [facility]. Sw followed up with pt to discuss transition of care planning and to update status of  transfer. Pt was already aware of status fir  transfer. Sw discussed sohail, until bed available at [facility]. Pt declined sohail. Pt stated that she was unable to work with PT due to uncontrolled pain (has since been addressed). Sw explained that transport to [facility] may be self pay,due to pt preference. Pt stated that they can bill her (because she doesn't have her checkbook). Sw will follow.   Electronically signed by LENCHO Pearl on 12/2/2020 at 12:35 PM

## 2020-12-02 NOTE — PROGRESS NOTES
Nothing new to add from neurosurgical stand point at this time. There is no change in patient's neurosurgical status. Will continue to follow along.   Awaiting transfer to [facility]

## 2020-12-02 NOTE — PLAN OF CARE
PT REFUSES TO WORK WITH PHYSICALTHERAPY. PT LIES FLAT IN BED. PT STATES\" IT HURTS TOO MUCH TO GET UP\" ' WHEN I WAS AT HOME THE NURSE SAID GET OUT OF THE BED, SO I DID AND I FELL SO I AM NOT LISTENING TO ANYONE\"

## 2020-12-02 NOTE — PROGRESS NOTES
OT consult received and appreciated. Chart reviewed. Will hold evaluation due to patient refusing due to pain and wanting to be transferred to [place]. Back and foot brace present . Will evaluate at a later time. Thank you.  Daniela Osman, OTR/L #[id number]

## 2020-12-02 NOTE — CARE COORDINATION
12/2/2020 social work transition of care planning  Mike spoke with James Glasgow at [facility] [phone]. Per James Glasgow, they are expecting some beds to open up this evening. Mike will discuss alternatives with pt today. [facility] called and spoke with floor manger about pt needing an inpatient order. Mike notified CM.   Electronically signed by LENCHO Adams on 12/2/2020 at 11:02 AM]

## 2020-12-03 VITALS
HEIGHT: 65 IN | BODY MASS INDEX: 27.99 KG/M2 | HEART RATE: 72 BPM | SYSTOLIC BLOOD PRESSURE: 153 MMHG | RESPIRATION RATE: 16 BRPM | TEMPERATURE: 97.6 F | OXYGEN SATURATION: 96 % | DIASTOLIC BLOOD PRESSURE: 69 MMHG | WEIGHT: 168 LBS

## 2020-12-03 PROCEDURE — 6370000000 HC RX 637 (ALT 250 FOR IP): Performed by: INTERNAL MEDICINE

## 2020-12-03 RX ADMIN — OXYCODONE HYDROCHLORIDE 10 MG: 10 TABLET ORAL at 01:09

## 2020-12-03 ASSESSMENT — PAIN DESCRIPTION - PROGRESSION: CLINICAL_PROGRESSION: NOT CHANGED

## 2020-12-03 ASSESSMENT — PAIN SCALES - GENERAL: PAINLEVEL_OUTOF10: 6

## 2020-12-03 ASSESSMENT — PAIN DESCRIPTION - LOCATION: LOCATION: BACK

## 2020-12-03 ASSESSMENT — PAIN DESCRIPTION - DESCRIPTORS: DESCRIPTORS: ACHING

## 2020-12-03 ASSESSMENT — PAIN DESCRIPTION - FREQUENCY: FREQUENCY: CONTINUOUS

## 2020-12-03 ASSESSMENT — PAIN DESCRIPTION - ORIENTATION: ORIENTATION: RIGHT

## 2020-12-03 ASSESSMENT — PAIN DESCRIPTION - ONSET: ONSET: ON-GOING

## 2020-12-03 ASSESSMENT — PAIN DESCRIPTION - PAIN TYPE: TYPE: ACUTE PAIN

## 2020-12-03 NOTE — PROGRESS NOTES
Discharged to [facility] at [facility], with belongings and discharge paperwork via [facility] transport. Nurse to nurse was called to [facility] 60 .

## 2020-12-05 LAB
BLOOD CULTURE, ROUTINE: NORMAL
CULTURE, BLOOD 2: NORMAL

## 2020-12-19 NOTE — DISCHARGE SUMMARY
levothyroxine (SYNTHROID) 137 MCG tablet Take 137 mcg by mouth DailyHistorical Med      senna (SENOKOT) 8.6 MG tablet Take 1 tablet by mouth as needed Historical Med      potassium chloride (KLOR-CON M) 20 MEQ extended release tablet Take 2 tablets by mouth daily, Disp-60 tablet, R-3Normal      docusate sodium (COLACE, DULCOLAX) 100 MG CAPS Take 100 mg by mouth 2 times daily, Disp-30 capsule, R-0Normal      !! atenolol (TENORMIN) 25 MG tablet Take 25 mg by mouth nightly Hold for SBP <100 or P<60Historical Med      pravastatin (PRAVACHOL) 40 MG tablet Take 40 mg by mouth 2 times daily Historical Med      probenecid (BENEMID) 500 MG tablet Take 500 mg by mouth 2 times daily. Historical Med      aspirin 81 MG tablet Take 81 mg by mouth nightly Historical Med      multivitamin (THERAGRAN) per tablet Take 1 tablet by mouth daily. Historical Med       !! - Potential duplicate medications found. Please discuss with provider.       STOP taking these medications       amoxicillin-clavulanate (AUGMENTIN) 875-125 MG per tablet Comments:   Reason for Stopping:         gabapentin (NEURONTIN) 100 MG capsule Comments:   Reason for Stopping:         mirtazapine (REMERON) 15 MG tablet Comments:   Reason for Stopping:         apixaban (ELIQUIS STARTER PACK) 5 MG TABS tablet Comments:   Reason for Stopping:         diclofenac sodium 1 % GEL Comments:   Reason for Stopping:         omeprazole (PRILOSEC) 20 MG delayed release capsule Comments:   Reason for Stopping:         torsemide (DEMADEX) 20 MG tablet Comments:   Reason for Stopping:         cyclobenzaprine (FLEXERIL) 10 MG tablet Comments:   Reason for Stopping:         magnesium hydroxide (MILK OF MAGNESIA) 400 MG/5ML suspension Comments:   Reason for Stopping:         cyanocobalamin 1000 MCG/ML injection Comments:   Reason for Stopping:         acetaminophen (TYLENOL) 325 MG tablet Comments:   Reason for Stopping:             Activity: activity as tolerated  Diet: cardiac diet    Follow-up with SNF doc in 1 week.     Note that over 30 minutes was spent in preparing discharge papers, discussing discharge with patient, medication review, etc.    Signed:  James Gonzalez  12/19/2020  7:55 AM

## 2021-03-24 ENCOUNTER — OFFICE VISIT (OUTPATIENT)
Dept: VASCULAR SURGERY | Age: 76
End: 2021-03-24
Payer: MEDICARE

## 2021-03-24 VITALS — DIASTOLIC BLOOD PRESSURE: 78 MMHG | SYSTOLIC BLOOD PRESSURE: 128 MMHG

## 2021-03-24 DIAGNOSIS — I82.511 CHRONIC DEEP VEIN THROMBOSIS (DVT) OF FEMORAL VEIN OF RIGHT LOWER EXTREMITY (HCC): Chronic | ICD-10-CM

## 2021-03-24 PROBLEM — I82.509 CHRONIC DEEP VEIN THROMBOSIS (DVT) (HCC): Chronic | Status: ACTIVE | Noted: 2021-03-24

## 2021-03-24 PROCEDURE — 99203 OFFICE O/P NEW LOW 30 MIN: CPT | Performed by: SURGERY

## 2021-03-24 NOTE — PROGRESS NOTES
Vascular Surgery Outpatient Consultation        Reason for Consult:    Chief Complaint   Patient presents with   [facility] Consult     Question DVT bilateral lower legs. Requesting Physician:  No referring provider defined for this encounter. Chief Complaint   Patient presents with    Surgical Consult     Question DVT bilateral lower legs. HISTORY OF PRESENT ILLNESS:                The patient is a 66 y.o. female who is referred for evaluation of patient with history of deep venous thrombosis. Overall she is doing very well. She has had back surgery. She has had knee surgery. She is recently had lower extremity DVT. She is on anticoagulation. She still has an open wound of her back. She denies any right-sided left-sided weakness numbness or vision changes. She has chronic lower extremity leg swelling. She is on anticoagulation and denies any chest pain shortness of breath or discomfort. She presents with her family. She has a history of a right lower extremity DVT. Now she has a history of a left lower extremity DVT although I cannot personally see those results. Past Medical History:        Diagnosis Date    Arthritis     Constipation     Gout     CONTROLLED    Hyperlipidemia     Hypertension     STABLE    Macular hole, right eye     Thyroid disease      Past Surgical History:        Procedure Laterality Date    ABDOMEN SURGERY      BACK [facility]  09/2020    [facility]    BREAST SURGERY Right 1982    BENIGN CYST    CHOLECYSTECTOMY  1997    COLONOSCOPY      EYE SURGERY      HYSTERECTOMY  1985    JOINT REPLACEMENT      bilat knees     LAMINECTOMY N/A 6/3/2019    LUMBAR LAMINECTOMY POSTERIOR L3-L5, BONE BIOPSY L4 - GIOVANNY, X-RAY,  WANTS TF performed by Du Ennis MD at [address]      TONSILLECTOMY       Current Medications:   Prior to Admission medications    Medication Sig Start Date End Date Taking?  Authorizing Provider   apixaban (ELIQUIS) 5 MG phone: Not on file     Gets together: Not on file     Attends Gnosticist service: Not on file     Active member of club or organization: Not on file     Attends meetings of clubs or organizations: Not on file     Relationship status: Not on file    Intimate partner violence     Fear of current or ex partner: Not on file     Emotionally abused: Not on file     Physically abused: Not on file     Forced sexual activity: Not on file   Other Topics Concern    Not on file   Social History Narrative    Not on file        Family History   Problem Relation Age of Onset    Other Mother        REVIEW OF SYSTEMS (New symptoms):    Eyes:      Blurred vision:  No [x]/Yes []               Diplopia:   No [x]/Yes []               Vision loss:       No [x]/Yes []   Ears, nose, throat:             Hearing loss:    No [x]/Yes []      Vertigo:   No [x]/Yes []                       Swallowing problem:  No [x]/Yes []               Nose bleeds:   No [x]/Yes []      Voice hoarseness:  No [x]/Yes []  Respiratory:             Cough:   No [x]/Yes []      Pleuritic chest pain:  No [x]/Yes []                        Dyspnea:   No [x]/Yes []      Wheezing:   No [x]/Yes []  Cardiovascular:             Angina:   No [x]/Yes []      Palpitations:   No [x]/Yes []          Claudication:    No [x]/Yes []      Leg swelling:   No [x]/Yes []  Gastrointestinal:             Nausea or vomiting:  No [x]/Yes []               Abdominal pain:  No [x]/Yes []                     Intestinal bleeding: No [x]/Yes []  Musculoskeletal:             Leg pain:   No [x]/Yes []      Back pain:   No [x]/Yes []                    Weakness:   No [x]/Yes []  Neurologic:             Numbness:   No [x]/Yes []      Paralysis:   No [x]/Yes []                       Headaches:   No [x]/Yes []  Hematologic, lymphatic:   Anemia:   No [x]/Yes []              Bleeding or bruising:  No [x]/Yes []              Fevers or chills: No [x]/Yes []  Endocrine:             Temp intolerance:   No [x]/Yes []                       Polydipsia, polyuria:  No [x]/Yes []  Skin:              Rash:    No [x]/Yes []      Ulcers:   No [x]/Yes []              Abnorm pigment: No [x]/Yes []  :              Frequency/urgency:  No [x]/Yes []      Hematuria:    No [x]/Yes []                      Incontinence:    No [x]/Yes []  Recent back surgery with an open wound. PHYSICAL EXAM:  Vitals:    03/24/21 1356   BP: 128/78     General Appearance: alert and oriented to person, place and time, well developed and well- nourished, in no acute distress  Skin: warm and dry, no rash or erythema bilateral lower extremity leg swelling approximately 2+. No ulcerations. Motor and sensation are intact. She does have a back wound that is currently present. Head: normocephalic and atraumatic  Eyes: extraocular eye movements intact, conjunctivae normal  ENT: external ear and ear canal normal bilaterally, nose without deformity  Pulmonary/Chest: clear to auscultation bilaterally- no wheezes, rales or rhonchi, normal air movement, no respiratory distress  Cardiovascular: normal rate, regular rhythm, normal S1 and S2, no murmurs, no carotid bruits  Abdomen: soft, non-tender, non-distended, normal bowel sounds, no masses or organomegaly  Musculoskeletal: normal range of motion, no joint swelling, deformity or tenderness  Neurologic: no cranial nerve deficit, gait, coordination and speech normal  Extremities: Bilateral lower extremity leg swelling. DP and PT are palpable. Brachial radials are palpable. Problem List Items Addressed This Visit     Chronic deep vein thrombosis (DVT) (HCC) (Chronic)            #1 DVT of the right extremity. Last ultrasound that I can find was in 2019 which was extensive DVT of the right extremity. Right now she has no sequelae. I recommend compression stockings and to continue with compression therapy. She is not a fall risk and otherwise is doing well.   She did have an extensive DVT the prior study in May demonstrated no evidence of DVT. At this point she has no current complaints. I like to see her back in 6 months and then we can revisit this issue. She will call if is any questions or concerns or issues all questions were answered according to the family satisfaction of the patient satisfaction    She has a history of a right DVT as stated above. She also has a history of a left DVT according to the notes although I do not have those results. Therefore she now has had 2 deep venous thromboses. In and around associated at the time of surgery      No follow-ups on file.

## 2021-05-21 ENCOUNTER — APPOINTMENT (OUTPATIENT)
Dept: CT IMAGING | Age: 76
End: 2021-05-21
Payer: MEDICARE

## 2021-05-21 ENCOUNTER — APPOINTMENT (OUTPATIENT)
Dept: GENERAL RADIOLOGY | Age: 76
End: 2021-05-21
Payer: MEDICARE

## 2021-05-21 ENCOUNTER — HOSPITAL ENCOUNTER (OUTPATIENT)
Age: 76
Setting detail: OBSERVATION
LOS: 1 days | Discharge: HOME | End: 2021-05-22
Attending: EMERGENCY MEDICINE | Admitting: INTERNAL MEDICINE
Payer: MEDICARE

## 2021-05-21 ENCOUNTER — APPOINTMENT (OUTPATIENT)
Dept: MRI IMAGING | Age: 76
End: 2021-05-21
Payer: MEDICARE

## 2021-05-21 DIAGNOSIS — R29.90 STROKE-LIKE EPISODE: Primary | ICD-10-CM

## 2021-05-21 LAB
ALBUMIN SERPL-MCNC: 4.1 G/DL (ref 3.5–5.2)
ALP BLD-CCNC: 110 U/L (ref 35–104)
ALT SERPL-CCNC: 13 U/L (ref 0–32)
ANION GAP SERPL CALCULATED.3IONS-SCNC: 12 MMOL/L (ref 7–16)
APTT: 32.1 SEC (ref 24.5–35.1)
AST SERPL-CCNC: 22 U/L (ref 0–31)
BASOPHILS ABSOLUTE: 0.05 E9/L (ref 0–0.2)
BASOPHILS RELATIVE PERCENT: 0.6 % (ref 0–2)
BILIRUB SERPL-MCNC: 0.3 MG/DL (ref 0–1.2)
BUN BLDV-MCNC: 22 MG/DL (ref 6–23)
CALCIUM SERPL-MCNC: 9.4 MG/DL (ref 8.6–10.2)
CHLORIDE BLD-SCNC: 103 MMOL/L (ref 98–107)
CO2: 24 MMOL/L (ref 22–29)
CREAT SERPL-MCNC: 1 MG/DL (ref 0.5–1)
EOSINOPHILS ABSOLUTE: 0.19 E9/L (ref 0.05–0.5)
EOSINOPHILS RELATIVE PERCENT: 2.1 % (ref 0–6)
GFR AFRICAN AMERICAN: >60
GFR NON-AFRICAN AMERICAN: 54 ML/MIN/1.73
GLUCOSE BLD-MCNC: 81 MG/DL (ref 74–99)
HCT VFR BLD CALC: 37.8 % (ref 34–48)
HEMOGLOBIN: 11 G/DL (ref 11.5–15.5)
IMMATURE GRANULOCYTES #: 0.03 E9/L
IMMATURE GRANULOCYTES %: 0.3 % (ref 0–5)
INR BLD: 1.1
LYMPHOCYTES ABSOLUTE: 1.75 E9/L (ref 1.5–4)
LYMPHOCYTES RELATIVE PERCENT: 19.6 % (ref 20–42)
MCH RBC QN AUTO: 22.4 PG (ref 26–35)
MCHC RBC AUTO-ENTMCNC: 29.1 % (ref 32–34.5)
MCV RBC AUTO: 77 FL (ref 80–99.9)
METER GLUCOSE: 89 MG/DL (ref 74–99)
MONOCYTES ABSOLUTE: 1.04 E9/L (ref 0.1–0.95)
MONOCYTES RELATIVE PERCENT: 11.7 % (ref 2–12)
NEUTROPHILS ABSOLUTE: 5.85 E9/L (ref 1.8–7.3)
NEUTROPHILS RELATIVE PERCENT: 65.7 % (ref 43–80)
PDW BLD-RTO: 17.1 FL (ref 11.5–15)
PLATELET # BLD: 277 E9/L (ref 130–450)
PMV BLD AUTO: 11.8 FL (ref 7–12)
POTASSIUM REFLEX MAGNESIUM: 4.3 MMOL/L (ref 3.5–5)
PROTHROMBIN TIME: 12 SEC (ref 9.3–12.4)
RBC # BLD: 4.91 E12/L (ref 3.5–5.5)
SODIUM BLD-SCNC: 139 MMOL/L (ref 132–146)
TOTAL PROTEIN: 7.7 G/DL (ref 6.4–8.3)
TROPONIN: 0.05 NG/ML (ref 0–0.03)
WBC # BLD: 8.9 E9/L (ref 4.5–11.5)

## 2021-05-21 PROCEDURE — 70498 CT ANGIOGRAPHY NECK: CPT | Performed by: RADIOLOGY

## 2021-05-21 PROCEDURE — 0042T CT BRAIN PERFUSION: CPT | Performed by: RADIOLOGY

## 2021-05-21 PROCEDURE — 70496 CT ANGIOGRAPHY HEAD: CPT | Performed by: RADIOLOGY

## 2021-05-21 PROCEDURE — 70450 CT HEAD/BRAIN W/O DYE: CPT

## 2021-05-21 PROCEDURE — 99222 1ST HOSP IP/OBS MODERATE 55: CPT | Performed by: PSYCHIATRY & NEUROLOGY

## 2021-05-21 PROCEDURE — G0378 HOSPITAL OBSERVATION PER HR: HCPCS

## 2021-05-21 PROCEDURE — 6360000004 HC RX CONTRAST MEDICATION: Performed by: RADIOLOGY

## 2021-05-21 PROCEDURE — 71045 X-RAY EXAM CHEST 1 VIEW: CPT

## 2021-05-21 PROCEDURE — 6370000000 HC RX 637 (ALT 250 FOR IP): Performed by: INTERNAL MEDICINE

## 2021-05-21 PROCEDURE — 0042T CT BRAIN PERFUSION: CPT

## 2021-05-21 PROCEDURE — 70496 CT ANGIOGRAPHY HEAD: CPT

## 2021-05-21 PROCEDURE — 85025 COMPLETE CBC W/AUTO DIFF WBC: CPT

## 2021-05-21 PROCEDURE — 70551 MRI BRAIN STEM W/O DYE: CPT

## 2021-05-21 PROCEDURE — 6370000000 HC RX 637 (ALT 250 FOR IP): Performed by: STUDENT IN AN ORGANIZED HEALTH CARE EDUCATION/TRAINING PROGRAM

## 2021-05-21 PROCEDURE — 36415 COLL VENOUS BLD VENIPUNCTURE: CPT

## 2021-05-21 PROCEDURE — 80053 COMPREHEN METABOLIC PANEL: CPT

## 2021-05-21 PROCEDURE — 99284 EMERGENCY DEPT VISIT MOD MDM: CPT

## 2021-05-21 PROCEDURE — 2580000003 HC RX 258: Performed by: INTERNAL MEDICINE

## 2021-05-21 PROCEDURE — 70498 CT ANGIOGRAPHY NECK: CPT

## 2021-05-21 PROCEDURE — 85730 THROMBOPLASTIN TIME PARTIAL: CPT

## 2021-05-21 PROCEDURE — 82962 GLUCOSE BLOOD TEST: CPT

## 2021-05-21 PROCEDURE — 84484 ASSAY OF TROPONIN QUANT: CPT

## 2021-05-21 PROCEDURE — 85610 PROTHROMBIN TIME: CPT

## 2021-05-21 RX ORDER — PROMETHAZINE HYDROCHLORIDE 25 MG/1
12.5 TABLET ORAL EVERY 6 HOURS PRN
Status: DISCONTINUED | OUTPATIENT
Start: 2021-05-21 | End: 2021-05-22 | Stop reason: HOSPADM

## 2021-05-21 RX ORDER — ACETAMINOPHEN 325 MG/1
650 TABLET ORAL EVERY 4 HOURS PRN
Status: DISCONTINUED | OUTPATIENT
Start: 2021-05-21 | End: 2021-05-22 | Stop reason: HOSPADM

## 2021-05-21 RX ORDER — ASPIRIN 81 MG/1
324 TABLET, CHEWABLE ORAL ONCE
Status: COMPLETED | OUTPATIENT
Start: 2021-05-21 | End: 2021-05-21

## 2021-05-21 RX ORDER — PROBENECID 500 MG/1
500 TABLET, FILM COATED ORAL 2 TIMES DAILY
Status: DISCONTINUED | OUTPATIENT
Start: 2021-05-21 | End: 2021-05-22 | Stop reason: HOSPADM

## 2021-05-21 RX ORDER — SODIUM CHLORIDE 0.9 % (FLUSH) 0.9 %
5-40 SYRINGE (ML) INJECTION PRN
Status: DISCONTINUED | OUTPATIENT
Start: 2021-05-21 | End: 2021-05-22 | Stop reason: HOSPADM

## 2021-05-21 RX ORDER — ALPRAZOLAM 0.25 MG/1
0.5 TABLET ORAL 2 TIMES DAILY PRN
Status: DISCONTINUED | OUTPATIENT
Start: 2021-05-21 | End: 2021-05-21

## 2021-05-21 RX ORDER — SODIUM CHLORIDE 9 MG/ML
25 INJECTION, SOLUTION INTRAVENOUS PRN
Status: DISCONTINUED | OUTPATIENT
Start: 2021-05-21 | End: 2021-05-22 | Stop reason: HOSPADM

## 2021-05-21 RX ORDER — ONDANSETRON 2 MG/ML
4 INJECTION INTRAMUSCULAR; INTRAVENOUS EVERY 6 HOURS PRN
Status: DISCONTINUED | OUTPATIENT
Start: 2021-05-21 | End: 2021-05-22 | Stop reason: HOSPADM

## 2021-05-21 RX ORDER — QUETIAPINE FUMARATE 25 MG/1
25 TABLET, FILM COATED ORAL NIGHTLY
Status: DISCONTINUED | OUTPATIENT
Start: 2021-05-21 | End: 2021-05-21

## 2021-05-21 RX ORDER — SODIUM CHLORIDE 0.9 % (FLUSH) 0.9 %
5-40 SYRINGE (ML) INJECTION EVERY 12 HOURS SCHEDULED
Status: DISCONTINUED | OUTPATIENT
Start: 2021-05-21 | End: 2021-05-22 | Stop reason: HOSPADM

## 2021-05-21 RX ORDER — POTASSIUM CHLORIDE 20 MEQ/1
40 TABLET, EXTENDED RELEASE ORAL DAILY
Status: DISCONTINUED | OUTPATIENT
Start: 2021-05-21 | End: 2021-05-21

## 2021-05-21 RX ORDER — LEVOTHYROXINE SODIUM 137 UG/1
137 TABLET ORAL DAILY
Status: DISCONTINUED | OUTPATIENT
Start: 2021-05-21 | End: 2021-05-22 | Stop reason: HOSPADM

## 2021-05-21 RX ORDER — ASPIRIN 300 MG/1
300 SUPPOSITORY RECTAL DAILY
Status: DISCONTINUED | OUTPATIENT
Start: 2021-05-21 | End: 2021-05-22 | Stop reason: HOSPADM

## 2021-05-21 RX ORDER — ASPIRIN 81 MG/1
81 TABLET ORAL DAILY
Status: DISCONTINUED | OUTPATIENT
Start: 2021-05-21 | End: 2021-05-22 | Stop reason: HOSPADM

## 2021-05-21 RX ORDER — POLYETHYLENE GLYCOL 3350 17 G/17G
17 POWDER, FOR SOLUTION ORAL DAILY PRN
Status: DISCONTINUED | OUTPATIENT
Start: 2021-05-21 | End: 2021-05-22 | Stop reason: HOSPADM

## 2021-05-21 RX ORDER — ATORVASTATIN CALCIUM 40 MG/1
40 TABLET, FILM COATED ORAL NIGHTLY
Status: DISCONTINUED | OUTPATIENT
Start: 2021-05-21 | End: 2021-05-22 | Stop reason: HOSPADM

## 2021-05-21 RX ADMIN — METOPROLOL TARTRATE 25 MG: 25 TABLET, FILM COATED ORAL at 21:37

## 2021-05-21 RX ADMIN — ACETAMINOPHEN 650 MG: 325 TABLET ORAL at 18:08

## 2021-05-21 RX ADMIN — SODIUM CHLORIDE, PRESERVATIVE FREE 10 ML: 5 INJECTION INTRAVENOUS at 21:38

## 2021-05-21 RX ADMIN — ASPIRIN 324 MG: 81 TABLET, CHEWABLE ORAL at 16:55

## 2021-05-21 RX ADMIN — PROBENECID 500 MG: 500 TABLET, FILM COATED ORAL at 21:37

## 2021-05-21 RX ADMIN — ATORVASTATIN CALCIUM 40 MG: 40 TABLET, FILM COATED ORAL at 21:37

## 2021-05-21 RX ADMIN — ASPIRIN 81 MG: 81 TABLET, COATED ORAL at 21:37

## 2021-05-21 RX ADMIN — IOPAMIDOL 120 ML: 755 INJECTION, SOLUTION INTRAVENOUS at 14:47

## 2021-05-21 ASSESSMENT — PAIN DESCRIPTION - LOCATION: LOCATION: HEAD

## 2021-05-21 ASSESSMENT — PAIN SCALES - GENERAL
PAINLEVEL_OUTOF10: 3
PAINLEVEL_OUTOF10: 0
PAINLEVEL_OUTOF10: 1

## 2021-05-21 ASSESSMENT — PAIN DESCRIPTION - PAIN TYPE: TYPE: ACUTE PAIN

## 2021-05-21 NOTE — ED PROVIDER NOTES
Department of Emergency Medicine   ED  Provider Note  Admit Date/RoomTime: 5/21/2021  2:33 PM  ED Room: 10/10        5/21/21  2:44 PM EDT    Stroke Alert called: Yes    HISTORY OF PRESENT ILLNESS:  (Nurses Notes Reviewed)    Chief Complaint:   Extremity Weakness (left side weak with facial droop around 1300 today, s/s resolved)      Source of history provided by:  patient. History/Exam Limitations: none. Rome Maldonado is a 66 y.o. old female presenting to the emergency department by EMS, with sudden onset of left sided weakness, which began suddenly. Last known well time: 1pm.  The episode occurred at home. Since recognized the symptoms have been improving. Patient is a 49-year-old female presents today with difficulty with speech as well as left-sided numbness and weakness. Symptoms started around 1 PM today. She states she was unable to talk for a short period time and noticed weakness in her left upper and lower extremity. Denies recent fall, trauma or injury. No previous history of CVA. Patient is on Eliquis, however is unsure of why she is on this medication. She states the speech has resolved and on arrival she is speaking in full sentences, however does have minor left-sided facial droop as well as numbness and weakness in the left upper and lower extremity. Code Status on file: Prior. NIH Stroke Scale at time of initial evaluation:      NIH Stroke Scale/Score at time of initial evaluation:  1A: Level of Consciousness 0 - alert; keenly responsive   1B: Ask Month and Age 0 - answers both questions correctly   1C:  Tell Patient To Open and Close Eyes, then Hand  Squeeze 0 - performs both tasks correctly   2: Test Horizontal Extraocular Movements 0 - normal   3: Test Visual Fields 0 - no visual loss   4: Test Facial Palsy 1 - minor paralysis (flattened nasolabial fold, asymmetric on smiling)   5A: Test Left Arm Motor Drift 1 - drift, limb holds 90 (or 45) degrees but drifts down before full 10 seconds: does not hit bed   5B: Test Right Arm Motor Drift 0 - no drift, limb holds 90 (or 45) degrees for full 10 seconds   6A: Test Left Leg Motor Drift 2 - some effort against gravity; leg falls to bed by 5 seconds but has some effort against gravity   6B: Test Right Leg Motor Drift 0 - no drift; leg holds 30 degree position for full 5 seconds   7: Test Limb Ataxia   (FNF/Heel-Shin) 0 - absent   8: Test Sensation 0 - normal; no sensory loss   9: Test Language/Aphasia 0 - no aphasia, normal   10: Test Dysarthria 0 - normal   11: Test Extinction/Inattention 0 - no abnormality   Total 4         Past Medical History:  has a past medical history of Arthritis, Constipation, Gout, Hyperlipidemia, Hypertension, Macular hole, right eye, and Thyroid disease. Past Surgical History:  has a past surgical history that includes Breast surgery (Right, 1982); Hysterectomy (1985); Cholecystectomy (1997); laminectomy (N/A, 6/3/2019); skin biopsy; Abdomen surgery; Colonoscopy; eye surgery; joint replacement; Tonsillectomy; and back surgery (09/2020). Social History:  reports that she has never smoked. She has never used smokeless tobacco. She reports current alcohol use. She reports that she does not use drugs. Prior Functional Status(Modified [place] Scale):  0=No symptoms at all    Family History: family history includes Other in her mother. The patients home medications have been reviewed. Prior to Admission medications    Medication Sig Start Date End Date Taking?  Authorizing Provider   apixaban (ELIQUIS) 5 MG TABS tablet Take 5 mg by mouth 2 times daily    Historical Provider, MD   metoprolol tartrate (LOPRESSOR) 25 MG tablet Take 25 mg by mouth 2 times daily    Historical Provider, MD   QUEtiapine (SEROQUEL) 25 MG tablet Take 1 tablet by mouth nightly  Patient not taking: Reported on 3/24/2021 12/1/20   Tracy Vinson MD   ALPRAZolam Juan Nath) 0.5 MG tablet Take 0.5 mg by mouth 2 times daily as needed for Sleep or Anxiety. Historical Provider, MD   levothyroxine (SYNTHROID) 137 MCG tablet Take 137 mcg by mouth Daily    Historical Provider, MD   senna (SENOKOT) 8.6 MG tablet Take 1 tablet by mouth as needed     Historical Provider, MD   potassium chloride (KLOR-CON M) 20 MEQ extended release tablet Take 2 tablets by mouth daily  Patient not taking: Reported on 3/24/2021 6/7/19   Ethel Huynh MD   pravastatin (PRAVACHOL) 40 MG tablet Take 40 mg by mouth daily     Historical Provider, MD   probenecid (BENEMID) 500 MG tablet Take 500 mg by mouth 2 times daily. Historical Provider, MD       Allergies: Adrenalin [epinephrine hcl (nasal)] and Epinephrine       Review of Systems:   Pertinent positives and negatives are stated within HPI, all other systems reviewed and are negative.    ---------------------------------------------------PHYSICAL EXAM--------------------------------------    Constitutional/General: Alert and oriented x3, well appearing, non toxic in NAD  Head: Normocephalic and atraumatic  Eyes: PERRL, EOMI  Mouth: Oropharynx clear, handling secretions, no trismus. facial droop  Neck: Supple, full ROM, non tender to palpation in the midline, no stridor, no crepitus, no meningeal signs  Pulmonary: Lungs clear to auscultation bilaterally, no wheezes, rales, or rhonchi. Not in respiratory distress  Cardiovascular:  Regular rate. Regular rhythm. No murmurs, gallops, or rubs. 2+ distal pulses  Chest: no chest wall tenderness  Abdomen: Soft. Non tender. Non distended. +BS. No rebound, guarding, or rigidity. No pulsatile masses appreciated. Musculoskeletal: Moves all extremities x 4. Warm and well perfused, no clubbing, cyanosis, or edema. Capillary refill <3 seconds  Skin: warm and dry. No rashes. Neurologic: GCS 15, CN 2-12 grossly intact, mild drift of left upper and lower extremity, speech is intact please also see NIH stroke scale.   Psych: Normal Affect      -------------------------------------------------- RESULTS -------------------------------------------------  All laboratory and imaging studies have been reviewed by myself    LABS:  Results for orders placed or performed during the hospital encounter of 05/21/21   CBC Auto Differential   Result Value Ref Range    WBC 8.9 4.5 - 11.5 E9/L    RBC 4.91 3.50 - 5.50 E12/L    Hemoglobin 11.0 (L) 11.5 - 15.5 g/dL    Hematocrit 37.8 34.0 - 48.0 %    MCV 77.0 (L) 80.0 - 99.9 fL    MCH 22.4 (L) 26.0 - 35.0 pg    MCHC 29.1 (L) 32.0 - 34.5 %    RDW 17.1 (H) 11.5 - 15.0 fL    Platelets 586 199 - 799 E9/L    MPV 11.8 7.0 - 12.0 fL    Neutrophils % 65.7 43.0 - 80.0 %    Immature Granulocytes % 0.3 0.0 - 5.0 %    Lymphocytes % 19.6 (L) 20.0 - 42.0 %    Monocytes % 11.7 2.0 - 12.0 %    Eosinophils % 2.1 0.0 - 6.0 %    Basophils % 0.6 0.0 - 2.0 %    Neutrophils Absolute 5.85 1.80 - 7.30 E9/L    Immature Granulocytes # 0.03 E9/L    Lymphocytes Absolute 1.75 1.50 - 4.00 E9/L    Monocytes Absolute 1.04 (H) 0.10 - 0.95 E9/L    Eosinophils Absolute 0.19 0.05 - 0.50 E9/L    Basophils Absolute 0.05 0.00 - 0.20 E9/L   Comprehensive Metabolic Panel w/ Reflex to MG   Result Value Ref Range    Sodium 139 132 - 146 mmol/L    Potassium reflex Magnesium 4.3 3.5 - 5.0 mmol/L    Chloride 103 98 - 107 mmol/L    CO2 24 22 - 29 mmol/L    Anion Gap 12 7 - 16 mmol/L    Glucose 81 74 - 99 mg/dL    BUN 22 6 - 23 mg/dL    CREATININE 1.0 0.5 - 1.0 mg/dL    GFR Non-African American 54 >=60 mL/min/1.73    GFR African American >60     Calcium 9.4 8.6 - 10.2 mg/dL    Total Protein 7.7 6.4 - 8.3 g/dL    Albumin 4.1 3.5 - 5.2 g/dL    Total Bilirubin 0.3 0.0 - 1.2 mg/dL    Alkaline Phosphatase 110 (H) 35 - 104 U/L    ALT 13 0 - 32 U/L    AST 22 0 - 31 U/L   Troponin   Result Value Ref Range    Troponin 0.05 (H) 0.00 - 0.03 ng/mL   Protime-INR   Result Value Ref Range    Protime 12.0 9.3 - 12.4 sec    INR 1.1    APTT   Result Value Ref Range    aPTT 32.1 24.5 - 35.1 sec   POCT Glucose   Result Value Ref Range    Meter Glucose 89 74 - 99 mg/dL       RADIOLOGY:  Interpreted by Radiologist.  [address]   Final Result   1. Stable findings. No intracranial hemorrhage or acute intracranial   disease. 2.  Age-appropriate cerebral atrophy. 3.  Carotid and vertebrobasilar atherosclerotic calcifications. 4.  CT perfusion and CTA brain exams to follow and reported separately. STROKE PROTOCOL. CT findings reported by Dr. Kelsy Page via telephone to the ER   referring service at 1536 hours. XR CHEST PORTABLE   Final Result   No pneumonia or pleural effusion. CTA HEAD W CONTRAST   Final Result   1. Mild atherosclerotic disease . 2. Estimated stenosis of the proximal right and left internal carotid   artery by NASCET criteria is not hemodynamically significant         This study was analyzed by the [address] ai algorithm. CTA NECK W CONTRAST   Final Result   1. Mild atherosclerotic disease . 2. Estimated stenosis of the proximal right and left internal carotid   artery by NASCET criteria is not hemodynamically significant         This study was analyzed by the [address] ai algorithm. CT BRAIN PERFUSION   Final Result      No significant ischemic penumbra identified      This study was analyzed by the [employer]. ai algorithm. MRI BRAIN WO CONTRAST    (Results Pending)           ------------------------- NURSING NOTES AND VITALS REVIEWED ---------------------------   The nursing notes within the ED encounter and vital signs as below have been reviewed. BP (!) 194/85   Pulse 76   Temp 97.1 °F (36.2 °C) (Infrared)   Resp 23   Ht 5' 5\" (1.651 m)   Wt 168 lb (76.2 kg)   SpO2 95%   BMI 27.96 kg/m²   Oxygen Saturation Interpretation: Normal    The patients available past medical records and past encounters were reviewed.         ------------------------------ ED COURSE/MEDICAL DECISION MAKING----------------------  Medications   aspirin chewable tablet 324 mg (has no administration in time range)   perflutren lipid microspheres (DEFINITY) injection 1.65 mg (has no administration in time range)   iopamidol (ISOVUE-370) 76 % injection 120 mL (120 mLs Intravenous Given 5/21/21 1447)       Based upon patient's stroke like symptoms a stroke neurology consult is indicated. Consult to Neurology completed. [facility]) Neurology via Telepresence      Acute CVA Core Measures:   Last Known to be Well (Stroke Diagnosis)  Date Last Known Well: 05/21/21  Time Last Known Well: 56  NIH Stroke Scale Total:    t-PA Eligibility: was not recommeded by [facility] Neurologist and under the order of the ED physician       Medical Decision Making:    Patient is a 78-year-old female presents today for left facial droop and left-sided weakness. On physical exam patient has NIH of 4. Lab work imaging was obtained. Imaging shows mild atherosclerotic disease, no evidence of acute ischemic penumbra. Telestroke was consulted, patient is not a TPA candidate as she is on Eliquis at home. Patient is found to have improvement of symptoms. Lab work shows no acute abnormalities. Patient will be admitted to the hospital for MRI and further evaluation by neurology. She was given aspirin at the recommendation of neurology. Re-Evaluations:             Re-evaluation. Patients symptoms are improving    This patient's ED course included: a personal history and physicial eaxmination    This patient has remained hemodynamically stable during their ED course. Consultations: The case has been discussed with Telestroke, they agree this patient is NOT t-PA eligible. Counseling: The emergency provider has spoken with the patient and discussed todays presentation, in addition to providing specific details for the plan of care and counseling regarding the diagnosis and prognosis. Questions are answered at this time and they are agreeable with the plan.       --------------------------------- IMPRESSION AND DISPOSITION ---------------------------------    IMPRESSION  1.  Stroke-like episode        DISPOSITION  Disposition: Admit to telemetry  Patient condition is fair              Stefano Sahu DO  Resident  05/21/21 9601

## 2021-05-21 NOTE — VIRTUAL HEALTH
1 [facility] Stroke and Vascular Neurology Consult for  [address] Stroke Alert through [address] @ 14:54  5/21/2021 5:16 PM  Pt Name: Shelly Tripp  MRN: [id number]  YOB: 1945  Date of evaluation: 5/21/2021  Primary Care Physician: Carlito Suarez DO  Reason for Evaluation: Stroke Evaluation with Discussion with Ed or primary team with Telemedicine and stroke evaluation with Review of imaging and labs    Shelly Tripp is a 66 y.o. female who presents with history of eliquis off for upcoming prcoedure for a few days, hld, htn with last well 13:00 and noted trouble speaking and left upper and lower extremity weakness. Gradual improvement since onset and while in ED. NIH 4. On telemedicine exam, improving speech with some word finding difficulty but able to name and repeat. Drift improving      LKW: 13:00  NIH:  39    Allergies  is allergic to adrenalin [epinephrine hcl (nasal)] and epinephrine. Medications  Prior to Admission medications    Medication Sig Start Date End Date Taking? Authorizing Provider   apixaban (ELIQUIS) 5 MG TABS tablet Take 5 mg by mouth 2 times daily    Historical Provider, MD   metoprolol tartrate (LOPRESSOR) 25 MG tablet Take 25 mg by mouth 2 times daily    Historical Provider, MD   QUEtiapine (SEROQUEL) 25 MG tablet Take 1 tablet by mouth nightly  Patient not taking: Reported on 3/24/2021 12/1/20   Ellis Mello MD   ALPRAZolam Annmarie Espinal) 0.5 MG tablet Take 0.5 mg by mouth 2 times daily as needed for Sleep or Anxiety.     Historical Provider, MD   levothyroxine (SYNTHROID) 137 MCG tablet Take 137 mcg by mouth Daily    Historical Provider, MD   senna (SENOKOT) 8.6 MG tablet Take 1 tablet by mouth as needed     Historical Provider, MD   potassium chloride (KLOR-CON M) 20 MEQ extended release tablet Take 2 tablets by mouth daily  Patient not taking: Reported on 3/24/2021 6/7/19   Marilyn Lee MD   pravastatin (PRAVACHOL) 40 MG tablet Take 40 mg by mouth daily     Historical Provider, MD   probenecid (BENEMID) 500 MG tablet Take 500 mg by mouth 2 times daily. Historical Provider, MD    Scheduled Meds:   apixaban  5 mg Oral BID    levothyroxine  137 mcg Oral Daily    metoprolol tartrate  25 mg Oral BID    potassium chloride  40 mEq Oral Daily    probenecid  500 mg Oral BID    QUEtiapine  25 mg Oral Nightly    sodium chloride flush  5-40 mL Intravenous 2 times per day    aspirin  81 mg Oral Daily    Or    aspirin  300 mg Rectal Daily    atorvastatin  40 mg Oral Nightly     Continuous Infusions:   sodium chloride       PRN Meds:. ALPRAZolam, sodium chloride flush, sodium chloride, promethazine **OR** ondansetron, polyethylene glycol, perflutren lipid microspheres  Past Medical History   has a past medical history of Arthritis, Constipation, Gout, Hyperlipidemia, Hypertension, Macular hole, right eye, and Thyroid disease. Social History  Social History     Socioeconomic History    Marital status:      Spouse name: Not on file    Number of children: Not on file    Years of education: Not on file    Highest education level: Not on file   Occupational History    Not on file   Tobacco Use    Smoking status: Never Smoker    Smokeless tobacco: Never Used   Vaping Use    Vaping Use: Never used   Substance and Sexual Activity    Alcohol use: Yes     Comment: Occasional    Drug use: No    Sexual activity: Not Currently   Other Topics Concern    Not on file   Social History Narrative    Not on file     Social Determinants of Health     Financial Resource Strain:     Difficulty of Paying Living Expenses:    Food Insecurity:     Worried About Running Out of Food in the Last Year:     [address] in the Last Year:    Transportation Needs:     Lack of Transportation (Medical):      Lack of Transportation (Non-Medical):    Physical Activity:     Days of Exercise per Week:     Minutes of Exercise per Session: Stress:     Feeling of Stress :    Social Connections:     Frequency of Communication with Friends and Family:     Frequency of Social Gatherings with Friends and Family:     Attends Mandaen Services:     Active Member of Clubs or Organizations:     Attends Club or Organization Meetings:     Marital Status:    Intimate Partner Violence:     Fear of Current or Ex-Partner:     Emotionally Abused:     Physically Abused:     Sexually Abused:      Family History      Problem Relation Age of Onset    Other Mother        OBJECTIVE  BP (!) 206/90   Pulse 71   Temp 97.2 °F (36.2 °C)   Resp 16   Ht 5' 5\" (1.651 m)   Wt 168 lb (76.2 kg)   SpO2 97%   BMI 27.96 kg/m²     Gen: Sitting in bed, nad  CV:RRR  NEURO: alert and oriented x 3 able to read sentences and name with occ word finding difficulty. Able to follow commands  CN: eomi, perrl, no facial asymmetry  MOTOR moving all extremities minor left drift  Sensory: intact lt  COORD: no dysmetria    Pre-Morbid mRS: 2    Imaging:  Images were personally reviewed with VIZ. AI and PACS used to review images including:  CT brain without contrast: no hemorrhage  CTA imaging: no large vessel occlusion    Assessment      Rome Maldonado is a 66 y.o. female who presents with history of eliquis off for upcoming prcoedure for a few days, hld, htn with last well 13:00 and noted trouble speaking and left upper and lower extremity weakness. Differential DDx:  1. Possible stroke/tia      Recommendations:  1. NIH 3  2. Recommend Inpatient Neurology Consult for further assessment and evaluation   3. No iv tpa as symptoms improving and unclear if on eliquis recently. 4. Mri brain without steve if possible  5. Fluids  6. Permissive htn 140-180 if neuro exam stable  7. Aspirin until decision to resume anticoagulation  8. Statins for goal ldl<70  9.  Pt/ot, rehab eval. Language eval      Discussed with ED Physician    At least 60 min of Telemedicine and time in conversation directly with ED staff and physician for the patient who is in imminent and life threatening deterioration without further treatment and evaluation. This Virtual Visit was conducted with patient's (and/or legal guardian's) consent, to provide telestroke consultation and necessary medical care. Time spent examining patient, reviewing the images personally, reviewing the chart, perform high complexity decision making and speaking with the nursing staff regarding recommendations      Kalin Huerta MD, MD   Stroke, Neurocritical Care And/or [address] [place] [address] Stroke [address]  Electronically signed 5/21/2021 at 5:16 PM    Patient Location:  [address] [place] [address]    Provider Location ([facility]): [place], [place]    This virtual visit was conducted via interactive/real-time audio/video.

## 2021-05-21 NOTE — Clinical Note
Patient Class: Inpatient [101]   REQUIRED: Diagnosis: Stroke-like symptom [969716]   Estimated Length of Stay: Estimated stay of more than 2 midnights   Future Attending Provider: Perico Fountain [[id number]]   Admitting Provider: Perico Fountain [[id number]]   Telemetry/Cardiac Monitoring Required?: Yes

## 2021-05-21 NOTE — ED NOTES
Bed: 10  Expected date:   Expected time:   Means of arrival:   Comments:  [address], RN  05/21/21 3774

## 2021-05-22 VITALS
TEMPERATURE: 97.4 F | HEIGHT: 65 IN | HEART RATE: 64 BPM | WEIGHT: 168 LBS | BODY MASS INDEX: 27.99 KG/M2 | DIASTOLIC BLOOD PRESSURE: 59 MMHG | SYSTOLIC BLOOD PRESSURE: 169 MMHG | RESPIRATION RATE: 16 BRPM | OXYGEN SATURATION: 98 %

## 2021-05-22 LAB
CHOLESTEROL, TOTAL: 159 MG/DL (ref 0–199)
HBA1C MFR BLD: 5.3 % (ref 4–5.6)
HCT VFR BLD CALC: 31.7 % (ref 34–48)
HDLC SERPL-MCNC: 42 MG/DL
HEMOGLOBIN: 9.4 G/DL (ref 11.5–15.5)
LDL CHOLESTEROL CALCULATED: 102 MG/DL (ref 0–99)
MCH RBC QN AUTO: 22.5 PG (ref 26–35)
MCHC RBC AUTO-ENTMCNC: 29.7 % (ref 32–34.5)
MCV RBC AUTO: 76 FL (ref 80–99.9)
PDW BLD-RTO: 17.1 FL (ref 11.5–15)
PLATELET # BLD: 244 E9/L (ref 130–450)
PMV BLD AUTO: 12.1 FL (ref 7–12)
RBC # BLD: 4.17 E12/L (ref 3.5–5.5)
TRIGL SERPL-MCNC: 77 MG/DL (ref 0–149)
TROPONIN: 0.03 NG/ML (ref 0–0.03)
VLDLC SERPL CALC-MCNC: 15 MG/DL
WBC # BLD: 6.7 E9/L (ref 4.5–11.5)

## 2021-05-22 PROCEDURE — 6370000000 HC RX 637 (ALT 250 FOR IP): Performed by: INTERNAL MEDICINE

## 2021-05-22 PROCEDURE — 85027 COMPLETE CBC AUTOMATED: CPT

## 2021-05-22 PROCEDURE — 83036 HEMOGLOBIN GLYCOSYLATED A1C: CPT

## 2021-05-22 PROCEDURE — 92523 SPEECH SOUND LANG COMPREHEN: CPT

## 2021-05-22 PROCEDURE — 2580000003 HC RX 258: Performed by: INTERNAL MEDICINE

## 2021-05-22 PROCEDURE — 84484 ASSAY OF TROPONIN QUANT: CPT

## 2021-05-22 PROCEDURE — 99218 PR INITIAL OBSERVATION CARE/DAY 30 MINUTES: CPT | Performed by: PSYCHIATRY & NEUROLOGY

## 2021-05-22 PROCEDURE — 36415 COLL VENOUS BLD VENIPUNCTURE: CPT

## 2021-05-22 PROCEDURE — 80061 LIPID PANEL: CPT

## 2021-05-22 PROCEDURE — G0378 HOSPITAL OBSERVATION PER HR: HCPCS

## 2021-05-22 RX ORDER — ATORVASTATIN CALCIUM 40 MG/1
40 TABLET, FILM COATED ORAL NIGHTLY
Qty: 30 TABLET | Refills: 3 | Status: SHIPPED | OUTPATIENT
Start: 2021-05-22

## 2021-05-22 RX ADMIN — LEVOTHYROXINE SODIUM 137 MCG: 0.14 TABLET ORAL at 06:49

## 2021-05-22 RX ADMIN — METOPROLOL TARTRATE 25 MG: 25 TABLET, FILM COATED ORAL at 09:17

## 2021-05-22 RX ADMIN — ACETAMINOPHEN 650 MG: 325 TABLET ORAL at 01:20

## 2021-05-22 RX ADMIN — PROBENECID 500 MG: 500 TABLET, FILM COATED ORAL at 09:17

## 2021-05-22 RX ADMIN — SODIUM CHLORIDE, PRESERVATIVE FREE 10 ML: 5 INJECTION INTRAVENOUS at 09:18

## 2021-05-22 RX ADMIN — ASPIRIN 81 MG: 81 TABLET, COATED ORAL at 09:17

## 2021-05-22 ASSESSMENT — PAIN SCALES - GENERAL
PAINLEVEL_OUTOF10: 5
PAINLEVEL_OUTOF10: 0
PAINLEVEL_OUTOF10: 0
PAINLEVEL_OUTOF10: 3

## 2021-05-22 ASSESSMENT — VISUAL ACUITY: VA_NORMAL: 1

## 2021-05-22 NOTE — PROGRESS NOTES
Within functional limits  Process Complex Verbal Commands:     Within functional limits    Comprehension of Conversation:      Within functional limits      EXPRESSIVE LANGUAGE     Serials: Functional    Imitation:  Words   Functional   Sentences Functional    Naming:  (Modality used:  Verbal)  Confrontation Naming  Functional  Functional Description  Functional  Response Naming: Functional    Conversation:      Conversation was within functional limits    COGNITION     Attention/Orientation  Attention: Sustained attention   Orientation:  Oriented to Person, Place, Date, Reason for hospitalization    Memory   Immediate Recall: Repeated 3/3    Delayed Recall:   Recalled 2/3    Long Term Recall:   Recalled Address, Birthdate, Age and Family    Organization/Problem Solving/Reasoning   Verbal Sequencing:   Functional        Verbal Problem solving:   Functional          CLINICAL OBSERVATIONS NOTED DURING THE EVALUATION  Within functional limits                  EDUCATION:   The Speech Language Pathologist (SLP) completed education regarding results of evaluation and that intervention is not warranted at this time. Learner: Patient  Education: Reviewed results and recommendations of this evaluation  Evaluation of Education:  Verbalizes understanding    Explained that Speech Pathology intervention is not warranted at this time   This plan may be re-evaluated and revised as warranted. Treatment goals discussed with Patient   The Patient understand(s) the diagnosis, prognosis and plan of care     Evaluation Time includes thorough review of current medical information, gathering information on past medical history/social history and prior level of function, completion of standardized testing/informal observation of tasks, assessment of data and education on plan of care and goals.       CPT code:    07666  eval speech sound lang comprehension      The admitting diagnosis and active problem list, as listed below have been reviewed prior to initiation of this evaluation.         ACTIVE PROBLEM LIST:   Patient Active Problem List   Diagnosis    Pneumatosis coli    HTN (hypertension)    HLD (hyperlipidemia)    Obesity    Acute osteomyelitis of lumbar spine (HCC)    Dehydration, moderate    Acute kidney injury ([address]    Hypotension due to hypovolemia    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    Pleural effusion    Onychomycosis    PVD (peripheral vascular disease) ([facility]    Difficulty walking    Weakness    Severe protein-calorie malnutrition (HCC)    Intractable back pain    Uncontrolled pain    Hip pain    Unable to walk    Chronic deep vein thrombosis (DVT) ([facility])    Stroke-like symptom    Stroke-like episode

## 2021-05-22 NOTE — PLAN OF CARE
Problem: HEMODYNAMIC STATUS  Goal: Patient has stable vital signs and fluid balance  5/21/2021 2225 by Sara Keita RN  Outcome: Met This Shift       Problem: ACTIVITY INTOLERANCE/IMPAIRED MOBILITY  Goal: Mobility/activity is maintained at optimum level for patient  5/21/2021 2225 by Sara Keita RN  Outcome: Met This Shift     Problem: COMMUNICATION IMPAIRMENT  Goal: Ability to express needs and understand communication  5/21/2021 2225 by Sara Keita RN  Outcome: Met This Shift

## 2021-05-22 NOTE — PROGRESS NOTES
Physical Therapy  Physical Therapy Attempt    Name: Rome Maldonado  : 1945  MRN: [id number]      Date of Service: 2021  Chart reviewed. Discussed pt's case with RN and pt. Pt reported no deficits, being at baseline and independently using bedside commode at times. Pt politely declined services at this time but if anything changes, will let RN know. Will discontinue skilled PT order. If there is a change in status, please re-consult.     Mac Matias, PT, DPT  [id number]

## 2021-05-22 NOTE — DISCHARGE SUMMARY
Admitted for evaluation of strokelike symptoms/TIA after she stopped taking her Eliquis for an upcoming minor surgery. Advised patient to call office of where she is having the surgery. ,  To find out if Eliquis in fact needs to be held. Okay for discharge from my standpoint, patient advised to continue Eliquis for now ordered the surgery needs to be delayed if Eliquis should be held.   Discussed with Dr. Evangelista Little at bedside-we are both in agreement in regards to

## 2021-05-22 NOTE — H&P
Adalberto Henao M.D. History and Physical      CHIEF COMPLAINT: Mouth droop    Reason for Admission: Concern for stroke    History Obtained From: Patient/EMR    HISTORY OF PRESENT ILLNESS:      The patient is a 66 y.o. female of Lars El DO with significant past medical history of hypertension hyperlipidemia gout and constipation who presents with complaints of left facial droop and left-sided weakness. She proceeded to ER for further evaluation. CTA and CTP are negative along with MRI brain. She tells me that she discontinued her Eliquis for an upcoming surgery that is planned next Friday. She does have a known history of atrial fibrillation. He currently has no symptoms  All labs personally reviewed   All imaging personally reviewed     Past Medical History:        Diagnosis Date    Arthritis     Constipation     Gout     CONTROLLED    Hyperlipidemia     Hypertension     STABLE    Macular hole, right eye     Thyroid disease      Past Surgical History:        Procedure Laterality Date    ABDOMEN SURGERY      [facility]    BREAST SURGERY Right 1982    BENIGN CYST    CHOLECYSTECTOMY  1997    COLONOSCOPY      EYE SURGERY      HYSTERECTOMY  1985    JOINT REPLACEMENT      bilat knees     LAMINECTOMY N/A 6/3/2019    LUMBAR LAMINECTOMY POSTERIOR L3-L5, BONE BIOPSY L4 - GIOVANNY, X-RAY,  WANTS TF performed by Artis Soto MD at [address] [facility]           Medications Prior to Admission:    Medications Prior to Admission: metoprolol tartrate (LOPRESSOR) 25 MG tablet, Take 25 mg by mouth 2 times daily  levothyroxine (SYNTHROID) 137 MCG tablet, Take 137 mcg by mouth Daily  probenecid (BENEMID) 500 MG tablet, Take 500 mg by mouth 2 times daily.   [DISCONTINUED] apixaban (ELIQUIS) 5 MG TABS tablet, Take 5 mg by mouth 2 times daily  [DISCONTINUED] QUEtiapine (SEROQUEL) 25 MG tablet, Take 1 tablet by mouth nightly (Patient not taking: Reported on 3/24/2021)  [DISCONTINUED] ALPRAZolam (XANAX) 0.5 MG tablet, Take 0.5 mg by mouth 2 times daily as needed for Sleep or Anxiety. [DISCONTINUED] senna (SENOKOT) 8.6 MG tablet, Take 1 tablet by mouth as needed   [DISCONTINUED] potassium chloride (KLOR-CON M) 20 MEQ extended release tablet, Take 2 tablets by mouth daily (Patient not taking: Reported on 3/24/2021)  [DISCONTINUED] pravastatin (PRAVACHOL) 40 MG tablet, Take 40 mg by mouth daily     Allergies:  Adrenalin [epinephrine hcl (nasal)] and Epinephrine    Social History:   TOBACCO:   reports that she has never smoked. She has never used smokeless tobacco.  ETOH:   reports current alcohol use. MARITAL STATUS:    OCCUPATION:      Family History:       Problem Relation Age of Onset    Other Mother        REVIEW OF SYSTEMS:    General ROS: positive for  - fatigue, malaise  Hematological and Lymphatic ROS: negative  Endocrine ROS: negative  Respiratory ROS: no cough, shortness of breath, or wheezing  Cardiovascular ROS: no chest pain or dyspnea on exertion  Gastrointestinal ROS: no abdominal pain, change in bowel habits, or black or bloody stools  Genito-Urinary ROS: no dysuria, trouble voiding, or hematuria  Neurological ROS: no TIA or stroke symptoms  negative    Vitals:  BP (!) 169/59   Pulse 64   Temp 97.4 °F (36.3 °C) (Temporal)   Resp 16   Ht 5' 5\" (1.651 m)   Wt 168 lb (76.2 kg)   SpO2 98%   BMI 27.96 kg/m²     PHYSICAL EXAM:  General:  Awake, alert, oriented X 3. Well developed, well nourished, well groomed. No apparent distress. HEENT:  Normocephalic, atraumatic. Pupils equal, round, reactive to light. No scleral icterus. No conjunctival injection. Normal lips, teeth, and gums. No nasal discharge. Neck:  Supple, FROM  Heart:  RRR, no murmurs, gallops, rubs, carotid upstroke normal, no carotid bruits  Lungs:  CTA bilaterally, bilat symmetrical expansion, no wheeze, rales, or rhonchi  Abdomen:   Bowel sounds present, soft, nontender, no masses, no organomegaly, no peritoneal signs  Extremities:  No clubbing, cyanosis, or edema  Skin:  Warm and dry, no open lesions or rash  Neuro:  Cranial nerves 2-12 intact, no focal deficits  Vascular: Radial and pedal Pulses 2+  Breast: deferred  Rectal: deferred  Genitalia:  deferred      DATA:     Recent Labs     05/21/21  1526 05/22/21  0640   WBC 8.9 6.7   HGB 11.0* 9.4*    244     Recent Labs     05/21/21  1526      K 4.3   BUN 22   CREATININE 1.0     Recent Labs     05/21/21  1445 05/21/21  1526   PROT  --  7.7   INR 1.1  --      Recent Labs     05/21/21  1526   AST 22   ALT 13   ALKPHOS 110*   BILITOT 0.3     No results for input(s): BNP in the last 72 hours. Recent Labs     05/21/21  1526   TROPONINI 0.05*       ASSESSMENT:      Active Problems:    Stroke-like symptom    Stroke-like episode  Resolved Problems:    * No resolved hospital problems. *        PLAN:    Admitted for strokelike symptoms  CTA CTP and MRI brain was negative  Resume all home medications  Okay for discharge from medicine standpoint  Lipid panel and A1c are normal  Blood pressure is okay  Neurology was consulted-await input especially regarding medication to be used while awaiting surgery i instead of Eliquis    Advised patient to not withhold important medication such as Eliquis unless advised by medical personnel prior to minor surgeries.     DVT prophylaxis  PT OT  Discharge plan    Yohana Foster MD  5/22/2021  8:32 AM

## 2021-05-22 NOTE — CONSULTS
Gracie Frazier is a 66 y.o. female       Chief Complaint   Patient presents with    Extremity Weakness     left side weak with facial droop around 1300 today, s/s resolved       HPI:  The patient is a 66 y.o. female of Lynne Snellen, DO with significant past medical history of hypertension hyperlipidemia gout and constipation who presents with complaints of left facial droop and left-sided weakness. She proceeded to ER for further evaluation. CTA and CTP are negative along with MRI brain. She tells me that she discontinued her Eliquis for an upcoming surgery that is planned next Friday. She does have a known history of atrial fibrillation. She feels back to baseline now. Of note she had recent severe covid infection. HPI  Prior to Visit Medications    Medication Sig Taking? Authorizing Provider   apixaban (ELIQUIS) 5 MG TABS tablet Take 1 tablet by mouth 2 times daily Yes Jean Bernal MD   atorvastatin (LIPITOR) 40 MG tablet Take 1 tablet by mouth nightly Yes Jean Bernal MD   metoprolol tartrate (LOPRESSOR) 25 MG tablet Take 25 mg by mouth 2 times daily Yes Historical Provider, MD   levothyroxine (SYNTHROID) 137 MCG tablet Take 137 mcg by mouth Daily Yes Historical Provider, MD   probenecid (BENEMID) 500 MG tablet Take 500 mg by mouth 2 times daily.  Yes Historical Provider, MD     Social History     Tobacco Use    Smoking status: Never Smoker    Smokeless tobacco: Never Used   Vaping Use    Vaping Use: Never used   Substance Use Topics    Alcohol use: Yes     Comment: Occasional    Drug use: No     Family History   Problem Relation Age of Onset    Other Mother      Past Surgical History:   Procedure Laterality Date    ABDOMEN SURGERY      [facility]  09/2020    [facility]    BREAST SURGERY Right 1982    BENIGN CYST    CHOLECYSTECTOMY  1997    COLONOSCOPY      EYE SURGERY      HYSTERECTOMY  1985    JOINT REPLACEMENT      bilat knees     LAMINECTOMY N/A 6/3/2019    LUMBAR normal.  CN IX, X: Palate elevates symmetrically  CN XI: Shoulder shrug strength is normal.  CN XII: Tongue midline without atrophy or fasciculations. Motor  Normal muscle bulk throughout. Normal muscle tone. No abnormal involuntary movements. Strength is 5/5 throughout all four extremities. Sensory  Light touch is normal in upper and lower extremities. Pinprick is normal in upper and lower extremities.      Reflexes                                           Right                      Left  Brachioradialis                    2+                         2+  Biceps                                 2+                         2+    Coordination  Right: Finger-to-nose normal.  Left: Finger-to-nose normal.      Laboratory/Radiology:     CBC with Differential:    Lab Results   Component Value Date    WBC 6.7 05/22/2021    RBC 4.17 05/22/2021    HGB 9.4 05/22/2021    HCT 31.7 05/22/2021     05/22/2021    MCV 76.0 05/22/2021    MCH 22.5 05/22/2021    MCHC 29.7 05/22/2021    RDW 17.1 05/22/2021    LYMPHOPCT 19.6 05/21/2021    MONOPCT 11.7 05/21/2021    BASOPCT 0.6 05/21/2021    MONOSABS 1.04 05/21/2021    LYMPHSABS 1.75 05/21/2021    EOSABS 0.19 05/21/2021    BASOSABS 0.05 05/21/2021     CMP:    Lab Results   Component Value Date     05/21/2021    K 4.3 05/21/2021     05/21/2021    CO2 24 05/21/2021    BUN 22 05/21/2021    CREATININE 1.0 05/21/2021    GFRAA >60 05/21/2021    LABGLOM 54 05/21/2021    GLUCOSE 81 05/21/2021    PROT 7.7 05/21/2021    LABALBU 4.1 05/21/2021    CALCIUM 9.4 05/21/2021    BILITOT 0.3 05/21/2021    ALKPHOS 110 05/21/2021    AST 22 05/21/2021    ALT 13 05/21/2021     PT/INR:    Lab Results   Component Value Date    PROTIME 12.0 05/21/2021    INR 1.1 05/21/2021     Last 3 Troponin:    Lab Results   Component Value Date    TROPONINI 0.03 05/22/2021    TROPONINI 0.05 05/21/2021    TROPONINI 0.03 11/28/2020     HgBA1c:    Lab Results   Component Value Date    LABA1C 5.3 05/22/2021 MRI brain:      Impression   No acute intracranial abnormality.       Mild chronic small vessel ischemic disease and mild atrophy.             I independently reviewed the labs and imaging studies at today's appointment. Assessment:     Likely TIA in setting of held anticoagulation. Plan:     Restart anticoagulation. Follow-up with orthopedic surgery about timing of release of tethered skin at surgical site.      Mirna Coburn MD  10:21 AM  5/22/2021

## 2021-09-02 NOTE — CONSULTS
[address] [place] [address] [facility]    Consultation Note     Admit Date: 5/27/2019  6:29 PM    Reason for Consult:  uti    Attending Physician:  Ibis Boston MD       Chief Complaint   Patient presents with    Fever     sent in from [facility] to r/o sepsis. +Cdiff and Ecoli in urine         History Obtained From:  For a detailed history please see previous and current available medical records. HISTORY OF PRESENT ILLNESS:             The patient is a 74 y.o. female known to myself. She was recently admitted from NH with confusion and worsening mental status while on invanz (prior to that she had complicated ESBL E coli UTI and had been on iv invanz)and was also found to have C diff colitis. invanz was stopped. She was discharged to nursing home on oral vancomycin after her confusion was resolved. Rnow she is admitted with fever and she was found to have GNB in [facility]. ID was consulted and she received one dose of cefepime.     Past Medical History:        Diagnosis Date    Arthritis     Gout     CONTROLLED    Hyperlipidemia     Hypertension     STABLE    Macular hole, right eye     Thyroid disease      Past Surgical History:        Procedure Laterality Date    BREAST SURGERY Right 1982    BENIGN CYST    CHOLECYSTECTOMY  1997    HYSTERECTOMY  1985     Current Medications:   Scheduled Meds:   sodium chloride flush  10 mL Intravenous 2 times per day    atenolol  25 mg Oral Nightly    docusate sodium  100 mg Oral BID    enoxaparin  40 mg Subcutaneous Daily    vitamin D  1,000 Units Oral Daily    vancomycin  125 mg Oral 4 times per day    QUEtiapine  25 mg Oral Nightly    probenecid  500 mg Oral BID    pravastatin  40 mg Oral Nightly    oxybutynin  5 mg Oral BID    nystatin  5 mL Oral 4x Daily    multivitamin  1 tablet Oral Daily    liothyronine  5 mcg Oral Daily    levothyroxine  112 mcg Oral Daily    fluconazole  200 mg Oral Daily    ceftolozane-tazobactam  750 mg [FreeTextEntry1] : 70 year old woman previously on anastrozole for breast cancer, with osteopenia treated with bisphosphonate tx x 5 years with improved bone density, with decrease after one year "drug holiday" then back on tx for ~one year.  Stopped anastrozole and bisphosphonate 1 year ago.\par   - DXA performed today, pt with osteopenia, slight decline in bone density.  Continue to monitor off tx\par  - Repeat DXA  in 2 years\par  - continue calcium 500 mg daily and weight bearing exercise\par \par \par \par f/u 1 year Intravenous Q8H     Continuous Infusions:   sodium chloride 75 mL/hr at 05/28/19 0618     PRN Meds:sodium chloride flush, acetaminophen, QUEtiapine, HYDROcodone-acetaminophen, magnesium hydroxide, ondansetron    Allergies:  Adrenalin [epinephrine] and Meropenem    Social History:   Social History     Socioeconomic History    Marital status:      Spouse name: None    Number of children: None    Years of education: None    Highest education level: None   Occupational History    None   Social Needs    Financial resource strain: None    Food insecurity:     Worry: None     Inability: None    Transportation needs:     Medical: None     Non-medical: None   Tobacco Use    Smoking status: Never Smoker    Smokeless tobacco: Never Used   Substance and Sexual Activity    Alcohol use: Yes     Comment: socially    Drug use: No    Sexual activity: None   Lifestyle    Physical activity:     Days per week: None     Minutes per session: None    Stress: None   Relationships    Social connections:     Talks on phone: None     Gets together: None     Attends Gnosticism service: None     Active member of club or organization: None     Attends meetings of clubs or organizations: None     Relationship status: None    Intimate partner violence:     Fear of current or ex partner: None     Emotionally abused: None     Physically abused: None     Forced sexual activity: None   Other Topics Concern    None   Social History Narrative    None       Family History:       Problem Relation Age of Onset    Other Mother    . Otherwise non-pertinent to the chief complaint.     REVIEW OF SYSTEMS:    14 ROS negative unless otherwise specified in the HPI    PHYSICAL EXAM:    Vitals:    BP (!) 142/68   Pulse 100   Temp 98.4 °F (36.9 °C) (Core)   Resp 18   Ht 5' 6\" (1.676 m)   Wt 231 lb (104.8 kg)   SpO2 94%   BMI 37.28 kg/m²     General Appearance:    Alert, cooperative, no distress, appears stated age   Head: Normocephalic, without obvious abnormality, atraumatic   Eyes:    PERRL, conjunctiva/corneas clear      Ears:    Normal and external ear canals, both ears   Nose:   Nares normal, septum midline, mucosa normal, no drainage    or sinus tenderness   Throat:   Lips, mucosa, and tongue normal; teeth and gums normal   Neck:   Supple, trachea midline, no adenopathy; no JVD   Lungs:     Clear to auscultation bilaterally, respirations unlabored   Chest wall:    No tenderness or deformity   Heart:    Regular rate and rhythm, S1 and S2 normal, no murmur, rub   or gallop   Abdomen:     Soft, non-tender, bowel sounds active all four quadrants,     no masses, no organomegaly   Extremities:   Extremities normal, atraumatic, no cyanosis or edema   Pulses:   2+ and symmetric all extremities   Skin:   Skin color, texture, turgor normal, no rashes or lesions       CBC+dif:  Reviewed and trend followed    Radiology:  Noted    Microbiology:  Pending    Assessment:  · Sepsis from recurrent gram negative abcteremia r/o GI / source   · C diff colitis ongoing treatment  · 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 I will be discussing this case with the treating physicians.     Electronically signed by Maury Musa MD on 5/28/2019 at 1:12 PM

## 2021-09-10 NOTE — ED NOTES
Assumed care of pt. Resps easy. A&O. Cardiac/hemodynamic monitoring in place. EKG in progress. Will continue to monitor.      Breanne Martinez RN  05/27/19 0721
Not applicable: This is a surgical and/or non-medical patient

## 2021-09-28 ENCOUNTER — TELEPHONE (OUTPATIENT)
Dept: VASCULAR SURGERY | Age: 76
End: 2021-09-28

## 2021-09-29 ENCOUNTER — OFFICE VISIT (OUTPATIENT)
Dept: VASCULAR SURGERY | Age: 76
End: 2021-09-29
Payer: MEDICARE

## 2021-09-29 VITALS — SYSTOLIC BLOOD PRESSURE: 126 MMHG | DIASTOLIC BLOOD PRESSURE: 82 MMHG

## 2021-09-29 DIAGNOSIS — I82.511 CHRONIC DEEP VEIN THROMBOSIS (DVT) OF FEMORAL VEIN OF RIGHT LOWER EXTREMITY (HCC): Primary | ICD-10-CM

## 2021-09-29 PROCEDURE — 99213 OFFICE O/P EST LOW 20 MIN: CPT | Performed by: NURSE PRACTITIONER

## 2021-09-29 RX ORDER — M-VIT,TX,IRON,MINS/CALC/FOLIC 27MG-0.4MG
1 TABLET ORAL DAILY
COMMUNITY

## 2021-09-29 NOTE — PROGRESS NOTES
Procedure Laterality Date    ABDOMEN SURGERY      BACK SURGERY  09/2020    [facility]    BREAST SURGERY Right 1982    BENIGN CYST    CHOLECYSTECTOMY  1997    COLONOSCOPY      EYE SURGERY      HYSTERECTOMY  1985    JOINT REPLACEMENT      bilat knees     LAMINECTOMY N/A 6/3/2019    LUMBAR LAMINECTOMY POSTERIOR L3-L5, BONE BIOPSY L4 - GIOVANNY, X-RAY,  WANTS TF performed by Collins Carpenter MD at [address]      TONSILLECTOMY       Current Medications:   Prior to Admission medications    Medication Sig Start Date End Date Taking? Authorizing Provider   Multiple Vitamins-Minerals (THERAPEUTIC MULTIVITAMIN-MINERALS) tablet Take 1 tablet by mouth daily   Yes Historical Provider, MD   apixaban (ELIQUIS) 5 MG TABS tablet Take 1 tablet by mouth 2 times daily 5/22/21  Yes Zoila Tineo MD   metoprolol tartrate (LOPRESSOR) 25 MG tablet Take 25 mg by mouth 2 times daily   Yes Historical Provider, MD   levothyroxine (SYNTHROID) 137 MCG tablet Take 137 mcg by mouth Daily   Yes Historical Provider, MD   probenecid (BENEMID) 500 MG tablet Take 500 mg by mouth 2 times daily.    Yes Historical Provider, MD   atorvastatin (LIPITOR) 40 MG tablet Take 1 tablet by mouth nightly  Patient not taking: Reported on 9/29/2021 5/22/21   Zoila Tineo MD     Allergies:  Adrenalin [epinephrine hcl (nasal)] and Epinephrine    Social History     Socioeconomic History    Marital status:      Spouse name: Not on file    Number of children: Not on file    Years of education: Not on file    Highest education level: Not on file   Occupational History    Not on file   Tobacco Use    Smoking status: Never Smoker    Smokeless tobacco: Never Used   Vaping Use    Vaping Use: Never used   Substance and Sexual Activity    Alcohol use: Yes     Comment: Occasional    Drug use: No    Sexual activity: Not Currently   Other Topics Concern    Not on file   Social History Narrative    Not on file     Social Determinants of Health Financial Resource Strain:     Difficulty of Paying Living Expenses:    Food Insecurity:     Worried About Running Out of Food in the Last Year:     [address] in the Last Year:    Transportation Needs:     Lack of Transportation (Medical):      Lack of Transportation (Non-Medical):    Physical Activity:     Days of Exercise per Week:     Minutes of Exercise per Session:    Stress:     Feeling of Stress :    Social Connections:     Frequency of Communication with Friends and Family:     Frequency of Social Gatherings with Friends and Family:     Attends Sabianist Services:     Active Member of Clubs or Organizations:     Attends Club or Organization Meetings:     Marital Status:    Intimate Partner Violence:     Fear of Current or Ex-Partner:     Emotionally Abused:     Physically Abused:     Sexually Abused:         Family History   Problem Relation Age of Onset    Other Mother        REVIEW OF SYSTEMS (New symptoms):    Eyes:      Blurred vision:  No [x]/Yes []               Diplopia:   No [x]/Yes []               Vision loss:       No [x]/Yes []   Ears, nose, throat:             Hearing loss:    No [x]/Yes []      Vertigo:   No [x]/Yes []                       Swallowing problem:  No [x]/Yes []               Nose bleeds:   No [x]/Yes []      Voice hoarseness:  No [x]/Yes []  Respiratory:             Cough:   No [x]/Yes []      Pleuritic chest pain:  No [x]/Yes []                        Dyspnea:   No [x]/Yes []      Wheezing:   No [x]/Yes []  Cardiovascular:             Angina:   No [x]/Yes []      Palpitations:   No [x]/Yes []          Claudication:    No [x]/Yes []      Leg swelling:   No [x]/Yes []  Gastrointestinal:             Nausea or vomiting:  No [x]/Yes []               Abdominal pain:  No [x]/Yes []                     Intestinal bleeding: No [x]/Yes []  Musculoskeletal:             Leg pain:   No [x]/Yes []      Back pain:   No []/Yes [x]                    Weakness:   No [x]/Yes []  Neurologic:             Numbness:   No [x]/Yes []      Paralysis:   No [x]/Yes []                       Headaches:   No [x]/Yes []  Hematologic, lymphatic:   Anemia:   No [x]/Yes []              Bleeding or bruising:  No [x]/Yes []              Fevers or chills: No [x]/Yes []  Endocrine:             Temp intolerance:   No [x]/Yes []                       Polydipsia, polyuria:  No [x]/Yes []  Skin:              Rash:    No [x]/Yes []      Ulcers:   No [x]/Yes []              Abnorm pigment: No [x]/Yes []  :              Frequency/urgency:  No [x]/Yes []      Hematuria:    No [x]/Yes []                      Incontinence:    No [x]/Yes []    PHYSICAL EXAM:  Vitals:    09/29/21 1322   BP: 126/82     General Appearance: alert and oriented to person, place and time, in no acute distress, well developed and well- nourished  Neurologic: no cranial nerve deficit, speech normal  Head: normocephalic and atraumatic  Eyes: extraocular eye movements intact, conjunctivae normal  ENT: external ear and ear canal normal bilaterally, nose without deformity  Pulmonary/Chest: normal air movement, no respiratory distress  Cardiovascular: normal rate, regular rhythm  Abdomen: non-distended, no masses  Musculoskeletal: no joint deformity or tenderness  Extremities: slight leg edema bilaterally  Skin: warm and dry, no rash or erythema    PULSE EXAM      Right      Left   Brachial     Radial 2 2   Femoral     Popliteal     Dorsalis Pedis 1 1   Posterior Tibial 1 1   (3=normal, 2=diminished, 1=barely palpable, 4=widened)    RADIOLOGY:     Problem List Items Addressed This Visit     Chronic deep vein thrombosis (DVT) (HCC) - Primary (Chronic)        At this point from the information available and from chart review, the patient has had 2 occurrences of LE DVT, once on the right in 2019 and once on the left following back surgery at [facility] in 12/2020.  She has had multiple complications from her surgeries in the past and tells me she

## 2022-10-20 ENCOUNTER — TRANSCRIBE ORDERS (OUTPATIENT)
Dept: ADMINISTRATIVE | Age: 77
End: 2022-10-20

## 2022-10-20 DIAGNOSIS — T84.54XA INFECTION ASSOCIATED WITH INTERNAL LEFT KNEE PROSTHESIS, INITIAL ENCOUNTER (HCC): Primary | ICD-10-CM

## 2022-10-24 ENCOUNTER — HOSPITAL ENCOUNTER (OUTPATIENT)
Dept: NUCLEAR MEDICINE | Age: 77
Discharge: HOME | End: 2022-10-24
Payer: MEDICARE

## 2022-10-24 DIAGNOSIS — T84.54XA INFECTION ASSOCIATED WITH INTERNAL LEFT KNEE PROSTHESIS, INITIAL ENCOUNTER (HCC): ICD-10-CM

## 2022-10-24 PROCEDURE — 3430000000 HC RX DIAGNOSTIC RADIOPHARMACEUTICAL: Performed by: RADIOLOGY

## 2022-10-24 PROCEDURE — A9569 TECHNETIUM TC-99M AUTO WBC: HCPCS | Performed by: RADIOLOGY

## 2022-10-26 ENCOUNTER — HOSPITAL ENCOUNTER (OUTPATIENT)
Age: 77
Discharge: HOME | End: 2022-10-28

## 2022-10-26 LAB
ABO/RH: NORMAL
ANION GAP SERPL CALCULATED.3IONS-SCNC: 10 MMOL/L (ref 7–16)
ANISOCYTOSIS: ABNORMAL
ANTIBODY SCREEN: NORMAL
BASOPHILS ABSOLUTE: 0.07 E9/L (ref 0–0.2)
BASOPHILS RELATIVE PERCENT: 0.8 % (ref 0–2)
BUN BLDV-MCNC: 20 MG/DL (ref 6–23)
CALCIUM SERPL-MCNC: 9.4 MG/DL (ref 8.6–10.2)
CHLORIDE BLD-SCNC: 102 MMOL/L (ref 98–107)
CO2: 23 MMOL/L (ref 22–29)
CREAT SERPL-MCNC: 0.9 MG/DL (ref 0.5–1)
EOSINOPHILS ABSOLUTE: 0.3 E9/L (ref 0.05–0.5)
EOSINOPHILS RELATIVE PERCENT: 3.6 % (ref 0–6)
GFR SERPL CREATININE-BSD FRML MDRD: >60 ML/MIN/1.73
GLUCOSE BLD-MCNC: 103 MG/DL (ref 74–99)
HBA1C MFR BLD: 5.6 % (ref 4–5.6)
HCT VFR BLD CALC: 36.8 % (ref 34–48)
HEMOGLOBIN: 10.6 G/DL (ref 11.5–15.5)
IMMATURE GRANULOCYTES #: 0.02 E9/L
IMMATURE GRANULOCYTES %: 0.2 % (ref 0–5)
INR BLD: 1.9
LYMPHOCYTES ABSOLUTE: 0.97 E9/L (ref 1.5–4)
LYMPHOCYTES RELATIVE PERCENT: 11.7 % (ref 20–42)
MCH RBC QN AUTO: 23 PG (ref 26–35)
MCHC RBC AUTO-ENTMCNC: 28.8 % (ref 32–34.5)
MCV RBC AUTO: 80 FL (ref 80–99.9)
MONOCYTES ABSOLUTE: 0.9 E9/L (ref 0.1–0.95)
MONOCYTES RELATIVE PERCENT: 10.9 % (ref 2–12)
NEUTROPHILS ABSOLUTE: 6 E9/L (ref 1.8–7.3)
NEUTROPHILS RELATIVE PERCENT: 72.8 % (ref 43–80)
PDW BLD-RTO: 17.4 FL (ref 11.5–15)
PLATELET # BLD: 367 E9/L (ref 130–450)
PMV BLD AUTO: 10.4 FL (ref 7–12)
POIKILOCYTES: ABNORMAL
POTASSIUM SERPL-SCNC: 6.1 MMOL/L (ref 3.5–5)
PROTHROMBIN TIME: 21.4 SEC (ref 9.3–12.4)
RBC # BLD: 4.6 E12/L (ref 3.5–5.5)
SODIUM BLD-SCNC: 135 MMOL/L (ref 132–146)
TEAR DROP CELLS: ABNORMAL
WBC # BLD: 8.3 E9/L (ref 4.5–11.5)

## 2022-10-26 PROCEDURE — 83036 HEMOGLOBIN GLYCOSYLATED A1C: CPT

## 2022-10-26 PROCEDURE — 86901 BLOOD TYPING SEROLOGIC RH(D): CPT

## 2022-10-26 PROCEDURE — 85610 PROTHROMBIN TIME: CPT

## 2022-10-26 PROCEDURE — 86850 RBC ANTIBODY SCREEN: CPT

## 2022-10-26 PROCEDURE — 86923 COMPATIBILITY TEST ELECTRIC: CPT

## 2022-10-26 PROCEDURE — 86900 BLOOD TYPING SEROLOGIC ABO: CPT

## 2022-10-26 PROCEDURE — P9016 RBC LEUKOCYTES REDUCED: HCPCS

## 2022-10-26 PROCEDURE — 87081 CULTURE SCREEN ONLY: CPT

## 2022-10-26 PROCEDURE — 85025 COMPLETE CBC W/AUTO DIFF WBC: CPT

## 2022-10-26 PROCEDURE — 80048 BASIC METABOLIC PNL TOTAL CA: CPT

## 2022-10-27 ENCOUNTER — HOSPITAL ENCOUNTER (OUTPATIENT)
Dept: NUCLEAR MEDICINE | Age: 77
Discharge: HOME | End: 2022-10-27
Payer: MEDICARE

## 2022-10-27 ENCOUNTER — HOSPITAL ENCOUNTER (OUTPATIENT)
Age: 77
Discharge: HOME | End: 2022-10-27
Payer: MEDICARE

## 2022-10-27 PROCEDURE — 78800 RP LOCLZJ TUM 1 AREA 1 D IMG: CPT

## 2022-10-27 PROCEDURE — 3430000000 HC RX DIAGNOSTIC RADIOPHARMACEUTICAL: Performed by: RADIOLOGY

## 2022-10-27 PROCEDURE — 36415 COLL VENOUS BLD VENIPUNCTURE: CPT

## 2022-10-27 PROCEDURE — A9569 TECHNETIUM TC-99M AUTO WBC: HCPCS | Performed by: RADIOLOGY

## 2022-10-27 RX ADMIN — EXAMETAZIME 20 MILLICURIE: KIT at 14:00

## 2022-10-28 LAB — MRSA CULTURE ONLY: NORMAL

## 2022-10-31 ENCOUNTER — HOSPITAL ENCOUNTER (OUTPATIENT)
Age: 77
Discharge: HOME | End: 2022-11-02

## 2022-10-31 LAB
ALBUMIN SERPL-MCNC: 2.9 G/DL (ref 3.5–5.2)
ALP BLD-CCNC: 86 U/L (ref 35–104)
ALT SERPL-CCNC: 15 U/L (ref 0–32)
ANION GAP SERPL CALCULATED.3IONS-SCNC: 14 MMOL/L (ref 7–16)
APPEARANCE FLUID: NORMAL
AST SERPL-CCNC: 28 U/L (ref 0–31)
BASOPHILS ABSOLUTE: 0.02 E9/L (ref 0–0.2)
BASOPHILS RELATIVE PERCENT: 0.2 % (ref 0–2)
BILIRUB SERPL-MCNC: 0.3 MG/DL (ref 0–1.2)
BUN BLDV-MCNC: 29 MG/DL (ref 6–23)
C-REACTIVE PROTEIN: 11.5 MG/DL (ref 0–0.4)
CALCIUM SERPL-MCNC: 8.7 MG/DL (ref 8.6–10.2)
CELL COUNT FLUID TYPE: NORMAL
CHLORIDE BLD-SCNC: 101 MMOL/L (ref 98–107)
CO2: 20 MMOL/L (ref 22–29)
COLOR FLUID: NORMAL
CREAT SERPL-MCNC: 1.3 MG/DL (ref 0.5–1)
EOSINOPHILS ABSOLUTE: 0.15 E9/L (ref 0.05–0.5)
EOSINOPHILS RELATIVE PERCENT: 1.3 % (ref 0–6)
GFR SERPL CREATININE-BSD FRML MDRD: 42 ML/MIN/1.73
GLUCOSE BLD-MCNC: 109 MG/DL (ref 74–99)
HCT VFR BLD CALC: 34.4 % (ref 34–48)
HEMOGLOBIN: 10.3 G/DL (ref 11.5–15.5)
IMMATURE GRANULOCYTES #: 0.09 E9/L
IMMATURE GRANULOCYTES %: 0.8 % (ref 0–5)
LYMPHOCYTES ABSOLUTE: 0.67 E9/L (ref 1.5–4)
LYMPHOCYTES RELATIVE PERCENT: 5.8 % (ref 20–42)
MCH RBC QN AUTO: 22.7 PG (ref 26–35)
MCHC RBC AUTO-ENTMCNC: 29.9 % (ref 32–34.5)
MCV RBC AUTO: 75.8 FL (ref 80–99.9)
MONOCYTE, FLUID: 50 %
MONOCYTES ABSOLUTE: 0.39 E9/L (ref 0.1–0.95)
MONOCYTES RELATIVE PERCENT: 3.4 % (ref 2–12)
NEUTROPHIL, FLUID: 50 %
NEUTROPHILS ABSOLUTE: 10.27 E9/L (ref 1.8–7.3)
NEUTROPHILS RELATIVE PERCENT: 88.5 % (ref 43–80)
NUCLEATED CELLS FLUID: 3887 /UL
PDW BLD-RTO: 17.6 FL (ref 11.5–15)
PLATELET # BLD: 271 E9/L (ref 130–450)
PMV BLD AUTO: 11.2 FL (ref 7–12)
POTASSIUM SERPL-SCNC: 3.9 MMOL/L (ref 3.5–5)
RBC # BLD: 4.54 E12/L (ref 3.5–5.5)
RBC FLUID: NORMAL /UL
SEDIMENTATION RATE, ERYTHROCYTE: 48 MM/HR (ref 0–20)
SODIUM BLD-SCNC: 135 MMOL/L (ref 132–146)
TOTAL PROTEIN: 6.1 G/DL (ref 6.4–8.3)
WBC # BLD: 11.6 E9/L (ref 4.5–11.5)

## 2022-10-31 PROCEDURE — 87206 SMEAR FLUORESCENT/ACID STAI: CPT

## 2022-10-31 PROCEDURE — 86140 C-REACTIVE PROTEIN: CPT

## 2022-10-31 PROCEDURE — 85025 COMPLETE CBC W/AUTO DIFF WBC: CPT

## 2022-10-31 PROCEDURE — 87116 MYCOBACTERIA CULTURE: CPT

## 2022-10-31 PROCEDURE — 87070 CULTURE OTHR SPECIMN AEROBIC: CPT

## 2022-10-31 PROCEDURE — 87102 FUNGUS ISOLATION CULTURE: CPT

## 2022-10-31 PROCEDURE — 85651 RBC SED RATE NONAUTOMATED: CPT

## 2022-10-31 PROCEDURE — 89051 BODY FLUID CELL COUNT: CPT

## 2022-10-31 PROCEDURE — 87075 CULTR BACTERIA EXCEPT BLOOD: CPT

## 2022-10-31 PROCEDURE — 87205 SMEAR GRAM STAIN: CPT

## 2022-10-31 PROCEDURE — 80053 COMPREHEN METABOLIC PANEL: CPT

## 2022-10-31 PROCEDURE — 87015 SPECIMEN INFECT AGNT CONCNTJ: CPT

## 2022-11-01 ENCOUNTER — HOSPITAL ENCOUNTER (OUTPATIENT)
Age: 77
Discharge: HOME | End: 2022-11-03

## 2022-11-01 LAB
ANION GAP SERPL CALCULATED.3IONS-SCNC: 10 MMOL/L (ref 7–16)
ANION GAP SERPL CALCULATED.3IONS-SCNC: 10 MMOL/L (ref 7–16)
BUN BLDV-MCNC: 29 MG/DL (ref 6–23)
BUN BLDV-MCNC: 32 MG/DL (ref 6–23)
CALCIUM SERPL-MCNC: 8.5 MG/DL (ref 8.6–10.2)
CALCIUM SERPL-MCNC: 8.9 MG/DL (ref 8.6–10.2)
CHLORIDE BLD-SCNC: 105 MMOL/L (ref 98–107)
CHLORIDE BLD-SCNC: 97 MMOL/L (ref 98–107)
CO2: 23 MMOL/L (ref 22–29)
CO2: 24 MMOL/L (ref 22–29)
CREAT SERPL-MCNC: 1.2 MG/DL (ref 0.5–1)
CREAT SERPL-MCNC: 1.3 MG/DL (ref 0.5–1)
GFR SERPL CREATININE-BSD FRML MDRD: 42 ML/MIN/1.73
GFR SERPL CREATININE-BSD FRML MDRD: 46 ML/MIN/1.73
GLUCOSE BLD-MCNC: 122 MG/DL (ref 74–99)
GLUCOSE BLD-MCNC: 81 MG/DL (ref 74–99)
GRAM STAIN ORDERABLE: NORMAL
HCT VFR BLD CALC: 28.6 % (ref 34–48)
HCT VFR BLD CALC: 30.2 % (ref 34–48)
HEMOGLOBIN: 8.4 G/DL (ref 11.5–15.5)
HEMOGLOBIN: 8.9 G/DL (ref 11.5–15.5)
MCH RBC QN AUTO: 22.5 PG (ref 26–35)
MCHC RBC AUTO-ENTMCNC: 29.4 % (ref 32–34.5)
MCV RBC AUTO: 76.5 FL (ref 80–99.9)
PDW BLD-RTO: 17.7 FL (ref 11.5–15)
PLATELET # BLD: 212 E9/L (ref 130–450)
PMV BLD AUTO: 10.5 FL (ref 7–12)
POTASSIUM SERPL-SCNC: 3.2 MMOL/L (ref 3.5–5)
POTASSIUM SERPL-SCNC: 3.8 MMOL/L (ref 3.5–5)
RBC # BLD: 3.74 E12/L (ref 3.5–5.5)
SODIUM BLD-SCNC: 131 MMOL/L (ref 132–146)
SODIUM BLD-SCNC: 138 MMOL/L (ref 132–146)
WBC # BLD: 6.1 E9/L (ref 4.5–11.5)

## 2022-11-01 PROCEDURE — 85018 HEMOGLOBIN: CPT

## 2022-11-01 PROCEDURE — 85027 COMPLETE CBC AUTOMATED: CPT

## 2022-11-01 PROCEDURE — 85014 HEMATOCRIT: CPT

## 2022-11-01 PROCEDURE — 80048 BASIC METABOLIC PNL TOTAL CA: CPT

## 2022-11-02 ENCOUNTER — HOSPITAL ENCOUNTER (OUTPATIENT)
Age: 77
Discharge: HOME | End: 2022-11-04

## 2022-11-02 LAB
ANION GAP SERPL CALCULATED.3IONS-SCNC: 10 MMOL/L (ref 7–16)
BUN BLDV-MCNC: 25 MG/DL (ref 6–23)
CALCIUM SERPL-MCNC: 8.7 MG/DL (ref 8.6–10.2)
CHLORIDE BLD-SCNC: 110 MMOL/L (ref 98–107)
CO2: 22 MMOL/L (ref 22–29)
CREAT SERPL-MCNC: 1 MG/DL (ref 0.5–1)
GFR SERPL CREATININE-BSD FRML MDRD: 57 ML/MIN/1.73
GLUCOSE BLD-MCNC: 82 MG/DL (ref 74–99)
HCT VFR BLD CALC: 30.8 % (ref 34–48)
HEMOGLOBIN: 9.1 G/DL (ref 11.5–15.5)
MCH RBC QN AUTO: 23.2 PG (ref 26–35)
MCHC RBC AUTO-ENTMCNC: 29.5 % (ref 32–34.5)
MCV RBC AUTO: 78.6 FL (ref 80–99.9)
PDW BLD-RTO: 17.5 FL (ref 11.5–15)
PLATELET # BLD: 218 E9/L (ref 130–450)
PMV BLD AUTO: 10.6 FL (ref 7–12)
POTASSIUM SERPL-SCNC: 3.5 MMOL/L (ref 3.5–5)
RBC # BLD: 3.92 E12/L (ref 3.5–5.5)
SODIUM BLD-SCNC: 142 MMOL/L (ref 132–146)
WBC # BLD: 7.2 E9/L (ref 4.5–11.5)

## 2022-11-02 PROCEDURE — 80048 BASIC METABOLIC PNL TOTAL CA: CPT

## 2022-11-02 PROCEDURE — 85027 COMPLETE CBC AUTOMATED: CPT

## 2022-11-03 ENCOUNTER — HOSPITAL ENCOUNTER (OUTPATIENT)
Age: 77
Discharge: HOME | End: 2022-11-05

## 2022-11-03 LAB
ANION GAP SERPL CALCULATED.3IONS-SCNC: 12 MMOL/L (ref 7–16)
BUN BLDV-MCNC: 14 MG/DL (ref 6–23)
CALCIUM SERPL-MCNC: 7.8 MG/DL (ref 8.6–10.2)
CHLORIDE BLD-SCNC: 114 MMOL/L (ref 98–107)
CO2: 17 MMOL/L (ref 22–29)
CREAT SERPL-MCNC: 0.8 MG/DL (ref 0.5–1)
GFR SERPL CREATININE-BSD FRML MDRD: >60 ML/MIN/1.73
GLUCOSE BLD-MCNC: 77 MG/DL (ref 74–99)
HCT VFR BLD CALC: 32.2 % (ref 34–48)
HEMOGLOBIN: 9.3 G/DL (ref 11.5–15.5)
MCH RBC QN AUTO: 22.8 PG (ref 26–35)
MCHC RBC AUTO-ENTMCNC: 28.9 % (ref 32–34.5)
MCV RBC AUTO: 78.9 FL (ref 80–99.9)
PDW BLD-RTO: 17.7 FL (ref 11.5–15)
PLATELET # BLD: 254 E9/L (ref 130–450)
PMV BLD AUTO: 11.2 FL (ref 7–12)
POTASSIUM SERPL-SCNC: 3.2 MMOL/L (ref 3.5–5)
RBC # BLD: 4.08 E12/L (ref 3.5–5.5)
SODIUM BLD-SCNC: 143 MMOL/L (ref 132–146)
WBC # BLD: 8.6 E9/L (ref 4.5–11.5)

## 2022-11-03 PROCEDURE — 87077 CULTURE AEROBIC IDENTIFY: CPT

## 2022-11-03 PROCEDURE — 87176 TISSUE HOMOGENIZATION CULTR: CPT

## 2022-11-03 PROCEDURE — 87205 SMEAR GRAM STAIN: CPT

## 2022-11-03 PROCEDURE — 80048 BASIC METABOLIC PNL TOTAL CA: CPT

## 2022-11-03 PROCEDURE — 87186 SC STD MICRODIL/AGAR DIL: CPT

## 2022-11-03 PROCEDURE — 87070 CULTURE OTHR SPECIMN AEROBIC: CPT

## 2022-11-03 PROCEDURE — 87102 FUNGUS ISOLATION CULTURE: CPT

## 2022-11-03 PROCEDURE — 85027 COMPLETE CBC AUTOMATED: CPT

## 2022-11-03 PROCEDURE — 87075 CULTR BACTERIA EXCEPT BLOOD: CPT

## 2022-11-04 ENCOUNTER — HOSPITAL ENCOUNTER (OUTPATIENT)
Age: 77
Discharge: HOME | End: 2022-11-06

## 2022-11-04 LAB
ANION GAP SERPL CALCULATED.3IONS-SCNC: 6 MMOL/L (ref 7–16)
BUN BLDV-MCNC: 15 MG/DL (ref 6–23)
CALCIUM SERPL-MCNC: 8.6 MG/DL (ref 8.6–10.2)
CHLORIDE BLD-SCNC: 109 MMOL/L (ref 98–107)
CO2: 24 MMOL/L (ref 22–29)
CREAT SERPL-MCNC: 0.9 MG/DL (ref 0.5–1)
GFR SERPL CREATININE-BSD FRML MDRD: >60 ML/MIN/1.73
GLUCOSE BLD-MCNC: 94 MG/DL (ref 74–99)
GRAM STAIN ORDERABLE: NORMAL
HCT VFR BLD CALC: 26.4 % (ref 34–48)
HEMOGLOBIN: 7.7 G/DL (ref 11.5–15.5)
MCH RBC QN AUTO: 23 PG (ref 26–35)
MCHC RBC AUTO-ENTMCNC: 29.2 % (ref 32–34.5)
MCV RBC AUTO: 78.8 FL (ref 80–99.9)
PDW BLD-RTO: 17.8 FL (ref 11.5–15)
PLATELET # BLD: 209 E9/L (ref 130–450)
PMV BLD AUTO: 10.7 FL (ref 7–12)
POTASSIUM SERPL-SCNC: 4.4 MMOL/L (ref 3.5–5)
RBC # BLD: 3.35 E12/L (ref 3.5–5.5)
SODIUM BLD-SCNC: 139 MMOL/L (ref 132–146)
WBC # BLD: 7.7 E9/L (ref 4.5–11.5)

## 2022-11-04 PROCEDURE — 85027 COMPLETE CBC AUTOMATED: CPT

## 2022-11-04 PROCEDURE — 80048 BASIC METABOLIC PNL TOTAL CA: CPT

## 2022-11-05 ENCOUNTER — HOSPITAL ENCOUNTER (OUTPATIENT)
Age: 77
Discharge: HOME | End: 2022-11-07

## 2022-11-05 LAB
ANAEROBIC CULTURE: NORMAL
ANION GAP SERPL CALCULATED.3IONS-SCNC: 9 MMOL/L (ref 7–16)
BLOOD BANK DISPENSE STATUS: NORMAL
BLOOD BANK PRODUCT CODE: NORMAL
BODY FLUID CULTURE, STERILE: NORMAL
BPU ID: NORMAL
BUN BLDV-MCNC: 16 MG/DL (ref 6–23)
CALCIUM SERPL-MCNC: 8.7 MG/DL (ref 8.6–10.2)
CHLORIDE BLD-SCNC: 109 MMOL/L (ref 98–107)
CO2: 23 MMOL/L (ref 22–29)
CREAT SERPL-MCNC: 1.1 MG/DL (ref 0.5–1)
DESCRIPTION BLOOD BANK: NORMAL
GFR SERPL CREATININE-BSD FRML MDRD: 51 ML/MIN/1.73
GLUCOSE BLD-MCNC: 78 MG/DL (ref 74–99)
GRAM STAIN RESULT: NORMAL
HCT VFR BLD CALC: 26.9 % (ref 34–48)
HEMOGLOBIN: 7.7 G/DL (ref 11.5–15.5)
MCH RBC QN AUTO: 22.8 PG (ref 26–35)
MCHC RBC AUTO-ENTMCNC: 28.6 % (ref 32–34.5)
MCV RBC AUTO: 79.8 FL (ref 80–99.9)
PDW BLD-RTO: 17.7 FL (ref 11.5–15)
PLATELET # BLD: 195 E9/L (ref 130–450)
PMV BLD AUTO: 11.1 FL (ref 7–12)
POTASSIUM SERPL-SCNC: 3.9 MMOL/L (ref 3.5–5)
RBC # BLD: 3.37 E12/L (ref 3.5–5.5)
SODIUM BLD-SCNC: 141 MMOL/L (ref 132–146)
WBC # BLD: 7.5 E9/L (ref 4.5–11.5)

## 2022-11-05 PROCEDURE — 80048 BASIC METABOLIC PNL TOTAL CA: CPT

## 2022-11-05 PROCEDURE — 85027 COMPLETE CBC AUTOMATED: CPT

## 2022-11-06 ENCOUNTER — HOSPITAL ENCOUNTER (OUTPATIENT)
Age: 77
Discharge: HOME | End: 2022-11-08

## 2022-11-06 LAB
ANION GAP SERPL CALCULATED.3IONS-SCNC: 8 MMOL/L (ref 7–16)
BUN BLDV-MCNC: 13 MG/DL (ref 6–23)
CALCIUM SERPL-MCNC: 8.5 MG/DL (ref 8.6–10.2)
CHLORIDE BLD-SCNC: 108 MMOL/L (ref 98–107)
CO2: 23 MMOL/L (ref 22–29)
CREAT SERPL-MCNC: 1 MG/DL (ref 0.5–1)
GFR SERPL CREATININE-BSD FRML MDRD: 57 ML/MIN/1.73
GLUCOSE BLD-MCNC: 71 MG/DL (ref 74–99)
HCT VFR BLD CALC: 31.8 % (ref 34–48)
HEMOGLOBIN: 9.3 G/DL (ref 11.5–15.5)
MCH RBC QN AUTO: 23.5 PG (ref 26–35)
MCHC RBC AUTO-ENTMCNC: 29.2 % (ref 32–34.5)
MCV RBC AUTO: 80.3 FL (ref 80–99.9)
ORGANISM: ABNORMAL
PDW BLD-RTO: 17.5 FL (ref 11.5–15)
PLATELET # BLD: 192 E9/L (ref 130–450)
PMV BLD AUTO: 10.4 FL (ref 7–12)
POTASSIUM SERPL-SCNC: 3.7 MMOL/L (ref 3.5–5)
RBC # BLD: 3.96 E12/L (ref 3.5–5.5)
SODIUM BLD-SCNC: 139 MMOL/L (ref 132–146)
WBC # BLD: 9.5 E9/L (ref 4.5–11.5)
WOUND/ABSCESS: ABNORMAL

## 2022-11-06 PROCEDURE — 85027 COMPLETE CBC AUTOMATED: CPT

## 2022-11-06 PROCEDURE — 80048 BASIC METABOLIC PNL TOTAL CA: CPT

## 2022-11-07 ENCOUNTER — HOSPITAL ENCOUNTER (OUTPATIENT)
Age: 77
Discharge: HOME | End: 2022-11-09

## 2022-11-07 LAB
ANION GAP SERPL CALCULATED.3IONS-SCNC: 10 MMOL/L (ref 7–16)
BUN BLDV-MCNC: 19 MG/DL (ref 6–23)
CALCIUM SERPL-MCNC: 8.6 MG/DL (ref 8.6–10.2)
CHLORIDE BLD-SCNC: 105 MMOL/L (ref 98–107)
CO2: 23 MMOL/L (ref 22–29)
CREAT SERPL-MCNC: 1 MG/DL (ref 0.5–1)
GFR SERPL CREATININE-BSD FRML MDRD: 57 ML/MIN/1.73
GLUCOSE BLD-MCNC: 78 MG/DL (ref 74–99)
HCT VFR BLD CALC: 30.9 % (ref 34–48)
HEMOGLOBIN: 9 G/DL (ref 11.5–15.5)
MCH RBC QN AUTO: 23.4 PG (ref 26–35)
MCHC RBC AUTO-ENTMCNC: 29.1 % (ref 32–34.5)
MCV RBC AUTO: 80.3 FL (ref 80–99.9)
ORGANISM: ABNORMAL
ORGANISM: ABNORMAL
PDW BLD-RTO: 18.3 FL (ref 11.5–15)
PLATELET # BLD: 218 E9/L (ref 130–450)
PMV BLD AUTO: 10.9 FL (ref 7–12)
POTASSIUM SERPL-SCNC: 3.7 MMOL/L (ref 3.5–5)
RBC # BLD: 3.85 E12/L (ref 3.5–5.5)
SODIUM BLD-SCNC: 138 MMOL/L (ref 132–146)
WBC # BLD: 10.6 E9/L (ref 4.5–11.5)
WOUND/ABSCESS: ABNORMAL
WOUND/ABSCESS: ABNORMAL

## 2022-11-07 PROCEDURE — 80048 BASIC METABOLIC PNL TOTAL CA: CPT

## 2022-11-07 PROCEDURE — 85027 COMPLETE CBC AUTOMATED: CPT

## 2022-11-09 LAB
ALBUMIN SERPL-MCNC: 2.7 G/DL (ref 3.5–5.2)
ALP BLD-CCNC: 80 U/L (ref 35–104)
ALT SERPL-CCNC: 8 U/L (ref 0–32)
ANION GAP SERPL CALCULATED.3IONS-SCNC: 11 MMOL/L (ref 7–16)
AST SERPL-CCNC: 13 U/L (ref 0–31)
BILIRUB SERPL-MCNC: 0.3 MG/DL (ref 0–1.2)
BUN BLDV-MCNC: 16 MG/DL (ref 6–23)
CALCIUM SERPL-MCNC: 8.7 MG/DL (ref 8.6–10.2)
CHLORIDE BLD-SCNC: 108 MMOL/L (ref 98–107)
CO2: 24 MMOL/L (ref 22–29)
CREAT SERPL-MCNC: 0.9 MG/DL (ref 0.5–1)
GFR SERPL CREATININE-BSD FRML MDRD: >60 ML/MIN/1.73
GLUCOSE BLD-MCNC: 84 MG/DL (ref 74–99)
HCT VFR BLD CALC: 32.1 % (ref 34–48)
HEMOGLOBIN: 9.7 G/DL (ref 11.5–15.5)
MCH RBC QN AUTO: 24 PG (ref 26–35)
MCHC RBC AUTO-ENTMCNC: 30.2 % (ref 32–34.5)
MCV RBC AUTO: 79.5 FL (ref 80–99.9)
PDW BLD-RTO: 19.1 FL (ref 11.5–15)
PLATELET # BLD: 271 E9/L (ref 130–450)
PMV BLD AUTO: 11 FL (ref 7–12)
POTASSIUM SERPL-SCNC: 4.4 MMOL/L (ref 3.5–5)
RBC # BLD: 4.04 E12/L (ref 3.5–5.5)
SODIUM BLD-SCNC: 143 MMOL/L (ref 132–146)
TOTAL PROTEIN: 5.6 G/DL (ref 6.4–8.3)
TSH SERPL DL<=0.05 MIU/L-ACNC: 17.15 UIU/ML (ref 0.27–4.2)
VITAMIN D 25-HYDROXY: 31 NG/ML (ref 30–100)
WBC # BLD: 9.3 E9/L (ref 4.5–11.5)

## 2022-11-10 ENCOUNTER — OUTSIDE SERVICES (OUTPATIENT)
Dept: PRIMARY CARE CLINIC | Age: 77
End: 2022-11-10

## 2022-11-10 DIAGNOSIS — E86.0 DEHYDRATION, MODERATE: ICD-10-CM

## 2022-11-10 DIAGNOSIS — R10.31 RIGHT LOWER QUADRANT PAIN: ICD-10-CM

## 2022-11-10 DIAGNOSIS — K21.9 GASTROESOPHAGEAL REFLUX DISEASE WITHOUT ESOPHAGITIS: ICD-10-CM

## 2022-11-10 DIAGNOSIS — I10 PRIMARY HYPERTENSION: ICD-10-CM

## 2022-11-10 DIAGNOSIS — E66.9 OBESITY, UNSPECIFIED CLASSIFICATION, UNSPECIFIED OBESITY TYPE, UNSPECIFIED WHETHER SERIOUS COMORBIDITY PRESENT: ICD-10-CM

## 2022-11-10 DIAGNOSIS — M46.26 OSTEOMYELITIS OF LUMBAR VERTEBRA (HCC): ICD-10-CM

## 2022-11-10 DIAGNOSIS — E78.5 HYPERLIPIDEMIA, UNSPECIFIED HYPERLIPIDEMIA TYPE: ICD-10-CM

## 2022-11-10 DIAGNOSIS — M54.50 LOW BACK PAIN, UNSPECIFIED BACK PAIN LATERALITY, UNSPECIFIED CHRONICITY, UNSPECIFIED WHETHER SCIATICA PRESENT: ICD-10-CM

## 2022-11-10 DIAGNOSIS — R26.89 OTHER ABNORMALITIES OF GAIT AND MOBILITY: ICD-10-CM

## 2022-11-10 DIAGNOSIS — I95.89 OTHER HYPOTENSION: ICD-10-CM

## 2022-11-10 DIAGNOSIS — A41.9 SEPSIS WITH ACUTE ORGAN DYSFUNCTION, DUE TO UNSPECIFIED ORGANISM, UNSPECIFIED TYPE, UNSPECIFIED WHETHER SEPTIC SHOCK PRESENT (HCC): ICD-10-CM

## 2022-11-10 DIAGNOSIS — R74.8 ABNORMAL LEVELS OF OTHER SERUM ENZYMES: ICD-10-CM

## 2022-11-10 DIAGNOSIS — M10.9 GOUT, UNSPECIFIED CAUSE, UNSPECIFIED CHRONICITY, UNSPECIFIED SITE: ICD-10-CM

## 2022-11-10 DIAGNOSIS — Z74.1 NEED FOR ASSISTANCE WITH PERSONAL CARE: ICD-10-CM

## 2022-11-10 DIAGNOSIS — I73.9 PVD (PERIPHERAL VASCULAR DISEASE) (HCC): ICD-10-CM

## 2022-11-10 DIAGNOSIS — G93.41 METABOLIC ENCEPHALOPATHY: ICD-10-CM

## 2022-11-10 DIAGNOSIS — N30.00 ACUTE CYSTITIS WITHOUT HEMATURIA: ICD-10-CM

## 2022-11-10 DIAGNOSIS — R26.2 DIFFICULTY WALKING: ICD-10-CM

## 2022-11-10 DIAGNOSIS — E43 SEVERE PROTEIN-CALORIE MALNUTRITION (HCC): ICD-10-CM

## 2022-11-10 DIAGNOSIS — R53.1 WEAKNESS: ICD-10-CM

## 2022-11-10 DIAGNOSIS — E86.1 HYPOVOLEMIA: ICD-10-CM

## 2022-11-10 DIAGNOSIS — R45.1 RESTLESSNESS AND AGITATION: ICD-10-CM

## 2022-11-10 DIAGNOSIS — M96.1 POSTLAMINECTOMY SYNDROME, NOT ELSEWHERE CLASSIFIED: Primary | ICD-10-CM

## 2022-11-10 DIAGNOSIS — K63.89 OTHER SPECIFIED DISEASES OF INTESTINE: ICD-10-CM

## 2022-11-10 DIAGNOSIS — F33.9 EPISODE OF RECURRENT MAJOR DEPRESSIVE DISORDER, UNSPECIFIED DEPRESSION EPISODE SEVERITY (HCC): ICD-10-CM

## 2022-11-10 DIAGNOSIS — Z51.89 ENCOUNTER FOR OTHER SPECIFIED AFTERCARE: ICD-10-CM

## 2022-11-10 DIAGNOSIS — N17.9 ACUTE KIDNEY INJURY (HCC): ICD-10-CM

## 2022-11-10 DIAGNOSIS — J18.9 PNEUMONIA DUE TO INFECTIOUS ORGANISM, UNSPECIFIED LATERALITY, UNSPECIFIED PART OF LUNG: ICD-10-CM

## 2022-11-10 DIAGNOSIS — R65.20 SEPSIS WITH ACUTE ORGAN DYSFUNCTION, DUE TO UNSPECIFIED ORGANISM, UNSPECIFIED TYPE, UNSPECIFIED WHETHER SEPTIC SHOCK PRESENT (HCC): ICD-10-CM

## 2022-11-10 DIAGNOSIS — M62.81 MUSCLE WEAKNESS: ICD-10-CM

## 2022-11-10 DIAGNOSIS — A04.72 ENTEROCOLITIS DUE TO CLOSTRIDIUM DIFFICILE, NOT SPECIFIED AS RECURRENT: ICD-10-CM

## 2022-11-10 DIAGNOSIS — E03.9 HYPOTHYROIDISM, UNSPECIFIED TYPE: ICD-10-CM

## 2022-11-10 LAB
BASOPHILS ABSOLUTE: 0.13 E9/L (ref 0–0.2)
BASOPHILS RELATIVE PERCENT: 1.5 % (ref 0–2)
BUN BLDV-MCNC: 16 MG/DL (ref 6–23)
C-REACTIVE PROTEIN: 1.4 MG/DL (ref 0–0.4)
CREAT SERPL-MCNC: 0.9 MG/DL (ref 0.5–1)
EOSINOPHILS ABSOLUTE: 0.82 E9/L (ref 0.05–0.5)
EOSINOPHILS RELATIVE PERCENT: 9.7 % (ref 0–6)
GFR SERPL CREATININE-BSD FRML MDRD: >60 ML/MIN/1.73
HCT VFR BLD CALC: 31.7 % (ref 34–48)
HEMOGLOBIN: 9.7 G/DL (ref 11.5–15.5)
IMMATURE GRANULOCYTES #: 0.05 E9/L
IMMATURE GRANULOCYTES %: 0.6 % (ref 0–5)
LYMPHOCYTES ABSOLUTE: 1.67 E9/L (ref 1.5–4)
LYMPHOCYTES RELATIVE PERCENT: 19.8 % (ref 20–42)
MCH RBC QN AUTO: 24.6 PG (ref 26–35)
MCHC RBC AUTO-ENTMCNC: 30.6 % (ref 32–34.5)
MCV RBC AUTO: 80.5 FL (ref 80–99.9)
MONOCYTES ABSOLUTE: 0.65 E9/L (ref 0.1–0.95)
MONOCYTES RELATIVE PERCENT: 7.7 % (ref 2–12)
NEUTROPHILS ABSOLUTE: 5.13 E9/L (ref 1.8–7.3)
NEUTROPHILS RELATIVE PERCENT: 60.7 % (ref 43–80)
PDW BLD-RTO: 19.3 FL (ref 11.5–15)
PLATELET # BLD: 264 E9/L (ref 130–450)
PMV BLD AUTO: 10.8 FL (ref 7–12)
RBC # BLD: 3.94 E12/L (ref 3.5–5.5)
SEDIMENTATION RATE, ERYTHROCYTE: 43 MM/HR (ref 0–20)
WBC # BLD: 8.5 E9/L (ref 4.5–11.5)

## 2022-11-14 ENCOUNTER — OUTSIDE SERVICES (OUTPATIENT)
Dept: PRIMARY CARE CLINIC | Age: 77
End: 2022-11-14

## 2022-11-14 DIAGNOSIS — N18.30 STAGE 3 CHRONIC KIDNEY DISEASE, UNSPECIFIED WHETHER STAGE 3A OR 3B CKD (HCC): ICD-10-CM

## 2022-11-14 DIAGNOSIS — F41.9 ANXIETY: ICD-10-CM

## 2022-11-14 DIAGNOSIS — R65.20 SEPSIS WITH ACUTE ORGAN DYSFUNCTION, DUE TO UNSPECIFIED ORGANISM, UNSPECIFIED TYPE, UNSPECIFIED WHETHER SEPTIC SHOCK PRESENT (HCC): Primary | ICD-10-CM

## 2022-11-14 DIAGNOSIS — I27.20 PULMONARY HYPERTENSION (HCC): ICD-10-CM

## 2022-11-14 DIAGNOSIS — G93.41 METABOLIC ENCEPHALOPATHY: ICD-10-CM

## 2022-11-14 DIAGNOSIS — T84.9XXD COMPLICATION OF INTERNAL LEFT KNEE PROSTHESIS, SUBSEQUENT ENCOUNTER: ICD-10-CM

## 2022-11-14 DIAGNOSIS — T84.60XA INFECTION AND INFLAMMATORY REACTION DUE TO INTERNAL FIXATION DEVICE (HCC): ICD-10-CM

## 2022-11-14 DIAGNOSIS — E03.9 HYPOTHYROIDISM, UNSPECIFIED TYPE: ICD-10-CM

## 2022-11-14 DIAGNOSIS — I82.A19: ICD-10-CM

## 2022-11-14 DIAGNOSIS — I10 HYPERTENSION, UNSPECIFIED TYPE: ICD-10-CM

## 2022-11-14 DIAGNOSIS — M62.81 MUSCLE WEAKNESS: ICD-10-CM

## 2022-11-14 DIAGNOSIS — L27.0 GENERALIZED SKIN ERUPTION DUE TO DRUGS AND MEDICAMENTS: ICD-10-CM

## 2022-11-14 DIAGNOSIS — M10.00 IDIOPATHIC GOUT, UNSPECIFIED CHRONICITY, UNSPECIFIED SITE: ICD-10-CM

## 2022-11-14 DIAGNOSIS — E87.5 HYPERKALEMIA: ICD-10-CM

## 2022-11-14 DIAGNOSIS — Z96.652 COMPLICATION OF INTERNAL LEFT KNEE PROSTHESIS, SUBSEQUENT ENCOUNTER: ICD-10-CM

## 2022-11-14 DIAGNOSIS — R26.2 DIFFICULTY WALKING: ICD-10-CM

## 2022-11-14 DIAGNOSIS — N28.9 DISORDER OF KIDNEY AND URETER: ICD-10-CM

## 2022-11-14 DIAGNOSIS — E78.2 MIXED HYPERLIPIDEMIA: ICD-10-CM

## 2022-11-14 DIAGNOSIS — T36.95XD: ICD-10-CM

## 2022-11-14 DIAGNOSIS — Z87.440 PERSONAL HISTORY OF URINARY TRACT INFECTION: ICD-10-CM

## 2022-11-14 DIAGNOSIS — I82.509 CHRONIC DEEP VEIN THROMBOSIS (DVT) OF LOWER EXTREMITY, UNSPECIFIED LATERALITY, UNSPECIFIED VEIN (HCC): ICD-10-CM

## 2022-11-14 DIAGNOSIS — A41.9 SEPSIS WITH ACUTE ORGAN DYSFUNCTION, DUE TO UNSPECIFIED ORGANISM, UNSPECIFIED TYPE, UNSPECIFIED WHETHER SEPTIC SHOCK PRESENT (HCC): Primary | ICD-10-CM

## 2022-11-14 LAB
BUN BLDV-MCNC: 19 MG/DL (ref 6–23)
CREAT SERPL-MCNC: 1 MG/DL (ref 0.5–1)
GFR SERPL CREATININE-BSD FRML MDRD: 57 ML/MIN/1.73

## 2022-11-16 LAB
ALBUMIN SERPL-MCNC: 2.5 G/DL (ref 3.5–5.2)
ALP BLD-CCNC: 108 U/L (ref 35–104)
ALT SERPL-CCNC: 9 U/L (ref 0–32)
ANION GAP SERPL CALCULATED.3IONS-SCNC: 12 MMOL/L (ref 7–16)
AST SERPL-CCNC: 40 U/L (ref 0–31)
BILIRUB SERPL-MCNC: 0.4 MG/DL (ref 0–1.2)
BUN BLDV-MCNC: 18 MG/DL (ref 6–23)
CALCIUM SERPL-MCNC: 9.2 MG/DL (ref 8.6–10.2)
CHLORIDE BLD-SCNC: 106 MMOL/L (ref 98–107)
CO2: 20 MMOL/L (ref 22–29)
CREAT SERPL-MCNC: 1 MG/DL (ref 0.5–1)
GFR SERPL CREATININE-BSD FRML MDRD: 57 ML/MIN/1.73
GLUCOSE BLD-MCNC: 65 MG/DL (ref 74–99)
HCT VFR BLD CALC: 31.9 % (ref 34–48)
HEMOGLOBIN: 9.6 G/DL (ref 11.5–15.5)
MCH RBC QN AUTO: 24.4 PG (ref 26–35)
MCHC RBC AUTO-ENTMCNC: 30.1 % (ref 32–34.5)
MCV RBC AUTO: 81.2 FL (ref 80–99.9)
PDW BLD-RTO: 20.4 FL (ref 11.5–15)
PLATELET # BLD: 262 E9/L (ref 130–450)
PMV BLD AUTO: 11.1 FL (ref 7–12)
POTASSIUM SERPL-SCNC: 5.6 MMOL/L (ref 3.5–5)
RBC # BLD: 3.93 E12/L (ref 3.5–5.5)
SODIUM BLD-SCNC: 138 MMOL/L (ref 132–146)
TOTAL PROTEIN: 6.1 G/DL (ref 6.4–8.3)
WBC # BLD: 7.7 E9/L (ref 4.5–11.5)

## 2022-11-17 ENCOUNTER — OUTSIDE SERVICES (OUTPATIENT)
Dept: PRIMARY CARE CLINIC | Age: 77
End: 2022-11-17

## 2022-11-17 DIAGNOSIS — I95.89 HYPOTENSION DUE TO HYPOVOLEMIA: ICD-10-CM

## 2022-11-17 DIAGNOSIS — F41.9 ANXIETY: ICD-10-CM

## 2022-11-17 DIAGNOSIS — E86.1 HYPOTENSION DUE TO HYPOVOLEMIA: ICD-10-CM

## 2022-11-17 DIAGNOSIS — R65.20 SEPSIS WITH ACUTE ORGAN DYSFUNCTION, DUE TO UNSPECIFIED ORGANISM, UNSPECIFIED TYPE, UNSPECIFIED WHETHER SEPTIC SHOCK PRESENT (HCC): Primary | ICD-10-CM

## 2022-11-17 DIAGNOSIS — I82.509 CHRONIC DEEP VEIN THROMBOSIS (DVT) OF LOWER EXTREMITY, UNSPECIFIED LATERALITY, UNSPECIFIED VEIN (HCC): ICD-10-CM

## 2022-11-17 DIAGNOSIS — E78.2 MIXED HYPERLIPIDEMIA: ICD-10-CM

## 2022-11-17 DIAGNOSIS — E03.9 HYPOTHYROIDISM, UNSPECIFIED TYPE: ICD-10-CM

## 2022-11-17 DIAGNOSIS — N18.30 STAGE 3 CHRONIC KIDNEY DISEASE, UNSPECIFIED WHETHER STAGE 3A OR 3B CKD (HCC): ICD-10-CM

## 2022-11-17 DIAGNOSIS — I82.A19: ICD-10-CM

## 2022-11-17 DIAGNOSIS — L27.0 GENERALIZED SKIN ERUPTION DUE TO DRUGS AND MEDICAMENTS: ICD-10-CM

## 2022-11-17 DIAGNOSIS — R26.2 DIFFICULTY WALKING: ICD-10-CM

## 2022-11-17 DIAGNOSIS — T36.95XD: ICD-10-CM

## 2022-11-17 DIAGNOSIS — T84.60XA INFECTION AND INFLAMMATORY REACTION DUE TO INTERNAL FIXATION DEVICE (HCC): ICD-10-CM

## 2022-11-17 DIAGNOSIS — I10 HYPERTENSION, UNSPECIFIED TYPE: ICD-10-CM

## 2022-11-17 DIAGNOSIS — M10.00 IDIOPATHIC GOUT, UNSPECIFIED CHRONICITY, UNSPECIFIED SITE: ICD-10-CM

## 2022-11-17 DIAGNOSIS — R53.1 WEAKNESS: ICD-10-CM

## 2022-11-17 DIAGNOSIS — M96.1 POSTLAMINECTOMY SYNDROME, NOT ELSEWHERE CLASSIFIED: ICD-10-CM

## 2022-11-17 DIAGNOSIS — E87.5 HYPERKALEMIA: ICD-10-CM

## 2022-11-17 DIAGNOSIS — M46.26 OSTEOMYELITIS OF LUMBAR VERTEBRA (HCC): ICD-10-CM

## 2022-11-17 DIAGNOSIS — R26.89 OTHER ABNORMALITIES OF GAIT AND MOBILITY: ICD-10-CM

## 2022-11-17 DIAGNOSIS — M62.81 MUSCLE WEAKNESS: ICD-10-CM

## 2022-11-17 DIAGNOSIS — A04.72 ENTEROCOLITIS DUE TO CLOSTRIDIUM DIFFICILE, NOT SPECIFIED AS RECURRENT: ICD-10-CM

## 2022-11-17 DIAGNOSIS — N28.9 DISORDER OF KIDNEY AND URETER: ICD-10-CM

## 2022-11-17 DIAGNOSIS — T84.9XXD COMPLICATION OF INTERNAL LEFT KNEE PROSTHESIS, SUBSEQUENT ENCOUNTER: ICD-10-CM

## 2022-11-17 DIAGNOSIS — G93.41 METABOLIC ENCEPHALOPATHY: ICD-10-CM

## 2022-11-17 DIAGNOSIS — R26.2 UNABLE TO WALK: ICD-10-CM

## 2022-11-17 DIAGNOSIS — A41.9 SEPSIS WITH ACUTE ORGAN DYSFUNCTION, DUE TO UNSPECIFIED ORGANISM, UNSPECIFIED TYPE, UNSPECIFIED WHETHER SEPTIC SHOCK PRESENT (HCC): Primary | ICD-10-CM

## 2022-11-17 DIAGNOSIS — Z96.652 COMPLICATION OF INTERNAL LEFT KNEE PROSTHESIS, SUBSEQUENT ENCOUNTER: ICD-10-CM

## 2022-11-17 DIAGNOSIS — Z87.440 PERSONAL HISTORY OF URINARY TRACT INFECTION: ICD-10-CM

## 2022-11-17 DIAGNOSIS — J18.9 PNEUMONIA DUE TO INFECTIOUS ORGANISM, UNSPECIFIED LATERALITY, UNSPECIFIED PART OF LUNG: ICD-10-CM

## 2022-11-17 DIAGNOSIS — I27.20 PULMONARY HYPERTENSION (HCC): ICD-10-CM

## 2022-11-17 LAB
ALBUMIN SERPL-MCNC: 2.7 G/DL (ref 3.5–5.2)
ALP BLD-CCNC: 120 U/L (ref 35–104)
ALT SERPL-CCNC: 5 U/L (ref 0–32)
ANION GAP SERPL CALCULATED.3IONS-SCNC: 13 MMOL/L (ref 7–16)
ANISOCYTOSIS: ABNORMAL
AST SERPL-CCNC: 28 U/L (ref 0–31)
BASOPHILS ABSOLUTE: 0.14 E9/L (ref 0–0.2)
BASOPHILS RELATIVE PERCENT: 1.9 % (ref 0–2)
BILIRUB SERPL-MCNC: 0.3 MG/DL (ref 0–1.2)
BUN BLDV-MCNC: 14 MG/DL (ref 6–23)
BUN BLDV-MCNC: 15 MG/DL (ref 6–23)
BURR CELLS: ABNORMAL
C-REACTIVE PROTEIN: 0.6 MG/DL (ref 0–0.4)
CALCIUM SERPL-MCNC: 9.2 MG/DL (ref 8.6–10.2)
CHLORIDE BLD-SCNC: 108 MMOL/L (ref 98–107)
CO2: 18 MMOL/L (ref 22–29)
CREAT SERPL-MCNC: 0.8 MG/DL (ref 0.5–1)
CREAT SERPL-MCNC: 0.8 MG/DL (ref 0.5–1)
EOSINOPHILS ABSOLUTE: 0.52 E9/L (ref 0.05–0.5)
EOSINOPHILS RELATIVE PERCENT: 7.2 % (ref 0–6)
GFR SERPL CREATININE-BSD FRML MDRD: >60 ML/MIN/1.73
GFR SERPL CREATININE-BSD FRML MDRD: >60 ML/MIN/1.73
GLUCOSE BLD-MCNC: 85 MG/DL (ref 74–99)
HCT VFR BLD CALC: 33.5 % (ref 34–48)
HEMOGLOBIN: 9.8 G/DL (ref 11.5–15.5)
IMMATURE GRANULOCYTES #: 0.03 E9/L
IMMATURE GRANULOCYTES %: 0.4 % (ref 0–5)
LYMPHOCYTES ABSOLUTE: 1.49 E9/L (ref 1.5–4)
LYMPHOCYTES RELATIVE PERCENT: 20.7 % (ref 20–42)
MCH RBC QN AUTO: 24.3 PG (ref 26–35)
MCHC RBC AUTO-ENTMCNC: 29.3 % (ref 32–34.5)
MCV RBC AUTO: 82.9 FL (ref 80–99.9)
MONOCYTES ABSOLUTE: 0.71 E9/L (ref 0.1–0.95)
MONOCYTES RELATIVE PERCENT: 9.8 % (ref 2–12)
NEUTROPHILS ABSOLUTE: 4.32 E9/L (ref 1.8–7.3)
NEUTROPHILS RELATIVE PERCENT: 60 % (ref 43–80)
OVALOCYTES: ABNORMAL
PDW BLD-RTO: 20.5 FL (ref 11.5–15)
PLATELET # BLD: 270 E9/L (ref 130–450)
PMV BLD AUTO: 11.2 FL (ref 7–12)
POIKILOCYTES: ABNORMAL
POLYCHROMASIA: ABNORMAL
POTASSIUM SERPL-SCNC: 4.8 MMOL/L (ref 3.5–5)
RBC # BLD: 4.04 E12/L (ref 3.5–5.5)
SODIUM BLD-SCNC: 139 MMOL/L (ref 132–146)
TOTAL PROTEIN: 5.8 G/DL (ref 6.4–8.3)
WBC # BLD: 7.2 E9/L (ref 4.5–11.5)

## 2022-11-18 LAB — SEDIMENTATION RATE, ERYTHROCYTE: 50 MM/HR (ref 0–20)

## 2022-11-21 ENCOUNTER — OUTSIDE SERVICES (OUTPATIENT)
Dept: PRIMARY CARE CLINIC | Age: 77
End: 2022-11-21

## 2022-11-21 DIAGNOSIS — E87.5 HYPERKALEMIA: ICD-10-CM

## 2022-11-21 DIAGNOSIS — M10.00 IDIOPATHIC GOUT, UNSPECIFIED CHRONICITY, UNSPECIFIED SITE: ICD-10-CM

## 2022-11-21 DIAGNOSIS — T84.9XXD COMPLICATION OF INTERNAL LEFT KNEE PROSTHESIS, SUBSEQUENT ENCOUNTER: ICD-10-CM

## 2022-11-21 DIAGNOSIS — R26.2 DIFFICULTY WALKING: ICD-10-CM

## 2022-11-21 DIAGNOSIS — I10 HYPERTENSION, UNSPECIFIED TYPE: ICD-10-CM

## 2022-11-21 DIAGNOSIS — I82.A19: ICD-10-CM

## 2022-11-21 DIAGNOSIS — A41.9 SEPSIS WITH ACUTE ORGAN DYSFUNCTION, DUE TO UNSPECIFIED ORGANISM, UNSPECIFIED TYPE, UNSPECIFIED WHETHER SEPTIC SHOCK PRESENT (HCC): Primary | ICD-10-CM

## 2022-11-21 DIAGNOSIS — M62.81 MUSCLE WEAKNESS: ICD-10-CM

## 2022-11-21 DIAGNOSIS — M96.1 POSTLAMINECTOMY SYNDROME, NOT ELSEWHERE CLASSIFIED: ICD-10-CM

## 2022-11-21 DIAGNOSIS — J18.9 PNEUMONIA DUE TO INFECTIOUS ORGANISM, UNSPECIFIED LATERALITY, UNSPECIFIED PART OF LUNG: ICD-10-CM

## 2022-11-21 DIAGNOSIS — I82.509 CHRONIC DEEP VEIN THROMBOSIS (DVT) OF LOWER EXTREMITY, UNSPECIFIED LATERALITY, UNSPECIFIED VEIN (HCC): ICD-10-CM

## 2022-11-21 DIAGNOSIS — T84.60XA INFECTION AND INFLAMMATORY REACTION DUE TO INTERNAL FIXATION DEVICE (HCC): ICD-10-CM

## 2022-11-21 DIAGNOSIS — E03.9 HYPOTHYROIDISM, UNSPECIFIED TYPE: ICD-10-CM

## 2022-11-21 DIAGNOSIS — R26.2 UNABLE TO WALK: ICD-10-CM

## 2022-11-21 DIAGNOSIS — Z87.440 PERSONAL HISTORY OF URINARY TRACT INFECTION: ICD-10-CM

## 2022-11-21 DIAGNOSIS — R65.20 SEPSIS WITH ACUTE ORGAN DYSFUNCTION, DUE TO UNSPECIFIED ORGANISM, UNSPECIFIED TYPE, UNSPECIFIED WHETHER SEPTIC SHOCK PRESENT (HCC): Primary | ICD-10-CM

## 2022-11-21 DIAGNOSIS — A04.72 ENTEROCOLITIS DUE TO CLOSTRIDIUM DIFFICILE, NOT SPECIFIED AS RECURRENT: ICD-10-CM

## 2022-11-21 DIAGNOSIS — I27.20 PULMONARY HYPERTENSION (HCC): ICD-10-CM

## 2022-11-21 DIAGNOSIS — F41.9 ANXIETY: ICD-10-CM

## 2022-11-21 DIAGNOSIS — E78.2 MIXED HYPERLIPIDEMIA: ICD-10-CM

## 2022-11-21 DIAGNOSIS — N28.9 DISORDER OF KIDNEY AND URETER: ICD-10-CM

## 2022-11-21 DIAGNOSIS — T36.95XD: ICD-10-CM

## 2022-11-21 DIAGNOSIS — M46.26 OSTEOMYELITIS OF LUMBAR VERTEBRA (HCC): ICD-10-CM

## 2022-11-21 DIAGNOSIS — G93.41 METABOLIC ENCEPHALOPATHY: ICD-10-CM

## 2022-11-21 DIAGNOSIS — N18.30 STAGE 3 CHRONIC KIDNEY DISEASE, UNSPECIFIED WHETHER STAGE 3A OR 3B CKD (HCC): ICD-10-CM

## 2022-11-21 DIAGNOSIS — L27.0 GENERALIZED SKIN ERUPTION DUE TO DRUGS AND MEDICAMENTS: ICD-10-CM

## 2022-11-21 DIAGNOSIS — R53.1 WEAKNESS: ICD-10-CM

## 2022-11-21 DIAGNOSIS — R26.89 OTHER ABNORMALITIES OF GAIT AND MOBILITY: ICD-10-CM

## 2022-11-21 DIAGNOSIS — Z96.652 COMPLICATION OF INTERNAL LEFT KNEE PROSTHESIS, SUBSEQUENT ENCOUNTER: ICD-10-CM

## 2022-11-21 LAB
ALBUMIN SERPL-MCNC: 2.8 G/DL (ref 3.5–5.2)
ALP BLD-CCNC: 141 U/L (ref 35–104)
ALT SERPL-CCNC: 6 U/L (ref 0–32)
ANION GAP SERPL CALCULATED.3IONS-SCNC: 12 MMOL/L (ref 7–16)
AST SERPL-CCNC: 15 U/L (ref 0–31)
BILIRUB SERPL-MCNC: 0.2 MG/DL (ref 0–1.2)
BUN BLDV-MCNC: 17 MG/DL (ref 6–23)
CALCIUM SERPL-MCNC: 9.1 MG/DL (ref 8.6–10.2)
CHLORIDE BLD-SCNC: 107 MMOL/L (ref 98–107)
CO2: 22 MMOL/L (ref 22–29)
CREAT SERPL-MCNC: 0.9 MG/DL (ref 0.5–1)
GFR SERPL CREATININE-BSD FRML MDRD: >60 ML/MIN/1.73
GLUCOSE BLD-MCNC: 84 MG/DL (ref 74–99)
HCT VFR BLD CALC: 33.4 % (ref 34–48)
HEMOGLOBIN: 10 G/DL (ref 11.5–15.5)
MCH RBC QN AUTO: 24.6 PG (ref 26–35)
MCHC RBC AUTO-ENTMCNC: 29.9 % (ref 32–34.5)
MCV RBC AUTO: 82.1 FL (ref 80–99.9)
PDW BLD-RTO: 20.2 FL (ref 11.5–15)
PLATELET # BLD: 273 E9/L (ref 130–450)
PMV BLD AUTO: 10.9 FL (ref 7–12)
POTASSIUM SERPL-SCNC: 4.2 MMOL/L (ref 3.5–5)
RBC # BLD: 4.07 E12/L (ref 3.5–5.5)
SODIUM BLD-SCNC: 141 MMOL/L (ref 132–146)
TOTAL PROTEIN: 5.8 G/DL (ref 6.4–8.3)
WBC # BLD: 5.6 E9/L (ref 4.5–11.5)

## 2022-11-23 LAB
ALBUMIN SERPL-MCNC: 2.7 G/DL (ref 3.5–5.2)
ALP BLD-CCNC: 143 U/L (ref 35–104)
ALT SERPL-CCNC: <5 U/L (ref 0–32)
ANION GAP SERPL CALCULATED.3IONS-SCNC: 11 MMOL/L (ref 7–16)
AST SERPL-CCNC: 14 U/L (ref 0–31)
BASOPHILS ABSOLUTE: 0.05 E9/L (ref 0–0.2)
BASOPHILS RELATIVE PERCENT: 0.9 % (ref 0–2)
BILIRUB SERPL-MCNC: 0.3 MG/DL (ref 0–1.2)
BUN BLDV-MCNC: 20 MG/DL (ref 6–23)
C DIFF TOXIN/ANTIGEN: NORMAL
C. DIFFICILE TOXIN MOLECULAR: ABNORMAL
CALCIUM SERPL-MCNC: 9.2 MG/DL (ref 8.6–10.2)
CHLORIDE BLD-SCNC: 109 MMOL/L (ref 98–107)
CO2: 24 MMOL/L (ref 22–29)
CREAT SERPL-MCNC: 0.9 MG/DL (ref 0.5–1)
EOSINOPHILS ABSOLUTE: 0.54 E9/L (ref 0.05–0.5)
EOSINOPHILS RELATIVE PERCENT: 10 % (ref 0–6)
GFR SERPL CREATININE-BSD FRML MDRD: >60 ML/MIN/1.73
GLUCOSE BLD-MCNC: 84 MG/DL (ref 74–99)
HCT VFR BLD CALC: 33.9 % (ref 34–48)
HEMOGLOBIN: 9.9 G/DL (ref 11.5–15.5)
IMMATURE GRANULOCYTES #: 0.01 E9/L
IMMATURE GRANULOCYTES %: 0.2 % (ref 0–5)
LYMPHOCYTES ABSOLUTE: 1.49 E9/L (ref 1.5–4)
LYMPHOCYTES RELATIVE PERCENT: 27.5 % (ref 20–42)
MCH RBC QN AUTO: 23.9 PG (ref 26–35)
MCHC RBC AUTO-ENTMCNC: 29.2 % (ref 32–34.5)
MCV RBC AUTO: 81.7 FL (ref 80–99.9)
MONOCYTES ABSOLUTE: 0.56 E9/L (ref 0.1–0.95)
MONOCYTES RELATIVE PERCENT: 10.4 % (ref 2–12)
NEUTROPHILS ABSOLUTE: 2.76 E9/L (ref 1.8–7.3)
NEUTROPHILS RELATIVE PERCENT: 51 % (ref 43–80)
ORGANISM: ABNORMAL
PDW BLD-RTO: 19.7 FL (ref 11.5–15)
PLATELET # BLD: 249 E9/L (ref 130–450)
PMV BLD AUTO: 10.4 FL (ref 7–12)
POTASSIUM SERPL-SCNC: 4.4 MMOL/L (ref 3.5–5)
RBC # BLD: 4.15 E12/L (ref 3.5–5.5)
SODIUM BLD-SCNC: 144 MMOL/L (ref 132–146)
TOTAL PROTEIN: 5.8 G/DL (ref 6.4–8.3)
WBC # BLD: 5.4 E9/L (ref 4.5–11.5)

## 2022-11-25 LAB
BASOPHILS ABSOLUTE: 0.05 E9/L (ref 0–0.2)
BASOPHILS RELATIVE PERCENT: 1 % (ref 0–2)
BUN BLDV-MCNC: 17 MG/DL (ref 6–23)
C-REACTIVE PROTEIN: 0.4 MG/DL (ref 0–0.4)
CREAT SERPL-MCNC: 0.9 MG/DL (ref 0.5–1)
EOSINOPHILS ABSOLUTE: 0.55 E9/L (ref 0.05–0.5)
EOSINOPHILS RELATIVE PERCENT: 10.9 % (ref 0–6)
GFR SERPL CREATININE-BSD FRML MDRD: >60 ML/MIN/1.73
HCT VFR BLD CALC: 31.6 % (ref 34–48)
HEMOGLOBIN: 9.4 G/DL (ref 11.5–15.5)
IMMATURE GRANULOCYTES #: 0.01 E9/L
IMMATURE GRANULOCYTES %: 0.2 % (ref 0–5)
LYMPHOCYTES ABSOLUTE: 1.36 E9/L (ref 1.5–4)
LYMPHOCYTES RELATIVE PERCENT: 26.9 % (ref 20–42)
MCH RBC QN AUTO: 24.2 PG (ref 26–35)
MCHC RBC AUTO-ENTMCNC: 29.7 % (ref 32–34.5)
MCV RBC AUTO: 81.2 FL (ref 80–99.9)
MONOCYTES ABSOLUTE: 0.56 E9/L (ref 0.1–0.95)
MONOCYTES RELATIVE PERCENT: 11.1 % (ref 2–12)
NEUTROPHILS ABSOLUTE: 2.52 E9/L (ref 1.8–7.3)
NEUTROPHILS RELATIVE PERCENT: 49.9 % (ref 43–80)
PDW BLD-RTO: 19.6 FL (ref 11.5–15)
PLATELET # BLD: 231 E9/L (ref 130–450)
PMV BLD AUTO: 10.5 FL (ref 7–12)
RBC # BLD: 3.89 E12/L (ref 3.5–5.5)
SEDIMENTATION RATE, ERYTHROCYTE: 35 MM/HR (ref 0–20)
WBC # BLD: 5.1 E9/L (ref 4.5–11.5)

## 2022-11-28 ENCOUNTER — OUTSIDE SERVICES (OUTPATIENT)
Dept: PRIMARY CARE CLINIC | Age: 77
End: 2022-11-28

## 2022-11-28 DIAGNOSIS — N18.30 STAGE 3 CHRONIC KIDNEY DISEASE, UNSPECIFIED WHETHER STAGE 3A OR 3B CKD (HCC): ICD-10-CM

## 2022-11-28 DIAGNOSIS — R65.20 SEPSIS WITH ACUTE ORGAN DYSFUNCTION, DUE TO UNSPECIFIED ORGANISM, UNSPECIFIED TYPE, UNSPECIFIED WHETHER SEPTIC SHOCK PRESENT (HCC): Primary | ICD-10-CM

## 2022-11-28 DIAGNOSIS — T84.60XA INFECTION AND INFLAMMATORY REACTION DUE TO INTERNAL FIXATION DEVICE (HCC): ICD-10-CM

## 2022-11-28 DIAGNOSIS — R26.2 UNABLE TO WALK: ICD-10-CM

## 2022-11-28 DIAGNOSIS — A41.9 SEPSIS WITH ACUTE ORGAN DYSFUNCTION, DUE TO UNSPECIFIED ORGANISM, UNSPECIFIED TYPE, UNSPECIFIED WHETHER SEPTIC SHOCK PRESENT (HCC): Primary | ICD-10-CM

## 2022-11-28 DIAGNOSIS — G93.41 METABOLIC ENCEPHALOPATHY: ICD-10-CM

## 2022-11-28 DIAGNOSIS — J18.9 PNEUMONIA DUE TO INFECTIOUS ORGANISM, UNSPECIFIED LATERALITY, UNSPECIFIED PART OF LUNG: ICD-10-CM

## 2022-11-28 DIAGNOSIS — R53.1 WEAKNESS: ICD-10-CM

## 2022-11-28 DIAGNOSIS — I27.20 PULMONARY HYPERTENSION (HCC): ICD-10-CM

## 2022-11-28 DIAGNOSIS — N28.9 DISORDER OF KIDNEY AND URETER: ICD-10-CM

## 2022-11-28 DIAGNOSIS — M10.00 IDIOPATHIC GOUT, UNSPECIFIED CHRONICITY, UNSPECIFIED SITE: ICD-10-CM

## 2022-11-28 DIAGNOSIS — Z87.440 PERSONAL HISTORY OF URINARY TRACT INFECTION: ICD-10-CM

## 2022-11-28 DIAGNOSIS — M62.81 MUSCLE WEAKNESS: ICD-10-CM

## 2022-11-28 DIAGNOSIS — T84.9XXD COMPLICATION OF INTERNAL LEFT KNEE PROSTHESIS, SUBSEQUENT ENCOUNTER: ICD-10-CM

## 2022-11-28 DIAGNOSIS — E86.1 HYPOTENSION DUE TO HYPOVOLEMIA: ICD-10-CM

## 2022-11-28 DIAGNOSIS — L27.0 GENERALIZED SKIN ERUPTION DUE TO DRUGS AND MEDICAMENTS: ICD-10-CM

## 2022-11-28 DIAGNOSIS — I10 HYPERTENSION, UNSPECIFIED TYPE: ICD-10-CM

## 2022-11-28 DIAGNOSIS — E03.9 HYPOTHYROIDISM, UNSPECIFIED TYPE: ICD-10-CM

## 2022-11-28 DIAGNOSIS — F41.9 ANXIETY: ICD-10-CM

## 2022-11-28 DIAGNOSIS — A04.72 ENTEROCOLITIS DUE TO CLOSTRIDIUM DIFFICILE, NOT SPECIFIED AS RECURRENT: ICD-10-CM

## 2022-11-28 DIAGNOSIS — M96.1 POSTLAMINECTOMY SYNDROME, NOT ELSEWHERE CLASSIFIED: ICD-10-CM

## 2022-11-28 DIAGNOSIS — E87.5 HYPERKALEMIA: ICD-10-CM

## 2022-11-28 DIAGNOSIS — I95.89 HYPOTENSION DUE TO HYPOVOLEMIA: ICD-10-CM

## 2022-11-28 DIAGNOSIS — R26.2 DIFFICULTY WALKING: ICD-10-CM

## 2022-11-28 DIAGNOSIS — E78.2 MIXED HYPERLIPIDEMIA: ICD-10-CM

## 2022-11-28 DIAGNOSIS — R26.89 OTHER ABNORMALITIES OF GAIT AND MOBILITY: ICD-10-CM

## 2022-11-28 DIAGNOSIS — I82.A19: ICD-10-CM

## 2022-11-28 DIAGNOSIS — T36.95XD: ICD-10-CM

## 2022-11-28 DIAGNOSIS — M46.26 OSTEOMYELITIS OF LUMBAR VERTEBRA (HCC): ICD-10-CM

## 2022-11-28 DIAGNOSIS — I82.509 CHRONIC DEEP VEIN THROMBOSIS (DVT) OF LOWER EXTREMITY, UNSPECIFIED LATERALITY, UNSPECIFIED VEIN (HCC): ICD-10-CM

## 2022-11-28 DIAGNOSIS — Z96.652 COMPLICATION OF INTERNAL LEFT KNEE PROSTHESIS, SUBSEQUENT ENCOUNTER: ICD-10-CM

## 2022-11-28 LAB
BASOPHILS ABSOLUTE: 0.06 E9/L (ref 0–0.2)
BASOPHILS RELATIVE PERCENT: 1 % (ref 0–2)
BUN BLDV-MCNC: 16 MG/DL (ref 6–23)
EOSINOPHILS ABSOLUTE: 0.46 E9/L (ref 0.05–0.5)
EOSINOPHILS RELATIVE PERCENT: 7.7 % (ref 0–6)
HCT VFR BLD CALC: 33.2 % (ref 34–48)
HEMOGLOBIN: 9.9 G/DL (ref 11.5–15.5)
IMMATURE GRANULOCYTES #: 0.01 E9/L
IMMATURE GRANULOCYTES %: 0.2 % (ref 0–5)
LYMPHOCYTES ABSOLUTE: 1.39 E9/L (ref 1.5–4)
LYMPHOCYTES RELATIVE PERCENT: 23.4 % (ref 20–42)
MCH RBC QN AUTO: 24 PG (ref 26–35)
MCHC RBC AUTO-ENTMCNC: 29.8 % (ref 32–34.5)
MCV RBC AUTO: 80.6 FL (ref 80–99.9)
MONOCYTES ABSOLUTE: 0.65 E9/L (ref 0.1–0.95)
MONOCYTES RELATIVE PERCENT: 10.9 % (ref 2–12)
NEUTROPHILS ABSOLUTE: 3.38 E9/L (ref 1.8–7.3)
NEUTROPHILS RELATIVE PERCENT: 56.8 % (ref 43–80)
PDW BLD-RTO: 19.2 FL (ref 11.5–15)
PLATELET # BLD: 233 E9/L (ref 130–450)
PMV BLD AUTO: 10.5 FL (ref 7–12)
RBC # BLD: 4.12 E12/L (ref 3.5–5.5)
TOTAL CK: 15 U/L (ref 20–180)
WBC # BLD: 6 E9/L (ref 4.5–11.5)

## 2022-11-30 LAB
ALBUMIN SERPL-MCNC: 2.8 G/DL (ref 3.5–5.2)
ALP BLD-CCNC: 140 U/L (ref 35–104)
ALT SERPL-CCNC: 6 U/L (ref 0–32)
ANION GAP SERPL CALCULATED.3IONS-SCNC: 10 MMOL/L (ref 7–16)
AST SERPL-CCNC: 15 U/L (ref 0–31)
BILIRUB SERPL-MCNC: 0.3 MG/DL (ref 0–1.2)
BUN BLDV-MCNC: 15 MG/DL (ref 6–23)
CALCIUM SERPL-MCNC: 9.1 MG/DL (ref 8.6–10.2)
CHLORIDE BLD-SCNC: 109 MMOL/L (ref 98–107)
CO2: 24 MMOL/L (ref 22–29)
CREAT SERPL-MCNC: 0.9 MG/DL (ref 0.5–1)
GFR SERPL CREATININE-BSD FRML MDRD: >60 ML/MIN/1.73
GLUCOSE BLD-MCNC: 88 MG/DL (ref 74–99)
HCT VFR BLD CALC: 31.1 % (ref 34–48)
HEMOGLOBIN: 9.3 G/DL (ref 11.5–15.5)
MCH RBC QN AUTO: 24.2 PG (ref 26–35)
MCHC RBC AUTO-ENTMCNC: 29.9 % (ref 32–34.5)
MCV RBC AUTO: 81 FL (ref 80–99.9)
PDW BLD-RTO: 19.2 FL (ref 11.5–15)
PLATELET # BLD: 220 E9/L (ref 130–450)
PMV BLD AUTO: 11.1 FL (ref 7–12)
POTASSIUM SERPL-SCNC: 4 MMOL/L (ref 3.5–5)
RBC # BLD: 3.84 E12/L (ref 3.5–5.5)
SODIUM BLD-SCNC: 143 MMOL/L (ref 132–146)
TOTAL PROTEIN: 5.7 G/DL (ref 6.4–8.3)
WBC # BLD: 5 E9/L (ref 4.5–11.5)

## 2022-12-01 ENCOUNTER — OUTSIDE SERVICES (OUTPATIENT)
Dept: PRIMARY CARE CLINIC | Age: 77
End: 2022-12-01

## 2022-12-01 DIAGNOSIS — E87.5 HYPERKALEMIA: ICD-10-CM

## 2022-12-01 DIAGNOSIS — Z87.440 PERSONAL HISTORY OF URINARY TRACT INFECTION: ICD-10-CM

## 2022-12-01 DIAGNOSIS — N18.30 STAGE 3 CHRONIC KIDNEY DISEASE, UNSPECIFIED WHETHER STAGE 3A OR 3B CKD (HCC): ICD-10-CM

## 2022-12-01 DIAGNOSIS — A41.9 SEPSIS WITH ACUTE ORGAN DYSFUNCTION, DUE TO UNSPECIFIED ORGANISM, UNSPECIFIED TYPE, UNSPECIFIED WHETHER SEPTIC SHOCK PRESENT (HCC): Primary | ICD-10-CM

## 2022-12-01 DIAGNOSIS — J18.9 PNEUMONIA DUE TO INFECTIOUS ORGANISM, UNSPECIFIED LATERALITY, UNSPECIFIED PART OF LUNG: ICD-10-CM

## 2022-12-01 DIAGNOSIS — T84.9XXD COMPLICATION OF INTERNAL LEFT KNEE PROSTHESIS, SUBSEQUENT ENCOUNTER: ICD-10-CM

## 2022-12-01 DIAGNOSIS — R65.20 SEPSIS WITH ACUTE ORGAN DYSFUNCTION, DUE TO UNSPECIFIED ORGANISM, UNSPECIFIED TYPE, UNSPECIFIED WHETHER SEPTIC SHOCK PRESENT (HCC): Primary | ICD-10-CM

## 2022-12-01 DIAGNOSIS — M46.26 OSTEOMYELITIS OF LUMBAR VERTEBRA (HCC): ICD-10-CM

## 2022-12-01 DIAGNOSIS — M10.00 IDIOPATHIC GOUT, UNSPECIFIED CHRONICITY, UNSPECIFIED SITE: ICD-10-CM

## 2022-12-01 DIAGNOSIS — R26.2 UNABLE TO WALK: ICD-10-CM

## 2022-12-01 DIAGNOSIS — E86.1 HYPOTENSION DUE TO HYPOVOLEMIA: ICD-10-CM

## 2022-12-01 DIAGNOSIS — Z96.652 COMPLICATION OF INTERNAL LEFT KNEE PROSTHESIS, SUBSEQUENT ENCOUNTER: ICD-10-CM

## 2022-12-01 DIAGNOSIS — M96.1 POSTLAMINECTOMY SYNDROME, NOT ELSEWHERE CLASSIFIED: ICD-10-CM

## 2022-12-01 DIAGNOSIS — R26.89 OTHER ABNORMALITIES OF GAIT AND MOBILITY: ICD-10-CM

## 2022-12-01 DIAGNOSIS — R53.1 WEAKNESS: ICD-10-CM

## 2022-12-01 DIAGNOSIS — G93.41 METABOLIC ENCEPHALOPATHY: ICD-10-CM

## 2022-12-01 DIAGNOSIS — I82.A19: ICD-10-CM

## 2022-12-01 DIAGNOSIS — I10 HYPERTENSION, UNSPECIFIED TYPE: ICD-10-CM

## 2022-12-01 DIAGNOSIS — E86.1 HYPOVOLEMIA: ICD-10-CM

## 2022-12-01 DIAGNOSIS — F41.9 ANXIETY: ICD-10-CM

## 2022-12-01 DIAGNOSIS — R26.2 DIFFICULTY WALKING: ICD-10-CM

## 2022-12-01 DIAGNOSIS — E03.9 HYPOTHYROIDISM, UNSPECIFIED TYPE: ICD-10-CM

## 2022-12-01 DIAGNOSIS — A04.72 ENTEROCOLITIS DUE TO CLOSTRIDIUM DIFFICILE, NOT SPECIFIED AS RECURRENT: ICD-10-CM

## 2022-12-01 DIAGNOSIS — T84.60XA INFECTION AND INFLAMMATORY REACTION DUE TO INTERNAL FIXATION DEVICE (HCC): ICD-10-CM

## 2022-12-01 DIAGNOSIS — T36.95XD: ICD-10-CM

## 2022-12-01 DIAGNOSIS — I95.89 HYPOTENSION DUE TO HYPOVOLEMIA: ICD-10-CM

## 2022-12-01 DIAGNOSIS — N28.9 DISORDER OF KIDNEY AND URETER: ICD-10-CM

## 2022-12-01 DIAGNOSIS — I82.509 CHRONIC DEEP VEIN THROMBOSIS (DVT) OF LOWER EXTREMITY, UNSPECIFIED LATERALITY, UNSPECIFIED VEIN (HCC): ICD-10-CM

## 2022-12-01 DIAGNOSIS — L27.0 GENERALIZED SKIN ERUPTION DUE TO DRUGS AND MEDICAMENTS: ICD-10-CM

## 2022-12-01 DIAGNOSIS — M62.81 MUSCLE WEAKNESS: ICD-10-CM

## 2022-12-01 DIAGNOSIS — I27.20 PULMONARY HYPERTENSION (HCC): ICD-10-CM

## 2022-12-01 DIAGNOSIS — E78.2 MIXED HYPERLIPIDEMIA: ICD-10-CM

## 2022-12-01 LAB
BUN BLDV-MCNC: 15 MG/DL (ref 6–23)
C-REACTIVE PROTEIN: 0.5 MG/DL (ref 0–0.4)
CREAT SERPL-MCNC: 0.9 MG/DL (ref 0.5–1)
GFR SERPL CREATININE-BSD FRML MDRD: >60 ML/MIN/1.73
SEDIMENTATION RATE, ERYTHROCYTE: 32 MM/HR (ref 0–20)

## 2022-12-05 ENCOUNTER — OUTSIDE SERVICES (OUTPATIENT)
Dept: PRIMARY CARE CLINIC | Age: 77
End: 2022-12-05

## 2022-12-05 DIAGNOSIS — L27.0 GENERALIZED SKIN ERUPTION DUE TO DRUGS AND MEDICAMENTS: ICD-10-CM

## 2022-12-05 DIAGNOSIS — I82.A19: ICD-10-CM

## 2022-12-05 DIAGNOSIS — I82.509 CHRONIC DEEP VEIN THROMBOSIS (DVT) OF LOWER EXTREMITY, UNSPECIFIED LATERALITY, UNSPECIFIED VEIN (HCC): ICD-10-CM

## 2022-12-05 DIAGNOSIS — J18.9 PNEUMONIA DUE TO INFECTIOUS ORGANISM, UNSPECIFIED LATERALITY, UNSPECIFIED PART OF LUNG: ICD-10-CM

## 2022-12-05 DIAGNOSIS — M62.81 MUSCLE WEAKNESS: ICD-10-CM

## 2022-12-05 DIAGNOSIS — N18.30 STAGE 3 CHRONIC KIDNEY DISEASE, UNSPECIFIED WHETHER STAGE 3A OR 3B CKD (HCC): ICD-10-CM

## 2022-12-05 DIAGNOSIS — I73.9 PVD (PERIPHERAL VASCULAR DISEASE) (HCC): ICD-10-CM

## 2022-12-05 DIAGNOSIS — I10 HYPERTENSION, UNSPECIFIED TYPE: ICD-10-CM

## 2022-12-05 DIAGNOSIS — R26.2 DIFFICULTY WALKING: ICD-10-CM

## 2022-12-05 DIAGNOSIS — A41.9 SEPSIS WITH ACUTE ORGAN DYSFUNCTION, DUE TO UNSPECIFIED ORGANISM, UNSPECIFIED TYPE, UNSPECIFIED WHETHER SEPTIC SHOCK PRESENT (HCC): Primary | ICD-10-CM

## 2022-12-05 DIAGNOSIS — E03.9 HYPOTHYROIDISM, UNSPECIFIED TYPE: ICD-10-CM

## 2022-12-05 DIAGNOSIS — M46.26 OSTEOMYELITIS OF LUMBAR VERTEBRA (HCC): ICD-10-CM

## 2022-12-05 DIAGNOSIS — T84.60XA INFECTION AND INFLAMMATORY REACTION DUE TO INTERNAL FIXATION DEVICE (HCC): ICD-10-CM

## 2022-12-05 DIAGNOSIS — R65.20 SEPSIS WITH ACUTE ORGAN DYSFUNCTION, DUE TO UNSPECIFIED ORGANISM, UNSPECIFIED TYPE, UNSPECIFIED WHETHER SEPTIC SHOCK PRESENT (HCC): Primary | ICD-10-CM

## 2022-12-05 DIAGNOSIS — M96.1 POSTLAMINECTOMY SYNDROME, NOT ELSEWHERE CLASSIFIED: ICD-10-CM

## 2022-12-05 DIAGNOSIS — E87.5 HYPERKALEMIA: ICD-10-CM

## 2022-12-05 DIAGNOSIS — Z96.652 COMPLICATION OF INTERNAL LEFT KNEE PROSTHESIS, SUBSEQUENT ENCOUNTER: ICD-10-CM

## 2022-12-05 DIAGNOSIS — T36.95XD: ICD-10-CM

## 2022-12-05 DIAGNOSIS — E86.1 HYPOTENSION DUE TO HYPOVOLEMIA: ICD-10-CM

## 2022-12-05 DIAGNOSIS — E43 SEVERE PROTEIN-CALORIE MALNUTRITION (HCC): ICD-10-CM

## 2022-12-05 DIAGNOSIS — F41.9 ANXIETY: ICD-10-CM

## 2022-12-05 DIAGNOSIS — T84.9XXD COMPLICATION OF INTERNAL LEFT KNEE PROSTHESIS, SUBSEQUENT ENCOUNTER: ICD-10-CM

## 2022-12-05 DIAGNOSIS — Z87.440 PERSONAL HISTORY OF URINARY TRACT INFECTION: ICD-10-CM

## 2022-12-05 DIAGNOSIS — R26.89 OTHER ABNORMALITIES OF GAIT AND MOBILITY: ICD-10-CM

## 2022-12-05 DIAGNOSIS — I95.89 HYPOTENSION DUE TO HYPOVOLEMIA: ICD-10-CM

## 2022-12-05 DIAGNOSIS — A04.72 ENTEROCOLITIS DUE TO CLOSTRIDIUM DIFFICILE, NOT SPECIFIED AS RECURRENT: ICD-10-CM

## 2022-12-05 DIAGNOSIS — M10.00 IDIOPATHIC GOUT, UNSPECIFIED CHRONICITY, UNSPECIFIED SITE: ICD-10-CM

## 2022-12-05 DIAGNOSIS — I27.20 PULMONARY HYPERTENSION (HCC): ICD-10-CM

## 2022-12-05 DIAGNOSIS — G93.41 METABOLIC ENCEPHALOPATHY: ICD-10-CM

## 2022-12-05 DIAGNOSIS — N28.9 DISORDER OF KIDNEY AND URETER: ICD-10-CM

## 2022-12-05 DIAGNOSIS — E78.2 MIXED HYPERLIPIDEMIA: ICD-10-CM

## 2022-12-05 LAB
BASOPHILS ABSOLUTE: 0.05 E9/L (ref 0–0.2)
BASOPHILS RELATIVE PERCENT: 1 % (ref 0–2)
BUN BLDV-MCNC: 15 MG/DL (ref 6–23)
CREAT SERPL-MCNC: 1 MG/DL (ref 0.5–1)
EOSINOPHILS ABSOLUTE: 0.12 E9/L (ref 0.05–0.5)
EOSINOPHILS RELATIVE PERCENT: 2.4 % (ref 0–6)
GFR SERPL CREATININE-BSD FRML MDRD: 57 ML/MIN/1.73
HCT VFR BLD CALC: 33.3 % (ref 34–48)
HEMOGLOBIN: 9.9 G/DL (ref 11.5–15.5)
IMMATURE GRANULOCYTES #: 0.01 E9/L
IMMATURE GRANULOCYTES %: 0.2 % (ref 0–5)
LYMPHOCYTES ABSOLUTE: 1.37 E9/L (ref 1.5–4)
LYMPHOCYTES RELATIVE PERCENT: 27.6 % (ref 20–42)
MCH RBC QN AUTO: 23.7 PG (ref 26–35)
MCHC RBC AUTO-ENTMCNC: 29.7 % (ref 32–34.5)
MCV RBC AUTO: 79.9 FL (ref 80–99.9)
MONOCYTES ABSOLUTE: 0.48 E9/L (ref 0.1–0.95)
MONOCYTES RELATIVE PERCENT: 9.7 % (ref 2–12)
NEUTROPHILS ABSOLUTE: 2.93 E9/L (ref 1.8–7.3)
NEUTROPHILS RELATIVE PERCENT: 59.1 % (ref 43–80)
PDW BLD-RTO: 18.6 FL (ref 11.5–15)
PLATELET # BLD: 217 E9/L (ref 130–450)
PMV BLD AUTO: 10.5 FL (ref 7–12)
RBC # BLD: 4.17 E12/L (ref 3.5–5.5)
WBC # BLD: 5 E9/L (ref 4.5–11.5)

## 2022-12-08 LAB
ANION GAP SERPL CALCULATED.3IONS-SCNC: 12 MMOL/L (ref 7–16)
BASOPHILS ABSOLUTE: 0.06 E9/L (ref 0–0.2)
BASOPHILS RELATIVE PERCENT: 1.1 % (ref 0–2)
BUN BLDV-MCNC: 19 MG/DL (ref 6–23)
BUN BLDV-MCNC: 19 MG/DL (ref 6–23)
C-REACTIVE PROTEIN: 0.3 MG/DL (ref 0–0.4)
CALCIUM SERPL-MCNC: 9 MG/DL (ref 8.6–10.2)
CHLORIDE BLD-SCNC: 106 MMOL/L (ref 98–107)
CO2: 23 MMOL/L (ref 22–29)
CREAT SERPL-MCNC: 1.1 MG/DL (ref 0.5–1)
CREAT SERPL-MCNC: 1.1 MG/DL (ref 0.5–1)
EOSINOPHILS ABSOLUTE: 0.16 E9/L (ref 0.05–0.5)
EOSINOPHILS RELATIVE PERCENT: 3 % (ref 0–6)
GFR SERPL CREATININE-BSD FRML MDRD: 51 ML/MIN/1.73
GFR SERPL CREATININE-BSD FRML MDRD: 51 ML/MIN/1.73
GLUCOSE BLD-MCNC: 90 MG/DL (ref 74–99)
HCT VFR BLD CALC: 31.7 % (ref 34–48)
HEMOGLOBIN: 9.6 G/DL (ref 11.5–15.5)
IMMATURE GRANULOCYTES #: 0.01 E9/L
IMMATURE GRANULOCYTES %: 0.2 % (ref 0–5)
LYMPHOCYTES ABSOLUTE: 1.25 E9/L (ref 1.5–4)
LYMPHOCYTES RELATIVE PERCENT: 23.7 % (ref 20–42)
MCH RBC QN AUTO: 24.2 PG (ref 26–35)
MCHC RBC AUTO-ENTMCNC: 30.3 % (ref 32–34.5)
MCV RBC AUTO: 79.8 FL (ref 80–99.9)
MONOCYTES ABSOLUTE: 0.55 E9/L (ref 0.1–0.95)
MONOCYTES RELATIVE PERCENT: 10.4 % (ref 2–12)
NEUTROPHILS ABSOLUTE: 3.25 E9/L (ref 1.8–7.3)
NEUTROPHILS RELATIVE PERCENT: 61.6 % (ref 43–80)
PDW BLD-RTO: 18.6 FL (ref 11.5–15)
PLATELET # BLD: 228 E9/L (ref 130–450)
PMV BLD AUTO: 11.2 FL (ref 7–12)
POTASSIUM SERPL-SCNC: 4.1 MMOL/L (ref 3.5–5)
RBC # BLD: 3.97 E12/L (ref 3.5–5.5)
SEDIMENTATION RATE, ERYTHROCYTE: 20 MM/HR (ref 0–20)
SODIUM BLD-SCNC: 141 MMOL/L (ref 132–146)
WBC # BLD: 5.3 E9/L (ref 4.5–11.5)

## 2022-12-12 LAB
BASOPHILS ABSOLUTE: 0.07 E9/L (ref 0–0.2)
BASOPHILS RELATIVE PERCENT: 1.2 % (ref 0–2)
BUN BLDV-MCNC: 19 MG/DL (ref 6–23)
CREAT SERPL-MCNC: 0.9 MG/DL (ref 0.5–1)
EOSINOPHILS ABSOLUTE: 0.16 E9/L (ref 0.05–0.5)
EOSINOPHILS RELATIVE PERCENT: 2.8 % (ref 0–6)
GFR SERPL CREATININE-BSD FRML MDRD: >60 ML/MIN/1.73
HCT VFR BLD CALC: 34.8 % (ref 34–48)
HEMOGLOBIN: 10.3 G/DL (ref 11.5–15.5)
IMMATURE GRANULOCYTES #: 0.01 E9/L
IMMATURE GRANULOCYTES %: 0.2 % (ref 0–5)
LYMPHOCYTES ABSOLUTE: 1.38 E9/L (ref 1.5–4)
LYMPHOCYTES RELATIVE PERCENT: 24.1 % (ref 20–42)
MCH RBC QN AUTO: 23.8 PG (ref 26–35)
MCHC RBC AUTO-ENTMCNC: 29.6 % (ref 32–34.5)
MCV RBC AUTO: 80.4 FL (ref 80–99.9)
MONOCYTES ABSOLUTE: 0.64 E9/L (ref 0.1–0.95)
MONOCYTES RELATIVE PERCENT: 11.2 % (ref 2–12)
NEUTROPHILS ABSOLUTE: 3.46 E9/L (ref 1.8–7.3)
NEUTROPHILS RELATIVE PERCENT: 60.5 % (ref 43–80)
PDW BLD-RTO: 18.2 FL (ref 11.5–15)
PLATELET # BLD: 232 E9/L (ref 130–450)
PMV BLD AUTO: 10.2 FL (ref 7–12)
RBC # BLD: 4.33 E12/L (ref 3.5–5.5)
WBC # BLD: 5.7 E9/L (ref 4.5–11.5)

## 2022-12-13 ENCOUNTER — OUTSIDE SERVICES (OUTPATIENT)
Dept: PRIMARY CARE CLINIC | Age: 77
End: 2022-12-13
Payer: MEDICARE

## 2022-12-13 DIAGNOSIS — A41.9 SEPSIS WITH ACUTE ORGAN DYSFUNCTION, DUE TO UNSPECIFIED ORGANISM, UNSPECIFIED TYPE, UNSPECIFIED WHETHER SEPTIC SHOCK PRESENT (HCC): Primary | ICD-10-CM

## 2022-12-13 DIAGNOSIS — R65.20 SEPSIS WITH ACUTE ORGAN DYSFUNCTION, DUE TO UNSPECIFIED ORGANISM, UNSPECIFIED TYPE, UNSPECIFIED WHETHER SEPTIC SHOCK PRESENT (HCC): Primary | ICD-10-CM

## 2022-12-13 DIAGNOSIS — E03.9 HYPOTHYROIDISM, UNSPECIFIED TYPE: ICD-10-CM

## 2022-12-13 DIAGNOSIS — M62.81 MUSCLE WEAKNESS: ICD-10-CM

## 2022-12-13 DIAGNOSIS — M10.00 IDIOPATHIC GOUT, UNSPECIFIED CHRONICITY, UNSPECIFIED SITE: ICD-10-CM

## 2022-12-13 DIAGNOSIS — T84.60XA INFECTION AND INFLAMMATORY REACTION DUE TO INTERNAL FIXATION DEVICE (HCC): ICD-10-CM

## 2022-12-13 DIAGNOSIS — I82.509 CHRONIC DEEP VEIN THROMBOSIS (DVT) OF LOWER EXTREMITY, UNSPECIFIED LATERALITY, UNSPECIFIED VEIN (HCC): ICD-10-CM

## 2022-12-13 DIAGNOSIS — N18.30 STAGE 3 CHRONIC KIDNEY DISEASE, UNSPECIFIED WHETHER STAGE 3A OR 3B CKD (HCC): ICD-10-CM

## 2022-12-13 DIAGNOSIS — I10 HYPERTENSION, UNSPECIFIED TYPE: ICD-10-CM

## 2022-12-13 DIAGNOSIS — Z87.440 PERSONAL HISTORY OF URINARY TRACT INFECTION: ICD-10-CM

## 2022-12-13 DIAGNOSIS — R26.2 DIFFICULTY WALKING: ICD-10-CM

## 2022-12-13 DIAGNOSIS — E87.5 HYPERKALEMIA: ICD-10-CM

## 2022-12-13 DIAGNOSIS — E78.2 MIXED HYPERLIPIDEMIA: ICD-10-CM

## 2022-12-13 DIAGNOSIS — I27.20 PULMONARY HYPERTENSION (HCC): ICD-10-CM

## 2022-12-13 PROCEDURE — 99309 SBSQ NF CARE MODERATE MDM 30: CPT

## 2022-12-15 LAB
BASOPHILS ABSOLUTE: 0.07 E9/L (ref 0–0.2)
BASOPHILS RELATIVE PERCENT: 1.3 % (ref 0–2)
BILIRUBIN URINE: NEGATIVE
BLOOD, URINE: NEGATIVE
BUN BLDV-MCNC: 20 MG/DL (ref 6–23)
C-REACTIVE PROTEIN: <0.3 MG/DL (ref 0–0.4)
CLARITY: CLEAR
COLOR: YELLOW
CREAT SERPL-MCNC: 0.9 MG/DL (ref 0.5–1)
EOSINOPHILS ABSOLUTE: 0.19 E9/L (ref 0.05–0.5)
EOSINOPHILS RELATIVE PERCENT: 3.5 % (ref 0–6)
GFR SERPL CREATININE-BSD FRML MDRD: >60 ML/MIN/1.73
GLUCOSE URINE: NEGATIVE MG/DL
HCT VFR BLD CALC: 34.4 % (ref 34–48)
HEMOGLOBIN: 10.5 G/DL (ref 11.5–15.5)
IMMATURE GRANULOCYTES #: 0.01 E9/L
IMMATURE GRANULOCYTES %: 0.2 % (ref 0–5)
KETONES, URINE: NEGATIVE MG/DL
LEUKOCYTE ESTERASE, URINE: NEGATIVE
LYMPHOCYTES ABSOLUTE: 1.41 E9/L (ref 1.5–4)
LYMPHOCYTES RELATIVE PERCENT: 25.7 % (ref 20–42)
MCH RBC QN AUTO: 23.5 PG (ref 26–35)
MCHC RBC AUTO-ENTMCNC: 30.5 % (ref 32–34.5)
MCV RBC AUTO: 77 FL (ref 80–99.9)
MONOCYTES ABSOLUTE: 0.7 E9/L (ref 0.1–0.95)
MONOCYTES RELATIVE PERCENT: 12.8 % (ref 2–12)
NEUTROPHILS ABSOLUTE: 3.1 E9/L (ref 1.8–7.3)
NEUTROPHILS RELATIVE PERCENT: 56.5 % (ref 43–80)
NITRITE, URINE: NEGATIVE
PDW BLD-RTO: 18.4 FL (ref 11.5–15)
PH UA: 5.5 (ref 5–9)
PLATELET # BLD: 233 E9/L (ref 130–450)
PMV BLD AUTO: 11 FL (ref 7–12)
PROTEIN UA: NEGATIVE MG/DL
RBC # BLD: 4.47 E12/L (ref 3.5–5.5)
SPECIFIC GRAVITY UA: 1.02 (ref 1–1.03)
UROBILINOGEN, URINE: 0.2 E.U./DL
WBC # BLD: 5.5 E9/L (ref 4.5–11.5)

## 2022-12-16 LAB — SEDIMENTATION RATE, ERYTHROCYTE: 15 MM/HR (ref 0–20)

## 2022-12-17 LAB — URINE CULTURE, ROUTINE: NORMAL

## 2022-12-22 LAB
BASOPHILS ABSOLUTE: 0.05 E9/L (ref 0–0.2)
BASOPHILS RELATIVE PERCENT: 0.9 % (ref 0–2)
BUN BLDV-MCNC: 21 MG/DL (ref 6–23)
C-REACTIVE PROTEIN: 0.4 MG/DL (ref 0–0.4)
CREAT SERPL-MCNC: 1 MG/DL (ref 0.5–1)
EOSINOPHILS ABSOLUTE: 0.22 E9/L (ref 0.05–0.5)
EOSINOPHILS RELATIVE PERCENT: 3.9 % (ref 0–6)
GFR SERPL CREATININE-BSD FRML MDRD: 57 ML/MIN/1.73
HCT VFR BLD CALC: 35 % (ref 34–48)
HEMOGLOBIN: 10.3 G/DL (ref 11.5–15.5)
IMMATURE GRANULOCYTES #: 0.01 E9/L
IMMATURE GRANULOCYTES %: 0.2 % (ref 0–5)
LYMPHOCYTES ABSOLUTE: 1.8 E9/L (ref 1.5–4)
LYMPHOCYTES RELATIVE PERCENT: 32.1 % (ref 20–42)
MCH RBC QN AUTO: 24 PG (ref 26–35)
MCHC RBC AUTO-ENTMCNC: 29.4 % (ref 32–34.5)
MCV RBC AUTO: 81.4 FL (ref 80–99.9)
MONOCYTES ABSOLUTE: 0.6 E9/L (ref 0.1–0.95)
MONOCYTES RELATIVE PERCENT: 10.7 % (ref 2–12)
NEUTROPHILS ABSOLUTE: 2.93 E9/L (ref 1.8–7.3)
NEUTROPHILS RELATIVE PERCENT: 52.2 % (ref 43–80)
PDW BLD-RTO: 18.4 FL (ref 11.5–15)
PLATELET # BLD: 233 E9/L (ref 130–450)
PMV BLD AUTO: 10.8 FL (ref 7–12)
RBC # BLD: 4.3 E12/L (ref 3.5–5.5)
SEDIMENTATION RATE, ERYTHROCYTE: 28 MM/HR (ref 0–20)
WBC # BLD: 5.6 E9/L (ref 4.5–11.5)

## 2022-12-24 LAB
ALBUMIN SERPL-MCNC: 3.2 G/DL (ref 3.5–5.2)
ALP BLD-CCNC: 134 U/L (ref 35–104)
ALT SERPL-CCNC: 10 U/L (ref 0–32)
ANION GAP SERPL CALCULATED.3IONS-SCNC: 12 MMOL/L (ref 7–16)
AST SERPL-CCNC: 19 U/L (ref 0–31)
BILIRUB SERPL-MCNC: 0.2 MG/DL (ref 0–1.2)
BUN BLDV-MCNC: 19 MG/DL (ref 6–23)
CALCIUM SERPL-MCNC: 9.5 MG/DL (ref 8.6–10.2)
CHLORIDE BLD-SCNC: 108 MMOL/L (ref 98–107)
CO2: 24 MMOL/L (ref 22–29)
CREAT SERPL-MCNC: 0.8 MG/DL (ref 0.5–1)
GFR SERPL CREATININE-BSD FRML MDRD: >60 ML/MIN/1.73
GLUCOSE BLD-MCNC: 93 MG/DL (ref 74–99)
HCT VFR BLD CALC: 32.8 % (ref 34–48)
HEMOGLOBIN: 9.9 G/DL (ref 11.5–15.5)
MCH RBC QN AUTO: 23.3 PG (ref 26–35)
MCHC RBC AUTO-ENTMCNC: 30.2 % (ref 32–34.5)
MCV RBC AUTO: 77.2 FL (ref 80–99.9)
PDW BLD-RTO: 18.5 FL (ref 11.5–15)
PLATELET # BLD: 231 E9/L (ref 130–450)
PMV BLD AUTO: 10.2 FL (ref 7–12)
POTASSIUM SERPL-SCNC: 4.1 MMOL/L (ref 3.5–5)
RBC # BLD: 4.25 E12/L (ref 3.5–5.5)
SODIUM BLD-SCNC: 144 MMOL/L (ref 132–146)
TOTAL PROTEIN: 6 G/DL (ref 6.4–8.3)
TSH SERPL DL<=0.05 MIU/L-ACNC: 1.53 UIU/ML (ref 0.27–4.2)
VITAMIN D 25-HYDROXY: 42 NG/ML (ref 30–100)
WBC # BLD: 5.9 E9/L (ref 4.5–11.5)

## 2022-12-26 ENCOUNTER — OUTSIDE SERVICES (OUTPATIENT)
Dept: PRIMARY CARE CLINIC | Age: 77
End: 2022-12-26

## 2022-12-26 DIAGNOSIS — A41.9 SEPSIS WITH ACUTE ORGAN DYSFUNCTION, DUE TO UNSPECIFIED ORGANISM, UNSPECIFIED TYPE, UNSPECIFIED WHETHER SEPTIC SHOCK PRESENT (HCC): ICD-10-CM

## 2022-12-26 DIAGNOSIS — Z96.652 COMPLICATION OF INTERNAL LEFT KNEE PROSTHESIS, SUBSEQUENT ENCOUNTER: Primary | ICD-10-CM

## 2022-12-26 DIAGNOSIS — I10 PRIMARY HYPERTENSION: ICD-10-CM

## 2022-12-26 DIAGNOSIS — R53.1 WEAKNESS: ICD-10-CM

## 2022-12-26 DIAGNOSIS — M46.26 ACUTE OSTEOMYELITIS OF LUMBAR SPINE (HCC): ICD-10-CM

## 2022-12-26 DIAGNOSIS — R26.2 DIFFICULTY WALKING: ICD-10-CM

## 2022-12-26 DIAGNOSIS — R65.20 SEPSIS WITH ACUTE ORGAN DYSFUNCTION, DUE TO UNSPECIFIED ORGANISM, UNSPECIFIED TYPE, UNSPECIFIED WHETHER SEPTIC SHOCK PRESENT (HCC): ICD-10-CM

## 2022-12-26 DIAGNOSIS — T84.9XXD COMPLICATION OF INTERNAL LEFT KNEE PROSTHESIS, SUBSEQUENT ENCOUNTER: Primary | ICD-10-CM

## 2022-12-30 LAB
ALBUMIN SERPL-MCNC: 3 G/DL (ref 3.5–5.2)
ALP BLD-CCNC: 113 U/L (ref 35–104)
ALT SERPL-CCNC: 7 U/L (ref 0–32)
ANION GAP SERPL CALCULATED.3IONS-SCNC: 11 MMOL/L (ref 7–16)
AST SERPL-CCNC: 13 U/L (ref 0–31)
BILIRUB SERPL-MCNC: 0.2 MG/DL (ref 0–1.2)
BUN BLDV-MCNC: 21 MG/DL (ref 6–23)
CALCIUM SERPL-MCNC: 9.5 MG/DL (ref 8.6–10.2)
CHLORIDE BLD-SCNC: 108 MMOL/L (ref 98–107)
CO2: 23 MMOL/L (ref 22–29)
CREAT SERPL-MCNC: 0.9 MG/DL (ref 0.5–1)
GFR SERPL CREATININE-BSD FRML MDRD: >60 ML/MIN/1.73
GLUCOSE BLD-MCNC: 82 MG/DL (ref 74–99)
HCT VFR BLD CALC: 33 % (ref 34–48)
HEMOGLOBIN: 9.8 G/DL (ref 11.5–15.5)
MCH RBC QN AUTO: 24 PG (ref 26–35)
MCHC RBC AUTO-ENTMCNC: 29.7 % (ref 32–34.5)
MCV RBC AUTO: 80.9 FL (ref 80–99.9)
PDW BLD-RTO: 18.6 FL (ref 11.5–15)
PLATELET # BLD: 239 E9/L (ref 130–450)
PMV BLD AUTO: 11 FL (ref 7–12)
POTASSIUM SERPL-SCNC: 3.9 MMOL/L (ref 3.5–5)
RBC # BLD: 4.08 E12/L (ref 3.5–5.5)
SODIUM BLD-SCNC: 142 MMOL/L (ref 132–146)
TOTAL PROTEIN: 5.7 G/DL (ref 6.4–8.3)
WBC # BLD: 5.7 E9/L (ref 4.5–11.5)

## 2022-12-31 ENCOUNTER — OUTSIDE SERVICES (OUTPATIENT)
Dept: PRIMARY CARE CLINIC | Age: 77
End: 2022-12-31

## 2022-12-31 DIAGNOSIS — E78.2 MIXED HYPERLIPIDEMIA: ICD-10-CM

## 2022-12-31 DIAGNOSIS — I10 HYPERTENSION, UNSPECIFIED TYPE: ICD-10-CM

## 2022-12-31 DIAGNOSIS — I10 PRIMARY HYPERTENSION: ICD-10-CM

## 2022-12-31 DIAGNOSIS — R26.2 DIFFICULTY WALKING: ICD-10-CM

## 2022-12-31 DIAGNOSIS — Z96.652 COMPLICATION OF INTERNAL LEFT KNEE PROSTHESIS, SUBSEQUENT ENCOUNTER: Primary | ICD-10-CM

## 2022-12-31 DIAGNOSIS — T84.9XXD COMPLICATION OF INTERNAL LEFT KNEE PROSTHESIS, SUBSEQUENT ENCOUNTER: Primary | ICD-10-CM

## 2022-12-31 DIAGNOSIS — I82.509 CHRONIC DEEP VEIN THROMBOSIS (DVT) OF LOWER EXTREMITY, UNSPECIFIED LATERALITY, UNSPECIFIED VEIN (HCC): ICD-10-CM

## 2022-12-31 DIAGNOSIS — R53.1 WEAKNESS: ICD-10-CM

## 2023-01-06 LAB
ALBUMIN SERPL-MCNC: 3 G/DL (ref 3.5–5.2)
ALP BLD-CCNC: 117 U/L (ref 35–104)
ALT SERPL-CCNC: 7 U/L (ref 0–32)
ANION GAP SERPL CALCULATED.3IONS-SCNC: 11 MMOL/L (ref 7–16)
AST SERPL-CCNC: 18 U/L (ref 0–31)
BASOPHILS ABSOLUTE: 0.05 E9/L (ref 0–0.2)
BASOPHILS RELATIVE PERCENT: 0.8 % (ref 0–2)
BILIRUB SERPL-MCNC: <0.2 MG/DL (ref 0–1.2)
BUN BLDV-MCNC: 23 MG/DL (ref 6–23)
C-REACTIVE PROTEIN: <0.3 MG/DL (ref 0–0.4)
CALCIUM SERPL-MCNC: 9.4 MG/DL (ref 8.6–10.2)
CHLORIDE BLD-SCNC: 108 MMOL/L (ref 98–107)
CO2: 23 MMOL/L (ref 22–29)
CREAT SERPL-MCNC: 1 MG/DL (ref 0.5–1)
EOSINOPHILS ABSOLUTE: 0.22 E9/L (ref 0.05–0.5)
EOSINOPHILS RELATIVE PERCENT: 3.7 % (ref 0–6)
GFR SERPL CREATININE-BSD FRML MDRD: 57 ML/MIN/1.73
GLUCOSE BLD-MCNC: 74 MG/DL (ref 74–99)
HCT VFR BLD CALC: 32.7 % (ref 34–48)
HEMOGLOBIN: 9.7 G/DL (ref 11.5–15.5)
IMMATURE GRANULOCYTES #: 0.02 E9/L
IMMATURE GRANULOCYTES %: 0.3 % (ref 0–5)
LYMPHOCYTES ABSOLUTE: 1.66 E9/L (ref 1.5–4)
LYMPHOCYTES RELATIVE PERCENT: 27.8 % (ref 20–42)
MCH RBC QN AUTO: 24.3 PG (ref 26–35)
MCHC RBC AUTO-ENTMCNC: 29.7 % (ref 32–34.5)
MCV RBC AUTO: 82 FL (ref 80–99.9)
MONOCYTES ABSOLUTE: 0.67 E9/L (ref 0.1–0.95)
MONOCYTES RELATIVE PERCENT: 11.2 % (ref 2–12)
NEUTROPHILS ABSOLUTE: 3.35 E9/L (ref 1.8–7.3)
NEUTROPHILS RELATIVE PERCENT: 56.2 % (ref 43–80)
PDW BLD-RTO: 18.3 FL (ref 11.5–15)
PLATELET # BLD: 209 E9/L (ref 130–450)
PMV BLD AUTO: 10.8 FL (ref 7–12)
POTASSIUM SERPL-SCNC: 4.1 MMOL/L (ref 3.5–5)
RBC # BLD: 3.99 E12/L (ref 3.5–5.5)
SEDIMENTATION RATE, ERYTHROCYTE: 18 MM/HR (ref 0–20)
SODIUM BLD-SCNC: 142 MMOL/L (ref 132–146)
TOTAL PROTEIN: 5.7 G/DL (ref 6.4–8.3)
WBC # BLD: 6 E9/L (ref 4.5–11.5)

## 2023-01-13 LAB
ALBUMIN SERPL-MCNC: 3.1 G/DL (ref 3.5–5.2)
ALP BLD-CCNC: 98 U/L (ref 35–104)
ALT SERPL-CCNC: 7 U/L (ref 0–32)
ANION GAP SERPL CALCULATED.3IONS-SCNC: 12 MMOL/L (ref 7–16)
AST SERPL-CCNC: 16 U/L (ref 0–31)
BILIRUB SERPL-MCNC: <0.2 MG/DL (ref 0–1.2)
BUN BLDV-MCNC: 24 MG/DL (ref 6–23)
CALCIUM SERPL-MCNC: 9.3 MG/DL (ref 8.6–10.2)
CHLORIDE BLD-SCNC: 107 MMOL/L (ref 98–107)
CO2: 24 MMOL/L (ref 22–29)
CREAT SERPL-MCNC: 1 MG/DL (ref 0.5–1)
GFR SERPL CREATININE-BSD FRML MDRD: 57 ML/MIN/1.73
GLUCOSE BLD-MCNC: 75 MG/DL (ref 74–99)
HCT VFR BLD CALC: 32.8 % (ref 34–48)
HEMOGLOBIN: 9.8 G/DL (ref 11.5–15.5)
MCH RBC QN AUTO: 24.4 PG (ref 26–35)
MCHC RBC AUTO-ENTMCNC: 29.9 % (ref 32–34.5)
MCV RBC AUTO: 81.6 FL (ref 80–99.9)
PDW BLD-RTO: 18 FL (ref 11.5–15)
PLATELET # BLD: 208 E9/L (ref 130–450)
PMV BLD AUTO: 11.3 FL (ref 7–12)
POTASSIUM SERPL-SCNC: 4.1 MMOL/L (ref 3.5–5)
RBC # BLD: 4.02 E12/L (ref 3.5–5.5)
SODIUM BLD-SCNC: 143 MMOL/L (ref 132–146)
TOTAL PROTEIN: 5.7 G/DL (ref 6.4–8.3)
WBC # BLD: 5.4 E9/L (ref 4.5–11.5)

## 2023-02-07 ENCOUNTER — HOSPITAL ENCOUNTER (OUTPATIENT)
Age: 78
Discharge: HOME | End: 2023-02-09

## 2023-02-07 LAB
ABO/RH: NORMAL
ANTIBODY SCREEN: NORMAL

## 2023-02-07 PROCEDURE — 87205 SMEAR GRAM STAIN: CPT

## 2023-02-07 PROCEDURE — 86850 RBC ANTIBODY SCREEN: CPT

## 2023-02-07 PROCEDURE — 86901 BLOOD TYPING SEROLOGIC RH(D): CPT

## 2023-02-07 PROCEDURE — 87206 SMEAR FLUORESCENT/ACID STAI: CPT

## 2023-02-07 PROCEDURE — 87116 MYCOBACTERIA CULTURE: CPT

## 2023-02-07 PROCEDURE — 86900 BLOOD TYPING SEROLOGIC ABO: CPT

## 2023-02-07 PROCEDURE — 87186 SC STD MICRODIL/AGAR DIL: CPT

## 2023-02-07 PROCEDURE — 88305 TISSUE EXAM BY PATHOLOGIST: CPT

## 2023-02-07 PROCEDURE — 87075 CULTR BACTERIA EXCEPT BLOOD: CPT

## 2023-02-07 PROCEDURE — 87077 CULTURE AEROBIC IDENTIFY: CPT

## 2023-02-07 PROCEDURE — 87176 TISSUE HOMOGENIZATION CULTR: CPT

## 2023-02-07 PROCEDURE — 87070 CULTURE OTHR SPECIMN AEROBIC: CPT

## 2023-02-07 PROCEDURE — 87102 FUNGUS ISOLATION CULTURE: CPT

## 2023-02-07 PROCEDURE — 88331 PATH CONSLTJ SURG 1 BLK 1SPC: CPT

## 2023-02-08 ENCOUNTER — HOSPITAL ENCOUNTER (OUTPATIENT)
Age: 78
Discharge: HOME | End: 2023-02-10

## 2023-02-08 LAB
ANION GAP SERPL CALCULATED.3IONS-SCNC: 10 MMOL/L (ref 7–16)
BUN BLDV-MCNC: 29 MG/DL (ref 6–23)
CALCIUM SERPL-MCNC: 8.8 MG/DL (ref 8.6–10.2)
CHLORIDE BLD-SCNC: 104 MMOL/L (ref 98–107)
CO2: 25 MMOL/L (ref 22–29)
CREAT SERPL-MCNC: 1.3 MG/DL (ref 0.5–1)
GFR SERPL CREATININE-BSD FRML MDRD: 42 ML/MIN/1.73
GLUCOSE BLD-MCNC: 95 MG/DL (ref 74–99)
GRAM STAIN ORDERABLE: NORMAL
HCT VFR BLD CALC: 29.3 % (ref 34–48)
HEMOGLOBIN: 8.7 G/DL (ref 11.5–15.5)
MCH RBC QN AUTO: 24.9 PG (ref 26–35)
MCHC RBC AUTO-ENTMCNC: 29.7 % (ref 32–34.5)
MCV RBC AUTO: 83.7 FL (ref 80–99.9)
PDW BLD-RTO: 16.4 FL (ref 11.5–15)
PLATELET # BLD: 243 E9/L (ref 130–450)
PMV BLD AUTO: 10.8 FL (ref 7–12)
POTASSIUM SERPL-SCNC: 4.9 MMOL/L (ref 3.5–5)
RBC # BLD: 3.5 E12/L (ref 3.5–5.5)
SODIUM BLD-SCNC: 139 MMOL/L (ref 132–146)
WBC # BLD: 8 E9/L (ref 4.5–11.5)

## 2023-02-08 PROCEDURE — 80048 BASIC METABOLIC PNL TOTAL CA: CPT

## 2023-02-08 PROCEDURE — 85027 COMPLETE CBC AUTOMATED: CPT

## 2023-02-09 ENCOUNTER — HOSPITAL ENCOUNTER (OUTPATIENT)
Age: 78
Discharge: HOME | End: 2023-02-11

## 2023-02-09 LAB
AFB SMEAR: NORMAL
ANAEROBIC CULTURE: NORMAL
ANION GAP SERPL CALCULATED.3IONS-SCNC: 8 MMOL/L (ref 7–16)
BUN BLDV-MCNC: 27 MG/DL (ref 6–23)
CALCIUM SERPL-MCNC: 8.7 MG/DL (ref 8.6–10.2)
CHLORIDE BLD-SCNC: 103 MMOL/L (ref 98–107)
CO2: 24 MMOL/L (ref 22–29)
CREAT SERPL-MCNC: 1.3 MG/DL (ref 0.5–1)
GFR SERPL CREATININE-BSD FRML MDRD: 42 ML/MIN/1.73
GLUCOSE BLD-MCNC: 89 MG/DL (ref 74–99)
HCT VFR BLD CALC: 29 % (ref 34–48)
HEMOGLOBIN: 8.5 G/DL (ref 11.5–15.5)
MCH RBC QN AUTO: 24.1 PG (ref 26–35)
MCHC RBC AUTO-ENTMCNC: 29.3 % (ref 32–34.5)
MCV RBC AUTO: 82.4 FL (ref 80–99.9)
PDW BLD-RTO: 16.5 FL (ref 11.5–15)
PLATELET # BLD: 203 E9/L (ref 130–450)
PMV BLD AUTO: 11.3 FL (ref 7–12)
POTASSIUM SERPL-SCNC: 4.6 MMOL/L (ref 3.5–5)
RBC # BLD: 3.52 E12/L (ref 3.5–5.5)
SODIUM BLD-SCNC: 135 MMOL/L (ref 132–146)
WBC # BLD: 5.7 E9/L (ref 4.5–11.5)

## 2023-02-09 PROCEDURE — 85027 COMPLETE CBC AUTOMATED: CPT

## 2023-02-09 PROCEDURE — 80048 BASIC METABOLIC PNL TOTAL CA: CPT

## 2023-02-10 LAB
ALBUMIN SERPL-MCNC: 3.2 G/DL (ref 3.5–5.2)
ALP BLD-CCNC: 114 U/L (ref 35–104)
ALT SERPL-CCNC: <5 U/L (ref 0–32)
ANION GAP SERPL CALCULATED.3IONS-SCNC: 12 MMOL/L (ref 7–16)
AST SERPL-CCNC: 25 U/L (ref 0–31)
BILIRUB SERPL-MCNC: 0.3 MG/DL (ref 0–1.2)
BUN BLDV-MCNC: 25 MG/DL (ref 6–23)
CALCIUM SERPL-MCNC: 8.8 MG/DL (ref 8.6–10.2)
CHLORIDE BLD-SCNC: 105 MMOL/L (ref 98–107)
CO2: 22 MMOL/L (ref 22–29)
CREAT SERPL-MCNC: 1.1 MG/DL (ref 0.5–1)
FUNGUS STAIN: NORMAL
GFR SERPL CREATININE-BSD FRML MDRD: 51 ML/MIN/1.73
GLUCOSE BLD-MCNC: 62 MG/DL (ref 74–99)
HCT VFR BLD CALC: 30.3 % (ref 34–48)
HEMOGLOBIN: 8.9 G/DL (ref 11.5–15.5)
MCH RBC QN AUTO: 24.5 PG (ref 26–35)
MCHC RBC AUTO-ENTMCNC: 29.4 % (ref 32–34.5)
MCV RBC AUTO: 83.5 FL (ref 80–99.9)
PDW BLD-RTO: 16.4 FL (ref 11.5–15)
PLATELET # BLD: 300 E9/L (ref 130–450)
PMV BLD AUTO: 11.3 FL (ref 7–12)
POTASSIUM SERPL-SCNC: 4.3 MMOL/L (ref 3.5–5)
RBC # BLD: 3.63 E12/L (ref 3.5–5.5)
SODIUM BLD-SCNC: 139 MMOL/L (ref 132–146)
TOTAL PROTEIN: 6 G/DL (ref 6.4–8.3)
WBC # BLD: 9.2 E9/L (ref 4.5–11.5)

## 2023-02-11 LAB — WOUND/ABSCESS: NORMAL

## 2023-02-12 LAB
ORGANISM: ABNORMAL
ORGANISM: ABNORMAL
WOUND/ABSCESS: ABNORMAL
WOUND/ABSCESS: NORMAL

## 2023-02-13 LAB
HCT VFR BLD CALC: 27 % (ref 34–48)
HEMOGLOBIN: 8.1 G/DL (ref 11.5–15.5)
MCH RBC QN AUTO: 24.4 PG (ref 26–35)
MCHC RBC AUTO-ENTMCNC: 30 % (ref 32–34.5)
MCV RBC AUTO: 81.3 FL (ref 80–99.9)
PDW BLD-RTO: 17.6 FL (ref 11.5–15)
PLATELET # BLD: 222 E9/L (ref 130–450)
PMV BLD AUTO: 10.8 FL (ref 7–12)
RBC # BLD: 3.32 E12/L (ref 3.5–5.5)
WBC # BLD: 4.9 E9/L (ref 4.5–11.5)

## 2023-02-17 LAB
ALBUMIN SERPL-MCNC: 3 G/DL (ref 3.5–5.2)
ALP BLD-CCNC: 108 U/L (ref 35–104)
ALT SERPL-CCNC: <5 U/L (ref 0–32)
ANION GAP SERPL CALCULATED.3IONS-SCNC: 9 MMOL/L (ref 7–16)
AST SERPL-CCNC: 15 U/L (ref 0–31)
BILIRUB SERPL-MCNC: 0.2 MG/DL (ref 0–1.2)
BUN BLDV-MCNC: 18 MG/DL (ref 6–23)
CALCIUM SERPL-MCNC: 8.9 MG/DL (ref 8.6–10.2)
CHLORIDE BLD-SCNC: 108 MMOL/L (ref 98–107)
CO2: 25 MMOL/L (ref 22–29)
CREAT SERPL-MCNC: 1 MG/DL (ref 0.5–1)
GFR SERPL CREATININE-BSD FRML MDRD: 57 ML/MIN/1.73
GLUCOSE BLD-MCNC: 82 MG/DL (ref 74–99)
HCT VFR BLD CALC: 25.8 % (ref 34–48)
HEMOGLOBIN: 7.7 G/DL (ref 11.5–15.5)
MCH RBC QN AUTO: 25 PG (ref 26–35)
MCHC RBC AUTO-ENTMCNC: 29.8 % (ref 32–34.5)
MCV RBC AUTO: 83.8 FL (ref 80–99.9)
PDW BLD-RTO: 17.5 FL (ref 11.5–15)
PLATELET # BLD: 234 E9/L (ref 130–450)
PMV BLD AUTO: 11.3 FL (ref 7–12)
POTASSIUM SERPL-SCNC: 4.1 MMOL/L (ref 3.5–5)
RBC # BLD: 3.08 E12/L (ref 3.5–5.5)
SODIUM BLD-SCNC: 142 MMOL/L (ref 132–146)
TOTAL PROTEIN: 5.4 G/DL (ref 6.4–8.3)
WBC # BLD: 5.6 E9/L (ref 4.5–11.5)

## 2023-02-23 LAB
ALBUMIN SERPL-MCNC: 3 G/DL (ref 3.5–5.2)
ALP BLD-CCNC: 146 U/L (ref 35–104)
ALT SERPL-CCNC: <5 U/L (ref 0–32)
ANION GAP SERPL CALCULATED.3IONS-SCNC: 13 MMOL/L (ref 7–16)
AST SERPL-CCNC: 16 U/L (ref 0–31)
BASOPHILS ABSOLUTE: 0.07 E9/L (ref 0–0.2)
BASOPHILS RELATIVE PERCENT: 1 % (ref 0–2)
BILIRUB SERPL-MCNC: 0.4 MG/DL (ref 0–1.2)
BUN BLDV-MCNC: 24 MG/DL (ref 6–23)
CALCIUM SERPL-MCNC: 9 MG/DL (ref 8.6–10.2)
CHLORIDE BLD-SCNC: 106 MMOL/L (ref 98–107)
CO2: 24 MMOL/L (ref 22–29)
CREAT SERPL-MCNC: 1 MG/DL (ref 0.5–1)
EOSINOPHILS ABSOLUTE: 0.21 E9/L (ref 0.05–0.5)
EOSINOPHILS RELATIVE PERCENT: 2.9 % (ref 0–6)
GFR SERPL CREATININE-BSD FRML MDRD: 57 ML/MIN/1.73
GLUCOSE BLD-MCNC: 60 MG/DL (ref 74–99)
HCT VFR BLD CALC: 27.9 % (ref 34–48)
HEMOGLOBIN: 8.1 G/DL (ref 11.5–15.5)
IMMATURE GRANULOCYTES #: 0.02 E9/L
IMMATURE GRANULOCYTES %: 0.3 % (ref 0–5)
LYMPHOCYTES ABSOLUTE: 1.37 E9/L (ref 1.5–4)
LYMPHOCYTES RELATIVE PERCENT: 18.9 % (ref 20–42)
MCH RBC QN AUTO: 23.8 PG (ref 26–35)
MCHC RBC AUTO-ENTMCNC: 29 % (ref 32–34.5)
MCV RBC AUTO: 81.8 FL (ref 80–99.9)
MONOCYTES ABSOLUTE: 0.77 E9/L (ref 0.1–0.95)
MONOCYTES RELATIVE PERCENT: 10.6 % (ref 2–12)
NEUTROPHILS ABSOLUTE: 4.82 E9/L (ref 1.8–7.3)
NEUTROPHILS RELATIVE PERCENT: 66.3 % (ref 43–80)
PDW BLD-RTO: 17.5 FL (ref 11.5–15)
PLATELET # BLD: 280 E9/L (ref 130–450)
PMV BLD AUTO: 11.7 FL (ref 7–12)
POTASSIUM SERPL-SCNC: 4 MMOL/L (ref 3.5–5)
RBC # BLD: 3.41 E12/L (ref 3.5–5.5)
SODIUM BLD-SCNC: 143 MMOL/L (ref 132–146)
TOTAL PROTEIN: 5.6 G/DL (ref 6.4–8.3)
WBC # BLD: 7.3 E9/L (ref 4.5–11.5)

## 2023-02-24 RX ORDER — ZINC GLUCONATE 50 MG
50 TABLET ORAL DAILY
COMMUNITY

## 2023-02-24 RX ORDER — POLYETHYLENE GLYCOL 1000
POWDER (GRAM) MISCELLANEOUS
COMMUNITY

## 2023-02-24 RX ORDER — SENNOSIDES 8.6 MG
650 CAPSULE ORAL EVERY 8 HOURS PRN
COMMUNITY

## 2023-02-24 RX ORDER — LEVOTHYROXINE SODIUM 0.15 MG/1
150 TABLET ORAL DAILY
COMMUNITY
Start: 2023-01-19

## 2023-02-24 RX ORDER — OXYCODONE HYDROCHLORIDE 5 MG/1
TABLET ORAL
COMMUNITY
Start: 2023-02-04

## 2023-02-24 RX ORDER — ALPRAZOLAM 0.5 MG/1
0.5 TABLET ORAL DAILY PRN
COMMUNITY
Start: 2022-12-23

## 2023-02-27 LAB
ALBUMIN SERPL-MCNC: 3.1 G/DL (ref 3.5–5.2)
ALP BLD-CCNC: 192 U/L (ref 35–104)
ALT SERPL-CCNC: <5 U/L (ref 0–32)
ANION GAP SERPL CALCULATED.3IONS-SCNC: 14 MMOL/L (ref 7–16)
ANISOCYTOSIS: ABNORMAL
AST SERPL-CCNC: 19 U/L (ref 0–31)
BASOPHILS ABSOLUTE: 0.05 E9/L (ref 0–0.2)
BASOPHILS RELATIVE PERCENT: 0.9 % (ref 0–2)
BILIRUB SERPL-MCNC: 0.2 MG/DL (ref 0–1.2)
BUN BLDV-MCNC: 18 MG/DL (ref 6–23)
C-REACTIVE PROTEIN: 1.2 MG/DL (ref 0–0.4)
CALCIUM SERPL-MCNC: 9 MG/DL (ref 8.6–10.2)
CHLORIDE BLD-SCNC: 108 MMOL/L (ref 98–107)
CO2: 22 MMOL/L (ref 22–29)
CREAT SERPL-MCNC: 1 MG/DL (ref 0.5–1)
EOSINOPHILS ABSOLUTE: 0.13 E9/L (ref 0.05–0.5)
EOSINOPHILS RELATIVE PERCENT: 2.6 % (ref 0–6)
GFR SERPL CREATININE-BSD FRML MDRD: 57 ML/MIN/1.73
GLUCOSE BLD-MCNC: 71 MG/DL (ref 74–99)
HCT VFR BLD CALC: 30.3 % (ref 34–48)
HEMOGLOBIN: 8.5 G/DL (ref 11.5–15.5)
LYMPHOCYTES ABSOLUTE: 1.28 E9/L (ref 1.5–4)
LYMPHOCYTES RELATIVE PERCENT: 25.2 % (ref 20–42)
MCH RBC QN AUTO: 24.3 PG (ref 26–35)
MCHC RBC AUTO-ENTMCNC: 28.1 % (ref 32–34.5)
MCV RBC AUTO: 86.6 FL (ref 80–99.9)
MONOCYTES ABSOLUTE: 0.31 E9/L (ref 0.1–0.95)
MONOCYTES RELATIVE PERCENT: 6.1 % (ref 2–12)
NEUTROPHILS ABSOLUTE: 3.32 E9/L (ref 1.8–7.3)
NEUTROPHILS RELATIVE PERCENT: 65.2 % (ref 43–80)
NUCLEATED RED BLOOD CELLS: 0.9 /100 WBC
OVALOCYTES: ABNORMAL
PDW BLD-RTO: 17 FL (ref 11.5–15)
PLATELET # BLD: 263 E9/L (ref 130–450)
PMV BLD AUTO: 11.4 FL (ref 7–12)
POIKILOCYTES: ABNORMAL
POLYCHROMASIA: ABNORMAL
POTASSIUM SERPL-SCNC: 4.7 MMOL/L (ref 3.5–5)
RBC # BLD: 3.5 E12/L (ref 3.5–5.5)
SEDIMENTATION RATE, ERYTHROCYTE: 30 MM/HR (ref 0–20)
SODIUM BLD-SCNC: 144 MMOL/L (ref 132–146)
TEAR DROP CELLS: ABNORMAL
TOTAL PROTEIN: 5.8 G/DL (ref 6.4–8.3)
WBC # BLD: 5.1 E9/L (ref 4.5–11.5)

## 2023-02-28 ENCOUNTER — TELEPHONE (OUTPATIENT)
Dept: VASCULAR SURGERY | Age: 78
End: 2023-02-28

## 2023-02-28 NOTE — TELEPHONE ENCOUNTER
Called and confirmed appointment for 3/1/23 at 115 PM with [facility] staff.

## 2023-03-01 ENCOUNTER — OFFICE VISIT (OUTPATIENT)
Dept: VASCULAR SURGERY | Age: 78
End: 2023-03-01
Payer: MEDICARE

## 2023-03-01 VITALS — HEIGHT: 66 IN | BODY MASS INDEX: 25.71 KG/M2 | WEIGHT: 160 LBS

## 2023-03-01 DIAGNOSIS — I82.532 CHRONIC DEEP VEIN THROMBOSIS (DVT) OF POPLITEAL VEIN OF LEFT LOWER EXTREMITY (HCC): ICD-10-CM

## 2023-03-01 PROBLEM — I82.539 CHRONIC DEEP VEIN THROMBOSIS (DVT) OF POPLITEAL VEIN (HCC): Status: ACTIVE | Noted: 2023-03-01

## 2023-03-01 PROCEDURE — 1123F ACP DISCUSS/DSCN MKR DOCD: CPT | Performed by: PHYSICIAN ASSISTANT

## 2023-03-01 PROCEDURE — 99214 OFFICE O/P EST MOD 30 MIN: CPT | Performed by: PHYSICIAN ASSISTANT

## 2023-03-01 RX ORDER — ONDANSETRON 4 MG/1
4 TABLET, FILM COATED ORAL EVERY 8 HOURS PRN
COMMUNITY

## 2023-03-01 NOTE — PROGRESS NOTES
Vascular Surgery Outpatient Progress Note    Chief Complaint   Patient presents with    Circulatory Problem     Left leg dvt       HISTORY OF PRESENT ILLNESS:      The patient is a 76 y.o. female who returns for follow-up evaluation of DVT. The patient has a history of R LE DVT in 12/2019 following hospitalization for vertebral osteomyelitis following back surgery. She completed treatment with eliquis at that time. She then was diagnosed with a LLE per her report while hospitalized for covid and sepsis. We do not have records of any of this imaging. She was started on eliquis for L LE DVT. She was seen by Dr. Marcos Sanchez 3/2021 and was recommended to stay on eliquis for at least 6 months for L LE DVT. She is now here for evaluation. She denies any leg swelling or pain. She had a complicated course following L3 postinfectious vertebral collapse status post L2-L5 revision decompression and L1-L5 posterior interbody fusion at [facility] in 10/2020 which subsequently infected her L TKA since last seen. She had the total joint explanted and an antibiotic spacer inserted 11/3/2022 by Dr Joanna Anthony. She then had the joint reimplanted on 2/7/2023 and the staples are to come out tomorrow. She states she developed left ankle swelling and pain shortly after her most recent operation. Her eliquis was held for 3 days prior to surgery and resumed the night after surgery per her report. She now has no leg pain, sob, chest pain. Her swelling is improving and to be expected in her post op period. She remains on eliquis.     Past Medical History:        Diagnosis Date    Arthritis     Constipation     DVT (deep venous thrombosis) (HCC)     Gout     CONTROLLED    History of COVID-19     Hyperlipidemia     Hypertension     STABLE    Macular hole, right eye     Thyroid disease      Past Surgical History:        Procedure Laterality Date    ABDOMEN SURGERY      BACK SURGERY  09/2020    [facility]    BREAST SURGERY Right 1982    BENIGN CYST    CHOLECYSTECTOMY  1997    COLONOSCOPY      EYE SURGERY      HYSTERECTOMY (CERVIX STATUS UNKNOWN)  1985    JOINT REPLACEMENT      bilat knees     LAMINECTOMY N/A 6/3/2019    LUMBAR LAMINECTOMY POSTERIOR L3-L5, BONE BIOPSY L4 - GIOVANNY, X-RAY,  WANTS TF performed by Yen Grove MD at [place] [address]       Current Medications:   Prior to Admission medications    Medication Sig Start Date End Date Taking? Authorizing Provider   ondansetron (ZOFRAN) 4 MG tablet Take 4 mg by mouth every 8 hours as needed for Nausea or Vomiting   Yes Historical Provider, MD   ALPRAZolam (XANAX) 0.5 MG tablet Take 0.5 mg by mouth daily as needed. 12/23/22  Yes Historical Provider, MD   diclofenac sodium (VOLTAREN) 1 % GEL  12/23/22  Yes Historical Provider, MD   levothyroxine (SYNTHROID) 150 MCG tablet Take 150 mcg by mouth daily 1/19/23  Yes Historical Provider, MD   oxyCODONE (ROXICODONE) 5 MG immediate release tablet  2/4/23  Yes Historical Provider, MD   Polyethylene Glycol 1000 POWD by Does not apply route   Yes Historical Provider, MD   acetaminophen (TYLENOL) 650 MG extended release tablet Take 650 mg by mouth every 8 hours as needed for Pain   Yes Historical Provider, MD   zinc gluconate 50 MG tablet Take 50 mg by mouth daily   Yes Historical Provider, MD   Multiple Vitamins-Minerals (THERAPEUTIC MULTIVITAMIN-MINERALS) tablet Take 1 tablet by mouth daily   Yes Historical Provider, MD   apixaban (ELIQUIS) 5 MG TABS tablet Take 1 tablet by mouth 2 times daily 5/22/21  Yes Lencho Mena MD   atorvastatin (LIPITOR) 40 MG tablet Take 1 tablet by mouth nightly 5/22/21  Yes Lencho Mena MD   metoprolol tartrate (LOPRESSOR) 25 MG tablet Take 25 mg by mouth 2 times daily   Yes Historical Provider, MD   probenecid (BENEMID) 500 MG tablet Take 500 mg by mouth 2 times daily.    Yes Historical Provider, MD     Allergies:  Adrenalin [epinephrine hcl (nasal)] and Epinephrine    Social History     Socioeconomic History    Marital status:      Spouse name: Not on file    Number of children: Not on file    Years of education: Not on file    Highest education level: Not on file   Occupational History    Not on file   Tobacco Use    Smoking status: Never    Smokeless tobacco: Never   Vaping Use    Vaping Use: Never used   Substance and Sexual Activity    Alcohol use: Yes     Comment: Occasional    Drug use: No    Sexual activity: Not Currently   Other Topics Concern    Not on file   Social History Narrative    Not on file     Social Determinants of Health     Financial Resource Strain: Not on file   Food Insecurity: Not on file   Transportation Needs: Not on file   Physical Activity: Not on file   Stress: Not on file   Social Connections: Not on file   Intimate Partner Violence: Not on file   Housing Stability: Not on file        Family History   Problem Relation Age of Onset    Other Mother        REVIEW OF SYSTEMS (New symptoms):    Eyes:      Blurred vision:  No [x]/Yes []               Diplopia:   No [x]/Yes []               Vision loss:       No [x]/Yes []   Ears, nose, throat:             Hearing loss:    No [x]/Yes []      Vertigo:   No [x]/Yes []                       Swallowing problem:  No [x]/Yes []               Nose bleeds:   No [x]/Yes []      Voice hoarseness:  No [x]/Yes []  Respiratory:             Cough:   No [x]/Yes []      Pleuritic chest pain:  No [x]/Yes []                        Dyspnea:   No [x]/Yes []      Wheezing:   No [x]/Yes []  Cardiovascular:             Angina:   No [x]/Yes []      Palpitations:   No [x]/Yes []          Claudication:    No [x]/Yes []      Leg swelling:   No [x]/Yes []  Gastrointestinal:             Nausea or vomiting:  No [x]/Yes []               Abdominal pain:  No [x]/Yes []                     Intestinal bleeding: No [x]/Yes []  Musculoskeletal:             Leg pain:   No [x]/Yes []      Back pain:   No []/Yes [x]                    Weakness:   No [x]/Yes []  Neurologic:        Numbness:   No [x]/Yes []      Paralysis:   No [x]/Yes []                       Headaches:   No [x]/Yes []  Hematologic, lymphatic:   Anemia:   No [x]/Yes []              Bleeding or bruising:  No [x]/Yes []              Fevers or chills: No [x]/Yes []  Endocrine:             Temp intolerance:   No [x]/Yes []                       Polydipsia, polyuria:  No [x]/Yes []  Skin:              Rash:    No [x]/Yes []      Ulcers:   No [x]/Yes []              Abnorm pigment: No [x]/Yes []  :              Frequency/urgency:  No [x]/Yes []      Hematuria:    No [x]/Yes []                      Incontinence:    No [x]/Yes []    PHYSICAL EXAM:  There were no vitals filed for this visit. General Appearance: alert and oriented to person, place and time, in no acute distress, well developed and well- nourished  Neurologic: no cranial nerve deficit, speech normal  Head: normocephalic and atraumatic  Eyes: extraocular eye movements intact, conjunctivae normal  ENT: external ear and ear canal normal bilaterally, nose without deformity  Pulmonary/Chest: normal air movement, no respiratory distress  Cardiovascular: normal rate, regular rhythm  Abdomen: non-distended, no masses  Musculoskeletal: no joint deformity or tenderness  Extremities: slight leg edema bilaterally. Staples intact to left knee  Skin: warm and dry, no rash or erythema    PULSE EXAM      Right      Left   Brachial     Radial 2 2   Femoral     Popliteal     Dorsalis Pedis 1 1   Posterior Tibial 1 1   (3=normal, 2=diminished, 1=barely palpable, 4=widened)    RADIOLOGY: LLE US results reviewed showing possible DVT left popliteal vein with partial compression    Problem List Items Addressed This Visit          Circulatory    Chronic deep vein thrombosis (DVT) of popliteal vein (HCC)       It is difficult to discern whether the LLE popliteal thrombus seen on US is new or chronic.  Based on her symptoms of ankle swelling and localized pain to her medial ankle, I am skeptical to call this an acute DVT. It may be scarring from her previous DVT but is difficult to say for sure given I don't have that prior imaging. I also would not consider this a failure of eliquis considering she was holding it for surgery just prior to this positive US. Regardless, I recommend lifelong anticoagulation as the benefit of anticoagulation outweighs the risks. I again explained to her than I would recommend consideration of VCF insertion should she require further surgeries requiring long term cessation of her Eliquis. I will not schedule her a follow up appointment, but asked her to call back with any problems. Thanh Warner PA-C    Return if symptoms worsen or fail to improve.

## 2023-03-03 LAB
ALBUMIN SERPL-MCNC: 3.1 G/DL (ref 3.5–5.2)
ALP BLD-CCNC: 171 U/L (ref 35–104)
ALT SERPL-CCNC: 7 U/L (ref 0–32)
ANION GAP SERPL CALCULATED.3IONS-SCNC: 12 MMOL/L (ref 7–16)
AST SERPL-CCNC: 16 U/L (ref 0–31)
BILIRUB SERPL-MCNC: <0.2 MG/DL (ref 0–1.2)
BUN BLDV-MCNC: 14 MG/DL (ref 6–23)
CALCIUM SERPL-MCNC: 8.6 MG/DL (ref 8.6–10.2)
CHLORIDE BLD-SCNC: 109 MMOL/L (ref 98–107)
CO2: 24 MMOL/L (ref 22–29)
CREAT SERPL-MCNC: 1.1 MG/DL (ref 0.5–1)
GFR SERPL CREATININE-BSD FRML MDRD: 51 ML/MIN/1.73
GLUCOSE BLD-MCNC: 74 MG/DL (ref 74–99)
HCT VFR BLD CALC: 29.7 % (ref 34–48)
HEMOGLOBIN: 8.4 G/DL (ref 11.5–15.5)
MCH RBC QN AUTO: 24.1 PG (ref 26–35)
MCHC RBC AUTO-ENTMCNC: 28.3 % (ref 32–34.5)
MCV RBC AUTO: 85.3 FL (ref 80–99.9)
PDW BLD-RTO: 16.6 FL (ref 11.5–15)
PLATELET # BLD: 211 E9/L (ref 130–450)
PMV BLD AUTO: 11.1 FL (ref 7–12)
POTASSIUM SERPL-SCNC: 4.4 MMOL/L (ref 3.5–5)
RBC # BLD: 3.48 E12/L (ref 3.5–5.5)
SODIUM BLD-SCNC: 145 MMOL/L (ref 132–146)
TOTAL PROTEIN: 5.4 G/DL (ref 6.4–8.3)
WBC # BLD: 4.3 E9/L (ref 4.5–11.5)

## 2023-03-09 ENCOUNTER — OUTSIDE SERVICES (OUTPATIENT)
Dept: PRIMARY CARE CLINIC | Age: 78
End: 2023-03-09

## 2023-03-09 DIAGNOSIS — T84.60XA INFECTION AND INFLAMMATORY REACTION DUE TO INTERNAL FIXATION DEVICE (HCC): ICD-10-CM

## 2023-03-09 DIAGNOSIS — E03.9 HYPOTHYROIDISM, UNSPECIFIED TYPE: ICD-10-CM

## 2023-03-09 DIAGNOSIS — I10 HYPERTENSION, UNSPECIFIED TYPE: ICD-10-CM

## 2023-03-09 DIAGNOSIS — Z96.652 COMPLICATION OF INTERNAL LEFT KNEE PROSTHESIS, SUBSEQUENT ENCOUNTER: Primary | ICD-10-CM

## 2023-03-09 DIAGNOSIS — E78.2 MIXED HYPERLIPIDEMIA: ICD-10-CM

## 2023-03-09 DIAGNOSIS — T84.9XXD COMPLICATION OF INTERNAL LEFT KNEE PROSTHESIS, SUBSEQUENT ENCOUNTER: Primary | ICD-10-CM

## 2023-03-09 DIAGNOSIS — R26.2 DIFFICULTY WALKING: ICD-10-CM

## 2023-03-09 DIAGNOSIS — I27.20 PULMONARY HYPERTENSION (HCC): ICD-10-CM

## 2023-03-09 DIAGNOSIS — G47.33 OSA (OBSTRUCTIVE SLEEP APNEA): ICD-10-CM

## 2023-03-20 LAB
ALBUMIN SERPL-MCNC: 3.3 G/DL (ref 3.5–5.2)
ALP SERPL-CCNC: 115 U/L (ref 35–104)
ALT SERPL-CCNC: <5 U/L (ref 0–32)
ANION GAP SERPL CALCULATED.3IONS-SCNC: 9 MMOL/L (ref 7–16)
ANISOCYTOSIS: ABNORMAL
AST SERPL-CCNC: 14 U/L (ref 0–31)
BASOPHILS # BLD: 0.04 E9/L (ref 0–0.2)
BASOPHILS NFR BLD: 0.6 % (ref 0–2)
BILIRUB SERPL-MCNC: 0.2 MG/DL (ref 0–1.2)
BUN SERPL-MCNC: 17 MG/DL (ref 6–23)
CALCIUM SERPL-MCNC: 8.9 MG/DL (ref 8.6–10.2)
CHLORIDE SERPL-SCNC: 110 MMOL/L (ref 98–107)
CO2 SERPL-SCNC: 26 MMOL/L (ref 22–29)
CREAT SERPL-MCNC: 1.1 MG/DL (ref 0.5–1)
CRP SERPL HS-MCNC: 2 MG/DL (ref 0–0.4)
EOSINOPHIL # BLD: 0.15 E9/L (ref 0.05–0.5)
EOSINOPHIL NFR BLD: 2.4 % (ref 0–6)
ERYTHROCYTE [DISTWIDTH] IN BLOOD BY AUTOMATED COUNT: 16.1 FL (ref 11.5–15)
ERYTHROCYTE [SEDIMENTATION RATE] IN BLOOD BY WESTERGREN METHOD: 35 MM/HR (ref 0–20)
GLUCOSE SERPL-MCNC: 83 MG/DL (ref 74–99)
HCT VFR BLD AUTO: 29.5 % (ref 34–48)
HGB BLD-MCNC: 8.5 G/DL (ref 11.5–15.5)
HYPOCHROMIA: ABNORMAL
IMM GRANULOCYTES # BLD: 0.02 E9/L
IMM GRANULOCYTES NFR BLD: 0.3 % (ref 0–5)
LYMPHOCYTES # BLD: 1.32 E9/L (ref 1.5–4)
LYMPHOCYTES NFR BLD: 21 % (ref 20–42)
MCH RBC QN AUTO: 23.3 PG (ref 26–35)
MCHC RBC AUTO-ENTMCNC: 28.8 % (ref 32–34.5)
MCV RBC AUTO: 80.8 FL (ref 80–99.9)
MONOCYTES # BLD: 0.74 E9/L (ref 0.1–0.95)
MONOCYTES NFR BLD: 11.7 % (ref 2–12)
NEUTROPHILS # BLD: 4.03 E9/L (ref 1.8–7.3)
NEUTS SEG NFR BLD: 64 % (ref 43–80)
OVALOCYTES: ABNORMAL
PLATELET # BLD AUTO: 246 E9/L (ref 130–450)
PMV BLD AUTO: 11 FL (ref 7–12)
POIKILOCYTES: ABNORMAL
POLYCHROMASIA: ABNORMAL
POTASSIUM SERPL-SCNC: 4.4 MMOL/L (ref 3.5–5)
PROT SERPL-MCNC: 6.2 G/DL (ref 6.4–8.3)
RBC # BLD AUTO: 3.65 E12/L (ref 3.5–5.5)
SODIUM SERPL-SCNC: 145 MMOL/L (ref 132–146)
WBC # BLD: 6.3 E9/L (ref 4.5–11.5)

## 2023-03-21 LAB
ACID FAST STN SPEC QL: NORMAL
MYCOBACTERIUM SPEC CULT: NORMAL

## 2023-03-28 LAB
ACID FAST STN SPEC QL: NORMAL
MYCOBACTERIUM SPEC CULT: NORMAL

## 2023-04-20 ENCOUNTER — OUTSIDE SERVICES (OUTPATIENT)
Dept: PRIMARY CARE CLINIC | Age: 78
End: 2023-04-20

## 2023-04-20 DIAGNOSIS — R26.2 DIFFICULTY WALKING: ICD-10-CM

## 2023-04-20 DIAGNOSIS — T84.9XXD COMPLICATION OF INTERNAL LEFT KNEE PROSTHESIS, SUBSEQUENT ENCOUNTER: Primary | ICD-10-CM

## 2023-04-20 DIAGNOSIS — R53.1 WEAKNESS: ICD-10-CM

## 2023-04-20 DIAGNOSIS — Z96.652 COMPLICATION OF INTERNAL LEFT KNEE PROSTHESIS, SUBSEQUENT ENCOUNTER: Primary | ICD-10-CM

## 2023-04-27 ENCOUNTER — OUTSIDE SERVICES (OUTPATIENT)
Dept: PRIMARY CARE CLINIC | Age: 78
End: 2023-04-27

## 2023-04-27 DIAGNOSIS — I10 HYPERTENSION, UNSPECIFIED TYPE: ICD-10-CM

## 2023-04-27 DIAGNOSIS — T84.9XXD COMPLICATION OF INTERNAL LEFT KNEE PROSTHESIS, SUBSEQUENT ENCOUNTER: Primary | ICD-10-CM

## 2023-04-27 DIAGNOSIS — R26.2 DIFFICULTY WALKING: ICD-10-CM

## 2023-04-27 DIAGNOSIS — R53.1 WEAKNESS: ICD-10-CM

## 2023-04-27 DIAGNOSIS — Z96.652 COMPLICATION OF INTERNAL LEFT KNEE PROSTHESIS, SUBSEQUENT ENCOUNTER: Primary | ICD-10-CM

## 2023-08-23 NOTE — CONSULTS
[address] [place] [address] [facility]  Consultation Note     Admit Date: 12/27/2019  1:30 PM    Reason for Consult:   UTI    Attending Physician:  Tennille Reason, DO    HISTORY OF PRESENT ILLNESS:             The history is obtained from extensive review of available past medical records. The patient is a 74 y.o. female who is known to the ID service. The patient presents to the ED on 12/26/2019 with left shoulder pain. She was discharged back home on diclofenac gel and a Medrol dose pack but she only took 1 dose. She comes back to the ED on 12/27/2019 with weakness, fatigue and frequent falls. She was admitted with a diagnosis of generalized weakness, unable to ambulate and acute hyponatremia. She was afebrile and had a normal white count. He was seen in consultation by urology for a neurogenic bladder. Anticholinergics were stopped. The patient denies any dysuria. Otherwise she feels well. She does not complain of back pain. Her shoulder feels well unless it is mobilized. Past Medical History:        Diagnosis Date    Arthritis     Gout     CONTROLLED    Hyperlipidemia     Hypertension     STABLE    Macular hole, right eye     Thyroid disease      9/12/2019. Admitted to [facility] with confusion and an elevated WBC count. There were some changes in pain medications at the nursing facility. She was treated with Ceftriaxone Of consult and admitted to the ICU. Seen by Dr. Radha White. It was felt that Percocet had caused encephalopathy and hypotension. All cultures were negative. She was observed off antibiotics for a few days and discharged. 7/30/2019. Admitted to [facility] with hypotension. Seen by Dr. Rabia Méndez. She had completed 8 weeks of antibiotics for an epidural abscess with osteomyelitis of the L4-L5 spine. Edmund Villalobosler was stopped in the PICC was pulled. She was observed off antibiotics and discharged. 5/27/2019. Admitted to [facility] with fever. BONE BIOPSY L4 - GIOVANNY, X-RAY,  WANTS TF performed by Maddie Garcia MD at [address] [facility]      TONSILLECTOMY       Current Medications:   Scheduled Meds:   cefTRIAXone (ROCEPHIN) IV  1 g Intravenous Q24H    apixaban  10 mg Oral BID    methylPREDNISolone  24 mg Oral Once    methylPREDNISolone  4 mg Oral QAM AC    methylPREDNISolone  4 mg Oral Lunch    methylPREDNISolone  4 mg Oral Dinner    methylPREDNISolone  4 mg Oral Nightly    triamcinolone acetonide  80 mg Intra-articular Once    lidocaine PF  5 mL Other Once    sodium chloride  1,000 mL Intravenous Once    aspirin  81 mg Oral Nightly    atenolol  25 mg Oral Daily    docusate sodium  100 mg Oral BID    levothyroxine  125 mcg Oral Daily    multivitamin  1 tablet Oral Daily    pravastatin  40 mg Oral Nightly    probenecid  500 mg Oral BID    senna  1 tablet Oral Daily    torsemide  20 mg Oral Daily     Continuous Infusions:  PRN Meds:acetaminophen, cyclobenzaprine, HYDROcodone-acetaminophen, magnesium hydroxide    Allergies:  Adrenalin [epinephrine] and Meropenem    Social History:   Social History     Socioeconomic History    Marital status:      Spouse name: None    Number of children: None    Years of education: None    Highest education level: None   Occupational History    None   Social Needs    Financial resource strain: None    Food insecurity:     Worry: None     Inability: None    Transportation needs:     Medical: None     Non-medical: None   Tobacco Use    Smoking status: Never Smoker    Smokeless tobacco: Never Used   Substance and Sexual Activity    Alcohol use: Not Currently     Frequency: Never    Drug use: No    Sexual activity: Not Currently   Lifestyle    Physical activity:     Days per week: None     Minutes per session: None    Stress: None   Relationships    Social connections:     Talks on phone: None     Gets together: None     Attends Baptism service: None     Active member of club or organization: None     Attends meetings of clubs or organizations: None     Relationship status: None    Intimate partner violence:     Fear of current or ex partner: None     Emotionally abused: None     Physically abused: None     Forced sexual activity: None   Other Topics Concern    None   Social History Narrative    None      Family History:       Problem Relation Age of Onset    Other Mother    . Otherwise non-pertinent to the chief complaint. REVIEW OF SYSTEMS:    Constitutional: Negative for fevers, chills, diaphoresis  Neurologic: Negative   Psychiatric: Negative  Rheumatologic: Negative   Endocrine: Negative  Hematologic: Negative  Immunologic: Negative  ENT: Negative  Respiratory: Negative   Cardiovascular: Negative  GI: Negative  : Neurogenic bladder. No dysuria. Musculoskeletal: Left shoulder pain. Denies back pain. Skin: No rashes. PHYSICAL EXAM:    Vitals:   /60   Pulse 71   Temp 98.6 °F (37 °C) (Oral)   Resp 15   Ht 5' 6\" (1.676 m)   Wt 179 lb 14.4 oz (81.6 kg)   SpO2 96%   BMI 29.04 kg/m²   Constitutional: The patient is awake, alert, and oriented. Skin: Warm and dry. No rashes were noted. HEENT: Eyes show round, and reactive pupils. No jaundice. Moist mucous membranes, no ulcerations, no thrush. Neck: Supple to movements. No lymphadenopathy. Chest: No use of accessory muscles to breathe. Symmetrical expansion. Auscultation reveals no wheezing, crackles, or rhonchi. Cardiovascular: S1 and S2 are rhythmic and regular. No murmurs appreciated. Abdomen: Positive bowel sounds to auscultation. Benign to palpation. No masses felt. No hepatosplenomegaly. Extremities: No clubbing, no cyanosis, no edema.   Lines: peripheral      CBC+dif:  Recent Labs     12/27/19  1429 12/28/19  0724   WBC 9.2 7.9   HGB 13.0 13.5   HCT 39.7 41.9   MCV 94.3 94.2    207   NEUTROABS 7.69* 5.79     Lab Results   Component Value Date    CRP 0.4 07/31/2019    CRP 19.7 (H) 05/01/2019 No results found for: [facility]  Lab Results   Component Value Date    SEDRATE 32 (H) 07/31/2019    SEDRATE 76 (H) 05/01/2019     Lab Results   Component Value Date    ALT 12 12/27/2019    AST 24 12/27/2019    ALKPHOS 96 12/27/2019    BILITOT 0.3 12/27/2019     Lab Results   Component Value Date     12/28/2019    K 3.9 12/28/2019    K 4.5 11/22/2019     12/28/2019    CO2 22 12/28/2019    BUN 20 12/28/2019    CREATININE 1.0 12/28/2019    GFRAA >60 12/28/2019    LABGLOM 54 12/28/2019    GLUCOSE 82 12/28/2019    PROT 5.6 12/27/2019    LABALBU 2.5 12/27/2019    CALCIUM 8.5 12/28/2019    BILITOT 0.3 12/27/2019    ALKPHOS 96 12/27/2019    AST 24 12/27/2019    ALT 12 12/27/2019       Lab Results   Component Value Date    PROTIME 11.2 09/20/2019    INR 0.9 09/20/2019       Lab Results   Component Value Date    TSH 4.510 09/12/2019       Lab Results   Component Value Date    COLORU Yellow 12/27/2019    PHUR 5.5 12/27/2019    LABCAST RARE 05/27/2019    WBCUA 2-5 12/27/2019    RBCUA 0-1 12/27/2019    YEAST MODERATE 09/12/2019    BACTERIA MANY 12/27/2019    CLARITYU Clear 12/27/2019    SPECGRAV 1.010 12/27/2019    LEUKOCYTESUR Negative 12/27/2019    UROBILINOGEN 0.2 12/27/2019    BILIRUBINUR Negative 12/27/2019    BLOODU Negative 12/27/2019    GLUCOSEU Negative 12/27/2019       Lab Results   Component Value Date    HCO3 18.6 09/13/2019    BE -5.1 09/13/2019    O2SAT 96.1 09/13/2019    PH 7.402 09/13/2019    PCO2 30.5 09/13/2019    PO2 78.5 09/13/2019     Radiology:  Noted    Microbiology:  Pending  No results for input(s): BC in the last 72 hours. Recent Labs     12/27/19  1429   ORG Gram negative man*  Gram negative man*     No results for input(s): BLOODCULT2 in the last 72 hours. No results for input(s): STREPNEUMAGU in the last 72 hours. No results for input(s): LP1UAG in the last 72 hours. No results for input(s): ASO in the last 72 hours. No results for input(s): CULTRESP in the last 72 hours.   No results for input(s): PROCAL in the last 72 hours. Assessment:  · Left shoulder pain. Currently on methylprednisolone  · Mild hyponatremia  · Neurogenic bladder  · Asymptomatic bacteriuria associated to neurogenic bladder. I do not think the patient had a UTI    Plan:    · Stop Ceftriaxone and observe off antibiotics  · Check cultures, baseline ESR, CRP  · Will follow with you    Thank you for having us see this patient in consultation. I will be discussing this case with the treating physicians.     Sirena Elizabeth  9:34 AM  12/30/2019 show

## 2023-11-02 ENCOUNTER — HOSPITAL ENCOUNTER (OUTPATIENT)
Age: 78
Discharge: HOME | End: 2023-11-04

## 2023-11-08 LAB — SURGICAL PATHOLOGY REPORT: NORMAL

## 2024-02-12 NOTE — PLAN OF CARE
symptoms of impaired coagulation  Description  Absence of signs or symptoms of impaired coagulation  Outcome: Met This Shift     Problem: Musculor/Skeletal Functional Status  Goal: Highest potential functional level  Outcome: Met This Shift  Goal: Absence of falls  Outcome: Met This Shift [FreeTextEntry1] : Ms. DANN CURRY is a 82 year old female  with a past medical history of Graves Disease, afib, HTN, CVA, Parkinson's disease who presents for management of her thyroid disease. Patient has been on Methimazole 5 mg every other day.  She feels well and denies any complaints.  Initial History: Patient was first diagnosed with thyroid disorder in 1998. She takes methimazole 5 mg QD. She was following with an endocrinologist and is changing due to insurance. She is adopted and does not know family history. She denies any exposure to radiation. She feels well and denies any weight changes, diarrhea, constipation, sob, tremors, anxiety, depression, temperature intolerance.

## 2025-01-27 ENCOUNTER — APPOINTMENT (OUTPATIENT)
Dept: GENERAL RADIOLOGY | Age: 80
End: 2025-01-27
Payer: MEDICARE

## 2025-01-27 ENCOUNTER — HOSPITAL ENCOUNTER (INPATIENT)
Age: 80
LOS: 5 days | Discharge: HOME | End: 2025-02-01
Attending: STUDENT IN AN ORGANIZED HEALTH CARE EDUCATION/TRAINING PROGRAM | Admitting: INTERNAL MEDICINE
Payer: MEDICARE

## 2025-01-27 DIAGNOSIS — I48.91 ATRIAL FIBRILLATION WITH RAPID VENTRICULAR RESPONSE (HCC): Primary | ICD-10-CM

## 2025-01-27 DIAGNOSIS — J18.9 ATYPICAL PNEUMONIA: ICD-10-CM

## 2025-01-27 LAB
ANION GAP SERPL CALCULATED.3IONS-SCNC: 14 MMOL/L (ref 7–16)
BASOPHILS # BLD: 0.09 K/UL (ref 0–0.2)
BASOPHILS NFR BLD: 1 % (ref 0–2)
BUN SERPL-MCNC: 20 MG/DL (ref 6–23)
CALCIUM SERPL-MCNC: 9.6 MG/DL (ref 8.6–10.2)
CHLORIDE SERPL-SCNC: 106 MMOL/L (ref 98–107)
CO2 SERPL-SCNC: 22 MMOL/L (ref 22–29)
CREAT SERPL-MCNC: 1.2 MG/DL (ref 0.5–1)
EOSINOPHIL # BLD: 0.19 K/UL (ref 0.05–0.5)
EOSINOPHILS RELATIVE PERCENT: 2 % (ref 0–6)
ERYTHROCYTE [DISTWIDTH] IN BLOOD BY AUTOMATED COUNT: 18.7 % (ref 11.5–15)
GFR, ESTIMATED: 47 ML/MIN/1.73M2
GLUCOSE SERPL-MCNC: 92 MG/DL (ref 74–99)
HCT VFR BLD AUTO: 40.3 % (ref 34–48)
HGB BLD-MCNC: 11.4 G/DL (ref 11.5–15.5)
IMM GRANULOCYTES # BLD AUTO: 0.03 K/UL (ref 0–0.58)
IMM GRANULOCYTES NFR BLD: 0 % (ref 0–5)
LYMPHOCYTES NFR BLD: 1.43 K/UL (ref 1.5–4)
LYMPHOCYTES RELATIVE PERCENT: 16 % (ref 20–42)
MAGNESIUM SERPL-MCNC: 2.3 MG/DL (ref 1.6–2.6)
MCH RBC QN AUTO: 24.9 PG (ref 26–35)
MCHC RBC AUTO-ENTMCNC: 28.3 G/DL (ref 32–34.5)
MCV RBC AUTO: 88.2 FL (ref 80–99.9)
MONOCYTES NFR BLD: 0.79 K/UL (ref 0.1–0.95)
MONOCYTES NFR BLD: 9 % (ref 2–12)
NEUTROPHILS NFR BLD: 72 % (ref 43–80)
NEUTS SEG NFR BLD: 6.55 K/UL (ref 1.8–7.3)
PLATELET # BLD AUTO: 186 K/UL (ref 130–450)
PMV BLD AUTO: 11.6 FL (ref 7–12)
POTASSIUM SERPL-SCNC: 4.6 MMOL/L (ref 3.5–5)
RBC # BLD AUTO: 4.57 M/UL (ref 3.5–5.5)
RBC # BLD: ABNORMAL 10*6/UL
SODIUM SERPL-SCNC: 142 MMOL/L (ref 132–146)
TROPONIN I SERPL HS-MCNC: 22 NG/L (ref 0–9)
TROPONIN I SERPL HS-MCNC: 23 NG/L (ref 0–9)
WBC OTHER # BLD: 9.1 K/UL (ref 4.5–11.5)

## 2025-01-27 PROCEDURE — 2580000003 HC RX 258: Performed by: STUDENT IN AN ORGANIZED HEALTH CARE EDUCATION/TRAINING PROGRAM

## 2025-01-27 PROCEDURE — 2580000003 HC RX 258

## 2025-01-27 PROCEDURE — 85025 COMPLETE CBC W/AUTO DIFF WBC: CPT

## 2025-01-27 PROCEDURE — 6370000000 HC RX 637 (ALT 250 FOR IP): Performed by: NURSE PRACTITIONER

## 2025-01-27 PROCEDURE — 99285 EMERGENCY DEPT VISIT HI MDM: CPT

## 2025-01-27 PROCEDURE — 2500000003 HC RX 250 WO HCPCS

## 2025-01-27 PROCEDURE — 96376 TX/PRO/DX INJ SAME DRUG ADON: CPT

## 2025-01-27 PROCEDURE — 71045 X-RAY EXAM CHEST 1 VIEW: CPT

## 2025-01-27 PROCEDURE — 84484 ASSAY OF TROPONIN QUANT: CPT

## 2025-01-27 PROCEDURE — 96374 THER/PROPH/DIAG INJ IV PUSH: CPT

## 2025-01-27 PROCEDURE — 6370000000 HC RX 637 (ALT 250 FOR IP): Performed by: STUDENT IN AN ORGANIZED HEALTH CARE EDUCATION/TRAINING PROGRAM

## 2025-01-27 PROCEDURE — 2500000003 HC RX 250 WO HCPCS: Performed by: NURSE PRACTITIONER

## 2025-01-27 PROCEDURE — 80048 BASIC METABOLIC PNL TOTAL CA: CPT

## 2025-01-27 PROCEDURE — 6370000000 HC RX 637 (ALT 250 FOR IP)

## 2025-01-27 PROCEDURE — 83735 ASSAY OF MAGNESIUM: CPT

## 2025-01-27 PROCEDURE — 93005 ELECTROCARDIOGRAM TRACING: CPT | Performed by: STUDENT IN AN ORGANIZED HEALTH CARE EDUCATION/TRAINING PROGRAM

## 2025-01-27 PROCEDURE — 2060000000 HC ICU INTERMEDIATE R&B

## 2025-01-27 RX ORDER — 0.9 % SODIUM CHLORIDE 0.9 %
500 INTRAVENOUS SOLUTION INTRAVENOUS ONCE
Status: COMPLETED | OUTPATIENT
Start: 2025-01-27 | End: 2025-01-27

## 2025-01-27 RX ORDER — METOPROLOL TARTRATE 25 MG/1
25 TABLET, FILM COATED ORAL ONCE
Status: COMPLETED | OUTPATIENT
Start: 2025-01-27 | End: 2025-01-27

## 2025-01-27 RX ORDER — ALPRAZOLAM 0.25 MG/1
0.5 TABLET ORAL DAILY PRN
Status: DISCONTINUED | OUTPATIENT
Start: 2025-01-27 | End: 2025-02-01 | Stop reason: HOSPADM

## 2025-01-27 RX ORDER — SENNOSIDES A AND B 8.6 MG/1
1 TABLET, FILM COATED ORAL DAILY PRN
Status: DISCONTINUED | OUTPATIENT
Start: 2025-01-27 | End: 2025-02-01 | Stop reason: HOSPADM

## 2025-01-27 RX ORDER — DILTIAZEM HYDROCHLORIDE 5 MG/ML
10 INJECTION INTRAVENOUS ONCE
Status: DISCONTINUED | OUTPATIENT
Start: 2025-01-27 | End: 2025-01-27

## 2025-01-27 RX ORDER — ACETAMINOPHEN 325 MG/1
650 TABLET ORAL EVERY 6 HOURS PRN
Status: DISCONTINUED | OUTPATIENT
Start: 2025-01-27 | End: 2025-02-01 | Stop reason: HOSPADM

## 2025-01-27 RX ORDER — METOPROLOL TARTRATE 1 MG/ML
5 INJECTION, SOLUTION INTRAVENOUS EVERY 5 MIN PRN
Status: DISCONTINUED | OUTPATIENT
Start: 2025-01-27 | End: 2025-01-28 | Stop reason: ALTCHOICE

## 2025-01-27 RX ORDER — METOPROLOL TARTRATE 25 MG/1
25 TABLET, FILM COATED ORAL 2 TIMES DAILY
Status: DISCONTINUED | OUTPATIENT
Start: 2025-01-28 | End: 2025-01-28

## 2025-01-27 RX ORDER — ONDANSETRON 2 MG/ML
4 INJECTION INTRAMUSCULAR; INTRAVENOUS EVERY 6 HOURS PRN
Status: DISCONTINUED | OUTPATIENT
Start: 2025-01-27 | End: 2025-02-01 | Stop reason: HOSPADM

## 2025-01-27 RX ORDER — LEVOTHYROXINE SODIUM 75 UG/1
150 TABLET ORAL DAILY
Status: DISCONTINUED | OUTPATIENT
Start: 2025-01-28 | End: 2025-02-01 | Stop reason: HOSPADM

## 2025-01-27 RX ORDER — ACETAMINOPHEN 650 MG/1
650 SUPPOSITORY RECTAL EVERY 6 HOURS PRN
Status: DISCONTINUED | OUTPATIENT
Start: 2025-01-27 | End: 2025-02-01 | Stop reason: HOSPADM

## 2025-01-27 RX ORDER — POTASSIUM CHLORIDE 1500 MG/1
40 TABLET, EXTENDED RELEASE ORAL PRN
Status: DISCONTINUED | OUTPATIENT
Start: 2025-01-27 | End: 2025-02-01 | Stop reason: HOSPADM

## 2025-01-27 RX ORDER — MAGNESIUM SULFATE IN WATER 40 MG/ML
2000 INJECTION, SOLUTION INTRAVENOUS PRN
Status: DISCONTINUED | OUTPATIENT
Start: 2025-01-27 | End: 2025-02-01 | Stop reason: HOSPADM

## 2025-01-27 RX ORDER — SODIUM CHLORIDE 9 MG/ML
INJECTION, SOLUTION INTRAVENOUS PRN
Status: DISCONTINUED | OUTPATIENT
Start: 2025-01-27 | End: 2025-02-01 | Stop reason: HOSPADM

## 2025-01-27 RX ORDER — SODIUM CHLORIDE 0.9 % (FLUSH) 0.9 %
10 SYRINGE (ML) INJECTION PRN
Status: DISCONTINUED | OUTPATIENT
Start: 2025-01-27 | End: 2025-02-01 | Stop reason: HOSPADM

## 2025-01-27 RX ORDER — ATORVASTATIN CALCIUM 40 MG/1
40 TABLET, FILM COATED ORAL NIGHTLY
Status: DISCONTINUED | OUTPATIENT
Start: 2025-01-27 | End: 2025-02-01 | Stop reason: HOSPADM

## 2025-01-27 RX ORDER — DOXYCYCLINE 100 MG/1
100 CAPSULE ORAL ONCE
Status: COMPLETED | OUTPATIENT
Start: 2025-01-27 | End: 2025-01-27

## 2025-01-27 RX ORDER — SODIUM CHLORIDE 0.9 % (FLUSH) 0.9 %
10 SYRINGE (ML) INJECTION EVERY 12 HOURS SCHEDULED
Status: DISCONTINUED | OUTPATIENT
Start: 2025-01-27 | End: 2025-02-01 | Stop reason: HOSPADM

## 2025-01-27 RX ORDER — ONDANSETRON 4 MG/1
4 TABLET, ORALLY DISINTEGRATING ORAL EVERY 8 HOURS PRN
Status: DISCONTINUED | OUTPATIENT
Start: 2025-01-27 | End: 2025-02-01 | Stop reason: HOSPADM

## 2025-01-27 RX ORDER — OXYCODONE HYDROCHLORIDE 5 MG/1
5 TABLET ORAL EVERY 6 HOURS PRN
Status: DISCONTINUED | OUTPATIENT
Start: 2025-01-27 | End: 2025-02-01 | Stop reason: HOSPADM

## 2025-01-27 RX ORDER — POTASSIUM CHLORIDE 7.45 MG/ML
10 INJECTION INTRAVENOUS PRN
Status: DISCONTINUED | OUTPATIENT
Start: 2025-01-27 | End: 2025-02-01 | Stop reason: HOSPADM

## 2025-01-27 RX ORDER — DOXYCYCLINE 100 MG/1
100 CAPSULE ORAL EVERY 12 HOURS SCHEDULED
Status: DISCONTINUED | OUTPATIENT
Start: 2025-01-28 | End: 2025-01-30

## 2025-01-27 RX ORDER — PROBENECID 500 MG/1
500 TABLET, FILM COATED ORAL 2 TIMES DAILY
Status: DISCONTINUED | OUTPATIENT
Start: 2025-01-27 | End: 2025-02-01 | Stop reason: HOSPADM

## 2025-01-27 RX ORDER — DILTIAZEM HYDROCHLORIDE 5 MG/ML
10 INJECTION INTRAVENOUS ONCE
Status: COMPLETED | OUTPATIENT
Start: 2025-01-27 | End: 2025-01-27

## 2025-01-27 RX ADMIN — ATORVASTATIN CALCIUM 40 MG: 40 TABLET, FILM COATED ORAL at 23:09

## 2025-01-27 RX ADMIN — APIXABAN 5 MG: 5 TABLET, FILM COATED ORAL at 23:09

## 2025-01-27 RX ADMIN — SODIUM CHLORIDE 5 MG/HR: 900 INJECTION, SOLUTION INTRAVENOUS at 21:23

## 2025-01-27 RX ADMIN — METOPROLOL TARTRATE 5 MG: 5 INJECTION INTRAVENOUS at 19:06

## 2025-01-27 RX ADMIN — DILTIAZEM HYDROCHLORIDE 10 MG: 5 INJECTION, SOLUTION INTRAVENOUS at 21:22

## 2025-01-27 RX ADMIN — Medication 3 MG: at 23:09

## 2025-01-27 RX ADMIN — METOPROLOL TARTRATE 25 MG: 25 TABLET, FILM COATED ORAL at 20:06

## 2025-01-27 RX ADMIN — METOPROLOL TARTRATE 5 MG: 5 INJECTION INTRAVENOUS at 20:06

## 2025-01-27 RX ADMIN — ALPRAZOLAM 0.5 MG: 0.25 TABLET ORAL at 23:09

## 2025-01-27 RX ADMIN — SODIUM CHLORIDE 500 ML: 9 INJECTION, SOLUTION INTRAVENOUS at 21:30

## 2025-01-27 RX ADMIN — SODIUM CHLORIDE, PRESERVATIVE FREE 10 ML: 5 INJECTION INTRAVENOUS at 23:11

## 2025-01-27 RX ADMIN — METOPROLOL TARTRATE 5 MG: 5 INJECTION INTRAVENOUS at 19:32

## 2025-01-27 RX ADMIN — DOXYCYCLINE HYCLATE 100 MG: 100 CAPSULE ORAL at 19:31

## 2025-01-27 ASSESSMENT — PAIN - FUNCTIONAL ASSESSMENT
PAIN_FUNCTIONAL_ASSESSMENT: NONE - DENIES PAIN

## 2025-01-27 ASSESSMENT — LIFESTYLE VARIABLES
HOW OFTEN DO YOU HAVE A DRINK CONTAINING ALCOHOL: NEVER
HOW MANY STANDARD DRINKS CONTAINING ALCOHOL DO YOU HAVE ON A TYPICAL DAY: PATIENT DOES NOT DRINK

## 2025-01-27 NOTE — ED PROVIDER NOTES
Department of Emergency Medicine     Written by: Bebeto Diaz MD  Patient Name: Laurie Hall  Admit Date: 2025  4:37 PM  MRN: [id number]                   : 1945    HPI     Chief Complaint   Patient presents with    Tachycardia     High HR at dr office. Sent by  for high HR and to get fluids. States she's been recently sick with URI    Atrial Fibrillation       Laurie Hall is a 79 y.o. female that presents to the ED due to concerns for atrial fibrillation with rapid ventricular response.  Has been having flulike symptoms for the past couple of weeks.  Been following with her PCP closely regarding this.  Went to her PCP office for follow-up today, was found to have heart rate greater than 140.  She was not aware that her heart rate was not high.  She has had no lightheadedness no chest pain no shortness of breath.  She did feel \"heaviness in my chest\".  She is on Eliquis and metoprolol.    Review of systems   Pertinent positives and negatives mentioned in the HPI/MDM.      Physical   Physical Exam  Constitutional:       General: She is not in acute distress.     Appearance: Normal appearance.   Eyes:      Extraocular Movements: Extraocular movements intact.      Pupils: Pupils are equal, round, and reactive to light.   Cardiovascular:      Rate and Rhythm: Tachycardia present. Rhythm irregular.      Pulses: Normal pulses.      Heart sounds: Normal heart sounds.   Pulmonary:      Effort: Pulmonary effort is normal. No respiratory distress.      Breath sounds: Rhonchi (Right lower lobe) present.   Abdominal:      Palpations: Abdomen is soft.      Tenderness: There is no abdominal tenderness.   Musculoskeletal:         General: No swelling. Normal range of motion.   Skin:     General: Skin is warm and dry.      Coloration: Skin is not jaundiced or pale.   Neurological:      Mental Status: She is alert and oriented to person, place, and time. Mental status is at baseline.

## 2025-01-28 ENCOUNTER — APPOINTMENT (OUTPATIENT)
Dept: CT IMAGING | Age: 80
End: 2025-01-28
Payer: MEDICARE

## 2025-01-28 ENCOUNTER — APPOINTMENT (OUTPATIENT)
Age: 80
End: 2025-01-28
Payer: MEDICARE

## 2025-01-28 LAB
ALBUMIN SERPL-MCNC: 3.4 G/DL (ref 3.5–5.2)
ALP SERPL-CCNC: 99 U/L (ref 35–104)
ALT SERPL-CCNC: 10 U/L (ref 0–32)
ANION GAP SERPL CALCULATED.3IONS-SCNC: 11 MMOL/L (ref 7–16)
AST SERPL-CCNC: 19 U/L (ref 0–31)
B PARAP IS1001 DNA NPH QL NAA+NON-PROBE: NOT DETECTED
B PERT DNA SPEC QL NAA+PROBE: NOT DETECTED
BASOPHILS # BLD: 0.07 K/UL (ref 0–0.2)
BASOPHILS NFR BLD: 1 % (ref 0–2)
BILIRUB SERPL-MCNC: 0.6 MG/DL (ref 0–1.2)
BNP SERPL-MCNC: 5944 PG/ML (ref 0–450)
BUN SERPL-MCNC: 19 MG/DL (ref 6–23)
C PNEUM DNA NPH QL NAA+NON-PROBE: NOT DETECTED
CALCIUM SERPL-MCNC: 9.2 MG/DL (ref 8.6–10.2)
CHLORIDE SERPL-SCNC: 106 MMOL/L (ref 98–107)
CHOLEST SERPL-MCNC: 116 MG/DL
CO2 SERPL-SCNC: 20 MMOL/L (ref 22–29)
CREAT SERPL-MCNC: 1 MG/DL (ref 0.5–1)
EOSINOPHIL # BLD: 0.1 K/UL (ref 0.05–0.5)
EOSINOPHILS RELATIVE PERCENT: 1 % (ref 0–6)
ERYTHROCYTE [DISTWIDTH] IN BLOOD BY AUTOMATED COUNT: 18.6 % (ref 11.5–15)
FLUAV RNA NPH QL NAA+NON-PROBE: NOT DETECTED
FLUBV RNA NPH QL NAA+NON-PROBE: NOT DETECTED
GFR, ESTIMATED: 55 ML/MIN/1.73M2
GLUCOSE SERPL-MCNC: 116 MG/DL (ref 74–99)
HADV DNA NPH QL NAA+NON-PROBE: NOT DETECTED
HCOV 229E RNA NPH QL NAA+NON-PROBE: NOT DETECTED
HCOV HKU1 RNA NPH QL NAA+NON-PROBE: NOT DETECTED
HCOV NL63 RNA NPH QL NAA+NON-PROBE: NOT DETECTED
HCOV OC43 RNA NPH QL NAA+NON-PROBE: NOT DETECTED
HCT VFR BLD AUTO: 36.3 % (ref 34–48)
HDLC SERPL-MCNC: 44 MG/DL
HGB BLD-MCNC: 10.6 G/DL (ref 11.5–15.5)
HMPV RNA NPH QL NAA+NON-PROBE: NOT DETECTED
HPIV1 RNA NPH QL NAA+NON-PROBE: NOT DETECTED
HPIV2 RNA NPH QL NAA+NON-PROBE: NOT DETECTED
HPIV3 RNA NPH QL NAA+NON-PROBE: NOT DETECTED
HPIV4 RNA NPH QL NAA+NON-PROBE: NOT DETECTED
IMM GRANULOCYTES # BLD AUTO: 0.03 K/UL (ref 0–0.58)
IMM GRANULOCYTES NFR BLD: 0 % (ref 0–5)
LDLC SERPL CALC-MCNC: 53 MG/DL
LYMPHOCYTES NFR BLD: 1.35 K/UL (ref 1.5–4)
LYMPHOCYTES RELATIVE PERCENT: 15 % (ref 20–42)
M PNEUMO DNA NPH QL NAA+NON-PROBE: NOT DETECTED
MCH RBC QN AUTO: 25.7 PG (ref 26–35)
MCHC RBC AUTO-ENTMCNC: 29.2 G/DL (ref 32–34.5)
MCV RBC AUTO: 87.9 FL (ref 80–99.9)
MONOCYTES NFR BLD: 0.71 K/UL (ref 0.1–0.95)
MONOCYTES NFR BLD: 8 % (ref 2–12)
NEUTROPHILS NFR BLD: 75 % (ref 43–80)
NEUTS SEG NFR BLD: 6.83 K/UL (ref 1.8–7.3)
PLATELET # BLD AUTO: 143 K/UL (ref 130–450)
PMV BLD AUTO: 11.1 FL (ref 7–12)
POTASSIUM SERPL-SCNC: 4.6 MMOL/L (ref 3.5–5)
PROCALCITONIN SERPL-MCNC: 0.05 NG/ML (ref 0–0.08)
PROT SERPL-MCNC: 6.3 G/DL (ref 6.4–8.3)
RBC # BLD AUTO: 4.13 M/UL (ref 3.5–5.5)
RSV RNA NPH QL NAA+NON-PROBE: NOT DETECTED
RV+EV RNA NPH QL NAA+NON-PROBE: NOT DETECTED
SARS-COV-2 RNA NPH QL NAA+NON-PROBE: NOT DETECTED
SODIUM SERPL-SCNC: 137 MMOL/L (ref 132–146)
SPECIMEN DESCRIPTION: NORMAL
TRIGL SERPL-MCNC: 96 MG/DL
TSH SERPL DL<=0.05 MIU/L-ACNC: 0.55 UIU/ML (ref 0.27–4.2)
VLDLC SERPL CALC-MCNC: 19 MG/DL
WBC OTHER # BLD: 9.1 K/UL (ref 4.5–11.5)

## 2025-01-28 PROCEDURE — 71250 CT THORAX DX C-: CPT

## 2025-01-28 PROCEDURE — 2500000003 HC RX 250 WO HCPCS: Performed by: INTERNAL MEDICINE

## 2025-01-28 PROCEDURE — 6370000000 HC RX 637 (ALT 250 FOR IP)

## 2025-01-28 PROCEDURE — 85025 COMPLETE CBC W/AUTO DIFF WBC: CPT

## 2025-01-28 PROCEDURE — 80053 COMPREHEN METABOLIC PANEL: CPT

## 2025-01-28 PROCEDURE — 84443 ASSAY THYROID STIM HORMONE: CPT

## 2025-01-28 PROCEDURE — 2580000003 HC RX 258

## 2025-01-28 PROCEDURE — APPSS60 APP SPLIT SHARED TIME 46-60 MINUTES

## 2025-01-28 PROCEDURE — 6370000000 HC RX 637 (ALT 250 FOR IP): Performed by: NURSE PRACTITIONER

## 2025-01-28 PROCEDURE — 6360000002 HC RX W HCPCS: Performed by: INTERNAL MEDICINE

## 2025-01-28 PROCEDURE — 2500000003 HC RX 250 WO HCPCS: Performed by: NURSE PRACTITIONER

## 2025-01-28 PROCEDURE — 84145 PROCALCITONIN (PCT): CPT

## 2025-01-28 PROCEDURE — 99223 1ST HOSP IP/OBS HIGH 75: CPT | Performed by: INTERNAL MEDICINE

## 2025-01-28 PROCEDURE — 6370000000 HC RX 637 (ALT 250 FOR IP): Performed by: INTERNAL MEDICINE

## 2025-01-28 PROCEDURE — 2060000000 HC ICU INTERMEDIATE R&B

## 2025-01-28 PROCEDURE — 87899 AGENT NOS ASSAY W/OPTIC: CPT

## 2025-01-28 PROCEDURE — 97165 OT EVAL LOW COMPLEX 30 MIN: CPT

## 2025-01-28 PROCEDURE — 0202U NFCT DS 22 TRGT SARS-COV-2: CPT

## 2025-01-28 PROCEDURE — 80061 LIPID PANEL: CPT

## 2025-01-28 PROCEDURE — 83880 ASSAY OF NATRIURETIC PEPTIDE: CPT

## 2025-01-28 PROCEDURE — 97161 PT EVAL LOW COMPLEX 20 MIN: CPT

## 2025-01-28 PROCEDURE — 2500000003 HC RX 250 WO HCPCS

## 2025-01-28 PROCEDURE — 36415 COLL VENOUS BLD VENIPUNCTURE: CPT

## 2025-01-28 PROCEDURE — 87449 NOS EACH ORGANISM AG IA: CPT

## 2025-01-28 PROCEDURE — 93306 TTE W/DOPPLER COMPLETE: CPT

## 2025-01-28 RX ORDER — IPRATROPIUM BROMIDE AND ALBUTEROL SULFATE 2.5; .5 MG/3ML; MG/3ML
1 SOLUTION RESPIRATORY (INHALATION)
Status: CANCELLED | OUTPATIENT
Start: 2025-01-28

## 2025-01-28 RX ORDER — ENOXAPARIN SODIUM 100 MG/ML
1 INJECTION SUBCUTANEOUS 2 TIMES DAILY
Status: DISCONTINUED | OUTPATIENT
Start: 2025-01-28 | End: 2025-01-29

## 2025-01-28 RX ORDER — METOPROLOL SUCCINATE 50 MG/1
50 TABLET, EXTENDED RELEASE ORAL DAILY
Status: DISCONTINUED | OUTPATIENT
Start: 2025-01-28 | End: 2025-01-28

## 2025-01-28 RX ORDER — METOPROLOL SUCCINATE 25 MG/1
25 TABLET, EXTENDED RELEASE ORAL DAILY
Status: DISCONTINUED | OUTPATIENT
Start: 2025-01-28 | End: 2025-01-28

## 2025-01-28 RX ORDER — FUROSEMIDE 10 MG/ML
20 INJECTION INTRAMUSCULAR; INTRAVENOUS 2 TIMES DAILY
Status: DISCONTINUED | OUTPATIENT
Start: 2025-01-28 | End: 2025-01-29

## 2025-01-28 RX ORDER — METOPROLOL SUCCINATE 50 MG/1
50 TABLET, EXTENDED RELEASE ORAL 2 TIMES DAILY
Status: DISCONTINUED | OUTPATIENT
Start: 2025-01-28 | End: 2025-01-29

## 2025-01-28 RX ADMIN — ENOXAPARIN SODIUM 80 MG: 100 INJECTION SUBCUTANEOUS at 20:23

## 2025-01-28 RX ADMIN — PROBENECID 500 MG: 500 TABLET, FILM COATED ORAL at 20:24

## 2025-01-28 RX ADMIN — METOPROLOL SUCCINATE 50 MG: 50 TABLET, EXTENDED RELEASE ORAL at 20:24

## 2025-01-28 RX ADMIN — ATORVASTATIN CALCIUM 40 MG: 40 TABLET, FILM COATED ORAL at 20:24

## 2025-01-28 RX ADMIN — SODIUM CHLORIDE 15 MG/HR: 900 INJECTION, SOLUTION INTRAVENOUS at 09:26

## 2025-01-28 RX ADMIN — FUROSEMIDE 20 MG: 10 INJECTION, SOLUTION INTRAMUSCULAR; INTRAVENOUS at 17:52

## 2025-01-28 RX ADMIN — APIXABAN 5 MG: 5 TABLET, FILM COATED ORAL at 09:21

## 2025-01-28 RX ADMIN — WATER 1000 MG: 1 INJECTION INTRAMUSCULAR; INTRAVENOUS; SUBCUTANEOUS at 16:11

## 2025-01-28 RX ADMIN — PROBENECID 500 MG: 500 TABLET, FILM COATED ORAL at 09:23

## 2025-01-28 RX ADMIN — DOXYCYCLINE HYCLATE 100 MG: 100 CAPSULE ORAL at 09:21

## 2025-01-28 RX ADMIN — METOPROLOL SUCCINATE 50 MG: 50 TABLET, EXTENDED RELEASE ORAL at 13:07

## 2025-01-28 RX ADMIN — SODIUM CHLORIDE 10 MG/HR: 900 INJECTION, SOLUTION INTRAVENOUS at 18:03

## 2025-01-28 RX ADMIN — DOXYCYCLINE HYCLATE 100 MG: 100 CAPSULE ORAL at 20:24

## 2025-01-28 RX ADMIN — LEVOTHYROXINE SODIUM 150 MCG: 75 TABLET ORAL at 09:21

## 2025-01-28 RX ADMIN — SODIUM CHLORIDE, PRESERVATIVE FREE 10 ML: 5 INJECTION INTRAVENOUS at 09:21

## 2025-01-28 RX ADMIN — METOPROLOL TARTRATE 25 MG: 25 TABLET, FILM COATED ORAL at 09:21

## 2025-01-28 NOTE — PROGRESS NOTES
Physical Therapy  Facility/Department: [address] INTERMEDIATE  Physical Therapy Initial Assessment    Name: Laurie Hall  : 1945  MRN: [id number]  Date of Service: 2025        Patient Diagnosis(es): The primary encounter diagnosis was Atrial fibrillation with rapid ventricular response (HCC). A diagnosis of Atypical pneumonia was also pertinent to this visit.  Past Medical History:  has a past medical history of Arthritis, Constipation, DVT (deep venous thrombosis) (HCC), Gout, History of COVID-19, Hyperlipidemia, Hypertension, Macular hole, right eye, and Thyroid disease.  Past Surgical History:  has a past surgical history that includes Breast surgery (Right, ); Hysterectomy (); Cholecystectomy (); laminectomy (N/A, 6/3/2019); skin biopsy; Abdomen surgery; Colonoscopy; eye surgery; joint replacement; Tonsillectomy; and back surgery (2020).         Requires PT Follow-Up: Yes    Evaluating Therapist: Seema Felton PT     Referring Provider:      Sher Alston DO       PT order : PT eval and treat     Room #: 638  DIAGNOSIS: The primary encounter diagnosis was Atrial fibrillation with rapid ventricular response (HCC). A diagnosis of Atypical pneumonia was also pertinent to this visit.    PRECAUTIONS: falls, droplet plus     Social:  Pt [place] alone  in a  1  floor [place]  no steps  to enter.  Prior to admission pt walked with  ww.      Initial Evaluation  Date:  2025  Treatment      Short Term/ Long Term   Goals   Was pt agreeable to Eval/treatment?  Yes      Does pt have pain?  None reported      Bed Mobility  Rolling:  independent   Supine to sit: independent   Sit to supine:  NT   Scooting:  independent    Independent    Transfers Sit to stand:  S/I   Stand to sit:  S/I   Stand pivot:  NT    Independent    Ambulation     15 and 50  feet with  ww  with  SBA    100  feet with ww  with  independent        Stair negotiation: ascended and descended NT   N/A    LE ROM  WFL      LE strength  4/ 5      AM- PAC RAW score   20/ 2 4            Pt is alert and Oriented x  4      Balance:  SBA with gait . Fall risk due to []Decreased strength, [] Decreased balance, [] Decreased safety awareness, [x] Decreased activity tolerance.  [] Other:     Endurance: decreased   Bed/Chair alarm:  yes      ASSESSMENT  Pt displays functional ability as noted in the objective portion of this evaluation.        Conditions Requiring Skilled Therapeutic Intervention:    [x]Decreased strength     []Decreased ROM  [x]Decreased functional mobility  [x]Decreased balance   [x]Decreased endurance   []Decreased posture  []Decreased sensation  []Decreased coordination   []Decreased vision  []Decreased safety awareness   []Increased pain         Comments:   Pt  in bed  upon arrival ; agreeable to PT. Mobility as above.  O2@ 3 LNC.  SpO2 88% after mobility, recovered to 93%. -151     Treatment:  Pt was instructed on the following :   -Bed mobility : independent   -Transfers: hand placement, controlled movement with stand to sit  - Gait: proper use of ww, avoid abandoning ww when turning to sit            Pt educated on fall risk, safety with mobility        Patient response to education:   Pt verbalized understanding Pt demonstrated skill Pt requires further education in this area    x With cues   x       Comments:  Pt left in chair after session, with call light in reach.      Rehab potential is Good for reaching above PT goals.    Pt’s/ family goals   1.  Home     Patient and or family understand(s) diagnosis, prognosis, and plan of care. -  yes     PLAN  PT care will be provided in accordance with the objectives noted above.  Whenever appropriate, clear delegation orders will be provided for nursing staff.  Exercises and functional mobility practice will be used as well as appropriate assistive devices or modalities to obtain goals. Patient and family education will also be administered as needed.        PLAN OF

## 2025-01-28 NOTE — ED NOTES
ED to Inpatient Handoff Report    Notified Destinee that electronic handoff available and patient ready for transport to room 638.    Safety Risks: Risk of falls    Patient in Restraints: no    Constant Observer or Patient : no    Telemetry Monitoring Ordered :Yes           Order to transfer to unit without monitor:NO - pt has Cardizem running        Deterioration Index Score:   Predictive Model Details          31  Factor Value    Calculated 1/27/2025 21:50 41% Age 79 years old    Deterioration Index Model 34% Respiratory rate 24     10% Potassium 4.6 mmol/L     6% Pulse oximetry 92 %     4% Sodium 142 mmol/L     3% Systolic 131     2% WBC count 9.1 k/uL     0% Temperature 97.9 °F (36.6 °C)     0% Hematocrit 40.3 %     0% Pulse 76        Vitals:    01/27/25 2100 01/27/25 2115 01/27/25 2130 01/27/25 2145   BP:  (!) 146/82  131/79   Pulse: (!) 104 (!) 126 92 76   Resp: 18 23 28 24   Temp:  97.9 °F (36.6 °C)     TempSrc:  Oral     SpO2: 98% 96% 94% 92%   Weight:       Height:             Opportunity for questions and clarification was provided during telephone report.

## 2025-01-28 NOTE — CARE COORDINATION
Social Work / Discharge Planning :SW met with patient and explained role as discharge planner/ transition of care. Patient admitted with A-Fib. Patient verified plan at discharge is HOME where she resides independently. Patient does use a ww and did ask for a new one due to current one is wearing down. No DME preference and will need DME order and will order through Jesús at [facility]. Patient currently on 02 and new. Goal to wean to BL RA . [facility] can be referred if unable to wean. Patient agreeable. Patient PCP is DR Quiles and [facility]. [id number] in [place] is preference for meds.  Patient verified she has transportation at D/C. Cardiology and Pulmonology consulted. Await treatment plan and recommendations. SW to follow. Electronically signed by LENCHO Layton on 1/28/25 at 10:34 AM EST

## 2025-01-28 NOTE — H&P
Internal Medicine History & Physical     Name: Laurie Hall  : 1945  Chief Complaint: Tachycardia (High HR at dr office. Sent by  for high HR and to get fluids. States she's been recently sick with URI) and Atrial Fibrillation  Primary Care Physician: Umair Quiles DO  Admission date: 2025  Date of service: 2025   Unit: [facility] 6S INTERMEDIATE     History of Present Illness  Laurie is a 79 y.o. year old female with past medical history of prior A-fib on Eliquis, HLD, HTN, hypothyroidism, who presented to the ED yesterday from her PCP office.  States that over the past several weeks that she had been feeling \"off\".  Reports some shortness of breath as well as an ongoing dry cough.  Had followed up with her PCP and was given antibiotics for this without improvement.  She was following back up with her PCP yesterday, had an EKG in the office and was noted to be in A-fib with RVR and sent to the emergency department.  Patient states that she was not having any palpitations or lightheadedness with this.  States that she did have an episode of A-fib back in  and has been on Eliquis since then.  Patient denies any chest pains, nausea, vomiting, diarrhea.    There are no family or friends at bedside. The history is provided by the patient.  She is felt to be a good historian.    ED course:   Initial blood work and imaging studies performed. Admission recommended by ED physician. My coverage discussed the case with the ED provider. Meds in the ED consisted of the following: Diltiazem 10 mg IV followed by infusion    Past Medical History:   Diagnosis Date    Arthritis     Constipation     DVT (deep venous thrombosis) (HCC)     Gout     CONTROLLED    History of COVID-19     Hyperlipidemia     Hypertension     STABLE    Macular hole, right eye     Thyroid disease        Past Surgical History:   Procedure Laterality Date    ABDOMEN SURGERY      BACK SURGERY  2020    [facility]

## 2025-01-28 NOTE — PROGRESS NOTES
[facility] Quality Flow/Interdisciplinary Rounds Progress Note        Quality Flow Rounds held on January 28, 2025    Disciplines Attending:  Bedside Nurse, , , and Nursing Unit Leadership    Laurie Hall was admitted on 1/27/2025  4:37 PM    Anticipated Discharge Date:       Disposition:    Kaveh Score:  Kaveh Scale Score: 18    BS RISK OF UNPLANNED READMISSION 2.0             11.8 Total Score        Discussed patient goal for the day, patient clinical progression, and barriers to discharge.  The following Goal(s) of the Day/Commitment(s) have been identified:   wean oxygen as able, echo, resp panel, card gtt po eliquis, po lopressor, pt ot mary,       Bree Alvarez RN  January 28, 2025

## 2025-01-28 NOTE — PROGRESS NOTES
Spiritual Health History and Assessment/Progress Note  [facility]    Initial Encounter, Attempted Encounter,  ,  ,      Name: Laurie Hall MRN: [id number]    Age: 79 y.o.     Sex: female   Language: English   Denominational: Buddhist   Atrial fibrillation with rapid ventricular response (HCC)     Date: 1/28/2025                           Spiritual Assessment began in [facility] 6S INTERMEDIATE            Encounter Overview/Reason: Initial Encounter, Attempted Encounter  Service Provided For: Patient, Patient not available    Esperanza, Belief, Meaning:   Patient is connected with a esperanza tradition or spiritual practice  Family/Friends No family/friends present      Importance and Influence:  Patient unable to assess at this time  Family/Friends No family/friends present    Community:  Patient feels well-supported. Support system includes: Spouse/Partner and Children  Family/Friends No family/friends present    Assessment and Plan of Care:     Patient Interventions include: Other: Nursing currently addressing the patient, col not assess.  Family/Friends Interventions include: No family/friends present    Patient Plan of Care: Spiritual Care available upon further referral  Family/Friends Plan of Care: No family/friends present    Electronically signed by Chaplain Fabby on 1/28/2025 at 4:04 PM

## 2025-01-28 NOTE — CONSULTS
Inpatient Cardiology Consultation      Reason for Consult: A-fib RVR    Consulting Physician: Dr Ascencio    Requesting Physician:  Florence Vick, APRN - CNP    Date of Consultation: 1/28/2025    HISTORY OF PRESENT ILLNESS:   Patient is an 79-year-old female who has been seen in consult 2019 by Dr. Cordero, but has not been seen since by [facility] cardiology.    HPI:  Patient presented to ER 1/27/2025 for tachycardia and new atrial fibrillation from PCPs office.  Patient was at PCP and found to be tachycardic with new A-fib RVR after having 2 weeks of flulike symptoms.  Patient did report heaviness in her chest.  Patient was A-fib RVR at a rate of 115 upon arrival.  Patient was given 5 mg Lopressor and 500 bolus with no response intervention was put on Cardizem infusion with bolus prior.  CXR considered for atypical pneumonia and patient was started on ATB.  Patient already on Eliquis due to history of DVT.  Patient had SpO2 dropped to 90% and was started on nasal cannula O2.  VS upon arrival 97.7, 16 respirations, 123 pulse, 131/93, 94% room air.  Labs: Potassium 4.6, CO2 20, BUN 19, creatinine 1.2 >1.0, GFR 55, magnesium 2.3, glucose 116, calcium 9.2, troponin 23 > 22, albumin 3.4, TSH 0.55, WBC 9.1, H&H 11.4 > 10.6/36.3, platelet 143  CXR: Increased interstitial markings throughout the lungs now moderate, previously mild.  Suggest progressive pulmonary fibrosis although an atypical pneumonia or ARDS superimposed upon mild fibrosis could have this appearance.  New small right pleural effusion    Upon assessment today 1/28/2025 patient is sitting up in chair on 3 L cannula.  Patient is alert and oriented, speaking full sentences, and is in no apparent distress at this time.  Patient tells me since Christmas she has been having an ongoing cough that was initially treated with a Z-Jose with no help and prednisone which also did not help.  She reports for the last week she has been having some shortness of breath

## 2025-01-28 NOTE — PROGRESS NOTES
Received a call from Dr. Ascencio to ask pulm if it is okay to give Therapeutic Lovenox starting tonight for possible cardioversion tomorrow. Pulm held Eliquis due to possible thoracentesis. Dr. Brower said that it is okay to start Lovenox as afib seems to be her main issue.

## 2025-01-28 NOTE — PLAN OF CARE
Problem: Safety - Adult  Goal: Free from fall injury  Outcome: Progressing     Problem: Skin/Tissue Integrity  Goal: Skin integrity remains intact  Description: 1.  Monitor for areas of redness and/or skin breakdown  2.  Assess vascular access sites hourly  3.  Every 4-6 hours minimum:  Change oxygen saturation probe site  4.  Every 4-6 hours:  If on nasal continuous positive airway pressure, respiratory therapy assess nares and determine need for appliance change or resting period  Outcome: Progressing     Problem: ABCDS Injury Assessment  Goal: Absence of physical injury  Outcome: Progressing     Problem: Neurosensory - Adult  Goal: Achieves stable or improved neurological status  Outcome: Progressing  Goal: Absence of seizures  Outcome: Progressing  Goal: Remains free of injury related to seizures activity  Outcome: Progressing  Goal: Achieves maximal functionality and self care  Outcome: Progressing     Problem: Respiratory - Adult  Goal: Achieves optimal ventilation and oxygenation  Outcome: Progressing     Problem: Cardiovascular - Adult  Goal: Maintains optimal cardiac output and hemodynamic stability  Outcome: Progressing  Goal: Absence of cardiac dysrhythmias or at baseline  Outcome: Progressing     Problem: Skin/Tissue Integrity - Adult  Goal: Skin integrity remains intact  Description: 1.  Monitor for areas of redness and/or skin breakdown  2.  Assess vascular access sites hourly  3.  Every 4-6 hours minimum:  Change oxygen saturation probe site  4.  Every 4-6 hours:  If on nasal continuous positive airway pressure, respiratory therapy assess nares and determine need for appliance change or resting period  Outcome: Progressing  Goal: Incisions, wounds, or drain sites healing without S/S of infection  Outcome: Progressing  Goal: Oral mucous membranes remain intact  Outcome: Progressing     Problem: Musculoskeletal - Adult  Goal: Return mobility to safest level of function  Outcome: Progressing  Goal:

## 2025-01-28 NOTE — PLAN OF CARE
Problem: Safety - Adult  Goal: Free from fall injury  1/28/2025 1100 by Alyssa Carrero RN  Outcome: Progressing  Flowsheets (Taken 1/28/2025 0915)  Free From Fall Injury: Instruct family/caregiver on patient safety  1/28/2025 0319 by Deepali Sifuentes RN  Outcome: Progressing  1/28/2025 0308 by Deepali Sifuentes RN  Outcome: Progressing     Problem: Skin/Tissue Integrity  Goal: Skin integrity remains intact  Description: 1.  Monitor for areas of redness and/or skin breakdown  2.  Assess vascular access sites hourly  3.  Every 4-6 hours minimum:  Change oxygen saturation probe site  4.  Every 4-6 hours:  If on nasal continuous positive airway pressure, respiratory therapy assess nares and determine need for appliance change or resting period  1/28/2025 1100 by Alyssa Carrero RN  Outcome: Progressing  Flowsheets (Taken 1/28/2025 0915)  Skin Integrity Remains Intact: Monitor for areas of redness and/or skin breakdown  1/28/2025 0319 by Deepali Sifuentes RN  Outcome: Progressing  1/28/2025 0308 by Deepali Sifuentes RN  Outcome: Progressing     Problem: ABCDS Injury Assessment  Goal: Absence of physical injury  1/28/2025 1100 by Alyssa Carrero RN  Outcome: Progressing  Flowsheets (Taken 1/28/2025 0915)  Absence of Physical Injury: Implement safety measures based on patient assessment  1/28/2025 0319 by Deepali Sifuentes RN  Outcome: Progressing  1/28/2025 0308 by Deepali Sifuentes RN  Outcome: Progressing     Problem: Neurosensory - Adult  Goal: Achieves stable or improved neurological status  1/28/2025 1100 by Alyssa Carrero RN  Outcome: Progressing  Flowsheets (Taken 1/28/2025 0915)  Achieves stable or improved neurological status: Assess for and report changes in neurological status  1/28/2025 0319 by Deepali Sifuentes RN  Outcome: Progressing  1/28/2025 0308 by Deepali Sifuentes RN  Outcome: Progressing  Goal: Absence of seizures  1/28/2025 1100 by Magan

## 2025-01-28 NOTE — PROGRESS NOTES
4 Eyes Skin Assessment     NAME:  Laurie Hall  YOB: 1945  MEDICAL RECORD NUMBER:  [id number]    The patient is being assessed for  Admission    I agree that at least one RN has performed a thorough Head to Toe Skin Assessment on the patient. ALL assessment sites listed below have been assessed.      Areas assessed by both nurses:    Head, Face, Ears, Shoulders, Back, Chest, Arms, Elbows, Hands, Sacrum. Buttock, Coccyx, Ischium, Legs. Feet and Heels, and Under Medical Devices         Does the Patient have a Wound? No noted wound(s)       Kaveh Prevention initiated by RN: Yes  Wound Care Orders initiated by RN: No    Pressure Injury (Stage 3,4, Unstageable, DTI, NWPT, and Complex wounds) if present, place Wound referral order by RN under : No    New Ostomies, if present place, Ostomy referral order under : No     Nurse 1 eSignature: Electronically signed by Deepali Sifuentes RN on 1/28/25 at 3:07 AM EST    **SHARE this note so that the co-signing nurse can place an eSignature**    Nurse 2 eSignature: Electronically signed by Miriam Graff RN on 1/28/25 at 3:52 AM EST

## 2025-01-28 NOTE — ACP (ADVANCE CARE PLANNING)
Advance Care Planning   Healthcare Decision Maker:    Jaxon Hall Child [phone]       Click here to complete Healthcare Decision Makers including selection of the Healthcare Decision Maker Relationship (ie \"Primary\").  Today we documented Decision Maker(s) consistent with Legal Next of Kin hierarchy.     Jaxon Hall Child [phone]

## 2025-01-28 NOTE — PROGRESS NOTES
Occupational Therapy    OCCUPATIONAL THERAPY INITIAL EVALUATION    [facility]   [address], [place]         Date:2025                                                  Patient Name: Laurie Hall    MRN: [id number]    : 1945    Room: [address] [place] [address]      Evaluating OT: Ambar Brannon OTR/L   [id number]      Referring Provider:Sher Alston DO     Specific Provider Orders/Date:OT eval and trat 2025      Diagnosis:  Atrial fibrillation with rapid ventricular response (HCC) [I48.91]     Pertinent Medical History: arthritis, HTN, macular hole R eye, back surgery,      Precautions:  Fall Risk, DROPLET PLUS     Assessment of current deficits    [x] Functional mobility  [x]ADLs  [x] Strength               []Cognition    [x] Functional transfers   [x] IADLs         [x] Safety Awareness   [x]Endurance    [] Fine Coordination              [x] Balance      [] Vision/perception   []Sensation     []Gross Motor Coordination  [] ROM  [] Delirium                   [] Motor Control     OT PLAN OF CARE   OT POC based on physician orders, patient diagnosis and results of clinical assessment    Frequency/Duration  1-3 days/wk for  PRN   Specific OT Treatment Interventions to include:   ADL retraining/adapted techniques and AE recommendations to increase functional independence within precautions                    Energy conservation techniques to improve tolerance for selfcare routine   Functional transfer/mobility training/DME recommendations for increased independence, safety and fall prevention         Patient/family education to increase safety and functional independence             Environmental modifications for safe mobility and completion of ADLs                             Therapeutic activity to improve functional performance during ADLs.                                         Therapeutic exercise to improve tolerance and functional strength

## 2025-01-28 NOTE — PLAN OF CARE
Problem: Safety - Adult  Goal: Free from fall injury  1/28/2025 0319 by Deepali Sifuentes RN  Outcome: Progressing  1/28/2025 0308 by Deepali Sifuentes RN  Outcome: Progressing     Problem: Skin/Tissue Integrity  Goal: Skin integrity remains intact  Description: 1.  Monitor for areas of redness and/or skin breakdown  2.  Assess vascular access sites hourly  3.  Every 4-6 hours minimum:  Change oxygen saturation probe site  4.  Every 4-6 hours:  If on nasal continuous positive airway pressure, respiratory therapy assess nares and determine need for appliance change or resting period  1/28/2025 0319 by Deepali Sifuentes RN  Outcome: Progressing  1/28/2025 0308 by Deepali Sifuentes RN  Outcome: Progressing     Problem: ABCDS Injury Assessment  Goal: Absence of physical injury  1/28/2025 0319 by Deepali Sifuentes RN  Outcome: Progressing  1/28/2025 0308 by Deepali Sifuentes RN  Outcome: Progressing     Problem: Neurosensory - Adult  Goal: Achieves stable or improved neurological status  1/28/2025 0319 by Deepali Sifuentes RN  Outcome: Progressing  1/28/2025 0308 by Deepali Sifuentes RN  Outcome: Progressing  Goal: Absence of seizures  1/28/2025 0319 by Deepali Sifuentes RN  Outcome: Progressing  1/28/2025 0308 by Deepali Sifuentes RN  Outcome: Progressing  Goal: Remains free of injury related to seizures activity  1/28/2025 0319 by Deepali Sifuentes RN  Outcome: Progressing  1/28/2025 0308 by Deepali Sifuentes RN  Outcome: Progressing  Goal: Achieves maximal functionality and self care  1/28/2025 0319 by Deepali Sifuentes RN  Outcome: Progressing  1/28/2025 0308 by Deepali Sifuentes RN  Outcome: Progressing     Problem: Respiratory - Adult  Goal: Achieves optimal ventilation and oxygenation  1/28/2025 0319 by Deepali Sifuentes RN  Outcome: Progressing  1/28/2025 0308 by Deepali Sifuentes RN  Outcome: Progressing      Problem: Cardiovascular - Adult  Goal: Maintains optimal cardiac output and hemodynamic stability  1/28/2025 0319 by Deepali Sifuentes RN  Outcome: Progressing  1/28/2025 0308 by Deepali Sifuentes RN  Outcome: Progressing  Goal: Absence of cardiac dysrhythmias or at baseline  1/28/2025 0319 by Deepali Sifuentes RN  Outcome: Progressing  1/28/2025 0308 by Deepali Sifuentes RN  Outcome: Progressing     Problem: Skin/Tissue Integrity - Adult  Goal: Skin integrity remains intact  Description: 1.  Monitor for areas of redness and/or skin breakdown  2.  Assess vascular access sites hourly  3.  Every 4-6 hours minimum:  Change oxygen saturation probe site  4.  Every 4-6 hours:  If on nasal continuous positive airway pressure, respiratory therapy assess nares and determine need for appliance change or resting period  1/28/2025 0319 by Deepali Sifuentes RN  Outcome: Progressing  1/28/2025 0308 by Deepali Sifuentes RN  Outcome: Progressing  Goal: Incisions, wounds, or drain sites healing without S/S of infection  1/28/2025 0319 by Deepali Sifuentes RN  Outcome: Progressing  1/28/2025 0308 by Deepali Sifuentes RN  Outcome: Progressing  Goal: Oral mucous membranes remain intact  1/28/2025 0319 by Deepali Sifuentes RN  Outcome: Progressing  1/28/2025 0308 by Deepali Sifuentes RN  Outcome: Progressing     Problem: Musculoskeletal - Adult  Goal: Return mobility to safest level of function  1/28/2025 0319 by Deepali Sifuentes RN  Outcome: Progressing  1/28/2025 0308 by Deepali Sifuentes RN  Outcome: Progressing  Goal: Maintain proper alignment of affected body part  1/28/2025 0319 by Deepali Sifuentes RN  Outcome: Progressing  1/28/2025 0308 by Deepali Sifuentes RN  Outcome: Progressing  Goal: Return ADL status to a safe level of function  1/28/2025 0319 by Deepali Sifuentes RN  Outcome: Progressing  1/28/2025 0308 by Deepali Sifuentes,

## 2025-01-28 NOTE — CARE COORDINATION
Internal Medicine On-call Care Coordination Note    I was called by the ED physician because they recommended admission for this patient and we cover their PCP.  The history as I understand it after discussion with the ED physician is as follows:    Was being treated OP for URI, sent in by PCP for HR 140s  Afib RVR despite IV metoprolol- then started on cardizem gtt  Hx DVT on eliquis  Also concern for walking pneumonia- started on doxy    I placed admission orders.  Including:    General admission orders  Reconciled home meds  PO doxy  Cardiology consult    Dr. Alston, or our coverage will see the patient tomorrow for H&P.    Electronically signed by JACOB Grajeda CNP on 1/27/2025 at 9:07 PM

## 2025-01-28 NOTE — CONSULTS
Bran Kiran M.D.,[facility]  Carmelo Lundberg D.O., RUY., [facility]  Hoa Vaughan M.D.  Roxana Boyle M.D.   Ethan Wallace D.O.  Cameron Brower M.D.       Patient:  Laurie Hall 79 y.o. female MRN: [id number]           PULMONARY CONSULTATION    Reason for Consultation: Abnormal CXR  Referring Physician: Sher Alston DO    Communication with the referring physician will be sent via the electronic medical record.    Chief Complaint: Tachycardia     CODE STATUS: FULL CODE    SUBJECTIVE:  HPI:  Laurie Hall is a 79 y.o. female not previously known to our service with a past medical history of arthritis, HLD, HTN, thyroid disease and we were asked to evaluate for an abnormal chest x-ray.    The patient presented to the emergency room from her PCPs office stating that over the past several weeks she has been feeling \"off\".  She has been having some shortness of breath as well as an ongoing dry cough.  She followed up with her PCP, was given antibiotics and did not improve.  She followed up with her PCP yesterday, had an EKG in the office it was noted to be in A-fib RVR and sent to the emergency room.  She was started on a Cardizem drip, cardiology was consulted and she was admitted.    She had a chest x-ray completed that showed increased interstitial markings throughout the lungs that seem to have progressed from previous imaging suggesting pulmonary fibrosis.  She is a lifelong non-smoker.  She had a sleep study done in 2013 that showed an AHI of 18 indicating moderate obstructive sleep apnea and she had significant oxygen desaturations as well.  She has a history of bilateral lower extremity lymphedema.    Patient was seen sitting up in the chair on 3 L nasal cannula.  Baseline is room air.  She was taken off and placed on room air but was having slight desaturation so she was placed on 1 L.  She is currently on the Cardizem drip.  She does states she is feeling better since she has been

## 2025-01-29 LAB
ALBUMIN SERPL-MCNC: 3.4 G/DL (ref 3.5–5.2)
ALP SERPL-CCNC: 91 U/L (ref 35–104)
ALT SERPL-CCNC: 13 U/L (ref 0–32)
ANION GAP SERPL CALCULATED.3IONS-SCNC: 12 MMOL/L (ref 7–16)
AST SERPL-CCNC: 47 U/L (ref 0–31)
BILIRUB SERPL-MCNC: 0.4 MG/DL (ref 0–1.2)
BUN SERPL-MCNC: 21 MG/DL (ref 6–23)
CALCIUM SERPL-MCNC: 9 MG/DL (ref 8.6–10.2)
CHLORIDE SERPL-SCNC: 108 MMOL/L (ref 98–107)
CO2 SERPL-SCNC: 21 MMOL/L (ref 22–29)
CREAT SERPL-MCNC: 1.2 MG/DL (ref 0.5–1)
ECHO AO ASC DIAM: 2.9 CM
ECHO AO ASCENDING AORTA INDEX: 1.53 CM/M2
ECHO AV AREA PEAK VELOCITY: 1.9 CM2
ECHO AV AREA VTI: 2.3 CM2
ECHO AV AREA/BSA PEAK VELOCITY: 1 CM2/M2
ECHO AV AREA/BSA VTI: 1.2 CM2/M2
ECHO AV CUSP MM: 1.6 CM
ECHO AV MEAN GRADIENT: 4 MMHG
ECHO AV MEAN VELOCITY: 0.9 M/S
ECHO AV PEAK GRADIENT: 8 MMHG
ECHO AV PEAK VELOCITY: 1.5 M/S
ECHO AV VELOCITY RATIO: 0.53
ECHO AV VTI: 26.8 CM
ECHO BSA: 1.93 M2
ECHO EST RA PRESSURE: 3 MMHG
ECHO LA DIAMETER INDEX: 2.17 CM/M2
ECHO LA DIAMETER: 4.1 CM
ECHO LA VOL A-L A2C: 51 ML (ref 22–52)
ECHO LA VOL A-L A4C: 80 ML (ref 22–52)
ECHO LA VOL MOD A2C: 48 ML (ref 22–52)
ECHO LA VOL MOD A4C: 71 ML (ref 22–52)
ECHO LA VOLUME AREA LENGTH: 79 ML
ECHO LA VOLUME INDEX A-L A2C: 27 ML/M2 (ref 16–34)
ECHO LA VOLUME INDEX A-L A4C: 42 ML/M2 (ref 16–34)
ECHO LA VOLUME INDEX AREA LENGTH: 42 ML/M2 (ref 16–34)
ECHO LA VOLUME INDEX MOD A2C: 25 ML/M2 (ref 16–34)
ECHO LA VOLUME INDEX MOD A4C: 38 ML/M2 (ref 16–34)
ECHO LV EDV A2C: 85 ML
ECHO LV EDV A4C: 82 ML
ECHO LV EDV BP: 85 ML (ref 56–104)
ECHO LV EDV INDEX A4C: 43 ML/M2
ECHO LV EDV INDEX BP: 45 ML/M2
ECHO LV EDV NDEX A2C: 45 ML/M2
ECHO LV EF PHYSICIAN: 55 %
ECHO LV EJECTION FRACTION A2C: 57 %
ECHO LV EJECTION FRACTION A4C: 68 %
ECHO LV EJECTION FRACTION BIPLANE: 63 % (ref 55–100)
ECHO LV ESV A2C: 36 ML
ECHO LV ESV A4C: 27 ML
ECHO LV ESV BP: 32 ML (ref 19–49)
ECHO LV ESV INDEX A2C: 19 ML/M2
ECHO LV ESV INDEX A4C: 14 ML/M2
ECHO LV ESV INDEX BP: 17 ML/M2
ECHO LV FRACTIONAL SHORTENING: 28 % (ref 28–44)
ECHO LV INTERNAL DIMENSION DIASTOLE INDEX: 2.28 CM/M2
ECHO LV INTERNAL DIMENSION DIASTOLIC: 4.3 CM (ref 3.9–5.3)
ECHO LV INTERNAL DIMENSION SYSTOLIC INDEX: 1.64 CM/M2
ECHO LV INTERNAL DIMENSION SYSTOLIC: 3.1 CM
ECHO LV ISOVOLUMETRIC RELAXATION TIME (IVRT): 60 MS
ECHO LV IVSD: 1 CM (ref 0.6–0.9)
ECHO LV MASS 2D: 152.6 G (ref 67–162)
ECHO LV MASS INDEX 2D: 80.7 G/M2 (ref 43–95)
ECHO LV POSTERIOR WALL DIASTOLIC: 1.1 CM (ref 0.6–0.9)
ECHO LV RELATIVE WALL THICKNESS RATIO: 0.51
ECHO LVOT AREA: 3.5 CM2
ECHO LVOT AV VTI INDEX: 0.66
ECHO LVOT DIAM: 2.1 CM
ECHO LVOT MEAN GRADIENT: 1 MMHG
ECHO LVOT PEAK GRADIENT: 3 MMHG
ECHO LVOT PEAK VELOCITY: 0.8 M/S
ECHO LVOT STROKE VOLUME INDEX: 32.2 ML/M2
ECHO LVOT SV: 60.9 ML
ECHO LVOT VTI: 17.6 CM
ECHO MV AREA PHT: 3.2 CM2
ECHO MV AREA VTI: 1.9 CM2
ECHO MV LVOT VTI INDEX: 1.79
ECHO MV MAX VELOCITY: 1.2 M/S
ECHO MV MEAN GRADIENT: 2 MMHG
ECHO MV MEAN VELOCITY: 0.7 M/S
ECHO MV PEAK GRADIENT: 6 MMHG
ECHO MV PRESSURE HALF TIME (PHT): 69.7 MS
ECHO MV VTI: 31.5 CM
ECHO PV MAX VELOCITY: 0.8 M/S
ECHO PV MEAN GRADIENT: 1 MMHG
ECHO PV MEAN VELOCITY: 0.5 M/S
ECHO PV PEAK GRADIENT: 2 MMHG
ECHO PV VTI: 17.2 CM
ECHO RIGHT VENTRICULAR SYSTOLIC PRESSURE (RVSP): 36 MMHG
ECHO RV INTERNAL DIMENSION: 2.4 CM
ECHO RV TAPSE: 2 CM (ref 1.7–?)
ECHO TV REGURGITANT MAX VELOCITY: 2.87 M/S
ECHO TV REGURGITANT PEAK GRADIENT: 33 MMHG
GFR, ESTIMATED: 48 ML/MIN/1.73M2
GLUCOSE SERPL-MCNC: 90 MG/DL (ref 74–99)
L PNEUMO1 AG UR QL IA.RAPID: NEGATIVE
POTASSIUM SERPL-SCNC: 5 MMOL/L (ref 3.5–5)
PROT SERPL-MCNC: 6.6 G/DL (ref 6.4–8.3)
S PNEUM AG SPEC QL: NEGATIVE
SODIUM SERPL-SCNC: 141 MMOL/L (ref 132–146)
SPECIMEN SOURCE: NORMAL

## 2025-01-29 PROCEDURE — 6360000002 HC RX W HCPCS: Performed by: INTERNAL MEDICINE

## 2025-01-29 PROCEDURE — 6370000000 HC RX 637 (ALT 250 FOR IP): Performed by: INTERNAL MEDICINE

## 2025-01-29 PROCEDURE — 80053 COMPREHEN METABOLIC PANEL: CPT

## 2025-01-29 PROCEDURE — 2060000000 HC ICU INTERMEDIATE R&B

## 2025-01-29 PROCEDURE — 2500000003 HC RX 250 WO HCPCS: Performed by: INTERNAL MEDICINE

## 2025-01-29 PROCEDURE — 2500000003 HC RX 250 WO HCPCS

## 2025-01-29 PROCEDURE — 36415 COLL VENOUS BLD VENIPUNCTURE: CPT

## 2025-01-29 PROCEDURE — 2580000003 HC RX 258

## 2025-01-29 PROCEDURE — 99233 SBSQ HOSP IP/OBS HIGH 50: CPT | Performed by: INTERNAL MEDICINE

## 2025-01-29 PROCEDURE — 6370000000 HC RX 637 (ALT 250 FOR IP): Performed by: NURSE PRACTITIONER

## 2025-01-29 PROCEDURE — 93306 TTE W/DOPPLER COMPLETE: CPT | Performed by: INTERNAL MEDICINE

## 2025-01-29 PROCEDURE — 2500000003 HC RX 250 WO HCPCS: Performed by: NURSE PRACTITIONER

## 2025-01-29 PROCEDURE — 2700000000 HC OXYGEN THERAPY PER DAY

## 2025-01-29 RX ORDER — FUROSEMIDE 10 MG/ML
40 INJECTION INTRAMUSCULAR; INTRAVENOUS 2 TIMES DAILY
Status: DISCONTINUED | OUTPATIENT
Start: 2025-01-29 | End: 2025-01-30

## 2025-01-29 RX ADMIN — PROBENECID 500 MG: 500 TABLET, FILM COATED ORAL at 20:46

## 2025-01-29 RX ADMIN — DOXYCYCLINE HYCLATE 100 MG: 100 CAPSULE ORAL at 20:46

## 2025-01-29 RX ADMIN — WATER 1000 MG: 1 INJECTION INTRAMUSCULAR; INTRAVENOUS; SUBCUTANEOUS at 16:03

## 2025-01-29 RX ADMIN — PROBENECID 500 MG: 500 TABLET, FILM COATED ORAL at 08:59

## 2025-01-29 RX ADMIN — APIXABAN 5 MG: 5 TABLET, FILM COATED ORAL at 20:46

## 2025-01-29 RX ADMIN — DOXYCYCLINE HYCLATE 100 MG: 100 CAPSULE ORAL at 08:54

## 2025-01-29 RX ADMIN — SODIUM CHLORIDE 10 MG/HR: 900 INJECTION, SOLUTION INTRAVENOUS at 05:23

## 2025-01-29 RX ADMIN — METOPROLOL SUCCINATE 50 MG: 50 TABLET, EXTENDED RELEASE ORAL at 08:54

## 2025-01-29 RX ADMIN — SODIUM CHLORIDE, PRESERVATIVE FREE 10 ML: 5 INJECTION INTRAVENOUS at 09:00

## 2025-01-29 RX ADMIN — METOPROLOL SUCCINATE 75 MG: 25 TABLET, FILM COATED, EXTENDED RELEASE ORAL at 20:46

## 2025-01-29 RX ADMIN — ATORVASTATIN CALCIUM 40 MG: 40 TABLET, FILM COATED ORAL at 20:46

## 2025-01-29 RX ADMIN — LEVOTHYROXINE SODIUM 150 MCG: 75 TABLET ORAL at 08:54

## 2025-01-29 RX ADMIN — APIXABAN 5 MG: 5 TABLET, FILM COATED ORAL at 11:36

## 2025-01-29 RX ADMIN — FUROSEMIDE 40 MG: 10 INJECTION, SOLUTION INTRAMUSCULAR; INTRAVENOUS at 18:09

## 2025-01-29 RX ADMIN — FUROSEMIDE 20 MG: 10 INJECTION, SOLUTION INTRAMUSCULAR; INTRAVENOUS at 11:36

## 2025-01-29 RX ADMIN — SODIUM CHLORIDE 10 MG/HR: 900 INJECTION, SOLUTION INTRAVENOUS at 16:19

## 2025-01-29 ASSESSMENT — PAIN SCALES - GENERAL: PAINLEVEL_OUTOF10: 0

## 2025-01-29 NOTE — PLAN OF CARE
Problem: Safety - Adult  Goal: Free from fall injury  Outcome: Progressing  Flowsheets (Taken 1/29/2025 0845)  Free From Fall Injury: Instruct family/caregiver on patient safety     Problem: Skin/Tissue Integrity  Goal: Skin integrity remains intact  Description: 1.  Monitor for areas of redness and/or skin breakdown  2.  Assess vascular access sites hourly  3.  Every 4-6 hours minimum:  Change oxygen saturation probe site  4.  Every 4-6 hours:  If on nasal continuous positive airway pressure, respiratory therapy assess nares and determine need for appliance change or resting period  Outcome: Progressing  Flowsheets (Taken 1/29/2025 0845)  Skin Integrity Remains Intact: Monitor for areas of redness and/or skin breakdown     Problem: ABCDS Injury Assessment  Goal: Absence of physical injury  Outcome: Progressing  Flowsheets (Taken 1/29/2025 0845)  Absence of Physical Injury: Implement safety measures based on patient assessment     Problem: Neurosensory - Adult  Goal: Achieves stable or improved neurological status  Outcome: Progressing  Flowsheets (Taken 1/29/2025 0845)  Achieves stable or improved neurological status: Assess for and report changes in neurological status  Goal: Absence of seizures  Outcome: Progressing  Flowsheets (Taken 1/29/2025 0845)  Absence of seizures: Monitor for seizure activity.  If seizure occurs, document type and location of movements and any associated apnea  Goal: Remains free of injury related to seizures activity  Outcome: Progressing  Flowsheets (Taken 1/29/2025 0845)  Remains free of injury related to seizure activity: Maintain airway, patient safety  and administer oxygen as ordered  Goal: Achieves maximal functionality and self care  Outcome: Progressing  Flowsheets (Taken 1/29/2025 0845)  Achieves maximal functionality and self care: Monitor swallowing and airway patency with patient fatigue and changes in neurological status     Problem: Respiratory - Adult  Goal: Achieves

## 2025-01-29 NOTE — PROGRESS NOTES
Messaged Dr. Brower per Dr. Ascencio regarding if blood thinners need to be held this am and if thoracentesis is happening today.

## 2025-01-29 NOTE — PROGRESS NOTES
INPATIENT CARDIOLOGY FOLLOW-UP    Name: Laurie Hall    Age: 79 y.o.    Primary Care Physician: Umair Quiles DO    Date of Service: 1/29/2025    Chief Complaint: Atrial fibrillation, acute HFpEF, pleural effusions    Interim History:  Currently with no chest pain, respiratory distress, or palpitations. AF with episodes of RVR on EKG and telemetry. No new cardiac complaints overnight.    Review of Systems:   Cardiac: As per HPI  General: No fever, chills  Pulmonary: As per HPI  HEENT: No visual disturbances, difficult swallowing  GI: No nausea, vomiting  : No dysuria, hematuria  Endocrine: +hypothyroidism, no DM  Musculoskeletal: MEZA x 4, no focal motor deficits  Skin: Intact, no rashes  Neuro: No headache, seizures  Psych: Currently with no depression, anxiety    Past Medical History:  Past Medical History:   Diagnosis Date    Arthritis     Constipation     DVT (deep venous thrombosis) ([facility])     Gout     CONTROLLED    History of COVID-19     Hyperlipidemia     Hypertension     STABLE    Macular hole, right eye     Thyroid disease        Past Surgical History:  Past Surgical History:   Procedure Laterality Date    ABDOMEN SURGERY      BACK SURGERY  09/2020    [facility]    BREAST SURGERY Right 1982    BENIGN CYST    CHOLECYSTECTOMY  1997    COLONOSCOPY      EYE SURGERY      HYSTERECTOMY (CERVIX STATUS UNKNOWN)  1985    JOINT REPLACEMENT      bilat knees     LAMINECTOMY N/A 6/3/2019    LUMBAR LAMINECTOMY POSTERIOR L3-L5, BONE BIOPSY L4 - GIOVANNY, X-RAY,  WANTS TF performed by Kavya Pulliam MD at [facility]    SKIN BIOPSY      TONSILLECTOMY         Family History:  Family History   Problem Relation Age of Onset    Other Mother        Social History:  Social History     Socioeconomic History    Marital status:      Spouse name: Not on file    Number of children: Not on file    Years of education: Not on file    Highest education level: Not on file   Occupational History    Not on file   Tobacco Use

## 2025-01-29 NOTE — PROGRESS NOTES
Bran Kiran M.D.,[facility]  Carmelo Lundberg D.O., RUY., [facility]  Markus Vaughan M.D.  Roxana Boyle M.D.   Ethan Wallace D.O.  Cameron Brower M.D.         Daily Pulmonary Progress Note    Patient:  Laurie Hall 79 y.o. female MRN: [id number]            Synopsis     We are following patient for pleural effusion    \"CC\" tachycardia    Code status: Full code      Subjective      Patient was seen and examined sitting up in bed on 2 L nasal cannula.  Chest CT shows a left thoracentesis and Eliquis was placed on hold.  Cardiology possibly doing a cardioversion and placed her on Lovenox in the meantime.      Review of Systems:  Constitutional: Denies fever, weight loss, night sweats, and fatigue  Skin: Denies pigmentation, dark lesions, and rashes   HEENT: Denies hearing loss, tinnitus, ear drainage, epistaxis, sore throat, and hoarseness.  Cardiovascular: Denies palpitations, chest pain, and chest pressure.  Respiratory: Dyspnea on exertion   Gastrointestinal: Denies nausea, vomiting, poor appetite, diarrhea, heartburn or reflux  Genitourinary: Denies dysuria, frequency, urgency or hematuria  Musculoskeletal: Denies myalgias, muscle weakness, and bone pain  Neurological: Denies dizziness, vertigo, headache, and focal weakness  Psychological: Denies anxiety and depression  Endocrine: Denies heat intolerance and cold intolerance  Hematopoietic/Lymphatic: Denies bleeding problems and blood transfusions    24-hour events:  No new events    Objective   OBJECTIVE:   BP (!) 145/67   Pulse 100   Temp 98.2 °F (36.8 °C) (Oral)   Resp 18   Ht 1.651 m (5' 5\")   Wt 81.6 kg (180 lb)   SpO2 96%   BMI 29.95 kg/m²   SpO2 Readings from Last 1 Encounters:   01/29/25 96%        I/O:    Intake/Output Summary (Last 24 hours) at 1/29/2025 1322  Last data filed at 1/29/2025 0329  Gross per 24 hour   Intake --   Output 600 ml   Net -600 ml                      CURRENT MEDS :  Scheduled Meds:   apixaban  5 mg Oral BID

## 2025-01-29 NOTE — PROGRESS NOTES
Internal Medicine Progress Note    Patient's name: Laurie Hall  : 1945  Chief complaints (on day of admission): Tachycardia (High HR at dr office. Sent by  for high HR and to get fluids. States she's been recently sick with URI) and Atrial Fibrillation  Admission date: 2025  Date of service: 2025   Room: [address]  Primary care physician: Umair Quiles DO  Reason for visit: Follow-up for afib and CHF    Subjective  Laurie was seen and examined. She was lying in bed in her room resting.  She still states that she is short of breath.  She is on a Cardizem infusion.  She is still requiring oxygen.  She is on IV diuretics but not urinated for clinical    Review of Systems  There are no new complaints of chest pain, abdominal pain, nausea, vomiting, diarrhea, constipation.    Hospital Medications  Current Facility-Administered Medications   Medication Dose Route Frequency Provider Last Rate Last Admin    apixaban (ELIQUIS) tablet 5 mg  5 mg Oral BID Roxana Boyle MD   5 mg at 25 1136    furosemide (LASIX) injection 40 mg  40 mg IntraVENous BID Roxana Boyle MD        metoprolol succinate (TOPROL XL) extended release tablet 75 mg  75 mg Oral BID Jesús Ascencio MD        cefTRIAXone (ROCEPHIN) 1,000 mg in sterile water 10 mL IV syringe  1,000 mg IntraVENous Q24H Sher Alston DO   1,000 mg at 25 1611    dilTIAZem 100 mg in sodium chloride 0.9 % 100 mL infusion (ADD-[place])  2.5-15 mg/hr IntraVENous Continuous Bebeto Diaz MD 10 mL/hr at 25 0928 10 mg/hr at 25 0928    doxycycline hyclate (VIBRAMYCIN) capsule 100 mg  100 mg Oral 2 times per day Florence Vick APRN - CNP   100 mg at 25 0854    ALPRAZolam (XANAX) tablet 0.5 mg  0.5 mg Oral Daily PRN Florence Vick APRN - CNP   0.5 mg at 25 2309    atorvastatin (LIPITOR) tablet 40 mg  40 mg Oral Nightly Lacivita, Florence, APRN - CNP   40 mg at 25

## 2025-01-29 NOTE — PROGRESS NOTES
[facility] Quality Flow/Interdisciplinary Rounds Progress Note        Quality Flow Rounds held on January 29, 2025    Disciplines Attending:  Bedside Nurse, , , and Nursing Unit Leadership    Laurie Hall was admitted on 1/27/2025  4:37 PM    Anticipated Discharge Date:       Disposition:    Kaveh Score:  Kaveh Scale Score: 20    [facility] RISK OF UNPLANNED READMISSION 2.0             12.6 Total Score        Discussed patient goal for the day, patient clinical progression, and barriers to discharge.  The following Goal(s) of the Day/Commitment(s) have been identified:   need thora and DCCV, await coordination between pulm and cardio, eliquis, card gtt,       Bree Alvarez, VALENTINA  January 29, 2025

## 2025-01-29 NOTE — PROGRESS NOTES
Physician Progress Note      PATIENT:               GAURAV JACKSON  [facility] #:                  [id number]  :                       1945  ADMIT DATE:       2025 4:37 PM  DISCH DATE:  RESPONDING  PROVIDER #:        Jesús Ascencio MD          QUERY TEXT:    Patient admitted with atrial fibrillation w/ RVR.  Noted documentation of   acute hypoxic respiratory failure in Cardiology and Pulmonology Consult Notes   on . Noted respiratory rates 16-28,  oxygen saturations 90-98% on room   air, 1945 - oxygen saturation 92-98% on 1-4L oxygen, and no   documentation of respiratory distress or labored breathing noted. In order to   support the diagnosis of acute hypoxic respiratory failure, please include   additional clinical indicators in your documentation.  Or please document if   the diagnosis of acute hypoxic respiratory failure       The medical record reflects the following:  Risk Factors: Bilateral Pleural Effusions, CHF, Afib w/ RVR  Clinical Indicators: ED Provider Note  \"Effort: Pulmonary effort is   normal. No respiratory distress\", H&P  \"Lungs: clear to auscultation   bilaterally, without rhonchi, crackle, wheezing, or rale, no retractions or   use of accessory muscles.\" Cardiology Consult Note  \"No accessory muscle   use or intercostal retractions...Acute hypoxic respiratory failure...B/L   pleural effusions (left>right)...Acute HFpEF...\", Pulmonology Consult Note    \"Breathing is not labored...Acute hypoxic respiratory failure...Bilateral   pleural effusions, L>R...Pulmonary edema\", - RN   Treatment: Labs, Consults- Cardiology, Pulmonology, IV Lasix, supplemental   oxygen      Thank you,  Kyler Mohamud, LAURENN, RN, CRCR  Clinical Documentation Integrity  [phone]  Options provided:  -- Acute Hypoxic Respiratory Failure as evidenced by, Please document   evidence.  -- Acute Hypoxic Respiratory Failure ruled out after study  -- Other - I will add my own

## 2025-01-29 NOTE — CARE COORDINATION
Social Work / Discharge Planning :Cardiology on board. Await plan. Patient plan is HOME at discharge. Therapy am/pac 20/24. Patient did request a ww. DME order obtained and referral sent to [facility]. Jesús will  deliver to room at D/C. Patient currently on 1 liter of 02 at 99%. Goal to wean to BL RA. If patient needs 02, Jesús [facility] can provide HOWEVER, will need a supportive dx. Patient has transportation. Await Cardiology plan and treatment. SW to follow. Electronically signed by LENCHO Layton on 1/29/25 at 11:09 AM EST

## 2025-01-30 LAB
ALBUMIN SERPL-MCNC: 3.7 G/DL (ref 3.5–5.2)
ALP SERPL-CCNC: 107 U/L (ref 35–104)
ALT SERPL-CCNC: 12 U/L (ref 0–32)
ANION GAP SERPL CALCULATED.3IONS-SCNC: 15 MMOL/L (ref 7–16)
AST SERPL-CCNC: 16 U/L (ref 0–31)
BASOPHILS # BLD: 0.06 K/UL (ref 0–0.2)
BASOPHILS NFR BLD: 1 % (ref 0–2)
BILIRUB SERPL-MCNC: 0.4 MG/DL (ref 0–1.2)
BUN SERPL-MCNC: 17 MG/DL (ref 6–23)
CALCIUM SERPL-MCNC: 9.2 MG/DL (ref 8.6–10.2)
CHLORIDE SERPL-SCNC: 105 MMOL/L (ref 98–107)
CO2 SERPL-SCNC: 22 MMOL/L (ref 22–29)
CREAT SERPL-MCNC: 1.3 MG/DL (ref 0.5–1)
EKG ATRIAL RATE: 144 BPM
EKG Q-T INTERVAL: 328 MS
EKG QRS DURATION: 94 MS
EKG QTC CALCULATION (BAZETT): 453 MS
EKG R AXIS: 67 DEGREES
EKG T AXIS: -69 DEGREES
EKG VENTRICULAR RATE: 115 BPM
EOSINOPHIL # BLD: 0.19 K/UL (ref 0.05–0.5)
EOSINOPHILS RELATIVE PERCENT: 2 % (ref 0–6)
ERYTHROCYTE [DISTWIDTH] IN BLOOD BY AUTOMATED COUNT: 18.8 % (ref 11.5–15)
GFR, ESTIMATED: 42 ML/MIN/1.73M2
GLUCOSE SERPL-MCNC: 108 MG/DL (ref 74–99)
HCT VFR BLD AUTO: 38 % (ref 34–48)
HGB BLD-MCNC: 11.2 G/DL (ref 11.5–15.5)
IMM GRANULOCYTES # BLD AUTO: 0.03 K/UL (ref 0–0.58)
IMM GRANULOCYTES NFR BLD: 0 % (ref 0–5)
LYMPHOCYTES NFR BLD: 1 K/UL (ref 1.5–4)
LYMPHOCYTES RELATIVE PERCENT: 12 % (ref 20–42)
MAGNESIUM SERPL-MCNC: 1.9 MG/DL (ref 1.6–2.6)
MCH RBC QN AUTO: 25.6 PG (ref 26–35)
MCHC RBC AUTO-ENTMCNC: 29.5 G/DL (ref 32–34.5)
MCV RBC AUTO: 87 FL (ref 80–99.9)
MONOCYTES NFR BLD: 0.7 K/UL (ref 0.1–0.95)
MONOCYTES NFR BLD: 8 % (ref 2–12)
NEUTROPHILS NFR BLD: 76 % (ref 43–80)
NEUTS SEG NFR BLD: 6.31 K/UL (ref 1.8–7.3)
PLATELET # BLD AUTO: 198 K/UL (ref 130–450)
PMV BLD AUTO: 11.7 FL (ref 7–12)
POTASSIUM SERPL-SCNC: 3.4 MMOL/L (ref 3.5–5)
PROT SERPL-MCNC: 6.9 G/DL (ref 6.4–8.3)
RBC # BLD AUTO: 4.37 M/UL (ref 3.5–5.5)
SODIUM SERPL-SCNC: 142 MMOL/L (ref 132–146)
WBC OTHER # BLD: 8.3 K/UL (ref 4.5–11.5)

## 2025-01-30 PROCEDURE — 2500000003 HC RX 250 WO HCPCS: Performed by: NURSE PRACTITIONER

## 2025-01-30 PROCEDURE — 6370000000 HC RX 637 (ALT 250 FOR IP): Performed by: NURSE PRACTITIONER

## 2025-01-30 PROCEDURE — 6360000002 HC RX W HCPCS: Performed by: INTERNAL MEDICINE

## 2025-01-30 PROCEDURE — 2580000003 HC RX 258

## 2025-01-30 PROCEDURE — 36415 COLL VENOUS BLD VENIPUNCTURE: CPT

## 2025-01-30 PROCEDURE — 2700000000 HC OXYGEN THERAPY PER DAY

## 2025-01-30 PROCEDURE — 99233 SBSQ HOSP IP/OBS HIGH 50: CPT | Performed by: INTERNAL MEDICINE

## 2025-01-30 PROCEDURE — 2500000003 HC RX 250 WO HCPCS

## 2025-01-30 PROCEDURE — 83735 ASSAY OF MAGNESIUM: CPT

## 2025-01-30 PROCEDURE — 93010 ELECTROCARDIOGRAM REPORT: CPT | Performed by: INTERNAL MEDICINE

## 2025-01-30 PROCEDURE — 85025 COMPLETE CBC W/AUTO DIFF WBC: CPT

## 2025-01-30 PROCEDURE — 6370000000 HC RX 637 (ALT 250 FOR IP): Performed by: INTERNAL MEDICINE

## 2025-01-30 PROCEDURE — 6360000002 HC RX W HCPCS

## 2025-01-30 PROCEDURE — 80053 COMPREHEN METABOLIC PANEL: CPT

## 2025-01-30 PROCEDURE — 2060000000 HC ICU INTERMEDIATE R&B

## 2025-01-30 RX ORDER — FUROSEMIDE 10 MG/ML
40 INJECTION INTRAMUSCULAR; INTRAVENOUS 2 TIMES DAILY
Status: DISCONTINUED | OUTPATIENT
Start: 2025-01-30 | End: 2025-02-01

## 2025-01-30 RX ORDER — POTASSIUM CHLORIDE 1500 MG/1
40 TABLET, EXTENDED RELEASE ORAL ONCE
Status: COMPLETED | OUTPATIENT
Start: 2025-01-30 | End: 2025-01-30

## 2025-01-30 RX ORDER — METOPROLOL SUCCINATE 100 MG/1
100 TABLET, EXTENDED RELEASE ORAL 2 TIMES DAILY
Status: DISCONTINUED | OUTPATIENT
Start: 2025-01-30 | End: 2025-02-01 | Stop reason: HOSPADM

## 2025-01-30 RX ADMIN — LEVOTHYROXINE SODIUM 150 MCG: 75 TABLET ORAL at 08:39

## 2025-01-30 RX ADMIN — APIXABAN 5 MG: 5 TABLET, FILM COATED ORAL at 08:39

## 2025-01-30 RX ADMIN — DOXYCYCLINE HYCLATE 100 MG: 100 CAPSULE ORAL at 08:39

## 2025-01-30 RX ADMIN — SODIUM CHLORIDE 10 MG/HR: 900 INJECTION, SOLUTION INTRAVENOUS at 13:23

## 2025-01-30 RX ADMIN — SODIUM CHLORIDE, PRESERVATIVE FREE 10 ML: 5 INJECTION INTRAVENOUS at 08:39

## 2025-01-30 RX ADMIN — SODIUM CHLORIDE 10 MG/HR: 900 INJECTION, SOLUTION INTRAVENOUS at 23:46

## 2025-01-30 RX ADMIN — FUROSEMIDE 40 MG: 10 INJECTION, SOLUTION INTRAMUSCULAR; INTRAVENOUS at 08:39

## 2025-01-30 RX ADMIN — PROBENECID 500 MG: 500 TABLET, FILM COATED ORAL at 20:02

## 2025-01-30 RX ADMIN — SODIUM CHLORIDE, PRESERVATIVE FREE 10 ML: 5 INJECTION INTRAVENOUS at 20:03

## 2025-01-30 RX ADMIN — METOPROLOL SUCCINATE 75 MG: 25 TABLET, FILM COATED, EXTENDED RELEASE ORAL at 08:39

## 2025-01-30 RX ADMIN — SODIUM CHLORIDE 10 MG/HR: 900 INJECTION, SOLUTION INTRAVENOUS at 02:35

## 2025-01-30 RX ADMIN — PROBENECID 500 MG: 500 TABLET, FILM COATED ORAL at 08:42

## 2025-01-30 RX ADMIN — METOPROLOL SUCCINATE 100 MG: 100 TABLET, FILM COATED, EXTENDED RELEASE ORAL at 20:02

## 2025-01-30 RX ADMIN — ATORVASTATIN CALCIUM 40 MG: 40 TABLET, FILM COATED ORAL at 20:02

## 2025-01-30 RX ADMIN — FUROSEMIDE 40 MG: 10 INJECTION, SOLUTION INTRAMUSCULAR; INTRAVENOUS at 16:47

## 2025-01-30 RX ADMIN — APIXABAN 5 MG: 5 TABLET, FILM COATED ORAL at 20:02

## 2025-01-30 RX ADMIN — POTASSIUM CHLORIDE 40 MEQ: 1500 TABLET, EXTENDED RELEASE ORAL at 16:47

## 2025-01-30 ASSESSMENT — PAIN SCALES - GENERAL: PAINLEVEL_OUTOF10: 3

## 2025-01-30 NOTE — PROGRESS NOTES
Internal Medicine Progress Note    Patient's name: Laurie Hall  : 1945  Chief complaints (on day of admission): Tachycardia (High HR at dr office. Sent by  for high HR and to get fluids. States she's been recently sick with URI) and Atrial Fibrillation  Admission date: 2025  Date of service: 2025   Room: [address] INTERMEDIATE  Primary care physician: Umair Quiles DO  Reason for visit: Follow-up for afib and CHF    Subjective  Laurie is seen sitting up in bed awake and alert, in no distress. She reports that she is feeling much better, urinated \"a lot\" overnight. She denies any pain or discomfort. Reports that her breathing is improved, weaned to 1L NC. She is asking about going home soon. No other issues or concerns from nursing.     Review of Systems  Full 10 point review of systems negative unless mentioned above.    Hospital Medications  Current Facility-Administered Medications   Medication Dose Route Frequency Provider Last Rate Last Admin    apixaban (ELIQUIS) tablet 5 mg  5 mg Oral BID Roxana Boyle MD   5 mg at 25 0839    furosemide (LASIX) injection 40 mg  40 mg IntraVENous BID Roxana Boyle MD   40 mg at 25 0839    metoprolol succinate (TOPROL XL) extended release tablet 75 mg  75 mg Oral BID Jesús Ascencio MD   75 mg at 25 0839    cefTRIAXone (ROCEPHIN) 1,000 mg in sterile water 10 mL IV syringe  1,000 mg IntraVENous Q24H Sher Alston DO   1,000 mg at 25 1603    dilTIAZem 100 mg in sodium chloride 0.9 % 100 mL infusion (ADD[facility])  2.5-15 mg/hr IntraVENous Continuous Bebeto Diaz MD 10 mL/hr at 25 0235 10 mg/hr at 25 0235    doxycycline hyclate (VIBRAMYCIN) capsule 100 mg  100 mg Oral 2 times per day Florence Vick APRN - CNP   100 mg at 25 0839    ALPRAZolam (XANAX) tablet 0.5 mg  0.5 mg Oral Daily PRN Florence Vick APRN - CNP   0.5 mg at 25 9551    atorvastatin (LIPITOR) tablet 40 mg  40 mg

## 2025-01-30 NOTE — PROGRESS NOTES
[facility] Quality Flow/Interdisciplinary Rounds Progress Note        Quality Flow Rounds held on January 30, 2025    Disciplines Attending:  Bedside Nurse, , , and Nursing Unit Leadership    Laurie Hall was admitted on 1/27/2025  4:37 PM    Anticipated Discharge Date:       Disposition:    Kaveh Score:  Kaveh Scale Score: 18    [facility] RISK OF UNPLANNED READMISSION 2.0             12.7 Total Score        Discussed patient goal for the day, patient clinical progression, and barriers to discharge.  The following Goal(s) of the Day/Commitment(s) have been identified:   discharge planning, cardio, pulm, IV cardizem gtt, IV diuretics, IV ABX      Carlos Wild RN  January 30, 2025

## 2025-01-30 NOTE — PROGRESS NOTES
Bran Kiran M.D.,[facility]  Carmelo Lundberg D.O., RUY., [facility]  Markus Vaughan M.D.  Roxana Boyle M.D.   Ethan Wallace D.O.  Cameron Brower M.D.         Daily Pulmonary Progress Note    Patient:  Laurie Hall 79 y.o. female MRN: [id number]            Synopsis     We are following patient for pleural effusion    \"CC\" tachycardia    Code status: Full code      Subjective      Patient was seen and examined sitting up in bed on room air.  Urine output yesterday 1.7 L.  She reports that she just came back from the restroom and her oxygen saturation was 91%.  Cardizem drip is still running.  Echocardiogram shows an EF greater than 55%.  She has been resumed on her Eliquis.      Review of Systems:  Constitutional: Denies fever, weight loss, night sweats, and fatigue  Skin: Denies pigmentation, dark lesions, and rashes   HEENT: Denies hearing loss, tinnitus, ear drainage, epistaxis, sore throat, and hoarseness.  Cardiovascular: Denies palpitations, chest pain, and chest pressure.  Respiratory: Dyspnea on exertion   Gastrointestinal: Denies nausea, vomiting, poor appetite, diarrhea, heartburn or reflux  Genitourinary: Denies dysuria, frequency, urgency or hematuria  Musculoskeletal: Denies myalgias, muscle weakness, and bone pain  Neurological: Denies dizziness, vertigo, headache, and focal weakness  Psychological: Denies anxiety and depression  Endocrine: Denies heat intolerance and cold intolerance  Hematopoietic/Lymphatic: Denies bleeding problems and blood transfusions    24-hour events:  No new events    Objective   OBJECTIVE:   /88   Pulse 88   Temp 97.5 °F (36.4 °C) (Oral)   Resp 18   Ht 1.651 m (5' 5\")   Wt 81.6 kg (180 lb)   SpO2 93%   BMI 29.95 kg/m²   SpO2 Readings from Last 1 Encounters:   01/30/25 93%        I/O:    Intake/Output Summary (Last 24 hours) at 1/30/2025 1321  Last data filed at 1/30/2025 1106  Gross per 24 hour   Intake --   Output 1500 ml   Net -1500 ml

## 2025-01-30 NOTE — PLAN OF CARE
Problem: Safety - Adult  Goal: Free from fall injury  1/30/2025 1041 by Alyssa Carrero RN  Outcome: Progressing  Flowsheets (Taken 1/30/2025 0830)  Free From Fall Injury: Instruct family/caregiver on patient safety  1/30/2025 0007 by Kenny Asencio RN  Outcome: Progressing  1/29/2025 2151 by Felicity Perla RN  Outcome: Progressing     Problem: Skin/Tissue Integrity  Goal: Skin integrity remains intact  Description: 1.  Monitor for areas of redness and/or skin breakdown  2.  Assess vascular access sites hourly  3.  Every 4-6 hours minimum:  Change oxygen saturation probe site  4.  Every 4-6 hours:  If on nasal continuous positive airway pressure, respiratory therapy assess nares and determine need for appliance change or resting period  1/30/2025 1041 by Alyssa Carrero RN  Outcome: Progressing  Flowsheets (Taken 1/30/2025 0830)  Skin Integrity Remains Intact: Monitor for areas of redness and/or skin breakdown  1/30/2025 0007 by Kenny Asencio RN  Outcome: Progressing     Problem: ABCDS Injury Assessment  Goal: Absence of physical injury  1/30/2025 1041 by Alyssa Carrero RN  Outcome: Progressing  Flowsheets (Taken 1/30/2025 0830)  Absence of Physical Injury: Implement safety measures based on patient assessment  1/30/2025 0007 by Kenny Asencio RN  Outcome: Progressing     Problem: Neurosensory - Adult  Goal: Achieves stable or improved neurological status  1/30/2025 1041 by Alyssa Carrero RN  Outcome: Progressing  Flowsheets (Taken 1/30/2025 0830)  Achieves stable or improved neurological status: Assess for and report changes in neurological status  1/30/2025 0007 by Kenny Asencio RN  Outcome: Progressing  Goal: Absence of seizures  1/30/2025 1041 by Alyssa Carrero RN  Outcome: Progressing  Flowsheets (Taken 1/30/2025 0830)  Absence of seizures: Monitor for seizure activity.  If seizure occurs, document type and location of movements and any associated apnea  1/30/2025 0007 by Kenny Asencio

## 2025-01-30 NOTE — PROGRESS NOTES
INPATIENT CARDIOLOGY FOLLOW-UP    Name: Laurie Hall    Age: 79 y.o.    Primary Care Physician: Umair Quiles DO    Date of Service: 1/30/2025    Chief Complaint: Atrial fibrillation, acute HFpEF, pleural effusions    Interim History:  Currently with no chest pain, respiratory distress, or palpitations. AF with episodes of RVR on EKG and telemetry. No new cardiac complaints overnight. SOB improved since admission per patient.    Review of Systems:   Cardiac: As per HPI  General: No fever, chills  Pulmonary: As per HPI  HEENT: No visual disturbances, difficult swallowing  GI: No nausea, vomiting  : No dysuria, hematuria  Endocrine: +hypothyroidism, no DM  Musculoskeletal: MEZA x 4, no focal motor deficits  Skin: Intact, no rashes  Neuro: No headache, seizures  Psych: Currently with no depression, anxiety    Past Medical History:  Past Medical History:   Diagnosis Date    Arthritis     Constipation     DVT (deep venous thrombosis) (HCC)     Gout     CONTROLLED    History of COVID-19     Hyperlipidemia     Hypertension     STABLE    Macular hole, right eye     Thyroid disease        Past Surgical History:  Past Surgical History:   Procedure Laterality Date    ABDOMEN SURGERY      BACK SURGERY  09/2020    [facility]    BREAST SURGERY Right 1982    BENIGN CYST    CHOLECYSTECTOMY  1997    COLONOSCOPY      EYE SURGERY      HYSTERECTOMY (CERVIX STATUS UNKNOWN)  1985    JOINT REPLACEMENT      bilat knees     LAMINECTOMY N/A 6/3/2019    LUMBAR LAMINECTOMY POSTERIOR L3-L5, BONE BIOPSY L4 - GIOVANNY, X-RAY,  WANTS TF performed by Kavya Pulliam MD at [facility]    SKIN BIOPSY      TONSILLECTOMY         Family History:  Family History   Problem Relation Age of Onset    Other Mother        Social History:  Social History     Socioeconomic History    Marital status:      Spouse name: Not on file    Number of children: Not on file    Years of education: Not on file    Highest education level: Not on file

## 2025-01-30 NOTE — PROGRESS NOTES
Physician Progress Note      PATIENT:               GAURAV JACKSON  CSN #:                  [id number]  :                       1945  ADMIT DATE:       2025 4:37 PM  DISCH DATE:  RESPONDING  PROVIDER #:        SALOME Thomas CNP          QUERY TEXT:    Patient admitted with Atrial fibrillation w/ RVR and is maintained on Eliquis.   If possible, please document in progress notes and discharge summary if you   are evaluating and/or treating any of the following:?  ?  The medical record reflects the following:  Risk Factors: Advanced Age >80, Female, CHF, HTN  Clinical Indicators: H&P  & Progress Note  \"...Atrial fibrillation w/   RVR...\", Cardiology Consult Note  \"...Atrial fibrillation with RVR /   reported persistent AF - elevated [id number] score...\"  Treatment: Eliquis, Cardiology Consult    Thank you,  IVAN Snow, RN, CRCR  Clinical Documentation Integrity  [phone]  Options provided:  -- Secondary hypercoagulable state in a patient with atrial fibrillation  -- Other - I will add my own diagnosis  -- Disagree - Not applicable / Not valid  -- Disagree - Clinically unable to determine / Unknown  -- Refer to Clinical Documentation Reviewer    PROVIDER RESPONSE TEXT:    This patient has secondary hypercoagulable state in a patient with atrial   fibrillation.    Query created by: Kyler Mohamud on 2025 1:03 PM      Electronically signed by:  SALOME Thomas CNP 2025 1:22 PM

## 2025-01-31 ENCOUNTER — ANESTHESIA EVENT (OUTPATIENT)
Dept: INPATIENT UNIT | Age: 80
End: 2025-01-31
Payer: MEDICARE

## 2025-01-31 ENCOUNTER — ANESTHESIA (OUTPATIENT)
Dept: INPATIENT UNIT | Age: 80
End: 2025-01-31
Payer: MEDICARE

## 2025-01-31 ENCOUNTER — APPOINTMENT (OUTPATIENT)
Dept: GENERAL RADIOLOGY | Age: 80
End: 2025-01-31
Payer: MEDICARE

## 2025-01-31 ENCOUNTER — APPOINTMENT (OUTPATIENT)
Dept: INPATIENT UNIT | Age: 80
End: 2025-01-31
Payer: MEDICARE

## 2025-01-31 PROBLEM — I50.31 ACUTE HEART FAILURE WITH PRESERVED EJECTION FRACTION (HFPEF) (HCC): Status: ACTIVE | Noted: 2025-01-31

## 2025-01-31 LAB
ALBUMIN SERPL-MCNC: 3.7 G/DL (ref 3.5–5.2)
ALP SERPL-CCNC: 104 U/L (ref 35–104)
ALT SERPL-CCNC: 12 U/L (ref 0–32)
ANION GAP SERPL CALCULATED.3IONS-SCNC: 15 MMOL/L (ref 7–16)
AST SERPL-CCNC: 17 U/L (ref 0–31)
BASOPHILS # BLD: 0.08 K/UL (ref 0–0.2)
BASOPHILS NFR BLD: 1 % (ref 0–2)
BILIRUB SERPL-MCNC: 0.4 MG/DL (ref 0–1.2)
BUN SERPL-MCNC: 17 MG/DL (ref 6–23)
CALCIUM SERPL-MCNC: 9.1 MG/DL (ref 8.6–10.2)
CHLORIDE SERPL-SCNC: 104 MMOL/L (ref 98–107)
CO2 SERPL-SCNC: 23 MMOL/L (ref 22–29)
CREAT SERPL-MCNC: 1.1 MG/DL (ref 0.5–1)
EOSINOPHIL # BLD: 0.26 K/UL (ref 0.05–0.5)
EOSINOPHILS RELATIVE PERCENT: 3 % (ref 0–6)
ERYTHROCYTE [DISTWIDTH] IN BLOOD BY AUTOMATED COUNT: 19.1 % (ref 11.5–15)
GFR, ESTIMATED: 49 ML/MIN/1.73M2
GLUCOSE SERPL-MCNC: 108 MG/DL (ref 74–99)
HCT VFR BLD AUTO: 40 % (ref 34–48)
HGB BLD-MCNC: 11.7 G/DL (ref 11.5–15.5)
IMM GRANULOCYTES # BLD AUTO: <0.03 K/UL (ref 0–0.58)
IMM GRANULOCYTES NFR BLD: 0 % (ref 0–5)
LYMPHOCYTES NFR BLD: 1.99 K/UL (ref 1.5–4)
LYMPHOCYTES RELATIVE PERCENT: 24 % (ref 20–42)
MCH RBC QN AUTO: 25 PG (ref 26–35)
MCHC RBC AUTO-ENTMCNC: 29.3 G/DL (ref 32–34.5)
MCV RBC AUTO: 85.5 FL (ref 80–99.9)
MONOCYTES NFR BLD: 0.79 K/UL (ref 0.1–0.95)
MONOCYTES NFR BLD: 10 % (ref 2–12)
NEUTROPHILS NFR BLD: 62 % (ref 43–80)
NEUTS SEG NFR BLD: 5.06 K/UL (ref 1.8–7.3)
PLATELET # BLD AUTO: 222 K/UL (ref 130–450)
PMV BLD AUTO: 10.8 FL (ref 7–12)
POTASSIUM SERPL-SCNC: 3.7 MMOL/L (ref 3.5–5)
PROT SERPL-MCNC: 6.9 G/DL (ref 6.4–8.3)
RBC # BLD AUTO: 4.68 M/UL (ref 3.5–5.5)
SODIUM SERPL-SCNC: 142 MMOL/L (ref 132–146)
WBC OTHER # BLD: 8.2 K/UL (ref 4.5–11.5)

## 2025-01-31 PROCEDURE — 71045 X-RAY EXAM CHEST 1 VIEW: CPT

## 2025-01-31 PROCEDURE — 6360000002 HC RX W HCPCS

## 2025-01-31 PROCEDURE — 2700000000 HC OXYGEN THERAPY PER DAY

## 2025-01-31 PROCEDURE — 92960 CARDIOVERSION ELECTRIC EXT: CPT | Performed by: INTERNAL MEDICINE

## 2025-01-31 PROCEDURE — 3700000000 HC ANESTHESIA ATTENDED CARE: Performed by: ANESTHESIOLOGY

## 2025-01-31 PROCEDURE — 85025 COMPLETE CBC W/AUTO DIFF WBC: CPT

## 2025-01-31 PROCEDURE — 92960 CARDIOVERSION ELECTRIC EXT: CPT | Performed by: ANESTHESIOLOGY

## 2025-01-31 PROCEDURE — 6370000000 HC RX 637 (ALT 250 FOR IP): Performed by: NURSE PRACTITIONER

## 2025-01-31 PROCEDURE — 2500000003 HC RX 250 WO HCPCS: Performed by: NURSE PRACTITIONER

## 2025-01-31 PROCEDURE — 2500000003 HC RX 250 WO HCPCS

## 2025-01-31 PROCEDURE — 5A2204Z RESTORATION OF CARDIAC RHYTHM, SINGLE: ICD-10-PCS | Performed by: INTERNAL MEDICINE

## 2025-01-31 PROCEDURE — 6370000000 HC RX 637 (ALT 250 FOR IP): Performed by: INTERNAL MEDICINE

## 2025-01-31 PROCEDURE — 2580000003 HC RX 258

## 2025-01-31 PROCEDURE — 7100000010 HC PHASE II RECOVERY - FIRST 15 MIN: Performed by: ANESTHESIOLOGY

## 2025-01-31 PROCEDURE — 2060000000 HC ICU INTERMEDIATE R&B

## 2025-01-31 PROCEDURE — 80053 COMPREHEN METABOLIC PANEL: CPT

## 2025-01-31 PROCEDURE — 99233 SBSQ HOSP IP/OBS HIGH 50: CPT | Performed by: INTERNAL MEDICINE

## 2025-01-31 PROCEDURE — 7100000011 HC PHASE II RECOVERY - ADDTL 15 MIN: Performed by: ANESTHESIOLOGY

## 2025-01-31 RX ORDER — MEPERIDINE HYDROCHLORIDE 25 MG/ML
12.5 INJECTION INTRAMUSCULAR; INTRAVENOUS; SUBCUTANEOUS ONCE
Status: DISCONTINUED | OUTPATIENT
Start: 2025-01-31 | End: 2025-02-01 | Stop reason: HOSPADM

## 2025-01-31 RX ORDER — HYDRALAZINE HYDROCHLORIDE 20 MG/ML
5 INJECTION INTRAMUSCULAR; INTRAVENOUS
Status: DISCONTINUED | OUTPATIENT
Start: 2025-01-31 | End: 2025-02-01 | Stop reason: HOSPADM

## 2025-01-31 RX ORDER — PROPOFOL 10 MG/ML
INJECTION, EMULSION INTRAVENOUS
Status: DISCONTINUED | OUTPATIENT
Start: 2025-01-31 | End: 2025-01-31 | Stop reason: SDUPTHER

## 2025-01-31 RX ORDER — PROCHLORPERAZINE EDISYLATE 5 MG/ML
5 INJECTION INTRAMUSCULAR; INTRAVENOUS
Status: ACTIVE | OUTPATIENT
Start: 2025-01-31 | End: 2025-02-01

## 2025-01-31 RX ORDER — PHENYLEPHRINE HCL IN 0.9% NACL 1 MG/10 ML
SYRINGE (ML) INTRAVENOUS
Status: DISCONTINUED | OUTPATIENT
Start: 2025-01-31 | End: 2025-01-31

## 2025-01-31 RX ORDER — SODIUM CHLORIDE 9 MG/ML
INJECTION, SOLUTION INTRAVENOUS
Status: DISCONTINUED | OUTPATIENT
Start: 2025-01-31 | End: 2025-01-31 | Stop reason: SDUPTHER

## 2025-01-31 RX ORDER — LABETALOL HYDROCHLORIDE 5 MG/ML
5 INJECTION, SOLUTION INTRAVENOUS
Status: DISCONTINUED | OUTPATIENT
Start: 2025-01-31 | End: 2025-02-01 | Stop reason: HOSPADM

## 2025-01-31 RX ORDER — SODIUM CHLORIDE 0.9 % (FLUSH) 0.9 %
5-40 SYRINGE (ML) INJECTION PRN
Status: DISCONTINUED | OUTPATIENT
Start: 2025-01-31 | End: 2025-02-01 | Stop reason: HOSPADM

## 2025-01-31 RX ORDER — DIPHENHYDRAMINE HYDROCHLORIDE 50 MG/ML
12.5 INJECTION INTRAMUSCULAR; INTRAVENOUS
Status: ACTIVE | OUTPATIENT
Start: 2025-01-31 | End: 2025-02-01

## 2025-01-31 RX ORDER — SODIUM CHLORIDE 0.9 % (FLUSH) 0.9 %
5-40 SYRINGE (ML) INJECTION EVERY 12 HOURS SCHEDULED
Status: DISCONTINUED | OUTPATIENT
Start: 2025-01-31 | End: 2025-02-01 | Stop reason: HOSPADM

## 2025-01-31 RX ORDER — POTASSIUM CHLORIDE 1500 MG/1
40 TABLET, EXTENDED RELEASE ORAL ONCE
Status: COMPLETED | OUTPATIENT
Start: 2025-01-31 | End: 2025-01-31

## 2025-01-31 RX ORDER — NALOXONE HYDROCHLORIDE 0.4 MG/ML
INJECTION, SOLUTION INTRAMUSCULAR; INTRAVENOUS; SUBCUTANEOUS PRN
Status: DISCONTINUED | OUTPATIENT
Start: 2025-01-31 | End: 2025-02-01 | Stop reason: HOSPADM

## 2025-01-31 RX ORDER — SODIUM CHLORIDE 9 MG/ML
INJECTION, SOLUTION INTRAVENOUS PRN
Status: DISCONTINUED | OUTPATIENT
Start: 2025-01-31 | End: 2025-02-01 | Stop reason: HOSPADM

## 2025-01-31 RX ORDER — FENTANYL CITRATE 50 UG/ML
25 INJECTION, SOLUTION INTRAMUSCULAR; INTRAVENOUS EVERY 5 MIN PRN
Status: DISCONTINUED | OUTPATIENT
Start: 2025-01-31 | End: 2025-02-01 | Stop reason: HOSPADM

## 2025-01-31 RX ADMIN — FUROSEMIDE 40 MG: 10 INJECTION, SOLUTION INTRAMUSCULAR; INTRAVENOUS at 15:48

## 2025-01-31 RX ADMIN — APIXABAN 5 MG: 5 TABLET, FILM COATED ORAL at 19:28

## 2025-01-31 RX ADMIN — PROPOFOL 20 MG: 10 INJECTION, EMULSION INTRAVENOUS at 11:50

## 2025-01-31 RX ADMIN — PROBENECID 500 MG: 500 TABLET, FILM COATED ORAL at 19:28

## 2025-01-31 RX ADMIN — METOPROLOL SUCCINATE 100 MG: 100 TABLET, FILM COATED, EXTENDED RELEASE ORAL at 19:28

## 2025-01-31 RX ADMIN — POTASSIUM CHLORIDE 40 MEQ: 1500 TABLET, EXTENDED RELEASE ORAL at 10:06

## 2025-01-31 RX ADMIN — PROBENECID 500 MG: 500 TABLET, FILM COATED ORAL at 08:39

## 2025-01-31 RX ADMIN — SODIUM CHLORIDE, PRESERVATIVE FREE 10 ML: 5 INJECTION INTRAVENOUS at 19:28

## 2025-01-31 RX ADMIN — SODIUM CHLORIDE: 9 INJECTION, SOLUTION INTRAVENOUS at 11:47

## 2025-01-31 RX ADMIN — LEVOTHYROXINE SODIUM 150 MCG: 75 TABLET ORAL at 08:39

## 2025-01-31 RX ADMIN — APIXABAN 5 MG: 5 TABLET, FILM COATED ORAL at 08:39

## 2025-01-31 RX ADMIN — PROPOFOL 50 MG: 10 INJECTION, EMULSION INTRAVENOUS at 11:47

## 2025-01-31 RX ADMIN — METOPROLOL SUCCINATE 100 MG: 100 TABLET, FILM COATED, EXTENDED RELEASE ORAL at 08:39

## 2025-01-31 RX ADMIN — SODIUM CHLORIDE, PRESERVATIVE FREE 10 ML: 5 INJECTION INTRAVENOUS at 08:41

## 2025-01-31 RX ADMIN — SODIUM CHLORIDE 10 MG/HR: 900 INJECTION, SOLUTION INTRAVENOUS at 09:53

## 2025-01-31 RX ADMIN — FUROSEMIDE 40 MG: 10 INJECTION, SOLUTION INTRAMUSCULAR; INTRAVENOUS at 05:05

## 2025-01-31 RX ADMIN — ATORVASTATIN CALCIUM 40 MG: 40 TABLET, FILM COATED ORAL at 19:28

## 2025-01-31 ASSESSMENT — ENCOUNTER SYMPTOMS
DYSPNEA ACTIVITY LEVEL: NO INTERVAL CHANGE
SHORTNESS OF BREATH: 1

## 2025-01-31 ASSESSMENT — PAIN - FUNCTIONAL ASSESSMENT: PAIN_FUNCTIONAL_ASSESSMENT: NONE - DENIES PAIN

## 2025-01-31 ASSESSMENT — LIFESTYLE VARIABLES: SMOKING_STATUS: 0

## 2025-01-31 NOTE — PROGRESS NOTES
INPATIENT CARDIOLOGY FOLLOW-UP    Name: Laurie Hall    Age: 79 y.o.    Primary Care Physician: Umair Quiles DO    Date of Service: 1/31/2025    Chief Complaint: Atrial fibrillation, acute HFpEF, pleural effusions    Interim History:  Currently with no chest pain, respiratory distress, or palpitations. AF with episodes of RVR on EKG and telemetry. No new cardiac complaints overnight. SOB improved since admission per patient.    Review of Systems:   Cardiac: As per HPI  General: No fever, chills  Pulmonary: As per HPI  HEENT: No visual disturbances, difficult swallowing  GI: No nausea, vomiting  : No dysuria, hematuria  Endocrine: +hypothyroidism, no DM  Musculoskeletal: MEZA x 4, no focal motor deficits  Skin: Intact, no rashes  Neuro: No headache, seizures  Psych: Currently with no depression, anxiety    Past Medical History:  Past Medical History:   Diagnosis Date    Arthritis     Constipation     DVT (deep venous thrombosis) (HCC)     Gout     CONTROLLED    History of COVID-19     Hyperlipidemia     Hypertension     STABLE    Macular hole, right eye     Thyroid disease        Past Surgical History:  Past Surgical History:   Procedure Laterality Date    ABDOMEN SURGERY      BACK SURGERY  09/2020    [facility]    BREAST SURGERY Right 1982    BENIGN CYST    CHOLECYSTECTOMY  1997    COLONOSCOPY      EYE SURGERY      HYSTERECTOMY (CERVIX STATUS UNKNOWN)  1985    JOINT REPLACEMENT      bilat knees     LAMINECTOMY N/A 6/3/2019    LUMBAR LAMINECTOMY POSTERIOR L3-L5, BONE BIOPSY L4 - GIOVANNY, X-RAY,  WANTS TF performed by Kavya Pulliam MD at [facility]    SKIN BIOPSY      TONSILLECTOMY         Family History:  Family History   Problem Relation Age of Onset    Other Mother        Social History:  Social History     Socioeconomic History    Marital status:      Spouse name: Not on file    Number of children: Not on file    Years of education: Not on file    Highest education level: Not on file

## 2025-01-31 NOTE — CARE COORDINATION
Social Work / Discharge Planning :Patient to have Cardioversion today. Pulmonary signed off and patient now on baseline of room air. SW notified Jesús from [facility] and he will be delivering the ww today. SW followed up with patient and updated on ww being delivered . Plan home. No additional needs per patient. SW to follow. Electronically signed by LENCHO Layton on 1/31/25 at 1:17 PM EST

## 2025-01-31 NOTE — ANESTHESIA PRE PROCEDURE
Procedure Laterality Date    ABDOMEN SURGERY      BACK SURGERY  09/2020    [facility]    BREAST SURGERY Right 1982    BENIGN CYST    CHOLECYSTECTOMY  1997    COLONOSCOPY      EYE SURGERY      HYSTERECTOMY (CERVIX STATUS UNKNOWN)  1985    JOINT REPLACEMENT      bilat knees     LAMINECTOMY N/A 6/3/2019    LUMBAR LAMINECTOMY POSTERIOR L3-L5, BONE BIOPSY L4 - GIOVANNY, X-RAY,  WANTS TF performed by Kavya Pulliam MD at [facility] OR    SKIN BIOPSY      TONSILLECTOMY         Social History:    Social History     Tobacco Use    Smoking status: Never    Smokeless tobacco: Never   Substance Use Topics    Alcohol use: Yes     Comment: Occasional                                Counseling given: Not Answered      Vital Signs (Current):   Vitals:    01/30/25 2315 01/31/25 0311 01/31/25 0500 01/31/25 0830   BP: 123/83 (!) 118/91  123/66   Pulse: (!) 154 82  95   Resp:       Temp: 97.5 °F (36.4 °C) 97.3 °F (36.3 °C)  98.1 °F (36.7 °C)   TempSrc: Axillary Oral  Oral   SpO2: 92% 91%  93%   Weight:   86 kg (189 lb 8 oz)    Height:                                                  BP Readings from Last 3 Encounters:   01/31/25 123/66   02/20/23 122/78   11/17/22 120/86       NPO Status:                                                                                 BMI:   Wt Readings from Last 3 Encounters:   01/31/25 86 kg (189 lb 8 oz)   03/01/23 72.6 kg (160 lb)   05/21/21 76.2 kg (168 lb)     Body mass index is 31.53 kg/m².    CBC:   Lab Results   Component Value Date/Time    WBC 8.2 01/31/2025 03:19 AM    RBC 4.68 01/31/2025 03:19 AM    HGB 11.7 01/31/2025 03:19 AM    HCT 40.0 01/31/2025 03:19 AM    MCV 85.5 01/31/2025 03:19 AM    RDW 19.1 01/31/2025 03:19 AM     01/31/2025 03:19 AM       CMP:   Lab Results   Component Value Date/Time     01/31/2025 03:19 AM    K 3.7 01/31/2025 03:19 AM    K 4.3 05/21/2021 03:26 PM     01/31/2025 03:19 AM    CO2 23 01/31/2025 03:19 AM    BUN 17 01/31/2025 03:19 AM    CREATININE 1.1

## 2025-01-31 NOTE — PROGRESS NOTES
Occupational Therapy  Pt declined therapy this AM.  Will attempt another time.   Sharon JAVIER/KENNY [id number]

## 2025-01-31 NOTE — PROGRESS NOTES
Internal Medicine Progress Note    Patient's name: Laurie Hall  : 1945  Chief complaints (on day of admission): Tachycardia (High HR at dr office. Sent by  for high HR and to get fluids. States she's been recently sick with URI) and Atrial Fibrillation  Admission date: 2025  Date of service: 2025   Room: [address] INTERMEDIATE  Primary care physician: Umair Quiles DO  Reason for visit: Follow-up for afib and CHF    Subjective  Laurie is seen lying in bed awake and alert, in no distress. She reports that she does seem to be feeling better today, was able to be weaned off the oxygen yesterday. She reports that she started to get some redness in her legs yesterday after she showered and had lotion put on -- does feel like it's better this morning. Still having atrial fibrillation with RVR -- on Cardizem drip at 10mg/hr. She reports that Cardiology was in this morning and is planning on doing a cardioversion. She is asking if she can go home after or if she will need to stay another night. No other issues or concerns from nursing.    Review of Systems  Full 10 point review of systems negative unless mentioned above.    Hospital Medications  Current Facility-Administered Medications   Medication Dose Route Frequency Provider Last Rate Last Admin    furosemide (LASIX) injection 40 mg  40 mg IntraVENous BID Purvi Bae APRN - CNP   40 mg at 25 0505    metoprolol succinate (TOPROL XL) extended release tablet 100 mg  100 mg Oral BID Jesús Ascencio MD   100 mg at 25    sodium chloride (OCEAN, BABY AYR) 0.65 % nasal spray 1 spray  1 spray Each Nostril PRN Sher Alston DO        apixaban (ELIQUIS) tablet 5 mg  5 mg Oral BID Roxana Boyle MD   5 mg at 25    dilTIAZem 100 mg in sodium chloride 0.9 % 100 mL infusion (ADD-[place])  2.5-15 mg/hr IntraVENous Continuous Bebeto Diaz MD 10 mL/hr at 25 2346 10 mg/hr at 25 2346    ALPRAZolam       XR CHEST PORTABLE   Final Result   1. Increased interstitial markings throughout the lungs, now moderate,   previously mild. This suggests progressive pulmonary fibrosis, although an   atypical pneumonia or ARDS superimposed upon mild fibrosis could have this   appearance.   2. New small right pleural effusion.         XR CHEST PORTABLE    (Results Pending)       Echocardiogram 1/28/25    Left Ventricle: Normal left ventricular systolic function. EF by visual approximation is > 55%. Left ventricle size is normal. IV septum and PW 1.0-1.1 cm. Indeterminate diastolic function due to atrial fibrillation.    Right Ventricle: Right ventricle size is normal. Normal systolic function. TAPSE is 2.0 cm.    Left Atrium: Left atrial volume index is moderately increased (42-48 mL/m2).    Right Atrium: Right atrium is dilated.    Mitral Valve: Mitral annular calcification. Mild regurgitation. No stenosis noted. MV mean gradient is 2 mmHg.    Tricuspid Valve: Mild regurgitation. The estimated RVSP is 36 mmHg.    Extracardiac: Left pleural effusion.    Assessment   Active Hospital Problems    Diagnosis     Chronic deep vein thrombosis (DVT) of popliteal vein ([facility]) [I82.539]      Priority: Medium    Atrial fibrillation with rapid ventricular response (HCC) [I48.91]     Unable to walk [R26.2]     Severe protein-calorie malnutrition (HCC) [E43]     PVD (peripheral vascular disease) (HCC) [I73.9]     HLD (hyperlipidemia) [E78.5]     Obesity [E66.9]     HTN (hypertension) [I10]          Plan  Acute on Chronic Diastolic Congestive Heart Failure   Likely related to atrial fibrillation with RVR  Echocardiogram noted  Continue IV Lasix twice daily as per cardiology  Monitor edema and I&O  Cardiology consult appreciated    Bilateral Pleural Effusions with Acute Hypoxia  CXR and CT chest noted -- effusions are small  Continue IV diuresis for now  No plans for thoracentesis at this time  Continue supplemental oxygen and wean to maintain

## 2025-01-31 NOTE — PROGRESS NOTES
[facility] Quality Flow/Interdisciplinary Rounds Progress Note        Quality Flow Rounds held on January 31, 2025    Disciplines Attending:  Bedside Nurse, , , and Nursing Unit Leadership    Laurie Hall was admitted on 1/27/2025  4:37 PM    Anticipated Discharge Date:       Disposition:    Kaveh Score:  Kaveh Scale Score: 20    [facility] RISK OF UNPLANNED READMISSION 2.0             9.3 Total Score        Discussed patient goal for the day, patient clinical progression, and barriers to discharge.  The following Goal(s) of the Day/Commitment(s) have been identified:   discharge planning, cardizem gtt, IV diuretics, cardio      Stevinsonpascual Wild RN  January 31, 2025

## 2025-01-31 NOTE — PROGRESS NOTES
Bran Kiran M.D.,[facility]  Carmelo Lundberg D.O., RUY., [facility]  Markus Vaughan M.D.  Roxana Boyle M.D.   Ethan Wallace D.O.  Cameron Brower M.D.         Daily Pulmonary Progress Note    Patient:  Laurie Hall 79 y.o. female MRN: [id number]            Synopsis     We are following patient for pleural effusion    \"CC\" tachycardia    Code status: Full code      Subjective      Patient was seen and examined ambulating in the room today.  She is on room air.  She is going to have a cardioversion today per cardiology. Cardizem drip is still infusing and orders to wean per cardiology after cardioversion. She is getting 40 mg Lasix BID. Urine output 1.7 L yesterday.     Review of Systems:  Constitutional: Denies fever, weight loss, night sweats, and fatigue  Skin: Denies pigmentation, dark lesions, and rashes   HEENT: Denies hearing loss, tinnitus, ear drainage, epistaxis, sore throat, and hoarseness.  Cardiovascular: Denies palpitations, chest pain, and chest pressure.  Respiratory: Dyspnea on exertion   Gastrointestinal: Denies nausea, vomiting, poor appetite, diarrhea, heartburn or reflux  Genitourinary: Denies dysuria, frequency, urgency or hematuria  Musculoskeletal: Denies myalgias, muscle weakness, and bone pain  Neurological: Denies dizziness, vertigo, headache, and focal weakness  Psychological: Denies anxiety and depression  Endocrine: Denies heat intolerance and cold intolerance  Hematopoietic/Lymphatic: Denies bleeding problems and blood transfusions    24-hour events:  No new events    Objective   OBJECTIVE:   /76   Pulse 100   Temp 98 °F (36.7 °C) (Temporal)   Resp 18   Ht 1.651 m (5' 5\")   Wt 86 kg (189 lb 8 oz)   SpO2 96%   BMI 31.53 kg/m²   SpO2 Readings from Last 1 Encounters:   01/31/25 96%        I/O:    Intake/Output Summary (Last 24 hours) at 1/31/2025 1133  Last data filed at 1/31/2025 0500  Gross per 24 hour   Intake 140 ml   Output 1350 ml   Net -1210 ml

## 2025-01-31 NOTE — ANESTHESIA POSTPROCEDURE EVALUATION
Department of Anesthesiology  Postprocedure Note    Patient: Laurie Hall  MRN: [id number]  YOB: 1945  Date of evaluation: 1/31/2025    Procedure Summary       Date: 01/31/25 Room / Location: [facility] Stage I    Anesthesia Start: 1147 Anesthesia Stop: 1153    Procedure: CARDIOVERSION Diagnosis:     Scheduled Providers:  Responsible Provider: Erick Adamson DO    Anesthesia Type: MAC ASA Status: 4            Anesthesia Type: No value filed.    Sallie Phase I:      Sallie Phase II:      Anesthesia Post Evaluation    Patient location during evaluation: PACU  Patient participation: complete - patient participated  Level of consciousness: sleepy but conscious  Pain score: 0  Airway patency: patent  Nausea & Vomiting: no nausea and no vomiting  Cardiovascular status: blood pressure returned to baseline and hemodynamically stable  Respiratory status: acceptable, spontaneous ventilation and nasal cannula  Hydration status: euvolemic  Pain management: adequate and satisfactory to patient        No notable events documented.    Agree with above.    JACOB Salazar - CRNA

## 2025-01-31 NOTE — PROCEDURES
CARDIOVERSION REPORT     CARDIOLOGIST: Jesús Ascencio MD    DATE OF SERVICE: 1/31/2025    PROCEDURE: DC Electrical Cardioversion    CURRENT MEDICATIONS PRIOR TO ENCOUNTER:  Current Facility-Administered Medications   Medication Dose Route Frequency Provider Last Rate Last Admin    furosemide (LASIX) injection 40 mg  40 mg IntraVENous BID Purvi Bae APRN - CNP   40 mg at 01/31/25 0505    metoprolol succinate (TOPROL XL) extended release tablet 100 mg  100 mg Oral BID Jesús Ascencio MD   100 mg at 01/31/25 0839    sodium chloride (OCEAN, BABY AYR) 0.65 % nasal spray 1 spray  1 spray Each Nostril PRN Sher Alston DO        apixaban (ELIQUIS) tablet 5 mg  5 mg Oral BID Roxana Boyle MD   5 mg at 01/31/25 0839    dilTIAZem 100 mg in sodium chloride 0.9 % 100 mL infusion (ADD-[place])  2.5-15 mg/hr IntraVENous Continuous Bebeto Diaz MD 10 mL/hr at 01/31/25 0953 10 mg/hr at 01/31/25 0953    ALPRAZolam (XANAX) tablet 0.5 mg  0.5 mg Oral Daily PRN Florence Vick APRN - CNP   0.5 mg at 01/27/25 2309    atorvastatin (LIPITOR) tablet 40 mg  40 mg Oral Nightly Florence Vick APRN - CNP   40 mg at 01/30/25 2002    levothyroxine (SYNTHROID) tablet 150 mcg  150 mcg Oral Daily Florence Vick APRN - CNP   150 mcg at 01/31/25 0839    oxyCODONE (ROXICODONE) immediate release tablet 5 mg  5 mg Oral Q6H PRN Florence Vick APRN - CNP        probenecid (BENEMID) tablet 500 mg  500 mg Oral BID Florence Vick APRN - CNP   500 mg at 01/31/25 0839    sodium chloride flush 0.9 % injection 10 mL  10 mL IntraVENous 2 times per day Florence Vick APRN - CNP   10 mL at 01/31/25 0841    sodium chloride flush 0.9 % injection 10 mL  10 mL IntraVENous PRN Florence Vick APRN - CNP        0.9 % sodium chloride infusion   IntraVENous PRN Lacivita, Florence, APRN - CNP        potassium chloride (KLOR-CON M) extended release tablet 40 mEq  40 mEq Oral PRN Florence Vick APRN - CNP        Or    potassium

## 2025-01-31 NOTE — PLAN OF CARE
Problem: Safety - Adult  Goal: Free from fall injury  1/31/2025 1334 by Femi Montoya RN  Outcome: Progressing  1/31/2025 0155 by Teja Triplett RN  Outcome: Progressing     Problem: Skin/Tissue Integrity  Goal: Skin integrity remains intact  Description: 1.  Monitor for areas of redness and/or skin breakdown  2.  Assess vascular access sites hourly  3.  Every 4-6 hours minimum:  Change oxygen saturation probe site  4.  Every 4-6 hours:  If on nasal continuous positive airway pressure, respiratory therapy assess nares and determine need for appliance change or resting period  1/31/2025 1334 by Femi Montoya RN  Outcome: Progressing  1/31/2025 0155 by Teja Triplett RN  Outcome: Progressing     Problem: ABCDS Injury Assessment  Goal: Absence of physical injury  1/31/2025 1334 by Femi Montoya RN  Outcome: Progressing  1/31/2025 0155 by Teja Triplett RN  Outcome: Progressing     Problem: Neurosensory - Adult  Goal: Achieves stable or improved neurological status  1/31/2025 1334 by Femi Montoya RN  Outcome: Progressing  1/31/2025 0155 by Teja Triplett RN  Outcome: Progressing  Goal: Absence of seizures  1/31/2025 1334 by Femi Montoya RN  Outcome: Progressing  1/31/2025 0155 by Teja Triplett RN  Outcome: Progressing  Goal: Remains free of injury related to seizures activity  1/31/2025 0155 by Teja Triplett RN  Outcome: Progressing  Goal: Achieves maximal functionality and self care  1/31/2025 0155 by Teja Triplett RN  Outcome: Progressing     Problem: Cardiovascular - Adult  Goal: Maintains optimal cardiac output and hemodynamic stability  1/31/2025 0155 by Teja Triplett RN  Outcome: Progressing  Goal: Absence of cardiac dysrhythmias or at baseline  1/31/2025 0155 by Teja Triplett RN  Outcome: Progressing

## 2025-02-01 VITALS
WEIGHT: 189.5 LBS | HEART RATE: 70 BPM | DIASTOLIC BLOOD PRESSURE: 55 MMHG | RESPIRATION RATE: 16 BRPM | SYSTOLIC BLOOD PRESSURE: 125 MMHG | HEIGHT: 65 IN | BODY MASS INDEX: 31.57 KG/M2 | OXYGEN SATURATION: 95 % | TEMPERATURE: 98.2 F

## 2025-02-01 PROCEDURE — 6370000000 HC RX 637 (ALT 250 FOR IP): Performed by: INTERNAL MEDICINE

## 2025-02-01 PROCEDURE — 2500000003 HC RX 250 WO HCPCS: Performed by: NURSE PRACTITIONER

## 2025-02-01 PROCEDURE — 99232 SBSQ HOSP IP/OBS MODERATE 35: CPT | Performed by: INTERNAL MEDICINE

## 2025-02-01 PROCEDURE — 6370000000 HC RX 637 (ALT 250 FOR IP): Performed by: NURSE PRACTITIONER

## 2025-02-01 RX ORDER — FUROSEMIDE 40 MG/1
40 TABLET ORAL DAILY
Status: DISCONTINUED | OUTPATIENT
Start: 2025-02-01 | End: 2025-02-01 | Stop reason: HOSPADM

## 2025-02-01 RX ORDER — METOPROLOL SUCCINATE 100 MG/1
100 TABLET, EXTENDED RELEASE ORAL 2 TIMES DAILY
Qty: 60 TABLET | Refills: 0 | Status: SHIPPED | OUTPATIENT
Start: 2025-02-01 | End: 2025-03-03

## 2025-02-01 RX ORDER — FUROSEMIDE 40 MG/1
40 TABLET ORAL DAILY
Qty: 60 TABLET | Refills: 0 | Status: SHIPPED | OUTPATIENT
Start: 2025-02-01

## 2025-02-01 RX ADMIN — SODIUM CHLORIDE, PRESERVATIVE FREE 10 ML: 5 INJECTION INTRAVENOUS at 08:32

## 2025-02-01 RX ADMIN — APIXABAN 5 MG: 5 TABLET, FILM COATED ORAL at 08:32

## 2025-02-01 RX ADMIN — PROBENECID 500 MG: 500 TABLET, FILM COATED ORAL at 08:31

## 2025-02-01 RX ADMIN — METOPROLOL SUCCINATE 100 MG: 100 TABLET, FILM COATED, EXTENDED RELEASE ORAL at 08:32

## 2025-02-01 RX ADMIN — LEVOTHYROXINE SODIUM 150 MCG: 75 TABLET ORAL at 08:31

## 2025-02-01 NOTE — PROGRESS NOTES
Internal Medicine Progress Note    Patient's name: Laurie Hall  : 1945  Chief complaints (on day of admission): Tachycardia (High HR at dr office. Sent by  for high HR and to get fluids. States she's been recently sick with URI) and Atrial Fibrillation  Admission date: 2025  Date of service: 2025   Room: [address]  Primary care physician: Umair Quiles DO  Reason for visit: Follow-up for afib and CHF    Subjective  Laurie was seen sitting up in bed. She tells me she is feeling fine. She states she is wanting to go home. She was seen on room air and denies difficulty breathing. She denies bothersome symptoms.     Review of Systems  Full 10 point review of systems negative unless mentioned above.    Hospital Medications  Current Facility-Administered Medications   Medication Dose Route Frequency Provider Last Rate Last Admin    furosemide (LASIX) tablet 40 mg  40 mg Oral Daily Garry Adam MD        naloxone 0.4 mg in 10 mL sodium chloride syringe   IntraVENous PRN Erick Adamson T, DO        sodium chloride flush 0.9 % injection 5-40 mL  5-40 mL IntraVENous 2 times per day Snow, Erick T, DO        sodium chloride flush 0.9 % injection 5-40 mL  5-40 mL IntraVENous PRN Sánchez Adamsonren T, DO        0.9 % sodium chloride infusion   IntraVENous PRN Sánchez Adamsonren T, DO        meperidine (DEMEROL) injection 12.5 mg  12.5 mg IntraVENous Once Erick Adamson T, DO        fentaNYL (SUBLIMAZE) injection 25 mcg  25 mcg IntraVENous Q5 Min PRN Sánchez Adamsonren T, DO        HYDROmorphone (DILAUDID) injection 0.5 mg  0.5 mg IntraVENous Q5 Min PRN Snow Erick T, DO        prochlorperazine (COMPAZINE) injection 5 mg  5 mg IntraVENous Once PRN Snow Erick T, DO        diphenhydrAMINE (BENADRYL) injection 12.5 mg  12.5 mg IntraVENous Once PRN Snow Erick T, DO        labetalol (NORMODYNE;TRANDATE) injection 5 mg  5 mg IntraVENous Q15 Min PRN Snow,

## 2025-02-01 NOTE — DISCHARGE SUMMARY
Internal Medicine Discharge Summary    NAME: Laurie Hall :  1945  MRN:  [id number] PCP:Umair Quiles DO    ADMITTED: 2025   DISCHARGED: 25    ADMITTING PHYSICIAN: Sher Alston DO    PCP: Umair Quiles DO    CONSULTANT(S):   IP CONSULT TO INTERNAL MEDICINE  IP CONSULT TO CARDIOLOGY  IP CONSULT TO PULMONOLOGY     ADMITTING DIAGNOSIS:   Atrial fibrillation with rapid ventricular response (HCC) [I48.91]     Please see H&P for further details    DISCHARGE DIAGNOSES:   Active Hospital Problems    Diagnosis     Atrial fibrillation with rapid ventricular response (HCC) [I48.91]      Priority: High    Chronic deep vein thrombosis (DVT) of popliteal vein (HCC) [I82.539]      Priority: Medium    Acute heart failure with preserved ejection fraction (HFpEF) (HCC) [I50.31]     Unable to walk [R26.2]     Severe protein-calorie malnutrition (HCC) [E43]     PVD (peripheral vascular disease) (HCC) [I73.9]     Bilateral pleural effusion [J90]     HLD (hyperlipidemia) [E78.5]     Obesity [E66.9]     HTN (hypertension) [I10]     Elevated troponin [R79.89]        BRIEF HISTORY OF PRESENT ILLNESS: Laurie Hall is a 79 y.o. female patient of Umair Quiles DO who  has a past medical history of Arthritis, Constipation, DVT (deep venous thrombosis) (HCC), Gout, History of COVID-19, Hyperlipidemia, Hypertension, Macular hole, right eye, and Thyroid disease. who originally had concerns including Tachycardia (High HR at dr office. Sent by  for high HR and to get fluids. States she's been recently sick with URI) and Atrial Fibrillation. at presentation on 2025, and was found to have Atrial fibrillation with rapid ventricular response (HCC) [I48.91] after workup.    Please see H&P for further details.    HOSPITAL COURSE:   The patient presented to the hospital with the chief complaint of Tachycardia (High HR at dr office. Sent by  for high HR and to get fluids. States she's been

## 2025-02-01 NOTE — PLAN OF CARE
Problem: Safety - Adult  Goal: Free from fall injury  Outcome: Completed     Problem: Skin/Tissue Integrity  Goal: Skin integrity remains intact  Description: 1.  Monitor for areas of redness and/or skin breakdown  2.  Assess vascular access sites hourly  3.  Every 4-6 hours minimum:  Change oxygen saturation probe site  4.  Every 4-6 hours:  If on nasal continuous positive airway pressure, respiratory therapy assess nares and determine need for appliance change or resting period  Outcome: Completed     Problem: ABCDS Injury Assessment  Goal: Absence of physical injury  Outcome: Completed     Problem: Neurosensory - Adult  Goal: Achieves stable or improved neurological status  Outcome: Completed  Goal: Absence of seizures  Outcome: Completed  Goal: Remains free of injury related to seizures activity  Outcome: Completed  Goal: Achieves maximal functionality and self care  Outcome: Completed     Problem: Respiratory - Adult  Goal: Achieves optimal ventilation and oxygenation  Outcome: Completed     Problem: Cardiovascular - Adult  Goal: Maintains optimal cardiac output and hemodynamic stability  Outcome: Completed  Goal: Absence of cardiac dysrhythmias or at baseline  Outcome: Completed     Problem: Skin/Tissue Integrity - Adult  Goal: Skin integrity remains intact  Description: 1.  Monitor for areas of redness and/or skin breakdown  2.  Assess vascular access sites hourly  3.  Every 4-6 hours minimum:  Change oxygen saturation probe site  4.  Every 4-6 hours:  If on nasal continuous positive airway pressure, respiratory therapy assess nares and determine need for appliance change or resting period  Outcome: Completed  Goal: Incisions, wounds, or drain sites healing without S/S of infection  Outcome: Completed  Goal: Oral mucous membranes remain intact  Outcome: Completed     Problem: Musculoskeletal - Adult  Goal: Return mobility to safest level of function  Outcome: Completed  Goal: Maintain proper alignment of

## 2025-02-01 NOTE — PROGRESS NOTES
INPATIENT CARDIOLOGY FOLLOW-UP    Name: Laurie Hall    Age: 79 y.o.    Date of Admission: 1/27/2025  4:37 PM    Date of Service: 2/1/2025    Primary Cardiologist: Known to Dr. Ascencio from current admission    Chief Complaint: Follow-up for new onset AF/HFpEF    Interim History:  Feels well wants to go home.  Maintaining sinus rhythm.  Denies chest pain or shortness of breath.    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    Bilateral pleural effusion    Onychomycosis    PVD (peripheral vascular disease) ([facility])    Difficulty walking    Weakness    Severe protein-calorie malnutrition ([facility])    Intractable back pain    Uncontrolled pain    Hip pain    Unable to walk    Chronic deep vein thrombosis (DVT) ([facility])    Stroke-like symptom    Stroke-like episode    Chronic deep vein thrombosis (DVT) of popliteal vein ([facility])    Atrial fibrillation with rapid ventricular response ([facility])    Acute heart failure with preserved ejection fraction (HFpEF) ([facility])       Current Medications:    Current Facility-Administered Medications:     furosemide (LASIX) tablet 40 mg, 40 mg, Oral, Daily, Garry Adam MD    naloxone 0.4 mg in 10 mL sodium chloride syringe, , IntraVENous, PRN, Snow, Erick T, DO    sodium chloride flush 0.9 % injection 5-40 mL, 5-40 mL, IntraVENous, 2 times per day, Snow, Erick T, DO    sodium chloride flush 0.9 % injection 5-40 mL, 5-40 mL, IntraVENous, PRN, Snow, Erick T, DO    0.9 % sodium chloride infusion, , IntraVENous, PRN, Snow, Erick T, DO    meperidine (DEMEROL)

## 2025-02-01 NOTE — PLAN OF CARE
Problem: Safety - Adult  Goal: Free from fall injury  1/31/2025 1941 by Mary Ríos RN  Outcome: Progressing  1/31/2025 1334 by Femi Montoya RN  Outcome: Progressing     Problem: Skin/Tissue Integrity  Goal: Skin integrity remains intact  Description: 1.  Monitor for areas of redness and/or skin breakdown  2.  Assess vascular access sites hourly  3.  Every 4-6 hours minimum:  Change oxygen saturation probe site  4.  Every 4-6 hours:  If on nasal continuous positive airway pressure, respiratory therapy assess nares and determine need for appliance change or resting period  1/31/2025 1941 by Mary Ríos RN  Outcome: Progressing  1/31/2025 1334 by Femi Montoya RN  Outcome: Progressing     Problem: ABCDS Injury Assessment  Goal: Absence of physical injury  1/31/2025 1941 by Mary Ríos RN  Outcome: Progressing  1/31/2025 1334 by Femi Montoya RN  Outcome: Progressing     Problem: Neurosensory - Adult  Goal: Achieves stable or improved neurological status  1/31/2025 1941 by Mary Ríos RN  Outcome: Progressing  1/31/2025 1334 by Femi Montoya RN  Outcome: Progressing  Goal: Absence of seizures  1/31/2025 1941 by Mary Ríos RN  Outcome: Progressing  1/31/2025 1334 by Femi Montoya RN  Outcome: Progressing  Goal: Remains free of injury related to seizures activity  Outcome: Progressing  Goal: Achieves maximal functionality and self care  Outcome: Progressing     Problem: Respiratory - Adult  Goal: Achieves optimal ventilation and oxygenation  Outcome: Progressing     Problem: Cardiovascular - Adult  Goal: Maintains optimal cardiac output and hemodynamic stability  Outcome: Progressing  Goal: Absence of cardiac dysrhythmias or at baseline  Outcome: Progressing     Problem: Skin/Tissue Integrity - Adult  Goal: Skin integrity remains intact  Description: 1.  Monitor for areas of redness and/or skin breakdown  2.  Assess vascular access sites hourly  3.  Every 4-6 hours minimum:  Change oxygen  level  Outcome: Progressing

## 2025-02-04 ENCOUNTER — TELEPHONE (OUTPATIENT)
Dept: CARDIOLOGY CLINIC | Age: 80
End: 2025-02-04

## 2025-02-04 NOTE — TELEPHONE ENCOUNTER
Can she follow-up with one of the [facility]'s in the next 4-6 weeks and then I can see her thereafter in 5/2025.

## 2025-02-04 NOTE — TELEPHONE ENCOUNTER
Pt phnd to schedule hosp fup appt with Dr Ascencio.  Please call pt to schedule [phone] (pt will be leaving at 3pm)

## 2025-02-05 NOTE — TELEPHONE ENCOUNTER
I called patient and tried to schedule with NP at the [facility] but she declined stating she is on a walker and would rather wait to be seen at the office. LORI is here only 2 days in February and those days are booked, schedule for March is not available yet, will keep patient on cancellation list.

## 2025-02-27 NOTE — PROGRESS NOTES
Food Insecurity: No Food Insecurity (1/27/2025)    Hunger Vital Sign     Worried About Running Out of Food in the Last Year: Never true     Ran Out of Food in the Last Year: Never true   Transportation Needs: No Transportation Needs (1/27/2025)    PRAPARE - Transportation     Lack of Transportation (Medical): No     Lack of Transportation (Non-Medical): No   Physical Activity: Not on file   Stress: Not on file   Social Connections: Not on file   Intimate Partner Violence: Not on file   Housing Stability: Low Risk  (1/27/2025)    Housing Stability Vital Sign     Unable to Pay for Housing in the Last Year: No     Number of Times Moved in the Last Year: 0     Homeless in the Last Year: No       Allergies:  Allergies   Allergen Reactions    Adrenalin [Epinephrine Hcl (Nasal)] Anaphylaxis    Epinephrine Shortness Of Breath and Palpitations       Current Medications:  Current Outpatient Medications   Medication Sig Dispense Refill    Coenzyme Q10 (COQ10) 200 MG CAPS Take by mouth      Multiple Vitamins-Minerals (PRESERVISION AREDS 2) CAPS Take by mouth      metoprolol succinate (TOPROL XL) 100 MG extended release tablet Take 1 tablet by mouth in the morning and at bedtime 60 tablet 0    furosemide (LASIX) 40 MG tablet Take 1 tablet by mouth daily 60 tablet 0    ALPRAZolam (XANAX) 0.5 MG tablet Take 1 tablet by mouth daily as needed.      levothyroxine (SYNTHROID) 150 MCG tablet Take 1 tablet by mouth daily      acetaminophen (TYLENOL) 650 MG extended release tablet Take 1 tablet by mouth every 8 hours as needed for Pain      Multiple Vitamins-Minerals (THERAPEUTIC MULTIVITAMIN-MINERALS) tablet Take 1 tablet by mouth daily      apixaban (ELIQUIS) 5 MG TABS tablet Take 1 tablet by mouth 2 times daily 60 tablet 0    atorvastatin (LIPITOR) 40 MG tablet Take 1 tablet by mouth nightly 30 tablet 3    probenecid (BENEMID) 500 MG tablet Take 1 tablet by mouth 2 times daily      ondansetron (ZOFRAN) 4 MG tablet Take 4 mg by mouth

## 2025-03-04 ENCOUNTER — OFFICE VISIT (OUTPATIENT)
Dept: CARDIOLOGY CLINIC | Age: 80
End: 2025-03-04
Payer: MEDICARE

## 2025-03-04 VITALS
DIASTOLIC BLOOD PRESSURE: 70 MMHG | OXYGEN SATURATION: 93 % | HEIGHT: 66 IN | SYSTOLIC BLOOD PRESSURE: 134 MMHG | RESPIRATION RATE: 18 BRPM | WEIGHT: 183.6 LBS | BODY MASS INDEX: 29.51 KG/M2 | TEMPERATURE: 97.1 F | HEART RATE: 65 BPM

## 2025-03-04 DIAGNOSIS — I10 PRIMARY HYPERTENSION: Primary | ICD-10-CM

## 2025-03-04 PROCEDURE — 1159F MED LIST DOCD IN RCRD: CPT | Performed by: NURSE PRACTITIONER

## 2025-03-04 PROCEDURE — 3078F DIAST BP <80 MM HG: CPT | Performed by: NURSE PRACTITIONER

## 2025-03-04 PROCEDURE — 99214 OFFICE O/P EST MOD 30 MIN: CPT | Performed by: NURSE PRACTITIONER

## 2025-03-04 PROCEDURE — 3075F SYST BP GE 130 - 139MM HG: CPT | Performed by: NURSE PRACTITIONER

## 2025-03-04 PROCEDURE — 1123F ACP DISCUSS/DSCN MKR DOCD: CPT | Performed by: NURSE PRACTITIONER

## 2025-03-04 PROCEDURE — 93000 ELECTROCARDIOGRAM COMPLETE: CPT | Performed by: INTERNAL MEDICINE

## 2025-03-04 RX ORDER — ANTIOX #8/OM3/DHA/EPA/LUT/ZEAX 250-2.5 MG
CAPSULE ORAL
COMMUNITY

## 2025-03-04 RX ORDER — AMPICILLIN TRIHYDRATE 250 MG
CAPSULE ORAL
COMMUNITY

## 2025-03-04 NOTE — PATIENT INSTRUCTIONS
Continue current medications as prescribed.     Review printed atrial fibrillation and heart failure recommendations.     Call office with weight gain of 3 lbs in 1 day or 5 lbs over 7 days.     Attempt to take your Lasix regularly as prescribed to avoid fluid overload.

## (undated) DEVICE — 3M™ IOBAN™ 2 ANTIMICROBIAL INCISE DRAPE 6650EZ: Brand: IOBAN™ 2

## (undated) DEVICE — SYRINGE 20ML LL S/C 50

## (undated) DEVICE — GARMENT,MEDLINE,DVT,INT,CALF,LG, GEN2: Brand: MEDLINE

## (undated) DEVICE — SHEET, T, LAPAROTOMY, STERILE: Brand: MEDLINE

## (undated) DEVICE — 5.0MM PRECISION ROUND

## (undated) DEVICE — BONE BIOPSY DEVICE F05A BBD SIZE 3: Brand: MEDTRONIC REUSABLE INSTRUMENTS

## (undated) DEVICE — INTENDED FOR TISSUE SEPARATION, AND OTHER PROCEDURES THAT REQUIRE A SHARP SURGICAL BLADE TO PUNCTURE OR CUT.: Brand: BARD-PARKER ® STAINLESS STEEL BLADES

## (undated) DEVICE — GAUZE,SPONGE,4"X4",16PLY,XRAY,STRL,LF: Brand: MEDLINE

## (undated) DEVICE — BRUNNS CURRETTES

## (undated) DEVICE — GLOVE ORANGE PI 8   MSG9080

## (undated) DEVICE — GOWN,SIRUS,FABRNF,XL,20/CS: Brand: MEDLINE

## (undated) DEVICE — 1.5L THIN WALL CAN: Brand: CRD

## (undated) DEVICE — GRADUATE

## (undated) DEVICE — NEEDLE SPNL L3.5IN PNK HUB S STL REG WALL FIT STYL W/ QNCKE

## (undated) DEVICE — APPLICATOR PREP 26ML 0.7% IOD POVACRYLEX 74% ISO ALC ST

## (undated) DEVICE — 1000 S-DRAPE TOWEL DRAPE 10/BX: Brand: STERI-DRAPE™

## (undated) DEVICE — TUBING SUCT 12FR MAL ALUM SHFT FN CAP VENT UNIV CONN W/ OBT

## (undated) DEVICE — E-Z CLEAN, NON-STICK, PTFE COATED, ELECTROSURGICAL BLADE ELECTRODE, 6.5 INCH (16.5 CM): Brand: MEGADYNE

## (undated) DEVICE — SURGICAL PROCEDURE PACK LAMINECTOMY LUMBAR

## (undated) DEVICE — SKIN AFFIX SURG ADHESIVE 72/CS 0.55ML: Brand: MEDLINE

## (undated) DEVICE — SET SPINAL NEURO STNEU1

## (undated) DEVICE — CLOTH SURG PREP PREOPERATIVE CHLORHEXIDINE GLUC 2% READYPREP

## (undated) DEVICE — INTRODUCER T15K ONE STEP OID BEVEL: Brand: KYPHON® ONE-STEP™ OSTEO INTRODUCER™ SYSTEM

## (undated) DEVICE — EXTRAS UGOKWE

## (undated) DEVICE — GLOVE ORANGE PI 7   MSG9070

## (undated) DEVICE — GLOVE SURG SZ 85 STD WHT LTX SYN POLYMER BEAD REINF ANTI RL

## (undated) DEVICE — CODMAN® SURGICAL PATTIES 1/2" X 1" (1.27CM X 2.54CM): Brand: CODMAN®

## (undated) DEVICE — LABEL MED 4 IN SURG PANEL W/ PEN STRL

## (undated) DEVICE — 3M(TM) MEDIPORE(TM) +PAD SOFT CLOTH ADHESIVE WOUND DRESSING 3569: Brand: 3M™ MEDIPORE™

## (undated) DEVICE — DOUBLE BASIN SET: Brand: MEDLINE INDUSTRIES, INC.

## (undated) DEVICE — SOLUTION IV IRRIG WATER 1000ML POUR BRL 2F7114

## (undated) DEVICE — READY WET SKIN SCRUB TRAY-LF: Brand: MEDLINE INDUSTRIES, INC.

## (undated) DEVICE — JACKSON TABLE POSITIONER KIT: Brand: MEDLINE INDUSTRIES, INC.

## (undated) DEVICE — TUBING, SUCTION, 3/16" X 12', STRAIGHT: Brand: MEDLINE

## (undated) DEVICE — HYPODERMIC SAFETY NEEDLE: Brand: MAGELLAN

## (undated) DEVICE — CODMAN® SURGICAL PATTIES 1/2" X 1/2" (1.27CM X 1.27CM): Brand: CODMAN®